# Patient Record
Sex: FEMALE | Race: WHITE | NOT HISPANIC OR LATINO | Employment: FULL TIME | URBAN - METROPOLITAN AREA
[De-identification: names, ages, dates, MRNs, and addresses within clinical notes are randomized per-mention and may not be internally consistent; named-entity substitution may affect disease eponyms.]

---

## 2017-01-13 ENCOUNTER — GENERIC CONVERSION - ENCOUNTER (OUTPATIENT)
Dept: OTHER | Facility: OTHER | Age: 62
End: 2017-01-13

## 2017-02-13 ENCOUNTER — GENERIC CONVERSION - ENCOUNTER (OUTPATIENT)
Dept: OTHER | Facility: OTHER | Age: 62
End: 2017-02-13

## 2017-03-16 ENCOUNTER — GENERIC CONVERSION - ENCOUNTER (OUTPATIENT)
Dept: OTHER | Facility: OTHER | Age: 62
End: 2017-03-16

## 2017-04-03 ENCOUNTER — ALLSCRIPTS OFFICE VISIT (OUTPATIENT)
Dept: OTHER | Facility: OTHER | Age: 62
End: 2017-04-03

## 2017-04-03 LAB
FLUAV AG SPEC QL IA: NEGATIVE
INFLUENZA B AG (HISTORICAL): NEGATIVE

## 2017-04-06 ENCOUNTER — ALLSCRIPTS OFFICE VISIT (OUTPATIENT)
Dept: OTHER | Facility: OTHER | Age: 62
End: 2017-04-06

## 2017-05-10 ENCOUNTER — ALLSCRIPTS OFFICE VISIT (OUTPATIENT)
Dept: OTHER | Facility: OTHER | Age: 62
End: 2017-05-10

## 2017-05-11 ENCOUNTER — GENERIC CONVERSION - ENCOUNTER (OUTPATIENT)
Dept: OTHER | Facility: OTHER | Age: 62
End: 2017-05-11

## 2017-05-12 ENCOUNTER — ALLSCRIPTS OFFICE VISIT (OUTPATIENT)
Dept: OTHER | Facility: OTHER | Age: 62
End: 2017-05-12

## 2017-05-15 ENCOUNTER — ALLSCRIPTS OFFICE VISIT (OUTPATIENT)
Dept: OTHER | Facility: OTHER | Age: 62
End: 2017-05-15

## 2017-05-23 ENCOUNTER — ALLSCRIPTS OFFICE VISIT (OUTPATIENT)
Dept: OTHER | Facility: OTHER | Age: 62
End: 2017-05-23

## 2017-05-23 ENCOUNTER — LAB REQUISITION (OUTPATIENT)
Dept: LAB | Facility: HOSPITAL | Age: 62
End: 2017-05-23
Payer: COMMERCIAL

## 2017-05-23 DIAGNOSIS — Z01.419 ENCOUNTER FOR GYNECOLOGICAL EXAMINATION WITHOUT ABNORMAL FINDING: ICD-10-CM

## 2017-05-23 PROCEDURE — 88175 CYTOPATH C/V AUTO FLUID REDO: CPT | Performed by: OBSTETRICS & GYNECOLOGY

## 2017-05-25 DIAGNOSIS — Z12.31 ENCOUNTER FOR SCREENING MAMMOGRAM FOR MALIGNANT NEOPLASM OF BREAST: ICD-10-CM

## 2017-06-01 LAB
LAB AP GYN PRIMARY INTERPRETATION: NORMAL
Lab: NORMAL

## 2017-06-02 ENCOUNTER — GENERIC CONVERSION - ENCOUNTER (OUTPATIENT)
Dept: OTHER | Facility: OTHER | Age: 62
End: 2017-06-02

## 2017-06-29 ENCOUNTER — ALLSCRIPTS OFFICE VISIT (OUTPATIENT)
Dept: RADIOLOGY | Facility: CLINIC | Age: 62
End: 2017-06-29
Payer: COMMERCIAL

## 2017-07-13 ENCOUNTER — GENERIC CONVERSION - ENCOUNTER (OUTPATIENT)
Dept: OTHER | Facility: OTHER | Age: 62
End: 2017-07-13

## 2017-09-21 ENCOUNTER — ALLSCRIPTS OFFICE VISIT (OUTPATIENT)
Dept: RADIOLOGY | Facility: CLINIC | Age: 62
End: 2017-09-21
Payer: COMMERCIAL

## 2017-10-09 ENCOUNTER — HOSPITAL ENCOUNTER (OUTPATIENT)
Dept: RADIOLOGY | Age: 62
Discharge: HOME | End: 2017-10-09
Payer: COMMERCIAL

## 2017-10-09 DIAGNOSIS — Z12.31 ENCOUNTER FOR SCREENING MAMMOGRAM FOR MALIGNANT NEOPLASM OF BREAST: ICD-10-CM

## 2017-10-09 PROCEDURE — 77063 BREAST TOMOSYNTHESIS BI: CPT

## 2017-10-09 PROCEDURE — G0202 SCR MAMMO BI INCL CAD: HCPCS

## 2017-10-13 ENCOUNTER — GENERIC CONVERSION - ENCOUNTER (OUTPATIENT)
Dept: OTHER | Facility: OTHER | Age: 62
End: 2017-10-13

## 2017-10-25 ENCOUNTER — TRANSCRIBE ORDERS (OUTPATIENT)
Dept: ADMINISTRATIVE | Facility: HOSPITAL | Age: 62
End: 2017-10-25

## 2017-10-25 DIAGNOSIS — M48.061 SPINAL STENOSIS, LUMBAR REGION, WITHOUT NEUROGENIC CLAUDICATION: Primary | ICD-10-CM

## 2017-11-07 ENCOUNTER — HOSPITAL ENCOUNTER (OUTPATIENT)
Dept: RADIOLOGY | Facility: HOSPITAL | Age: 62
Discharge: HOME | End: 2017-11-07
Attending: RADIOLOGY
Payer: COMMERCIAL

## 2017-11-07 ENCOUNTER — HOSPITAL ENCOUNTER (OUTPATIENT)
Dept: RADIOLOGY | Facility: HOSPITAL | Age: 62
Discharge: HOME | End: 2017-11-07
Payer: COMMERCIAL

## 2017-11-07 DIAGNOSIS — M48.061 SPINAL STENOSIS, LUMBAR REGION, WITHOUT NEUROGENIC CLAUDICATION: ICD-10-CM

## 2017-11-07 DIAGNOSIS — M48.061 SPINAL STENOSIS OF LUMBAR REGION WITHOUT NEUROGENIC CLAUDICATION: ICD-10-CM

## 2017-11-07 PROCEDURE — 72148 MRI LUMBAR SPINE W/O DYE: CPT

## 2017-11-15 ENCOUNTER — ALLSCRIPTS OFFICE VISIT (OUTPATIENT)
Dept: RADIOLOGY | Facility: CLINIC | Age: 62
End: 2017-11-15
Payer: COMMERCIAL

## 2017-11-20 ENCOUNTER — ALLSCRIPTS OFFICE VISIT (OUTPATIENT)
Dept: OTHER | Facility: OTHER | Age: 62
End: 2017-11-20

## 2017-11-20 ENCOUNTER — APPOINTMENT (OUTPATIENT)
Dept: RADIOLOGY | Age: 62
End: 2017-11-20
Payer: COMMERCIAL

## 2017-11-20 ENCOUNTER — TRANSCRIBE ORDERS (OUTPATIENT)
Dept: ADMINISTRATIVE | Age: 62
End: 2017-11-20

## 2017-11-20 DIAGNOSIS — M25.561 PAIN IN RIGHT KNEE: ICD-10-CM

## 2017-11-20 DIAGNOSIS — S89.91XA INJURY OF RIGHT LOWER LEG: ICD-10-CM

## 2017-11-20 PROCEDURE — 73562 X-RAY EXAM OF KNEE 3: CPT

## 2017-11-21 NOTE — PROGRESS NOTES
Assessment    1  Lumbar stenosis without neurogenic claudication (724 02) (M48 061)   2  Acute pain of right knee (719 46) (M25 561)   3  Benign essential HTN (401 1) (I10)    Plan  Acute pain of right knee    · * XR KNEE 3 VW RIGHT NON INJURY; Status:Active; Requested for:20Nov2017; Anxiety    · BusPIRone HCl - 5 MG Oral Tablet; TAKE 2 TABLETS AT NIGHT   · LORazepam 1 MG Oral Tablet; TAKE 1  5  tablets  AT BEDTIME AS NEEDED  Benign essential HTN    · Quinapril HCl - 10 MG Oral Tablet; TAKE 1 TABLET DAILY  Myofascial pain syndrome    · TiZANidine HCl - 4 MG Oral Capsule; TAKE 1 5 CAPSULE Daily as needed forpain    Discussion/Summary    Will obtain x-rays of the right knee  If there is no significant bony pathology present we will consider intra-articular steroid injection for pain relief  Chief Complaint  Pt presents for right knee pain, pains to walk and when laying downover a week ago but got worst last Saturday  Pain scale 6/10      History of Present Illness  HPI: The patient presents to the office with complaints of recent onset of right knee pain  She has a history of spinal stenosis and chronic left-sided sciatica  She has a spinal stimulator in place and recently had some modifications done to the settings of the spinal stimulator which improved her left-sided sciatic pain  Consequent to her left-sided sciatic pain she was probably favoring her left side and bearing most of her weight on her right side and as a consequence has developed some right knee pain  She had some company visiting her over the weekend and was on her feet quite a bit and states that by the end of the weekend she could barely stand up the pain was so severe in her right knee  She denies any redness or swelling but she continues to have some pain and discomfort in her knee which she describes at a level of 6/10 currently  She denies any instability of the knee or difficulty maintaining her weight        Review of Systems Constitutional: No fever, no chills, feels well, no tiredness, no recent weight gain or loss  ENT: no ear ache, no loss of hearing, no nosebleeds or nasal discharge, no sore throat or hoarseness  Cardiovascular: no complaints of slow or fast heart rate, no chest pain, no palpitations, no leg claudication or lower extremity edema  Respiratory: no complaints of shortness of breath, no wheezing, no dyspnea on exertion, no orthopnea or PND  Breasts: no complaints of breast pain, breast lump or nipple discharge  Genitourinary: no complaints of dysuria, no incontinence, no pelvic pain, no dysmenorrhea, no vaginal discharge or abnormal vaginal bleeding  Musculoskeletal: as noted in HPI  Neurological: no complaints of headache, no confusion, no numbness or tingling, no dizziness or fainting  Active Problems  1  Anxiety (300 00) (F41 9)   2  Benign essential HTN (401 1) (I10)   3  Cervical radiculopathy (723 4) (M54 12)   4  Disc disorder of cervical region (722 91) (M50 90)   5  Hormone replacement therapy (postmenopausal) (V07 4) (Z79 890)   6  Hot flash, menopausal (627 2) (N95 1)   7  Lumbar degenerative disc disease (722 52) (M51 36)   8  Lumbar facet arthropathy (721 3) (M12 88)   9  Lumbar radiculopathy (724 4) (M54 16)   10  Lumbar stenosis without neurogenic claudication (724 02) (M48 061)   11  Myofascial pain syndrome (729 1) (M79 1)   12  Pain syndrome, chronic (338 4) (G89 4)   13  Positive depression screening (796 4) (Z13 89)   14  Sacroiliitis (720 2) (M46 1)    Social History     · Denied: History of Alcohol Use (History)   · Daily caffeinated cola consumption   · Daily Coffee Consumption (3  Cups/Day)   · Denied: History of Drug Use   · Marital History - Currently    · Never A Smoker   · Occasional alcohol use   · Occupation:   · Business owner, [employer]   · Working Full Time  The social history was reviewed and is unchanged  Current Meds   1   Advil 200 MG Oral Tablet; TAKE 3 Respiratory effort: No increased work of breathing or signs of respiratory distress  Cardiovascular  Examination of extremities for edema and/or varicosities: Normal    Musculoskeletal  Gait and station: Normal    Digits and nails: Normal without clubbing or cyanosis  Inspection/palpation of joints, bones, and muscles: Abnormal  -- Mild arthritic changes in both knees with some medial tenderness on palpation of the right knee  No joint instability  Moderate crepitus was noted on range of motion of the right knee compared to the left  No evidence of joint effusion  Skin  Skin and subcutaneous tissue: Normal without rashes or lesions     Neurologic No focal neurological   Psychiatric  Orientation to person, place, and time: Normal    Mood and affect: Normal          Future Appointments    Date/Time Provider Specialty Site   05/25/2018 10:30 AM Avila Barber MD Obstetrics/Gynecology [facility]       Signatures   Electronically signed by : Kenyatta Rayo MD; Nov 20 2017  4:13PM EST                       (Author)

## 2017-11-22 ENCOUNTER — APPOINTMENT (EMERGENCY)
Dept: RADIOLOGY | Facility: HOSPITAL | Age: 62
End: 2017-11-22
Payer: COMMERCIAL

## 2017-11-22 ENCOUNTER — HOSPITAL ENCOUNTER (EMERGENCY)
Facility: HOSPITAL | Age: 62
Discharge: HOME | End: 2017-11-22
Attending: EMERGENCY MEDICINE | Admitting: EMERGENCY MEDICINE
Payer: COMMERCIAL

## 2017-11-22 ENCOUNTER — GENERIC CONVERSION - ENCOUNTER (OUTPATIENT)
Dept: OTHER | Facility: OTHER | Age: 62
End: 2017-11-22

## 2017-11-22 VITALS
SYSTOLIC BLOOD PRESSURE: 179 MMHG | RESPIRATION RATE: 18 BRPM | WEIGHT: 205 LBS | BODY MASS INDEX: 33.09 KG/M2 | OXYGEN SATURATION: 99 % | DIASTOLIC BLOOD PRESSURE: 84 MMHG | TEMPERATURE: 97.8 F | HEART RATE: 61 BPM

## 2017-11-22 DIAGNOSIS — M25.561 RIGHT KNEE PAIN: ICD-10-CM

## 2017-11-22 DIAGNOSIS — M19.90 DEGENERATIVE JOINT DISEASE: Primary | ICD-10-CM

## 2017-11-22 PROCEDURE — 99283 EMERGENCY DEPT VISIT LOW MDM: CPT

## 2017-11-22 PROCEDURE — 73564 X-RAY EXAM KNEE 4 OR MORE: CPT | Performed by: EMERGENCY MEDICINE

## 2017-11-22 RX ORDER — PREDNISONE 50 MG/1
50 TABLET ORAL DAILY
Qty: 5 TABLET | Refills: 0 | Status: SHIPPED | OUTPATIENT
Start: 2017-11-22 | End: 2017-11-27

## 2017-11-23 NOTE — DISCHARGE INSTRUCTIONS
Arthralgia   WHAT YOU NEED TO KNOW:   Arthralgia is pain in one or more joints, with no inflammation  It may be short-term and get better within 6 to 8 weeks  Arthralgia can be an early sign of arthritis  Arthralgia may be caused by a medical condition, such as a hormone disorder or a tumor  It may also be caused by an infection or injury  DISCHARGE INSTRUCTIONS:   Medicines: The following medicines may  be ordered for you:  · Acetaminophen  decreases pain  Ask how much to take and how often to take it  Follow directions  Acetaminophen can cause liver damage if not taken correctly  · NSAIDs  decrease pain and prevent swelling  Ask your healthcare provider which medicine is right for you  Ask how much to take and when to take it  Take as directed  NSAIDs can cause stomach bleeding and kidney problems if not taken correctly  · Pain relief cream  decreases pain  Use this cream as directed  · Take your medicine as directed  Contact your healthcare provider if you think your medicine is not helping or if you have side effects  Tell him of her if you are allergic to any medicine  Keep a list of the medicines, vitamins, and herbs you take  Include the amounts, and when and why you take them  Bring the list or the pill bottles to follow-up visits  Carry your medicine list with you in case of an emergency  Follow up with your healthcare provider or specialist as directed:  Write down your questions so you remember to ask them during your visits  Self-care:   · Apply heat  to help decrease pain  Use a heating pad or heat wrap  Apply heat for 20 to 30 minutes every 2 hours for as many days as directed  · Rest  as much as possible  Avoid activities that cause joint pain  · Apply ice  to help decrease swelling and pain  Ice may also help prevent tissue damage  Use an ice pack, or put crushed ice in a plastic bag   Cover it with a towel and place it on your painful joint for 15 to 20 minutes every hour or as directed  · Support  the joint with a brace or elastic wrap as directed  · Elevate  your joint above the level of your heart as often as you can to help decrease swelling and pain  Prop your painful joint on pillows or blankets to keep it elevated comfortably  · Lose weight  if you are overweight  Extra weight can put pressure on your joints and cause more pain  Ask your healthcare provider how much you should weigh  Ask him to help you create a weight loss plan  · Exercise  regularly to help improve joint movement and to decrease pain  Ask about the best exercise plan for you  Low-impact exercises can help take the pressure off your joints  Examples are walking, swimming, and water aerobics  Physical therapy:  A physical therapist teaches you exercises to help improve movement and strength, and to decrease pain  Ask your healthcare provider if physical therapy is right for you  Contact your healthcare provider or specialist if:   · You have a fever  · You continue to have joint pain that cannot be relieved with heat, ice, or medicine  · You have pain and inflammation around your joint  · You have questions or concerns about your condition or care  Return to the emergency department if:   · You have sudden, severe pain when you move your joint  · You have a fever and shaking chills  · You cannot move your joint  · You lose feeling on the side of your body where you have the painful joint  © 2017 [address]  Information is for End User's use only and may not be sold, redistributed or otherwise used for commercial purposes  All illustrations and images included in CareNotes® are the copyrighted property of A D A M , Inc  or Yobany Betancur  The above information is an  only  It is not intended as medical advice for individual conditions or treatments   Talk to your doctor, nurse or pharmacist before following any medical regimen to see if it is safe and effective for you  Knee Pain   Cal DEAN & Lottie BONDS: Hip and Knee Pain  In: Austen Quinteros, Luis GARCÍA, Murray, et al, eds  [employer]'s Textbook of Rheumatology, 9th ed  [address], [place], [place], 2013, [place] [phone]  Odilia Guzman, Von, Arjun MORALES, et al: Knee Pain  In: [employer], ed  Current Therapy in Pain, 1st ed  [address], [place], [place], 2009, [place] [phone]  Rachana Orta: Knee Pain  In: Mono FJ, ed  The 5-Minute Clinical Consult 2012, 20th ed  [address] [facility] [facility], [place], [place], 2012, [place] [phone]  © 2017 [address] [facility] Information is for End User's use only and may not be sold, redistributed or otherwise used for commercial purposes  All illustrations and images included in CareNotes® are the copyrighted property of A D A M , Inc  or [employer]  The above information is an  only  It is not intended as medical advice for individual conditions or treatments  Talk to your doctor, nurse or pharmacist before following any medical regimen to see if it is safe and effective for you  Osteoarthritis, Ambulatory Care   GENERAL INFORMATION:   Osteoarthritis  occurs when cartilage (tissue that cushions a joint) wears away slowly and causes the bones to rub together  Osteoarthritis (OA) is a long-term condition that often affects the hands, neck, lower back, knees, and hips  OA is also called arthrosis or degenerative joint disease    Common symptoms include the following:   · Joint pain that gets worse when you move the joint     · Joint stiffness that decreases after you move the joint     · Decreased range of movement     · Hard, bony enlargement on your fingers or toes    · A grinding or cracking sound when you move your joint  Seek immediate care for the following symptoms:   · Severe pain    · Not able to move your joint  Treatment for osteoarthritis  may include any of the following:  · Acetaminophen  is used to decrease pain  It is available without a doctor's order  Ask how much to take and how often to take it  Follow directions  Acetaminophen can cause liver damage if not taken correctly  · NSAIDs  help decrease swelling and pain or fever  This medicine is available with or without a doctor's order  NSAIDs can cause stomach bleeding or kidney problems in certain people  If you take blood thinner medicine, always ask your healthcare provider if NSAIDs are safe for you  Always read the medicine label and follow directions  · Capsaicin cream  may help decrease pain in your joint  · Prescription pain medicine  may be given to decrease severe pain if other medicines do not work  Take the medicine as directed  Do not wait until the pain is severe before you take your medicine  · A steroid injection  may be given if your symptoms get worse  · Physical therapy  may be ordered by your healthcare provider  A physical therapist teaches you exercises to help improve movement and strength, and to decrease pain in your joints  · Surgery  may be needed if other treatments do not work  Manage osteoarthritis   · Stay active  Physical activity may reduce your pain and improve your ability to do daily activities  Avoid activities that cause pain  Ask your healthcare provider what type of exercise would be best for you  · Maintain a healthy weight  This helps decrease the strain on the joints in your back, hips, knees, ankles, and feet  Ask your healthcare provider how much you should weigh  Ask him to help you create a weight loss plan if you are overweight  · Use heat or ice  on your joints as directed  Heat and ice help decrease pain, swelling, and muscle spasms  Use a heating pad on a low setting or take a warm bath  Use an ice pack, or put crushed ice in a plastic bag  Cover it with a towel  · Massage  the muscles around the joint to relieve pain and stiffness      · Use a cane, crutches, or a walker to protect and relieve pressure on your ankle, knee, and hip joints  You may also be prescribed shoe inserts to decrease pressure in your joints  · Wear flat or low-heeled shoes  This will help decrease pain and reduce pressure on your ankle, knee, and hip joints  Follow up with your healthcare provider as directed:  Write down your questions so you remember to ask them during your visits  CARE AGREEMENT:   You have the right to help plan your care  Learn about your health condition and how it may be treated  Discuss treatment options with your caregivers to decide what care you want to receive  You always have the right to refuse treatment  The above information is an  only  It is not intended as medical advice for individual conditions or treatments  Talk to your doctor, nurse or pharmacist before following any medical regimen to see if it is safe and effective for you  © 2014 [address] is for End User's use only and may not be sold, redistributed or otherwise used for commercial purposes  All illustrations and images included in CareNotes® are the copyrighted property of A D A M , Inc  or Yobany Betancur

## 2017-11-27 ENCOUNTER — GENERIC CONVERSION - ENCOUNTER (OUTPATIENT)
Dept: OTHER | Facility: OTHER | Age: 62
End: 2017-11-27

## 2017-11-28 ENCOUNTER — GENERIC CONVERSION - ENCOUNTER (OUTPATIENT)
Dept: OTHER | Facility: OTHER | Age: 62
End: 2017-11-28

## 2017-11-28 ENCOUNTER — TRANSCRIBE ORDERS (OUTPATIENT)
Dept: ADMINISTRATIVE | Facility: HOSPITAL | Age: 62
End: 2017-11-28

## 2017-11-28 DIAGNOSIS — S89.91XA INJURY OF RIGHT KNEE, INITIAL ENCOUNTER: Primary | ICD-10-CM

## 2017-12-07 ENCOUNTER — HOSPITAL ENCOUNTER (OUTPATIENT)
Dept: RADIOLOGY | Facility: HOSPITAL | Age: 62
Discharge: HOME | End: 2017-12-07
Payer: COMMERCIAL

## 2017-12-07 ENCOUNTER — GENERIC CONVERSION - ENCOUNTER (OUTPATIENT)
Dept: OTHER | Facility: OTHER | Age: 62
End: 2017-12-07

## 2017-12-07 DIAGNOSIS — S89.91XA INJURY OF RIGHT LOWER LEG: ICD-10-CM

## 2017-12-07 PROCEDURE — 73721 MRI JNT OF LWR EXTRE W/O DYE: CPT

## 2017-12-08 ENCOUNTER — ALLSCRIPTS OFFICE VISIT (OUTPATIENT)
Dept: OTHER | Facility: OTHER | Age: 62
End: 2017-12-08

## 2017-12-13 ENCOUNTER — GENERIC CONVERSION - ENCOUNTER (OUTPATIENT)
Dept: OTHER | Facility: OTHER | Age: 62
End: 2017-12-13

## 2017-12-21 ENCOUNTER — ALLSCRIPTS OFFICE VISIT (OUTPATIENT)
Dept: OTHER | Facility: OTHER | Age: 62
End: 2017-12-21

## 2017-12-21 DIAGNOSIS — M54.16 RADICULOPATHY OF LUMBAR REGION: ICD-10-CM

## 2017-12-21 DIAGNOSIS — E66.9 OBESITY: ICD-10-CM

## 2017-12-21 DIAGNOSIS — M54.12 RADICULOPATHY OF CERVICAL REGION: ICD-10-CM

## 2017-12-21 DIAGNOSIS — S83.241A OTHER TEAR OF MEDIAL MENISCUS, CURRENT INJURY, RIGHT KNEE, INITIAL ENCOUNTER: ICD-10-CM

## 2017-12-21 DIAGNOSIS — M17.11 PRIMARY OSTEOARTHRITIS OF RIGHT KNEE: ICD-10-CM

## 2017-12-21 DIAGNOSIS — M25.561 PAIN IN RIGHT KNEE: ICD-10-CM

## 2017-12-21 DIAGNOSIS — M51.36 OTHER INTERVERTEBRAL DISC DEGENERATION, LUMBAR REGION: ICD-10-CM

## 2017-12-21 DIAGNOSIS — F41.9 ANXIETY DISORDER: ICD-10-CM

## 2017-12-21 DIAGNOSIS — M50.90 CERVICAL DISC DISORDER: ICD-10-CM

## 2017-12-23 NOTE — PROGRESS NOTES
Assessment   1  Acute pain of right knee (079 46) (M25 561)   2  Tear of medial meniscus of right knee, current, unspecified tear type, initial encounter     (836 0) (S83 241A)   3  Primary osteoarthritis of right knee (715 16) (M17 11)    Plan   Acute pain of right knee, Anxiety, Primary osteoarthritis of right knee, Tear of medial    meniscus of right knee, current, unspecified tear type, initial encounter    · Meloxicam 15 MG Oral Tablet; TAKE 1 TABLET DAILY WITH FOOD   · *1 - [facility]  * right knee osteoarthritis, medial meniscus    tear, patellofemoral syndrome    Weight bearing as tolerated    VMO strengthening    Use all modalities as seen fit  2-3 times a week for 6-8 weeks    Thank you  Status: Active  Requested for: 29Pdp9949  Care Summary provided  : Yes    Discussion/Summary   Right knee DJD with possible acute tear of the meniscus  Plan is as follows:          Weightbearing as tolerated          Discussed curve sedative and operative interventions at this time we will commence with conservative treatment          Outpatient physical therapy          Follow-up in 2 months          The patient continues to be symptomatic, then we will discuss possible surgical intervention which may include arthroscopic surgery with partial medial meniscectomy          Discussed treatment plan with patient and she is in agreement treatment plan  Thank you         Chief Complaint   1  Knee Pain    History of Present Illness   HPI: 51-year-old female presents at this time secondary to right knee pain  Patient states that she has had some knee pain in the past and was scheduled for steroid injection by her pain management physician  She states that she was going downstairs but she forgot her fall  When she twisted her knee to go back of states she felt the pop and some swelling medially in the right knee  She states that the pain was localized over the medial side   She did receive steroid injections and states that she has felt much better  She denies any numbness tingling fevers chills  Review of Systems      ROS reviewed  Active Problems   1  Acute pain of right knee (719 46) (M25 561)   2  Anxiety (300 00) (F41 9)   3  Benign essential HTN (401 1) (I10)   4  Cervical radiculopathy (723 4) (M54 12)   5  Disc disorder of cervical region (722 91) (M50 90)   6  Hormone replacement therapy (postmenopausal) (V07 4) (Z79 890)   7  Hot flash, menopausal (627 2) (N95 1)   8  Lumbar degenerative disc disease (722 52) (M51 36)   9  Lumbar facet arthropathy (721 3) (M46 96)   10  Lumbar radiculopathy (724 4) (M54 16)   11  Lumbar stenosis without neurogenic claudication (724 02) (M48 061)   12  Myofascial pain syndrome (729 1) (M79 1)   13  Pain syndrome, chronic (338 4) (G89 4)   14  Positive depression screening (796 4) (Z13 89)   15  Primary osteoarthritis of right knee (715 16) (M17 11)   16  Right knee injury (959 7) (S89 91XA)   17  Sacroiliitis (720 2) (M46 1)   18   Tear of medial meniscus of right knee, current, unspecified tear type, initial encounter      (836 0) (B94 989Q)    Past Medical History    · History of Asymptomatic menopausal state (V49 81) (Z78 0)   · History of Cellulitis (682 9) (L03 90)   · History of Cervical radiculopathy (723 4) (M54 12)   · History of Cough (786 2) (R05)   · History of Ecchymoses, spontaneous (782 7) (R23 3)   · History of Encounter for screening mammogram for malignant neoplasm of breast    (V76 12) (Z12 31)   · History of acute bronchitis (V12 69) (Z87 09)   · History of cataract (V12 49) (Z86 69)   · History of herpes simplex infection (V12 09) (Z86 19)   · History of herpes simplex infection (V12 09) (Z86 19)   · History of hypertension (V12 59) (Z86 79)   · History of infectious mononucleosis (V12 09) (Z86 19)   · History of insomnia (V13 89) (Z87 898)   · History of low back pain (V13 59) (Z87 39)   · History of retinal detachment (V12 49) (Z86 69)   · History of thyroid disease (V12 29) (Z86 39)   · History of tonsillitis (V12 69) (Z87 09)   · History of vertigo (V12 49) (D52 665)   · History of Joint pain, knee (719 46) (M25 569)   · History of Laceration Of Eyelid (870 8)   · History of Lumbago With Sciatica (724 3)   · History of Menopause (627 2)   · History of Osteoarthritis of shoulder region, unspecified laterality   · Other muscle spasm (728 85) (S16 048)   · History of Other muscle spasm (728 85) (Q64 296)   · History of Pain in joint of right shoulder region (719 41) (M25 511)   · History of Pap smear, as part of routine gynecological examination (V76 2) (Z01 419)   · History of Plantar fasciitis (728 71) (M72 2)   · History of Preop examination (V72 84) (Z01 818)   · History of Ptosis of both eyelids (374 30) (H02 403)   · History of Ptosis of left eyelid (374 30) (H02 402)   · History of 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)   · History of Retinal tear of right eye (361 00) (H33 311)   · History of Screening for colon cancer (V76 51) (Z12 11)   · History of Shoulder Joint Disorder (719 91)   · History of Sprained Right Shoulder (840 9)   · History of Status post surgery (V45 89) (Z98 890)   · History of Thyrotoxicosis (242 90)   · 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    (242 41)   · History of Viral URI (465 9) (J06 9,B97 89)   · History of Visit for screening mammogram (V76 12) (Z12 31)     The active problems and past medical history were reviewed and updated today  Surgical History    · History of Denial Of Any Significant Medical History   · History of Implantation Of Intraspinal Neurostimulator By Laminectomy Epidural     The surgical history was reviewed and updated today         Family History   Mother    · Family history of Breast Cancer (V16 3)   · Family history of Chronic Obstructive Pulmonary Disease   · Family history of Essential Hypertension   · Family history of Family Health Status 1  Children Living   · Family history of Hypertension (V17 49)  Father    · Family history of Acute Myocardial Infarction (V17 3)   · Family history of Emphysema   · Family history of Essential Hypertension   · Family history of Prehypertension  Maternal Uncle    · Family history of Liver Cancer     The family history was reviewed and updated today  Social History    · Denied: History of Alcohol Use (History)   · Daily caffeinated cola consumption   · Daily Coffee Consumption (3  Cups/Day)   · Denied: History of Drug Use   · Marital History - Currently    · Never A Smoker   · Occasional alcohol use   · Occupation:   · Business owner, [employer]   · Working Full Time  The social history was reviewed and updated today  Current Meds    1  Advil 200 MG Oral Tablet; TAKE 3 TABLET 3 times daily; Therapy: (Recorded:32Ccd2590) to Recorded   2  BusPIRone HCl - 5 MG Oral Tablet; TAKE 2 TABLETS AT NIGHT; Therapy: 00EJV6382 to (Evaluate:19May2018)  Requested for: 77NJE9995; Last     Rx:20Nov2017 Ordered   3  Denavir 1 % External Cream; APPLY TOPICALLY DAILY AS NEEDED; Therapy: 27Ciw9327 to (Last Rx:28Vbp4512)  Requested for: 89Msa0227 Ordered   4  LORazepam 1 MG Oral Tablet; TAKE 1  5  tablets  AT BEDTIME AS NEEDED  Requested     for: 69LJS8480; Last Rx:20Nov2017 Ordered   5  Lyrica 50 MG Oral Capsule; TAKE 1 TAB IN AM AND 2 TABS QHS; Therapy: 48CNH0609 to (Evaluate:05Jun2018); Last Rx:34Ytz5943 Ordered   6  MethIMAzole 5 MG Oral Tablet; Take one tablet daily; Therapy: (Recorded:31Cdl6484) to Recorded   7  Propranolol HCl - 10 MG Oral Tablet; TAKE 1 TABLET TWICE DAILY  Requested for:     88TBZ7139; Last Rx:08Wyb0978 Ordered   8  Quinapril HCl - 10 MG Oral Tablet; TAKE 1 TABLET DAILY; Therapy: 23SZV7814 to (Evaluate:19May2018)  Requested for: 42ZXQ3289; Last     Rx:20Nov2017 Ordered   9   TiZANidine HCl - 4 MG Oral Capsule; TAKE 1 5 CAPSULE Daily as needed for pain Requested for: 20Nov2017; Last Rx:19Pjy9695 Ordered     The medication list was reviewed and updated today  Allergies   1  Adhesive Tape TAPE   2  Flexeril TABS   3  Naproxen Sodium TABS   4  Penicillins   5  Promethazine-Codeine SYRP   6  Tramadol    Vitals   Signs   Heart Rate: 72  Respiration: 18  Systolic: 692  Diastolic: 86  Height: 5 ft 6 in  Weight: 215 lb 6 08 oz  BMI Calculated: 34 76  BSA Calculated: 2 07    Physical Exam   Right knee:  No abrasions, no open wounds, medial joint line tenderness, very mild lateral joint line tenderness, no pain with valgus or varus stress, stable to valgus and varus stress, negative Lachman test, negative posterior drawer test, otherwise neurologically and vascularly intact distally         Constitutional - General appearance: Normal       Musculoskeletal - Gait and station: Abnormal -- Digits and nails: Normal -- Muscle strength/tone: Normal -- Lower extremity compartments: Normal       Cardiovascular - Pulses: Normal -- Examination of extremities for edema and/or varicosities: Normal       Skin - Skin and subcutaneous tissue: Normal       Neurologic - Sensation: Normal       Psychiatric - Orientation to person, place, and time: Normal -- Mood and affect: Normal       Results/Data   I personally reviewed the films/images/results in the office today  My interpretation follows  X-ray Review Right knee: Mild osteoarthritic changes with mild medial joint space narrowing  MRI Review MRI of right knee shows radial tear of the medial meniscus tricompartmental osteoarthritic changes  Future Appointments      Date/Time Provider Specialty Site   02/27/2018 10:00 AM CHANTALE Arroyo   Orthopedic Surgery [address]   12/27/2017 11:45 AM Elisabet Gonzalez MD Internal Medicine [facility]   05/25/2018 10:30 AM Tito Uribe MD Obstetrics/Gynecology [facility]     Signatures    Electronically signed by : Celso Gonzales CHANTALE Dial ; Dec 22 2017 11:13AM EST                       (Author)

## 2017-12-27 ENCOUNTER — GENERIC CONVERSION - ENCOUNTER (OUTPATIENT)
Dept: OTHER | Facility: OTHER | Age: 62
End: 2017-12-27

## 2018-01-05 ENCOUNTER — GENERIC CONVERSION - ENCOUNTER (OUTPATIENT)
Dept: OTHER | Facility: OTHER | Age: 63
End: 2018-01-05

## 2018-01-09 ENCOUNTER — GENERIC CONVERSION - ENCOUNTER (OUTPATIENT)
Dept: OTHER | Facility: OTHER | Age: 63
End: 2018-01-09

## 2018-01-10 ENCOUNTER — GENERIC CONVERSION - ENCOUNTER (OUTPATIENT)
Dept: OBGYN CLINIC | Facility: HOSPITAL | Age: 63
End: 2018-01-10

## 2018-01-10 NOTE — MISCELLANEOUS
Message   Recorded as Task   Date: 02/05/2016 08:59 AM, Created By: Hillary Ferrera   Task Name: Medical Complaint Callback   Assigned To: Hillary Ferrera   Regarding Patient: Pamela Mahmood, Status: Active   Comment:    Costenbader,Rondel - 05 Feb 2016 8:59 AM     TASK CREATED  Pt called and requested an appt for incision check  States her battery site incision "looks worse"  Swollen and very tender  Appt scheduled with Jodi Pereyra for 2pm today          Signatures   Electronically signed by : Leeann Avalos, ; Feb 5 2016  9:00AM EST                       (Author)

## 2018-01-10 NOTE — PROGRESS NOTES
Assessment    1  Pre-op evaluation (V72 84) (Z01 818)   2  Benign essential HTN (401 1) (I10)   3  Lumbar degenerative disc disease (722 52) (M51 36)    Discussion/Summary  Surgical Clearance: She is at a LOW TO MODERATE risk from a cardiovascular standpoint at this time without any additional cardiac testing  Reevaluation needed, if she should present with symptoms prior to surgery/procedure  Released from Work: Unable to return to work until further notice  Reviewed MRI results and blood work with patient  Educated patient to make follow up apt with Dr Wandy Silva for routine health maintenance  The patient was counseled regarding diagnostic results, instructions for management, risk factor reductions, prognosis, patient and family education, impressions, risks and benefits of treatment options, importance of compliance with treatment  Chief Complaint  Patient is her for a Pre-Op exam  She is scheduled for placement of a thoracic spinal cord stimulator with left buttock internal pulse generator on 1/26/2016 by CHANTALE Lynch  at [facility]  Labs for clearance are located in patient's chart  History of Present Illness  Pre-Op Visit (Brief): The patient is being seen for a preoperative visit  The procedure is a(n) Placement of a thoracic spinal cord stimulator with left buttock internal pulse generator scheduled for 1/26/16 with Courtney Maria  The indication for surgery is Thoracic spine pain  Surgical Risk Assessment:   Prior Anesthesia: She had prior anesthesia and no prior adverse reaction to general anesthesia  Pertinent Past Medical History: Hyperthyroidism, Chronic back pain, HTN  Exercise Capacity: able to walk four blocks without symptoms and able to walk two flights of stairs without symptoms  Lifestyle Factors: denies alcohol use, denies tobacco use and denies illegal drug use  Symptoms: no symptoms   Other VICKEY risk factors include age over 48, but normal BMI, female gender and normal neck circumference  Predicted risk of VICKEY: None  Pertinent Family History: Heart disease, empyemaischemic heart disease, but no family history of an adverse reaction to anesthesia, no aneurysm, no bleeding problems, no sudden early deaths and no stroke  Living Situation: home is secure and supportive and no post-op concerns with her living situation  Review of Systems    Constitutional: no fever, not feeling poorly, no chills and not feeling tired  Eyes: no eyesight problems  ENT: no sore throat  Cardiovascular: no chest pain and no palpitations  Respiratory: no shortness of breath, no cough and no wheezing  Gastrointestinal: no abdominal pain, no nausea and no diarrhea  Musculoskeletal: arthralgias and Chronic lower back pain, but as noted in HPI and no myalgias  Integumentary: no rashes  Neurological: no headache  Psychiatric: no anxiety and no depression  Hematologic/Lymphatic: no tendency for easy bleeding and no tendency for easy bruising  ROS reviewed  Active Problems    1  Anxiety (300 00) (F41 9)   2  Benign essential HTN (401 1) (I10)   3  Cervical radiculopathy (723 4) (M54 12)   4  Degeneration of cervical intervertebral disc (722 4) (M50 90)   5  Hormone replacement therapy (postmenopausal) (V07 4) (Z79 890)   6  Low back pain (724 2) (M54 5)   7  Lumbar degenerative disc disease (722 52) (M51 36)   8  Lumbar facet arthropathy (721 3) (M47 816)   9  Lumbar radiculopathy (724 4) (M54 16)   10  Lumbar stenosis without neurogenic claudication (724 02) (M48 06)   11  Myofascial pain syndrome (729 1) (M79 1)   12  Pain syndrome, chronic (338 4) (G89 4)   13   Sacroiliitis (720 2) (M46 1)    Past Medical History    · History of Asymptomatic menopausal state (V49 81) (Z78 0)   · History of Cellulitis (682 9) (L03 90)   · History of Cervical radiculopathy (723 4) (M54 12)   · History of Cough (786 2) (R05)   · History of Ecchymoses, spontaneous (782 7) (R23 3)   · History of Encounter for screening mammogram for malignant neoplasm of breast  (V76 12) (Z12 31)   · History of acute bronchitis (V12 69) (Z87 09)   · History of cataract (V12 49) (Z86 69)   · History of herpes simplex infection (V12 09) (Z86 19)   · History of herpes simplex infection (V12 09) (Z86 19)   · History of hypertension (V12 59) (Z86 79)   · History of infectious mononucleosis (V12 09) (Z86 19)   · History of insomnia (V13 89) (Z87 898)   · History of retinal detachment (V12 49) (Z86 69)   · History of thyroid disease (V12 29) (Z86 39)   · History of vertigo (V12 49) (Z87 898)   · History of Joint pain, knee (719 46) (M25 569)   · History of Laceration Of Eyelid (870 8)   · History of Lumbago With Sciatica (724 3)   · History of Menopause (627 2)   · History of Osteoarthritis of shoulder region, unspecified laterality   · Other muscle spasm (728 85) (I17 541)   · History of Other muscle spasm (728 85) (X18 609)   · History of Pain in joint of right shoulder region (719 41) (M25 511)   · History of Pap smear, as part of routine gynecological examination (V76 2) (Z12 4)   · History of Plantar fasciitis (728 71) (M72 2)   · History of Preop examination (V72 84) (Z01 818)   · History of Ptosis of left eyelid (374 30) (H02 402)   · History of Retinal tear of right eye (361 00) (H33 001)   · History of Screening for colon cancer (V76 51) (Z12 11)   · History of Shoulder Joint Disorder (719 91)   · History of Sprained Right Shoulder (840 9)   · History of Status post surgery (V45 89) (Z98 89)   · History of Thyrotoxicosis (242 90)   · 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  (242 41)   · History of Visit for screening mammogram (V76 12) (Z12 31)    The active problems and past medical history were reviewed and updated today        Surgical History    · History of Denial Of Any Significant Medical History    The surgical history was reviewed and updated today  Family History    · Family history of Breast Cancer (V16 3)   · Family history of Chronic Obstructive Pulmonary Disease   · Family history of Essential Hypertension   · Family history of Family Health Status 1  Children Living   · Family history of Hypertension (V17 49)    · Family history of Acute Myocardial Infarction (V17 3)   · Family history of Emphysema   · Family history of Essential Hypertension   · Family history of Prehypertension    · Family history of Liver Cancer    The family history was reviewed and updated today  Social History    · Denied: History of Alcohol Use (History)   · Daily caffeinated cola consumption   · Daily Coffee Consumption (3  Cups/Day)   · Denied: History of Drug Use   · Marital History - Currently    · Never A Smoker   · Occasional alcohol use   · Occupation:   · Business owner, [employer]   · Working Full Time  The social history was reviewed and updated today  Current Meds   1  BusPIRone HCl - 5 MG Oral Tablet; TAKE 2 TABLETS AT NIGHT; Therapy: 93RUA5241 to (Evaluate:16Mar2016)  Requested for: 12Jan2016; Last   Rx:18Sep2015; Status: ACTIVE - Renewal Denied Ordered   2  Estradiol 0 5 MG Oral Tablet; take 1 tablet twice a day; Therapy: 21HLO4844 to (Last Rx:11Nov2015)  Requested for: 12VXY6396 Ordered   3  LORazepam 1 MG Oral Tablet; TAKE 1  5  tablets  AT BEDTIME AS NEEDED  Requested   for: 12Jan2016; Last ER:24VON4038; Status: ACTIVE - Renewal Denied Ordered   4  Lyrica 50 MG Oral Capsule; TAKE 1 CAPSULE 3 TIMES DAILY; Therapy: 47Akg6494 to (Evaluate:25Imf7402); Last Rx:19Nov2015 Ordered   5  Methimazole 5 MG Oral Tablet; Take 1 tablet daily; Therapy: (Recorded:54Oid2718) to Recorded   6  Norethindrone Acetate 5 MG Oral Tablet; One tablet daily for ten days every other month; Therapy: 08MEO1318 to 050 558 89 71)  Requested for: 17YUF7563; Last   Rx:29Jan2014 Ordered   7   Norethindrone Acetate 5 MG Oral Tablet; TAKE 1 TABLET DAILY FOR TEN DAYS EVERY   OTHER MONTH;   Therapy: 94DRC2811 to (Judson Mcgregor)  Requested for: 62GZD8506; Last   Rx:02Nov2015 Ordered   8  Propranolol HCl - 10 MG Oral Tablet; Take 1 tablet twice daily; Therapy: 71Lkj0944 to (Evaluate:28Fvf7183)  Requested for: 05EOP5447; Last   Rx:17Mar2014 Ordered   9  Quinapril HCl - 20 MG Oral Tablet; TAKE 1 TABLET DAILY; Therapy: 71Bnz4985 to (Evaluate:17Jan2016)  Requested for: 60LJT7925; Last   Rx:29Quv1438; Status: ACTIVE - Renewal Denied Ordered   10  TiZANidine HCl - 4 MG Oral Tablet; TAKE 1 TABLET Twice daily PRN SPASM; Therapy: 28DQS4735 to (Evaluate:44Tpq7410)  Requested for: 56Zsw5037; Last    Rx:96Ysp3782 Ordered   11  Triamterene-HCTZ 37 5-25 MG Oral Capsule; TAKE ONE CAPSULE BY MOUTH EVERY    DAY; Therapy: 65Zsu6538 to (Last Rx:76Zks6329)  Requested for: 57GPR2855 Ordered    The medication list was reviewed and updated today  Allergies    1  Flexeril TABS   2  Naproxen Sodium TABS   3  Penicillins   4  Promethazine-Codeine SYRP   5  Tramadol    Vitals   Recorded: 20Jan2016 10:48AM   Temperature 98 F, Oral   Heart Rate 64   Systolic 466, RUE, Sitting   Diastolic 68, RUE, Sitting   BP Comments MANUAL BP   Height 5 ft 6 in   Weight 189 lb 8 oz   BMI Calculated 30 59   BSA Calculated 1 95   O2 Saturation 97     Physical Exam    Constitutional   General appearance: No acute distress, well appearing and well nourished  Eyes   Conjunctiva and lids: Abnormal   R hyperemic conjuctiva from recent surgery  Pupils and irises: Equal, round and reactive to light  Pulmonary   Respiratory effort: No increased work of breathing or signs of respiratory distress  Auscultation of lungs: Clear to auscultation  Cardiovascular   Auscultation of heart: Normal rate and rhythm, normal S1 and S2, without murmurs  Examination of extremities for edema and/or varicosities: Normal     Carotid pulses: Normal     Abdomen   Abdomen: Non-tender, no masses  Liver and spleen: No hepatomegaly or splenomegaly  Musculoskeletal   Gait and station: Normal     Skin   Skin and subcutaneous tissue: Normal without rashes or lesions  Neurologic   Cranial nerves: Cranial nerves 2-12 intact  Psychiatric   Orientation to person, place, and time: Normal     Mood and affect: Normal          End of Encounter Meds    1  BusPIRone HCl - 5 MG Oral Tablet; TAKE 2 TABLETS AT NIGHT; Therapy: 80DWS1304 to (Evaluate:16Mar2016)  Requested for: 12Jan2016; Last   Rx:42Gzk5261; Status: ACTIVE - Renewal Denied Ordered   2  LORazepam 1 MG Oral Tablet; TAKE 1  5  tablets  AT BEDTIME AS NEEDED  Requested   for: 12Jan2016; Last SS:68WCH1158; Status: ACTIVE - Renewal Denied Ordered    3  Quinapril HCl - 20 MG Oral Tablet; TAKE 1 TABLET DAILY; Therapy: 11Aug2011 to (Evaluate:17Jan2016)  Requested for: 92WFR0259; Last   Rx:48Ueo9855; Status: ACTIVE - Renewal Denied Ordered   4  Triamterene-HCTZ 37 5-25 MG Oral Capsule; TAKE ONE CAPSULE BY MOUTH EVERY   DAY; Therapy: 79Cgf8038 to (Last Rx:27Aiu9142)  Requested for: 09EJI7887 Ordered    5  Norethindrone Acetate 5 MG Oral Tablet; TAKE 1 TABLET DAILY FOR TEN DAYS EVERY   OTHER MONTH;   Therapy: 86NNY9297 to (Shun Dangelo)  Requested for: 26HSW0228; Last   Rx:02Nov2015 Ordered    6  Lyrica 50 MG Oral Capsule; TAKE 1 CAPSULE 3 TIMES DAILY; Therapy: 90Fyf4969 to (Evaluate:02Bor3528); Last Rx:19Nov2015 Ordered    7  TiZANidine HCl - 4 MG Oral Tablet; TAKE 1 TABLET Twice daily PRN SPASM; Therapy: 07PNX9898 to (Evaluate:51Jmr9575)  Requested for: 69Kxx8896; Last   Rx:18Sep2015 Ordered    8  Estradiol 0 5 MG Oral Tablet; take 1 tablet twice a day; Therapy: 76XHS8319 to (Last Rx:11Nov2015)  Requested for: 29XNQ9339 Ordered    9  Norethindrone Acetate 5 MG Oral Tablet; One tablet daily for ten days every other month; Therapy: 97OOT2004 to [phone])  Requested for: 04AXP1227; Last   Rx:29Jan2014 Ordered    10   Propranolol HCl - 10 MG Oral Tablet; Take 1 tablet twice daily; Therapy: 64Ktc1750 to (Evaluate:20Ybm7737)  Requested for: 86NCY1640; Last    Rx:17Mar2014 Ordered    11  Methimazole 5 MG Oral Tablet; Take 1 tablet daily; Therapy: (Recorded:67Cti7960) to Recorded    Future Appointments    Date/Time Provider Specialty Site   01/26/2016 02:30 PM CHANTALE Davis  Neurosurgery [address] OR   03/10/2016 11:15 AM CHANTALE Davis   Neurosurgery [facility] NEUROSURGICAL   03/10/2016 11:00 AM Ezekiel Mcintosh, [facility] NEUROSURGICAL   02/09/2016 09:15 AM Sandra Zarate, [facility] Neurosurgery [facility] NEUROSURGICAL   02/10/2016 11:00 AM Yamila Lema MD Obstetrics/Gynecology [facility]     Signatures   Electronically signed by : Mitali Rivera, [address] [place] [address]; Jan 20 2016 12:21PM EST                       (Author)    Electronically signed by : Brianna Mujica MD; Jan 20 2016  4:41PM EST                       (Author)

## 2018-01-10 NOTE — MISCELLANEOUS
Message   Recorded as Task   Date: 09/21/2016 09:55 AM, Created By: Raynard Halsted   Task Name: Call Back   Assigned To: [facility]ney clinical,Team   Regarding Patient: Lg Chang, Status: In Progress   Comment:    Laisha Wheeler - 21 Sep 2016 9:55 AM     TASK CREATED  Caller: Self; General Medical Question; [phone] (Home); [phone] x,,,,, (Work)  Received a vmlom from [phone] this am from the pt  stating that she knows she has multiple issues going on with her body  She has had 3 eye surgeries previously but she continues with blurry vision which there seems to be no cause  She is wondering if she could be having that as a SE from one of her medications  She did s/w the pharmacist who stated that it could possibly be the Lyrica  She would like to know what you think about that? Do you think she should change her meds? As anyone else been affected with blurry vision? FQ to advise  thanks   Yen Kimbrough - 21 Sep 2016 11:20 AM     TASK REPLIED TO: Previously Assigned To [facility] clinical,Team  she could get it from [facility]   but she's on such a low dose  she could try weaning herself off of it by taking 1 tab BID for 3 days, then one tab QHS and see how she does   Laisha Wheeler - 21 Sep 2016 1:44 PM     TASK EDITED  Attempted to call the pt  and left a detailed mom in regards to the previous task  Laisha Wheeler - 21 Sep 2016 1:44 PM     TASK IN PROGRESS   Marissa Martinez - 27 Sep 2016 2:40 PM     TASK EDITED  S/W pt and she did get the message last wk from our nurse  She said she was in our dept the very next day and did discuss with Dr Mario Mosher then  Pt appreciated the f/u call  Active Problems    1  Anxiety (300 00) (F41 9)   2  Benign essential HTN (401 1) (I10)   3  Cervical radiculopathy (723 4) (M54 12)   4  Degeneration of cervical intervertebral disc (722 4) (M50 90)   5  Encounter for gynecological examination without abnormal finding (V72 31) (Z01 419)   6   Encounter for screening mammogram for malignant neoplasm of breast (V76 12)   (Z12 31)   7  Hormone replacement therapy (postmenopausal) (V07 4) (Z79 890)   8  Low back pain (724 2) (M54 5)   9  Lumbar degenerative disc disease (722 52) (M51 36)   10  Lumbar facet arthropathy (721 3) (M12 88)   11  Lumbar radiculopathy (724 4) (M54 16)   12  Lumbar stenosis without neurogenic claudication (724 02) (M48 06)   13  Myofascial pain syndrome (729 1) (M79 1)   14  Pain syndrome, chronic (338 4) (G89 4)   15  Sacroiliitis (720 2) (M46 1)   16  Screening cholesterol level (V77 91) (Z13 220)    Current Meds   1  Advil 200 MG Oral Tablet; TAKE 3 TABLET 3 times daily; Therapy: (Recorded:95Lmh4420) to Recorded   2  BusPIRone HCl - 5 MG Oral Tablet; TAKE 2 TABLETS AT NIGHT; Therapy: 65OZE8453 to (Evaluate:97Bhy5337)  Requested for: 29JGD6034; Last   Rx:30Jun2016 Ordered   3  Estradiol 0 5 MG Oral Tablet; take 1 tablet twice a day; Therapy: 03BQC8874 to (Last Rx:11Nov2015)  Requested for: 13IHJ2318 Ordered   4  LORazepam 1 MG Oral Tablet; TAKE 1  5  tablets  AT BEDTIME AS NEEDED  Requested   for: 21Jun2016; Last Rx:21Jun2016 Ordered   5  Lyrica 50 MG Oral Capsule; 1 CAPSULE in the am and 2 caps at night; Therapy: 98GDU3294 to (Evaluate:09Agv7445); Last GP:53HVV6569 Ordered   6  Methimazole 5 MG Oral Tablet; Take 1 tablet daily; Therapy: (Recorded:73Uov6283) to Recorded   7  Norethindrone Acetate 5 MG Oral Tablet; TAKE 1 TABLET DAILY FOR TEN DAYS EVERY   OTHER MONTH;   Therapy: 74XJN8119 to (Melissa Menon)  Requested for: 06RXQ3272; Last   Rx:02Nov2015 Ordered   8  Propranolol HCl - 10 MG Oral Tablet; take 1/2 in the A M  and one tablet in at bed time; Therapy: (Recorded:09Jun2016) to Recorded   9  Quinapril HCl - 10 MG Oral Tablet; TAKE 1 TABLET DAILY; Therapy: 51KHX6532 to (Evaluate:36Nwp9194); Last Rx:30Jun2016 Ordered   10   TiZANidine HCl - 4 MG Oral Capsule; TAKE 1 5 CAPSULE Daily  Requested for:    66Ncd3896; Last ZU:98MOX8864 Ordered    Allergies    1  Adhesive Tape TAPE   2  Flexeril TABS   3  Penicillins   4  Naproxen Sodium TABS   5  Promethazine-Codeine SYRP   6   Tramadol    Signatures   Electronically signed by : Lang Schirmer, ; Sep 27 2016  2:41PM EST                       (Author)

## 2018-01-10 NOTE — MISCELLANEOUS
Message   Recorded as Task   Date: 11/27/2017 08:58 AM, Created By: Beka Nair   Task Name: Miscellaneous   Assigned To: Venancio Braun clinical,Team   Regarding Patient: Jeromy Munoz, Status: Active   Comment:    Beka Nair - 27 Nov 2017 8:58 AM     TASK CREATED  Pt called stating that she went to the ER on Wednesday because she twisted her knee and heard something pop  Pt states that they recommended she follow-up with Dr Kreg Spatz and possibly have an MRI done  Pt states that she has an appt scheduled for a procedure on the same knee (R) on 12/8 and wants to know if Dr Kreg Spatz wants to order an MRI or any other tests to be done prior to the procedure  Pt can be reached at [phone]  Kaylan Langston - 27 Nov 2017 9:16 AM     TASK EDITED  S/w pt  who went to [facility] ER on 11/22 after being on the stairs at home and twisted her knee and her a loud pop with severe pain into the R knee  PT  went to the ER where they took x-rays, MD stated that there was not much change from x-ray on 11/22 that it showed arthritis  Pt  was given a steroid pack which has given her relief from pain and swelling, has one more day of pack  Pt  scheduled for R knee injection on 12/8 at 3:30  Pt  questioning if she should be seen prior to inj or get an MRI prior  Please advise  Messi Garcia - 27 Nov 2017 9:36 AM     TASK REPLIED TO: Previously Assigned To Messi Garcia  please have her schedule with an NP for re-eval   Kaylan Langston - 27 Nov 2017 9:53 AM     TASK EDITED  Called pt  to make an appt with HA on 12/6 at 2:45  Pt  scheduled for OPRO on 12/8, SOVS with HA will re-evaluate if pt can have OPRO on 12/8  Pt  requesting to have SOVS sooner, made a note in IDX if anything opens up with either NP to call her  Pt  verbalized understanding  Active Problems    1  Acute pain of right knee (719 46) (M25 561)   2  Anxiety (300 00) (F41 9)   3  Benign essential HTN (401 1) (I10)   4   Cervical radiculopathy (723 4) (M54 12)   5  Disc disorder of cervical region (722 91) (M50 90)   6  Hormone replacement therapy (postmenopausal) (V07 4) (Z79 890)   7  Hot flash, menopausal (627 2) (N95 1)   8  Lumbar degenerative disc disease (722 52) (M51 36)   9  Lumbar facet arthropathy (721 3) (M12 88)   10  Lumbar radiculopathy (724 4) (M54 16)   11  Lumbar stenosis without neurogenic claudication (724 02) (M48 061)   12  Myofascial pain syndrome (729 1) (M79 1)   13  Pain syndrome, chronic (338 4) (G89 4)   14  Positive depression screening (796 4) (Z13 89)   15  Sacroiliitis (720 2) (M46 1)    Current Meds   1  Advil 200 MG Oral Tablet; TAKE 3 TABLET 3 times daily; Therapy: (Recorded:41Xgu8679) to Recorded   2  BusPIRone HCl - 5 MG Oral Tablet; TAKE 2 TABLETS AT NIGHT; Therapy: 72LKH2812 to (Evaluate:56Zjw8836)  Requested for: 84AKB5162; Last   Rx:20Nov2017 Ordered   3  Denavir 1 % External Cream; APPLY TOPICALLY DAILY AS NEEDED; Therapy: 24Mhy8289 to (Last Rx:85Qif1101)  Requested for: 06Kjs9461 Ordered   4  LORazepam 1 MG Oral Tablet; TAKE 1  5  tablets  AT BEDTIME AS NEEDED  Requested   for: 81AEF7079; Last Rx:20Nov2017 Ordered   5  Lyrica 50 MG Oral Capsule; TAKE 1 TAB IN AM AND 2 TABS QHS; Therapy: 72JSL2653 to (Evaluate:54Gsm4088); Last Rx:15Jun2017 Ordered   6  MethIMAzole 5 MG Oral Tablet; Take one tablet daily; Therapy: (Recorded:73Bxj3748) to Recorded   7  Propranolol HCl - 10 MG Oral Tablet; TAKE 1 TABLET TWICE DAILY  Requested for:   31VIR3622; Last Rx:72Wzq3441 Ordered   8  Quinapril HCl - 10 MG Oral Tablet; TAKE 1 TABLET DAILY; Therapy: 46NGR3622 to (Evaluate:01Dva5470)  Requested for: 57DGK7438; Last   Rx:20Nov2017 Ordered   9  TiZANidine HCl - 4 MG Oral Capsule; TAKE 1 5 CAPSULE Daily as needed for pain    Requested for: 20Nov2017; Last Rx:20Nov2017 Ordered    Allergies    1  Adhesive Tape TAPE   2  Flexeril TABS   3  Naproxen Sodium TABS   4  Penicillins   5  Promethazine-Codeine SYRP   6  Tramadol    Signatures   Electronically signed by : Jake Obrien, ; Nov 27 2017  9:55AM EST                       (Author)

## 2018-01-10 NOTE — MISCELLANEOUS
Message   Recorded as Task   Date: 11/23/2016 09:19 AM, Created By: Ambrosio Broussard   Task Name: Financial Authorization   Assigned To: [facility] sched,Team   Regarding Patient: Karuna Drew, Status: In Progress   Comment:    Marissa Martinez - 23 Nov 2016 9:19 AM     TASK CREATED  Caller: Self; Authorization Status; [phone] (Home)  Pt called and said she scheduled her CT for 11/29 at [facility]  Pt wants to be called once approved by her [employer] company  [employer] - 28 Nov 3289 9:09 PM     TASK IN PROGRESS   [employer] - 28 Nov 0210 0:41 PM     TASK EDITED  Spoke with [employer] @ the health plan and no Judieth Dates is required for CT  Called patient and advised her  PT appreciative  Active Problems    1  Anxiety (300 00) (F41 9)   2  Benign essential HTN (401 1) (I10)   3  Cervical radiculopathy (723 4) (M54 12)   4  Disc disorder of cervical region (722 91) (M50 90)   5  Hormone replacement therapy (postmenopausal) (V07 4) (Z79 890)   6  Low back pain (724 2) (M54 5)   7  Lumbar degenerative disc disease (722 52) (M51 36)   8  Lumbar facet arthropathy (721 3) (M12 88)   9  Lumbar radiculopathy (724 4) (M54 16)   10  Lumbar stenosis without neurogenic claudication (724 02) (M48 06)   11  Myofascial pain syndrome (729 1) (M79 1)   12  Need for influenza vaccination (V04 81) (Z23)   13  Pain syndrome, chronic (338 4) (G89 4)   14  Preop examination (V72 84) (Z01 818)   15  Ptosis of both eyelids (374 30) (H02 403)   16  Sacroiliitis (720 2) (M46 1)   17  Screening for depression (V79 0) (Z13 89)    Current Meds   1  Advil 200 MG Oral Tablet; TAKE 3 TABLET 3 times daily; Therapy: (Recorded:93Shl7659) to Recorded   2  BusPIRone HCl - 5 MG Oral Tablet; TAKE 2 TABLETS AT NIGHT; Therapy: 85GWM8673 to (Evaluate:78Ygf7267)  Requested for: 79MGJ9355; Last   Rx:30Jun2016 Ordered   3  Estradiol 0 5 MG Oral Tablet; take 1 tablet twice a day;    Therapy: 99PEY2321 to (Last Rx:11Nov2015)  Requested for: 83ZSD3871 Ordered   4  LORazepam 1 MG Oral Tablet; TAKE 1  5  tablets  AT BEDTIME AS NEEDED  Requested   for: 54AAW2256; Last Rx:02Nov2016 Ordered   5  Lyrica 100 MG Oral Capsule; TAKE 2 CAPSULE Bedtime; Therapy: 76VIP0292 to (Evaluate:21Jan2017); Last Rx:22Nov2016 Ordered   6  MethIMAzole 5 MG Oral Tablet; take 0 5 tablet daily; Therapy: (Recorded:14Nov2016) to Recorded   7  Norethindrone Acetate 5 MG Oral Tablet; TAKE 1 TABLET DAILY FOR TEN DAYS EVERY   OTHER MONTH;   Therapy: 71WHD1611 to (Rukhsana Corral)  Requested for: 90AFG7144; Last   Rx:02Nov2015 Ordered   8  Propranolol HCl - 10 MG Oral Tablet; TAKE 1 TABLET AT BEDTIME; Therapy: (Recorded:14Nov2016) to Recorded   9  Quinapril HCl - 10 MG Oral Tablet; TAKE 1 TABLET DAILY; Therapy: 15ZPC1526 to (Evaluate:73Ucz6458); Last Rx:30Jun2016 Ordered   10  TiZANidine HCl - 4 MG Oral Capsule; TAKE 1 5 CAPSULE Daily  Requested for:    04Ois6243; Last Rx:30Jun2016 Ordered    Allergies    1  Adhesive Tape TAPE   2  Flexeril TABS   3  Penicillins   4  Naproxen Sodium TABS   5  Promethazine-Codeine SYRP   6   Tramadol    Signatures   Electronically signed by : Sosa Montejo, ; Nov 28 2016  3:45PM EST                       (Author)

## 2018-01-11 ENCOUNTER — GENERIC CONVERSION - ENCOUNTER (OUTPATIENT)
Dept: OTHER | Facility: OTHER | Age: 63
End: 2018-01-11

## 2018-01-11 NOTE — RESULT NOTES
Verified Results  MAMMO SCREENING BILATERAL W 3D & CAD [id number] 02:46PM Zenia Galarza Order Number: [id number]    - Patient Instructions: To schedule this appointment, please contact Central Scheduling at [phone]  Do not wear any perfume, powder, lotion or deodorant on breast or underarm area  Please bring your doctors order, referral (if needed) and insurance information with you on the day of the test  Failure to bring this information may result in this test being rescheduled  Arrive 15 minutes prior to your appointment time to register  On the day of your test, please bring any prior mammogram or breast studies with you that were not performed at a [facility]  Failure to bring prior exams may result in your test needing to be rescheduled  Test Name Result Flag Reference   MAMMO SCREENING BILATERAL W 3D & CAD (Report)     Patient History:   Patient is postmenopausal and had first child at age 42  Family history of breast cancer at age 80 in mother  Took hormonal contraceptives for 31 years  Took estrogen for 4    years  Took progesterone for 4 years  Patient has never smoked  Patient's BMI is 30 7  Reason for exam: screening, asymptomatic  Mammo Screening Bilateral W DBT and CAD: October 9, 2017 - Check    In #: [de-identified]   2D/3D Procedure   3D views: Bilateral MLO view(s) were taken  2D views: Bilateral CC view(s) were taken  Technologist: AMOL Nelson (AMOL)(M)   Prior study comparison: May 25, 2016, mammo screening bilateral W   DBT and CAD, performed at [place] [address]  February 11, 2015, digital bilateral screening mammogram   performed at [address] [place] [address]  January 28, 2014,    digital bilateral screening mammogram performed at [address] [place] [address]  January 24, 2013, digital bilateral screening    mammogram performed at [address] [place] [address]   January 3,    2012, digital bilateral screening mammogram performed at [address] [place] [address]  There are scattered fibroglandular densities  A combination of    mediolateral oblique 3D tomographic slices as well as standard    two-dimensional orthogonal images were obtained  No dominant soft tissue mass, architectural distortion or    suspicious calcifications are noted in either breast   The skin    and nipple structures are within normal limits  Scattered benign   appearing calcifications are noted  No significant changes when compared with prior studies  ACR BI-RADSï¾® Assessments: BiRad:2 - Benign     Recommendation:   Routine screening mammogram of both breasts in 1 year  A    reminder letter will be scheduled  The patient is scheduled in a reminder system for screening    mammography  8-10% of cancers will be missed on mammography  Management of a    palpable abnormality must be based on clinical grounds  Patients    will be notified of their results via letter from our facility  Accredited by [employer] of [employer] and FDA  Transcription Location:  [facility]: [id number]     Risk Value(s):   Tyrer-Cuzick 10 Year: 11 800%, Tyrer-Cuzick Lifetime: 27 300%,    Myriad Table: 1 5%, ALBAN 5 Year: 2 9%, NCI Lifetime: 14 3%       Plan  PMH: Encounter for screening mammogram for malignant neoplasm of breast    · Digital Unilateral Diagnostic Mammogram With CAD ; every 1 year; Last 90PSH4172;   Next [id number]; Status:Active

## 2018-01-11 NOTE — CONSULTS
Chief Complaint  Patient is her for a Pre-Op exam  She is scheduled for placement of a thoracic spinal cord stimulator with left buttock internal pulse generator on 1/26/2016 by CHANTALE Arreola  at [facility]  Labs for clearance are located in patient's chart  History of Present Illness  Pre-Op Visit (Brief): The patient is being seen for a preoperative visit  The procedure is a(n) Placement of a thoracic spinal cord stimulator with left buttock internal pulse generator scheduled for 1/26/16 with Bernice Duque  The indication for surgery is Thoracic spine pain  Surgical Risk Assessment:   Prior Anesthesia: She had prior anesthesia and no prior adverse reaction to general anesthesia  Pertinent Past Medical History: Hyperthyroidism, Chronic back pain, HTN  Exercise Capacity: able to walk four blocks without symptoms and able to walk two flights of stairs without symptoms  Lifestyle Factors: denies alcohol use, denies tobacco use and denies illegal drug use  Symptoms: no symptoms  Other VICKEY risk factors include age over 48, but normal BMI, female gender and normal neck circumference  Predicted risk of VICKEY: None  Pertinent Family History: Heart disease, empyemaischemic heart disease, but no family history of an adverse reaction to anesthesia, no aneurysm, no bleeding problems, no sudden early deaths and no stroke  Living Situation: home is secure and supportive and no post-op concerns with her living situation  Review of Systems    Constitutional: no fever, not feeling poorly, no chills and not feeling tired  Eyes: no eyesight problems  ENT: no sore throat  Cardiovascular: no chest pain and no palpitations  Respiratory: no shortness of breath, no cough and no wheezing  Gastrointestinal: no abdominal pain, no nausea and no diarrhea  Musculoskeletal: arthralgias and Chronic lower back pain, but as noted in HPI and no myalgias  Integumentary: no rashes     Neurological: no headache  Psychiatric: no anxiety and no depression  Hematologic/Lymphatic: no tendency for easy bleeding and no tendency for easy bruising  ROS reviewed  Active Problems    1  Anxiety (300 00) (F41 9)   2  Benign essential HTN (401 1) (I10)   3  Cervical radiculopathy (723 4) (M54 12)   4  Degeneration of cervical intervertebral disc (722 4) (M50 90)   5  Hormone replacement therapy (postmenopausal) (V07 4) (Z79 890)   6  Low back pain (724 2) (M54 5)   7  Lumbar degenerative disc disease (722 52) (M51 36)   8  Lumbar facet arthropathy (721 3) (M47 816)   9  Lumbar radiculopathy (724 4) (M54 16)   10  Lumbar stenosis without neurogenic claudication (724 02) (M48 06)   11  Myofascial pain syndrome (729 1) (M79 1)   12  Pain syndrome, chronic (338 4) (G89 4)   13   Sacroiliitis (720 2) (M46 1)    Past Medical History    · History of Asymptomatic menopausal state (V49 81) (Z78 0)   · History of Cellulitis (682 9) (L03 90)   · History of Cervical radiculopathy (723 4) (M54 12)   · History of Cough (786 2) (R05)   · History of Ecchymoses, spontaneous (782 7) (R23 3)   · History of Encounter for screening mammogram for malignant neoplasm of breast  (V76 12) (Z12 31)   · History of acute bronchitis (V12 69) (Z87 09)   · History of cataract (V12 49) (Z86 69)   · History of herpes simplex infection (V12 09) (Z86 19)   · History of herpes simplex infection (V12 09) (Z86 19)   · History of hypertension (V12 59) (Z86 79)   · History of infectious mononucleosis (V12 09) (Z86 19)   · History of insomnia (V13 89) (Z87 898)   · History of retinal detachment (V12 49) (Z86 69)   · History of thyroid disease (V12 29) (Z86 39)   · History of vertigo (V12 49) (U23 017)   · History of Joint pain, knee (719 46) (M25 569)   · History of Laceration Of Eyelid (870 8)   · History of Lumbago With Sciatica (724 3)   · History of Menopause (627 2)   · History of Osteoarthritis of shoulder region, unspecified laterality   · Other muscle spasm (728 85) (L59 684)   · History of Other muscle spasm (728 85) (A18 623)   · History of Pain in joint of right shoulder region (719 41) (M25 511)   · History of Pap smear, as part of routine gynecological examination (V76 2) (Z12 4)   · History of Plantar fasciitis (728 71) (M72 2)   · History of Preop examination (V72 84) (Z01 818)   · History of Ptosis of left eyelid (374 30) (H02 402)   · History of Retinal tear of right eye (361 00) (H33 001)   · History of Screening for colon cancer (V76 51) (Z12 11)   · History of Shoulder Joint Disorder (719 91)   · History of Sprained Right Shoulder (840 9)   · History of Status post surgery (V45 89) (Z98 89)   · History of Thyrotoxicosis (242 90)   · 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  (242 41)   · History of Visit for screening mammogram (V76 12) (Z12 31)    The active problems and past medical history were reviewed and updated today  Surgical History    · History of Denial Of Any Significant Medical History    The surgical history was reviewed and updated today  Family History    · Family history of Breast Cancer (V16 3)   · Family history of Chronic Obstructive Pulmonary Disease   · Family history of Essential Hypertension   · Family history of Family Health Status 1  Children Living   · Family history of Hypertension (V17 49)    · Family history of Acute Myocardial Infarction (V17 3)   · Family history of Emphysema   · Family history of Essential Hypertension   · Family history of Prehypertension    · Family history of Liver Cancer    The family history was reviewed and updated today         Social History    · Denied: History of Alcohol Use (History)   · Daily caffeinated cola consumption   · Daily Coffee Consumption (3  Cups/Day)   · Denied: History of Drug Use   · Marital History - Currently    · Never A Smoker   · Occasional alcohol use   · Occupation:   · Business owner, [employer]   · Working Full Time  The social history was reviewed and updated today  Current Meds   1  BusPIRone HCl - 5 MG Oral Tablet; TAKE 2 TABLETS AT NIGHT; Therapy: 76WOI3977 to (Evaluate:16Mar2016)  Requested for: 12Jan2016; Last   Rx:10Nlr5526; Status: ACTIVE - Renewal Denied Ordered   2  Estradiol 0 5 MG Oral Tablet; take 1 tablet twice a day; Therapy: 45NYF1188 to (Last Rx:11Nov2015)  Requested for: 48DCK0832 Ordered   3  LORazepam 1 MG Oral Tablet; TAKE 1  5  tablets  AT BEDTIME AS NEEDED  Requested   for: 12Jan2016; Last BS:30FFV6185; Status: ACTIVE - Renewal Denied Ordered   4  Lyrica 50 MG Oral Capsule; TAKE 1 CAPSULE 3 TIMES DAILY; Therapy: 05Ksj4850 to (Evaluate:63Gyn2676); Last Rx:19Nov2015 Ordered   5  Methimazole 5 MG Oral Tablet; Take 1 tablet daily; Therapy: (Recorded:18Sep2015) to Recorded   6  Norethindrone Acetate 5 MG Oral Tablet; One tablet daily for ten days every other month; Therapy: 18IBL4370 to [phone])  Requested for: 47UNS3766; Last   Rx:29Jan2014 Ordered   7  Norethindrone Acetate 5 MG Oral Tablet; TAKE 1 TABLET DAILY FOR TEN DAYS EVERY   OTHER MONTH;   Therapy: 78BWG8898 to (Ada Evans)  Requested for: 44FLZ9026; Last   Rx:02Nov2015 Ordered   8  Propranolol HCl - 10 MG Oral Tablet; Take 1 tablet twice daily; Therapy: 03Hmq6976 to (Evaluate:14Dnd6851)  Requested for: 04WWF0287; Last   Rx:17Mar2014 Ordered   9  Quinapril HCl - 20 MG Oral Tablet; TAKE 1 TABLET DAILY; Therapy: 46Muu8087 to (Evaluate:17Jan2016)  Requested for: 13PDM5302; Last   Rx:75Lfk8408; Status: ACTIVE - Renewal Denied Ordered   10  TiZANidine HCl - 4 MG Oral Tablet; TAKE 1 TABLET Twice daily PRN SPASM; Therapy: 67IYV8790 to (Evaluate:61Ahs7131)  Requested for: 89Fir9597; Last    Rx:18Sep2015 Ordered   11  Triamterene-HCTZ 37 5-25 MG Oral Capsule; TAKE ONE CAPSULE BY MOUTH EVERY    DAY;     Therapy: 68Aza9916 to (Last Rx:04Nbc8840)  Requested for: 72UPH2940 Ordered    The medication list was reviewed and updated today  Allergies    1  Flexeril TABS   2  Naproxen Sodium TABS   3  Penicillins   4  Promethazine-Codeine SYRP   5  Tramadol    Vitals  Signs [Data Includes: Current Encounter]    Temperature: 98 F, Oral  Heart Rate: 64  Systolic: 964, RUE, Sitting  Diastolic: 68, RUE, Sitting  BP Comments: MANUAL BP  Height: 5 ft 6 in  Weight: 189 lb 8 oz  BMI Calculated: 30 59  BSA Calculated: 1 95  O2 Saturation: 97    Physical Exam    Constitutional   General appearance: No acute distress, well appearing and well nourished  Eyes   Conjunctiva and lids: Abnormal   R hyperemic conjuctiva from recent surgery  Pupils and irises: Equal, round and reactive to light  Pulmonary   Respiratory effort: No increased work of breathing or signs of respiratory distress  Auscultation of lungs: Clear to auscultation  Cardiovascular   Auscultation of heart: Normal rate and rhythm, normal S1 and S2, without murmurs  Examination of extremities for edema and/or varicosities: Normal     Carotid pulses: Normal     Abdomen   Abdomen: Non-tender, no masses  Liver and spleen: No hepatomegaly or splenomegaly  Musculoskeletal   Gait and station: Normal     Skin   Skin and subcutaneous tissue: Normal without rashes or lesions  Neurologic   Cranial nerves: Cranial nerves 2-12 intact  Psychiatric   Orientation to person, place, and time: Normal     Mood and affect: Normal          Assessment    1  Pre-op evaluation (V72 84) (Z01 818)   2  Benign essential HTN (401 1) (I10)   3  Lumbar degenerative disc disease (722 52) (M51 36)    Discussion/Summary  Surgical Clearance: She is at a LOW TO MODERATE risk from a cardiovascular standpoint at this time without any additional cardiac testing  Reevaluation needed, if she should present with symptoms prior to surgery/procedure  Released from Work: Unable to return to work until further notice       Reviewed MRI results and blood work with patient  Educated patient to make follow up apt with Dr Philly Vazquez for routine health maintenance  The patient was counseled regarding diagnostic results, instructions for management, risk factor reductions, prognosis, patient and family education, impressions, risks and benefits of treatment options, importance of compliance with treatment  End of Encounter Meds    1  BusPIRone HCl - 5 MG Oral Tablet; TAKE 2 TABLETS AT NIGHT; Therapy: 19JMM9813 to (Evaluate:16Mar2016)  Requested for: 12Jan2016; Last   Rx:62Drp1395; Status: ACTIVE - Renewal Denied Ordered   2  LORazepam 1 MG Oral Tablet; TAKE 1  5  tablets  AT BEDTIME AS NEEDED  Requested   for: 12Jan2016; Last BF:70JDB8161; Status: ACTIVE - Renewal Denied Ordered    3  Quinapril HCl - 20 MG Oral Tablet; TAKE 1 TABLET DAILY; Therapy: 11Aug2011 to (Evaluate:17Jan2016)  Requested for: 05MVU2103; Last   Rx:45Lfm8059; Status: ACTIVE - Renewal Denied Ordered   4  Triamterene-HCTZ 37 5-25 MG Oral Capsule; TAKE ONE CAPSULE BY MOUTH EVERY   DAY; Therapy: 95Hyu4643 to (Last Rx:45Mqx8840)  Requested for: 96MYO5744 Ordered    5  Norethindrone Acetate 5 MG Oral Tablet; TAKE 1 TABLET DAILY FOR TEN DAYS EVERY   OTHER MONTH;   Therapy: 54IPS7873 to (Malcolm Dennison)  Requested for: 54LQR7277; Last   Rx:02Nov2015 Ordered    6  Lyrica 50 MG Oral Capsule; TAKE 1 CAPSULE 3 TIMES DAILY; Therapy: 34Aqs5468 to (Evaluate:71Few0628); Last Rx:19Nov2015 Ordered    7  TiZANidine HCl - 4 MG Oral Tablet; TAKE 1 TABLET Twice daily PRN SPASM; Therapy: 11IPY1186 to (Evaluate:36Ofe9119)  Requested for: 80Bag5893; Last   Rx:70Xej7460 Ordered    8  Estradiol 0 5 MG Oral Tablet; take 1 tablet twice a day; Therapy: 79XQU0957 to (Last Rx:11Nov2015)  Requested for: 60EEE1578 Ordered    9  Norethindrone Acetate 5 MG Oral Tablet; One tablet daily for ten days every other month; Therapy: 63HMO3125 to [phone])  Requested for: 50ICL3560; Last   Rx:29Jan2014 Ordered    10  Propranolol HCl - 10 MG Oral Tablet; Take 1 tablet twice daily; Therapy: 91Dgj4882 to (Evaluate:99Ygc8549)  Requested for: 35JFA1449; Last    Rx:17Mar2014 Ordered    11  Methimazole 5 MG Oral Tablet; Take 1 tablet daily;     Therapy: (Recorded:19Rmr9479) to Recorded    Signatures   Electronically signed by : Veena Shannon, 10 [facility]; Jan 20 2016 12:21PM EST                       (Author)    Electronically signed by : Akiko Randall MD; Jan 20 2016  4:41PM EST                       (Author)
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
## 2018-01-12 VITALS
TEMPERATURE: 98.3 F | HEART RATE: 93 BPM | SYSTOLIC BLOOD PRESSURE: 138 MMHG | WEIGHT: 201.25 LBS | BODY MASS INDEX: 32.34 KG/M2 | HEIGHT: 66 IN | DIASTOLIC BLOOD PRESSURE: 78 MMHG | OXYGEN SATURATION: 96 %

## 2018-01-12 NOTE — RESULT NOTES
Message   Recorded as Task   Date: 03/17/2016 01:17 PM, Created By: Snow Cuenca   Task Name: Medical Complaint Callback   Assigned To: [facility] surgery sched,Team   Regarding Patient: Monty Domingo, Status: Active   Comment:    Laisha Wheeler - 17 Mar 2016 1:17 PM     TASK CREATED  Caller: Self; Medical Complaint; [phone] (Home); [phone] x,,,,, (Work)  Received [employer] at [phone], then s/w pt  stating that she started having increasing right shoulder,neck pain with numb/ness and tingling going down the arm  She states that she did have and PRATIK done a couple of years ago  She is going away on vacation in early april  She is wondering if she can have an epidural or does she need a povs for a reevaluation  FQ to advise  thanks   Yoni Valladares - 17 Mar 2016 2:32 PM     TASK REPLIED TO: Previously Assigned To [facility] courtney clinical,Team  ok to schedule ISAI (dx M50 12)   Laisha Wheeler - 17 Mar 2016 3:28 PM     TASK EDITED  S/w pt  to schedule the ISAI per FQ  Pt at another appt  and will CB on 3/18 to schedule     Kanika Baugh - 18 Mar 2016 9:12 AM     TASK EDITED  S/W patient - patient scheduled for Tuesday, March 22 at [facility] with Dr Jeannie Khan - patient advised nothing to eat or drink 1 hour prior to procedure but encouraged to have a light lunch - patient stated on Advil, advised to stop 1 day prior, patient denies any abx's - patient also advised to arrange for  - patient aware and agreed        Signatures   Electronically signed by : Raisa José, ; Mar 18 2016  9:13AM EST                       (Author)

## 2018-01-12 NOTE — RESULT NOTES
Verified Results  MAMMO SCREENING BILATERAL W 3D & CAD 00NNJ7640 02:24PM Odell Fernandes Order Number: [id number]     Test Name Result Flag Reference   MAMMO SCREENING BILATERAL W 3D & CAD (Report)     Patient History:   Patient had first child at age 42  Family history of breast cancer in mother at age 80  Took hormonal contraceptives for 31 years  Taking estrogen for 4   years  Taking progesterone for 4 years  Patient has never smoked  Patient's BMI is 30 7  Reason for exam: screening (asymptomatic)  Mammo Screening Bilateral W DBT and CAD: May 25, 2016 - Check In    #: [de-identified]   2D/3D Procedure   3D views: Bilateral MLO view(s) were taken  CC view(s) were    taken of the left breast    2D views: Bilateral CC view(s) were taken  Technologist: [facility] (R)(M)   Prior study comparison: February 11, 2015, digital bilateral    screening mammogram, performed at [address] [place] [address]  January 28, 2014, digital bilateral screening mammogram,    performed at [address] [place] [address]  January 24, 2013,    digital bilateral screening mammogram, performed at [address] [place] [address]  January 3, 2012, digital bilateral screening    mammogram, performed at [address] [place] [address]  December 3,   2009, digital bilateral screening mammogram, performed at [address] [place] [address]  The breast tissue is heterogeneously dense, potentially limiting    the sensitivity of mammography  Patient risk, included in this    report, assists in determining the appropriate screening regimen    (such as 3-D mammography or the inclusion of automated breast    ultrasound or MRI)  3-D mammography may also remain indicated as    screening  The parenchymal pattern appears stable  No dominant soft tissue    mass or suspicious calcifications are noted  The skin and nipple   contours are within normal limits  No mammographic evidence of malignancy   No    significant changes when compared with prior studies  ASSESSMENT: BiRad:1 - Negative     Recommendation:   Routine screening mammogram in 1 year  A reminder letter will be   scheduled  8-10% of cancers will be missed on mammography  Management of a    palpable abnormality must be based on clinical grounds  Patients    will be notified of their results via letter from our facility  Accredited by [employer] of Radiology and FDA  Transcription Location: [facility]: [id number]     Risk Value(s):   Tyrer-Cuzick 10 Year: 11 757%, Tyrer-Cuzick Lifetime: 28 319%,    Myriad Table: 1 5%, ALBAN 5 Year: 2 8%, NCI Lifetime: 14 6%   Signed by:   Poornima Hobbs MD   5/26/16       Plan  PMH: Encounter for screening mammogram for malignant neoplasm of breast    · Digital Unilateral Diagnostic Mammogram With CAD ; every 1 year; Last 22FBB9350;   Next [id number]; Status:Active

## 2018-01-12 NOTE — MISCELLANEOUS
Message   Recorded as Task   Date: 11/29/2016 03:19 PM, Created By: Kvng Drew   Task Name: Call Patient with results   Assigned To: [address] [place] [address] clinical,Team   Regarding Patient: Alexis Bailon, Status: In Progress   August Naldo - 29 Nov 2016 3:19 PM     Patient Phone: [phone]    degenerative disc disease C6-7,  mild central and mild right>Left foraminal narrowing  Do not offer any procedure, she has had ISAI with no relief  Follow up with Dr Olivia Cotto as suggested at last OV   Fifi Willard - 29 Nov 2016 4:17 PM     TASK REASSIGNED: Previously Assigned To Patti Hammond - 30 Nov 2016 3:36 PM     TASK REPLIED TO: Previously Assigned To [address] [place] [address] clinical,Team  ****FYI****    S/w pt  and advised of above  Pt  states that she was already called by someone in [facility] and was told not to go to   [facility] office because there is not much of a change in the CT scan  Pt  was advised to schedule another inj  w/ Dr Mary Cesar and she is coming in on Tuesday for her ISAI  Pt  was advised that I will notify AO    ****   Samina Newell - 30 Nov 2016 3:39 PM     TASK REPLIED TO: Previously Assigned To Kvng dash thank you   Cuca Valles - 30 Nov 2016 3:58 PM     TASK IN PROGRESS        Active Problems    1  Anxiety (300 00) (F41 9)   2  Benign essential HTN (401 1) (I10)   3  Cervical radiculopathy (723 4) (M54 12)   4  Disc disorder of cervical region (722 91) (M50 90)   5  Hormone replacement therapy (postmenopausal) (V07 4) (Z79 890)   6  Low back pain (724 2) (M54 5)   7  Lumbar degenerative disc disease (722 52) (M51 36)   8  Lumbar facet arthropathy (721 3) (M12 88)   9  Lumbar radiculopathy (724 4) (M54 16)   10  Lumbar stenosis without neurogenic claudication (724 02) (M48 06)   11  Myofascial pain syndrome (729 1) (M79 1)   12  Need for influenza vaccination (V04 81) (Z23)   13  Pain syndrome, chronic (338 4) (G89 4)   14   Preop examination (P06 95) (Z01 818)   15  Ptosis of both eyelids (374 30) (H02 403)   16  Sacroiliitis (720 2) (M46 1)   17  Screening for depression (V79 0) (Z13 89)    Current Meds   1  Advil 200 MG Oral Tablet; TAKE 3 TABLET 3 times daily; Therapy: (Recorded:47Rmm8793) to Recorded   2  BusPIRone HCl - 5 MG Oral Tablet; TAKE 2 TABLETS AT NIGHT; Therapy: 94YNT0548 to (Evaluate:67Hil9806)  Requested for: 17DIP8153; Last   Rx:30Jun2016 Ordered   3  Estradiol 0 5 MG Oral Tablet; take 1 tablet twice a day; Therapy: 83XMV4536 to (Last Rx:11Nov2015)  Requested for: 47ACH9669 Ordered   4  LORazepam 1 MG Oral Tablet; TAKE 1  5  tablets  AT BEDTIME AS NEEDED  Requested   for: 30QCW2310; Last Rx:02Nov2016 Ordered   5  Lyrica 100 MG Oral Capsule; TAKE 2 CAPSULE Bedtime; Therapy: 93NBZ4218 to (Evaluate:21Jan2017); Last Rx:22Nov2016 Ordered   6  MethIMAzole 5 MG Oral Tablet; take 0 5 tablet daily; Therapy: (Recorded:14Nov2016) to Recorded   7  Norethindrone Acetate 5 MG Oral Tablet; TAKE 1 TABLET DAILY FOR TEN DAYS EVERY   OTHER MONTH;   Therapy: 85HPH3092 to ([place] [facility])  Requested for: 85ZIH4211; Last   Rx:02Nov2015 Ordered   8  Propranolol HCl - 10 MG Oral Tablet; TAKE 1 TABLET AT BEDTIME; Therapy: (Recorded:14Nov2016) to Recorded   9  Quinapril HCl - 10 MG Oral Tablet; TAKE 1 TABLET DAILY; Therapy: 33RIU8896 to (Evaluate:60Nbf7895); Last Rx:30Jun2016 Ordered   10  TiZANidine HCl - 4 MG Oral Capsule; TAKE 1 5 CAPSULE Daily  Requested for:    79Wfb0758; Last Rx:30Jun2016 Ordered    Allergies    1  Adhesive Tape TAPE   2  Flexeril TABS   3  Penicillins   4  Naproxen Sodium TABS   5  Promethazine-Codeine SYRP   6   Tramadol    Signatures   Electronically signed by : Munir Farah RN; Nov 30 2016  3:59PM EST                       (Author)

## 2018-01-12 NOTE — MISCELLANEOUS
Message   Recorded as Task   Date: 11/27/2017 10:03 AM, Created By: Nehal Morfin   Task Name: Miscellaneous   Assigned To: [facility] clinical,Team   Regarding Patient: Minerva Shultz, Status: Active   Comment:    Nehal Reichjayleentatyana - 27 Nov 2017 10:03 AM     TASK CREATED  Pt called stating that she just spoke with the nurse and forgot to mention something  Pt is requesting a call back to ask another question  I was not able to reactivate the original task  Pt can be reached at [phone]  Kaylan Langston - 27 Nov 2017 10:32 AM     TASK EDITED  Pt  concerned that now that she is using a cane it is putting pressure on her R neck where she had an old injection, I suggested maybe using a walker to distribute the weight to both arms, pt  stated she may be able to get one from a friend  Offered pt  an appt with HA at the [facility] office tomorrow beings pt  was concerned about her SOVS at [place] being too close to her injec  Pt  scheduled for SOVS with [facility] for ER visit f/u on 11/28 at 1:00pm at [facility] office  Cx'd SOVS on 12/6 in [facility]  Pt  verbalized understanding  Active Problems    1  Acute pain of right knee (719 46) (M25 561)   2  Anxiety (300 00) (F41 9)   3  Benign essential HTN (401 1) (I10)   4  Cervical radiculopathy (723 4) (M54 12)   5  Disc disorder of cervical region (722 91) (M50 90)   6  Hormone replacement therapy (postmenopausal) (V07 4) (Z79 890)   7  Hot flash, menopausal (627 2) (N95 1)   8  Lumbar degenerative disc disease (722 52) (M51 36)   9  Lumbar facet arthropathy (721 3) (M12 88)   10  Lumbar radiculopathy (724 4) (M54 16)   11  Lumbar stenosis without neurogenic claudication (724 02) (M48 061)   12  Myofascial pain syndrome (729 1) (M79 1)   13  Pain syndrome, chronic (338 4) (G89 4)   14  Positive depression screening (796 4) (Z13 89)   15  Sacroiliitis (720 2) (M46 1)    Current Meds   1  Advil 200 MG Oral Tablet; TAKE 3 TABLET 3 times daily;    Therapy: (Recorded:42Lty1651) to Recorded   2  BusPIRone HCl - 5 MG Oral Tablet; TAKE 2 TABLETS AT NIGHT; Therapy: 46JHX5261 to (Evaluate:90Rbu4307)  Requested for: 23FOK1497; Last   Rx:20Nov2017 Ordered   3  Denavir 1 % External Cream; APPLY TOPICALLY DAILY AS NEEDED; Therapy: 51Ovt6019 to (Last Rx:10Tvl2131)  Requested for: 70Oza7535 Ordered   4  LORazepam 1 MG Oral Tablet; TAKE 1  5  tablets  AT BEDTIME AS NEEDED  Requested   for: 50MWM6771; Last Rx:20Nov2017 Ordered   5  Lyrica 50 MG Oral Capsule; TAKE 1 TAB IN AM AND 2 TABS QHS; Therapy: 20ZUD8888 to (Evaluate:80Ntb4534); Last Rx:15Jun2017 Ordered   6  MethIMAzole 5 MG Oral Tablet; Take one tablet daily; Therapy: (Recorded:77Dww3110) to Recorded   7  Propranolol HCl - 10 MG Oral Tablet; TAKE 1 TABLET TWICE DAILY  Requested for:   89DFR2859; Last Rx:15Wul5330 Ordered   8  Quinapril HCl - 10 MG Oral Tablet; TAKE 1 TABLET DAILY; Therapy: 28OYL0586 to (Evaluate:02Wwn9048)  Requested for: 30AJP1741; Last   Rx:20Nov2017 Ordered   9  TiZANidine HCl - 4 MG Oral Capsule; TAKE 1 5 CAPSULE Daily as needed for pain    Requested for: 20Nov2017; Last Rx:20Nov2017 Ordered    Allergies    1  Adhesive Tape TAPE   2  Flexeril TABS   3  Naproxen Sodium TABS   4  Penicillins   5  Promethazine-Codeine SYRP   6   Tramadol    Signatures   Electronically signed by : Elham Gan, ; Nov 27 2017 10:33AM EST                       (Author)

## 2018-01-12 NOTE — MISCELLANEOUS
Message   Recorded as Task   Date: 01/25/2016 08:34 AM, Created By: Mimi Morales   Task Name: Med Renewal Request   Assigned To: Mariaa Quigley clinical,Team   Regarding Patient: Laya Houes, Status: Active   Comment:    Mimi Morales - 25 Jan 2016 8:34 AM     TASK CREATED  Caller: Self; Renew Medication; [phone] (Home)  TC from pt 1/25 at 23-14-20-09 asking for refill of lyrica 50mg TID [facility], pt has 5 left and it tolerating and not having s/e's  Pt said she is finally going for surgery tomorrow to have her stimulator placed  Pt has no pending povs     Please advise  Steph Lilly - 25 Jan 2016 10:22 AM     TASK REPLIED TO: Previously Assigned To [facility] courtney clinical,Team  can you please call in Lyrica 50 mg 1 tab po TID disp # 90 2 refills  thanks   Mimi Morales - 25 Jan 2016 11:11 AM     TASK EDITED  Lyrica 50mg 1 tab TID, #90 with 2 [facility] [address]  Pt informed Samuel Quinn [facility]   Pt advised never to stop taking this medication abruptly and in the future to call 48 hrs in advance for refill  Pt understood and agreed  Active Problems    1  Anxiety (300 00) (F41 9)   2  Benign essential HTN (401 1) (I10)   3  Cervical radiculopathy (723 4) (M54 12)   4  Degeneration of cervical intervertebral disc (722 4) (M50 90)   5  Hormone replacement therapy (postmenopausal) (V07 4) (Z79 890)   6  Low back pain (724 2) (M54 5)   7  Lumbar degenerative disc disease (722 52) (M51 36)   8  Lumbar facet arthropathy (721 3) (M47 816)   9  Lumbar radiculopathy (724 4) (M54 16)   10  Lumbar stenosis without neurogenic claudication (724 02) (M48 06)   11  Myofascial pain syndrome (729 1) (M79 1)   12  Pain syndrome, chronic (338 4) (G89 4)   13  Pre-op evaluation (V72 84) (Z01 818)   14  Sacroiliitis (720 2) (M46 1)    Current Meds   1  BusPIRone HCl - 5 MG Oral Tablet; TAKE 2 TABLETS AT NIGHT;    Therapy: 33LFR4030 to (Evaluate:16Mar2016) Requested for: 12Jan2016; Last   Rx:18Sep2015; Status: ACTIVE - Renewal Denied Ordered   2  Estradiol 0 5 MG Oral Tablet; take 1 tablet twice a day; Therapy: 46AEZ9753 to (Last Rx:11Nov2015)  Requested for: 86TJQ4752 Ordered   3  LORazepam 1 MG Oral Tablet; TAKE 1  5  tablets  AT BEDTIME AS NEEDED  Requested   for: 12Jan2016; Last DQ:80WXS5352; Status: ACTIVE - Renewal Denied Ordered   4  Lyrica 50 MG Oral Capsule; TAKE 1 CAPSULE 3 TIMES DAILY; Therapy: 13Haw2241 to (Evaluate:69Woh2971); Last Rx:19Nov2015 Ordered   5  Methimazole 5 MG Oral Tablet; Take 1 tablet daily; Therapy: (Recorded:18Sep2015) to Recorded   6  Norethindrone Acetate 5 MG Oral Tablet; One tablet daily for ten days every other month; Therapy: 53XEJ1076 to 051632-354)  Requested for: 72EZX2188; Last   Rx:29Jan2014 Ordered   7  Norethindrone Acetate 5 MG Oral Tablet; TAKE 1 TABLET DAILY FOR TEN DAYS EVERY   OTHER MONTH;   Therapy: 99CLA3571 to ([place])  Requested for: 66WWF3701; Last   Rx:02Nov2015 Ordered   8  Propranolol HCl - 10 MG Oral Tablet; Take 1 tablet twice daily; Therapy: 61Bef2729 to (Evaluate:73Fqf1833)  Requested for: 96XST5233; Last   Rx:17Mar2014 Ordered   9  Quinapril HCl - 20 MG Oral Tablet; TAKE 1 TABLET DAILY; Therapy: 71Lzf3146 to (Evaluate:17Jan2016)  Requested for: 32EHC9870; Last   Rx:75Ass3505; Status: ACTIVE - Renewal Denied Ordered   10  TiZANidine HCl - 4 MG Oral Tablet; TAKE 1 TABLET Twice daily PRN SPASM; Therapy: 67QKK8170 to (Evaluate:08Jsr0204)  Requested for: 18Sep2015; Last    Rx:18Sep2015 Ordered   11  Triamterene-HCTZ 37 5-25 MG Oral Capsule; TAKE ONE CAPSULE BY MOUTH EVERY    DAY; Therapy: 20Fle2714 to (Last Rx:00Oau3128)  Requested for: 64YZI4256 Ordered    Allergies    1  Flexeril TABS   2  Naproxen Sodium TABS   3  Penicillins   4  Promethazine-Codeine SYRP   5   Tramadol    Plan  Lumbar radiculopathy    · Lyrica 50 MG Oral Capsule; TAKE 1 CAPSULE 3 TIMES DAILY    Signatures   Electronically signed by : Mary Alvarado, ; Jan 25 2016 11:16AM EST                       (Author)

## 2018-01-12 NOTE — PROGRESS NOTES
Assessment    1  Low back pain (724 2) (M54 5)   2  Lumbar degenerative disc disease (722 52) (M51 36)   3  Lumbar radiculopathy (724 4) (M54 16)   4  Lumbar stenosis without neurogenic claudication (724 02) (M48 06)   5  Lumbar facet arthropathy (721 3) (M47 816)   6  Pain syndrome, chronic (338 4) (G89 4)    Plan  Low back pain, Lumbar degenerative disc disease, Lumbar facet arthropathy, Lumbar  radiculopathy, Lumbar stenosis without neurogenic claudication, Pain syndrome, chronic    · Follow-up Visit in 4 Weeks Evaluation and Treatment  Follow-up as scheduled in 4  weeks  Status: Complete  Done: 32JMK0166   Ordered; For: Low back pain, Lumbar degenerative disc disease, Lumbar facet arthropathy, Lumbar radiculopathy, Lumbar stenosis without neurogenic claudication, Pain syndrome, chronic; Ordered By: Steve Hook Performed:  Due: 28FOQ8862; Last Updated By: Lanie Carpenter; 2/5/2016 3:17:30 PM    Discussion/Summary    64 y o  female presenting for an incision check  After examining her incision, she was noted to have mild erythema surrounding the staples individually and a large ecchymotic patch underneath the battery pack  Due to these symptoms, she does not have an infection and her incisions were well healed  I removed her staples from both incisions and she had a small amount of dehiscence on the left part of the battery incision but the subcutaneous tissues were well healed  Steristrips were applied to help reapproximate the edges and patient was instructed to have her  monitor the incision for any increasing dehiscence, fevers, increasing erythema or any purulent drainage  If she develops any of those symptoms she should contact our office so we may bring her in to be seen  The [address] [place] [address] saw her during this postoperative appointment to adjust her spinal cord stimulator   She should follow up as scheduled for the 6 week postoperative appointment and continue to follow postoperative restrictions  She should continue to limit weights of 5-10 pounds and limiting bending/twisting/lifting  If she feels as though she needs an adjustment prior to the 6 week pov she should contact the company rep to set up a time to meet for those adjustments  Chief Complaint  incision site @ IPG swollen and red x 2 days , 01/26/16 SF: placement of thoracic SCS with left buttock IPG ( left spine thoracic ) - [facility]      Post-Op  Post-Op Lumbar/Sacral Spine:   Ashlee Dumont is status post on 1/26/16   Placement of thoracic SCS at T9-10 and left buttock IPG  History of Present Illness: The patient reports back pain and 64 y o  female presenting for an incision check  She had her thoracic SCS placed on 1/26/16 and was doing well until Wednesday night when she started with some increased discomfort over the battery site  She states her  has been checking the incision and he told her on Wednesday night it was a little red but not bad  Thursday she called our office to see if there were any recommendations but she didn't want to make an appointment  She states when she woke up this morning she had some slight increase in swelling around the battery and increase in pain  She was also having some anxiety about going into the weekend if her incision was infected  She states she doesn't have any fevers or drainage from the incision  Her pain has been well controlled by the stimulator and it was originally placed for chronic lower back pain and left leg pain  She states her pain is currently a 1/10 but she has noticed some increased muscle spasm/tightness along the middle thoracic portion of her back  She normally uses a small roll pillow under her back to sleep that is about an inch thick but she hasn't been using that because she didn't want to cause any problems with the stimulator  Patient states she sees Dr Staci Fang for pain management and he fills her lyrica   Her PCP refills her tizanidine and she also takes advil for pain  Denies any numbness/tingling, weakness or bowel/bladder dysfunction  , but no nausea, no fever, no lower extremity weakness, no lower extremity numbness, normal bowel and bladder function, no lower extremity pain and walking/standing/sitting tolerance  Physical Examination:   Surgical incision site is clean, dry and intact  Evaluation of the back demonstrates erythema, but no warmth, no swelling, no induration, no ecchymosis and no tenderness  The gait was normal and the station was normal    Lumbar/Sacral Spine examination demonstrates full ROM and no pain with motion in any direction  Lower Extremity DVT Assessment: no signs or symptoms suggestive of deep vein thrombosis, no calf swelling and no palpable cord in calf  Motor Exam: motor groups within normal limits of strength & tone bilaterally  Sensory Exam: sensory functions within normal limits bilaterally  Deep Tendon Reflexes: deep tendon reflexes within normal limits bilaterally  Assessment:   Post-op, the patient is doing well, with good pain control, without signs of an infection and without evidence of a cerebrospinal fluid leak  Plan:   Patient instructed to follow up in 4 weeks  To Do For Next Visit: clinical recheck  Review of Systems    Constitutional: No fever, no chills, feels well, no tiredness, no recent weight gain or weight loss  Eyes: wear glasses, but No complaints of eye pain, no red eyes, no eyesight problems, no discharge, no dry eyes, no itching of eyes  ENT: no complaints of earache, no loss of hearing, no nose bleeds, no nasal discharge, no sore throat, no hoarseness  Cardiovascular: No complaints of slow heart rate, no fast heart rate, no chest pain, no palpitations, no leg claudication, no lower extremity edema  Respiratory: No complaints of shortness of breath, no wheezing, no cough, no SOB on exertion, no orthopnea, no PND     Gastrointestinal: No complaints of abdominal pain, no constipation, no nausea or vomiting, no diarrhea, no bloody stools  Genitourinary: No complaints of dysuria, no incontinence, no pelvic pain, no dysmenorrhea, no vaginal discharge or bleeding  Musculoskeletal: limb pain, joint stiffness and right side mid back pian, but no joint swelling and no limb swelling  Integumentary: skin wound and right buttock bruising ,incisions red with staples, but no rashes, no itching, no breast pain, no skin lesions and no breast lump  Neurological: No complaints of headache, no confusion, no convulsions, no numbness, no dizziness or fainting, no tingling, no limb weakness, no difficulty walking  Psychiatric: anxiety, but not suicidal, no personality change, no sleep disturbances, no depression and no emotional problems  Endocrine: hypothyroid, but No complaints of proptosis, no hot flashes, no muscle weakness, no deepening of the voice, no feelings of weakness  Hematologic/Lymphatic: No complaints of swollen glands, no swollen glands in the neck, does not bleed easily, does not bruise easily  ROS reviewed  Active Problems    1  Anxiety (300 00) (F41 9)   2  Benign essential HTN (401 1) (I10)   3  Cervical radiculopathy (723 4) (M54 12)   4  Degeneration of cervical intervertebral disc (722 4) (M50 90)   5  Hormone replacement therapy (postmenopausal) (V07 4) (Z79 890)   6  Low back pain (724 2) (M54 5)   7  Lumbar degenerative disc disease (722 52) (M51 36)   8  Lumbar facet arthropathy (721 3) (M47 816)   9  Lumbar radiculopathy (724 4) (M54 16)   10  Lumbar stenosis without neurogenic claudication (724 02) (M48 06)   11  Myofascial pain syndrome (729 1) (M79 1)   12  Pain syndrome, chronic (338 4) (G89 4)   13  Pre-op evaluation (V72 84) (Z01 818)   14   Sacroiliitis (720 2) (M46 1)    Social History    · Denied: History of Alcohol Use (History)   · Daily caffeinated cola consumption   · Daily Coffee Consumption (3  Cups/Day)   · Denied: History of Drug Use   · Marital History - Currently    · Never A Smoker   · Occasional alcohol use   · Occupation:   · Business owner, [employer]   · Working Full Time    Current Meds   1  Advil 200 MG Oral Tablet; TAKE 3 TABLET 3 times daily; Therapy: (Recorded:61Kyu3105) to Recorded   2  BusPIRone HCl - 5 MG Oral Tablet; TAKE 2 TABLETS AT NIGHT; Therapy: 18EDE9114 to (Evaluate:16Mar2016)  Requested for: 12Jan2016; Last   Rx:18Sep2015; Status: ACTIVE - Renewal Denied Ordered   3  Estradiol 0 5 MG Oral Tablet; take 1 tablet twice a day; Therapy: 55LZS3295 to (Last Rx:11Nov2015)  Requested for: 20RAF6738 Ordered   4  LORazepam 1 MG Oral Tablet; TAKE 1  5  tablets  AT BEDTIME AS NEEDED  Requested   for: 08CXO7451; Last Rx:05Feb2016 Ordered   5  Methimazole 5 MG Oral Tablet; Take 1 tablet daily; Therapy: (Recorded:18Sep2015) to Recorded   6  Norethindrone Acetate 5 MG Oral Tablet; TAKE 1 TABLET DAILY FOR TEN DAYS EVERY   OTHER MONTH;   Therapy: 61FFO6612 to (Sarah Beth Astudillo)  Requested for: 42FFI0161; Last   Rx:02Nov2015 Ordered   7  Propranolol HCl - 10 MG Oral Tablet; Take 1 tablet twice daily; Therapy: 75Prv5467 to (Evaluate:13Sep2014)  Requested for: 32FQP9130; Last   Rx:17Mar2014 Ordered   8  Quinapril HCl - 20 MG Oral Tablet; TAKE 1 TABLET DAILY; Therapy: 11Aug2011 to (Evaluate:17Jan2016)  Requested for: 86MWX4280; Last   Rx:74Drk7914; Status: ACTIVE - Renewal Denied Ordered   9  TiZANidine HCl - 4 MG Oral Capsule; take 1 capsule daily; Therapy: (Recorded:64Lpr0231) to Recorded   10  Triamterene-HCTZ 37 5-25 MG Oral Capsule; TAKE ONE CAPSULE BY MOUTH EVERY    DAY; Therapy: 71Rsw9394 to (Last Rx:21Bei9988)  Requested for: 24BVU4011 Ordered    Allergies    1  Adhesive Tape TAPE   2  Flexeril TABS   3  Penicillins   4  Naproxen Sodium TABS   5  Promethazine-Codeine SYRP   6   Tramadol    Vitals   Recorded: 19YYP7224 02:12PM   Temperature 97 8 F, Oral   Heart Rate 60   Respiration 12   Systolic 875, RUE, Sitting   Diastolic 80, RUE, Sitting   Height 5 ft 6 in   Weight 190 lb 2 oz   BMI Calculated 30 69   BSA Calculated 1 96   Pain Scale 1     Procedure    Wound Exam: well healed with no sign of infection  There was mild dehiscence of the wound  Procedure Note: 20 staples were removed  Dressing: Steri-Strips were applied  Patient Status:  the patient tolerated the procedure well  Complications:  there were no complications  Future Appointments    Date/Time Provider Specialty Site   03/10/2016 11:15 AM CHANTALE Kim   Neurosurgery [facility] NEUROSURGICAL   03/10/2016 11:00 AM Castillo Rea N Marty Kim   04/27/2016 02:00 PM Joe Chamberlain MD Obstetrics/Gynecology [facility] & [address], [address]     Signatures   Electronically signed by : Bradford Ramirez [facility]; Feb 5 2016  3:47PM EST                       (Author)    Electronically signed by : CHANTALE Chavez ; Feb 5 2016  5:27PM EST                       (Author)

## 2018-01-13 VITALS
HEART RATE: 79 BPM | WEIGHT: 204 LBS | OXYGEN SATURATION: 96 % | DIASTOLIC BLOOD PRESSURE: 76 MMHG | HEIGHT: 65 IN | BODY MASS INDEX: 33.99 KG/M2 | SYSTOLIC BLOOD PRESSURE: 134 MMHG | TEMPERATURE: 97.4 F

## 2018-01-13 VITALS
SYSTOLIC BLOOD PRESSURE: 128 MMHG | DIASTOLIC BLOOD PRESSURE: 64 MMHG | WEIGHT: 201 LBS | BODY MASS INDEX: 32.3 KG/M2 | HEIGHT: 66 IN

## 2018-01-13 VITALS
HEIGHT: 65 IN | BODY MASS INDEX: 33.82 KG/M2 | OXYGEN SATURATION: 98 % | TEMPERATURE: 98.3 F | HEART RATE: 97 BPM | SYSTOLIC BLOOD PRESSURE: 132 MMHG | WEIGHT: 203 LBS | DIASTOLIC BLOOD PRESSURE: 76 MMHG

## 2018-01-13 VITALS
HEART RATE: 100 BPM | HEIGHT: 65 IN | SYSTOLIC BLOOD PRESSURE: 138 MMHG | BODY MASS INDEX: 34.29 KG/M2 | TEMPERATURE: 100.2 F | DIASTOLIC BLOOD PRESSURE: 80 MMHG | OXYGEN SATURATION: 98 % | WEIGHT: 205.8 LBS

## 2018-01-13 NOTE — RESULT NOTES
Verified Results  * CT SPINE CERVICAL WO CONTRAST [id number] 09:28AM [facility]     Test Name Result Flag Reference   CT SPINE CERVICAL WO CONTRAST (Report)     CT CERVICAL SPINE - WITHOUT CONTRAST     INDICATION: Right-sided neck pain radiating into the right arm and fingers for one month  Occasional severe pain  Posterior right scapular pain with stiffness in the neck  COMPARISON: None  TECHNIQUE: CT examination of the cervical spine was performed without intravenous contrast  Contiguous axial images were obtained  Sagittal and coronal reconstructions were performed  This examination, like all CT scans performed in the [facility], was performed utilizing techniques to minimize radiation dose exposure, including the use of iterative reconstruction and automated exposure control  IMAGE QUALITY: Diagnostic  FINDINGS:     ALIGNMENT: Normal alignment of the cervical spine  No subluxation  VERTEBRAL BODIES: No fracture  DEGENERATIVE CHANGES: Degenerative disc disease C6-7 with disc osteophyte complex  There is mild central and mild right greater than left foraminal narrowing  PREVERTEBRAL AND PARASPINAL SOFT TISSUES: Normal      THORACIC INLET: Normal        IMPRESSION:     Degenerative disc disease C6-7 results in mild central and mild right greater than left foraminal narrowing         Workstation performed: [id number]     Signed by:   Fadi Yip DO   11/29/16

## 2018-01-13 NOTE — MISCELLANEOUS
Message   Recorded as Task   Date: 03/16/2017 03:34 PM, Created By: Aldo Jarquin   Task Name: Care Coordination   Assigned To: SPA courtney clinical,Team   Regarding Patient: Asia Portillo, Status: Active   Comment:    RodolfociciAyala gonzalez - 16 Mar 2017 3:34 PM     TASK CREATED  Pt called requesting a refill of Lyrica but has an insurance question regarding it  Pls call pt at [phone]  Mariela Orellana - 16 Mar 2017 3:46 PM     TASK EDITED  Spoke to pt she [facility] and [facility] day supply cost her $60, she had it filled  Pt is requesting that she receive another script for the other 60 days to be sent to future scripts as it is only $40/month  Pt just filled the one month supply  Steph Lilly - 16 Mar 2017 4:15 PM     TASK REPLIED TO: Previously Assigned To Steph Lilly  can send scripts electronically for Lyrica  She can  script at [facility] and send in herself   Mariela Orellana - 16 Mar 2017 4:20 PM     TASK EDITED  Pt aware, will p/u  Active Problems    1  Anxiety (300 00) (F41 9)   2  Benign essential HTN (401 1) (I10)   3  Cervical radiculopathy (723 4) (M54 12)   4  Disc disorder of cervical region (722 91) (M50 90)   5  Hormone replacement therapy (postmenopausal) (V07 4) (Z79 890)   6  Low back pain (724 2) (M54 5)   7  Lumbar degenerative disc disease (722 52) (M51 36)   8  Lumbar facet arthropathy (721 3) (M12 88)   9  Lumbar radiculopathy (724 4) (M54 16)   10  Lumbar stenosis without neurogenic claudication (724 02) (M48 06)   11  Myofascial pain syndrome (729 1) (M79 1)   12  Need for influenza vaccination (V04 81) (Z23)   13  Pain syndrome, chronic (338 4) (G89 4)   14  Preop examination (V72 84) (Z01 818)   15  Ptosis of both eyelids (374 30) (H02 403)   16  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)   17  Sacroiliitis (720 2) (M46 1)   18  Screening for depression (V79 0) (Z13 89)    Current Meds   1   Advil 200 MG Oral Tablet; TAKE 3 TABLET 3 times daily; Therapy: (Recorded:94Ufk4306) to Recorded   2  BusPIRone HCl - 5 MG Oral Tablet; TAKE 2 TABLETS AT NIGHT; Therapy: 16JFU3725 to (Evaluate:58Fhu7957)  Requested for: 58Wsg5430; Last   Rx:84Bvl9733 Ordered   3  Denavir 1 % External Cream; APPLY TOPICALLY DAILY AS NEEDED; Therapy: 53Net1258 to (Last Rx:36Zcz9109)  Requested for: 40Qmt5168 Ordered   4  Estradiol 0 5 MG Oral Tablet; take 1 tablet twice a day; Therapy: 80FID1065 to (Last Rx:11Nov2015)  Requested for: 19CUA9956 Ordered   5  LORazepam 1 MG Oral Tablet; TAKE 1  5  tablets  AT BEDTIME AS NEEDED  Requested   for: 05PWO2216; Last Rx:46Bnf1302 Ordered   6  Lyrica 50 MG Oral Capsule; TAKE 1 TAB IN AM AND 3 TABS QHS; Therapy: 58XBU1827 to (Evaluate:45Ihh7876); Last Rx:16Mar2017 Ordered   7  MethIMAzole 5 MG Oral Tablet; take 0 5 tablet daily; Therapy: (Recorded:42Vbp5596) to Recorded   8  Norethindrone Acetate 5 MG Oral Tablet; TAKE 1 TABLET DAILY FOR TEN DAYS EVERY   OTHER MONTH;   Therapy: 42EZT6741 to (Cali Barclay)  Requested for: 44AYK0624; Last   Rx:02Nov2015 Ordered   9  Propranolol HCl - 10 MG Oral Tablet; TAKE 1 TABLET AT BEDTIME; Last Rx:74Ftd3877   Ordered   10  Quinapril HCl - 10 MG Oral Tablet; TAKE 1 TABLET DAILY; Therapy: 78BXH2880 to (Evaluate:77Wkl0597)  Requested for: 54Tzd1708; Last    Rx:40Uth9320; Status: ACTIVE - Renewal Denied Ordered   11  TiZANidine HCl - 4 MG Oral Capsule; TAKE 1 5 CAPSULE Daily  Requested for:    11Ktz9940; Last Rx:09Myp3510 Ordered    Allergies    1  Adhesive Tape TAPE   2  Flexeril TABS   3  Penicillins   4  Naproxen Sodium TABS   5  Promethazine-Codeine SYRP   6   Tramadol    Signatures   Electronically signed by : Love Dacosta RN; Mar 16 2017  4:20PM EST                       (Author)

## 2018-01-13 NOTE — MISCELLANEOUS
Message     Recorded as Task   Date: 11/21/2017 10:09 AM, Created By: Josefina Jean   Task Name: Call Back   Assigned To: [facility]PA courtney clinical,Team   Regarding Patient: Cely Valiente, Status: Active   CommentDeniece Musca - 21 Nov 2017 10:09 AM     TASK CREATED  [facility] Call Center- Patient called requesting to leave a message for a nurse/Q06  Stated she hurt her other knee and had an xray done on her right knee   not sure who to f/u with  Jocelyn Shukla S73 or her PCP to read the xray and to possibly give her injections in her knee  any questions c/b [phone]   Aliyah Hernandez - 21 Nov 2017 11:06 AM     TASK EDITED  S/W pt  Pt advised same as above  Pt stated she did have 30 people over on Saturday and did Thanksgiving dinner and was on her feet all day  Pt did have a xray done on her right knee and was seen in Dr Alesia Cosby office by Dr Larry Schwartz stated he could give her the injection or she could be seen by FQ to do the injection  Pt stated her left leg is feeling much better now since the last injection  Advised pt will make FQ aware and will c/b with recommendations  Ada Lopez - 21 Nov 2017 11:59 AM     TASK REPLIED TO: Previously Assigned To Ada Lopez  she's got OA in that knee  i can give her shot next week (give 2 weeks in between epidural and knee injection)   Aliyah Hernandez - 21 Nov 2017 12:31 PM     TASK EDITED  FQ, First available Oriana Pablo is 12/19, or not sure if plan is to do on a Friday with ultrasound? Please advise  Thanks  Ada Lopez - 21 Nov 2017 12:31 PM     TASK REPLIED TO: Previously Assigned To Christian Yin with an ultrasound   Aliyah Hernandez - 21 Nov 2017 12:48 PM     TASK EDITED  Attempted to reach pt  LVMMOM with C/B #, location and office hours  Carmen Augustin - 21 Nov 2017 12:52 PM     TASK EDITED  phone call from patient returning your call  please call patient at [phone], patient will be available     Priscilla Lovett - 21 Nov 2017 2:16 PM TASK EDITED  Pt called back asking if there is any way that her PCP can give her this injection  She would prefer Q06 to do it but doesn't want to wait that long with the holidays coming up  Pt would like a call back at [phone]  Aliyah Hernandez - 21 Nov 2017 2:30 PM     TASK EDITED  S/W pt  Advised pt same as above  Pt scheduled on first available Friday 12/8/17 with FQ for right knee injection under USGI to arrive at 3:15 for a 3:30 appt  Pt asking for a sooner appt if possible due to a lot events coming up with pt's job and pt is afraid she is going to re-injury her left knee  Advised pt that FQ recommendations is to wait 2 weeks between epidural and knee injection and the earliest SPA would be able to schedule pt would be November 30th  Advised pt will make FQ aware  DavidAliyah - 21 Nov 2017 2:34 PM     TASK EDITED  S/W pt  Pt asking if it is okay if her PCP can give her the injection earlier if she waits for 2 weeks; which would be around November 30th  Advised will c/b with FQ recommendations  Jayne Guzman - 22 Nov 2017 8:24 AM     TASK REPLIED TO: Previously Assigned To Jayne Guzman  yes that's fine   Marissa Martinez - 22 Nov 2017 8:36 AM     TASK EDITED  Pt advised of the same as in previous task  Pt said for now she is going to keep the appt she has w/ FQ for the knee inj on 12/8 but if she feels she needs it done sooner she will call her PCP to do it, she knows not to have done before 11/30 b/c it needs to be 2 wk from her PRATIK she had  Pt said if we have a sooner appt before 12/8 could we call her to have R/S  I explained to the pt that inj with u/s guidance are only done every other friday PM and with our office being closed this Friday the next avail time inj w/ u/s guidance would be is on 12/8, so there won't be any sooner appt to offer her here at [address]  Pt knows to call our office if she decides to have done sooner by PCP  Active Problems    1   Acute pain of right knee (719 46) (M25 561)   2  Anxiety (300 00) (F41 9)   3  Benign essential HTN (401 1) (I10)   4  Cervical radiculopathy (723 4) (M54 12)   5  Disc disorder of cervical region (722 91) (M50 90)   6  Hormone replacement therapy (postmenopausal) (V07 4) (Z79 890)   7  Hot flash, menopausal (627 2) (N95 1)   8  Lumbar degenerative disc disease (722 52) (M51 36)   9  Lumbar facet arthropathy (721 3) (M12 88)   10  Lumbar radiculopathy (724 4) (M54 16)   11  Lumbar stenosis without neurogenic claudication (724 02) (M48 061)   12  Myofascial pain syndrome (729 1) (M79 1)   13  Pain syndrome, chronic (338 4) (G89 4)   14  Positive depression screening (796 4) (Z13 89)   15  Sacroiliitis (720 2) (M46 1)    Current Meds   1  Advil 200 MG Oral Tablet; TAKE 3 TABLET 3 times daily; Therapy: (Recorded:69Wlp2309) to Recorded   2  BusPIRone HCl - 5 MG Oral Tablet; TAKE 2 TABLETS AT NIGHT; Therapy: 24JEV5523 to (Evaluate:16Yam4767)  Requested for: 97XXU5460; Last   Rx:20Nov2017 Ordered   3  Denavir 1 % External Cream; APPLY TOPICALLY DAILY AS NEEDED; Therapy: 93Rvh4257 to (Last Rx:12Tli5118)  Requested for: 73Jcr8991 Ordered   4  LORazepam 1 MG Oral Tablet; TAKE 1  5  tablets  AT BEDTIME AS NEEDED  Requested   for: 11MRR4021; Last Rx:20Nov2017 Ordered   5  Lyrica 50 MG Oral Capsule; TAKE 1 TAB IN AM AND 2 TABS QHS; Therapy: 91PAS5738 to (Evaluate:89Wif3527); Last Rx:15Jun2017 Ordered   6  MethIMAzole 5 MG Oral Tablet; Take one tablet daily; Therapy: (Recorded:99Iyc3235) to Recorded   7  Propranolol HCl - 10 MG Oral Tablet; TAKE 1 TABLET TWICE DAILY  Requested for:   59DRU5900; Last Rx:22Vta6151 Ordered   8  Quinapril HCl - 10 MG Oral Tablet; TAKE 1 TABLET DAILY; Therapy: 35FRS6356 to (Evaluate:43Yxu4730)  Requested for: 47VPY9937; Last   Rx:20Nov2017 Ordered   9  TiZANidine HCl - 4 MG Oral Capsule; TAKE 1 5 CAPSULE Daily as needed for pain    Requested for: 20Nov2017; Last Rx:20Nov2017 Ordered    Allergies    1   Adhesive Tape TAPE   2  Flexeril TABS   3  Naproxen Sodium TABS   4  Penicillins   5  Promethazine-Codeine SYRP   6   Tramadol    Signatures   Electronically signed by : Rigo Velázquez, ; Nov 22 2017  8:37AM EST                       (Author)

## 2018-01-13 NOTE — MISCELLANEOUS
Message   Recorded as Task   Date: 12/05/2016 01:32 PM, Created By: Alicia Reddy   Task Name: Follow Up   Assigned To: [facility] courtney clinical,Team   Regarding Patient: Everett Rollins, Status: Active   Comment:    MichelleMarissa - 05 Dec 2016 1:32 PM     TASK CREATED  Caller: Self; General Medical Question; [phone] (Home)  TC from pt at 1:16 that [facility] had given her a RX for lyrica 100mg take 2 QHS at her last ov  pt said she hasn't gotten that RX filled yet b/c she was using up her lyrica 50mg first  She has found if she take 200mg QHS she is still too tired the next morning she instead she takes 50mg 1 tab during the day and 50mg (3) QHS, this doesn't make her as tired  Pt found out that her new ins for next year requires 80 day RXs  Pt asking when she comes tomorrow for her inj can we provide her with a 90 day RX of lyrica 50mg 1 cap during the day and 3 caps QHS for her to send to [facility]? Told pt I would forward her request to Dr Kaiser Friday - 05 Dec 2016 1:35 PM     TASK REPLIED TO: Previously Assigned To Ralph funes,Team   yes i will write script now   Alicia Reddy - 05 Dec 2016 3:33 PM     TASK EDITED  Left vm for pt that Dr Cindy Patel has written her script and we will give to her tomorrow when she comes for her injection  Active Problems    1  Anxiety (300 00) (F41 9)   2  Benign essential HTN (401 1) (I10)   3  Cervical radiculopathy (723 4) (M54 12)   4  Disc disorder of cervical region (722 91) (M50 90)   5  Hormone replacement therapy (postmenopausal) (V07 4) (Z79 890)   6  Low back pain (724 2) (M54 5)   7  Lumbar degenerative disc disease (722 52) (M51 36)   8  Lumbar facet arthropathy (721 3) (M12 88)   9  Lumbar radiculopathy (724 4) (M54 16)   10  Lumbar stenosis without neurogenic claudication (724 02) (M48 06)   11  Myofascial pain syndrome (729 1) (M79 1)   12  Need for influenza vaccination (V04 81) (Z23)   13   Pain syndrome, chronic (338 4) (G89 4)   14  Preop examination (V72 84) (Z01 818)   15  Ptosis of both eyelids (374 30) (H02 403)   16  Sacroiliitis (720 2) (M46 1)   17  Screening for depression (V79 0) (Z13 89)    Current Meds   1  Advil 200 MG Oral Tablet; TAKE 3 TABLET 3 times daily; Therapy: (Recorded:36Gqt6439) to Recorded   2  BusPIRone HCl - 5 MG Oral Tablet; TAKE 2 TABLETS AT NIGHT; Therapy: 81PAK2888 to (Evaluate:31May2017)  Requested for: 00Ahw8183; Last   Rx:33Rae1300 Ordered   3  Estradiol 0 5 MG Oral Tablet; take 1 tablet twice a day; Therapy: 72FIL0682 to (Last Rx:11Nov2015)  Requested for: 55LGM7101 Ordered   4  LORazepam 1 MG Oral Tablet; TAKE 1  5  tablets  AT BEDTIME AS NEEDED  Requested   for: 85IBY9597; Last Rx:02Nov2016 Ordered   5  Lyrica 50 MG Oral Capsule; TAKE 1 TAB IN AM AND 3 TABS QHS; Therapy: 74FKF3134 to (Evaluate:03Jun2017); Last Rx:17Iyg6465 Ordered   6  MethIMAzole 5 MG Oral Tablet; take 0 5 tablet daily; Therapy: (Recorded:14Nov2016) to Recorded   7  Norethindrone Acetate 5 MG Oral Tablet; TAKE 1 TABLET DAILY FOR TEN DAYS EVERY   OTHER MONTH;   Therapy: 30QKX7877 to ([place])  Requested for: 00HCI9291; Last   Rx:02Nov2015 Ordered   8  Propranolol HCl - 10 MG Oral Tablet; TAKE 1 TABLET AT BEDTIME; Last Rx:87Tfs8874   Ordered   9  Quinapril HCl - 10 MG Oral Tablet; TAKE 1 TABLET DAILY; Therapy: 85IYP9859 to (Evaluate:31May2017); Last Rx:49Sej3836 Ordered   10  TiZANidine HCl - 4 MG Oral Capsule; TAKE 1 5 CAPSULE Daily  Requested for:    20Oji8723; Last Rx:28Lss9769 Ordered    Allergies    1  Adhesive Tape TAPE   2  Flexeril TABS   3  Penicillins   4  Naproxen Sodium TABS   5  Promethazine-Codeine SYRP   6   Tramadol    Signatures   Electronically signed by : Saniya Wan, ; Dec  5 2016  3:33PM EST                       (Author)

## 2018-01-14 VITALS
DIASTOLIC BLOOD PRESSURE: 88 MMHG | WEIGHT: 208 LBS | BODY MASS INDEX: 33.43 KG/M2 | OXYGEN SATURATION: 98 % | SYSTOLIC BLOOD PRESSURE: 140 MMHG | HEIGHT: 66 IN | TEMPERATURE: 98.1 F | HEART RATE: 76 BPM

## 2018-01-15 NOTE — MISCELLANEOUS
Message   Recorded as Task   Date: 09/13/2016 01:02 PM, Created By: Reba Franco   Task Name: Medical Complaint Callback   Assigned To: S[facility] courtney clinical,Team   Regarding Patient: Lizy Awad, Status: Active   Comment:    Reba Franco - 13 Sep 2016 1:02 PM     TASK CREATED  Caller: Self; Medical Complaint; [phone] (Home)  Pt left vm on Tues at 11;35 that when she last saw NM she had discussed about the pain of her left buttocks and going down the leg and her SCS seems not to be reaching that area  She did get reprogrammed by [facility]  Quan Martinez said that maybe she could have a shot that might help with the pain in this area  Pt wants Steph's input on this  Request c/b at [phone]  S/W pt and confirmed pain was on the left side  I confirmed that per pt last ov w/ Viviane Martinez that if Viviane Martinez stated that if she fails to get relief after reprogramming she should contact the office and be sched for Left SIJ inj  Told pt I would sched the inj and make Steph aware on Thurs when she's in the office  Pt scheduled for Left SIJ inj for 9/22/16 at 2:30  Instr reviewed: light lunch allowed then NPO 1 Hr prior to opro,  needed, c/b needed if sick/abx started prior to opro, denies blood thinners  Pt verbalaized understanding of all instr  Rody Asper, please put order in allscripts for the injection  **   Steph Lilly - 14 Sep 2016 9:39 AM     TASK REPLIED TO: Previously Assigned To SPA courtney clinical,Team                      order in allscripts for SIJ inj   thanks for scheduling patient        Active Problems    1  Anxiety (300 00) (F41 9)   2  Benign essential HTN (401 1) (I10)   3  Cervical radiculopathy (723 4) (M54 12)   4  Degeneration of cervical intervertebral disc (722 4) (M50 90)   5  Encounter for gynecological examination without abnormal finding (V72 31) (Z01 419)   6  Encounter for screening mammogram for malignant neoplasm of breast (V76 12)   (Z12 31)   7   Hormone replacement therapy (postmenopausal) (V07 4) (Z79 890)   8  Low back pain (724 2) (M54 5)   9  Lumbar degenerative disc disease (722 52) (M51 36)   10  Lumbar facet arthropathy (721 3) (M12 88)   11  Lumbar radiculopathy (724 4) (M54 16)   12  Lumbar stenosis without neurogenic claudication (724 02) (M48 06)   13  Myofascial pain syndrome (729 1) (M79 1)   14  Pain syndrome, chronic (338 4) (G89 4)   15  Sacroiliitis (720 2) (M46 1)   16  Screening cholesterol level (V77 91) (Z13 220)    Current Meds   1  Advil 200 MG Oral Tablet; TAKE 3 TABLET 3 times daily; Therapy: (Recorded:37Bef1208) to Recorded   2  BusPIRone HCl - 5 MG Oral Tablet; TAKE 2 TABLETS AT NIGHT; Therapy: 94GST0716 to (Evaluate:19Qsh0968)  Requested for: 02MYN6194; Last   Rx:30Jun2016 Ordered   3  Estradiol 0 5 MG Oral Tablet; take 1 tablet twice a day; Therapy: 41FLS0212 to (Last Rx:11Nov2015)  Requested for: 24ZFB3889 Ordered   4  LORazepam 1 MG Oral Tablet; TAKE 1  5  tablets  AT BEDTIME AS NEEDED  Requested   for: 21Jun2016; Last Rx:21Jun2016 Ordered   5  Lyrica 50 MG Oral Capsule; 1 CAPSULE in the am and 2 caps at night; Therapy: 92BUF6378 to (Evaluate:27Fpl6971); Last XK:41AUP3893 Ordered   6  Methimazole 5 MG Oral Tablet; Take 1 tablet daily; Therapy: (Recorded:77Goi1088) to Recorded   7  Norethindrone Acetate 5 MG Oral Tablet; TAKE 1 TABLET DAILY FOR TEN DAYS EVERY   OTHER MONTH;   Therapy: 93CYJ1107 to (\[facility]\"")  Requested for: 39LAC4004; Last   Rx:02Nov2015 Ordered   8  Propranolol HCl - 10 MG Oral Tablet; take 1/2 in the A M  and one tablet in at bed time; Therapy: (Recorded:09Jun2016) to Recorded   9  Quinapril HCl - 10 MG Oral Tablet; TAKE 1 TABLET DAILY; Therapy: 95CMO2978 to (Evaluate:53Pvb0511); Last Rx:30Jun2016 Ordered   10  TiZANidine HCl - 4 MG Oral Capsule; TAKE 1 5 CAPSULE Daily  Requested for:    34Khm5926; Last Rx:30Jun2016 Ordered    Allergies    1  Adhesive Tape TAPE   2  Flexeril TABS   3  Penicillins   4  Naproxen Sodium TABS   5  Promethazine-Codeine SYRP   6   Tramadol    Signatures   Electronically signed by : Karlos Oliveira, ; Sep 14 2016 10:17AM EST                       (Author)

## 2018-01-15 NOTE — MISCELLANEOUS
Message  Patient had a SCS implanted by Dr [facility] in January  She had her 2 week POV on 2/5/16  She called into the office and left a message stating that she was still having some discomfort around her thoracic incision site as well as at her IPG site  We reviewed signs of infection- she denied any excessive erythema, drainage or warmth to the skin around the incisional areas  Patient also denied a fever  I explained to the patient that it is not unusual to still have discomfort at these surgical sites  We discussed continuing to monitor the incisions and calling the office if she has any further questions or concerns  Patient expressed an understanding and was in agreement with this  Active Problems    1  Anxiety (300 00) (F41 9)   2  Benign essential HTN (401 1) (I10)   3  Cervical radiculopathy (723 4) (M54 12)   4  Degeneration of cervical intervertebral disc (722 4) (M50 90)   5  Hormone replacement therapy (postmenopausal) (V07 4) (Z79 890)   6  Low back pain (724 2) (M54 5)   7  Lumbar degenerative disc disease (722 52) (M51 36)   8  Lumbar facet arthropathy (721 3) (M47 816)   9  Lumbar radiculopathy (724 4) (M54 16)   10  Lumbar stenosis without neurogenic claudication (724 02) (M48 06)   11  Myofascial pain syndrome (729 1) (M79 1)   12  Pain syndrome, chronic (338 4) (G89 4)   13  Pre-op evaluation (V72 84) (Z01 818)   14  Sacroiliitis (720 2) (M46 1)    Current Meds   1  Advil 200 MG Oral Tablet; TAKE 3 TABLET 3 times daily; Therapy: (Recorded:37Fkt7297) to Recorded   2  BusPIRone HCl - 5 MG Oral Tablet; TAKE 2 TABLETS AT NIGHT; Therapy: 58KPC2827 to (Evaluate:16Mar2016)  Requested for: 12Jan2016; Last   Rx:65Qns7783; Status: ACTIVE - Renewal Denied Ordered   3  Estradiol 0 5 MG Oral Tablet; take 1 tablet twice a day; Therapy: 63OIG5777 to (Last Rx:11Nov2015)  Requested for: 68DJW9636 Ordered   4   LORazepam 1 MG Oral Tablet; TAKE 1  5  tablets  AT BEDTIME AS NEEDED  Requested   for: 34FMZ3695; Last Rx:98Xfg9341 Ordered   5  Methimazole 5 MG Oral Tablet; Take 1 tablet daily; Therapy: (Recorded:81Ihh9577) to Recorded   6  Norethindrone Acetate 5 MG Oral Tablet; TAKE 1 TABLET DAILY FOR TEN DAYS EVERY   OTHER MONTH;   Therapy: 60CDS0498 to ([place] )  Requested for: 43OLO6749; Last   Rx:02Nov2015 Ordered   7  Propranolol HCl - 10 MG Oral Tablet; Take 1 tablet twice daily; Therapy: 73Skb4562 to (Evaluate:23Vfa2443)  Requested for: 19TJV4767; Last   Rx:17Mar2014 Ordered   8  Quinapril HCl - 20 MG Oral Tablet; TAKE 1 TABLET DAILY; Therapy: 11Aug2011 to (Evaluate:17Jan2016)  Requested for: 22ORE4548; Last   Rx:32Fml1061; Status: ACTIVE - Renewal Denied Ordered   9  TiZANidine HCl - 4 MG Oral Capsule; take 1 capsule daily; Therapy: (Recorded:21Cpj9881) to Recorded   10  Triamterene-HCTZ 37 5-25 MG Oral Capsule; TAKE ONE CAPSULE BY MOUTH EVERY    DAY; Therapy: 23Hgf8544 to (Last Rx:14Kco0125)  Requested for: 23FAC9676 Ordered    Allergies    1  Adhesive Tape TAPE   2  Flexeril TABS   3  Penicillins   4  Naproxen Sodium TABS   5  Promethazine-Codeine SYRP   6   Tramadol    Signatures   Electronically signed by : Bernice Grier RN; Feb 15 2016  2:11PM EST                       (Author)

## 2018-01-15 NOTE — MISCELLANEOUS
Message   Recorded as Task   Date: 05/24/2016 09:04 AM, Created By: Campbell Osorio   Task Name: Med Renewal Request   Assigned To: Ana funes,Team   Regarding Patient: Karuna Drew, Status: In Progress   Comment:    Mariela Orellana - 24 May 2016 9:04 AM     TASK CREATED  Caller: Self; Renew Medication; [phone] (Home)  Pt lmom requesting a refill of lyrica 50mg, pt states she takes one tab tid and sometimes just two tabs at hs depending on her pain in the morning  [facility] [facility] 24 May 2016 10:37 AM     TASK REPLIED TO: Previously Assigned To SPA courtney clinical,Team  ok to call in lyrica 50mg one po tid # 90 with no refills and then schedule 1 month POVS with dorita and me for re-eval and further refills   Mariela Orellana - 24 May 2016 11:00 AM     TASK EDITED  Lmom for pt to return call, needs to schedule a one month office visit  Cheri called into Felicia Jessica - 24 May 2016 11:29 AM     TASK EDITED  Spoke to pt, f/u scheduled for 6/23/16  Active Problems    1  Anxiety (300 00) (F41 9)   2  Benign essential HTN (401 1) (I10)   3  Cervical radiculopathy (723 4) (M54 12)   4  Degeneration of cervical intervertebral disc (722 4) (M50 90)   5  Encounter for gynecological examination without abnormal finding (V72 31) (Z01 419)   6  Encounter for screening mammogram for malignant neoplasm of breast (V76 12)   (Z12 31)   7  Hormone replacement therapy (postmenopausal) (V07 4) (Z79 890)   8  Low back pain (724 2) (M54 5)   9  Lumbar degenerative disc disease (722 52) (M51 36)   10  Lumbar facet arthropathy (721 3) (M46 96)   11  Lumbar radiculopathy (724 4) (M54 16)   12  Lumbar stenosis without neurogenic claudication (724 02) (M48 06)   13  Myofascial pain syndrome (729 1) (M79 1)   14  Pain syndrome, chronic (338 4) (G89 4)   15  Sacroiliitis (720 2) (M46 1)    Current Meds   1   Advil 200 MG Oral Tablet; TAKE 3 TABLET 3 times daily; Therapy: (Recorded:14Nfv4334) to Recorded   2  BusPIRone HCl - 5 MG Oral Tablet; TAKE 2 TABLETS AT NIGHT; Therapy: 56CRZ7012 to (Evaluate:57Ymv7583)  Requested for: 43FBN2379; Last   Rx:10Mar2016 Ordered   3  Estradiol 0 5 MG Oral Tablet; take 1 tablet twice a day; Therapy: 52ZXI4203 to (Last Rx:11Nov2015)  Requested for: 64QMY0363 Ordered   4  LORazepam 1 MG Oral Tablet; TAKE 1  5  tablets  AT BEDTIME AS NEEDED  Requested   for: 38QJO3897; Last Rx:31Mar2016 Ordered   5  Lyrica 100 MG Oral Capsule; TAKE 1 CAPSULE AT BEDTIME; Therapy: 65PDL6137 to Recorded   6  Lyrica 50 MG Oral Capsule; 1 CAPSULE DAILY; Therapy: 89LDY7099 to Recorded   7  Lyrica 50 MG Oral Capsule; TAKE 1 CAPSULE 3 TIMES DAILY; Therapy: 87IZJ0027 to (Evaluate:23Jun2016) Recorded   8  Methimazole 5 MG Oral Tablet; Take 1 tablet daily; Therapy: (Recorded:32Ecx7335) to Recorded   9  Norethindrone Acetate 5 MG Oral Tablet; TAKE 1 TABLET DAILY FOR TEN DAYS EVERY   OTHER MONTH;   Therapy: 56EPN4768 to (Keyla Aguilar)  Requested for: 68ASO7571; Last   Rx:02Nov2015 Ordered   10  Propranolol HCl - 10 MG Oral Tablet; Take 1 tablet twice daily; Therapy: 07Rzt2347 to (Evaluate:47Zvi9471)  Requested for: 15TSM3604; Last    Rx:17Mar2014 Ordered   11  Quinapril HCl - 10 MG Oral Tablet; TAKE 1 TABLET DAILY; Therapy: 77KHN5018 to (Evaluate:09Apr2016); Last Rx:10Mar2016 Ordered   12  TiZANidine HCl - 4 MG Oral Capsule; TAKE 1 5 CAPSULE Daily  Requested for:    69QSL5570; Last Rx:10Mar2016 Ordered   13  Triamterene-HCTZ 37 5-25 MG Oral Capsule; TAKE 1 CAPSULE DAILY; Therapy: 62Yry9637 to (Evaluate:08Jun2016)  Requested for: 67GYJ9832; Last    Rx:10Mar2016 Ordered    Allergies    1  Adhesive Tape TAPE   2  Flexeril TABS   3  Penicillins   4  Naproxen Sodium TABS   5  Promethazine-Codeine SYRP   6   Tramadol    Signatures   Electronically signed by : Mike Stevens RN; May 24 2016 11:29AM EST (Author)

## 2018-01-15 NOTE — MISCELLANEOUS
Message   Recorded as Task   Date: 11/08/2016 01:23 PM, Created By: Chilo Carlos   Task Name: Follow Up   Assigned To: Romel Salcido procedure,Team   Regarding Patient: Maria Interiano, Status: Active   CommentDoak Seth - 08 Nov 2016 1:23 PM     TASK CREATED  pt is S/P 11/01/2016 ISAI (M50 12) # 2 by Dr Zaid Myrick  no pain dairy   f/u 12/22   Chilo Carlos - 08 Nov 2016 2:32 PM     TASK EDITED  spoke to pt she got some relief from inj her pain level is a 4 no redness or swelling   Alexa Merida - 08 Nov 2016 2:45 PM     TASK REPLIED TO: Previously Assigned To Alexa Merida md aware   f/u @ [facility]        Active Problems    1  Anxiety (300 00) (F41 9)   2  Benign essential HTN (401 1) (I10)   3  Cervical radiculopathy (723 4) (M54 12)   4  Degeneration of cervical intervertebral disc (722 4) (M50 90)   5  Hormone replacement therapy (postmenopausal) (V07 4) (Z79 890)   6  Low back pain (724 2) (M54 5)   7  Lumbar degenerative disc disease (722 52) (M51 36)   8  Lumbar facet arthropathy (721 3) (M12 88)   9  Lumbar radiculopathy (724 4) (M54 16)   10  Lumbar stenosis without neurogenic claudication (724 02) (M48 06)   11  Myofascial pain syndrome (729 1) (M79 1)   12  Pain syndrome, chronic (338 4) (G89 4)   13  Preop examination (V72 84) (Z01 818)   14  Ptosis of both eyelids (374 30) (H02 403)   15  Sacroiliitis (720 2) (M46 1)    Current Meds   1  Advil 200 MG Oral Tablet; TAKE 3 TABLET 3 times daily; Therapy: (Recorded:16Jil3332) to Recorded   2  BusPIRone HCl - 5 MG Oral Tablet; TAKE 2 TABLETS AT NIGHT; Therapy: 06UGJ5418 to (Evaluate:22Gjx3049)  Requested for: 32ZFM2419; Last   Rx:30Jun2016 Ordered   3  Estradiol 0 5 MG Oral Tablet; take 1 tablet twice a day; Therapy: 30JHV1416 to (Last Rx:11Nov2015)  Requested for: 82VWB6533 Ordered   4  LORazepam 1 MG Oral Tablet; TAKE 1  5  tablets  AT BEDTIME AS NEEDED  Requested   for: 15SAH4393; Last Rx:02Nov2016 Ordered   5   Lyrica 50 MG Oral Capsule; TAKE 2 CAPSULE Bedtime; Therapy: 50TOU8063 to (Evaluate:04Apr2017) Recorded   6  MethIMAzole 5 MG Oral Tablet; Take 1 tablet daily; Therapy: (Recorded:92Gcp4504) to Recorded   7  Norethindrone Acetate 5 MG Oral Tablet; TAKE 1 TABLET DAILY FOR TEN DAYS EVERY   OTHER MONTH;   Therapy: 59SBM8101 to (Nimco Barrientos)  Requested for: 94JXL5570; Last   Rx:02Nov2015 Ordered   8  Propranolol HCl - 10 MG Oral Tablet; take 1/2 in the A M  and one tablet in at bed time; Therapy: (Recorded:09Jun2016) to Recorded   9  Quinapril HCl - 10 MG Oral Tablet; TAKE 1 TABLET DAILY; Therapy: 42RLQ7538 to (Evaluate:42Pkg8242); Last Rx:30Jun2016 Ordered   10  TiZANidine HCl - 4 MG Oral Capsule; TAKE 1 5 CAPSULE Daily  Requested for:    40Run9229; Last Rx:30Jun2016 Ordered    Allergies    1  Adhesive Tape TAPE   2  Flexeril TABS   3  Penicillins   4  Naproxen Sodium TABS   5  Promethazine-Codeine SYRP   6   Tramadol    Signatures   Electronically signed by : Saurav Haskins, ; Nov 8 2016  3:32PM EST                       (Author)

## 2018-01-15 NOTE — MISCELLANEOUS
Message   Recorded as Task   Date: 12/12/2016 07:59 AM, Created By: Toya Yip   Task Name: Financial Authorization   Assigned To: Armida Walker   Regarding Patient: Asia Portillo, Status: Active   Comment:    Irma Quarles - 12 Dec 2016 7:59 AM     TASK Christine Adame from [employer] lm on [place] procedure f/u line 12/9/16 @ 1251  -[facility] pt needs prior auth for Lyrica, if he doesn't hear from us today, he will have to submit to medical director  -[facility] # to contact [address] [place] [address] - 12 Dec 2016 11:20 AM     TASK EDITED   Hina Omer - 12 Dec 2016 12:01 PM     TASK EDITED  Spoke with Mason Dia and request has been approved, valid through 12/31/2039  Pt aware  Active Problems    1  Anxiety (300 00) (F41 9)   2  Benign essential HTN (401 1) (I10)   3  Cervical radiculopathy (723 4) (M54 12)   4  Disc disorder of cervical region (722 91) (M50 90)   5  Hormone replacement therapy (postmenopausal) (V07 4) (Z79 890)   6  Low back pain (724 2) (M54 5)   7  Lumbar degenerative disc disease (722 52) (M51 36)   8  Lumbar facet arthropathy (721 3) (M12 88)   9  Lumbar radiculopathy (724 4) (M54 16)   10  Lumbar stenosis without neurogenic claudication (724 02) (M48 06)   11  Myofascial pain syndrome (729 1) (M79 1)   12  Need for influenza vaccination (V04 81) (Z23)   13  Pain syndrome, chronic (338 4) (G89 4)   14  Preop examination (V72 84) (Z01 818)   15  Ptosis of both eyelids (374 30) (H02 403)   16  Sacroiliitis (720 2) (M46 1)   17  Screening for depression (V79 0) (Z13 89)    Current Meds   1  Advil 200 MG Oral Tablet; TAKE 3 TABLET 3 times daily; Therapy: (Recorded:13Lpc3888) to Recorded   2  BusPIRone HCl - 5 MG Oral Tablet; TAKE 2 TABLETS AT NIGHT; Therapy: 44LEZ0152 to (Evaluate:67Pjd1853)  Requested for: 91Qze5185; Last   Rx:03Wtx1073 Ordered   3  Estradiol 0 5 MG Oral Tablet; take 1 tablet twice a day;    Therapy: 72HFC4679 to (Last Rx:11Nov2015)  Requested for: 70QUM4784 Ordered   4  LORazepam 1 MG Oral Tablet; TAKE 1  5  tablets  AT BEDTIME AS NEEDED  Requested   for: 69HEB7752; Last Rx:02Nov2016 Ordered   5  Lyrica 50 MG Oral Capsule; TAKE 1 TAB IN AM AND 3 TABS QHS; Therapy: 16BBQ6310 to (Evaluate:03Jun2017); Last Rx:56Wiz2722 Ordered   6  MethIMAzole 5 MG Oral Tablet; take 0 5 tablet daily; Therapy: (Recorded:14Nov2016) to Recorded   7  Norethindrone Acetate 5 MG Oral Tablet; TAKE 1 TABLET DAILY FOR TEN DAYS EVERY   OTHER MONTH;   Therapy: 47DLP6549 to ([place])  Requested for: 29LZR4388; Last   Rx:02Nov2015 Ordered   8  Propranolol HCl - 10 MG Oral Tablet; TAKE 1 TABLET AT BEDTIME; Last Rx:31Eei2188   Ordered   9  Quinapril HCl - 10 MG Oral Tablet; TAKE 1 TABLET DAILY; Therapy: 58RRI7329 to (Evaluate:59Ukq8335); Last Rx:00Wbo2887 Ordered   10  TiZANidine HCl - 4 MG Oral Capsule; TAKE 1 5 CAPSULE Daily  Requested for:    89Tmn7477; Last Rx:81Jmt2714 Ordered    Allergies    1  Adhesive Tape TAPE   2  Flexeril TABS   3  Penicillins   4  Naproxen Sodium TABS   5  Promethazine-Codeine SYRP   6   Tramadol    Signatures   Electronically signed by : Abram Bland, ; Dec 12 2016 12:02PM EST                       (Author)

## 2018-01-16 NOTE — MISCELLANEOUS
Message   Recorded as Task   Date: 12/13/2016 11:51 AM, Created By: Jessica Anne   Task Name: Follow Up   Assigned To: Jackie alonso,Team   Regarding Patient: Emily Claudio, Status: Active   CommentRuperto Marquise - 13 Dec 2016 11:51 AM     TASK CREATED  pt is S/P ISAI # 3 12/06/2016 by Dr Kaushik Campos   no pain diary   f/u 12/22/2016   Jessica Anne - 13 Dec 2016 11:53 AM     TASK EDITED  spoke to pt she got 60% relief from inj her pain is a 3 no redness or swelling   Bety,Rod - 13 Dec 2016 11:54 AM     TASK REPLIED TO: Previously Assigned To Chelsea Vu md aware   will see her at the [facility]        Active Problems    1  Anxiety (300 00) (F41 9)   2  Benign essential HTN (401 1) (I10)   3  Cervical radiculopathy (723 4) (M54 12)   4  Disc disorder of cervical region (722 91) (M50 90)   5  Hormone replacement therapy (postmenopausal) (V07 4) (Z79 890)   6  Low back pain (724 2) (M54 5)   7  Lumbar degenerative disc disease (722 52) (M51 36)   8  Lumbar facet arthropathy (721 3) (M12 88)   9  Lumbar radiculopathy (724 4) (M54 16)   10  Lumbar stenosis without neurogenic claudication (724 02) (M48 06)   11  Myofascial pain syndrome (729 1) (M79 1)   12  Need for influenza vaccination (V04 81) (Z23)   13  Pain syndrome, chronic (338 4) (G89 4)   14  Preop examination (V72 84) (Z01 818)   15  Ptosis of both eyelids (374 30) (H02 403)   16  Sacroiliitis (720 2) (M46 1)   17  Screening for depression (V79 0) (Z13 89)    Current Meds   1  Advil 200 MG Oral Tablet; TAKE 3 TABLET 3 times daily; Therapy: (Recorded:16Pzl4683) to Recorded   2  BusPIRone HCl - 5 MG Oral Tablet; TAKE 2 TABLETS AT NIGHT; Therapy: 96AEJ6624 to (Evaluate:34Xdp2649)  Requested for: 62Coj3582; Last   Rx:47Ddt4471 Ordered   3  Estradiol 0 5 MG Oral Tablet; take 1 tablet twice a day; Therapy: 96BIN3310 to (Last Rx:11Nov2015)  Requested for: 34ZSJ4066 Ordered   4   LORazepam 1 MG Oral Tablet; TAKE 1  5  tablets AT BEDTIME AS NEEDED  Requested   for: 58VYA5412; Last Rx:02Nov2016 Ordered   5  Lyrica 50 MG Oral Capsule; TAKE 1 TAB IN AM AND 3 TABS QHS; Therapy: 88UNH5700 to (Evaluate:03Jun2017); Last Rx:36Lcl1568 Ordered   6  MethIMAzole 5 MG Oral Tablet; take 0 5 tablet daily; Therapy: (Recorded:14Nov2016) to Recorded   7  Norethindrone Acetate 5 MG Oral Tablet; TAKE 1 TABLET DAILY FOR TEN DAYS EVERY   OTHER MONTH;   Therapy: 55UDJ3117 to (Rogelio Montgomery)  Requested for: 24ARW6366; Last   Rx:02Nov2015 Ordered   8  Propranolol HCl - 10 MG Oral Tablet; TAKE 1 TABLET AT BEDTIME; Last Rx:02Dec2016   Ordered   9  Quinapril HCl - 10 MG Oral Tablet; TAKE 1 TABLET DAILY; Therapy: 68GXG2224 to (Evaluate:10Med6794); Last Rx:54Eid2594 Ordered   10  TiZANidine HCl - 4 MG Oral Capsule; TAKE 1 5 CAPSULE Daily  Requested for:    23Tvh8258; Last Rx:66Eim8757 Ordered    Allergies    1  Adhesive Tape TAPE   2  Flexeril TABS   3  Penicillins   4  Naproxen Sodium TABS   5  Promethazine-Codeine SYRP   6   Tramadol    Signatures   Electronically signed by : Cami Bass, ; Dec 13 2016 11:59AM EST                       (Author)

## 2018-01-16 NOTE — MISCELLANEOUS
Message   Recorded as Task   Date: 02/04/2016 11:38 AM, Created By: Deon Eddy   Task Name: Call Back   Assigned To: Addis Crabtree   Regarding Patient: Swati Lopez, Status: Active   Comment:    Addis Crabtree - 04 Feb 2016 11:38 AM     TASK CREATED  Pt reports that the area around her newly placed battery site is swollen  When questioned, she denies any increase in discomfort, redness,drainage, chills, or fever  She reports that the area had been bruised, and now that it is improving she has noticed this  Her 2 week f/u for staple removal is 2/9  She was offered an appt  tomorrow to eval but she feels she will wait until tomorrow to see how it is  She will call the office with any future concerns          Signatures   Electronically signed by : [facility], ; Feb 4 2016 11:39AM EST                       (Author)

## 2018-01-16 NOTE — MISCELLANEOUS
Message   Recorded as Task   Date: 11/22/2016 03:14 PM, Created By: Moon Galvan   Task Name: Follow Up   Assigned To: SPA courtney clinical,Team   Regarding Patient: Gurdeep Donnelly, Status: Active   Comment:    Mariela Orellana - 22 Nov 2016 3:14 PM     TASK CREATED  Caller: Self; General Medical Question; [phone] (Mobile Phone)  Pt lmom stating that she just was seen by Priscilla Navarrete and has 2 questions: She would like to make sure that she can get an MRI due to having an implanted neurostimulator and also is it possible that the injection she got in her neck did not target the right area as they have worked in the past    Moon Galvan - 22 Nov 2016 3:14 PM     TASK EDITED   Jennyfer Miguel - 22 Nov 2016 3:43 PM     TASK REPLIED TO: Previously Assigned To Jennyfer Miguel  my fault we will have to get a CT scan, I will put this order in allscripts  It is always a possibility, it also does sometimes stop being effective so it is hard to tell  since she did not receive any relief at all we would not repeat  Mariela Orellana - 22 Nov 2016 4:00 PM     TASK EDITED  Spoke to pt made aware of CtScan that was ordered and further recommendations  Pt stated that she does not mind seeing an NP but would fell more comfortable hearing from Dr Albert that she should see a surgeon  Tequila Chino - 22 Nov 2016 4:09 PM     TASK REPLIED TO: Previously Assigned To Tequila Chino  i don't think she needs to see Dr Messi Bishop yet, i wanted Rox Velazquez to order new imaging and I'll review and call her with results  Just didn't want to do another injection without further imaging   Marissa Martinez - 23 Nov 2016 9:18 AM     TASK EDITED      ***FYI***    Pt left vm today at 8:33  that she s/w a nurse late yest afternoon and has an update and questions:  She called Dr Ubaldo Mcmullen office to schedule an appt but was told there was a note stating not to schule appt until more info from Dr William Champion      She had asked about MRI which she can't have b/c of her stimulator so we changed it to a CT, she scheduled it for 11/29/16  Does our office check with her ins if CT will be covered, she believes we would have for the MRI but no one said if we do for the CT? She had asked to make a note to Dr Ryan Kirkpatrick if she could try another shot to get her through the Holidays while she waits for the results of the test?    LMOM for pt to c/b at 8:52    Pt LMOM returning my call at 8:56  S/W pt and informed her that per Dr Ryan Kirkpatrick she doesn't need ov w/ Dr Freeman Gerardo yet, he will call her with the results of the CT and he doesn't want another inj without further imaging  Told pt I would inform our  that she has scheduled the CT for 11/29/16 at [facility] and to call her once CT [facility] has been obtained  I will send separate task to  task team about getting auth for CT on 11/29  Pt appreciative of the c/b  Bette Tafoya - 23 Nov 2016 10:45 AM     TASK REPLIED TO: Previously Assigned To Bette Tafoya md aware        Active Problems    1  Anxiety (300 00) (F41 9)   2  Benign essential HTN (401 1) (I10)   3  Cervical radiculopathy (723 4) (M54 12)   4  Disc disorder of cervical region (722 91) (M50 90)   5  Hormone replacement therapy (postmenopausal) (V07 4) (Z79 890)   6  Low back pain (724 2) (M54 5)   7  Lumbar degenerative disc disease (722 52) (M51 36)   8  Lumbar facet arthropathy (721 3) (M12 88)   9  Lumbar radiculopathy (724 4) (M54 16)   10  Lumbar stenosis without neurogenic claudication (724 02) (M48 06)   11  Myofascial pain syndrome (729 1) (M79 1)   12  Need for influenza vaccination (V04 81) (Z23)   13  Pain syndrome, chronic (338 4) (G89 4)   14  Preop examination (V72 84) (Z01 818)   15  Ptosis of both eyelids (374 30) (H02 403)   16  Sacroiliitis (720 2) (M46 1)   17  Screening for depression (V79 0) (Z13 89)    Current Meds   1   Advil 200 MG Oral Tablet; TAKE 3 TABLET 3 times daily; Therapy: (Recorded:83Apm1248) to Recorded   2  BusPIRone HCl - 5 MG Oral Tablet; TAKE 2 TABLETS AT NIGHT; Therapy: 06TWM8926 to (Evaluate:97Hzw2121)  Requested for: 69HJG0360; Last   Rx:30Jun2016 Ordered   3  Estradiol 0 5 MG Oral Tablet; take 1 tablet twice a day; Therapy: 07WLM8379 to (Last Rx:11Nov2015)  Requested for: 77PKP8894 Ordered   4  LORazepam 1 MG Oral Tablet; TAKE 1  5  tablets  AT BEDTIME AS NEEDED  Requested   for: 22ENP2308; Last Rx:02Nov2016 Ordered   5  Lyrica 100 MG Oral Capsule; TAKE 2 CAPSULE Bedtime; Therapy: 82XLV0809 to (Evaluate:21Jan2017); Last Rx:22Nov2016 Ordered   6  MethIMAzole 5 MG Oral Tablet; take 0 5 tablet daily; Therapy: (Recorded:14Nov2016) to Recorded   7  Norethindrone Acetate 5 MG Oral Tablet; TAKE 1 TABLET DAILY FOR TEN DAYS EVERY   OTHER MONTH;   Therapy: 53TSU6259 to (Dany Garay)  Requested for: 36YUD6742; Last   Rx:02Nov2015 Ordered   8  Propranolol HCl - 10 MG Oral Tablet; TAKE 1 TABLET AT BEDTIME; Therapy: (Recorded:14Nov2016) to Recorded   9  Quinapril HCl - 10 MG Oral Tablet; TAKE 1 TABLET DAILY; Therapy: 30YKY8412 to (Evaluate:37Dti8756); Last Rx:30Jun2016 Ordered   10  TiZANidine HCl - 4 MG Oral Capsule; TAKE 1 5 CAPSULE Daily  Requested for:    87Hxr3712; Last Rx:30Jun2016 Ordered    Allergies    1  Adhesive Tape TAPE   2  Flexeril TABS   3  Penicillins   4  Naproxen Sodium TABS   5  Promethazine-Codeine SYRP   6   Tramadol    Signatures   Electronically signed by : Eyad Werner, ; Nov 23 2016 10:53AM EST                       (Author)

## 2018-01-16 NOTE — MISCELLANEOUS
Message   Recorded as Task   Date: 03/28/2016 10:32 AM, Created By: Ya Gardner   Task Name: Follow Up   Assigned To: Rafael Higgins procedure,Team   Regarding Patient: Lucila Larsen, Status: Active   CommentBlain Zena - 28 Mar 2016 10:32 AM     TASK CREATED  Pt is S/P ISAI  on 3/22/16 by Dr Lauryn Torrez  No f/u scheduled  OmerHina - 29 Mar 2016 9:56 AM     TASK EDITED  Spoke with pt who received 40% relief from injection  No neck pain and RUE pain is gone just some pain in R shoulder blade  Current level of pain 3/10, prior to inj 7/10  Pt will call should pain return  Magali Jeffery - 29 Mar 2016 10:58 AM     TASK REPLIED TO: Previously Assigned To Magali Jeffery md aware        Active Problems    1  Anxiety (300 00) (F41 9)   2  Benign essential HTN (401 1) (I10)   3  Cervical radiculopathy (723 4) (M54 12)   4  Degeneration of cervical intervertebral disc (722 4) (M50 90)   5  Hormone replacement therapy (postmenopausal) (V07 4) (Z79 890)   6  Low back pain (724 2) (M54 5)   7  Lumbar degenerative disc disease (722 52) (M51 36)   8  Lumbar facet arthropathy (721 3) (M46 96)   9  Lumbar radiculopathy (724 4) (M54 16)   10  Lumbar stenosis without neurogenic claudication (724 02) (M48 06)   11  Myofascial pain syndrome (729 1) (M79 1)   12  Pain syndrome, chronic (338 4) (G89 4)   13  Pre-op evaluation (V72 84) (Z01 818)   14  Sacroiliitis (720 2) (M46 1)    Current Meds   1  Advil 200 MG Oral Tablet; TAKE 3 TABLET 3 times daily; Therapy: (Recorded:90Ive8633) to Recorded   2  BusPIRone HCl - 5 MG Oral Tablet; TAKE 2 TABLETS AT NIGHT; Therapy: 76OCE4015 to (Evaluate:11Edf1435)  Requested for: 43PPF7803; Last   Rx:10Mar2016 Ordered   3  Estradiol 0 5 MG Oral Tablet; take 1 tablet twice a day; Therapy: 61OSQ3786 to (Last Rx:74Swf6541)  Requested for: 21GVM5237 Ordered   4   LORazepam 1 MG Oral Tablet; TAKE 1  5  tablets  AT BEDTIME AS NEEDED  Requested   for: 82OGY4911; Last Rx:02Fsj7046 Ordered   5  Lyrica 100 MG Oral Capsule; TAKE 1 CAPSULE AT BEDTIME; Therapy: 21YXD5783 to Recorded   6  Lyrica 50 MG Oral Capsule; 1 CAPSULE DAILY; Therapy: 10ZZC8341 to Recorded   7  Methimazole 5 MG Oral Tablet; Take 1 tablet daily; Therapy: (Recorded:74Dne4429) to Recorded   8  Norethindrone Acetate 5 MG Oral Tablet; TAKE 1 TABLET DAILY FOR TEN DAYS EVERY   OTHER MONTH;   Therapy: 00KRV7482 to (Ev Mujica)  Requested for: 34OFA9448; Last   Rx:02Nov2015 Ordered   9  Propranolol HCl - 10 MG Oral Tablet; Take 1 tablet twice daily; Therapy: 44Afg6292 to (Evaluate:67Lwq1858)  Requested for: 36XYD1555; Last   Rx:17Mar2014 Ordered   10  Quinapril HCl - 10 MG Oral Tablet; TAKE 1 TABLET DAILY; Therapy: 12LZQ9113 to (Evaluate:09Apr2016); Last Rx:10Mar2016 Ordered   11  TiZANidine HCl - 4 MG Oral Capsule; TAKE 1 5 CAPSULE Daily  Requested for:    19HUT2127; Last Rx:10Mar2016 Ordered   12  Triamterene-HCTZ 37 5-25 MG Oral Capsule; TAKE 1 CAPSULE DAILY; Therapy: 22Boi5192 to (Evaluate:08Jun2016)  Requested for: 56QFW6574; Last    Rx:10Mar2016 Ordered    Allergies    1  Adhesive Tape TAPE   2  Flexeril TABS   3  Penicillins   4  Naproxen Sodium TABS   5  Promethazine-Codeine SYRP   6   Tramadol    Signatures   Electronically signed by : Delores Golden, ; Mar 29 2016 11:51AM EST                       (Author)

## 2018-01-16 NOTE — RESULT NOTES
Message   Recorded as Task   Date: 08/31/2016 08:16 AM, Created By: Christi Buckner   Task Name: Follow Up   Assigned To: Christi Buckner   Regarding Patient: Valentine Mcleod, Status: Active   CommentElisha Riley - 31 Aug 2016 8:16 AM     TASK CREATED  Found prescription for Lyrica 100mg oral capsule dated 11/04/2015 that patient never picked up  Prescription was voided, scanned into file and destroyed          Signatures   Electronically signed by : Ilana Shepard, ; Aug 31 2016  8:17AM EST                       (Author)

## 2018-01-16 NOTE — RESULT NOTES
Message   Recorded as Task   Date: 10/18/2016 11:39 AM, Created By: Lindsay Landis   Task Name: Medical Complaint Callback   Assigned To: Smita Hickey   Regarding Patient: Riddhi Giron, Status: Active   Comment:    Lindsya Landis - 18 Oct 2016 11:39 AM     TASK CREATED  Caller: Self; Medical Complaint; [phone] (Home)  Pt left vm at 1057 that she wanted to let us know that the shot for her LB took awhile to work but the leg is is definietely better  Pt said said called for another reason: she thinks she over did it 2 wks ago b/c she's having pain with her neck and shoulder again  She is trying to nurse it with heat and rest but the pain is not going away  She had the back inj in Sept and didn't know if she could have an inj for the neck/shoulder this soon? Is she allowed to have another shot for the shoulder/neck again? She knows Dr Christine Nova does inj on Tues & Thurs and wanted to know if she could have done this Thurs b/c she is sched for eye sx on friday or if she got the shot would that interfer with the local anesthesia she would get friday? S/W pt and informed her Dr Jean-Pierre Hilario sched for [facility] Ko is already full  Pt is having B/L eye lid sx this friday, I told pt she will need to find out from her eye surgeon how soon he would allow her post op to lay face down on our table to get a neck inj  Told pt I would make Dr Christine Nova aware about her pain returning and that I advised her to d/w eye surgeon about when she could lie prone post op and then she is to call the office back with that info  Shai Judd - 18 Oct 2016 1:12 PM     TASK REPLIED TO: Previously Assigned To SPA courtney clinical,Team  no she shouldn't get shot Thursday if she's having eye surgery friday   Laisha Wheeler - 18 Oct 2016 1:51 PM     TASK EDITED  S/w the pt  and she was very appreciative of the call and she will f/u c/ the eye surgeon as to when she will be able to have the injection     Shai Judd - 23 Oct 2016 11:29 AM     TASK REPLIED TO: Previously Assigned To Jonn Grace md aware   when opthomologist gives ok, then schedule ISAI (Dx M50 12)        Signatures   Electronically signed by : Tim Rowe, ; Oct 19 2016  2:08PM EST                       (Author)

## 2018-01-17 NOTE — MISCELLANEOUS
Message   Recorded as Task   Date: 06/02/2016 10:40 AM, Created By: Cayden Chua   Task Name: Follow Up   Assigned To: Heena Ruiz   Regarding Patient: Corrinne Mountain, Status: Active   Comment:    Cayden Chua - 02 Jun 2016 10:40 AM     TASK CREATED  Pt had 3 d mammo done and got letter - does she need f/u? I discussed dense breasts and her 3 d mammo - which was normal  Offered pt abus and gave her info  Pt will considr and cb if she wants it  Her Melissa Purdy is hi        Active Problems    1  Anxiety (300 00) (F41 9)   2  Benign essential HTN (401 1) (I10)   3  Cervical radiculopathy (723 4) (M54 12)   4  Degeneration of cervical intervertebral disc (722 4) (M50 90)   5  Encounter for gynecological examination without abnormal finding (V72 31) (Z01 419)   6  Encounter for screening mammogram for malignant neoplasm of breast (V76 12)   (Z12 31)   7  Hormone replacement therapy (postmenopausal) (V07 4) (Z79 890)   8  Low back pain (724 2) (M54 5)   9  Lumbar degenerative disc disease (722 52) (M51 36)   10  Lumbar facet arthropathy (721 3) (M46 96)   11  Lumbar radiculopathy (724 4) (M54 16)   12  Lumbar stenosis without neurogenic claudication (724 02) (M48 06)   13  Myofascial pain syndrome (729 1) (M79 1)   14  Pain syndrome, chronic (338 4) (G89 4)   15  Sacroiliitis (720 2) (M46 1)    Current Meds   1  Advil 200 MG Oral Tablet; TAKE 3 TABLET 3 times daily; Therapy: (Recorded:03Ysi0397) to Recorded   2  BusPIRone HCl - 5 MG Oral Tablet; TAKE 2 TABLETS AT NIGHT; Therapy: 63SWS2525 to (Evaluate:33Dgl6940)  Requested for: 81FJU7570; Last   Rx:10Mar2016 Ordered   3  Estradiol 0 5 MG Oral Tablet; take 1 tablet twice a day; Therapy: 54JVC6966 to (Last Rx:11Nov2015)  Requested for: 41QLZ5973 Ordered   4  LORazepam 1 MG Oral Tablet; TAKE 1  5  tablets  AT BEDTIME AS NEEDED  Requested   for: 95OHO3452; Last Rx:31Mar2016 Ordered   5  Lyrica 100 MG Oral Capsule; TAKE 1 CAPSULE AT BEDTIME;    Therapy: 22VFB0467 to Recorded   6  Lyrica 50 MG Oral Capsule; 1 CAPSULE DAILY; Therapy: 00IOS0523 to Recorded   7  Lyrica 50 MG Oral Capsule; TAKE 1 CAPSULE 3 TIMES DAILY; Therapy: 20RDL2970 to (Evaluate:23Jun2016) Recorded   8  Methimazole 5 MG Oral Tablet; Take 1 tablet daily; Therapy: (Recorded:22Rmo0216) to Recorded   9  Norethindrone Acetate 5 MG Oral Tablet; TAKE 1 TABLET DAILY FOR TEN DAYS EVERY   OTHER MONTH;   Therapy: 77NIZ7871 to (Seth Savage)  Requested for: 62FDT8882; Last   Rx:02Nov2015 Ordered   10  Propranolol HCl - 10 MG Oral Tablet; Take 1 tablet twice daily; Therapy: 32Qxn1822 to (Evaluate:50Xxg7074)  Requested for: 27SQY0258; Last    Rx:17Mar2014 Ordered   11  Quinapril HCl - 10 MG Oral Tablet; TAKE 1 TABLET DAILY; Therapy: 13GQN7305 to (Evaluate:09Apr2016); Last Rx:10Mar2016 Ordered   12  TiZANidine HCl - 4 MG Oral Capsule; TAKE 1 5 CAPSULE Daily  Requested for:    62GCD6181; Last Rx:10Mar2016 Ordered   13  Triamterene-HCTZ 37 5-25 MG Oral Capsule; TAKE 1 CAPSULE DAILY; Therapy: 93Jjd5745 to (Evaluate:08Jun2016)  Requested for: 17BWW2625; Last    Rx:10Mar2016 Ordered    Allergies    1  Adhesive Tape TAPE   2  Flexeril TABS   3  Penicillins   4  Naproxen Sodium TABS   5  Promethazine-Codeine SYRP   6  Tramadol    Signatures   Electronically signed by :  Karlo Ireland, ; Jun 2 2016 10:40AM EST                       (Author)

## 2018-01-17 NOTE — RESULT NOTES
Verified Results  (1) THIN PREP PAP FOLLOW UP WITH IMAGING 94LYO7481 03:29PM Maycol Cardenas Order Number: [id number]     Test Name Result Flag Reference   LAB AP CASE REPORT (Report)     Gynecologic Cytology Report            Case: [id number]                  Authorizing Provider: Ruby Damon MD     Collected:      05/23/2017 1529        First Screen:     CHIRAG Faria Received:      05/26/2017 0910        Specimen:  LIQUID-BASED PAP, DIAGNOSTIC, Cervix   LAB AP GYN PRIMARY INTERPRETATION      Negative for intraepithelial lesion or malignancy  Electronically signed by CHIRAG Faria on 6/1/2017 at 8:42 AM   LAB AP GYN SPECIMEN ADEQUACY      Satisfactory for evaluation  Endocervical/transformation zone component present  LAB AP GYN ADDITIONAL INFORMATION (Report)     [employer]'s FDA approved ,  and ThinPrep Imaging System are   utilized with strict adherence to the 's instruction manual to   prepare gynecologic and non-gynecologic cytology specimens for the   production of ThinPrep slides as well as for gynecologic ThinPrep imaging  These processes have been validated by our laboratory and/or by the     The Pap test is not a diagnostic procedure and should not be used as the   sole means to detect cervical cancer  It is only a screening procedure to   aid in the detection of cervical cancer and its precursors  Both   false-negative and false-positive results have been experienced  Your   patient's test result should be interpreted in this context together with   the history and clinical findings     LAB AP CLINICAL INFORMATION      TW Order Number: [id number]

## 2018-01-17 NOTE — RESULT NOTES
Verified Results  * XR SPINE THORACIC 3 VIEW [id number] 01:14PM Bettye Moore Order Number: [id number]   Performing Comments: Please complete upright thoracic spine xrays AP/Lateral views 2-3 days prior to the 3 month follow up appointment  Test Name Result Flag Reference   XR SPINE THORACIC 3 VW (Report)     THORACIC SPINE     INDICATION: Follow-up spinal cord stimulator placement  COMPARISON: Intraoperative radiograph from January 26, 2016  VIEWS: AP, lateral and coned-down projections; 3 images     FINDINGS:     A spinal cord stimulator is in place with the paddle electrode at the T8-T9 level  There is no evidence of lead fracture  Thoracic vertebrae demonstrate normal stature and alignment  There is no fracture or pathologic bone lesion  There is no displacement of the paraspinal line  The pedicles are intact  IMPRESSION:     Spinal cord stimulator electrode at T8-T9         Workstation performed: [id number]     Signed by:   Maria A Lima MD   6/7/16

## 2018-01-17 NOTE — RESULT NOTES
Message   Recorded as Task   Date: 07/06/2017 09:39 AM, Created By: Sheila Kenny   Task Name: Follow Up   Assigned To: ELIZABETH valdez procedure,Team   Regarding Patient: Kimberly Ramos, Status: Active   Comment:    Marzena Strong - 06 Jul 2017 9:39 AM     TASK CREATED  pt S/P L SIJ INJECTION on 6/29/17 with Cain Pollock at [facility]  f/u scheduled on 11/13/17 with Jan Pride - 07 Jul 2017 8:50 AM     TASK EDITED  1st attempt to reach pt  LM on VM to Tawnya Byers - 07 Jul 2017 9:08 AM     TASK EDITED  [address]- patient returned call   stating that she feels the same   which is normal for the patient   no relief   pain level 5/10   only taking Lyrica & advil   c/b [phone]   Marzena Strong - 07 Jul 2017 10:19 AM     TASK EDITED  tried to reach pt again  LM on VM to Bertha Ashton - 07 Jul 2017 10:27 AM     TASK EDITED  Pt called back again  [phone]  Marzena Strong - 07 Jul 2017 11:46 AM     TASK EDITED  got ahold of the pt  She states her pain is 5/10 and hasn't had any relief yet from inj  but said "that's normal for me, usually takes a few weeks until she starts to get some relief "   Alin Maguire - 09 Jul 2017 11:04 PM     TASK REPLIED TO: Previously Assigned To Alin Maguire md aware    please f/u with her next week   [facility] - 13 Jul 1117 3:69 AM     TASK EDITED  Patient reports that her buttock pain is a little bit better, she still has pain going down her left leg  She understands it may take a while  Please advise if there are any other recommendations prior to her f/u? Alin Maguire - 13 Jul 2017 9:27 AM     TASK REPLIED TO: Previously Assigned To Alin Maguire  no further recs at this time   [facility] - 13 Jul 0256 62:48 AM     TASK EDITED  Patient aware          Signatures   Electronically signed by : Erminia Barthel, ; Jul 13 2017 10:07AM EST                       (Author)

## 2018-01-17 NOTE — MISCELLANEOUS
Message   Recorded as Task   Date: 02/13/2017 08:42 AM, Created By: Autumn Smith   Task Name: Med Renewal Request   Assigned To: Ashlyn Jeffries clinical,Team   Regarding Patient: Julia Wiseman, Status: Active   Comment:    Autumn Smith - 13 Feb 2017 8:42 AM     TASK CREATED  Caller: Self; Renew Medication; [phone] (Home)  TC from pt asking for 90 day RF for her Lyrica 50mg 1 tab QAM, 3 tabs QHS to be sent to [facility]  pt just filled last bottle from retail pharmacy  Last seen 11/22 by PIETRO Sebastian pending parthavsTorbryce Fishman - 13 Feb 2017 12:24 PM     TASK REPLIED TO: Previously Assigned To ELIZABETH Dennison clinical,Team  will send, but would need ov after this refill or have PCP prescribe   Steph Lilly - 13 Feb 2017 12:25 PM     TASK REASSIGNED: Previously Assigned To ELIZABETH Cerda - 13 Feb 2017 12:25 PM     TASK REPLIED TO: Previously Assigned To Steph Lilly  forgot we cant send lyrica to mail order  she would need to  scripts  please see task below this about ov   Marissa Martinez - 13 Feb 2017 2:10 PM     TASK EDITED  pt informed she will need to come p/u the RX for the lyrica and then mail it to [facility]  Pt aware she can p/u RX M-F from 8-4pm     I then transferred call to  to make her 3 month ov w/ NM  Active Problems    1  Anxiety (300 00) (F41 9)   2  Benign essential HTN (401 1) (I10)   3  Cervical radiculopathy (723 4) (M54 12)   4  Disc disorder of cervical region (722 91) (M50 90)   5  Hormone replacement therapy (postmenopausal) (V07 4) (Z79 890)   6  Low back pain (724 2) (M54 5)   7  Lumbar degenerative disc disease (722 52) (M51 36)   8  Lumbar facet arthropathy (721 3) (M12 88)   9  Lumbar radiculopathy (724 4) (M54 16)   10  Lumbar stenosis without neurogenic claudication (724 02) (M48 06)   11  Myofascial pain syndrome (729 1) (M79 1)   12  Need for influenza vaccination (V04 81) (Z23)   13   Pain syndrome, chronic (338 4) (G89 4)   14  Preop examination (V72 84) (Z01 818)   15  Ptosis of both eyelids (374 30) (H02 403)   16  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)   17  Sacroiliitis (720 2) (M46 1)   18  Screening for depression (V79 0) (Z13 89)    Current Meds   1  Advil 200 MG Oral Tablet; TAKE 3 TABLET 3 times daily; Therapy: (Recorded:69Cdu0426) to Recorded   2  BusPIRone HCl - 5 MG Oral Tablet; TAKE 2 TABLETS AT NIGHT; Therapy: 84MIE0102 to (Evaluate:31May2017)  Requested for: 64Pmt3659; Last   Rx:02Dec2016 Ordered   3  Denavir 1 % External Cream; APPLY TOPICALLY DAILY AS NEEDED; Therapy: 30Iyh6123 to (Last Rx:00Sfo1327)  Requested for: 54Fwq7188 Ordered   4  Estradiol 0 5 MG Oral Tablet; take 1 tablet twice a day; Therapy: 03EXV6333 to (Last Rx:11Nov2015)  Requested for: 86GWB9518 Ordered   5  LORazepam 1 MG Oral Tablet; TAKE 1  5  tablets  AT BEDTIME AS NEEDED  Requested   for: 81QOZ9167; Last Rx:12Iiu3665 Ordered   6  Lyrica 50 MG Oral Capsule; TAKE 1 TAB IN AM AND 3 TABS QHS; Therapy: 03NUH0975 to (Evaluate:14May2017); Last Rx:88Fyo1168 Ordered   7  MethIMAzole 5 MG Oral Tablet; take 0 5 tablet daily; Therapy: (Recorded:14Nov2016) to Recorded   8  Norethindrone Acetate 5 MG Oral Tablet; TAKE 1 TABLET DAILY FOR TEN DAYS EVERY   OTHER MONTH;   Therapy: 09VQL9651 to (Alfredo Samara)  Requested for: 54CXT2977; Last   Rx:02Nov2015 Ordered   9  Propranolol HCl - 10 MG Oral Tablet; TAKE 1 TABLET AT BEDTIME; Last Rx:02Dec2016   Ordered   10  Quinapril HCl - 10 MG Oral Tablet; TAKE 1 TABLET DAILY; Therapy: 42LTP0976 to (Evaluate:31May2017)  Requested for: 68Fji0378; Last    Rx:08Ewq5670; Status: ACTIVE - Renewal Denied Ordered   11  TiZANidine HCl - 4 MG Oral Capsule; TAKE 1 5 CAPSULE Daily  Requested for:    31Ysh6771; Last Rx:26Nnd6634 Ordered    Allergies    1  Adhesive Tape TAPE   2  Flexeril TABS   3  Penicillins   4  Naproxen Sodium TABS   5  Promethazine-Codeine SYRP   6  Tramadol    Signatures   Electronically signed by : Saniya Wan, ; Feb 13 2017  2:11PM EST                       (Author)

## 2018-01-18 NOTE — RESULT NOTES
Verified Results  (B) PAP (REFLEX TO HPV PLUS WHEN ASC-US) 27Apr2016 02:21PM Jeane Davey     Test Name Result Flag Reference   PAP, LIQUID-BASED NILM     DIAGNOSIS:            Negative for intraepithelial lesion or malignancy  ADEQUACY:             Satisfactory for evaluation /                         Endocervical/transformation zone component                         present  Scant cellularity  COMMENT:              This Pap smear was manually screened  SPECIMEN SOURCE:      PAP (RFLX HPV PLUS WHENASC-US), CERVIX  CLINICAL INFORMATION: LMP: N/A                        Provided Diagnosis Codes: J33 367,Y80 79                                                Cervicovaginal cytology should be considered a                         screening procedure subject to false negatives                         and false positives  Results are more reliable                         when a satisfactory sample is obtained on a                         regular repetitive basis, and should be                         interpreted together with past and current                         clinical data    ELECTRONICALLY SIGNED   BY:                   Rescreened By: CHIRAG Ferguson (ASCP)   Case                         Electronically Signed 05/02/2016

## 2018-01-18 NOTE — PROGRESS NOTES
Assessment    1  Pain syndrome, chronic (338 4) (G89 4)   2  Lumbar stenosis without neurogenic claudication (724 02) (M48 06)   3  Lumbar degenerative disc disease (722 52) (M51 36)   4  Lumbar facet arthropathy (721 3) (M12 88)   5  Lumbar radiculopathy (724 4) (M54 16)    Plan  Pain syndrome, chronic    · Lyrica 50 MG Oral Capsule; TAKE 1 TAB IN AM AND 2 TABS QHS   Rx By: Paula Martinez; Dispense: 90 Days ; #:270 Capsule; Refill: 1; For: Pain syndrome, chronic; LUIS = N; Print Rx   · Follow-up visit in 6 months Evaluation and Treatment  Follow-up  Status: Hold For -  Scheduling  Requested for: 02NCW2686   Ordered; For: Pain syndrome, chronic; Ordered By: Paula Martinez Performed:  Order Comments: with nm Due: 68GAB7847  Sacroiliitis    · Ray Banerjee  (Chiropractic) Co-Management  *  Status: Hold For - Scheduling   Requested for: 03HEB6190   Ordered; For: Sacroiliitis; Ordered By: Paula Martinez Performed:  Due: 72QFL9318  Care Summary provided  : Yes    Discussion/Summary    Patient is a 62-year-old female with a history of lumbar degenerative disc disease, lumbar spondylosis, lumbar stenosis, and lumbar radiculopathy, who presents today for a follow-up appointment  The patient continues to experience left-sided low back pain which has gradually increased since last office visit  Her pain is consistent with sacroiliitis  Unfortunately, because of her insurance, she cannot move forward with an injection at this time  Therefore, I will refer her to Dr Josiah Samayoa for chiropractic treatments for sacroiliac dysfunction  She will continue on the Lyrica as prescribed and was given a 90 day prescription with one refill  [place] prescription monitoring drug program report was reviewed and was appropriate  Chief Complaint    1   Back Pain    History of Present Illness  Patient is a 62-year-old female with a history of cervical disc bulge, cervical degenerative disc disease, and cervical radiculopathy, lumbar stenosis, lumbar radiculopathy, and sacroiliitis  She was last seen in the office on December 6, 2016 in which she had a cervical epidural steroid injection  She presents today for a follow-up appointment  At this time, the patient has minimal neck complaints  A majority of her pain is located on the left side of the low back, and radiates on the lateral aspect of her legs stopping at the knee  Her pain is constant occurring in the evening  She describes it as dull aching and throbbing  She is currently rating her pain a 4/10 on the numeric rating scale  He is taking Lyrica 50 mg one tablet in the morning, and 2 tablets at night  Medication provides moderate pain relief, but makes her feel tired in the morning  Aditya Lozano presents with complaints of gradual onset of constant episodes of moderate left lower back pain, described as dull, aching and throbbing, radiating to the left buttock and left thigh  On a scale of 1 to 10, the patient rates the pain as 4  Symptoms are unchanged  Review of Systems    Constitutional: no fever, no recent weight gain and no recent weight loss  Eyes: no double vision and no blurry vision  Cardiovascular: no chest pain, no palpitations and no lower extremity edema  Respiratory: no complaints of shortness of breath and no wheezing  Musculoskeletal: difficulty walking and joint stiffness, but no muscle weakness, no joint swelling, no limb swelling, no pain in extremity and no decreased range of motion  Neurological: no dizziness, no difficulty swallowing, no memory loss, no loss of consciousness and no seizures  Gastrointestinal: no nausea, no vomiting, no constipation and no diarrhea  Genitourinary: no difficulty initiating urine stream, no genital pain and no frequent urination  Integumentary: no complaints of skin rash  Psychiatric: no depression     Endocrine: no excessive thirst, no adrenal disease, no hypothyroidism and no hyperthyroidism  Hematologic/Lymphatic: no tendency for easy bruising and no tendency for easy bleeding  Active Problems    1  Anxiety (300 00) (F41 9)   2  Benign essential HTN (401 1) (I10)   3  Cervical radiculopathy (723 4) (M54 12)   4  Disc disorder of cervical region (722 91) (M50 90)   5  Hormone replacement therapy (postmenopausal) (V07 4) (Z79 890)   6  Hot flash, menopausal (627 2) (N95 1)   7  Lumbar degenerative disc disease (722 52) (M51 36)   8  Lumbar facet arthropathy (721 3) (M12 88)   9  Lumbar radiculopathy (724 4) (M54 16)   10  Lumbar stenosis without neurogenic claudication (724 02) (M48 06)   11  Myofascial pain syndrome (729 1) (M79 1)   12  Pain syndrome, chronic (338 4) (G89 4)   13  Positive depression screening (796 4) (R68 89)   14  Sacroiliitis (720 2) (M46 1)    Past Medical History    1  History of Asymptomatic menopausal state (V49 81) (Z78 0)   2  History of Cellulitis (682 9) (L03 90)   3  History of Cervical radiculopathy (723 4) (M54 12)   4  History of Cough (786 2) (R05)   5  History of Ecchymoses, spontaneous (782 7) (R23 3)   6  History of Encounter for screening mammogram for malignant neoplasm of breast   (V76 12) (Z12 31)   7  History of acute bronchitis (V12 69) (Z87 09)   8  History of cataract (V12 49) (Z86 69)   9  History of herpes simplex infection (V12 09) (Z86 19)   10  History of herpes simplex infection (V12 09) (Z86 19)   11  History of hypertension (V12 59) (Z86 79)   12  History of infectious mononucleosis (V12 09) (Z86 19)   13  History of insomnia (V13 89) (Z87 898)   14  History of low back pain (V13 59) (Z87 39)   15  History of retinal detachment (V12 49) (Z86 69)   16  History of thyroid disease (V12 29) (Z86 39)   17  History of tonsillitis (V12 69) (Z87 09)   18  History of vertigo (V12 49) (Z87 898)   19  History of Joint pain, knee (719 46) (M25 569)   20  History of Laceration Of Eyelid (870 8)   21   History of Lumbago With Sciatica (724 3)   22  History of Menopause (627 2)   23  History of Osteoarthritis of shoulder region, unspecified laterality   24  Other muscle spasm (728 85) (M62 838)   25  History of Other muscle spasm (728 85) (M62 838)   26  History of Pain in joint of right shoulder region (719 41) (M25 511)   27  History of Pap smear, as part of routine gynecological examination (V76 2) (Z01 419)   28  History of Plantar fasciitis (728 71) (M72 2)   29  History of Preop examination (V72 84) (Z01 818)   30  History of Ptosis of both eyelids (374 30) (H02 403)   31  History of Ptosis of left eyelid (374 30) (H02 402)   32  History of 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)   33  History of Retinal tear of right eye (361 00) (H33 001)   34  History of Screening for colon cancer (V76 51) (Z12 11)   35  History of Shoulder Joint Disorder (719 91)   36  History of Sprained Right Shoulder (840 9)   37  History of Status post surgery (V45 89) (Z98 890)   38  History of Thyrotoxicosis (242 90)   39  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    (242 41)   40  History of Viral URI (465 9) (J06 9,B97 89)   41  History of Visit for screening mammogram (V76 12) (Z12 31)    The active problems and past medical history were reviewed and updated today  Surgical History    1  History of Denial Of Any Significant Medical History   2  History of Implantation Of Intraspinal Neurostimulator By Laminectomy Epidural    The surgical history was reviewed and updated today  Family History  Mother    1  Family history of Breast Cancer (V16 3)   2  Family history of Chronic Obstructive Pulmonary Disease   3  Family history of Essential Hypertension   4  Family history of Family Health Status 1  Children Living   5  Family history of Hypertension (V17 49)  Father    10  Family history of Acute Myocardial Infarction (V17 3)   7  Family history of Emphysema   8  Family history of Essential Hypertension   9   Family history of Prehypertension  Maternal Uncle    10  Family history of Liver Cancer    The family history was reviewed and updated today  Social History    · Denied: History of Alcohol Use (History)   · Daily caffeinated cola consumption   · Daily Coffee Consumption (3  Cups/Day)   · Denied: History of Drug Use   · Marital History - Currently    · Never A Smoker   · Occasional alcohol use   · Occupation:   · Working Full Time  The social history was reviewed and updated today  The social history was reviewed and is unchanged  Current Meds   1  Advil 200 MG Oral Tablet; TAKE 3 TABLET 3 times daily; Therapy: (Recorded:41Nyn7158) to Recorded   2  BusPIRone HCl - 5 MG Oral Tablet; TAKE 2 TABLETS AT NIGHT; Therapy: 44NWT1094 to (Jannette Scruggs)  Requested for: 03Apr2017; Last   Rx:03Apr2017 Ordered   3  Denavir 1 % External Cream; APPLY TOPICALLY DAILY AS NEEDED; Therapy: 92Lrc2207 to (Last Rx:98Tma3450)  Requested for: 10Upd0692 Ordered   4  LORazepam 1 MG Oral Tablet; TAKE 1  5  tablets  AT BEDTIME AS NEEDED  Requested   for: 03Apr2017; Last Rx:03Apr2017 Ordered   5  Lyrica 50 MG Oral Capsule; TAKE 1 TAB IN AM AND 2 TABS QHS; Therapy: 18ABU7928 to (Evaluate:77Fnh4115) Recorded   6  MethIMAzole 5 MG Oral Tablet; Take one tablet daily; Therapy: (Recorded:21Wfh4508) to Recorded   7  Propranolol HCl - 10 MG Oral Tablet; TAKE 1 TABLET TWICE DAILY; Last Rx:12May2017   Ordered   8  Quinapril HCl - 10 MG Oral Tablet; TAKE 1 TABLET DAILY; Therapy: 64FDJ6130 to (Jannette Scruggs)  Requested for: 03Apr2017; Last   Rx:03Apr2017 Ordered   9  TiZANidine HCl - 4 MG Oral Capsule; TAKE 1 5 CAPSULE Daily as needed for pain; Therapy: (Recorded:29Lvu9481) to  Requested for: 64VZI7966 Recorded    The medication list was reviewed and updated today  Allergies    1  Adhesive Tape TAPE   2  Flexeril TABS   3  Naproxen Sodium TABS   4  Penicillins   5  Promethazine-Codeine SYRP   6  Tramadol    Vitals  Vital Signs    Recorded: 70EGI1020 12:57PM   Temperature 98 1 F   Heart Rate 88   Respiration 18   Systolic 101   Diastolic 86   Height 5 ft 5 5 in   Weight 204 lb 4 00 oz   BMI Calculated 33 47   BSA Calculated 2   Pain Scale 4     Physical Exam    Constitutional   General appearance: Well developed, well nourished, alert, in no distress, non-toxic and no overt pain behavior  Eyes   Sclera: anicteric   HEENT   Hearing grossly intact  Neck   Neck: Supple, symmetric, trachea midline, no masses  Pulmonary   Respiratory effort: Even and unlabored  Cardiovascular   Examination of extremities: No edema or pitting edema present  Skin   Skin and subcutaneous tissue: Normal without rashes or lesions, well hydrated  Psychiatric   Mood and affect: Mood and affect appropriate  Neurologic   Cranial nerves: Cranial nerves II-XII grossly intact  Musculoskeletal   Gait and station: Normal     Cervical Spine examination demonstrates Cervical Spine:   Appearance: Normal normal lordosis  Tenderness: None  Cervical Sensory Exam:  intact to light touch and pinprick in the upper extremities  ROM: Full  Flexion was painless  Extension was painless  Rotation to the left was painless  Rotation to the right was painful    hand strength was normal bilaterally  wrist strength was normal bilaterally  elbow strength was normal bilaterally  shoulder strength was normal bilaterally  Special Tests: negative Spurling's Maneuver to the right and negative Spurling's Maneuver to the left  Lumbar/Sacral Spine examination demonstrates Lumbosacral Spine:   Appearance: Normal    Tenderness: the left sacroiliac joint  Bilateral L5  Lumbosacral Spine Sensory: intact to light touch and pinprick in the lower extremities  ROM Lumbosacral Spine: Full  Flexion was not restricted and was painless  Extension was not restricted and was painless   Left lateral flexion was not restricted and was painless  Right lateral flexion was not restricted and was painless  Foot and ankle strength was normal bilaterally  Knee strength was normal bilaterally  Hip strength was normal bilaterally  Evaluation of Muscle Stretch Reflexes on the right side demonstrates muscle stretch reflexes equal and symmetric right lower limbs  Evaluation of Muscle Stretch Reflexes on the left side demonstrates muscle stretch reflexes equal and symmetric right lower limbs  Special Tests: negative Straight Leg Raise on right, negative Straight Leg Raise on left, negative Nicko's Maneuver on right and negative Nicko's Maneuver on lef  Results/Data  Results Free Text [place] [facility] [place]   [place]    I personally reviewed the films/images in the office today  Radiology:  THORACIC SPINE     INDICATION: Follow-up spinal cord stimulator placement  COMPARISON: Intraoperative radiograph from January 26, 2016  VIEWS: AP, lateral and coned-down projections; 3 images     FINDINGS:     A spinal cord stimulator is in place with the paddle electrode at the T8-T9 level  There is no evidence of lead fracture  Thoracic vertebrae demonstrate normal stature and alignment  There is no fracture or pathologic bone lesion  There is no displacement of the paraspinal line  The pedicles are intact  IMPRESSION:     Spinal cord stimulator electrode at T8-T9  Workstation performed: [id number]            Future Appointments    Date/Time Provider Specialty Site   07/11/2017 11:45 AM Renee Jacinto DO Internal Medicine [facility]   11/13/2017 10:15 AM Renee Jacinto DO Internal Medicine [facility]   05/23/2017 02:30 PM Brooklyn Joyce MD Obstetrics/Gynecology [facility]

## 2018-01-18 NOTE — MISCELLANEOUS
Message   Recorded as Task   Date: 11/22/2016 03:14 PM, Created By: Kelley Ballard   Task Name: Follow Up   Assigned To: [facility],Team   Regarding Patient: Cristobal Rollins, Status: Active   Comment:    Mariela Orellana - 22 Nov 2016 3:14 PM     TASK CREATED  Caller: Self; General Medical Question; [phone] (Mobile Phone)  Pt lmom stating that she just was seen by Noel Garcia and has 2 questions: She would like to make sure that she can get an MRI due to having an implanted neurostimulator and also is it possible that the injection she got in her neck did not target the right area as they have worked in the past    Kelley Ballard - 22 Nov 2016 3:14 PM     TASK EDITED   Jose De Jesus - 22 Nov 2016 3:43 PM     TASK REPLIED TO: Previously Assigned To Jose De Jesus  my fault we will have to get a CT scan, I will put this order in allscripts  It is always a possibility, it also does sometimes stop being effective so it is hard to tell  since she did not receive any relief at all we would not repeat  Mariela Orellana - 22 Nov 2016 4:00 PM     TASK EDITED  Spoke to pt made aware of CtScan that was ordered and further recommendations  Pt stated that she does not mind seeing an NP but would fell more comfortable hearing from Dr Albert that she should see a surgeon  Meir Dorsey - 22 Nov 2016 4:09 PM     TASK REPLIED TO: Previously Assigned To Meir Dorsey  i don't think she needs to see Dr Nida Connor yet, i wanted Drea Prieto to order new imaging and I'll review and call her with results  Just didn't want to do another injection without further imaging   Marissa Martinez - 23 Nov 2016 9:18 AM     TASK EDITED      ***FYI***    Pt left vm today at 8:33  that she s/w a nurse late yest afternoon and has an update and questions:  She called Dr Chico Munoz office to schedule an appt but was told there was a note stating not to schule appt until more info from Dr Lew Sanchez      She had asked about MRI which she can't have b/c of her stimulator so we changed it to a CT, she scheduled it for 11/29/16  Does our office check with her ins if CT will be covered, she believes we would have for the MRI but no one said if we do for the CT? She had asked to make a note to Dr Lew Sanchez if she could try another shot to get her through the Holidays while she waits for the results of the test?    LMOM for pt to c/b at 8:52    Pt LMOM returning my call at 8:56  S/W pt and informed her that per Dr Lew Sanchez she doesn't need ov w/ Dr Nida Connor yet, he will call her with the results of the CT and he doesn't want another inj without further imaging  Told pt I would inform our  that she has scheduled the CT for 11/29/16 at [address] [place] [address] and to call her once CT Donovan Apodaca has been obtained  I will send separate task to  task team about getting auth for CT on 11/29  Pt appreciative of the c/b  Tae East 32 - 23 Nov 2016 10:45 AM     TASK REPLIED TO: Previously Assigned To Tae Akhtar md aware   Priscila Lean - 23 Nov 2016 10:53 AM     TASK COMPLETED   Mariela Orellana - 29 Nov 2016 10:08 AM     TASK REACTIVATED   Mariela Orellana - 29 Nov 2016 10:14 AM     TASK EDITED  Pt lmom stating that she doesn't need a call back just wanted Dr Albert to be made aware that she had the CTScan done and nothing has changed  She continues w/excrutiating pain in her right shoulder and right hand weakness/pain  She feels it is identical to the first time she was seen by Sheryl Tirado and the injection had took it away  Pt can be reached at [phone] once Sheryl Tirado has reviewed results  Tae East 32 - 29 Nov 2016 4:08 PM     TASK REPLIED TO: Previously Assigned To Tae Akhtar  reviewed CT scan results and no signficant change    please schedule her for repeat ISAI   Mariela Orellana - 29 Nov 2016 4:26 PM     TASK EDITED  Spoke to pt, scheduled ISAI for 12/6/16   Pt denies taking anticoagulant medication  Was advised to hold ibuprofen 24 hours prior to injection  Pt to bring a ,npo 1 hour prior to injection,if she becomes ill/fever/abt to call the office  Pt verbalized understanding  Active Problems    1  Anxiety (300 00) (F41 9)   2  Benign essential HTN (401 1) (I10)   3  Cervical radiculopathy (723 4) (M54 12)   4  Disc disorder of cervical region (722 91) (M50 90)   5  Hormone replacement therapy (postmenopausal) (V07 4) (Z79 890)   6  Low back pain (724 2) (M54 5)   7  Lumbar degenerative disc disease (722 52) (M51 36)   8  Lumbar facet arthropathy (721 3) (M12 88)   9  Lumbar radiculopathy (724 4) (M54 16)   10  Lumbar stenosis without neurogenic claudication (724 02) (M48 06)   11  Myofascial pain syndrome (729 1) (M79 1)   12  Need for influenza vaccination (V04 81) (Z23)   13  Pain syndrome, chronic (338 4) (G89 4)   14  Preop examination (V72 84) (Z01 818)   15  Ptosis of both eyelids (374 30) (H02 403)   16  Sacroiliitis (720 2) (M46 1)   17  Screening for depression (V79 0) (Z13 89)    Current Meds   1  Advil 200 MG Oral Tablet; TAKE 3 TABLET 3 times daily; Therapy: (Recorded:52Pau3545) to Recorded   2  BusPIRone HCl - 5 MG Oral Tablet; TAKE 2 TABLETS AT NIGHT; Therapy: 50RKW4960 to (Evaluate:93Esb9556)  Requested for: 76QKP1160; Last   Rx:30Jun2016 Ordered   3  Estradiol 0 5 MG Oral Tablet; take 1 tablet twice a day; Therapy: 01IML2598 to (Last Rx:11Nov2015)  Requested for: 51HVB3704 Ordered   4  LORazepam 1 MG Oral Tablet; TAKE 1  5  tablets  AT BEDTIME AS NEEDED  Requested   for: 07OIQ2412; Last Rx:02Nov2016 Ordered   5  Lyrica 100 MG Oral Capsule; TAKE 2 CAPSULE Bedtime; Therapy: 30NNR7256 to (Evaluate:21Jan2017); Last Rx:22Nov2016 Ordered   6  MethIMAzole 5 MG Oral Tablet; take 0 5 tablet daily; Therapy: (Recorded:39Qxm6171) to Recorded   7   Norethindrone Acetate 5 MG Oral Tablet; TAKE 1 TABLET DAILY FOR TEN DAYS EVERY   OTHER MONTH;   Therapy: 61ZMQ2348 to (Rayshawn Ariza)  Requested for: 01YSL9174; Last   Rx:02Nov2015 Ordered   8  Propranolol HCl - 10 MG Oral Tablet; TAKE 1 TABLET AT BEDTIME; Therapy: (Recorded:14Nov2016) to Recorded   9  Quinapril HCl - 10 MG Oral Tablet; TAKE 1 TABLET DAILY; Therapy: 30UKR2118 to (Evaluate:84Btf9002); Last Rx:30Jun2016 Ordered   10  TiZANidine HCl - 4 MG Oral Capsule; TAKE 1 5 CAPSULE Daily  Requested for:    19Iay6512; Last Rx:30Jun2016 Ordered    Allergies    1  Adhesive Tape TAPE   2  Flexeril TABS   3  Penicillins   4  Naproxen Sodium TABS   5  Promethazine-Codeine SYRP   6   Tramadol    Signatures   Electronically signed by : Orren Bamberger, RN; Nov 29 2016  4:26PM EST                       (Author)

## 2018-01-18 NOTE — MISCELLANEOUS
Message   Recorded as Task   Date: 06/07/2016 11:12 AM, Created By: Daksha Thomson   Task Name: Follow Up   Assigned To: Ilana Domingo   Regarding Patient: Debby Moreno, Status: In Progress   Sushantlizeth Roberto - 07 Jun 2016 11:12 AM     TASK CREATED  Caller: Self; [phone] (Home); [phone] x,,,,, (Work)  pt has questions about an us see prior [id number] 11:21 AM     TASK IN Miller Baugh - 07 Jun 2016 12:30 PM     TASK EDITED  Pt states she would like to get the ABUS done as a precoutionary measure as her mother has past away from breast cancer  I gave the patient the codes to call into the insurance to make sure they will cover the ABUs, patient will call us back to let us know  Active Problems    1  Anxiety (300 00) (F41 9)   2  Benign essential HTN (401 1) (I10)   3  Cervical radiculopathy (723 4) (M54 12)   4  Degeneration of cervical intervertebral disc (722 4) (M50 90)   5  Encounter for gynecological examination without abnormal finding (V72 31) (Z01 419)   6  Encounter for screening mammogram for malignant neoplasm of breast (V76 12)   (Z12 31)   7  Hormone replacement therapy (postmenopausal) (V07 4) (Z79 890)   8  Low back pain (724 2) (M54 5)   9  Lumbar degenerative disc disease (722 52) (M51 36)   10  Lumbar facet arthropathy (721 3) (M46 96)   11  Lumbar radiculopathy (724 4) (M54 16)   12  Lumbar stenosis without neurogenic claudication (724 02) (M48 06)   13  Myofascial pain syndrome (729 1) (M79 1)   14  Pain syndrome, chronic (338 4) (G89 4)   15  Sacroiliitis (720 2) (M46 1)    Current Meds   1  Advil 200 MG Oral Tablet; TAKE 3 TABLET 3 times daily; Therapy: (Recorded:69Jgj8096) to Recorded   2  BusPIRone HCl - 5 MG Oral Tablet; TAKE 2 TABLETS AT NIGHT; Therapy: 61QCF6465 to (Evaluate:84Qre0201)  Requested for: 38HAB4127; Last   Rx:10Mar2016 Ordered   3   Estradiol 0 5 MG Oral Tablet; take 1 tablet twice a day;   Therapy: 18ZMV8446 to (Last Rx:11Nov2015)  Requested for: 25VEL9170 Ordered   4  LORazepam 1 MG Oral Tablet; TAKE 1  5  tablets  AT BEDTIME AS NEEDED  Requested   for: 17TPD1725; Last Rx:02Jun2016 Ordered   5  Lyrica 100 MG Oral Capsule; TAKE 1 CAPSULE AT BEDTIME; Therapy: 25MFN8541 to Recorded   6  Lyrica 50 MG Oral Capsule; 1 CAPSULE DAILY; Therapy: 01FHK1586 to Recorded   7  Lyrica 50 MG Oral Capsule; TAKE 1 CAPSULE 3 TIMES DAILY; Therapy: 26AUP4758 to (Evaluate:23Jun2016) Recorded   8  Methimazole 5 MG Oral Tablet; Take 1 tablet daily; Therapy: (Recorded:68Txq0626) to Recorded   9  Norethindrone Acetate 5 MG Oral Tablet; TAKE 1 TABLET DAILY FOR TEN DAYS EVERY   OTHER MONTH;   Therapy: 05AMJ9603 to (Laisha Morrow)  Requested for: 83JGM0191; Last   Rx:02Nov2015 Ordered   10  Propranolol HCl - 10 MG Oral Tablet; Take 1 tablet twice daily; Therapy: 52Ncx1547 to (Evaluate:96Low0760)  Requested for: 28AZP3560; Last    Rx:17Mar2014 Ordered   11  Quinapril HCl - 10 MG Oral Tablet; TAKE 1 TABLET DAILY; Therapy: 78ONQ1136 to (Evaluate:09Apr2016); Last Rx:10Mar2016 Ordered   12  TiZANidine HCl - 4 MG Oral Capsule; TAKE 1 5 CAPSULE Daily  Requested for:    26DSE0313; Last Rx:10Mar2016 Ordered   13  Triamterene-HCTZ 37 5-25 MG Oral Capsule; TAKE 1 CAPSULE DAILY; Therapy: 57Flw1973 to (Evaluate:08Jun2016)  Requested for: 07MYI4899; Last    Rx:10Mar2016 Ordered    Allergies    1  Adhesive Tape TAPE   2  Flexeril TABS   3  Penicillins   4  Naproxen Sodium TABS   5  Promethazine-Codeine SYRP   6   Tramadol    Signatures   Electronically signed by : Aura Rollins LPN; Jun 7 7767 97:58MV EST                       (Author)

## 2018-01-22 ENCOUNTER — GENERIC CONVERSION - ENCOUNTER (OUTPATIENT)
Dept: OTHER | Facility: OTHER | Age: 63
End: 2018-01-22

## 2018-01-22 VITALS
HEIGHT: 66 IN | TEMPERATURE: 98.1 F | BODY MASS INDEX: 32.83 KG/M2 | HEART RATE: 88 BPM | DIASTOLIC BLOOD PRESSURE: 86 MMHG | WEIGHT: 204.25 LBS | RESPIRATION RATE: 18 BRPM | SYSTOLIC BLOOD PRESSURE: 126 MMHG

## 2018-01-22 VITALS
RESPIRATION RATE: 16 BRPM | HEIGHT: 66 IN | BODY MASS INDEX: 33.43 KG/M2 | SYSTOLIC BLOOD PRESSURE: 126 MMHG | DIASTOLIC BLOOD PRESSURE: 82 MMHG | HEART RATE: 72 BPM | WEIGHT: 208 LBS

## 2018-01-23 VITALS
DIASTOLIC BLOOD PRESSURE: 86 MMHG | BODY MASS INDEX: 34.61 KG/M2 | RESPIRATION RATE: 18 BRPM | HEIGHT: 66 IN | WEIGHT: 215.38 LBS | HEART RATE: 72 BPM | SYSTOLIC BLOOD PRESSURE: 135 MMHG

## 2018-01-23 NOTE — MISCELLANEOUS
Message   Recorded as Task   Date: 12/19/2017 03:32 PM, Created By: Jeremy Begum   Task Name: Miscellaneous   Assigned To: Mercy Sheets   Regarding Patient: Moira De León, Status: Active   Comment:    Jeremy Begum - 19 Dec 2017 3:32 PM     TASK CREATED  pt is calling requesting to speak with denae please call pt back at [address] [place] [address] - 19 Dec 2017 4:10 PM     TASK EDITED  Pt said that when she had her recent knee inj she s/w FQ about her husb's achilles tendon issue  FQ said he was going to check with one of the other doctor's in the practice if this was something he could address  Candace Driverg was just wondering what FQ found out? I told pt Dr Rosa Blandon is out of office this afternoon but I will send him a messsage and we will get back to her tomorrow about it  Pls advisbubba Lawler - 56 Dec 2017 8:47 PM     TASK REPLIED TO: Previously Assigned To Vicky Lawler  i'm so sorry, I did find out  Imani Lora also feels it's unsafe to do an achilles steroid injection because it could cause tendon rupture   Marissa Martinez - 20 Dec 2017 8:30 AM     TASK EDITED  Pt informed of the same  Pt asking if you have any other thoughts or rec for her husb of what he should do or who he should see for the achilles tendon issue? Vicky Lawler - 05 Jan 2018 4:12 PM     TASK REPLIED TO: Previously Assigned To Vicky Lawler  i would recommend he see Dr Rafael Sauceda - he specializes in foot and ankle   Kaylan Langston - 05 Jan 2018 4:25 PM     TASK EDITED  **FYI**    S/w pt , informed her of recommendation, she says he saw his PCP and he does have a torn tendon, and his PCP referred him to Dr Rafael Sauceda as well  Pt  is very appreciative of FQ taking the time to give this recommendation and is happy both FQ and his PCP recommend the same doctor  Vicky Lawler - 05 Jan 2018 4:34 PM     TASK REPLIED TO: Previously Assigned To Vicky Lawler  md aware        Active Problems    1   Acute pain of right knee (719 46) (M25 561)   2  Anxiety (300 00) (F41 9)   3  Benign essential HTN (401 1) (I10)   4  Cervical radiculopathy (723 4) (M54 12)   5  Disc disorder of cervical region (722 91) (M50 90)   6  Hormone replacement therapy (postmenopausal) (V07 4) (Z79 890)   7  Hot flash, menopausal (627 2) (N95 1)   8  Lumbar degenerative disc disease (722 52) (M51 36)   9  Lumbar facet arthropathy (721 3) (M46 96)   10  Lumbar radiculopathy (724 4) (M54 16)   11  Lumbar stenosis without neurogenic claudication (724 02) (M48 061)   12  Myofascial pain syndrome (729 1) (M79 1)   13  Obesity (BMI 30-39 9) (278 00) (E66 9)   14  Pain syndrome, chronic (338 4) (G89 4)   15  Positive depression screening (796 4) (Z13 89)   16  Primary osteoarthritis of right knee (715 16) (M17 11)   17  Right knee injury (959 7) (S89 91XA)   18  Sacroiliitis (720 2) (M46 1)   19  Tear of medial meniscus of right knee, current, unspecified tear type, initial encounter    (836 0) (S83 241A)    Current Meds   1  Advil 200 MG Oral Tablet; TAKE 3 TABLET 3 times daily; Therapy: (Recorded:18Hom5504) to Recorded   2  BusPIRone HCl - 5 MG Oral Tablet; TAKE 2 TABLETS AT NIGHT; Therapy: 51TOK2848 to (Evaluate:52Rav0638)  Requested for: 41RGW6163; Last   Rx:20Nov2017 Ordered   3  Denavir 1 % External Cream; APPLY TOPICALLY DAILY AS NEEDED; Therapy: 08Bex4197 to (Last Rx:35Dme7885)  Requested for: 17Vwt6686 Ordered   4  LORazepam 1 MG Oral Tablet; TAKE 1  5  tablets  AT BEDTIME AS NEEDED  Requested   for: 32PCG9390; Last Rx:20Nov2017 Ordered   5  Lyrica 50 MG Oral Capsule; TAKE 1 TAB IN AM AND 2 TABS QHS; Therapy: 37XWO0250 to (Evaluate:05Jun2018); Last Rx:87Msz3803 Ordered   6  Meloxicam 15 MG Oral Tablet; TAKE 1 TABLET DAILY WITH FOOD; Therapy: 21Dec2017 to (Evaluate:20Apr2018)  Requested for: 21Dec2017; Last   Rx:21Dec2017 Ordered   7  MethIMAzole 5 MG Oral Tablet; Take one tablet daily; Therapy: (Recorded:33Rcl4975) to Recorded   8  Propranolol HCl - 10 MG Oral Tablet; TAKE 1 TABLET TWICE DAILY  Requested for:   96CZN3441; Last Rx:78Zgc1467 Ordered   9  Quinapril HCl - 10 MG Oral Tablet; TAKE 1 TABLET DAILY; Therapy: 92UYZ5772 to (Evaluate:89Jka5532)  Requested for: 20YMK5702; Last   Rx:20Nov2017 Ordered   10  TiZANidine HCl - 4 MG Oral Capsule; TAKE 1 5 CAPSULE Daily as needed for pain     Requested for: 20Nov2017; Last Rx:20Nov2017 Ordered   11  Xenical 120 MG Oral Capsule; Take 1 three times daily with each main meal;    Therapy: 34Uxg6934 to (Last Rx:53Bhk4150)  Requested for: 97Bmx0744 Ordered    Allergies    1  Adhesive Tape TAPE   2  Flexeril TABS   3  Naproxen Sodium TABS   4  Penicillins   5  Promethazine-Codeine SYRP   6   Tramadol    Signatures   Electronically signed by : Myron Bueno, ; Jan 5 2018  4:37PM EST                       (Author)

## 2018-01-23 NOTE — MISCELLANEOUS
Message   Recorded as Task   Date: 12/07/2017 08:06 AM, Created By: Lupe Cosby   Task Name: Follow Up   Assigned To: [facility],Team   Regarding Patient: Lisa Nur, Status: Active   CommentDaisy An - 07 Dec 2017 8:06 AM     TASK CREATED  does patient need refill for Lyrica  I received a letter from [facility] stating she needs a refill  I can not send to mail order through allscripts  she can  a script at our office today  i will print out and leave at our office   Laisha Wheeler - 07 Dec 2017 9:36 AM     TASK EDITED  S/w the pt  and she stated that she found an "old" script dated for June  They did honor it and she will not need one until next month  Pt  was appreciative of the call  Steph Lilly - 07 Dec 2017 12:30 PM     TASK REPLIED TO: Previously Assigned To Steph Lilly  [facility] stated that script was too ol in the letter they sent me  i wasnt sure if she had another script or needed onw   elvin delgado have put that in previous task, but i didnt realize she sent that script in   [address] [place] [address] Dec 2017 1:54 PM     TASK EDITED  Pt informed of the previous task  She said she called [facility] a wk ago and told them she had a RX from June and they said it would be ok, so she sent it in  Pt said she is coming in tomorrow at 3pm for a knee inj she will p/u the Lyrica Rx then  Steph Lilly - 07 Dec 2017 3:09 PM     TASK REPLIED TO: Previously Assigned To Lupe Cosby  ok great thanks        Active Problems    1  Acute pain of right knee (719 46) (M25 561)   2  Anxiety (300 00) (F41 9)   3  Benign essential HTN (401 1) (I10)   4  Cervical radiculopathy (723 4) (M54 12)   5  Disc disorder of cervical region (722 91) (M50 90)   6  Hormone replacement therapy (postmenopausal) (V07 4) (Z79 890)   7  Hot flash, menopausal (627 2) (N95 1)   8  Lumbar degenerative disc disease (722 52) (M51 36)   9  Lumbar facet arthropathy (721 3) (M46 96)   10   Lumbar radiculopathy (724 4) (M54 16)   11  Lumbar stenosis without neurogenic claudication (724 02) (M48 061)   12  Myofascial pain syndrome (729 1) (M79 1)   13  Pain syndrome, chronic (338 4) (G89 4)   14  Positive depression screening (796 4) (Z13 89)   15  Primary osteoarthritis of right knee (715 16) (M17 11)   16  Right knee injury (959 7) (S89 91XA)   17  Sacroiliitis (720 2) (M46 1)    Current Meds   1  Advil 200 MG Oral Tablet; TAKE 3 TABLET 3 times daily; Therapy: (Recorded:46Rjz7372) to Recorded   2  BusPIRone HCl - 5 MG Oral Tablet; TAKE 2 TABLETS AT NIGHT; Therapy: 67LWX2477 to (Evaluate:19May2018)  Requested for: 55YSE5219; Last   Rx:20Nov2017 Ordered   3  Denavir 1 % External Cream; APPLY TOPICALLY DAILY AS NEEDED; Therapy: 99Ydu6196 to (Last Rx:98Pzz8306)  Requested for: 18Ecy5886 Ordered   4  LORazepam 1 MG Oral Tablet; TAKE 1  5  tablets  AT BEDTIME AS NEEDED  Requested   for: 83STM3162; Last Rx:20Nov2017 Ordered   5  Lyrica 50 MG Oral Capsule; TAKE 1 TAB IN AM AND 2 TABS QHS; Therapy: 77TLS2668 to (Evaluate:05Jun2018); Last Rx:07Tup9558 Ordered   6  MethIMAzole 5 MG Oral Tablet; Take one tablet daily; Therapy: (Recorded:57Wyi8798) to Recorded   7  Propranolol HCl - 10 MG Oral Tablet; TAKE 1 TABLET TWICE DAILY  Requested for:   20DQN2885; Last Rx:50Vbn3408 Ordered   8  Quinapril HCl - 10 MG Oral Tablet; TAKE 1 TABLET DAILY; Therapy: 65MYL6853 to (Evaluate:19May2018)  Requested for: 22EHY0159; Last   Rx:20Nov2017 Ordered   9  TiZANidine HCl - 4 MG Oral Capsule; TAKE 1 5 CAPSULE Daily as needed for pain    Requested for: 20Nov2017; Last Rx:20Nov2017 Ordered    Allergies    1  Adhesive Tape TAPE   2  Flexeril TABS   3  Naproxen Sodium TABS   4  Penicillins   5  Promethazine-Codeine SYRP   6   Tramadol    Signatures   Electronically signed by : Mark Alberto, ; Dec  7 2017  3:11PM EST                       (Author)

## 2018-01-23 NOTE — MISCELLANEOUS
Message   Recorded as Task   Date: 01/09/2018 03:56 PM, Created By: Jewell Sutton   Task Name: Care Coordination   Assigned To: [facility],Team   Regarding Patient: Karuna Pathak, Status: Active   Comment:    Aliyah Hernandez - 09 Jan 2018 3:56 PM     TASK CREATED    Two sample boxes of Lyrica 50 mg  given to pt  Expiration 2/2020  Lot # [id number]  Active Problems    1  Acute pain of right knee (719 46) (M25 561)   2  Anxiety (300 00) (F41 9)   3  Benign essential HTN (401 1) (I10)   4  Cervical radiculopathy (723 4) (M54 12)   5  Disc disorder of cervical region (722 91) (M50 90)   6  Hormone replacement therapy (postmenopausal) (V07 4) (Z79 890)   7  Hot flash, menopausal (627 2) (N95 1)   8  Lumbar degenerative disc disease (722 52) (M51 36)   9  Lumbar facet arthropathy (721 3) (M46 96)   10  Lumbar radiculopathy (724 4) (M54 16)   11  Lumbar stenosis without neurogenic claudication (724 02) (M48 061)   12  Myofascial pain syndrome (729 1) (M79 1)   13  Need for influenza vaccination (V04 81) (Z23)   14  Obesity (BMI 30-39 9) (278 00) (E66 9)   15  Pain syndrome, chronic (338 4) (G89 4)   16  Positive depression screening (796 4) (Z13 89)   17  Primary osteoarthritis of right knee (715 16) (M17 11)   18  Right knee injury (959 7) (S89 91XA)   19  Sacroiliitis (720 2) (M46 1)   20  Tear of medial meniscus of right knee, current, unspecified tear type, initial encounter    (836 0) (S83 241A)    Current Meds   1  Advil 200 MG Oral Tablet; TAKE 3 TABLET 3 times daily; Therapy: (Recorded:47Uqf0115) to Recorded   2  BusPIRone HCl - 5 MG Oral Tablet; TAKE 2 TABLETS AT NIGHT; Therapy: 06KDO6734 to (Evaluate:41Xsk0943)  Requested for: 07DWO9126; Last   Rx:20Nov2017 Ordered   3  Denavir 1 % External Cream; APPLY TOPICALLY DAILY AS NEEDED; Therapy: 64Spc1596 to (Last Rx:22Dec2016)  Requested for: 22Dec2016 Ordered   4   LORazepam 1 MG Oral Tablet; TAKE 1  5  tablets  AT BEDTIME AS NEEDED Requested   for: 20Nov2017; Last Rx:20Nov2017 Ordered   5  Lyrica 50 MG Oral Capsule; TAKE 1 TAB IN AM AND 2 TABS QHS; Therapy: 26ADY4177 to (Evaluate:05Jun2018); Last Rx:74Vdg4916 Ordered   6  Meloxicam 15 MG Oral Tablet; TAKE 1 TABLET DAILY WITH FOOD; Therapy: 39Tve9842 to (Evaluate:20Apr2018)  Requested for: 75Wrc3815; Last   Rx:12Btx5386 Ordered   7  MethIMAzole 5 MG Oral Tablet; Take one tablet daily; Therapy: (Recorded:78Vpk4440) to Recorded   8  Propranolol HCl - 10 MG Oral Tablet; TAKE 1 TABLET TWICE DAILY  Requested for:   93AVK4210; Last Rx:79Pdi9706 Ordered   9  Quinapril HCl - 10 MG Oral Tablet; TAKE 1 TABLET DAILY; Therapy: 00VHS2266 to (Evaluate:19May2018)  Requested for: 09OXG9865; Last   Rx:20Nov2017 Ordered   10  TiZANidine HCl - 4 MG Oral Capsule; TAKE 1 5 CAPSULE Daily as needed for pain     Requested for: 20Nov2017; Last Rx:20Nov2017 Ordered   11  Xenical 120 MG Oral Capsule; Take 1 three times daily with each main meal;    Therapy: 05Tpo6602 to (Last Rx:14Qog0274)  Requested for: 32Dml4824 Ordered    Allergies    1  Adhesive Tape TAPE   2  Flexeril TABS   3  Naproxen Sodium TABS   4  Penicillins   5  Promethazine-Codeine SYRP   6   Tramadol    Signatures   Electronically signed by : Sheri So RN; Jan 9 2018  3:57PM EST                       (Author)

## 2018-01-23 NOTE — MISCELLANEOUS
Message     Recorded as Task   Date: 12/07/2017 08:06 AM, Created By: Mary Pinzon   Task Name: Follow Up   Assigned To: SPA courtney clinical,Team   Regarding Patient: Nathalia Moore, Status: Active   CommentClaudell Goodpasture - 07 Dec 2017 8:06 AM     TASK CREATED  does patient need refill for Lyrica  I received a letter from [facility] stating she needs a refill  I can not send to mail order through allscripts  she can  a script at our office today  i will print out and leave at our office   Laisha Wheeler - 07 Dec 2017 9:36 AM     TASK EDITED  S/w the pt  and she stated that she found an "old" script dated for June  They did honor it and she will not need one until next month  Pt  was appreciative of the call  Steph Lilly - 07 Dec 2017 12:30 PM     TASK REPLIED TO: Previously Assigned To Steph Lilly  [facility] stated that script was too old in the letter they sent me  i wasn't sure if she had another script or needed one   sorry should have put that in previous task, but i didn't realize she sent that script in   [address] [place] [address] Dec 2017 1:54 PM     TASK EDITED  Pt informed of the previous task  She said she called [facility] a wk ago and told them she had a RX from June and they said it would be ok, so she sent it in  Pt said she is coming in tomorrow at 3pm for a knee inj she will p/u the Lyrica Rx then  Steph Lilly - 07 Dec 2017 3:09 PM     TASK REPLIED TO: Previously Assigned To Mary Pinzon  ok great thanks   Daryle Bumpers - 07 Dec 2017 3:11 PM     TASK Jason Gess - 13 Dec 2017 11:46 AM     TASK REACTIVATED   Viviane Lars - 13 Dec 2017 11:47 AM     TASK EDITED  Pt called asking if there is a script for Lyrica to  at the office  She didn't want to come in if it wasn't ready  Please call [phone]  Marissa Martinez - 13 Dec 2017 11:57 AM     TASK EDITED  Pt informed the Maurie Pill is signed and ready for p/u   Pt told she may p/u RX M-F from 8-4pmElihu Nail left Rx in the MA's cabinet  Active Problems    1  Acute pain of right knee (719 46) (M25 561)   2  Anxiety (300 00) (F41 9)   3  Benign essential HTN (401 1) (I10)   4  Cervical radiculopathy (723 4) (M54 12)   5  Disc disorder of cervical region (722 91) (M50 90)   6  Hormone replacement therapy (postmenopausal) (V07 4) (Z79 890)   7  Hot flash, menopausal (627 2) (N95 1)   8  Lumbar degenerative disc disease (722 52) (M51 36)   9  Lumbar facet arthropathy (721 3) (M46 96)   10  Lumbar radiculopathy (724 4) (M54 16)   11  Lumbar stenosis without neurogenic claudication (724 02) (M48 061)   12  Myofascial pain syndrome (729 1) (M79 1)   13  Pain syndrome, chronic (338 4) (G89 4)   14  Positive depression screening (796 4) (Z13 89)   15  Primary osteoarthritis of right knee (715 16) (M17 11)   16  Right knee injury (959 7) (S89 91XA)   17  Sacroiliitis (720 2) (M46 1)   18  Tear of medial meniscus of right knee, current, unspecified tear type, initial encounter    (836 0) (S83 241A)    Current Meds   1  Advil 200 MG Oral Tablet; TAKE 3 TABLET 3 times daily; Therapy: (Recorded:60Zjw5513) to Recorded   2  BusPIRone HCl - 5 MG Oral Tablet; TAKE 2 TABLETS AT NIGHT; Therapy: 24OER9581 to (Evaluate:85Ccv3922)  Requested for: 00FUI6877; Last   Rx:20Nov2017 Ordered   3  Denavir 1 % External Cream; APPLY TOPICALLY DAILY AS NEEDED; Therapy: 43Xqv0311 to (Last Rx:72Rny3919)  Requested for: 40Hcc7772 Ordered   4  LORazepam 1 MG Oral Tablet; TAKE 1  5  tablets  AT BEDTIME AS NEEDED  Requested   for: 64SCP3363; Last Rx:20Nov2017 Ordered   5  Lyrica 50 MG Oral Capsule; TAKE 1 TAB IN AM AND 2 TABS QHS; Therapy: 08IGS6806 to (Evaluate:05Jun2018); Last Rx:73Kfs0373 Ordered   6  MethIMAzole 5 MG Oral Tablet; Take one tablet daily; Therapy: (Recorded:11Hsv4422) to Recorded   7  Propranolol HCl - 10 MG Oral Tablet; TAKE 1 TABLET TWICE DAILY  Requested for:   15WVQ5069; Last Rx:78Yge5404 Ordered   8  Quinapril HCl - 10 MG Oral Tablet; TAKE 1 TABLET DAILY; Therapy: 52HCN8995 to (Evaluate:97Xmi3658)  Requested for: 85AEN0550; Last   Rx:20Nov2017 Ordered   9  TiZANidine HCl - 4 MG Oral Capsule; TAKE 1 5 CAPSULE Daily as needed for pain    Requested for: 20Nov2017; Last Rx:20Nov2017 Ordered    Allergies    1  Adhesive Tape TAPE   2  Flexeril TABS   3  Naproxen Sodium TABS   4  Penicillins   5  Promethazine-Codeine SYRP   6   Tramadol    Signatures   Electronically signed by : Christine Jasso, ; Dec 13 2017 11:58AM EST                       (Author)

## 2018-01-23 NOTE — MISCELLANEOUS
Message   Recorded as Task   Date: 12/07/2017 01:56 PM, Created By: [address] [facility]   Task Name: Follow Up   Assigned To: Marissa Martinez   Regarding Patient: Jeromy Munoz, Status: Active   Comment:    Marissa Martinez - 07 Dec 2017 1:56 PM     TASK CREATED  Other  Pt wanted to let you know that she had the Knee MRI done today at noon at [facility] and her knee inj is scheduled for tomorrow at 3pm, she thought you would want to see the results before the inj tomorrow  Told pt I would make FQ aware  Messi Garcia - 07 Dec 2017 2:02 PM     TASK REPLIED TO: Previously Assigned To Messi Garcia md aware        Active Problems    1  Acute pain of right knee (719 46) (M25 561)   2  Anxiety (300 00) (F41 9)   3  Benign essential HTN (401 1) (I10)   4  Cervical radiculopathy (723 4) (M54 12)   5  Disc disorder of cervical region (722 91) (M50 90)   6  Hormone replacement therapy (postmenopausal) (V07 4) (Z79 890)   7  Hot flash, menopausal (627 2) (N95 1)   8  Lumbar degenerative disc disease (722 52) (M51 36)   9  Lumbar facet arthropathy (721 3) (M46 96)   10  Lumbar radiculopathy (724 4) (M54 16)   11  Lumbar stenosis without neurogenic claudication (724 02) (M48 061)   12  Myofascial pain syndrome (729 1) (M79 1)   13  Pain syndrome, chronic (338 4) (G89 4)   14  Positive depression screening (796 4) (Z13 89)   15  Primary osteoarthritis of right knee (715 16) (M17 11)   16  Right knee injury (959 7) (S89 91XA)   17  Sacroiliitis (720 2) (M46 1)    Current Meds   1  Advil 200 MG Oral Tablet; TAKE 3 TABLET 3 times daily; Therapy: (Recorded:51Eee7528) to Recorded   2  BusPIRone HCl - 5 MG Oral Tablet; TAKE 2 TABLETS AT NIGHT; Therapy: 22QQH4229 to (Evaluate:12Xzy0312)  Requested for: 75OVY3970; Last   Rx:20Nov2017 Ordered   3  Denavir 1 % External Cream; APPLY TOPICALLY DAILY AS NEEDED; Therapy: 22Dec2016 to (Last Rx:22Dec2016)  Requested for: 22Dec2016 Ordered   4   LORazepam 1 MG Oral Tablet; TAKE 1  5  tablets AT BEDTIME AS NEEDED  Requested   for: 20Nov2017; Last Rx:20Nov2017 Ordered   5  Lyrica 50 MG Oral Capsule; TAKE 1 TAB IN AM AND 2 TABS QHS; Therapy: 97KRC4458 to (Evaluate:05Jun2018); Last Rx:21Axi5097 Ordered   6  MethIMAzole 5 MG Oral Tablet; Take one tablet daily; Therapy: (Recorded:73Fbe7440) to Recorded   7  Propranolol HCl - 10 MG Oral Tablet; TAKE 1 TABLET TWICE DAILY  Requested for:   84RJU1777; Last Rx:25Vwf6047 Ordered   8  Quinapril HCl - 10 MG Oral Tablet; TAKE 1 TABLET DAILY; Therapy: 33CKZ1901 to (Evaluate:19May2018)  Requested for: 25FAG1519; Last   Rx:20Nov2017 Ordered   9  TiZANidine HCl - 4 MG Oral Capsule; TAKE 1 5 CAPSULE Daily as needed for pain    Requested for: 20Nov2017; Last Rx:20Nov2017 Ordered    Allergies    1  Adhesive Tape TAPE   2  Flexeril TABS   3  Naproxen Sodium TABS   4  Penicillins   5  Promethazine-Codeine SYRP   6   Tramadol    Signatures   Electronically signed by : Navarro Corcoran, ; Dec  7 2017  3:56PM EST                       (Author)

## 2018-01-24 VITALS
OXYGEN SATURATION: 98 % | SYSTOLIC BLOOD PRESSURE: 126 MMHG | BODY MASS INDEX: 34.47 KG/M2 | HEART RATE: 78 BPM | TEMPERATURE: 98.4 F | WEIGHT: 214.5 LBS | DIASTOLIC BLOOD PRESSURE: 86 MMHG | HEIGHT: 66 IN

## 2018-01-24 NOTE — MISCELLANEOUS
Message     Recorded as Task   Date: 01/15/2018 12:41 PM, Created By: Boy Arana   Task Name: Miscellaneous   Assigned To: Tom Duval clinical,Team   Regarding Patient: Mary Kay Arora, Status: In Progress   Comment:    Alexandria Rodriguez - [address] 2018 12:41 PM     TASK CREATED  pt is calling in about the task  dated  from 1/8/18  pt did go on [employer] and printed out the co pay cards but she also needs the script for lyrica and does not have one  please call pt back at [address] [place] [address] [address] 2018 2:06 PM     TASK EDITED  I explained to the pt that she needs download the [employer] pathway application, fill out her portion of the application then drop off application at our office for NM to complete  Amy Donahue will then fax the application and a Lyrica RX to [facility], pt will be contacted from [facility] if she is approved for the program   Pt wanted to make sure we received the same info that it was being denied by her Ins b/c it is not approved for chronic pain, I told pt Amy Donahue was already made aware of that info  Alexandria Rodriguez - [address] 2018 2:20 PM     TASK EDITED  pt is calling back requesting to speak with denae and would like for her to call her back at [address] [place] [address] - [address] 2018 2:59 PM     TASK EDITED  Luis Antonio Only:  S/W pt who said she was discussing things with her husb and they will pursue the [employer] program, bu he is just upset with their Ins Co b/c they pay so much money into it and they won't cover the lyrica now  They are planning on contacting their Ins Co directly to inquire further about this but they were asking if we used a different dx code on the most recent Lyrica Rx as compared to her previous scripts and maybe that is why they won't cover it  I placed pt on hold and looked in allscripts and informed the pt that we used the same dx code for her recent Rx as was used previously for her June RX   I told pt that unfortunately Ins Co are constantly changes things & we have seen it happen with many of our pts  I advised to follow through on calling their Ins [place] - [address] 2018 4:06 PM     TASK REPLIED TO: Previously Assigned To Steph Lilly                called patient and explained that i will gladly do a tier exception after she talks to [employer]  if she calls back, I would just need to know where to fax letter to   i did tell her to get that info for me   Marissa Martinez - [address] 2018 4:12 PM     TASK IN PROGRESS        Active Problems    1  Acute pain of right knee (719 46) (M25 561)   2  Anxiety (300 00) (F41 9)   3  Benign essential HTN (401 1) (I10)   4  Cervical radiculopathy (723 4) (M54 12)   5  Disc disorder of cervical region (722 91) (M50 90)   6  Hormone replacement therapy (postmenopausal) (V07 4) (Z79 890)   7  Hot flash, menopausal (627 2) (N95 1)   8  Lumbar degenerative disc disease (722 52) (M51 36)   9  Lumbar facet arthropathy (721 3) (M46 96)   10  Lumbar radiculopathy (724 4) (M54 16)   11  Lumbar stenosis without neurogenic claudication (724 02) (M48 061)   12  Myofascial pain syndrome (729 1) (M79 1)   13  Need for influenza vaccination (V04 81) (Z23)   14  Obesity (BMI 30-39 9) (278 00) (E66 9)   15  Pain syndrome, chronic (338 4) (G89 4)   16  Positive depression screening (796 4) (Z13 89)   17  Primary osteoarthritis of right knee (715 16) (M17 11)   18  Right knee injury (959 7) (S89 91XA)   19  Sacroiliitis (720 2) (M46 1)   20  Tear of medial meniscus of right knee, current, unspecified tear type, initial encounter    (836 0) (S83 241A)    Current Meds   1  Advil 200 MG Oral Tablet; TAKE 3 TABLET 3 times daily; Therapy: (Recorded:65Dsh2136) to Recorded   2  BusPIRone HCl - 5 MG Oral Tablet; TAKE 2 TABLETS AT NIGHT; Therapy: 15KKX7741 to (Evaluate:76Oac1212)  Requested for: 50HCO9108; Last   Rx:20Nov2017 Ordered   3   Denavir 1 % External Cream; APPLY TOPICALLY DAILY AS NEEDED; Therapy: 89Nnk0709 to (Last Rx:75Sff7654)  Requested for: 00Ckt6548 Ordered   4  LORazepam 1 MG Oral Tablet; TAKE 1  5  tablets  AT BEDTIME AS NEEDED  Requested   for: 62FJF0739; Last Rx:20Nov2017 Ordered   5  Lyrica 50 MG Oral Capsule; TAKE 1 TAB IN AM AND 2 TABS QHS; Therapy: 43IQZ9380 to (Evaluate:05Jun2018); Last Rx:34Iak1210 Ordered   6  Meloxicam 15 MG Oral Tablet; TAKE 1 TABLET DAILY WITH FOOD; Therapy: 11Squ7588 to (Evaluate:20Apr2018)  Requested for: 86Bdv0603; Last   Rx:24Bhd1690 Ordered   7  MethIMAzole 5 MG Oral Tablet; Take one tablet daily; Therapy: (Recorded:45Hiv5835) to Recorded   8  Propranolol HCl - 10 MG Oral Tablet; TAKE 1 TABLET TWICE DAILY  Requested for:   83ZUE3134; Last Rx:29Qkl3472 Ordered   9  Quinapril HCl - 10 MG Oral Tablet; TAKE 1 TABLET DAILY; Therapy: 93HZK4508 to (Evaluate:18Dbr7485)  Requested for: 60SCJ2697; Last   Rx:20Nov2017 Ordered   10  TiZANidine HCl - 4 MG Oral Capsule; TAKE 1 5 CAPSULE Daily as needed for pain     Requested for: 20Nov2017; Last Rx:20Nov2017 Ordered   11  Xenical 120 MG Oral Capsule; Take 1 three times daily with each main meal;    Therapy: 45Rmy9139 to (Last Rx:15Xxb0189)  Requested for: 11Izi0864 Ordered    Allergies    1  Adhesive Tape TAPE   2  Flexeril TABS   3  Naproxen Sodium TABS   4  Penicillins   5  Promethazine-Codeine SYRP   6   Tramadol    Signatures   Electronically signed by : Tod Faulkner, ; Jan 22 2018  9:50AM EST                       (Author)

## 2018-01-25 ENCOUNTER — TELEPHONE (OUTPATIENT)
Dept: OTHER | Facility: OTHER | Age: 63
End: 2018-01-25

## 2018-01-25 NOTE — TELEPHONE ENCOUNTER
FYI: S/W pt who just wanted to know if Claudell Loyal got the note she dropped off for her that provides instructions on what NM has to do for the Tier Exception for the Lyrica  Pt said she hasn't heard anything back from her Ins Co yet  Pt said if she doesn't get the Tier Exception then we will have to come up with another plan b/c she was told it would cost $1900  I informed pt that Claudell Loyal has been out for a family emergency since last Thurs and today was her first day back in the office but only for a half of a day  Told pt I was not sure if Claudell Loyal has even completed the Tier Exception with her being out of the office  Pt understood and will just wait until she hears from the Ins Co   Pt said she will need to come in for more Lyrica samples in a while  While waiting to hear about the Tier Exception  Told pt just to call the office before coming just to be sure we have samples available

## 2018-01-25 NOTE — TELEPHONE ENCOUNTER
I did tier exception letter (in basket on my desk), but there was no fax number in the letter that talisha left for me  I would have to contact her insurance company for that unless she has it  I have not had time to do that because of being out    If she provides you with fax please leave on my desk and I will fax Monday if that's ok with her

## 2018-01-25 NOTE — TELEPHONE ENCOUNTER
[address] Horace Martinez left on anserve    Given to:             [address] [facility]    Reason: ROUTINE/OFFICE   Pt's Dr: Remberto Mary, [facility]       For: Hector Bass 49     2nd Call: NO        From: Jenae Vilchis     Phone: [phone]   Ext:     Pt Name: SAME AS ABOVE    Pt : 1955     Message: NEED TO SPK TO JHONATAN Lucas 81  PLS CALL BK

## 2018-01-26 NOTE — TELEPHONE ENCOUNTER
-armandoi-    RN s/w pt regarding last task  Per pt the insurance company would only give her a phone number which she she provided in the note that she gave to [place]  Per pt the [employer] told her that the physician or the CRNP needs to call the number to get the rest of the information and the fax number  Pt appreciative and realizes that NM will be back in the office on 1/29 and that if she needs more samples of lyrica she will call

## 2018-01-26 NOTE — TELEPHONE ENCOUNTER
Ivana Valera Not sure if I sent the reply correct, because its still in my in basket  I have letter written and its in the basket on my desk  I just need a fax number to send it to   I have to call her insurance company Monday, unless Tee Siddiqui has the fax for the appeal department

## 2018-01-29 ENCOUNTER — TELEPHONE (OUTPATIENT)
Dept: RADIOLOGY | Facility: CLINIC | Age: 63
End: 2018-01-29

## 2018-01-29 NOTE — TELEPHONE ENCOUNTER
Given to:             [address] [facility]    Reason: ROUTINE/OFFICE   Pt's Dr: Fabby Willard       For: OFFICE     2nd Call: NO        From: Giana Campbell     Phone: [phone]   Ext:     Pt Name: Giana Campbell    Pt : 1955     Message: FOR NURSE  NEEDS SOME MORE SAMPLES OF ANIVAL WAS ADVISED SHE            CAN CALL AND ADVISE  SHE WILLPASS BY TODAY AT 4:30 TO   -----------------------------------------------------------------     CALLER ID: [id number]      CSN: [id number]      Taken By: NS 2018 10:32 AM  Delivered By:    /  /

## 2018-01-29 NOTE — TELEPHONE ENCOUNTER
S/W pt and confirmed she is taking lyrica 50 mg, 1 in Am and 2 at HS Pt advised she can come p/u 2 sample boxes at the [place] office before 4 pm today  2 samples baoxes of lyrica 50mg left in cabinet for pt p/u    EXP 2/2020, Lot # [id number]

## 2018-02-08 ENCOUNTER — TELEPHONE (OUTPATIENT)
Dept: PAIN MEDICINE | Facility: MEDICAL CENTER | Age: 63
End: 2018-02-08

## 2018-02-08 NOTE — TELEPHONE ENCOUNTER
Pt coming into the office today to meet with Alisson from [address] [place] [address]  Asking if she can get more samples of Lyrica when she is there (appt is at 3:30)  States that they are trying to get the medication approved through her insurance company but she only has enough samples left for 4 more days  Pt can be reached at [phone]

## 2018-02-08 NOTE — TELEPHONE ENCOUNTER
S/w pt confirmed through NM that we can provide her more Lyrica samples until we hear anything on the Lyrica approval  Pt verbalized understanding

## 2018-02-19 NOTE — TELEPHONE ENCOUNTER
Roz Irvin from [facility]  is calling to say that the lyrica is still denied   Case ref number is [id number] and phone number is [phone] [facility] [address]

## 2018-02-22 NOTE — TELEPHONE ENCOUNTER
I already did appeal/tier exception so there is nothing more I can do if still being denied    I advised her previously to look into [employer] pathway program

## 2018-02-22 NOTE — TELEPHONE ENCOUNTER
S/w the patient and informed her and she stated she does not think she will qualify but she will try the [facility] pathways  Encouraged her to go online and print out the form and fill out the required fields and she will bring in to you to fill out the rest of the information  Patient was appreciative of the call and NM's effort  and wanted you to know

## 2018-02-26 ENCOUNTER — TELEPHONE (OUTPATIENT)
Dept: OBGYN CLINIC | Facility: HOSPITAL | Age: 63
End: 2018-02-26

## 2018-02-26 NOTE — TELEPHONE ENCOUNTER
Caller: patient  Call back number: [phone]  Patient's doctor: Dr Luke Newton    Patient needs a new script for Pt   Patient will call back with fax number

## 2018-02-26 NOTE — MISCELLANEOUS
Message   Recorded as Task   Date: 01/08/2018 09:25 AM, Created By: Shelbi Beard   Task Name: Miscellaneous   Assigned To: SPA Med Authorization,Team   Regarding Patient: Silvano Wood, Status: Active   Comment:    Alexandria Rodriguez - 08 Jan 2018 9:25 AM     TASK CREATED  pt is calling and  needing a refill on her lyrica  she has none left  and need to speaks with nurse, cb# [phone] - 08 Jan 2018 9:31 AM     TASK IN PROGRESS   Cinthya Rangel - 08 Jan 2018 9:41 AM     TASK EDITED  S/w pt  Pt received a lyrica script back in December  Pharmacy telling her it is now a Tier 3 drug and her price increased from $45/month to $650/month  Pt can not afford to fill script  Has 3 day supply left  Pt states [facility] gave her a phone # for office to call [phone]    Pt would also like to know if there are any samples she can have? Cinthya Rangel - 08 Jan 2018 9:49 AM     TASK IN PROGRESS   Cinthya Rangel - 08 Jan 2018 10:05 AM     TASK EDITED  Madelaine Koroma the # provided for future scripts below  They state lyrica needs prior auth now  [facility] started a tier exception for med at this time  ansd advised office cb to check status  Reference #: [id number]  Will send to med auth team to f/u  Jaspreet Goetz - 08 Jan 2018 10:33 AM     TASK REPLIED TO: Previously Assigned To Jaspreet Gotez md aware  pt can  samples at [facility] 27 Jan 2018 10:35 AM     TASK IN [address] [place] [address] - 08 Jan 2018 10:37 AM     TASK EDITED  S/w pt and advised of same  Cinthya Rangel - 08 Jan 2018 10:38 AM     TASK EDITED  [facility], please f/u on status of tier exception started today  Reference # intask below  Ayala Copeland - 09 Jan 2018 2:15 PM     TASK EDITED  Mannie Baltazar from [facility] called requesting more information regarding Lyrica 50mg  Mannie Garciah can be reached at [phone]  She said there is a deadline on the case of tomorrow noon central time     Hina Omer - [address] 2018 7:19 AM     TASK EDITED  Faxed additional requested info to [facility]  Hina Omer - [address] 2018 7:19 AM     TASK IN PROGRESS   Levonia Medico - [address] 2018 3:04 PM     TASK EDITED  Simmie Fresh[facility] has been denied because it is considered off label when used for chronic pain dx and is considered experimental    Steph Lilly - 11 Jan 2018 7:04 AM     TASK REPLIED TO: Previously Assigned To Steph Lilly  she can apply to [employer] pathway prgram   she can get the form online, fill out her section, then bring to the office and i will fill out the prescriber section and fax a script to them  she can  a box or 2 of samples also from our office   [facility] - 11 Jan 0487 0:67 PM     TASK EDITED  Pt aware  She will go online to get application and fill it out and will bring into office  She is aware to  samples  Active Problems    1  Acute pain of right knee (719 46) (M25 561)   2  Anxiety (300 00) (F41 9)   3  Benign essential HTN (401 1) (I10)   4  Cervical radiculopathy (723 4) (M54 12)   5  Disc disorder of cervical region (722 91) (M50 90)   6  Hormone replacement therapy (postmenopausal) (V07 4) (Z79 890)   7  Hot flash, menopausal (627 2) (N95 1)   8  Lumbar degenerative disc disease (722 52) (M51 36)   9  Lumbar facet arthropathy (721 3) (M46 96)   10  Lumbar radiculopathy (724 4) (M54 16)   11  Lumbar stenosis without neurogenic claudication (724 02) (M48 061)   12  Myofascial pain syndrome (729 1) (M79 1)   13  Need for influenza vaccination (V04 81) (Z23)   14  Obesity (BMI 30-39 9) (278 00) (E66 9)   15  Pain syndrome, chronic (338 4) (G89 4)   16  Positive depression screening (796 4) (Z13 89)   17  Primary osteoarthritis of right knee (715 16) (M17 11)   18  Right knee injury (959 7) (S89 91XA)   19  Sacroiliitis (720 2) (M46 1)   20  Tear of medial meniscus of right knee, current, unspecified tear type, initial encounter    (836 0) (Q64 182A)    Current Meds   1  Advil 200 MG Oral Tablet; TAKE 3 TABLET 3 times daily; Therapy: (Recorded:20Cgg0912) to Recorded   2  BusPIRone HCl - 5 MG Oral Tablet; TAKE 2 TABLETS AT NIGHT; Therapy: 03TNY5222 to (Evaluate:54Hrh6355)  Requested for: 70YJF7002; Last   Rx:20Nov2017 Ordered   3  Denavir 1 % External Cream; APPLY TOPICALLY DAILY AS NEEDED; Therapy: 49Tdh9856 to (Last Rx:60Tvt4277)  Requested for: 32Pem9161 Ordered   4  LORazepam 1 MG Oral Tablet; TAKE 1  5  tablets  AT BEDTIME AS NEEDED  Requested   for: 06MJD3939; Last Rx:20Nov2017 Ordered   5  Lyrica 50 MG Oral Capsule; TAKE 1 TAB IN AM AND 2 TABS QHS; Therapy: 91YVY4310 to (Evaluate:05Jun2018); Last Rx:16Tca8588 Ordered   6  Meloxicam 15 MG Oral Tablet; TAKE 1 TABLET DAILY WITH FOOD; Therapy: 10Tqq5865 to (Evaluate:20Apr2018)  Requested for: 86Gza2624; Last   Rx:31Kio6774 Ordered   7  MethIMAzole 5 MG Oral Tablet; Take one tablet daily; Therapy: (Recorded:12Elt7341) to Recorded   8  Propranolol HCl - 10 MG Oral Tablet; TAKE 1 TABLET TWICE DAILY  Requested for:   12STJ3292; Last Rx:91Hih0624 Ordered   9  Quinapril HCl - 10 MG Oral Tablet; TAKE 1 TABLET DAILY; Therapy: 47VCL7980 to (Evaluate:67Dxu9846)  Requested for: 15ZBY7247; Last   Rx:20Nov2017 Ordered   10  TiZANidine HCl - 4 MG Oral Capsule; TAKE 1 5 CAPSULE Daily as needed for pain     Requested for: 20Nov2017; Last Rx:20Nov2017 Ordered   11  Xenical 120 MG Oral Capsule; Take 1 three times daily with each main meal;    Therapy: 63Hze6104 to (Last Rx:68Uwr8140)  Requested for: 16Eba9021 Ordered    Allergies    1  Adhesive Tape TAPE   2  Flexeril TABS   3  Naproxen Sodium TABS   4  Penicillins   5  Promethazine-Codeine SYRP   6   Tramadol    Signatures   Electronically signed by : Tirso Sherwood, ; Jan 11 2018  3:36PM EST                       (Author)

## 2018-02-27 DIAGNOSIS — M25.561 RIGHT KNEE PAIN, UNSPECIFIED CHRONICITY: Primary | ICD-10-CM

## 2018-03-02 NOTE — TELEPHONE ENCOUNTER
Pt informed that the office is closing b/c of the weather and our power has been flickering  Pt advised she will need to stop in the office next week

## 2018-03-02 NOTE — TELEPHONE ENCOUNTER
Pt called asking if she can get more samples of Lyrica  She also has a form that she needs to talk to someone about  Pt states that she can stop by the office today to take care of both  She can be reached at [phone]

## 2018-03-05 NOTE — TELEPHONE ENCOUNTER
Pt wanted [employer] [facility] to know that they have been without power since Friday so they haven't been able to do [employer] application, but it looks like some preliminary info she saw online is that she may not qualify for it  Pt hoping to look into it more once she gets her power back  She will keep [place] updated but if she is not going to qualify then she might need to make an appt to see [place] to figure out something else she can be put on  I told that per [place] that she is working on possibly getting her a script for a free 7 day trial of lyrica

## 2018-03-05 NOTE — TELEPHONE ENCOUNTER
I can write a script for a free 7 day trial which she can take to the pharmacy  When she gets close to running out of her samples she can call and she can  script  This can only be done once  Also she can call Jesus Leary to find out income info and if she would qualify  [phone]    She should apply soon, as it can take a few weeks for them to make decision and we have no more samples

## 2018-03-05 NOTE — TELEPHONE ENCOUNTER
Pt informed of Steph's rec as stated in her previous task  Pt given ph # for [address] as listed in Steph's task, she will call them and if b/c of their income they won't qualify then she won't fill out the application and will call our office and make us aware  Pt asked if that happens what is the next step? Pt informed that per Jan Menon there is one other medication she could try her on and she wouldn't need an ov for that she should just call the office and make Jan Menon aware and Jan Menon would order it

## 2018-03-07 NOTE — PROCEDURES
Procedure      Pre-procedure Diagnosis: Lumbar spinal stenosis with neurogenic claudication  Post-procedure Diagnosis: Lumbar spinal stenosis with neurogenic claudication  Procedure Title(s):  1  Left L4 transforaminal epidural steroid injection 2  Left L5 transforaminal epidural steroid injection  Attending Surgeon:   Javier Alvarez MD  Anesthesia:   Local     Indications: The patient is a 63year-old female with a diagnosis of lumbar spinal stenosis with neurogenic claudication  The patient's history and physical exam were reviewed  The risks, benefits and alternatives to the procedure were discussed, and all questions were answered to the patient's satisfaction  The patient agreed to proceed, and written informed consent was obtained  Procedure in Detail: The patient was brought into the procedure room and placed in the prone position on the fluoroscopy table  The area of the lumbar spine was prepped with chloraprep solution then draped in a sterile manner  The L4 vertebral body was identified with AP fluoroscopy  An oblique view to the left was obtained to better visualize the inferior junction of the pedicle and transverse process  The 6 o'clock position of the pedicle was marked and identified  The skin and subcutaneous tissues in the area were anesthetized with 1% lidocaine  A 22-gauge, 5 inch needle was directed toward the targeted point under fluoroscopy until bone was contacted  The needle was then walked inferiorly until the neural foramen was entered  A lateral fluoroscopic view was then used to place the needle tip at the 10 o'clock position of the foramen  The same procedure was repeated for the left L5 level    Negative aspiration was confirmed, and 1 ml Omnipaque 300 was injected at each level  Appropriate neurograms were observed under AP fluoroscopy  Digital subtraction angiography was performed showing no vascular uptake and appropriate spread in the epidural space   Then, after negative aspiration, a solution consisting of 1 mL 0 25% bupivacaine and 0 5 mL depo-medrol (80mg/mL) was easily injected at each level  The needles were removed with a 1% lidocaine flush  The patient's back was cleaned and a bandage was placed over the needle insertion points  Disposition: The patient tolerated the procedure well, and there were no apparent complications  The patient was taken to the recovery area where written discharge instructions for the procedure were given             Signatures   Electronically signed by : Parker Saenz MD; Nov 15 2017  1:18PM EST                       (Author)

## 2018-03-08 ENCOUNTER — TELEPHONE (OUTPATIENT)
Dept: PAIN MEDICINE | Facility: MEDICAL CENTER | Age: 63
End: 2018-03-08

## 2018-03-08 NOTE — TELEPHONE ENCOUNTER
Pt given the Lyrica weaning instructions from Raffi Ibarra as stated in her previous task  Pt verbalized understanding of instr and will call the office once she is closer to being weaned off her Lyrica for Raffi Ibarra to send script for nortriptyline to her pharmacy

## 2018-03-08 NOTE — TELEPHONE ENCOUNTER
She would have to wean off Lyrica by taking 1 50 mg tab BID for 1 week then decrease to 1 tab daily for 1 week then stop  We can try nortriptyline instead  This would be 1 tab at night   She can call when closer to weaning off Lyrica and I will send to pharmacy then

## 2018-03-08 NOTE — TELEPHONE ENCOUNTER
[address] [facility]- Patient LM a message on anserve requesting to s/w nurse   c/b [phone]

## 2018-03-08 NOTE — TELEPHONE ENCOUNTER
[address] [place]- patient called back stating she forgot to ask if the new Rx is covered by her insurance?  C/b [phone]

## 2018-03-08 NOTE — TELEPHONE ENCOUNTER
Pt said they followed up with the [address] [place] [address] and they will not qualify for the program  Told pt I would make Zachary Arredondo aware and we will c/b with what the next step would be  Pt asking if she orders a new medication does she have to wean off the Lyrica before starting a new medication? Pt said she is currently taking Lyrica 50 mg 1 cap in the AM and 2 capsules in the evening  Pls advise

## 2018-03-09 NOTE — TELEPHONE ENCOUNTER
UMU:  I called pt and advised her that she should call her Ins Co and inquire if nortriptyline would be covered

## 2018-03-19 ENCOUNTER — TELEPHONE (OUTPATIENT)
Dept: PAIN MEDICINE | Facility: MEDICAL CENTER | Age: 63
End: 2018-03-19

## 2018-03-19 DIAGNOSIS — M51.36 DEGENERATIVE LUMBAR DISC: ICD-10-CM

## 2018-03-19 DIAGNOSIS — M47.816 LUMBAR SPONDYLOSIS: Primary | ICD-10-CM

## 2018-03-19 NOTE — TELEPHONE ENCOUNTER
[address] from [facility] called requesting to get a new Rx for PT and a copy of recent MRI  Fax# [phone]   Any questions c/b [phone]

## 2018-03-19 NOTE — TELEPHONE ENCOUNTER
Pt came to [address] [place] [address] therapy but with an  rx for PT on her back  Therapist from [address] [place] [address] requesting a new PT rx for her back dated within the last 30 days  Last rx written on  by NM  She is also requesting the most recent MRI's of her back and knee be sent over with the PT rx once done

## 2018-03-19 NOTE — TELEPHONE ENCOUNTER
Faxed PT rx and both MRI's to # listed below attn Jose Martin Later at [facility]

## 2018-03-19 NOTE — TELEPHONE ENCOUNTER
Order for PT for low back in Epic  This will be good for 6 more weeks    After that patient will need f/u if additional PT is needed for re-eval

## 2018-03-26 DIAGNOSIS — F41.9 ANXIETY: ICD-10-CM

## 2018-03-26 DIAGNOSIS — F41.9 ANXIETY: Primary | ICD-10-CM

## 2018-03-26 RX ORDER — LORAZEPAM 1 MG/1
TABLET ORAL
Qty: 135 TABLET | Refills: 0 | Status: SHIPPED | OUTPATIENT
Start: 2018-03-26 | End: 2018-07-11 | Stop reason: SDUPTHER

## 2018-03-26 RX ORDER — LORAZEPAM 1 MG/1
1 TABLET ORAL
Qty: 30 TABLET | Refills: 0 | Status: SHIPPED | OUTPATIENT
Start: 2018-03-26 | End: 2018-03-26 | Stop reason: SDUPTHER

## 2018-03-26 NOTE — TELEPHONE ENCOUNTER
Rx that was printed in BT for 30 day supply was shredded  Pt gets a 90 day supply to send to mail order

## 2018-04-05 ENCOUNTER — TELEPHONE (OUTPATIENT)
Dept: PAIN MEDICINE | Facility: CLINIC | Age: 63
End: 2018-04-05

## 2018-04-05 DIAGNOSIS — M54.16 LUMBAR RADICULOPATHY: Primary | ICD-10-CM

## 2018-04-05 RX ORDER — NORTRIPTYLINE HYDROCHLORIDE 10 MG/1
10 CAPSULE ORAL
Qty: 30 CAPSULE | Refills: 0 | Status: SHIPPED | OUTPATIENT
Start: 2018-04-05 | End: 2018-04-13

## 2018-04-05 NOTE — TELEPHONE ENCOUNTER
Pt said she followed our instruction and took the lyrica BID for 1 week and then decreased to once a day for 1 wk and then call and Erinn Flor would prescribe the nortriptyline  Pt said she has been taking the lyrica once a day for the past   1 1/2 wks  Pt said she looked up the nortriptyline and read it is also used for depression and she wanted to make sure it can be taken with her lorazepam which she takes 1 mg daily at bedtime  Told pt I would make Erinn Flor aware  Told pt that common s/e are drowsiness and dizziness and not to stop abruptly  Pt wants sent to [facility] on file b/c she wants to see how it works before ordering a 90 day supply with her mail order pharmacy       **Pt said no need to c/b once sent b/c her [facility] will notify her **

## 2018-04-05 NOTE — TELEPHONE ENCOUNTER
Pt called and stated that her insurance won't pay for her Lyrica  Pt thinks she is supposed to be started on Nortriptyline  Please call [phone]

## 2018-04-05 NOTE — TELEPHONE ENCOUNTER
Nortriptyline is classified as a medication used for depression  However, we prescribe low doses to help with neuropathic pain  Ok to take with lorazepam as they are different class of medications   Will [facility] today

## 2018-04-06 NOTE — TELEPHONE ENCOUNTER
Pt informed of the same as stated in Steph's previous task  Pt stated she picked it up yest and started it last night  Pt advised she will need to contact the office 2-3 days before running out for further refills  Pt was informed during yest's call of the common s/e and not to stop med abruptly  Pt verbalized understanding

## 2018-04-09 NOTE — TELEPHONE ENCOUNTER
Pt is calling for an update on new medication she is on Nortriptyline  Pt states that the med is making her hyper and hasn't been able to sleep since she started taking it   Please call pt back at [phone]

## 2018-04-09 NOTE — TELEPHONE ENCOUNTER
I s/w Jeff Duval prior to calling pt and made her aware of pt's previous message  Jeff Duval said for pt to stop the nortriptyline and unfortunately there are no other neuropathetic pain meds to offer her  Pt said to tell Jeff Duval she is no going to take it tonight but is going to try taking it in the morning instead tomorrow b/c it had the opposite effect on her and didn't make her drowsy but hyper  She stated she also just s/w Pat about getting another inj scheduled

## 2018-04-13 ENCOUNTER — OFFICE VISIT (OUTPATIENT)
Dept: INTERNAL MEDICINE CLINIC | Facility: CLINIC | Age: 63
End: 2018-04-13
Payer: COMMERCIAL

## 2018-04-13 VITALS
DIASTOLIC BLOOD PRESSURE: 88 MMHG | TEMPERATURE: 97.7 F | WEIGHT: 224 LBS | HEART RATE: 94 BPM | SYSTOLIC BLOOD PRESSURE: 136 MMHG | HEIGHT: 66 IN | OXYGEN SATURATION: 95 % | BODY MASS INDEX: 36 KG/M2

## 2018-04-13 DIAGNOSIS — M54.16 LUMBAR RADICULOPATHY: Primary | ICD-10-CM

## 2018-04-13 DIAGNOSIS — M51.36 LUMBAR DEGENERATIVE DISC DISEASE: ICD-10-CM

## 2018-04-13 DIAGNOSIS — L71.9 ROSACEA, ACNE: ICD-10-CM

## 2018-04-13 PROCEDURE — 99214 OFFICE O/P EST MOD 30 MIN: CPT | Performed by: FAMILY MEDICINE

## 2018-04-13 RX ORDER — PREDNISONE 20 MG/1
50 TABLET ORAL DAILY
Qty: 5 TABLET | Refills: 0 | Status: SHIPPED | OUTPATIENT
Start: 2018-04-13 | End: 2018-04-16 | Stop reason: SDUPTHER

## 2018-04-13 RX ORDER — VALACYCLOVIR HYDROCHLORIDE 500 MG/1
500 TABLET, FILM COATED ORAL AS NEEDED
COMMUNITY
End: 2018-10-16 | Stop reason: SDUPTHER

## 2018-04-13 RX ORDER — DOXYCYCLINE HYCLATE 100 MG/1
100 CAPSULE ORAL EVERY 12 HOURS SCHEDULED
Qty: 60 CAPSULE | Refills: 4 | Status: SHIPPED | OUTPATIENT
Start: 2018-04-13 | End: 2018-05-13

## 2018-04-13 RX ORDER — CARBOXYMETHYLCELLULOSE SODIUM 5 MG/ML
1 SOLUTION/ DROPS OPHTHALMIC
COMMUNITY
End: 2018-05-08 | Stop reason: ALTCHOICE

## 2018-04-13 RX ORDER — DULOXETIN HYDROCHLORIDE 30 MG/1
30 CAPSULE, DELAYED RELEASE ORAL 2 TIMES DAILY
Qty: 60 CAPSULE | Refills: 5 | Status: SHIPPED | OUTPATIENT
Start: 2018-04-13 | End: 2018-05-08 | Stop reason: ALTCHOICE

## 2018-04-13 NOTE — PROGRESS NOTES
Assessment/Plan:    No problem-specific Assessment & Plan notes found for this encounter  Problem List Items Addressed This Visit        Nervous and Auditory    Lumbar radiculopathy - Primary    Relevant Medications    predniSONE 20 mg tablet    DULoxetine (CYMBALTA) 30 mg delayed release capsule       Musculoskeletal and Integument    Rosacea, acne    Relevant Medications    doxycycline hyclate (VIBRAMYCIN) 100 mg capsule    Lumbar degenerative disc disease    Relevant Medications    predniSONE 20 mg tablet    DULoxetine (CYMBALTA) 30 mg delayed release capsule          Her start prednisone for 5 days and stop all NSAIDs including ibuprofen and meloxicam   Prednisone must be taken with food  Then start Cymbalta to help with anxiety depression and back pain  I encouraged her to make an appointment with her neurosurgeon and keep the appointment with pain management doctor to discuss further options such as injections versus Neurosurgery for lumbar stenosis and canal stenosis and foraminal stenosis  MRI reviewed today with her   MRI of the knee also reviewed today with her   Will start doxycycline b i d  for rosacea symptoms  She is advised to not starting the above meds at once  recommend prednisone followed by Cymbalta followed by doxycycline  Call if there is any side effects  We will further address her sleep and depression have follow-up in 3-4 weeks  Discussed options Of Neurontin, amitriptyline, or narcotics to control her pain  s        Subjective:      Patient ID: Paris Alvares is a 63 y o  female  HPI      Back pain:  Patient states that lyrica not covered by her insurance anymore and she has been off of it for 1 week  She feels that it really did not help her is now that she has not been taking it for 1 week  On Neurontin in the past which did not help her      She was given nortriptyline by her pain management doctor which made her feel lethargic but did not help with her discomfort  She called them and she has an appointment to be seen for possible injection  She describes her pain as burning and has difficulty standing and sitting for any prolonged period of time  She has difficulty coping in her and sitting at her job  She states that she takes Zanaflex at night p r n  but that makes her tired in the daytime  She takes anywhere between 8-12 to 100 mg ibuprofen a day but does not mix it with meloxicam   She had an MRI few months back and a pain pump placed by Neurosurgery approximately 1 year ago  Originally it did help her but since then it has not been helping and she has not seen a neurosurgeon back  Rosaananyaa, patient was placed on Metrogel by her endocrinologist and she has been using it for 1 month  She feels that is not helping  She has facial flushing for several years which are exacerbated by stress, hypertension, heat, and hot flashes  She would like to discuss other options  Insomnia, patient states she does not sleep well which is very multifactorial including the pain and how flashes  She used to be on estrogen replacement currently does not take any  She does take Ativan at night to help her sleep which helped marginally  Depression:  Patient is a depression was exacerbated by the pain and inability to sleep  She is always tired and fatigued and is having difficulty with activities of daily living around the house like cleaning and cooking as well as work  Patient is here today and can cooperate always  She feels that she is up to any medications or the long medications right now          The following portions of the patient's history were reviewed and updated as appropriate: allergies, current medications, past family history, past medical history, past social history, past surgical history and problem list     Review of Systems    Constitutional:  Denies fever or chills   Eyes:  Denies change in visual acuity   HENT:  Denies nasal congestion or sore throat   Respiratory:  Denies cough or shortness of breath or wheezing  Cardiovascular:  Denies palpitations or chest pain  GI:  Denies abdominal pain, nausea, or vomiting  Integument:  Denies rash, facial flushing   Neurologic:  Denies headache or focal weakness        Objective:      /88 (BP Location: Left arm, Patient Position: Sitting, Cuff Size: Large)   Pulse 94   Temp 97 7 °F (36 5 °C) (Oral)   Ht 5' 6" (1 676 m)   Wt 102 kg (224 lb)   LMP 11/26/2015   SpO2 95%   BMI 36 15 kg/m²          Physical Exam      Constitutional:  Well developed, well nourished, no acute distress, non-toxic appearance   Eyes:  PERRL, conjunctiva normal , non icteric sclera  HENT:  Atraumatic, oropharynx moist  Neck-  supple   Respiratory:  CTA b/l, normal breath sounds, no rales, no wheezing   Cardiovascular:  RRR, no murmurs, no LE edema b/l  GI:  Soft, nondistended, normal bowel sounds x 4, nontender, no organomegaly, no mass, no rebound, no guarding   Neurologic:  no focal deficits noted   Psychiatric:  Speech and behavior appropriate , AAO x 3    MS, unable to stand up straight  Pain exacerbated with standing up straight  Positive straight leg test bilaterally  Tender to palpation lumbar area paraspinal muscle hypertonicity

## 2018-04-16 DIAGNOSIS — M54.16 LUMBAR RADICULOPATHY: ICD-10-CM

## 2018-04-16 DIAGNOSIS — M51.36 LUMBAR DEGENERATIVE DISC DISEASE: ICD-10-CM

## 2018-04-16 RX ORDER — PREDNISONE 20 MG/1
50 TABLET ORAL DAILY
Qty: 8 TABLET | Refills: 0 | Status: SHIPPED | OUTPATIENT
Start: 2018-04-16 | End: 2018-05-08 | Stop reason: ALTCHOICE

## 2018-04-18 ENCOUNTER — OFFICE VISIT (OUTPATIENT)
Dept: PAIN MEDICINE | Facility: CLINIC | Age: 63
End: 2018-04-18
Payer: COMMERCIAL

## 2018-04-18 VITALS
WEIGHT: 224 LBS | SYSTOLIC BLOOD PRESSURE: 136 MMHG | TEMPERATURE: 98.2 F | HEIGHT: 66 IN | BODY MASS INDEX: 36 KG/M2 | HEART RATE: 88 BPM | DIASTOLIC BLOOD PRESSURE: 92 MMHG

## 2018-04-18 DIAGNOSIS — M46.1 SACROILIITIS (HCC): ICD-10-CM

## 2018-04-18 DIAGNOSIS — M51.16 INTERVERTEBRAL DISC DISORDERS WITH RADICULOPATHY, LUMBAR REGION: Primary | ICD-10-CM

## 2018-04-18 PROBLEM — M25.561 ACUTE PAIN OF RIGHT KNEE: Status: ACTIVE | Noted: 2017-11-20

## 2018-04-18 PROBLEM — N95.1 HOT FLASH, MENOPAUSAL: Status: ACTIVE | Noted: 2017-05-12

## 2018-04-18 PROBLEM — E66.9 OBESITY (BMI 30-39.9): Status: ACTIVE | Noted: 2017-12-27

## 2018-04-18 PROBLEM — Z13.31 POSITIVE DEPRESSION SCREENING: Status: ACTIVE | Noted: 2017-05-12

## 2018-04-18 PROBLEM — S89.91XA RIGHT KNEE INJURY: Status: ACTIVE | Noted: 2017-11-28

## 2018-04-18 PROBLEM — S83.241A TEAR OF MEDIAL MENISCUS OF RIGHT KNEE, CURRENT: Status: ACTIVE | Noted: 2017-12-08

## 2018-04-18 PROBLEM — M17.11 PRIMARY OSTEOARTHRITIS OF RIGHT KNEE: Status: ACTIVE | Noted: 2017-11-28

## 2018-04-18 PROCEDURE — 99214 OFFICE O/P EST MOD 30 MIN: CPT | Performed by: NURSE PRACTITIONER

## 2018-04-18 NOTE — PROGRESS NOTES
Assessment:  1  Intervertebral disc disorders with radiculopathy, lumbar region    2  Sacroiliitis ([facility] [address] )        Plan:  Haylee Iraheta is a 63 y o  female with a history of chronic pain secondary to cervical disc bulge, cervical degenerative disease, cervical radiculopathy, lumbar degenerative disc disease, lumbar stenosis, lumbar radiculopathy, and right knee pain  Patient has a permanent [employer] spinal cord stimulator implanted  Patient presents today with left-sided low back pain which is consistent with lumbar radiculopathy, most likely stemming from a small disc bulge, and mild stenosis noted at L4-L5 in her lumbar spine  The patient had underwent a left L4-L5 transforaminal epidural steroid injection on 11/15/2017, with excellent relief of symptoms  Therefore, the patient would like to proceed with another left L4-L5 transforaminal epidural steroid injection at this time  Patient was educated on procedures most common risks, and was given a brochure the procedure  Patient verbalized understanding, and would like to proceed with the procedure  Therefore the patient be scheduled for an upcoming Tuesday or Thursday  Patient recently was on prednisone 50 mg for the last 6 days  Carol Wadsworth states that he is ok with the patient proceeding with the injection in 2 or more weeks    I discussed with the patient multiple medication options to help with her ongoing low back pain  Patient reports that her family doctor has recently started her on Cymbalta 30 mg 1 tablet 2 times daily  However, the patient reports that she has not started this medication as of yet  Patient reports that she will try starting this medication this coming week  The patient last had her spinal cord stimulator reprogrammed in February of 2018  Jaime Lara ([employer] representative)  Was  In the office today and agreed to reprogram the patient after her office visit      The patient will follow up after her left L4-L5 transforaminal epidural steroid injection, or sooner with worsening symptoms  Complete risks and benefits including bleeding, infection, tissue reaction, nerve injury and allergic reaction were discussed  The approach was demonstrated using models and literature was provided  Verbal and written consent was obtained  My impressions and treatment recommendations were discussed in detail with the patient who verbalized understanding and had no further questions  Discharge instructions were provided  I personally saw and examined the patient and I agree with the above discussed plan of care  Orders Placed This Encounter   Procedures    FL spine and pain procedure     Standing Status:   Future     Standing Expiration Date:   4/18/2022     Order Specific Question:   Reason for Exam:     Answer:   Left L4-L5 TFESI     Order Specific Question:   Is the patient pregnant? Answer:   No     Order Specific Question:   Anticoagulant hold needed? Answer:   no     No orders of the defined types were placed in this encounter  History of Present Illness:  Junior Casas is a 63 y o  female with a history of chronic pain secondary to cervical disc bulge, cervical degenerative disease, cervical radiculopathy, lumbar degenerative disc disease, lumbar stenosis, lumbar radiculopathy, and right knee pain  Patient has a permanent [employer] spinal cord stimulator implanted  Patient was last seen office on 12/8/2017 where she underwent a right knee injection  Patient presents for a follow up office visit in regards to Back Pain; Leg Pain; and Knee Pain  The patients current symptoms include low back pain that radiates down the lateral aspect of her left leg to her knee  Patient denies bilateral leg weakness, or bowel or bladder issues  She describes her pain as dull aching, constant pain that occurs throughout the day  Patient reports that her pain is symptoms have worsened since last visit    Patient currently rates her pain as 7/10 numeric pain scale  The patient reports that she recently was started on Nortriptyline by our office 10 mg at bedtime, however was starting to feel anxiety and sleeplessness, therefore she stopped the medication  Patient reports that she recently was started on prednisone 50 mg for the last 6 days, as prescribed by her family doctor  Patient reports that the Prednisone helped with her right knee and leg pain, however did not help with her back  Patient reports that the last time her spinal cord stimulator was reprogrammed was in February of 2018  I have personally reviewed and/or updated the patient's past medical history, past surgical history, family history, social history, current medications, allergies, and vital signs today  Review of Systems   Respiratory: Negative for shortness of breath  Cardiovascular: Negative for chest pain  Gastrointestinal: Negative for constipation, diarrhea, nausea and vomiting  Musculoskeletal: Positive for gait problem  Negative for arthralgias, joint swelling and myalgias  Skin: Negative for rash  Neurological: Negative for dizziness, seizures and weakness  All other systems reviewed and are negative  Patient Active Problem List   Diagnosis    Rosacea, acne    Lumbar degenerative disc disease    Lumbar radiculopathy    Acute pain of right knee    Anxiety    Benign essential HTN    Cervical radiculopathy    Disc disorder of cervical region    Hot flash, menopausal    Lumbar facet arthropathy (HCC)    Lumbar stenosis without neurogenic claudication    Myofascial pain syndrome    Obesity (BMI 30-39  9)    Pain syndrome, chronic    Positive depression screening    Primary osteoarthritis of right knee    Right knee injury    Sacroiliitis (HCC)    Tear of medial meniscus of right knee, current       Past Medical History:   Diagnosis Date    Anxiety     Cataract, bilateral     last assessed    Cervical radiculopathy     Disc disease, degenerative, cervical     Herpes simplex infection     Hypertension     Insomnia     Low back pain     Lumbar degenerative disc disease     Lumbar facet arthropathy (HCC)     Lumbar radiculopathy     Lumbar stenosis without neurogenic claudication     Mononucleosis     Myofascial pain dysfunction syndrome     Osteoarthritis     shoulder region - unspecified laterality - last assessed 2/1/14    Plantar fasciitis     Ptosis, both eyelids     last assessed 10/6/16    Ptosis, congenital, left     Retinal detachment     last assessed 12/11/14 right eye    Sacroiliitis ([address] )     Thyroid disease     Thyrotoxicosis     last assessed 5/17/13    Vertigo        Past Surgical History:   Procedure Laterality Date    [place] SURG IMPLNT NEUROELECT,EPIDURAL Left 1/26/2016    Procedure: PLACEMENT OF THORACIC SPINAL CORD STIMULATOR WITH LEFT BUTTOCK IMPLANTABLE PULSE GENERATOR (IMPULSE MONITORING);   Surgeon: Jt Nguyen MD;  Location:  MAIN OR;  Service: Neurosurgery    RETINAL DETACHMENT SURGERY Right        Family History   Problem Relation Age of Onset   Levoolayinka Vazquez Breast cancer Mother    Levoolayinka Vazquez COPD Mother     Hypertension Mother      essential    Heart attack Father      acute MI    Emphysema Father     Hypertension Father      essential    Other Father      prehypertension    Liver cancer Maternal Aunt        Social History     Occupational History    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     Social History Main Topics    Smoking status: Never Smoker    Smokeless tobacco: Never Used    Alcohol use No      Comment: occasional use as per [employer]    Drug use: No    Sexual activity: Yes       Current Outpatient Prescriptions on File Prior to Visit   Medication Sig    busPIRone (BUSPAR) 5 mg tablet Take 5 mg by mouth 2 (two) times a day Take two tabs hs      carboxymethylcellulose 0 5 % SOLN 1 drop 4 (four) times a day    doxycycline hyclate (VIBRAMYCIN) 100 mg capsule Take 1 capsule (100 mg total) by mouth every 12 (twelve) hours for 30 days    DULoxetine (CYMBALTA) 30 mg delayed release capsule Take 1 capsule (30 mg total) by mouth 2 (two) times a day    ibuprofen (ADVIL) 200 mg tablet Take 3 tablets by mouth 3 (three) times a day    LORazepam (ATIVAN) 1 mg tablet Take 1 5 tablets by mouth at bedtime as needed    methimazole (TAPAZOLE) 5 mg tablet Take 5 mg by mouth daily      metroNIDAZOLE (METROCREAM) 0 75 % cream Apply topically daily at bedtime    penciclovir (DENAVIR) 1 % cream Apply topically    predniSONE 20 mg tablet Take 2 5 tablets (50 mg total) by mouth daily Take with food    propranolol (INDERAL) 10 mg tablet Take 10 mg by mouth 3 (three) times a day   quinapril (ACCUPRIL) 20 mg tablet Take 20 mg by mouth daily at bedtime   TiZANidine (ZANAFLEX) 4 MG capsule Take 4 mg by mouth daily      triamterene-hydrochlorothiazide (DYAZIDE) 37 5-25 mg per capsule Take 1 capsule by mouth every morning   valACYclovir (VALTREX) 500 mg tablet Take 500 mg by mouth as needed     No current facility-administered medications on file prior to visit  Allergies   Allergen Reactions    Other Dermatitis     Adhesive tape - please use ONLY PAPER TAPE    Scopolamine Other (See Comments)     Severe, debilitating headache      Flexeril [Cyclobenzaprine] Dizziness and Fatigue    Naproxen      Other reaction(s): Unknown Allergic Reaction  Annotation - [id number]: nightmares    Penicillins      Other reaction(s): Unknown Allergic Reaction  Annotation - [id number]: childhood reaction    Promethazine-Codeine      Reaction Date: [id number]; Annotation - [id number]: nightmares;  Annotation - [id number]: nightmares    Tramadol      Other reaction(s): Unknown Allergic Reaction  Annotation - [id number]: PT HAS NIGHTMARES FROM TRAMADOL    Wound Dressing Adhesive Rash       Physical Exam:    /92   Pulse 88   Temp 98 2 °F (36 8 °C)   Ht 5' 6" (1 676 m)   Wt 102 kg (224 lb) LMP 11/26/2015   BMI 36 15 kg/m²     Constitutional:normal, well developed, well nourished, alert, in no distress and non-toxic and no overt pain behavior  and obese  Eyes:anicteric  HEENT:grossly intact  Neck:supple, symmetric, trachea midline and no masses   Pulmonary:even and unlabored  Cardiovascular:No edema or pitting edema present  Skin:Normal without rashes or lesions and well hydrated  Psychiatric:Mood and affect appropriate  Neurologic:Cranial Nerves II-XII grossly intact  Musculoskeletal:normal    Lumbar Spine Exam    Appearance:  Normal lordosis  Palpation/Tenderness:  left lumbar paraspinal tenderness  Sensory:  no sensory deficits noted  Range of Motion:  Flexion:  Minimally limited  with pain  Extension:  Minimally limited  with pain  Lateral Flexion - Left:  Minimally limited  with pain  Lateral Flexion - Right:  Minimally limited  with pain  Rotation - Left:  Minimally limited  with pain  Rotation - Right:  Minimally limited  with pain  Motor Strength:  Left hip flexion:  5/5  Right hip flexion:  5/5  Left knee extension:  5/5  Right knee extension:  5/5  Left foot dorsiflexion:  5/5  Left foot plantar flexion:  5/5  Right foot dorsiflexion:  5/5  Right foot plantar flexion:  5/5      Imaging  MRI LUMBAR SPINE WITHOUT CONTRAST     INDICATION:  Low back and left leg pain     COMPARISON:  MR 3/17/2014     TECHNIQUE:  Sagittal T1, sagittal T2, sagittal inversion recovery, axial 2-D merge and axial T2        IMAGE QUALITY:  Presence of spinal stimulator limits study parameters, imaging time and consequently, anatomic integrity         Findings:  Patient does have 6 nonrib-bearing lumbar-type vertebral bodies and, 12 ribs  For purposes of this report transitional lumbosacral segment will be deemed S1         ALIGNMENT:  Stable degenerative grade 1 L5-S1 anterolisthesis      MARROW SIGNAL:  Normal marrow signal is identified within the visualized bony structures    No discrete marrow lesion      DISTAL CORD AND CONUS:  Normal size and signal within the distal cord and conus  The conus ends at the L2 level      PARASPINAL SOFT TISSUES:  Paraspinal soft tissues are unremarkable      SACRUM:  Normal signal within the sacrum  No evidence of insufficiency or stress fracture      LOWER THORACIC DISC SPACES:  Minimal bulge T12-L1     LUMBAR DISC SPACES:          L1-L2:  Minor bulge     L2-L3:  Minor bulge     L3-L4:  Minor bulge     L4-L5:  Facet arthrosis, redundancy of the ligamentum flavum, minor bulge  Minor stenosis  AP dimension of the sac reduced to approximately 6 mm  This has progressed minimally since prior study      L5-S1:  Degenerative grade 1 anterolisthesis, marked redundancy of the ligamentum flavum, trefoil shape Canal with severe bilateral lateral recess stenosis  Minor distortion of the foramen no critical stenosis  Correlate for signs and symptoms of   spinal stenosis  Although not apparent on prior studies, the pars interarticularis is not well depicted on the limited quality exam today  Dehiscence of the pars is not excluded      S1-S2: Rudimentary disc space overtly normal, minor facet arthrosis, no critical stenosis      IMPRESSION:     Study is limited by imaging requirements of spinal stimulator      6 nonrib-bearing lumbar-type vertebral bodies and 12th ribs  Caudal-most vertebral body will be designated S1      Progressive degenerative changes at L4-L5 and L5-S1, most notably at L5-S1  Severe canal and lateral recess stenosis    Correlate for signs and symptoms of spinal stenosis and, bilateral S1 radiculitis

## 2018-04-19 ENCOUNTER — TELEPHONE (OUTPATIENT)
Dept: INTERNAL MEDICINE CLINIC | Facility: CLINIC | Age: 63
End: 2018-04-19

## 2018-04-19 NOTE — TELEPHONE ENCOUNTER
Pt called indicating that she saw pain management today  They told her that her Cymbalta dose Dr Pat Calix started her on was too high for someone who is starting  She doesn't know what she should do  Should she start it at the 30 mg bid, Dr Pat Calix told her to or start at 20 mg and titrate up as suggested by pain management? She stated she has already p/u the medication from the pharmacy, but has not started taking it yet  She also said should she wait until after getting her injection or start it already? Please discuss with Dr Pat Calix  Thank you!

## 2018-05-08 ENCOUNTER — HOSPITAL ENCOUNTER (OUTPATIENT)
Dept: RADIOLOGY | Facility: CLINIC | Age: 63
Discharge: HOME | End: 2018-05-08
Attending: ANESTHESIOLOGY
Payer: COMMERCIAL

## 2018-05-08 VITALS
RESPIRATION RATE: 20 BRPM | DIASTOLIC BLOOD PRESSURE: 86 MMHG | HEART RATE: 70 BPM | SYSTOLIC BLOOD PRESSURE: 137 MMHG | TEMPERATURE: 97.9 F | OXYGEN SATURATION: 100 %

## 2018-05-08 DIAGNOSIS — M51.16 INTERVERTEBRAL DISC DISORDERS WITH RADICULOPATHY, LUMBAR REGION: ICD-10-CM

## 2018-05-08 PROCEDURE — 64484 NJX AA&/STRD TFRM EPI L/S EA: CPT | Performed by: ANESTHESIOLOGY

## 2018-05-08 PROCEDURE — 64483 NJX AA&/STRD TFRM EPI L/S 1: CPT | Performed by: ANESTHESIOLOGY

## 2018-05-08 RX ORDER — BUPIVACAINE HCL/PF 2.5 MG/ML
10 VIAL (ML) INJECTION ONCE
Status: COMPLETED | OUTPATIENT
Start: 2018-05-08 | End: 2018-05-08

## 2018-05-08 RX ORDER — METHYLPREDNISOLONE ACETATE 80 MG/ML
80 INJECTION, SUSPENSION INTRA-ARTICULAR; INTRALESIONAL; INTRAMUSCULAR; PARENTERAL; SOFT TISSUE ONCE
Status: COMPLETED | OUTPATIENT
Start: 2018-05-08 | End: 2018-05-08

## 2018-05-08 RX ORDER — LIDOCAINE HYDROCHLORIDE 10 MG/ML
10 INJECTION, SOLUTION EPIDURAL; INFILTRATION; INTRACAUDAL; PERINEURAL ONCE
Status: COMPLETED | OUTPATIENT
Start: 2018-05-08 | End: 2018-05-08

## 2018-05-08 RX ADMIN — METHYLPREDNISOLONE ACETATE 80 MG: 80 INJECTION, SUSPENSION INTRA-ARTICULAR; INTRALESIONAL; INTRAMUSCULAR; PARENTERAL; SOFT TISSUE at 13:56

## 2018-05-08 RX ADMIN — LIDOCAINE HYDROCHLORIDE 8 ML: 10 INJECTION, SOLUTION EPIDURAL; INFILTRATION; INTRACAUDAL; PERINEURAL at 13:52

## 2018-05-08 RX ADMIN — Medication 2 ML: at 13:56

## 2018-05-08 RX ADMIN — IOHEXOL 1 ML: 300 INJECTION, SOLUTION INTRAVENOUS at 13:56

## 2018-05-08 NOTE — DISCHARGE INSTRUCTIONS
Epidural Steroid Injection   WHAT YOU NEED TO KNOW:   An epidural steroid injection (PRATIK) is a procedure to inject steroid medicine into the epidural space  The epidural space is between your spinal cord and vertebrae  Steroids reduce inflammation and fluid buildup in your spine that may be causing pain  You may be given pain medicine along with the steroids  ACTIVITY  · Do not drive or operate machinery today  · No strenuous activity today - bending, lifting, etc   · You may resume normal activites starting tomorrow - start slowly and as tolerated  · You may shower today, but no tub baths or hot tubs  · You may have numbness for several hours from the local anesthetic  Please use caution and common sense, especially with weight-bearing activities  CARE OF THE INJECTION SITE  · If you have soreness or pain, apply ice to the area today (20 minutes on/20 minutes off)  · Starting tomorrow, you may use warm, moist heat or ice if needed  · You may have an increase or change in your discomfort for 36-48 hours after your treatment  · Apply ice and continue with any pain medication you have been prescribed  · Notify [facility] if you have any of the following: redness, drainage, swelling, headache, stiff neck or fever above 100°F     SPECIAL INSTRUCTIONS  · Our office will contact you in approximately 7 days for a progress report  MEDICATIONS  · Continue to take all routine medications  · Our office may have instructed you to hold some medications  If you have a problem specifically related to your procedure, please call our office at [phone]  Problems not related to your procedure should be directed to your primary care physician

## 2018-05-08 NOTE — H&P
History of Present Illness: The patient is a 66 y o  female who presents with complaints of left lower back and leg pain secondary to lumbar spinal stenosis and is here today for left L4-5 transforaminal epidural steroid injection  Patient Active Problem List   Diagnosis    Rosacea, acne    Lumbar degenerative disc disease    Lumbar radiculopathy    Acute pain of right knee    Anxiety    Benign essential HTN    Cervical radiculopathy    Disc disorder of cervical region    Hot flash, menopausal    Lumbar facet arthropathy (HCC)    Lumbar stenosis without neurogenic claudication    Myofascial pain syndrome    Obesity (BMI 30-39  9)    Pain syndrome, chronic    Positive depression screening    Primary osteoarthritis of right knee    Right knee injury    Sacroiliitis ([address] )    Tear of medial meniscus of right knee, current       Past Medical History:   Diagnosis Date    Anxiety     Cataract, bilateral     last assessed    Cervical radiculopathy     Disc disease, degenerative, cervical     Herpes simplex infection     Hypertension     Insomnia     Low back pain     Lumbar degenerative disc disease     Lumbar facet arthropathy (HCC)     Lumbar radiculopathy     Lumbar stenosis without neurogenic claudication     Mononucleosis     Myofascial pain dysfunction syndrome     Osteoarthritis     shoulder region - unspecified laterality - last assessed 2/1/14    Plantar fasciitis     Ptosis, both eyelids     last assessed 10/6/16    Ptosis, congenital, left     Retinal detachment     last assessed 12/11/14 right eye    Sacroiliitis ([address] )     Thyroid disease     Thyrotoxicosis     last assessed 5/17/13    Vertigo        Past Surgical History:   Procedure Laterality Date    [place] SURG IMPLNT NEUROELECT,EPIDURAL Left 1/26/2016    Procedure: PLACEMENT OF THORACIC SPINAL CORD STIMULATOR WITH LEFT BUTTOCK IMPLANTABLE PULSE GENERATOR (IMPULSE MONITORING);   Surgeon: Geraldo Bynum MD;  Location: [facility];  Service: Neurosurgery    RETINAL DETACHMENT SURGERY Right          Current Outpatient Prescriptions:     busPIRone (BUSPAR) 5 mg tablet, Take 5 mg by mouth 2 (two) times a day Take two tabs hs  , Disp: , Rfl:     ibuprofen (ADVIL) 200 mg tablet, Take 3 tablets by mouth 3 (three) times a day, Disp: , Rfl:     LORazepam (ATIVAN) 1 mg tablet, Take 1 5 tablets by mouth at bedtime as needed, Disp: 135 tablet, Rfl: 0    methimazole (TAPAZOLE) 5 mg tablet, Take 5 mg by mouth daily  , Disp: , Rfl:     metroNIDAZOLE (METROCREAM) 0 75 % cream, Apply topically daily at bedtime, Disp: , Rfl:     penciclovir (DENAVIR) 1 % cream, Apply topically as needed  , Disp: , Rfl:     propranolol (INDERAL) 10 mg tablet, Take 10 mg by mouth 3 (three) times a day , Disp: , Rfl:     quinapril (ACCUPRIL) 20 mg tablet, Take 20 mg by mouth daily at bedtime  , Disp: , Rfl:     TiZANidine (ZANAFLEX) 4 MG capsule, Take 4 mg by mouth daily  , Disp: , Rfl:     valACYclovir (VALTREX) 500 mg tablet, Take 500 mg by mouth as needed, Disp: , Rfl:     doxycycline hyclate (VIBRAMYCIN) 100 mg capsule, Take 1 capsule (100 mg total) by mouth every 12 (twelve) hours for 30 days, Disp: 60 capsule, Rfl: 4    Allergies   Allergen Reactions    Other Dermatitis     Adhesive tape - please use ONLY PAPER TAPE    Scopolamine Other (See Comments)     Severe, debilitating headache      Flexeril [Cyclobenzaprine] Dizziness and Fatigue    Naproxen      Other reaction(s): Unknown Allergic Reaction  Annotation - [id number]: nightmares    Penicillins      Other reaction(s): Unknown Allergic Reaction  Annotation - [id number]: childhood reaction    Promethazine-Codeine      Reaction Date: [id number]; Annotation - [id number]: nightmares;  Annotation - [id number]: nightmares    Tramadol      Other reaction(s): Unknown Allergic Reaction  Annotation - [id number]: PT HAS NIGHTMARES FROM TRAMADOL    Wound Dressing Adhesive Rash       Physical Exam:   Vitals: 05/08/18 1325   BP: 124/81   Pulse: 59   Resp: 20   Temp: 97 9 °F (36 6 °C)   SpO2: 100%     General: Awake, Alert, Oriented x 3, Mood and affect appropriate  Respiratory: Respirations even and unlabored  Cardiovascular: Peripheral pulses intact; no edema  Musculoskeletal Exam:  Left lower back tenderness    ASA Score: 2    Assessment:   1   Intervertebral disc disorders with radiculopathy, lumbar region        Plan: Left L4-L5 TFESI

## 2018-05-14 DIAGNOSIS — I10 ESSENTIAL HYPERTENSION: Primary | ICD-10-CM

## 2018-05-15 ENCOUNTER — TELEPHONE (OUTPATIENT)
Dept: RADIOLOGY | Facility: CLINIC | Age: 63
End: 2018-05-15

## 2018-05-15 NOTE — TELEPHONE ENCOUNTER
Patient states that her pain isn't totally gone but is feeling better  Patient states that it usually takes the full two weeks before she gets the full effect  Patient describes the pain as dull ache      Pain scale is 2/10 with 60% relief

## 2018-05-16 ENCOUNTER — OFFICE VISIT (OUTPATIENT)
Dept: INTERNAL MEDICINE CLINIC | Facility: CLINIC | Age: 63
End: 2018-05-16
Payer: COMMERCIAL

## 2018-05-16 VITALS
WEIGHT: 215 LBS | BODY MASS INDEX: 34.55 KG/M2 | OXYGEN SATURATION: 99 % | DIASTOLIC BLOOD PRESSURE: 82 MMHG | SYSTOLIC BLOOD PRESSURE: 126 MMHG | HEART RATE: 66 BPM | TEMPERATURE: 97.5 F | HEIGHT: 66 IN

## 2018-05-16 DIAGNOSIS — M48.061 LUMBAR STENOSIS WITHOUT NEUROGENIC CLAUDICATION: ICD-10-CM

## 2018-05-16 DIAGNOSIS — F41.9 ANXIETY: ICD-10-CM

## 2018-05-16 DIAGNOSIS — M50.90 DISC DISORDER OF CERVICAL REGION: ICD-10-CM

## 2018-05-16 DIAGNOSIS — M54.16 LUMBAR RADICULOPATHY: Primary | ICD-10-CM

## 2018-05-16 DIAGNOSIS — L71.9 ROSACEA, ACNE: ICD-10-CM

## 2018-05-16 DIAGNOSIS — M54.12 CERVICAL RADICULOPATHY: ICD-10-CM

## 2018-05-16 PROCEDURE — 99214 OFFICE O/P EST MOD 30 MIN: CPT | Performed by: FAMILY MEDICINE

## 2018-05-16 RX ORDER — QUINAPRIL 10 MG/1
TABLET ORAL
Qty: 90 TABLET | Refills: 1 | Status: SHIPPED | OUTPATIENT
Start: 2018-05-16 | End: 2018-10-28 | Stop reason: SDUPTHER

## 2018-05-16 RX ORDER — BUSPIRONE HYDROCHLORIDE 5 MG/1
10 TABLET ORAL 2 TIMES DAILY
Qty: 60 TABLET | Refills: 5 | Status: SHIPPED | OUTPATIENT
Start: 2018-05-16 | End: 2018-06-27 | Stop reason: SDUPTHER

## 2018-05-16 NOTE — PROGRESS NOTES
Assessment/Plan:    No problem-specific Assessment & Plan notes found for this encounter  Problem List Items Addressed This Visit        Nervous and Auditory    Lumbar radiculopathy - Primary    Cervical radiculopathy       Musculoskeletal and Integument    Rosacea, acne    Disc disorder of cervical region       Other    Anxiety    Lumbar stenosis without neurogenic claudication           stay off Cymbalta since it has not helped  Increase buspar to 10 mg twice a day,  to help with anxiety depression and back pain  I encouraged her to make an appointment with her neurosurgeon and keep the appointment with pain management doctor to discuss further options such as injections versus Neurosurgery for lumbar stenosis and canal stenosis and foraminal stenosis  keep doxycycline b i d  for rosacea symptoms  She is advised to not starting the above meds at once  Has a spinal stimulator that helps her back but now pain in LLE- radiation  Call if there is any side effects  We will further address her sleep and depression have follow-up in 3-4 weeks  Discussed options Of Neurontin, amitriptyline, or narcotics to control her pain  Subjective:      Patient ID: Ubaldo Shine is a 66 y o  female  HPI        Back pain:  Patient states that lyrica not covered by her insurance anymore and she has been off of it for 1 week  She feels that it really did not help her is now that she has not been taking it for 1 week  On Neurontin in the past which did not help her  She was given nortriptyline by her pain management doctor which made her feel lethargic but did not help with her discomfort  She called them and she has an appointment to be seen for possible injection  She describes her pain as burning and has difficulty standing and sitting for any prolonged period of time  She has difficulty coping in her and sitting at her job    She states that she takes Zanaflex at night p r n  but that makes her tired in the daytime  She takes anywhere between 8-12 to 100 mg ibuprofen a day but does not mix it with meloxicam   She had an MRI few months back and a pain pump placed by Neurosurgery approximately 1 year ago  Originally it did help her but since then it has not been helping and she has not seen a neurosurgeon back  5/16/18: prednisone id not help and Cymbalta makes her anxiety worse  Went to the pain mgmt and got injections that have helped now  Still having crying spells and does not feel that Cymbalta is helping  Rosacea, patient was placed on Metrogel by her endocrinologist and she has been using it for 1 month  She feels that is not helping  She has facial flushing for several years which are exacerbated by stress, hypertension, heat, and hot flashes  She would like to discuss other options  5/16/18: started doxy 1 week ago, no relief yet  Still flushing and worse in the mornings  Insomnia, patient states she does not sleep well which is very multifactorial including the pain and how flashes  She used to be on estrogen replacement currently does not take any  She does take Ativan at night to help her sleep which helped marginally  5/16/18: taking 1 5 mg  And advil pm and it has helped- sleeping through the night now and slightly  More tired in am      Depression:  Patient is a depression was exacerbated by the pain and inability to sleep  She is always tired and fatigued and is having difficulty with activities of daily living around the house like cleaning and cooking as well as work  Patient is here today and can cooperate always  She feels that she is up to any medications or the long medications right now          The following portions of the patient's history were reviewed and updated as appropriate: allergies, current medications, past family history, past medical history, past social history, past surgical history and problem list     Review of Systems      Constitutional:  Denies fever or chills   Eyes:  Denies change in visual acuity   HENT:  Denies nasal congestion or sore throat   Respiratory:  Denies cough or shortness of breath or wheezing  Cardiovascular:  Denies palpitations or chest pain  GI:  Denies abdominal pain, nausea, or vomiting  Integument:  Denies rash, facial flushing   Neurologic:  Denies headache or focal weakness        Objective:      /82 (BP Location: Left arm, Patient Position: Sitting, Cuff Size: Adult)   Pulse 66   Temp 97 5 °F (36 4 °C) (Oral)   Ht 5' 6" (1 676 m)   Wt 97 5 kg (215 lb)   LMP 11/26/2015   SpO2 99%   BMI 34 70 kg/m²          Physical Exam        Constitutional:  Well developed, well nourished, no acute distress, non-toxic appearance   Eyes:  PERRL, conjunctiva normal , non icteric sclera  HENT:  Atraumatic, oropharynx moist  Neck-  supple   Respiratory:  CTA b/l, normal breath sounds, no rales, no wheezing   Cardiovascular:  RRR, no murmurs, no LE edema b/l  GI:  Soft, nondistended, normal bowel sounds x 4, nontender, no organomegaly, no mass, no rebound, no guarding   Neurologic:  no focal deficits noted   Psychiatric:  Speech and behavior appropriate , AAO x 3    MS, unable to stand up straight  Pain exacerbated with standing up straight  Positive straight leg test bilaterally  Tender to palpation lumbar area paraspinal muscle hypertonicity

## 2018-06-11 ENCOUNTER — OFFICE VISIT (OUTPATIENT)
Dept: INTERNAL MEDICINE CLINIC | Facility: CLINIC | Age: 63
End: 2018-06-11
Payer: COMMERCIAL

## 2018-06-11 VITALS
DIASTOLIC BLOOD PRESSURE: 80 MMHG | HEIGHT: 66 IN | BODY MASS INDEX: 34.55 KG/M2 | HEART RATE: 74 BPM | SYSTOLIC BLOOD PRESSURE: 140 MMHG | OXYGEN SATURATION: 98 % | WEIGHT: 215 LBS | TEMPERATURE: 97.8 F
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
## 2018-06-11 DIAGNOSIS — L03.116 CELLULITIS OF LEFT LOWER EXTREMITY: Primary | ICD-10-CM

## 2018-06-11 PROCEDURE — 99213 OFFICE O/P EST LOW 20 MIN: CPT | Performed by: NURSE PRACTITIONER

## 2018-06-11 RX ORDER — CEPHALEXIN 500 MG/1
500 CAPSULE ORAL EVERY 8 HOURS SCHEDULED
Qty: 21 CAPSULE | Refills: 0 | Status: SHIPPED | OUTPATIENT
Start: 2018-06-11 | End: 2018-06-18

## 2018-06-11 NOTE — PATIENT INSTRUCTIONS
You have been prescribed an antibiotic today  Take the full course of prescription  Recommend taking with food as may upset your stomach  Also would recommend OTC probiotic or yogurt to help protect the natural edy of your stomach  Rest   Stay well hydrated    Return for follow-up on Thursday for re-evaluation

## 2018-06-11 NOTE — PROGRESS NOTES
Assessment/Plan:    No problem-specific Assessment & Plan notes found for this encounter  Diagnoses and all orders for this visit:    Cellulitis of left lower extremity  -     cephalexin (KEFLEX) 500 mg capsule; Take 1 capsule (500 mg total) by mouth every 8 (eight) hours for 7 days    Insect bite, initial encounter      Given that the patient is currently on 200 mg of doxycycline daily and this cellulitis developed, will start the patient on Keflex  Low risk for Lyme in this setting  Patient to follow up in 3 days for a recheck to ensure that she is improving  Red flag signs to return or go to the ER discussed  Patient advised to take a probiotics to protect her good gut bacteria  Subjective:      Patient ID: Danilo Guevara is a 66 y o  female  Patient presents for an acute visit  She reports that she noticed 2 days ago that she had an insect bite to her posterior left calf  She is unsure of what kind of insect bit her  She reports that the following day the erythema to the bite area increased and has remained the same today  She reports tenderness, warmth, and itching to the site  The patient has tried oral and topical Benadryl to the site, as well as oral Zyrtec with only minimal relief of her itching symptoms  The patient chronically takes ibuprofen and reports that this is not helping her symptoms at this time  The patient denies drainage from the site, as well as fevers, chills, night sweats, chest pain, shortness of breath, abdominal pain, nausea, vomiting, or diarrhea, lower extremity swelling or calf pain  The patient suffers from rosacea and currently takes 200 mg of doxycycline daily as well as applies Metrogel daily  Insect Bite   This is a new problem  The current episode started in the past 7 days  The problem occurs constantly  The problem has been gradually worsening  Associated symptoms include a rash (per HPI)   Pertinent negatives include no abdominal pain, arthralgias, change in bowel habit, chest pain, chills, congestion, coughing, diaphoresis, fatigue, fever, joint swelling, myalgias, nausea, sore throat, swollen glands or vomiting  Nothing aggravates the symptoms  She has tried NSAIDs (antihistamines) for the symptoms  The treatment provided mild relief  The following portions of the patient's history were reviewed and updated as appropriate: allergies, current medications, past family history, past medical history, past social history, past surgical history and problem list     Review of Systems   Constitutional: Negative for chills, diaphoresis, fatigue and fever  HENT: Negative for congestion and sore throat  Eyes: Negative for discharge  Respiratory: Negative for cough  Cardiovascular: Negative for chest pain  Gastrointestinal: Negative for abdominal pain, change in bowel habit, nausea and vomiting  Musculoskeletal: Negative for arthralgias, joint swelling and myalgias  Skin: Positive for rash (per HPI)  Neurological: Negative for dizziness  Psychiatric/Behavioral: Negative for agitation           Past Medical History:   Diagnosis Date    Anxiety     Cataract, bilateral     last assessed    Cervical radiculopathy     Disc disease, degenerative, cervical     Herpes simplex infection     Hypertension     Insomnia     Low back pain     Lumbar degenerative disc disease     Lumbar facet arthropathy (HCC)     Lumbar radiculopathy     Lumbar stenosis without neurogenic claudication     Mononucleosis     Myofascial pain dysfunction syndrome     Osteoarthritis     shoulder region - unspecified laterality - last assessed 2/1/14    Plantar fasciitis     Ptosis, both eyelids     last assessed 10/6/16    Ptosis, congenital, left     Retinal detachment     last assessed 12/11/14 right eye    Sacroiliitis ([facility] [address] )     Thyroid disease     Thyrotoxicosis     last assessed 5/17/13    Vertigo          Current Outpatient Prescriptions:    busPIRone (BUSPAR) 5 mg tablet, Take 2 tablets (10 mg total) by mouth 2 (two) times a day Take two tabs hs, Disp: 60 tablet, Rfl: 5    ibuprofen (ADVIL) 200 mg tablet, Take 3 tablets by mouth 3 (three) times a day, Disp: , Rfl:     LORazepam (ATIVAN) 1 mg tablet, Take 1 5 tablets by mouth at bedtime as needed, Disp: 135 tablet, Rfl: 0    methimazole (TAPAZOLE) 5 mg tablet, Take 5 mg by mouth daily  , Disp: , Rfl:     metroNIDAZOLE (METROCREAM) 0 75 % cream, Apply topically daily at bedtime, Disp: , Rfl:     penciclovir (DENAVIR) 1 % cream, Apply topically as needed  , Disp: , Rfl:     propranolol (INDERAL) 10 mg tablet, Take 10 mg by mouth 2 (two) times a day  , Disp: , Rfl:     quinapril (ACCUPRIL) 10 mg tablet, TAKE 1 TABLET BY MOUTH  DAILY, Disp: 90 tablet, Rfl: 1    TiZANidine (ZANAFLEX) 4 MG capsule, Take 4 mg by mouth daily  , Disp: , Rfl:     valACYclovir (VALTREX) 500 mg tablet, Take 500 mg by mouth as needed, Disp: , Rfl:     cephalexin (KEFLEX) 500 mg capsule, Take 1 capsule (500 mg total) by mouth every 8 (eight) hours for 7 days, Disp: 21 capsule, Rfl: 0    quinapril (ACCUPRIL) 20 mg tablet, Take 20 mg by mouth daily at bedtime  , Disp: , Rfl:     Allergies   Allergen Reactions    Other Dermatitis     Adhesive tape - please use ONLY PAPER TAPE    Scopolamine Other (See Comments)     Severe, debilitating headache      Flexeril [Cyclobenzaprine] Dizziness and Fatigue    Naproxen      Other reaction(s): Unknown Allergic Reaction  Annotation - [id number]: nightmares    Penicillins      Other reaction(s): Unknown Allergic Reaction  Annotation - [id number]: childhood reaction    Promethazine-Codeine      Reaction Date: [id number]; Annotation - [id number]: nightmares;  Annotation - [id number]: nightmares    Tramadol      Other reaction(s): Unknown Allergic Reaction  Annotation - [id number]: PT HAS NIGHTMARES FROM TRAMADOL    Wound Dressing Adhesive Rash       Social History   Past Surgical History: Procedure Laterality Date    [place] SURG IMPLNT NEUROELECT,EPIDURAL Left 1/26/2016    Procedure: PLACEMENT OF THORACIC SPINAL CORD STIMULATOR WITH LEFT BUTTOCK IMPLANTABLE PULSE GENERATOR (IMPULSE MONITORING); Surgeon: Ez Payne MD;  Location: [facility];  Service: Neurosurgery    RETINAL DETACHMENT SURGERY Right      Family History   Problem Relation Age of Onset   Jaky Sheilds Breast cancer Mother    Jaky Shields COPD Mother     Hypertension Mother      essential    Heart attack Father      acute MI    Emphysema Father     Hypertension Father      essential    Other Father      prehypertension    Liver cancer Maternal Aunt        Objective:  /80 (BP Location: Left arm, Patient Position: Sitting, Cuff Size: Large) Comment: DID NOT TAKE AFTERNOON MEDICATIONS YET  Pulse 74   Temp 97 8 °F (36 6 °C) (Oral)   Ht 5' 6" (1 676 m)   Wt 97 5 kg (215 lb)   LMP 11/26/2015   SpO2 98%   BMI 34 70 kg/m²        Physical Exam   Constitutional: She is oriented to person, place, and time  She appears well-developed and well-nourished  No distress  HENT:   Head: Normocephalic and atraumatic  Right Ear: External ear normal    Left Ear: External ear normal    Mouth/Throat: Oropharynx is clear and moist    Eyes: Conjunctivae are normal  Pupils are equal, round, and reactive to light  No scleral icterus  Neck: Normal range of motion  Neck supple  No thyromegaly present  Cardiovascular: Normal rate, regular rhythm and normal heart sounds  Pulmonary/Chest: Effort normal and breath sounds normal  No respiratory distress  Abdominal: Soft  Bowel sounds are normal  She exhibits no distension  Musculoskeletal: Normal range of motion  She exhibits no edema  Neurological: She is alert and oriented to person, place, and time  Skin: Skin is warm and dry  Rash noted  Psychiatric: She has a normal mood and affect  Her behavior is normal  Judgment and thought content normal    Vitals reviewed

## 2018-06-13 ENCOUNTER — OFFICE VISIT (OUTPATIENT)
Dept: INTERNAL MEDICINE CLINIC | Facility: CLINIC | Age: 63
End: 2018-06-13
Payer: COMMERCIAL

## 2018-06-13 VITALS
TEMPERATURE: 97.6 F | HEIGHT: 67 IN | WEIGHT: 216.8 LBS | RESPIRATION RATE: 16 BRPM | SYSTOLIC BLOOD PRESSURE: 136 MMHG | OXYGEN SATURATION: 99 % | DIASTOLIC BLOOD PRESSURE: 88 MMHG | BODY MASS INDEX: 34.03 KG/M2 | HEART RATE: 74 BPM

## 2018-06-13 DIAGNOSIS — L03.116 CELLULITIS OF LEFT LOWER EXTREMITY: Primary | ICD-10-CM

## 2018-06-13 PROCEDURE — 99213 OFFICE O/P EST LOW 20 MIN: CPT | Performed by: NURSE PRACTITIONER

## 2018-06-13 RX ORDER — DOXYCYCLINE HYCLATE 100 MG/1
100 CAPSULE ORAL 2 TIMES DAILY
Refills: 4 | COMMUNITY
Start: 2018-06-11 | End: 2018-09-26

## 2018-06-13 RX ORDER — SULFAMETHOXAZOLE AND TRIMETHOPRIM 800; 160 MG/1; MG/1
1 TABLET ORAL DAILY
Qty: 10 TABLET | Refills: 0 | Status: SHIPPED | OUTPATIENT
Start: 2018-06-13 | End: 2018-06-23

## 2018-06-13 NOTE — PROGRESS NOTES
Assessment/Plan:    Cellulitis of left lower extremity  Started patient on Bactrium DS to take once daily for 10 days  Patient is to continue to take antibiotic with food, probiotic or yogurt to prevent yeast infection/diarrhea  Will have patient follow-up in one week  given the patient is currently on 200 milligrams of doxy daily and this cellulitis developed, patient was also started on Keflex to take for 7 days, at her previous appointment, advised patient to continue with the Keflex as well  Red flag symptoms discussed in the at office visit today as to when to return to the office or go to the emergency room  Diagnoses and all orders for this visit:    Cellulitis of left lower extremity  -     sulfamethoxazole-trimethoprim (BACTRIM DS) 800-160 mg per tablet; Take 1 tablet by mouth daily for 10 days    Other orders  -     Discontinue: DOXYCYCLINE HYCLATE PO; Take 100 mg by mouth 2 (two) times a day  -     doxycycline hyclate (VIBRAMYCIN) 100 mg capsule; 100 mg 2 (two) times a day          Subjective:      Patient ID: Manuelito Smith is a 66 y o  female  Patient presents today with a worsen rash on her legs  She was seen on June 11,2018 by [facility] and was started on Keflex 200mg Q8 hours for 7 days  Patient states that she has an increase of pain and the location of the rash is more erythematous  Patient had noticed this rash about 4 days ago,  She had reported that she had an insect bite to the posterior left calf  She reports that she was unaware of what kind of insect bit her  The following day she developed erythema  The patient had tried oral and topical Benadryl to the site of the insect bite and she is also taking oral Zyrtec with minimal relief to her itching  The patient currently denies drainage from the site as well as fevers, chills, night sweats, chest pain, shortness of breath, abdominal pain, nausea, vomiting, or diarrhea    She does report some swelling to her left lower extremity however she does deny calf pain  Of note the patient does suffer from rosacea and currently takes doxy 200 milligrams daily  The following portions of the patient's history were reviewed and updated as appropriate: allergies, current medications, past family history, past medical history, past social history, past surgical history and problem list     Review of Systems   Constitutional: Negative for activity change, appetite change, chills, diaphoresis and fever  HENT: Negative for congestion, ear discharge, ear pain, postnasal drip, rhinorrhea, sinus pain, sinus pressure and sore throat  Eyes: Negative for pain, discharge, itching and visual disturbance  Respiratory: Negative for cough, chest tightness, shortness of breath and wheezing  Cardiovascular: Positive for leg swelling (left lower extremitiy, denies calf tenderness)  Negative for chest pain and palpitations  Gastrointestinal: Negative for abdominal pain, constipation, diarrhea, nausea and vomiting  Endocrine: Negative for polydipsia, polyphagia and polyuria  Genitourinary: Negative for difficulty urinating, dysuria and urgency  Musculoskeletal: Negative for arthralgias, back pain and neck pain  Skin: Positive for rash (left lower   Extremity)  Negative for wound  Neurological: Negative for dizziness, weakness, numbness and headaches           Past Medical History:   Diagnosis Date    Anxiety     Cataract, bilateral     last assessed    Cervical radiculopathy     Disc disease, degenerative, cervical     Herpes simplex infection     Hypertension     Insomnia     Low back pain     Lumbar degenerative disc disease     Lumbar facet arthropathy (HCC)     Lumbar radiculopathy     Lumbar stenosis without neurogenic claudication     Mononucleosis     Myofascial pain dysfunction syndrome     Osteoarthritis     shoulder region - unspecified laterality - last assessed 2/1/14    Plantar fasciitis     Ptosis, both eyelids     last assessed 10/6/16    Ptosis, congenital, left     Retinal detachment     last assessed 12/11/14 right eye    Sacroiliitis ([address] )     Thyroid disease     Thyrotoxicosis     last assessed 5/17/13    Vertigo          Current Outpatient Prescriptions:     busPIRone (BUSPAR) 5 mg tablet, Take 2 tablets (10 mg total) by mouth 2 (two) times a day Take two tabs hs, Disp: 60 tablet, Rfl: 5    cephalexin (KEFLEX) 500 mg capsule, Take 1 capsule (500 mg total) by mouth every 8 (eight) hours for 7 days, Disp: 21 capsule, Rfl: 0    doxycycline hyclate (VIBRAMYCIN) 100 mg capsule, 100 mg 2 (two) times a day, Disp: , Rfl: 4    ibuprofen (ADVIL) 200 mg tablet, Take 3 tablets by mouth 3 (three) times a day, Disp: , Rfl:     LORazepam (ATIVAN) 1 mg tablet, Take 1 5 tablets by mouth at bedtime as needed, Disp: 135 tablet, Rfl: 0    methimazole (TAPAZOLE) 5 mg tablet, Take 5 mg by mouth daily  , Disp: , Rfl:     metroNIDAZOLE (METROCREAM) 0 75 % cream, Apply topically daily at bedtime, Disp: , Rfl:     penciclovir (DENAVIR) 1 % cream, Apply topically as needed  , Disp: , Rfl:     propranolol (INDERAL) 10 mg tablet, Take 10 mg by mouth 2 (two) times a day  , Disp: , Rfl:     quinapril (ACCUPRIL) 10 mg tablet, TAKE 1 TABLET BY MOUTH  DAILY, Disp: 90 tablet, Rfl: 1    TiZANidine (ZANAFLEX) 4 MG capsule, Take 4 mg by mouth daily  , Disp: , Rfl:     valACYclovir (VALTREX) 500 mg tablet, Take 500 mg by mouth as needed, Disp: , Rfl:     sulfamethoxazole-trimethoprim (BACTRIM DS) 800-160 mg per tablet, Take 1 tablet by mouth daily for 10 days, Disp: 10 tablet, Rfl: 0    Allergies   Allergen Reactions    Other Dermatitis     Adhesive tape - please use ONLY PAPER TAPE    Scopolamine Other (See Comments)     Severe, debilitating headache      Flexeril [Cyclobenzaprine] Dizziness and Fatigue    Naproxen      Other reaction(s): Unknown Allergic Reaction  Annotation - [id number]: nightmares    Penicillins      Other reaction(s): Unknown Allergic Reaction  Annotation - [id number]: childhood reaction    Promethazine-Codeine      Reaction Date: [id number]; Annotation - [id number]: nightmares; Annotation - [id number]: nightmares    Tramadol      Other reaction(s): Unknown Allergic Reaction  Annotation - [id number]: PT HAS NIGHTMARES FROM TRAMADOL    Wound Dressing Adhesive Rash       Social History   Past Surgical History:   Procedure Laterality Date    [place] SURG IMPLNT NEUROELECT,EPIDURAL Left 1/26/2016    Procedure: PLACEMENT OF THORACIC SPINAL CORD STIMULATOR WITH LEFT BUTTOCK IMPLANTABLE PULSE GENERATOR (IMPULSE MONITORING); Surgeon: Rikki Runner, MD;  Location:  MAIN OR;  Service: Neurosurgery    RETINAL DETACHMENT SURGERY Right      Family History   Problem Relation Age of Onset   Dee Dee Silence Breast cancer Mother    Dee Dee Silence COPD Mother     Hypertension Mother      essential    Heart attack Father      acute MI    Emphysema Father     Hypertension Father      essential    Other Father      prehypertension    Liver cancer Maternal Aunt        Objective:  /88 (BP Location: Left arm, Patient Position: Sitting, Cuff Size: Large)   Pulse 74   Temp 97 6 °F (36 4 °C) (Oral)   Resp 16   Ht 5' 7" (1 702 m)   Wt 98 3 kg (216 lb 12 8 oz)   LMP 11/26/2015   SpO2 99%   BMI 33 96 kg/m²     No results found for this or any previous visit (from the past 1344 hour(s))  Physical Exam   Constitutional: She is oriented to person, place, and time  She appears well-developed and well-nourished  No distress  HENT:   Head: Normocephalic and atraumatic  Right Ear: External ear normal    Left Ear: External ear normal    Nose: Nose normal    Mouth/Throat: Oropharynx is clear and moist  No oropharyngeal exudate  Eyes: Conjunctivae and EOM are normal  Pupils are equal, round, and reactive to light  Right eye exhibits no discharge  Left eye exhibits no discharge  Neck: Normal range of motion  Neck supple   No thyromegaly present  Cardiovascular: Normal rate, regular rhythm, normal heart sounds and intact distal pulses  Exam reveals no gallop and no friction rub  No murmur heard  Pulmonary/Chest: Effort normal and breath sounds normal  No stridor  No respiratory distress  She has no wheezes  She has no rales  Abdominal: Soft  Bowel sounds are normal  She exhibits no distension  There is no tenderness  Musculoskeletal: She exhibits edema (trace right lower extremity edema)  Negative holomens sign   Lymphadenopathy:     She has no cervical adenopathy  Neurological: She is alert and oriented to person, place, and time  Skin: Skin is warm and dry  No rash noted  She is not diaphoretic  No erythema  Psychiatric: She has a normal mood and affect  Her behavior is normal  Judgment and thought content normal      No images are attached to the encounter

## 2018-06-13 NOTE — ASSESSMENT & PLAN NOTE
Started patient on Bactrium DS to take once daily for 10 days  Patient is to continue to take antibiotic with food, probiotic or yogurt to prevent yeast infection/diarrhea  Will have patient follow-up in one week  given the patient is currently on 200 milligrams of doxy daily and this cellulitis developed, patient was also started on Keflex to take for 7 days, at her previous appointment, advised patient to continue with the Keflex as well  Red flag symptoms discussed in the at office visit today as to when to return to the office or go to the emergency room

## 2018-06-26 DIAGNOSIS — E05.90 HYPERTHYROIDISM: Primary | ICD-10-CM

## 2018-06-27 DIAGNOSIS — F41.9 ANXIETY: ICD-10-CM

## 2018-06-27 RX ORDER — BUSPIRONE HYDROCHLORIDE 5 MG/1
10 TABLET ORAL
Qty: 180 TABLET | Refills: 1 | Status: SHIPPED | OUTPATIENT
Start: 2018-06-27 | End: 2018-10-11 | Stop reason: SDUPTHER

## 2018-07-11 DIAGNOSIS — F41.9 ANXIETY: ICD-10-CM

## 2018-07-12 RX ORDER — LORAZEPAM 1 MG/1
1.5 TABLET ORAL
Qty: 135 TABLET | Refills: 0 | Status: SHIPPED | OUTPATIENT
Start: 2018-07-12 | End: 2018-10-10 | Stop reason: SDUPTHER

## 2018-07-31 LAB
ALBUMIN SERPL-MCNC: 4.4 G/DL (ref 3.6–4.8)
ALBUMIN/GLOB SERPL: 1.8 {RATIO} (ref 1.2–2.2)
ALP SERPL-CCNC: 142 IU/L (ref 39–117)
ALT SERPL-CCNC: 15 IU/L (ref 0–32)
AMBIG ABBREV DEFAULT: NORMAL
AMBIG ABBREV DEFAULT: NORMAL
AST SERPL-CCNC: 23 IU/L (ref 0–40)
BASOPHILS # BLD AUTO: 0.1 X10E3/UL (ref 0–0.2)
BASOPHILS NFR BLD AUTO: 1 %
BILIRUB SERPL-MCNC: 0.9 MG/DL (ref 0–1.2)
BUN SERPL-MCNC: 16 MG/DL (ref 8–27)
BUN/CREAT SERPL: 20 (ref 12–28)
CALCIUM SERPL-MCNC: 10 MG/DL (ref 8.7–10.3)
CHLORIDE SERPL-SCNC: 97 MMOL/L (ref 96–106)
CHOLEST SERPL-MCNC: 216 MG/DL (ref 100–199)
CO2 SERPL-SCNC: 23 MMOL/L (ref 20–29)
CREAT SERPL-MCNC: 0.82 MG/DL (ref 0.57–1)
EOSINOPHIL # BLD AUTO: 0.1 X10E3/UL (ref 0–0.4)
EOSINOPHIL NFR BLD AUTO: 2 %
ERYTHROCYTE [DISTWIDTH] IN BLOOD BY AUTOMATED COUNT: 15.5 % (ref 12.3–15.4)
GLOBULIN SER-MCNC: 2.4 G/DL (ref 1.5–4.5)
GLUCOSE SERPL-MCNC: 103 MG/DL (ref 65–99)
HBA1C MFR BLD: 5.6 % (ref 4.8–5.6)
HCT VFR BLD AUTO: 37.9 % (ref 34–46.6)
HDLC SERPL-MCNC: 79 MG/DL
HGB BLD-MCNC: 12.2 G/DL (ref 11.1–15.9)
IMM GRANULOCYTES # BLD: 0 X10E3/UL (ref 0–0.1)
IMM GRANULOCYTES NFR BLD: 0 %
LDLC SERPL CALC-MCNC: 121 MG/DL (ref 0–99)
LYMPHOCYTES # BLD AUTO: 1.5 X10E3/UL (ref 0.7–3.1)
LYMPHOCYTES NFR BLD AUTO: 27 %
MCH RBC QN AUTO: 27.7 PG (ref 26.6–33)
MCHC RBC AUTO-ENTMCNC: 32.2 G/DL (ref 31.5–35.7)
MCV RBC AUTO: 86 FL (ref 79–97)
MONOCYTES # BLD AUTO: 0.5 X10E3/UL (ref 0.1–0.9)
MONOCYTES NFR BLD AUTO: 9 %
NEUTROPHILS # BLD AUTO: 3.4 X10E3/UL (ref 1.4–7)
NEUTROPHILS NFR BLD AUTO: 61 %
PLATELET # BLD AUTO: 332 X10E3/UL (ref 150–379)
POTASSIUM SERPL-SCNC: 4.2 MMOL/L (ref 3.5–5.2)
PROT SERPL-MCNC: 6.8 G/DL (ref 6–8.5)
RBC # BLD AUTO: 4.41 X10E6/UL (ref 3.77–5.28)
SL AMB EGFR AFRICAN AMERICAN: 89 ML/MIN/1.73
SL AMB EGFR NON AFRICAN AMERICAN: 77 ML/MIN/1.73
SL AMB VLDL CHOLESTEROL CALC: 16 MG/DL (ref 5–40)
SODIUM SERPL-SCNC: 139 MMOL/L (ref 134–144)
T4 FREE SERPL-MCNC: 0.97 NG/DL (ref 0.82–1.77)
TRIGL SERPL-MCNC: 78 MG/DL (ref 0–149)
TSH SERPL DL<=0.005 MIU/L-ACNC: 3.02 UIU/ML (ref 0.45–4.5)
WBC # BLD AUTO: 5.7 X10E3/UL (ref 3.4–10.8)

## 2018-08-09 ENCOUNTER — OFFICE VISIT (OUTPATIENT)
Dept: PAIN MEDICINE | Facility: CLINIC | Age: 63
End: 2018-08-09
Payer: COMMERCIAL

## 2018-08-09 VITALS
HEIGHT: 67 IN | BODY MASS INDEX: 34.06 KG/M2 | WEIGHT: 217 LBS | DIASTOLIC BLOOD PRESSURE: 82 MMHG | HEART RATE: 74 BPM | SYSTOLIC BLOOD PRESSURE: 134 MMHG | TEMPERATURE: 98.2 F

## 2018-08-09 DIAGNOSIS — M48.061 SPINAL STENOSIS OF LUMBAR REGION, UNSPECIFIED WHETHER NEUROGENIC CLAUDICATION PRESENT: ICD-10-CM

## 2018-08-09 DIAGNOSIS — M50.120 CERVICAL DISC DISORDER WITH RADICULOPATHY OF MID-CERVICAL REGION: ICD-10-CM

## 2018-08-09 DIAGNOSIS — G89.4 CHRONIC PAIN SYNDROME: Primary | ICD-10-CM

## 2018-08-09 DIAGNOSIS — M54.16 LUMBAR RADICULOPATHY: ICD-10-CM

## 2018-08-09 DIAGNOSIS — M47.812 SPONDYLOSIS OF CERVICAL REGION WITHOUT MYELOPATHY OR RADICULOPATHY: ICD-10-CM

## 2018-08-09 DIAGNOSIS — M51.16 INTERVERTEBRAL DISC DISORDERS WITH RADICULOPATHY, LUMBAR REGION: ICD-10-CM

## 2018-08-09 PROCEDURE — 99214 OFFICE O/P EST MOD 30 MIN: CPT | Performed by: NURSE PRACTITIONER

## 2018-08-09 NOTE — PROGRESS NOTES
Assessment:  1  Chronic pain syndrome    2  Cervical disc disorder with radiculopathy of mid-cervical region    3  Spondylosis of cervical region without myelopathy or radiculopathy    4  Lumbar radiculopathy    5  Intervertebral disc disorders with radiculopathy, lumbar region    6  Spinal stenosis of lumbar region, unspecified whether neurogenic claudication present        Plan:  Abbey Knox is a 66 y o  female with a history of chronic pain secondary to cervical disc bulge, cervical degenerative disease, cervical radiculopathy, lumbar degenerative disc disease, lumbar stenosis, lumbar radiculopathy, and right knee pain  The patient presents today with left-sided low back pain, which is consistent with lumbar radiculopathy most likely stemming from lumbar stenosis noted at L4-L5  The patient reports that her pain is symptoms are the same pain is symptoms that she experienced prior to undergoing a previous left L4-L5 transforaminal epidural steroid injection  Therefore at this time the patient would like to repeat the injection  The patient was educated on the procedures most common risks, was given a brochure the procedure  The patient verbalized understanding, would like to proceed with the procedure  Therefore the patient be scheduled for upcoming Tuesday or Thursday  The patient would like to attend physical therapy after the completion of her injection to help increase her strength and endurance  Therefore the patient was provided a referral to physical therapy for evaluation treatment  The patient has a permanent [employer] spinal cord stimulator implanted  She reports that she met with Sandrine kaur yesterday and had her stimulator reprogrammed and since only has noticed a small amount of relief since reprogramming  Complete risks and benefits including bleeding, infection, tissue reaction, nerve injury and allergic reaction were discussed   The approach was demonstrated using models and literature was provided  The patient will follow up after undergoing a left L4-L5 transforaminal steroid injection, or sooner with the worsening of  symptoms  My impressions and treatment recommendations were discussed in detail with the patient who verbalized understanding and had no further questions  Discharge instructions were provided  I personally saw and examined the patient and I agree with the above discussed plan of care  Orders Placed This Encounter   Procedures    FL spine and pain procedure     Standing Status:   Future     Standing Expiration Date:   8/9/2022     Order Specific Question:   Reason for Exam:     Answer:   Left L4-L5 TFESI     Order Specific Question:   Anticoagulant hold needed? Answer:   No    Ambulatory referral to Physical Therapy     Standing Status:   Future     Standing Expiration Date:   2/9/2019     Referral Priority:   Routine     Referral Type:   Physical Therapy     Referral Reason:   Specialty Services Required     Requested Specialty:   Physical Therapy     Number of Visits Requested:   1     Expiration Date:   8/9/2019     No orders of the defined types were placed in this encounter  History of Present Illness:  Amanda Gracia is a 66 y o  female with a history of chronic pain secondary to cervical disc bulge, cervical degenerative disease, cervical radiculopathy, lumbar degenerative disc disease, lumbar stenosis, lumbar radiculopathy, and right knee pain  Patient has a permanent [employer] spinal cord stimulator implanted  The patient was last seen in the office on 5/8/2018 where she underwent a left L4-L5 transforaminal epidural steroid injection  She  presents for a follow up office visit in regards to Hip Pain and Leg Pain  The patients current symptoms include left-sided low back pain that radiates down the posterior aspect of her left leg to her knee  She denies bilateral leg weakness or bowel or bladder issues   She describes her pain as dull aching, throbbing constant pain that occurs mostly during the evening hours  The patient reports that her pain is symptoms have worsened since last visit  She currently rates her pain as 6/10 numeric pain scale  I have personally reviewed and/or updated the patient's past medical history, past surgical history, family history, social history, current medications, allergies, and vital signs today  Review of Systems   Respiratory: Negative for shortness of breath  Cardiovascular: Negative for chest pain  Gastrointestinal: Negative for constipation, diarrhea, nausea and vomiting  Musculoskeletal: Positive for gait problem  Negative for arthralgias, joint swelling and myalgias  Joint stiffness   Skin: Negative for rash  Neurological: Negative for dizziness, seizures and weakness  All other systems reviewed and are negative  Patient Active Problem List   Diagnosis    Rosacea, acne    Lumbar degenerative disc disease    Lumbar radiculopathy    Acute pain of right knee    Anxiety    Benign essential HTN    Cervical radiculopathy    Disc disorder of cervical region    Hot flash, menopausal    Lumbar facet arthropathy (HCC)    Lumbar stenosis without neurogenic claudication    Myofascial pain syndrome    Obesity (BMI 30-39  9)    Pain syndrome, chronic    Positive depression screening    Primary osteoarthritis of right knee    Right knee injury    Sacroiliitis ([address] )    Tear of medial meniscus of right knee, current    Cellulitis of left lower extremity       Past Medical History:   Diagnosis Date    Anxiety     Cataract, bilateral     last assessed    Cervical radiculopathy     Disc disease, degenerative, cervical     Herpes simplex infection     Hypertension     Insomnia     Low back pain     Lumbar degenerative disc disease     Lumbar facet arthropathy (HCC)     Lumbar radiculopathy     Lumbar stenosis without neurogenic claudication     Mononucleosis     Myofascial pain dysfunction syndrome     Osteoarthritis     shoulder region - unspecified laterality - last assessed 2/1/14    Plantar fasciitis     Ptosis, both eyelids     last assessed 10/6/16    Ptosis, congenital, left     Retinal detachment     last assessed 12/11/14 right eye    Sacroiliitis ([address] )     Thyroid disease     Thyrotoxicosis     last assessed 5/17/13    Vertigo        Past Surgical History:   Procedure Laterality Date    LA SURG IMPLNT NEUROELECT,EPIDURAL Left 1/26/2016    Procedure: PLACEMENT OF THORACIC SPINAL CORD STIMULATOR WITH LEFT BUTTOCK IMPLANTABLE PULSE GENERATOR (IMPULSE MONITORING);   Surgeon: Hao Mcdonald MD;  Location:  MAIN OR;  Service: Neurosurgery    RETINAL DETACHMENT SURGERY Right        Family History   Problem Relation Age of Onset   Yoshi Segura Breast cancer Mother    Yoshi Seugra COPD Mother     Hypertension Mother         essential    Heart attack Father         acute MI    Emphysema Father     Hypertension Father         essential    Other Father         prehypertension    Liver cancer Maternal Aunt        Social History     Occupational History    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     Social History Main Topics    Smoking status: Never Smoker    Smokeless tobacco: Never Used    Alcohol use No      Comment: occasional use as per [employer]    Drug use: No    Sexual activity: Yes       Current Outpatient Prescriptions on File Prior to Visit   Medication Sig    busPIRone (BUSPAR) 5 mg tablet Take 2 tablets (10 mg total) by mouth daily at bedtime    doxycycline hyclate (VIBRAMYCIN) 100 mg capsule 100 mg 2 (two) times a day    ibuprofen (ADVIL) 200 mg tablet Take 3 tablets by mouth 3 (three) times a day    LORazepam (ATIVAN) 1 mg tablet Take 1 5 tablets (1 5 mg total) by mouth daily at bedtime Take 1 5 tablets by mouth at bedtime as needed    methimazole (TAPAZOLE) 5 mg tablet Take 5 mg by mouth daily      metroNIDAZOLE (METROCREAM) 0 75 % cream Apply topically daily at bedtime    penciclovir (DENAVIR) 1 % cream Apply topically as needed      propranolol (INDERAL) 10 mg tablet Take 10 mg by mouth 2 (two) times a day      quinapril (ACCUPRIL) 10 mg tablet TAKE 1 TABLET BY MOUTH  DAILY    TiZANidine (ZANAFLEX) 4 MG capsule Take 4 mg by mouth daily      valACYclovir (VALTREX) 500 mg tablet Take 500 mg by mouth as needed     No current facility-administered medications on file prior to visit  Allergies   Allergen Reactions    Other Dermatitis     Adhesive tape - please use ONLY PAPER TAPE    Scopolamine Other (See Comments)     Severe, debilitating headache      Flexeril [Cyclobenzaprine] Dizziness and Fatigue    Naproxen      Other reaction(s): Unknown Allergic Reaction  Annotation - [id number]: nightmares    Penicillins      Other reaction(s): Unknown Allergic Reaction  Annotation - [id number]: childhood reaction    Promethazine-Codeine      Reaction Date: [id number]; Annotation - [id number]: nightmares; Annotation - [id number]: nightmares    Tramadol      Other reaction(s): Unknown Allergic Reaction  Annotation - [id number]: PT HAS NIGHTMARES FROM TRAMADOL    Wound Dressing Adhesive Rash       Physical Exam:    /82   Pulse 74   Temp 98 2 °F (36 8 °C)   Ht 5' 7" (1 702 m)   Wt 98 4 kg (217 lb)   LMP 11/26/2015   BMI 33 99 kg/m²     Constitutional:normal, well developed, well nourished, alert, in no distress and non-toxic and no overt pain behavior   and obese  Eyes:anicteric  HEENT:grossly intact  Neck:supple, symmetric, trachea midline and no masses   Pulmonary:even and unlabored  Cardiovascular:No edema or pitting edema present  Skin:Normal without rashes or lesions and well hydrated  Psychiatric:Mood and affect appropriate  Neurologic:Cranial Nerves II-XII grossly intact  Musculoskeletal:normal     Lumbar Spine Exam    Appearance:  Normal lordosis  Palpation/Tenderness:  left lumbar paraspinal tenderness  Sensory:  no sensory deficits noted  Range of Motion:  Flexion:  Minimally limited  with pain  Extension:  Minimally limited  with pain  Lateral Flexion - Left:  Minimally limited  without pain  Lateral Flexion - Right:  Minimally limited  without pain  Rotation - Left:  Minimally limited  without pain  Rotation - Right:  Minimally limited  without pain  Motor Strength:  Left hip flexion:  5/5  Right hip flexion:  5/5  Left knee extension:  5/5  Right knee extension:  5/5  Left foot dorsiflexion:  5/5  Left foot plantar flexion:  5/5  Right foot dorsiflexion:  5/5  Right foot plantar flexion:  5/5        Imaging  MRI LUMBAR SPINE WITHOUT CONTRAST     INDICATION:  Low back and left leg pain     COMPARISON:  MR 3/17/2014     TECHNIQUE:  Sagittal T1, sagittal T2, sagittal inversion recovery, axial 2-D merge and axial T2        IMAGE QUALITY:  Presence of spinal stimulator limits study parameters, imaging time and consequently, anatomic integrity         Findings:  Patient does have 6 nonrib-bearing lumbar-type vertebral bodies and, 12 ribs  For purposes of this report transitional lumbosacral segment will be deemed S1         ALIGNMENT:  Stable degenerative grade 1 L5-S1 anterolisthesis      MARROW SIGNAL:  Normal marrow signal is identified within the visualized bony structures  No discrete marrow lesion      DISTAL CORD AND CONUS:  Normal size and signal within the distal cord and conus  The conus ends at the L2 level      PARASPINAL SOFT TISSUES:  Paraspinal soft tissues are unremarkable      SACRUM:  Normal signal within the sacrum  No evidence of insufficiency or stress fracture      LOWER THORACIC DISC SPACES:  Minimal bulge T12-L1     LUMBAR DISC SPACES:          L1-L2:  Minor bulge     L2-L3:  Minor bulge     L3-L4:  Minor bulge     L4-L5:  Facet arthrosis, redundancy of the ligamentum flavum, minor bulge  Minor stenosis  AP dimension of the sac reduced to approximately 6 mm    This has progressed minimally since prior study      L5-S1:  Degenerative grade 1 anterolisthesis, marked redundancy of the ligamentum flavum, trefoil shape Canal with severe bilateral lateral recess stenosis  Minor distortion of the foramen no critical stenosis  Correlate for signs and symptoms of   spinal stenosis  Although not apparent on prior studies, the pars interarticularis is not well depicted on the limited quality exam today  Dehiscence of the pars is not excluded      S1-S2: Rudimentary disc space overtly normal, minor facet arthrosis, no critical stenosis      IMPRESSION:     Study is limited by imaging requirements of spinal stimulator      6 nonrib-bearing lumbar-type vertebral bodies and 12th ribs  Caudal-most vertebral body will be designated S1      Progressive degenerative changes at L4-L5 and L5-S1, most notably at L5-S1  Severe canal and lateral recess stenosis    Correlate for signs and symptoms of spinal stenosis and, bilateral S1 radiculitis         Workstation performed: [id number]

## 2018-08-13 ENCOUNTER — OFFICE VISIT (OUTPATIENT)
Dept: INTERNAL MEDICINE CLINIC | Facility: CLINIC | Age: 63
End: 2018-08-13
Payer: COMMERCIAL

## 2018-08-13 VITALS
TEMPERATURE: 97.7 F | DIASTOLIC BLOOD PRESSURE: 80 MMHG | SYSTOLIC BLOOD PRESSURE: 130 MMHG | WEIGHT: 218.4 LBS | HEART RATE: 68 BPM | OXYGEN SATURATION: 97 % | BODY MASS INDEX: 34.28 KG/M2 | HEIGHT: 67 IN

## 2018-08-13 DIAGNOSIS — M51.36 LUMBAR DEGENERATIVE DISC DISEASE: ICD-10-CM

## 2018-08-13 DIAGNOSIS — I10 BENIGN ESSENTIAL HTN: ICD-10-CM

## 2018-08-13 DIAGNOSIS — M54.16 LUMBAR RADICULOPATHY: Primary | ICD-10-CM

## 2018-08-13 DIAGNOSIS — L71.9 ROSACEA, ACNE: ICD-10-CM

## 2018-08-13 DIAGNOSIS — F41.9 ANXIETY: ICD-10-CM

## 2018-08-13 PROBLEM — M25.561 ACUTE PAIN OF RIGHT KNEE: Status: RESOLVED | Noted: 2017-11-20 | Resolved: 2018-08-13

## 2018-08-13 PROBLEM — L03.116 CELLULITIS OF LEFT LOWER EXTREMITY: Status: RESOLVED | Noted: 2018-06-13 | Resolved: 2018-08-13

## 2018-08-13 PROCEDURE — 99214 OFFICE O/P EST MOD 30 MIN: CPT | Performed by: FAMILY MEDICINE

## 2018-08-13 RX ORDER — METAXALONE 400 MG/1
400 TABLET ORAL 2 TIMES DAILY PRN
Qty: 60 TABLET | Refills: 3 | Status: SHIPPED | OUTPATIENT
Start: 2018-08-13 | End: 2019-01-04

## 2018-08-13 RX ORDER — MELOXICAM 15 MG/1
15 TABLET ORAL DAILY
Qty: 30 TABLET | Refills: 3 | Status: SHIPPED | OUTPATIENT
Start: 2018-08-13 | End: 2018-09-26

## 2018-08-13 NOTE — PROGRESS NOTES
Assessment/Plan:    No problem-specific Assessment & Plan notes found for this encounter  1  Benign essential HTN (401 1) (I10)   2  Anxiety (300 00) (F41 9)    Discussion:    add mobic for better control and stop OTC advil  Take with food, stop zanaflex and start skelaxin for better pain control  Call if any issues    stay off Cymbalta since it has not helped and made her tired  Increase buspar to 10 mg twice a day,  to help with anxiety depression and back pain  I encouraged her to make an appointment with her neurosurgeon and keep the appointment with pain management doctor to discuss further options such as injections versus Neurosurgery for lumbar stenosis and canal stenosis and foraminal stenosis  keep doxycycline b i d  for rosacea symptoms  She is advised to not starting the above meds at once  Has a spinal stimulator that helps her back but now pain in LLE- radiation  Takes ativan at night and  It helps but she still wakes up at night, it makes her too tired in the daytime so she does not like to take it  Discussed options Of Neurontin, amitriptyline, or narcotics to control her pain              Problem List Items Addressed This Visit        Cardiovascular and Mediastinum    Benign essential HTN       Nervous and Auditory    Lumbar radiculopathy - Primary       Musculoskeletal and Integument    Rosacea, acne    Lumbar degenerative disc disease       Other    Anxiety            Subjective:  F/up htn and back pain and insomnia     Patient ID: Haylee Iraheta is a 66 y o  female  HPI     Back pain:  Patient states that lyrica not covered by her insurance anymore and she has been off of it for 1 week  She feels that it really did not help her is now that she has not been taking it for 1 week  On Neurontin in the past which did not help her  She was given nortriptyline by her pain management doctor which made her feel lethargic but did not help with her discomfort    She called them and she has an appointment to be seen for possible injection  She describes her pain as burning and has difficulty standing and sitting for any prolonged period of time  She has difficulty coping in her and sitting at her job  She states that she takes Zanaflex at night p r n  but that makes her tired in the daytime  She takes anywhere between 8-12 to 100 mg ibuprofen a day but does not mix it with meloxicam   She had an MRI few months back and a pain pump placed by Neurosurgery approximately 1 year ago  Originally it did help her but since then it has not been helping and she has not seen a neurosurgeon back  5/16/18: prednisone id not help and Cymbalta makes her anxiety worse  Went to the pain mgmt and got injections that have helped now  Still having crying spells and does not feel that Cymbalta is helping  8/13/18: going for another spinal injection, had her stimulator adjusted but not much relief  Better day today       Rosacea, patient was placed on Metrogel by her endocrinologist and she has been using it for 1 month  She feels that is not helping  She has facial flushing for several years which are exacerbated by stress, hypertension, heat, and hot flashes  She would like to discuss other options  5/16/18: started doxy 1 week ago, no relief yet  Still flushing and worse in the mornings      Insomnia, patient states she does not sleep well which is very multifactorial including the pain and how flashes  She used to be on estrogen replacement currently does not take any  She does take Ativan at night to help her sleep which helped marginally  5/16/18: taking 1 5 mg  And advil pm and it has helped- sleeping through the night now and slightly  More tired in am       Depression:  Patient is a depression was exacerbated by the pain and inability to sleep  She is always tired and fatigued and is having difficulty with activities of daily living around the house like cleaning and cooking as well as work    Patient is here today and can cooperate always  She feels that she is up to any medications or the long medications right now    8/13/18: buspar was increased to 10 mg per but she felt tired on it and went back to 5 mg BId, has gained weight    The following portions of the patient's history were reviewed and updated as appropriate: allergies, current medications, past family history, past medical history, past social history, past surgical history and problem list     Review of Systems    Constitutional:  Denies fever or chills   Eyes:  Denies change in visual acuity   HENT:  Denies nasal congestion or sore throat   Respiratory:  Denies cough or shortness of breath or wheezing  Cardiovascular:  Denies palpitations or chest pain  GI:  Denies abdominal pain, nausea, or vomiting  Integument:  Denies rash   Neurologic:  Denies headache or focal weakness      Objective:      /80 (BP Location: Left arm, Patient Position: Sitting, Cuff Size: Adult)   Pulse 68   Temp 97 7 °F (36 5 °C) (Oral)   Ht 5' 7" (1 702 m)   Wt 99 1 kg (218 lb 6 4 oz)   LMP 11/26/2015   SpO2 97%   BMI 34 21 kg/m²          Physical Exam    Constitutional:  Well developed, well nourished, no acute distress, non-toxic appearance   Eyes:  PERRL, conjunctiva normal , non icteric sclera  HENT:  Atraumatic, oropharynx moist  Neck-  supple   Respiratory:  CTA b/l, normal breath sounds, no rales, no wheezing   Cardiovascular:  RRR, no murmurs, no LE edema b/l  GI:  Soft, nondistended, normal bowel sounds x 4, nontender, no organomegaly, no mass, no rebound, no guarding   Neurologic:  no focal deficits noted   Psychiatric:  Speech and behavior appropriate , AAO x 3  MS: able to stand up straight today, positive straight leg test

## 2018-08-17 ENCOUNTER — TELEPHONE (OUTPATIENT)
Dept: PAIN MEDICINE | Facility: MEDICAL CENTER | Age: 63
End: 2018-08-17

## 2018-08-17 NOTE — TELEPHONE ENCOUNTER
FYI only:  Pt said she had problems with her arm and neck 5 yrs ago and now she's starting with pain the middle of her right arm that radiates down to her hand and up to the shoulder  Pt asking if she can also have a neck inj on 8/23 when she get her lumbar inj  I explained to the pt that those 2 inj can't be done on the same day  I looked at pt's last ov on 8/8 w/ HA and there is nothing documented about her arm pain, pt said she didn't tell [facility] b/c it wasn't that bad  I explained to the pt that Ins would require documentation at ov for auth for an inj so I rec pt make another ov w/ HA to have the arm pain eval   Pt given ov for Wed 8/22 at 3pm w/ HA

## 2018-08-17 NOTE — TELEPHONE ENCOUNTER
Patient called stating that she is scheduled for a procedure (lumbar)   Patient stated she has been having issues w/ her neck and wants to know if she can be seen for both and have a few questions c/b [phone]

## 2018-08-22 ENCOUNTER — OFFICE VISIT (OUTPATIENT)
Dept: PAIN MEDICINE | Facility: CLINIC | Age: 63
End: 2018-08-22
Payer: COMMERCIAL

## 2018-08-22 VITALS
HEART RATE: 74 BPM | WEIGHT: 217 LBS | SYSTOLIC BLOOD PRESSURE: 128 MMHG | DIASTOLIC BLOOD PRESSURE: 76 MMHG | HEIGHT: 67 IN | BODY MASS INDEX: 34.06 KG/M2 | TEMPERATURE: 98.1 F

## 2018-08-22 DIAGNOSIS — M48.061 LUMBAR STENOSIS WITHOUT NEUROGENIC CLAUDICATION: ICD-10-CM

## 2018-08-22 DIAGNOSIS — G89.4 PAIN SYNDROME, CHRONIC: ICD-10-CM

## 2018-08-22 DIAGNOSIS — M54.12 CERVICAL RADICULOPATHY: ICD-10-CM

## 2018-08-22 DIAGNOSIS — M51.36 LUMBAR DEGENERATIVE DISC DISEASE: ICD-10-CM

## 2018-08-22 DIAGNOSIS — M50.120 CERVICAL DISC DISORDER WITH RADICULOPATHY OF MID-CERVICAL REGION: Primary | ICD-10-CM

## 2018-08-22 DIAGNOSIS — M47.816 LUMBAR FACET ARTHROPATHY: ICD-10-CM

## 2018-08-22 DIAGNOSIS — M50.90 DISC DISORDER OF CERVICAL REGION: ICD-10-CM

## 2018-08-22 DIAGNOSIS — M79.18 MYOFASCIAL PAIN SYNDROME: ICD-10-CM

## 2018-08-22 PROCEDURE — 99214 OFFICE O/P EST MOD 30 MIN: CPT | Performed by: NURSE PRACTITIONER

## 2018-08-22 NOTE — PROGRESS NOTES
Assessment:  1  Cervical disc disorder with radiculopathy of mid-cervical region    2  Myofascial pain syndrome    3  Lumbar facet arthropathy ([address] )    4  Lumbar degenerative disc disease    5  Disc disorder of cervical region    6  Cervical radiculopathy    7  Pain syndrome, chronic    8  Lumbar stenosis without neurogenic claudication        Plan:  Junior Casas is a 66 y o  female with a history of chronic pain secondary to cervical disc bulge, cervical degenerative disease, cervical radiculopathy, lumbar degenerative disc disease, lumbar stenosis, lumbar radiculopathy, and right knee pain  The patient present with a re-emergence of neck pain since her last cervical epidural steroid injection  Therefore, at this time the patient would like to proceed with a repeat cervical epidural steroid injection at this time  The patient was educated on the procedures most common risks  She verbalized understanding and would like to proceed with the procedure  Therefore, the patient will be scheduled for an upcoming Tuesday or Thursday  The patient is also scheduled for a left L4-L5 TFESI on 08/23/2018 fro her low back pain  [place] Pres[facility] Drug Monitoring Program report was reviewed and was appropriate     Complete risks and benefits including bleeding, infection, tissue reaction, nerve injury and allergic reaction were discussed  The approach was demonstrated using models and literature was provided  The patient will follow up after her cervical epidural steroid injection or sooner with the worsening of symptoms  My impressions and treatment recommendations were discussed in detail with the patient who verbalized understanding and had no further questions  Discharge instructions were provided  I personally saw and examined the patient and I agree with the above discussed plan of care      Orders Placed This Encounter   Procedures    FL spine and pain procedure     Standing Status:   Future Standing Expiration Date:   8/22/2022     Order Specific Question:   Reason for Exam:     Answer:   ISAI     Order Specific Question:   Anticoagulant hold needed? Answer:   No     No orders of the defined types were placed in this encounter  History of Present Illness:  Alberto Bowie is a 66 y o  female with a history of chronic pain secondary to cervical disc bulge, cervical degenerative disease, cervical radiculopathy, lumbar degenerative disc disease, lumbar stenosis, lumbar radiculopathy, and right knee pain  Patient has a permanent [employer] spinal cord stimulator implanted  The patient who presents for a follow up office visit in regards to Follow-up (right shoulder)  The patients current symptoms include right neck pain that radiates down the anterior aspect of the right arm to her elbow,and intermittently radiates into her middle finger  She reports mild weakness, however she reports there weakness is not as bad as it was in the past prior to a cervical epidural steroid injection  She describes her pain as sharp,throbbing, and shooting pain  She describes her pain as intermittent pain that occurs throughout the day  The patient reports that her pain and symptoms have worsened since last office visit  I have personally reviewed and/or updated the patient's past medical history, past surgical history, family history, social history, current medications, allergies, and vital signs today  Review of Systems   Respiratory: Negative for shortness of breath  Cardiovascular: Negative for chest pain  Gastrointestinal: Negative for constipation, diarrhea, nausea and vomiting  Musculoskeletal: Negative for arthralgias, gait problem, joint swelling and myalgias  Skin: Negative for rash  Neurological: Positive for weakness  Negative for dizziness and seizures  All other systems reviewed and are negative        Patient Active Problem List   Diagnosis    Rosacea, acne    Lumbar degenerative disc disease    Lumbar radiculopathy    Anxiety    Benign essential HTN    Cervical radiculopathy    Disc disorder of cervical region    Hot flash, menopausal    Lumbar facet arthropathy (HCC)    Lumbar stenosis without neurogenic claudication    Myofascial pain syndrome    Obesity (BMI 30-39  9)    Pain syndrome, chronic    Positive depression screening    Primary osteoarthritis of right knee    Right knee injury    Sacroiliitis ([address] )    Tear of medial meniscus of right knee, current       Past Medical History:   Diagnosis Date    Anxiety     Cataract, bilateral     last assessed    Cervical radiculopathy     Disc disease, degenerative, cervical     Herpes simplex infection     Hypertension     Insomnia     Low back pain     Lumbar degenerative disc disease     Lumbar facet arthropathy (HCC)     Lumbar radiculopathy     Lumbar stenosis without neurogenic claudication     Mononucleosis     Myofascial pain dysfunction syndrome     Osteoarthritis     shoulder region - unspecified laterality - last assessed 2/1/14    Plantar fasciitis     Ptosis, both eyelids     last assessed 10/6/16    Ptosis, congenital, left     Retinal detachment     last assessed 12/11/14 right eye    Sacroiliitis ([address] )     Thyroid disease     Thyrotoxicosis     last assessed 5/17/13    Vertigo        Past Surgical History:   Procedure Laterality Date    DC SURG IMPLNT NEUROELECT,EPIDURAL Left 1/26/2016    Procedure: PLACEMENT OF THORACIC SPINAL CORD STIMULATOR WITH LEFT BUTTOCK IMPLANTABLE PULSE GENERATOR (IMPULSE MONITORING);   Surgeon: Hao Mcdonald MD;  Location:  MAIN OR;  Service: Neurosurgery    RETINAL DETACHMENT SURGERY Right        Family History   Problem Relation Age of Onset   Yoshi Segura Breast cancer Mother    Yoshi Segura COPD Mother     Hypertension Mother         essential    Heart attack Father         acute MI    Emphysema Father     Hypertension Father         essential    Other Father prehypertension    Liver cancer Maternal Aunt        Social History     Occupational History    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     Social History Main Topics    Smoking status: Never Smoker    Smokeless tobacco: Never Used    Alcohol use No      Comment: occasional use as per [employer]    Drug use: No    Sexual activity: Yes       Current Outpatient Prescriptions on File Prior to Visit   Medication Sig    busPIRone (BUSPAR) 5 mg tablet Take 2 tablets (10 mg total) by mouth daily at bedtime    doxycycline hyclate (VIBRAMYCIN) 100 mg capsule 100 mg 2 (two) times a day    LORazepam (ATIVAN) 1 mg tablet Take 1 5 tablets (1 5 mg total) by mouth daily at bedtime Take 1 5 tablets by mouth at bedtime as needed    meloxicam (MOBIC) 15 mg tablet Take 1 tablet (15 mg total) by mouth daily    metaxalone (SKELAXIN) 400 MG tablet Take 1 tablet (400 mg total) by mouth 2 (two) times a day as needed (muscl spasm)    methimazole (TAPAZOLE) 5 mg tablet Take 5 mg by mouth daily      metroNIDAZOLE (METROCREAM) 0 75 % cream Apply topically daily at bedtime    penciclovir (DENAVIR) 1 % cream Apply topically as needed      propranolol (INDERAL) 10 mg tablet Take 10 mg by mouth 2 (two) times a day      quinapril (ACCUPRIL) 10 mg tablet TAKE 1 TABLET BY MOUTH  DAILY    valACYclovir (VALTREX) 500 mg tablet Take 500 mg by mouth as needed     No current facility-administered medications on file prior to visit          Allergies   Allergen Reactions    Other Dermatitis     Adhesive tape - please use ONLY PAPER TAPE    Scopolamine Other (See Comments)     Severe, debilitating headache      Flexeril [Cyclobenzaprine] Dizziness and Fatigue    Naproxen      Other reaction(s): Unknown Allergic Reaction  Annotation - [id number]: nightmares    Penicillins      Other reaction(s): Unknown Allergic Reaction  Annotation - [id number]: childhood reaction    Promethazine-Codeine      Reaction Date: [id number]; Annotation - [id number]: nightmares; Annotation - [id number]: nightmares    Tramadol      Other reaction(s): Unknown Allergic Reaction  Annotation - [id number]: PT HAS NIGHTMARES FROM TRAMADOL    Wound Dressing Adhesive Rash       Physical Exam:    /76   Pulse 74   Temp 98 1 °F (36 7 °C) (Oral)   Ht 5' 7" (1 702 m)   Wt 98 4 kg (217 lb)   LMP 11/26/2015   BMI 33 99 kg/m²     Constitutional:normal, well developed, well nourished, alert, in no distress and non-toxic and no overt pain behavior  and obese  Eyes:anicteric  HEENT:grossly intact  Neck:supple, symmetric, trachea midline and no masses   Pulmonary:even and unlabored  Cardiovascular:No edema or pitting edema present  Skin:Normal without rashes or lesions and well hydrated  Psychiatric:Mood and affect appropriate  Neurologic:Cranial Nerves II-XII grossly intact  Musculoskeletal:normal    Cervical Spine Exam    Appearance:  Normal lordosis  Palpation/Tenderness:  right cervical paraspinal tenderness  right trapezium tenderness  Sensory:  no sensory deficits noted  Range of Motion:  Flexion:  Minimally limited  with pain  Extension:  Minimally limited  with pain  Lateral Flexion - Left:  Minimally limited  with pain  Lateral Flexion - Right:  Minimally limited  with pain  Rotation - Left:  Minimally limited  with pain  Rotation - Right:  Minimally limited  with pain  Motor Strength:  Left Arm Flexion  5/5  Left Arm Extension  5/5  Right Arm Flexion  5/5  Right Arm Extension  5/5  Left Wrist Flexion  5/5  Left Wrist Extension  5/5  Left Finger Abduction  5/5  Right Finger Abduction  5/5  Left    5/5  Right   5/5  Special Tests:  Left Spurlings:  negative  Right Spurlings  negative       Imaging  CT CERVICAL SPINE - WITHOUT CONTRAST     INDICATION: Right-sided neck pain radiating into the right arm and fingers for one month  Occasional severe pain    Posterior right scapular pain with stiffness in the neck      COMPARISON: None      TECHNIQUE:  CT examination of the cervical spine was performed without intravenous contrast   Contiguous axial images were obtained  Sagittal and coronal reconstructions were performed  This examination, like all CT scans performed in the [facility], was performed utilizing techniques to minimize radiation dose exposure, including the use of iterative reconstruction and automated exposure control         IMAGE QUALITY:  Diagnostic      FINDINGS:     ALIGNMENT:  Normal alignment of the cervical spine  No subluxation      VERTEBRAL BODIES:  No fracture      DEGENERATIVE CHANGES:  Degenerative disc disease C6-7 with disc osteophyte complex    There is mild central and mild right greater than left foraminal narrowing      PREVERTEBRAL AND PARASPINAL SOFT TISSUES:  Normal      THORACIC INLET:  Normal      IMPRESSION:     Degenerative disc disease C6-7 results in mild central and mild right greater than left foraminal narrowing

## 2018-08-23 ENCOUNTER — HOSPITAL ENCOUNTER (OUTPATIENT)
Dept: RADIOLOGY | Facility: CLINIC | Age: 63
Discharge: HOME | End: 2018-08-23
Attending: ANESTHESIOLOGY

## 2018-09-14 ENCOUNTER — HOSPITAL ENCOUNTER (OUTPATIENT)
Dept: RADIOLOGY | Facility: CLINIC | Age: 63
Discharge: HOME | End: 2018-09-14
Attending: ANESTHESIOLOGY | Admitting: ANESTHESIOLOGY
Payer: COMMERCIAL

## 2018-09-14 VITALS
RESPIRATION RATE: 18 BRPM | SYSTOLIC BLOOD PRESSURE: 129 MMHG | DIASTOLIC BLOOD PRESSURE: 82 MMHG | TEMPERATURE: 97.7 F | OXYGEN SATURATION: 100 % | HEART RATE: 61 BPM

## 2018-09-14 DIAGNOSIS — M51.16 INTERVERTEBRAL DISC DISORDERS WITH RADICULOPATHY, LUMBAR REGION: ICD-10-CM

## 2018-09-14 PROCEDURE — 64483 NJX AA&/STRD TFRM EPI L/S 1: CPT | Performed by: ANESTHESIOLOGY

## 2018-09-14 RX ORDER — METHYLPREDNISOLONE ACETATE 80 MG/ML
80 INJECTION, SUSPENSION INTRA-ARTICULAR; INTRALESIONAL; INTRAMUSCULAR; PARENTERAL; SOFT TISSUE ONCE
Status: COMPLETED | OUTPATIENT
Start: 2018-09-14 | End: 2018-09-14

## 2018-09-14 RX ORDER — BUPIVACAINE HCL/PF 2.5 MG/ML
10 VIAL (ML) INJECTION ONCE
Status: COMPLETED | OUTPATIENT
Start: 2018-09-14 | End: 2018-09-14

## 2018-09-14 RX ORDER — LIDOCAINE HYDROCHLORIDE 10 MG/ML
10 INJECTION, SOLUTION EPIDURAL; INFILTRATION; INTRACAUDAL; PERINEURAL ONCE
Status: COMPLETED | OUTPATIENT
Start: 2018-09-14 | End: 2018-09-14

## 2018-09-14 RX ADMIN — IOHEXOL 1 ML: 300 INJECTION, SOLUTION INTRAVENOUS at 10:04

## 2018-09-14 RX ADMIN — Medication 2 ML: at 10:04

## 2018-09-14 RX ADMIN — METHYLPREDNISOLONE ACETATE 80 MG: 80 INJECTION, SUSPENSION INTRA-ARTICULAR; INTRALESIONAL; INTRAMUSCULAR; PARENTERAL; SOFT TISSUE at 10:04

## 2018-09-14 RX ADMIN — LIDOCAINE HYDROCHLORIDE 8 ML: 10 INJECTION, SOLUTION EPIDURAL; INFILTRATION; INTRACAUDAL; PERINEURAL at 10:00

## 2018-09-14 NOTE — DISCHARGE INSTR - LAB
Epidural Steroid Injection   WHAT YOU NEED TO KNOW:   An epidural steroid injection (PRATIK) is a procedure to inject steroid medicine into the epidural space  The epidural space is between your spinal cord and vertebrae  Steroids reduce inflammation and fluid buildup in your spine that may be causing pain  You may be given pain medicine along with the steroids  ACTIVITY  · Do not drive or operate machinery today  · No strenuous activity today - bending, lifting, etc   · You may resume normal activites starting tomorrow - start slowly and as tolerated  · You may shower today, but no tub baths or hot tubs  · You may have numbness for several hours from the local anesthetic  Please use caution and common sense, especially with weight-bearing activities  CARE OF THE INJECTION SITE  · If you have soreness or pain, apply ice to the area today (20 minutes on/20 minutes off)  · Starting tomorrow, you may use warm, moist heat or ice if needed  · You may have an increase or change in your discomfort for 36-48 hours after your treatment  · Apply ice and continue with any pain medication you have been prescribed  · Notify [facility] if you have any of the following: redness, drainage, swelling, headache, stiff neck or fever above 100°F     SPECIAL INSTRUCTIONS  · Our office will contact you in approximately 7 days for a progress report  MEDICATIONS  · Continue to take all routine medications  · Our office may have instructed you to hold some medications  If you have a problem specifically related to your procedure, please call our office at [phone]  Problems not related to your procedure should be directed to your primary care physician

## 2018-09-14 NOTE — H&P
History of Present Illness: The patient is a 66 y o  female who presents with complaints of left lower back and leg pain secondary to lumbar stenosis and is here today for left L4-5 transforaminal epidural steroid injection  Patient Active Problem List   Diagnosis    Rosacea, acne    Lumbar degenerative disc disease    Lumbar radiculopathy    Anxiety    Benign essential HTN    Cervical radiculopathy    Disc disorder of cervical region    Hot flash, menopausal    Lumbar facet arthropathy (HCC)    Lumbar stenosis without neurogenic claudication    Myofascial pain syndrome    Obesity (BMI 30-39  9)    Pain syndrome, chronic    Positive depression screening    Primary osteoarthritis of right knee    Right knee injury    Sacroiliitis ([address] )    Tear of medial meniscus of right knee, current       Past Medical History:   Diagnosis Date    Anxiety     Cataract, bilateral     last assessed    Cervical radiculopathy     Disc disease, degenerative, cervical     Herpes simplex infection     Hypertension     Insomnia     Low back pain     Lumbar degenerative disc disease     Lumbar facet arthropathy (HCC)     Lumbar radiculopathy     Lumbar stenosis without neurogenic claudication     Mononucleosis     Myofascial pain dysfunction syndrome     Osteoarthritis     shoulder region - unspecified laterality - last assessed 2/1/14    Plantar fasciitis     Ptosis, both eyelids     last assessed 10/6/16    Ptosis, congenital, left     Retinal detachment     last assessed 12/11/14 right eye    Sacroiliitis ([address] )     Thyroid disease     Thyrotoxicosis     last assessed 5/17/13    Vertigo        Past Surgical History:   Procedure Laterality Date    OR SURG IMPLNT NEUROELECT,EPIDURAL Left 1/26/2016    Procedure: PLACEMENT OF THORACIC SPINAL CORD STIMULATOR WITH LEFT BUTTOCK IMPLANTABLE PULSE GENERATOR (IMPULSE MONITORING);   Surgeon: Lilia Claudio MD;  Location:  MAIN OR;  Service: Neurosurgery  RETINAL DETACHMENT SURGERY Right          Current Outpatient Prescriptions:     busPIRone (BUSPAR) 5 mg tablet, Take 2 tablets (10 mg total) by mouth daily at bedtime, Disp: 180 tablet, Rfl: 1    doxycycline hyclate (VIBRAMYCIN) 100 mg capsule, 100 mg 2 (two) times a day, Disp: , Rfl: 4    LORazepam (ATIVAN) 1 mg tablet, Take 1 5 tablets (1 5 mg total) by mouth daily at bedtime Take 1 5 tablets by mouth at bedtime as needed, Disp: 135 tablet, Rfl: 0    meloxicam (MOBIC) 15 mg tablet, Take 1 tablet (15 mg total) by mouth daily, Disp: 30 tablet, Rfl: 3    metaxalone (SKELAXIN) 400 MG tablet, Take 1 tablet (400 mg total) by mouth 2 (two) times a day as needed (muscl spasm), Disp: 60 tablet, Rfl: 3    methimazole (TAPAZOLE) 5 mg tablet, Take 5 mg by mouth daily  , Disp: , Rfl:     metroNIDAZOLE (METROCREAM) 0 75 % cream, Apply topically daily at bedtime, Disp: , Rfl:     penciclovir (DENAVIR) 1 % cream, Apply topically as needed  , Disp: , Rfl:     propranolol (INDERAL) 10 mg tablet, Take 10 mg by mouth 2 (two) times a day  , Disp: , Rfl:     quinapril (ACCUPRIL) 10 mg tablet, TAKE 1 TABLET BY MOUTH  DAILY, Disp: 90 tablet, Rfl: 1    valACYclovir (VALTREX) 500 mg tablet, Take 500 mg by mouth as needed, Disp: , Rfl:     Allergies   Allergen Reactions    Other Dermatitis     Adhesive tape - please use ONLY PAPER TAPE    Scopolamine Other (See Comments)     Severe, debilitating headache      Flexeril [Cyclobenzaprine] Dizziness and Fatigue    Naproxen      Other reaction(s): Unknown Allergic Reaction  Annotation - [id number]: nightmares    Penicillins      Other reaction(s): Unknown Allergic Reaction  Annotation - [id number]: childhood reaction    Promethazine-Codeine      Reaction Date: [id number]; Annotation - [id number]: nightmares;  Annotation - [id number]: nightmares    Tramadol      Other reaction(s): Unknown Allergic Reaction  Annotation - [id number]: PT HAS NIGHTMARES FROM TRAMADOL    Wound Dressing Adhesive Rash       Physical Exam:   Vitals:    09/14/18 0940   BP: 104/71   Pulse: 64   Resp: 18   Temp: 97 7 °F (36 5 °C)   SpO2: 96%     General: Awake, Alert, Oriented x 3, Mood and affect appropriate  Respiratory: Respirations even and unlabored  Cardiovascular: Peripheral pulses intact; no edema  Musculoskeletal Exam:   Left lower back tenderness    ASA Score: 2    Patient/Chart Verification  Patient ID Verified: Verbal  ID Band Applied: No  Consents Confirmed: Procedural, To be obtained in the Pre-Procedure area  H&P( within 30 days) Verified: To be obtained in the Pre-Procedure area  Interval H&P(within 24 hr) Complete (required for Outpatients and Surgery Admit only): To be obtained in the Pre-Procedure area  Allergies Reviewed: Yes  Anticoag/NSAID held?: NA  Currently on antibiotics?: No    Assessment:   1   Intervertebral disc disorders with radiculopathy, lumbar region        Plan: Left L4-L5 TFESI

## 2018-09-17 DIAGNOSIS — F41.9 ANXIETY: ICD-10-CM

## 2018-09-17 RX ORDER — LORAZEPAM 1 MG/1
1.5 TABLET ORAL
Qty: 135 TABLET | Refills: 0 | Status: CANCELLED | OUTPATIENT
Start: 2018-09-17

## 2018-09-17 NOTE — TELEPHONE ENCOUNTER
Pt l/m on [facility] Rx line requesting refill of Lorazepam to [facility]  Per PDMP site Rx to early to fill  Last filled on 7/12/18 for 90 day supply  The earliest it can be filled is 10/5/18, 1 week prior to it being due b/c now all meds can be e-scribed including controlled substances  Called and notified pt  She said she would call back

## 2018-09-19 ENCOUNTER — TELEPHONE (OUTPATIENT)
Dept: RADIOLOGY | Facility: CLINIC | Age: 63
End: 2018-09-19

## 2018-09-25 NOTE — TELEPHONE ENCOUNTER
Patient states that she is able to stand up straight  Patient states that this is improvement  Pain scale - 3/10 with 70% relief    Was wondering about surgery? Should I schedule for an office visit?

## 2018-09-26 ENCOUNTER — OFFICE VISIT (OUTPATIENT)
Dept: PAIN MEDICINE | Facility: CLINIC | Age: 63
End: 2018-09-26
Payer: COMMERCIAL

## 2018-09-26 VITALS
HEIGHT: 67 IN | BODY MASS INDEX: 34.06 KG/M2 | WEIGHT: 217 LBS | SYSTOLIC BLOOD PRESSURE: 120 MMHG | DIASTOLIC BLOOD PRESSURE: 68 MMHG | TEMPERATURE: 97.8 F

## 2018-09-26 DIAGNOSIS — M48.061 LUMBAR STENOSIS WITHOUT NEUROGENIC CLAUDICATION: Primary | ICD-10-CM

## 2018-09-26 PROCEDURE — 99214 OFFICE O/P EST MOD 30 MIN: CPT | Performed by: ANESTHESIOLOGY

## 2018-09-26 NOTE — PROGRESS NOTES
Assessment:  1  Lumbar stenosis without neurogenic claudication        Plan:  The patient received significant relief from the epidural steroid injection but still has pain in her left low back and leg and is looking for definitive treatment so that she does not have to rely on injections so frequently  At this time, I will order updated MRI of the lumbar spine in preparation for referral to Dr Troy Quintanilla of Neurosurgery to discuss lumbar decompression surgery  History of Present Illness: The patient is a 66 y o  female with a history of lumbar spinal stenosis with radiculopathy and sacroiliitis returns follow-up  She was last seen on September 14th at which time she underwent a left L4-5 transforaminal epidural steroid injection which provided significant relief  She reports intermittent pain now in the left lower back that travels down the left leg that is dull-aching, throbbing, pressure-like  She does have the spinal cord stimulator that does help with the back pain but is interested in more long-term treatment  Review of Systems:    Review of Systems   Respiratory: Negative for shortness of breath  Cardiovascular: Negative for chest pain  Gastrointestinal: Negative for constipation, diarrhea, nausea and vomiting  Musculoskeletal: Positive for gait problem  Negative for arthralgias, joint swelling and myalgias  Skin: Negative for rash  Neurological: Negative for dizziness, seizures and weakness  All other systems reviewed and are negative          Past Medical History:   Diagnosis Date    Anxiety     Cataract, bilateral     last assessed    Cervical radiculopathy     Disc disease, degenerative, cervical     Herpes simplex infection     Hypertension     Insomnia     Low back pain     Lumbar degenerative disc disease     Lumbar facet arthropathy (HCC)     Lumbar radiculopathy     Lumbar stenosis without neurogenic claudication     Mononucleosis     Myofascial pain dysfunction syndrome     Osteoarthritis     shoulder region - unspecified laterality - last assessed 2/1/14    Plantar fasciitis     Ptosis, both eyelids     last assessed 10/6/16    Ptosis, congenital, left     Retinal detachment     last assessed 12/11/14 right eye    Sacroiliitis ([address] )     Thyroid disease     Thyrotoxicosis     last assessed 5/17/13    Vertigo        Past Surgical History:   Procedure Laterality Date    [place] SURG IMPLNT NEUROELECT,EPIDURAL Left 1/26/2016    Procedure: PLACEMENT OF THORACIC SPINAL CORD STIMULATOR WITH LEFT BUTTOCK IMPLANTABLE PULSE GENERATOR (IMPULSE MONITORING);   Surgeon: Jt Nguyen MD;  Location:  MAIN OR;  Service: Neurosurgery    RETINAL DETACHMENT SURGERY Right        Family History   Problem Relation Age of Onset   Juan C Vazquez Breast cancer Mother    Juan C Vazquez COPD Mother     Hypertension Mother         essential    Heart attack Father         acute MI    Emphysema Father     Hypertension Father         essential    Other Father         prehypertension    Liver cancer Maternal Aunt        Social History     Occupational History    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     Social History Main Topics    Smoking status: Never Smoker    Smokeless tobacco: Never Used    Alcohol use No      Comment: occasional use as per [employer]    Drug use: No    Sexual activity: Yes         Current Outpatient Prescriptions:     busPIRone (BUSPAR) 5 mg tablet, Take 2 tablets (10 mg total) by mouth daily at bedtime, Disp: 180 tablet, Rfl: 1    LORazepam (ATIVAN) 1 mg tablet, Take 1 5 tablets (1 5 mg total) by mouth daily at bedtime Take 1 5 tablets by mouth at bedtime as needed, Disp: 135 tablet, Rfl: 0    metaxalone (SKELAXIN) 400 MG tablet, Take 1 tablet (400 mg total) by mouth 2 (two) times a day as needed (muscl spasm), Disp: 60 tablet, Rfl: 3    methimazole (TAPAZOLE) 5 mg tablet, Take 5 mg by mouth daily  , Disp: , Rfl:     metroNIDAZOLE (METROCREAM) 0 75 % cream, Apply topically daily at bedtime, Disp: , Rfl:     penciclovir (DENAVIR) 1 % cream, Apply topically as needed  , Disp: , Rfl:     propranolol (INDERAL) 10 mg tablet, Take 10 mg by mouth 2 (two) times a day  , Disp: , Rfl:     quinapril (ACCUPRIL) 10 mg tablet, TAKE 1 TABLET BY MOUTH  DAILY, Disp: 90 tablet, Rfl: 1    valACYclovir (VALTREX) 500 mg tablet, Take 500 mg by mouth as needed, Disp: , Rfl:     Allergies   Allergen Reactions    Other Dermatitis     Adhesive tape - please use ONLY PAPER TAPE    Scopolamine Other (See Comments)     Severe, debilitating headache      Flexeril [Cyclobenzaprine] Dizziness and Fatigue    Naproxen      Other reaction(s): Unknown Allergic Reaction  Annotation - [id number]: nightmares    Penicillins      Other reaction(s): Unknown Allergic Reaction  Annotation - [id number]: childhood reaction    Promethazine-Codeine      Reaction Date: [id number]; Annotation - [id number]: nightmares; Annotation - [id number]: nightmares    Tramadol      Other reaction(s): Unknown Allergic Reaction  Annotation - [id number]: PT HAS NIGHTMARES FROM TRAMADOL    Wound Dressing Adhesive Rash       Physical Exam:    /68   Temp 97 8 °F (36 6 °C) (Oral)   Ht 5' 7" (1 702 m)   Wt 98 4 kg (217 lb)   LMP 11/26/2015   BMI 33 99 kg/m²     Constitutional:normal, well developed, well nourished, alert, in no distress and non-toxic and no overt pain behavior   and obese  Eyes:anicteric  HEENT:grossly intact  Neck:supple, symmetric, trachea midline and no masses   Pulmonary:even and unlabored  Cardiovascular:No edema or pitting edema present  Skin:Normal without rashes or lesions and well hydrated  Psychiatric:Mood and affect appropriate  Neurologic:Cranial Nerves II-XII grossly intact  Musculoskeletal:antalgic    Lumbar Spine Exam  Appearance:  Normal lordosis  Palpation/Tenderness:  left lumbar paraspinal tenderness  left sacroiliac joint tenderness  left piriformis tenderness  Sensory:  no sensory deficits noted  Range of Motion:  Flexion:  Minimally limited  with pain  Extension:  Moderately limited  with pain  Lateral Flexion - Left:  Moderately limited  with pain  Lateral Flexion - Right:  No limitation  without pain  Rotation - Left:  Moderately limited  with pain  Rotation - Right:  No limitation  without pain  Motor Strength:  Left hip flexion:  5/5  Left hip extension:  5/5  Right hip flexion:  5/5  Right hip extension:  5/5  Left knee flexion:  5/5  Left knee extension:  5/5  Right knee flexion:  5/5  Right knee extension:  5/5  Left foot dorsiflexion:  4/5  Left foot plantar flexion:  5/5  Right foot dorsiflexion:  5/5  Right foot plantar flexion:  5/5  Reflexes:  Left Patellar:  2+   Right Patellar:  2+   Left Achilles:  2+   Right Achilles:  2+   Special Tests:  Left Straight Leg Test:  positive  Right Straight Leg Test:  negative  Left Nicko's Maneuver:  negative  Right Nicko's Maneuver:  negative    Imaging    MRI LUMBAR SPINE WITHOUT CONTRAST (11/7/2017)     INDICATION:  Low back and left leg pain     COMPARISON:  MR 3/17/2014     TECHNIQUE:  Sagittal T1, sagittal T2, sagittal inversion recovery, axial 2-D merge and axial T2        IMAGE QUALITY:  Presence of spinal stimulator limits study parameters, imaging time and consequently, anatomic integrity         Findings:  Patient does have 6 nonrib-bearing lumbar-type vertebral bodies and, 12 ribs  For purposes of this report transitional lumbosacral segment will be deemed S1         ALIGNMENT:  Stable degenerative grade 1 L5-S1 anterolisthesis      MARROW SIGNAL:  Normal marrow signal is identified within the visualized bony structures  No discrete marrow lesion      DISTAL CORD AND CONUS:  Normal size and signal within the distal cord and conus  The conus ends at the L2 level      PARASPINAL SOFT TISSUES:  Paraspinal soft tissues are unremarkable      SACRUM:  Normal signal within the sacrum   No evidence of insufficiency or stress fracture      LOWER THORACIC DISC SPACES:  Minimal bulge T12-L1     LUMBAR DISC SPACES:          L1-L2:  Minor bulge     L2-L3:  Minor bulge     L3-L4:  Minor bulge     L4-L5:  Facet arthrosis, redundancy of the ligamentum flavum, minor bulge  Minor stenosis  AP dimension of the sac reduced to approximately 6 mm  This has progressed minimally since prior study      L5-S1:  Degenerative grade 1 anterolisthesis, marked redundancy of the ligamentum flavum, trefoil shape Canal with severe bilateral lateral recess stenosis  Minor distortion of the foramen no critical stenosis  Correlate for signs and symptoms of   spinal stenosis  Although not apparent on prior studies, the pars interarticularis is not well depicted on the limited quality exam today    Dehiscence of the pars is not excluded      S1-S2: Rudimentary disc space overtly normal, minor facet arthrosis, no critical stenosis        Orders Placed This Encounter   Procedures    MRI lumbar spine wo contrast    Ambulatory referral to Neurosurgery

## 2018-09-28 ENCOUNTER — HOSPITAL ENCOUNTER (OUTPATIENT)
Dept: RADIOLOGY | Facility: CLINIC | Age: 63
Discharge: HOME | End: 2018-09-28
Attending: ANESTHESIOLOGY | Admitting: ANESTHESIOLOGY
Payer: COMMERCIAL

## 2018-09-28 VITALS
HEART RATE: 66 BPM | TEMPERATURE: 98 F | RESPIRATION RATE: 20 BRPM | DIASTOLIC BLOOD PRESSURE: 78 MMHG | SYSTOLIC BLOOD PRESSURE: 119 MMHG | OXYGEN SATURATION: 98 %

## 2018-09-28 DIAGNOSIS — M50.120 CERVICAL DISC DISORDER WITH RADICULOPATHY OF MID-CERVICAL REGION: ICD-10-CM

## 2018-09-28 PROCEDURE — 62321 NJX INTERLAMINAR CRV/THRC: CPT | Performed by: ANESTHESIOLOGY

## 2018-09-28 RX ORDER — LIDOCAINE HYDROCHLORIDE 10 MG/ML
5 INJECTION, SOLUTION EPIDURAL; INFILTRATION; INTRACAUDAL; PERINEURAL ONCE
Status: COMPLETED | OUTPATIENT
Start: 2018-09-28 | End: 2018-09-28

## 2018-09-28 RX ORDER — METHYLPREDNISOLONE ACETATE 80 MG/ML
80 INJECTION, SUSPENSION INTRA-ARTICULAR; INTRALESIONAL; INTRAMUSCULAR; PARENTERAL; SOFT TISSUE ONCE
Status: COMPLETED | OUTPATIENT
Start: 2018-09-28 | End: 2018-09-28

## 2018-09-28 RX ADMIN — LIDOCAINE HYDROCHLORIDE 4 ML: 10 INJECTION, SOLUTION EPIDURAL; INFILTRATION; INTRACAUDAL; PERINEURAL at 10:45

## 2018-09-28 RX ADMIN — METHYLPREDNISOLONE ACETATE 80 MG: 80 INJECTION, SUSPENSION INTRA-ARTICULAR; INTRALESIONAL; INTRAMUSCULAR; PARENTERAL; SOFT TISSUE at 10:48

## 2018-09-28 RX ADMIN — IOHEXOL 1 ML: 300 INJECTION, SOLUTION INTRAVENOUS at 10:47

## 2018-09-28 NOTE — DISCHARGE INSTR - LAB
Epidural Steroid Injection   WHAT YOU NEED TO KNOW:   An epidural steroid injection (PRATIK) is a procedure to inject steroid medicine into the epidural space  The epidural space is between your spinal cord and vertebrae  Steroids reduce inflammation and fluid buildup in your spine that may be causing pain  You may be given pain medicine along with the steroids  ACTIVITY  · Do not drive or operate machinery today  · No strenuous activity today - bending, lifting, etc   · You may resume normal activites starting tomorrow - start slowly and as tolerated  · You may shower today, but no tub baths or hot tubs  · You may have numbness for several hours from the local anesthetic  Please use caution and common sense, especially with weight-bearing activities  CARE OF THE INJECTION SITE  · If you have soreness or pain, apply ice to the area today (20 minutes on/20 minutes off)  · Starting tomorrow, you may use warm, moist heat or ice if needed  · You may have an increase or change in your discomfort for 36-48 hours after your treatment  · Apply ice and continue with any pain medication you have been prescribed  · Notify [facility] if you have any of the following: redness, drainage, swelling, headache, stiff neck or fever above 100°F     SPECIAL INSTRUCTIONS  · Our office will contact you in approximately 7 days for a progress report  MEDICATIONS  · Continue to take all routine medications  · Our office may have instructed you to hold some medications  If you have a problem specifically related to your procedure, please call our office at [phone]  Problems not related to your procedure should be directed to your primary care physician

## 2018-09-28 NOTE — H&P
History of Present Illness: The patient is a 66 y o  female who presents with complaints of neck pain and is here today for cervical epidural steroid injection  Patient Active Problem List   Diagnosis    Rosacea, acne    Lumbar degenerative disc disease    Lumbar radiculopathy    Anxiety    Benign essential HTN    Cervical radiculopathy    Disc disorder of cervical region    Hot flash, menopausal    Lumbar facet arthropathy (HCC)    Lumbar stenosis without neurogenic claudication    Myofascial pain syndrome    Obesity (BMI 30-39  9)    Pain syndrome, chronic    Positive depression screening    Primary osteoarthritis of right knee    Right knee injury    Sacroiliitis ([address] )    Tear of medial meniscus of right knee, current       Past Medical History:   Diagnosis Date    Anxiety     Cataract, bilateral     last assessed    Cervical radiculopathy     Disc disease, degenerative, cervical     Herpes simplex infection     Hypertension     Insomnia     Low back pain     Lumbar degenerative disc disease     Lumbar facet arthropathy (HCC)     Lumbar radiculopathy     Lumbar stenosis without neurogenic claudication     Mononucleosis     Myofascial pain dysfunction syndrome     Osteoarthritis     shoulder region - unspecified laterality - last assessed 2/1/14    Plantar fasciitis     Ptosis, both eyelids     last assessed 10/6/16    Ptosis, congenital, left     Retinal detachment     last assessed 12/11/14 right eye    Sacroiliitis ([address] )     Thyroid disease     Thyrotoxicosis     last assessed 5/17/13    Vertigo        Past Surgical History:   Procedure Laterality Date    [place] SURG IMPLNT NEUROELECT,EPIDURAL Left 1/26/2016    Procedure: PLACEMENT OF THORACIC SPINAL CORD STIMULATOR WITH LEFT BUTTOCK IMPLANTABLE PULSE GENERATOR (IMPULSE MONITORING);   Surgeon: Latonya Carter MD;  Location:  MAIN OR;  Service: Neurosurgery    RETINAL DETACHMENT SURGERY Right          Current Outpatient Prescriptions:     busPIRone (BUSPAR) 5 mg tablet, Take 2 tablets (10 mg total) by mouth daily at bedtime, Disp: 180 tablet, Rfl: 1    LORazepam (ATIVAN) 1 mg tablet, Take 1 5 tablets (1 5 mg total) by mouth daily at bedtime Take 1 5 tablets by mouth at bedtime as needed, Disp: 135 tablet, Rfl: 0    metaxalone (SKELAXIN) 400 MG tablet, Take 1 tablet (400 mg total) by mouth 2 (two) times a day as needed (muscl spasm), Disp: 60 tablet, Rfl: 3    methimazole (TAPAZOLE) 5 mg tablet, Take 5 mg by mouth daily  , Disp: , Rfl:     metroNIDAZOLE (METROCREAM) 0 75 % cream, Apply topically daily at bedtime, Disp: , Rfl:     penciclovir (DENAVIR) 1 % cream, Apply topically as needed  , Disp: , Rfl:     propranolol (INDERAL) 10 mg tablet, Take 10 mg by mouth 2 (two) times a day  , Disp: , Rfl:     quinapril (ACCUPRIL) 10 mg tablet, TAKE 1 TABLET BY MOUTH  DAILY, Disp: 90 tablet, Rfl: 1    valACYclovir (VALTREX) 500 mg tablet, Take 500 mg by mouth as needed, Disp: , Rfl:     Allergies   Allergen Reactions    Other Dermatitis     Adhesive tape - please use ONLY PAPER TAPE    Scopolamine Other (See Comments)     Severe, debilitating headache      Flexeril [Cyclobenzaprine] Dizziness and Fatigue    Naproxen      Other reaction(s): Unknown Allergic Reaction  Annotation - [id number]: nightmares    Penicillins      Other reaction(s): Unknown Allergic Reaction  Annotation - [id number]: childhood reaction    Promethazine-Codeine      Reaction Date: [id number]; Annotation - [id number]: nightmares;  Annotation - [id number]: nightmares    Tramadol      Other reaction(s): Unknown Allergic Reaction  Annotation - [id number]: PT HAS NIGHTMARES FROM TRAMADOL    Wound Dressing Adhesive Rash       Physical Exam:   Vitals:    09/28/18 1031   BP: 110/72   Pulse: 65   Resp: 20   Temp: 98 °F (36 7 °C)   SpO2: 97%     General: Awake, Alert, Oriented x 3, Mood and affect appropriate  Respiratory: Respirations even and unlabored  Cardiovascular: Peripheral pulses intact; no edema  Musculoskeletal Exam:   Neck tenderness    ASA Score: 2    Patient/Chart Verification  Patient ID Verified: Verbal  Consents Confirmed: Procedural, To be obtained in the Pre-Procedure area  H&P( within 30 days) Verified: To be obtained in the Pre-Procedure area  Allergies Reviewed: Yes  Anticoag/NSAID held?:  (no ibuprofen for 24 hrs )  Currently on antibiotics?: Yes (on prophylactic abx for rosacea)    Assessment:   1   Cervical disc disorder with radiculopathy of mid-cervical region        Plan: ISAI

## 2018-09-29 DIAGNOSIS — L71.9 ROSACEA, ACNE: ICD-10-CM

## 2018-10-01 RX ORDER — DOXYCYCLINE HYCLATE 100 MG/1
CAPSULE ORAL
Qty: 60 CAPSULE | Refills: 4 | OUTPATIENT
Start: 2018-10-01

## 2018-10-04 ENCOUNTER — TELEPHONE (OUTPATIENT)
Dept: RADIOLOGY | Facility: CLINIC | Age: 63
End: 2018-10-04

## 2018-10-10 DIAGNOSIS — L71.9 ROSACEA, ACNE: Primary | ICD-10-CM

## 2018-10-10 DIAGNOSIS — F41.9 ANXIETY: ICD-10-CM

## 2018-10-10 RX ORDER — DOXYCYCLINE HYCLATE 100 MG/1
100 CAPSULE ORAL 2 TIMES DAILY
Qty: 180 CAPSULE | Refills: 1 | Status: SHIPPED | OUTPATIENT
Start: 2018-10-10 | End: 2019-03-08 | Stop reason: SDUPTHER

## 2018-10-10 RX ORDER — LORAZEPAM 1 MG/1
1.5 TABLET ORAL
Qty: 135 TABLET | Refills: 0 | Status: SHIPPED | OUTPATIENT
Start: 2018-10-10 | End: 2018-12-20 | Stop reason: SDUPTHER

## 2018-10-11 ENCOUNTER — HOSPITAL ENCOUNTER (OUTPATIENT)
Dept: RADIOLOGY | Facility: HOSPITAL | Age: 63
Discharge: HOME | End: 2018-10-11
Payer: COMMERCIAL

## 2018-10-11 DIAGNOSIS — M48.061 LUMBAR STENOSIS WITHOUT NEUROGENIC CLAUDICATION: ICD-10-CM

## 2018-10-11 DIAGNOSIS — F41.9 ANXIETY: ICD-10-CM

## 2018-10-11 PROCEDURE — 72148 MRI LUMBAR SPINE W/O DYE: CPT

## 2018-10-11 RX ORDER — BUSPIRONE HYDROCHLORIDE 5 MG/1
10 TABLET ORAL
Qty: 180 TABLET | Refills: 1 | Status: SHIPPED | OUTPATIENT
Start: 2018-10-11 | End: 2018-10-12 | Stop reason: SDUPTHER

## 2018-10-11 NOTE — TELEPHONE ENCOUNTER
Please confirm with pt  Per Dr Malick Flores last note on 8/13 it states to increase to Buspar twice a day  At other area of the note it states that the patient was on 10 but cut back to 5  Can we please confirm what she is actually taking? Thanks!  Send back when ready

## 2018-10-12 RX ORDER — BUSPIRONE HYDROCHLORIDE 5 MG/1
5 TABLET ORAL 2 TIMES DAILY
Qty: 180 TABLET | Refills: 1 | Status: SHIPPED | OUTPATIENT
Start: 2018-10-12 | End: 2018-11-05 | Stop reason: SDUPTHER

## 2018-10-15 ENCOUNTER — OFFICE VISIT (OUTPATIENT)
Dept: INTERNAL MEDICINE CLINIC | Age: 63
End: 2018-10-15
Payer: COMMERCIAL

## 2018-10-15 ENCOUNTER — TELEPHONE (OUTPATIENT)
Dept: RADIOLOGY | Facility: CLINIC | Age: 63
End: 2018-10-15

## 2018-10-15 VITALS
HEART RATE: 66 BPM | BODY MASS INDEX: 34.02 KG/M2 | WEIGHT: 217.2 LBS | DIASTOLIC BLOOD PRESSURE: 60 MMHG | TEMPERATURE: 97.5 F | OXYGEN SATURATION: 98 % | SYSTOLIC BLOOD PRESSURE: 114 MMHG

## 2018-10-15 DIAGNOSIS — M51.26 BULGE OF LUMBAR DISC WITHOUT MYELOPATHY: Primary | ICD-10-CM

## 2018-10-15 DIAGNOSIS — I10 BENIGN ESSENTIAL HTN: ICD-10-CM

## 2018-10-15 DIAGNOSIS — M51.36 LUMBAR DEGENERATIVE DISC DISEASE: ICD-10-CM

## 2018-10-15 DIAGNOSIS — J30.89 SEASONAL ALLERGIC RHINITIS DUE TO OTHER ALLERGIC TRIGGER: ICD-10-CM

## 2018-10-15 DIAGNOSIS — Z23 NEED FOR INFLUENZA VACCINATION: ICD-10-CM

## 2018-10-15 DIAGNOSIS — M54.16 LUMBAR RADICULOPATHY: ICD-10-CM

## 2018-10-15 DIAGNOSIS — L71.9 ROSACEA, ACNE: ICD-10-CM

## 2018-10-15 PROBLEM — M51.369 BULGE OF LUMBAR DISC WITHOUT MYELOPATHY: Status: ACTIVE | Noted: 2018-10-15

## 2018-10-15 PROCEDURE — 90686 IIV4 VACC NO PRSV 0.5 ML IM: CPT

## 2018-10-15 PROCEDURE — 3078F DIAST BP <80 MM HG: CPT | Performed by: FAMILY MEDICINE

## 2018-10-15 PROCEDURE — 90471 IMMUNIZATION ADMIN: CPT

## 2018-10-15 PROCEDURE — 3074F SYST BP LT 130 MM HG: CPT | Performed by: FAMILY MEDICINE

## 2018-10-15 PROCEDURE — 99214 OFFICE O/P EST MOD 30 MIN: CPT | Performed by: FAMILY MEDICINE

## 2018-10-15 RX ORDER — MELOXICAM 15 MG/1
15 TABLET ORAL DAILY PRN
Qty: 30 TABLET | Refills: 3 | Status: SHIPPED | OUTPATIENT
Start: 2018-10-15 | End: 2019-01-25

## 2018-10-15 RX ORDER — FLUTICASONE PROPIONATE 50 MCG
1 SPRAY, SUSPENSION (ML) NASAL DAILY
Qty: 16 G | Refills: 3 | Status: SHIPPED | OUTPATIENT
Start: 2018-10-15

## 2018-10-15 NOTE — TELEPHONE ENCOUNTER
----- Message from Leonel Abreu MD sent at 10/12/2018 10:50 AM EDT -----  MRI L-spine reviewed- essentially no change from prior MRI  Still with left sided disc protrusion at L4-5 with mild to moderate stenosis as well as at L5-S1 with moderate spinal stenosis  Cont with plan to see Dr Trevino President

## 2018-10-15 NOTE — PROGRESS NOTES
Assessment/Plan:    No problem-specific Assessment & Plan notes found for this encounter  1  Benign essential HTN (401 1) (I10)   2  Anxiety (300 00) (F41 9)    Discussion:    add mobic for better control and stop OTC advil  Take with food, stop zanaflex and start skelaxin for better pain control  Call if any issues    stay off Cymbalta since it has not helped and made her tired  Increase buspar to 10 mg twice a day,  to help with anxiety depression and back pain  she has an appt with her neurosurgeon and keep the appointment with pain management doctor to discuss further options such as injections versus Neurosurgery for lumbar stenosis and canal stenosis and foraminal stenosis  keep doxycycline b i d  for rosacea symptoms  She is advised to not starting the above meds at once  Has a spinal stimulator that helps her back but now pain in LLE- radiation  Takes ativan at night and  It helps but she still wakes up at night, it makes her too tired in the daytime so she does not like to take it  Discussed options Of Neurontin, amitriptyline, or narcotics to control her pain              Problem List Items Addressed This Visit        Cardiovascular and Mediastinum    Benign essential HTN       Nervous and Auditory    Lumbar radiculopathy    Relevant Medications    meloxicam (MOBIC) 15 mg tablet       Musculoskeletal and Integument    Rosacea, acne    Lumbar degenerative disc disease    Relevant Medications    meloxicam (MOBIC) 15 mg tablet    Bulge of lumbar disc without myelopathy - Primary    Relevant Medications    meloxicam (MOBIC) 15 mg tablet            Subjective:  F/up htn and back pain and insomnia     Patient ID: Luke Olmstead is a 66 y o  female  HPI     Back pain:  Patient states that lyrica not covered by her insurance anymore and she has been off of it for 1 week  She feels that it really did not help her is now that she has not been taking it for 1 week    On Neurontin in the past which did not help her  She was given nortriptyline by her pain management doctor which made her feel lethargic but did not help with her discomfort  She called them and she has an appointment to be seen for possible injection  She describes her pain as burning and has difficulty standing and sitting for any prolonged period of time  She has difficulty coping in her and sitting at her job  She states that she takes Zanaflex at night p r n  but that makes her tired in the daytime  She takes anywhere between 8-12 to 100 mg ibuprofen a day but does not mix it with meloxicam   She had an MRI few months back and a pain pump placed by Neurosurgery approximately 1 year ago  Originally it did help her but since then it has not been helping and she has not seen a neurosurgeon back  5/16/18: prednisone id not help and Cymbalta makes her anxiety worse  Went to the pain mgmt and got injections that have helped now  Still having crying spells and does not feel that Cymbalta is helping  8/13/18: going for another spinal injection, had her stimulator adjusted but not much relief  Better day today    10/15/18:  Pain mgmt note seen, for neurosurgical eval  repeat MRI noted today  Never started mobic, better with low dose muscle relaxor, takes half bc full makes her tired     Rosacea, patient was placed on Metrogel by her endocrinologist and she has been using it for 1 month  She feels that is not helping  She has facial flushing for several years which are exacerbated by stress, hypertension, heat, and hot flashes  She would like to discuss other options  5/16/18: started doxy 1 week ago, no relief yet  Still flushing and worse in the mornings  10/15/18:  Helping a lot, taking doxy BID, ran out 1 week ago, re start at BID and then decrease to monthly     Insomnia, patient states she does not sleep well which is very multifactorial including the pain and how flashes    She used to be on estrogen replacement currently does not take any  She does take Ativan at night to help her sleep which helped marginally  5/16/18: taking 1 5 mg  And advil pm and it has helped- sleeping through the night now and slightly  More tired in am   10/15/18: taking ativan and sleeping well with it     Depression:  Patient is a depression was exacerbated by the pain and inability to sleep  She is always tired and fatigued and is having difficulty with activities of daily living around the house like cleaning and cooking as well as work  Patient is here today and can cooperate always  She feels that she is up to any medications or the long medications right now  8/13/18: buspar was increased to 10 mg per but she felt tired on it and went back to 5 mg BId, has gained weight  10/15/18: taking bupar 5 mg- 2 tabs at [facility],      The following portions of the patient's history were reviewed and updated as appropriate: allergies, current medications, past family history, past medical history, past social history, past surgical history and problem list     Review of Systems    Constitutional:  Denies fever or chills   Eyes:  Denies change in visual acuity   HENT:  Denies nasal congestion or sore throat , + allergy symptoms  Respiratory:  Denies cough or shortness of breath or wheezing  Cardiovascular:  Denies palpitations or chest pain  GI:  Denies abdominal pain, nausea, or vomiting  Integument:  rosacea  Neurologic:  Denies headache or focal weakness, dizziness with computer and change in head positions    MS: see HPI    Objective:      /60 (BP Location: Left arm, Patient Position: Sitting, Cuff Size: Large)   Pulse 66   Temp 97 5 °F (36 4 °C) (Tympanic)   Wt 98 5 kg (217 lb 3 2 oz)   LMP 11/26/2015   SpO2 98%   BMI 34 02 kg/m²          Physical Exam    Constitutional:  Well developed, well nourished, no acute distress, non-toxic appearance   Eyes:  PERRL, conjunctiva normal , non icteric sclera  HENT:  Atraumatic, oropharynx moist  Neck-  supple Respiratory:  CTA b/l, normal breath sounds, no rales, no wheezing   Cardiovascular:  RRR, no murmurs, no LE edema b/l  GI:  Soft, nondistended, normal bowel sounds x 4, nontender, no organomegaly, no mass, no rebound, no guarding   Neurologic:  no focal deficits noted   Psychiatric:  Speech and behavior appropriate , AAO x 3  MS: able to stand up straight today, positive straight leg test

## 2018-10-15 NOTE — TELEPHONE ENCOUNTER
Pt informed of the MRI results and recommendation as stated in FQ's previous task  Pt said she has an appt with Dr Aiden Mccabe for 11/8 and is also on their cancellation list  Pt appreciated the call

## 2018-10-16 DIAGNOSIS — B00.1 RECURRENT COLD SORES: Primary | ICD-10-CM

## 2018-10-16 RX ORDER — VALACYCLOVIR HYDROCHLORIDE 500 MG/1
500 TABLET, FILM COATED ORAL AS NEEDED
Qty: 30 TABLET | Refills: 0 | Status: SHIPPED | OUTPATIENT
Start: 2018-10-16 | End: 2018-12-07 | Stop reason: SDUPTHER

## 2018-10-28 DIAGNOSIS — I10 ESSENTIAL HYPERTENSION: ICD-10-CM

## 2018-10-30 RX ORDER — QUINAPRIL 10 MG/1
TABLET ORAL
Qty: 90 TABLET | Refills: 1 | Status: SHIPPED | OUTPATIENT
Start: 2018-10-30 | End: 2018-12-12 | Stop reason: SDUPTHER

## 2018-11-05 DIAGNOSIS — F41.9 ANXIETY: ICD-10-CM

## 2018-11-07 RX ORDER — BUSPIRONE HYDROCHLORIDE 5 MG/1
5 TABLET ORAL 4 TIMES DAILY
Qty: 128 TABLET | Refills: 0 | Status: SHIPPED | OUTPATIENT
Start: 2018-11-07 | End: 2018-12-10 | Stop reason: SDUPTHER

## 2018-11-08 ENCOUNTER — OFFICE VISIT (OUTPATIENT)
Dept: NEUROSURGERY | Facility: CLINIC | Age: 63
End: 2018-11-08
Payer: COMMERCIAL

## 2018-11-08 VITALS
RESPIRATION RATE: 16 BRPM | HEIGHT: 67 IN | BODY MASS INDEX: 34.18 KG/M2 | HEART RATE: 65 BPM | SYSTOLIC BLOOD PRESSURE: 144 MMHG | DIASTOLIC BLOOD PRESSURE: 77 MMHG | TEMPERATURE: 96 F | WEIGHT: 217.8 LBS

## 2018-11-08 DIAGNOSIS — M43.16 SPONDYLOLISTHESIS OF LUMBAR REGION: Primary | ICD-10-CM

## 2018-11-08 DIAGNOSIS — M48.061 LUMBAR STENOSIS WITHOUT NEUROGENIC CLAUDICATION: ICD-10-CM

## 2018-11-08 PROCEDURE — 99214 OFFICE O/P EST MOD 30 MIN: CPT | Performed by: NEUROLOGICAL SURGERY

## 2018-11-08 NOTE — LETTER
November 8, 2018     Ovidio Larsen MD  [id number] [facility] [address]  [address] [facility] [facility]    Patient: Saskia Martinez   YOB: 1955   Date of Visit: 11/8/2018       Dear Dr Rosa Blandon:    Thank you for referring Sharmin Yates to me for evaluation  Below are my notes for this consultation  If you have questions, please do not hesitate to call me  I look forward to following your patient along with you  Sincerely,        Sloane Tatum MD        CC: DO Slonae Syed MD  11/8/2018 10:55 AM  Sign at close encounter  Office Note - Neurosurgery   Saskia Martinez 66 y o  female MRN: [id number]      Assessment:    Patient is gradually worsening  44-year-old woman with lumbar spondylosis and spondylolisthesis with stenosis and radiculopathy and neurogenic claudication  She has tried a number of nonsurgical pain management strategies  Epidural steroid injection seem to be helpful  She may wish to initiate membrane stabilizing agents at a more for double as these have been helpful in the past     We discussed an L4-5 and L5-S1 posterior instrumented fixation fusion with L5-S1 decompression and possible transverse lumbar interbody fusion with possible extension to additional levels  The goal of surgery is to relieve pressure neural structures and hopefully improve radicular pain and symptoms of neurogenic claudication  Weakness, numbness and back pain are less likely to improve  The risks of surgery were described in detail  1  Risk of general anesthetic with possible cardiac and respiratory complication  Risk of infection and bleeding  2  Risk of neurological injury with new pain, weakness or numbness in the legs or difficulties with bowel and bladder function  Risk of CSF leak was described  3  Possible need for additional lumbar spine surgery in the future                                             Expected postoperative course, including activity restrictions, expected pain and postoperative medication were reviewed  Patient provided verbal consent to surgical procedure and signed consent form: No    Patient prefers to exhausted nonsurgical pain management strategies  I reviewed the signs and symptoms of progressive lumbar radiculopathy and neurogenic claudication  I would be pleased to see her again should she have additional questions regarding surgery under which circumstance she may require up-to-date imaging including flexion-extension film lumbar spine  Otherwise, she will follow up through this office on a p r n  basis  History, physical examination and diagnostic tests were reviewed and questions answered  Diagnosis, care plan and treatment options were discussed  The patient and spouse/SO understand instructions and will follow up as directed  Plan:    Follow-up: prn    Problem List Items Addressed This Visit        Musculoskeletal and Integument    Spondylolisthesis of lumbar region - Primary       Other    Lumbar stenosis without neurogenic claudication          Subjective/Objective     Chief Complaint    Lower back and left-sided leg pain  HPI    Pleasant 19-year-old woman with longstanding history of lower back and left-sided leg pain  She had a spinal cord stimulator placed in January of 2016 which initially was quite helpful  However she has now developed some lower back pain and left buttock pain which radiates into the left hamstring  This seems to be worse with standing and walking but can occur in any position  If he stands and walks for more than a block, she will begin to feel heaviness and clumsiness in the left leg  She denies any difficulty with the right leg  She denies any difficulties with bowel bladder function or changing perineal sensation  Epidural steroid injections are quite helpful with the pain  In the past Lyrica was helpful but she can no longer afford this secondary to insurance changes    She has not initiated a less expensive membrane stabilizing agent  Physical therapy has not been helpful  She presents today with an MRI of the lumbar spine  ROS    Review of Systems   Constitutional: Positive for fatigue  HENT: Negative  Eyes: Negative  Respiratory: Negative  Cardiovascular: Negative  Gastrointestinal: Negative  Endocrine: Negative  Genitourinary: Negative  Musculoskeletal: Positive for back pain (across lower back, more left then right, radiates into left buttock, left hip, and down left leg, stopping a left knee ) and gait problem (due to vertigo)  Skin: Negative  Allergic/Immunologic: Negative  Neurological: Positive for dizziness (due to vertigo ) and weakness (left leg )  Negative for seizures, syncope, numbness and headaches  Hematological: Negative  Psychiatric/Behavioral: Negative          Family History    Family History   Problem Relation Age of Onset   [facility] Breast cancer Mother    [facility] COPD Mother     Hypertension Mother         essential    Heart attack Father         acute MI    Emphysema Father     Hypertension Father         essential    Other Father         prehypertension    Liver cancer Maternal Aunt        Social History    Social History     Social History    Marital status: /Civil Union     Spouse name: N/A    Number of children: N/A    Years of education: N/A     Occupational History    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     Social History Main Topics    Smoking status: Never Smoker    Smokeless tobacco: Never Used    Alcohol use No      Comment: occasional use as per Allscripts    Drug use: No    Sexual activity: Yes     Other Topics Concern    Not on file     Social History Narrative    Daily caffeinated cola consumption    3 cups coffee/day           Past Medical History    Past Medical History:   Diagnosis Date    Anxiety     Cataract, bilateral     last assessed    Cervical radiculopathy     Disc disease, degenerative, cervical     Herpes simplex infection     Hypertension     Insomnia     Low back pain     Lumbar degenerative disc disease     Lumbar facet arthropathy     Lumbar radiculopathy     Lumbar stenosis without neurogenic claudication     Mononucleosis     Myofascial pain dysfunction syndrome     Osteoarthritis     shoulder region - unspecified laterality - last assessed 2/1/14    Plantar fasciitis     Ptosis, both eyelids     last assessed 10/6/16    Ptosis, congenital, left     Retinal detachment     last assessed 12/11/14 right eye    Sacroiliitis ([address] )     Thyroid disease     Thyrotoxicosis     last assessed 5/17/13    Vertigo        Surgical History    Past Surgical History:   Procedure Laterality Date    NE SURG IMPLNT NEUROELECT,EPIDURAL Left 1/26/2016    Procedure: PLACEMENT OF THORACIC SPINAL CORD STIMULATOR WITH LEFT BUTTOCK IMPLANTABLE PULSE GENERATOR (IMPULSE MONITORING);   Surgeon: Allen Ferrell MD;  Location:  MAIN OR;  Service: Neurosurgery    RETINAL DETACHMENT SURGERY Right        Medications      Current Outpatient Prescriptions:     busPIRone (BUSPAR) 5 mg tablet, Take 1 tablet (5 mg total) by mouth 4 (four) times a day, Disp: 128 tablet, Rfl: 0    doxycycline hyclate (VIBRAMYCIN) 100 mg capsule, Take 1 capsule (100 mg total) by mouth 2 (two) times a day for 180 days, Disp: 180 capsule, Rfl: 1    fluticasone (FLONASE) 50 mcg/act nasal spray, 1 spray into each nostril daily, Disp: 16 g, Rfl: 3    LORazepam (ATIVAN) 1 mg tablet, Take 1 5 tablets (1 5 mg total) by mouth daily at bedtime as needed for anxiety, Disp: 135 tablet, Rfl: 0    meloxicam (MOBIC) 15 mg tablet, Take 1 tablet (15 mg total) by mouth daily as needed for moderate pain, Disp: 30 tablet, Rfl: 3    metaxalone (SKELAXIN) 400 MG tablet, Take 1 tablet (400 mg total) by mouth 2 (two) times a day as needed (muscl spasm), Disp: 60 tablet, Rfl: 3    methimazole (TAPAZOLE) 5 mg tablet, Take 5 mg by mouth daily  , Disp: , Rfl:     penciclovir (DENAVIR) 1 % cream, Apply topically as needed  , Disp: , Rfl:     propranolol (INDERAL) 10 mg tablet, Take 10 mg by mouth 2 (two) times a day  , Disp: , Rfl:     quinapril (ACCUPRIL) 10 mg tablet, TAKE 1 TABLET BY MOUTH  DAILY, Disp: 90 tablet, Rfl: 1    valACYclovir (VALTREX) 500 mg tablet, Take 1 tablet (500 mg total) by mouth as needed (cold sores) for up to 30 days, Disp: 30 tablet, Rfl: 0    Allergies    Allergies   Allergen Reactions    Other Dermatitis     Adhesive tape - please use ONLY PAPER TAPE    Scopolamine Other (See Comments)     Severe, debilitating headache      Flexeril [Cyclobenzaprine] Dizziness and Fatigue    Naproxen      Other reaction(s): Unknown Allergic Reaction  Annotation - [id number]: nightmares    Penicillins      Other reaction(s): Unknown Allergic Reaction  Annotation - [id number]: childhood reaction    Promethazine-Codeine      Reaction Date: [id number]; Annotation - [id number]: nightmares; Annotation - [id number]: nightmares    Tramadol      Other reaction(s): Unknown Allergic Reaction  Annotation - [id number]: PT HAS NIGHTMARES FROM TRAMADOL    Wound Dressing Adhesive Rash       The following portions of the patient's history were reviewed and updated as appropriate: allergies, current medications, past family history, past medical history, past social history, past surgical history and problem list     Investigations    I personally reviewed the MRI results with the patient:    MRI of the lumbar spine without contrast dated October 11th, 2018  Note is made of partial lumbarization of S1  Grade 1 degenerative anterolisthesis at L5-S1  Advanced facet arthropathy at L4-5 and L5-S1  At L5-S1 there is moderate central and bilateral lateral recess stenosis, worse on the left than on the right secondary to degenerative changes and anterolisthesis  Minimal stenosis at L4-5  Intrathecal contents unremarkable      Physical Exam    Vitals:  Blood pressure 144/77, pulse 65, temperature (!) 96 °F (35 6 °C), resp  rate 16, height 5' 7" (1 702 m), weight 98 8 kg (217 lb 12 8 oz), last menstrual period 11/26/2015  ,Body mass index is 34 11 kg/m²  Physical Exam   Constitutional: She is oriented to person, place, and time  She appears well-developed and well-nourished  No distress  Musculoskeletal:   Full range of motion on flexion-extension of the lumbar spine with minimal pain  No tenderness to palpation  Neurological: She is alert and oriented to person, place, and time  No sensory deficit  5/5 power in lower extremities  Normal pinprick and light touch sensation over lower extremities  Walks with a steady gait but somewhat stooped forward posture  Skin: Skin is warm and dry  Psychiatric: She has a normal mood and affect  Her behavior is normal    Vitals reviewed  Neurologic Exam     Mental Status   Oriented to person, place, and time

## 2018-11-08 NOTE — PROGRESS NOTES
Office Note - Neurosurgery   Lynetta Skiff 66 y o  female MRN: [id number]      Assessment:    Patient is gradually worsening  53-year-old woman with lumbar spondylosis and spondylolisthesis with stenosis and radiculopathy and neurogenic claudication  She has tried a number of nonsurgical pain management strategies  Epidural steroid injection seem to be helpful  She may wish to initiate membrane stabilizing agents at a more for double as these have been helpful in the past     We discussed an L4-5 and L5-S1 posterior instrumented fixation fusion with L5-S1 decompression and possible transverse lumbar interbody fusion with possible extension to additional levels  The goal of surgery is to relieve pressure neural structures and hopefully improve radicular pain and symptoms of neurogenic claudication  Weakness, numbness and back pain are less likely to improve  The risks of surgery were described in detail  1  Risk of general anesthetic with possible cardiac and respiratory complication  Risk of infection and bleeding  2  Risk of neurological injury with new pain, weakness or numbness in the legs or difficulties with bowel and bladder function  Risk of CSF leak was described  3  Possible need for additional lumbar spine surgery in the future  Expected postoperative course, including activity restrictions, expected pain and postoperative medication were reviewed  Patient provided verbal consent to surgical procedure and signed consent form: No    Patient prefers to exhausted nonsurgical pain management strategies  I reviewed the signs and symptoms of progressive lumbar radiculopathy and neurogenic claudication  I would be pleased to see her again should she have additional questions regarding surgery under which circumstance she may require up-to-date imaging including flexion-extension film lumbar spine    Otherwise, she will follow up through this office on a p r n  basis  History, physical examination and diagnostic tests were reviewed and questions answered  Diagnosis, care plan and treatment options were discussed  The patient and spouse/SO understand instructions and will follow up as directed  Plan:    Follow-up: prn    Problem List Items Addressed This Visit        Musculoskeletal and Integument    Spondylolisthesis of lumbar region - Primary       Other    Lumbar stenosis without neurogenic claudication          Subjective/Objective     Chief Complaint    Lower back and left-sided leg pain  HPI    Pleasant 80-year-old woman with longstanding history of lower back and left-sided leg pain  She had a spinal cord stimulator placed in January of 2016 which initially was quite helpful  However she has now developed some lower back pain and left buttock pain which radiates into the left hamstring  This seems to be worse with standing and walking but can occur in any position  If he stands and walks for more than a block, she will begin to feel heaviness and clumsiness in the left leg  She denies any difficulty with the right leg  She denies any difficulties with bowel bladder function or changing perineal sensation  Epidural steroid injections are quite helpful with the pain  In the past Lyrica was helpful but she can no longer afford this secondary to insurance changes  She has not initiated a less expensive membrane stabilizing agent  Physical therapy has not been helpful  She presents today with an MRI of the lumbar spine  ROS    Review of Systems   Constitutional: Positive for fatigue  HENT: Negative  Eyes: Negative  Respiratory: Negative  Cardiovascular: Negative  Gastrointestinal: Negative  Endocrine: Negative  Genitourinary: Negative      Musculoskeletal: Positive for back pain (across lower back, more left then right, radiates into left buttock, left hip, and down left leg, stopping a left knee ) and gait problem (due to vertigo)  Skin: Negative  Allergic/Immunologic: Negative  Neurological: Positive for dizziness (due to vertigo ) and weakness (left leg )  Negative for seizures, syncope, numbness and headaches  Hematological: Negative  Psychiatric/Behavioral: Negative          Family History    Family History   Problem Relation Age of Onset   [facility] Breast cancer Mother    [facility] COPD Mother     Hypertension Mother         essential    Heart attack Father         acute MI    Emphysema Father     Hypertension Father         essential    Other Father         prehypertension    Liver cancer Maternal Aunt        Social History    Social History     Social History    Marital status: /Civil Union     Spouse name: N/A    Number of children: N/A    Years of education: N/A     Occupational History    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     Social History Main Topics    Smoking status: Never Smoker    Smokeless tobacco: Never Used    Alcohol use No      Comment: occasional use as per Allscripts    Drug use: No    Sexual activity: Yes     Other Topics Concern    Not on file     Social History Narrative    Daily caffeinated cola consumption    3 cups coffee/day           Past Medical History    Past Medical History:   Diagnosis Date    Anxiety     Cataract, bilateral     last assessed    Cervical radiculopathy     Disc disease, degenerative, cervical     Herpes simplex infection     Hypertension     Insomnia     Low back pain     Lumbar degenerative disc disease     Lumbar facet arthropathy     Lumbar radiculopathy     Lumbar stenosis without neurogenic claudication     Mononucleosis     Myofascial pain dysfunction syndrome     Osteoarthritis     shoulder region - unspecified laterality - last assessed 2/1/14    Plantar fasciitis     Ptosis, both eyelids     last assessed 10/6/16    Ptosis, congenital, left     Retinal detachment     last assessed 12/11/14 right eye    Sacroili[address] ([facility] [address]     Thyroid disease     Thyrotoxicosis     last assessed 5/17/13    Vertigo        Surgical History    Past Surgical History:   Procedure Laterality Date    [place] SURG IMPLNT NEUROELECT,EPIDURAL Left 1/26/2016    Procedure: PLACEMENT OF THORACIC SPINAL CORD STIMULATOR WITH LEFT BUTTOCK IMPLANTABLE PULSE GENERATOR (IMPULSE MONITORING);   Surgeon: Alonzo Mitchell MD;  Location:  MAIN OR;  Service: Neurosurgery    RETINAL DETACHMENT SURGERY Right        Medications      Current Outpatient Prescriptions:     busPIRone (BUSPAR) 5 mg tablet, Take 1 tablet (5 mg total) by mouth 4 (four) times a day, Disp: 128 tablet, Rfl: 0    doxycycline hyclate (VIBRAMYCIN) 100 mg capsule, Take 1 capsule (100 mg total) by mouth 2 (two) times a day for 180 days, Disp: 180 capsule, Rfl: 1    fluticasone (FLONASE) 50 mcg/act nasal spray, 1 spray into each nostril daily, Disp: 16 g, Rfl: 3    LORazepam (ATIVAN) 1 mg tablet, Take 1 5 tablets (1 5 mg total) by mouth daily at bedtime as needed for anxiety, Disp: 135 tablet, Rfl: 0    meloxicam (MOBIC) 15 mg tablet, Take 1 tablet (15 mg total) by mouth daily as needed for moderate pain, Disp: 30 tablet, Rfl: 3    metaxalone (SKELAXIN) 400 MG tablet, Take 1 tablet (400 mg total) by mouth 2 (two) times a day as needed (muscl spasm), Disp: 60 tablet, Rfl: 3    methimazole (TAPAZOLE) 5 mg tablet, Take 5 mg by mouth daily  , Disp: , Rfl:     penciclovir (DENAVIR) 1 % cream, Apply topically as needed  , Disp: , Rfl:     propranolol (INDERAL) 10 mg tablet, Take 10 mg by mouth 2 (two) times a day  , Disp: , Rfl:     quinapril (ACCUPRIL) 10 mg tablet, TAKE 1 TABLET BY MOUTH  DAILY, Disp: 90 tablet, Rfl: 1    valACYclovir (VALTREX) 500 mg tablet, Take 1 tablet (500 mg total) by mouth as needed (cold sores) for up to 30 days, Disp: 30 tablet, Rfl: 0    Allergies    Allergies   Allergen Reactions    Other Dermatitis     Adhesive tape - please use ONLY PAPER TAPE    Scopolamine Other (See Comments)     Severe, debilitating headache      Flexeril [Cyclobenzaprine] Dizziness and Fatigue    Naproxen      Other reaction(s): Unknown Allergic Reaction  Annotation - [id number]: nightmares    Penicillins      Other reaction(s): Unknown Allergic Reaction  Annotation - [id number]: childhood reaction    Promethazine-Codeine      Reaction Date: [id number]; Annotation - [id number]: nightmares; Annotation - [id number]: nightmares    Tramadol      Other reaction(s): Unknown Allergic Reaction  Annotation - [id number]: PT HAS NIGHTMARES FROM TRAMADOL    Wound Dressing Adhesive Rash       The following portions of the patient's history were reviewed and updated as appropriate: allergies, current medications, past family history, past medical history, past social history, past surgical history and problem list     Investigations    I personally reviewed the MRI results with the patient:    MRI of the lumbar spine without contrast dated October 11th, 2018  Note is made of partial lumbarization of S1  Grade 1 degenerative anterolisthesis at L5-S1  Advanced facet arthropathy at L4-5 and L5-S1  At L5-S1 there is moderate central and bilateral lateral recess stenosis, worse on the left than on the right secondary to degenerative changes and anterolisthesis  Minimal stenosis at L4-5  Intrathecal contents unremarkable  Physical Exam    Vitals:  Blood pressure 144/77, pulse 65, temperature (!) 96 °F (35 6 °C), resp  rate 16, height 5' 7" (1 702 m), weight 98 8 kg (217 lb 12 8 oz), last menstrual period 11/26/2015  ,Body mass index is 34 11 kg/m²  Physical Exam   Constitutional: She is oriented to person, place, and time  She appears well-developed and well-nourished  No distress  Musculoskeletal:   Full range of motion on flexion-extension of the lumbar spine with minimal pain  No tenderness to palpation  Neurological: She is alert and oriented to person, place, and time   No sensory deficit  5/5 power in lower extremities  Normal pinprick and light touch sensation over lower extremities  Walks with a steady gait but somewhat stooped forward posture  Skin: Skin is warm and dry  Psychiatric: She has a normal mood and affect  Her behavior is normal    Vitals reviewed  Neurologic Exam     Mental Status   Oriented to person, place, and time

## 2018-11-13 ENCOUNTER — TELEPHONE (OUTPATIENT)
Dept: OBGYN CLINIC | Facility: HOSPITAL | Age: 63
End: 2018-11-13

## 2018-11-13 NOTE — TELEPHONE ENCOUNTER
Caller: patient  Call back number: [phone]    Patient called stating she would like to get another injection in her back and neck  She had the last one in September  She states she is still having a lot of pain in her neck and shoulder  She states the injection did not help the last time  Her back pain is staring to come back  She is asking if this can be ordered so it can be approved by her insurance  She has seen Barbi Alcantara and is considering the surgery

## 2018-11-13 NOTE — TELEPHONE ENCOUNTER
S/w pt, scheduled her for OV with Gonsalo Rodriguez on 11/19 to arrive at 8:30 am to discuss arm/shoulder pain  Please schedule pt for L L4-5 TFESI, pt needs it to be before the end of the year due to high deductible at the beginning of the year

## 2018-11-13 NOTE — TELEPHONE ENCOUNTER
S/w pt, she is s/p ISAI on 9/28 and L L4-5 TFESI on 9/14, she said that she would like to try to have inj's for both again before the end of the year  She said her insurance is very difficult and it takes a long time to get approved and if she waits till the beginning of the year she will have to pay thousands for the injections  Pt said she would like to get her back done first cause that is bothering her more, she did meet with Rock Pearson and he suggested sx but she said it is a lot more than she was expecting and she does not know if she is ready for that yet  She is also concerned because she said the ISAI that she got in the end of September did not really help her shoulder and arm pain and thinks maybe something new might be going on  What do you think?

## 2018-11-13 NOTE — TELEPHONE ENCOUNTER
Go head and schedule the repeat back injection and schedule her follow-up visit with [facility] for re-eval of the neck/shoulder

## 2018-11-19 ENCOUNTER — OFFICE VISIT (OUTPATIENT)
Dept: PAIN MEDICINE | Facility: CLINIC | Age: 63
End: 2018-11-19
Payer: COMMERCIAL

## 2018-11-19 ENCOUNTER — HOSPITAL ENCOUNTER (OUTPATIENT)
Dept: RADIOLOGY | Age: 63
Discharge: HOME | End: 2018-11-19
Payer: COMMERCIAL

## 2018-11-19 ENCOUNTER — APPOINTMENT (OUTPATIENT)
Dept: RADIOLOGY | Facility: MEDICAL CENTER | Age: 63
End: 2018-11-19
Payer: COMMERCIAL

## 2018-11-19 ENCOUNTER — TELEPHONE (OUTPATIENT)
Dept: OBGYN CLINIC | Facility: CLINIC | Age: 63
End: 2018-11-19

## 2018-11-19 ENCOUNTER — TELEPHONE (OUTPATIENT)
Dept: PAIN MEDICINE | Facility: MEDICAL CENTER | Age: 63
End: 2018-11-19

## 2018-11-19 VITALS
SYSTOLIC BLOOD PRESSURE: 118 MMHG | BODY MASS INDEX: 33.9 KG/M2 | WEIGHT: 216 LBS | DIASTOLIC BLOOD PRESSURE: 72 MMHG | HEART RATE: 84 BPM | HEIGHT: 67 IN

## 2018-11-19 VITALS — HEIGHT: 67 IN | WEIGHT: 216 LBS | BODY MASS INDEX: 33.9 KG/M2

## 2018-11-19 DIAGNOSIS — M25.511 ACUTE PAIN OF RIGHT SHOULDER: ICD-10-CM

## 2018-11-19 DIAGNOSIS — M51.26 LUMBAR DISC HERNIATION: ICD-10-CM

## 2018-11-19 DIAGNOSIS — Z12.31 ENCOUNTER FOR SCREENING MAMMOGRAM FOR MALIGNANT NEOPLASM OF BREAST: ICD-10-CM

## 2018-11-19 DIAGNOSIS — G89.4 CHRONIC PAIN SYNDROME: Primary | ICD-10-CM

## 2018-11-19 DIAGNOSIS — Z12.31 ENCOUNTER FOR SCREENING MAMMOGRAM FOR MALIGNANT NEOPLASM OF BREAST: Primary | ICD-10-CM

## 2018-11-19 DIAGNOSIS — M47.816 LUMBAR SPONDYLOSIS: ICD-10-CM

## 2018-11-19 DIAGNOSIS — M54.16 LUMBAR RADICULOPATHY: ICD-10-CM

## 2018-11-19 DIAGNOSIS — M54.12 CERVICAL RADICULOPATHY: ICD-10-CM

## 2018-11-19 PROCEDURE — 99214 OFFICE O/P EST MOD 30 MIN: CPT | Performed by: NURSE PRACTITIONER

## 2018-11-19 PROCEDURE — 73030 X-RAY EXAM OF SHOULDER: CPT

## 2018-11-19 PROCEDURE — 77067 SCR MAMMO BI INCL CAD: CPT

## 2018-11-19 PROCEDURE — 77063 BREAST TOMOSYNTHESIS BI: CPT

## 2018-11-19 NOTE — PROGRESS NOTES
Assessment:  1  Chronic pain syndrome    2  Acute pain of right shoulder    3  Cervical radiculopathy    4  Lumbar disc herniation    5  Lumbar radiculopathy    6  Lumbar spondylosis        Plan:  Regi Quiroz is a 66 y o  female who presents for a follow up office visit in regards to Back Pain and Arm Pain  The patient has a history of chronic pain syndrome secondary to lumbar disc herniation, lumbar spondylosis, lumbar radiculopathy, cervical degenerative disc disease and cervical radiculopathy  She presents today with ongoing right arm and low back pain  The low back pain is consistent with a disc herniation as well as spondylosis at L4-L5 which is causing radiculopathy  To help decrease inflammation and nerve irritation, we can repeat the left L4-L5 transforaminal epidural steroid injection  Complete risks and benefits including bleeding, infection, tissue reaction, nerve injury and allergic reaction were discussed  The approach was demonstrated using models and literature was provided  Verbal and written consent was obtained  In regards to the right arm pain, she has weakness in her right biceps and triceps on examination  Her pain was also replicated with right arm abduction  She has a MRI right shoulder in 2014 which showed calcific deposits in the infraspinatus without tear and mild acromioclavicular joint osteoarthritis  I will order a x-ray of the right shoulder to further evaluate due to new onset of weakness and decreased ROM  I will contact her with the results  I will also order an EMG of the right upper extremity to evaluate for median neuropathy  I will contact her with the results of this test as well    My impressions and treatment recommendations were discussed in detail with the patient who verbalized understanding and had no further questions  Discharge instructions were provided   I personally saw and examined the patient and I agree with the above discussed plan of care     Orders Placed This Encounter   Procedures    XR shoulder 2+ vw right     Standing Status:   Future     Number of Occurrences:   1     Standing Expiration Date:   11/19/2022     Scheduling Instructions:      Bring along any outside films relating to this procedure  Order Specific Question:   Reason for Exam:     Answer:   right shoulder pain    FL spine and pain procedure     Standing Status:   Future     Standing Expiration Date:   11/19/2022     Order Specific Question:   Reason for Exam:     Answer:   left L4-L5 TFESI     Order Specific Question:   Anticoagulant hold needed? Answer:   no    EMG 1 Limb     Standing Status:   Future     Standing Expiration Date:   11/19/2019     Order Specific Question:   Location:     Answer:   Right upper     Order Specific Question:   Reason for Exam:     Answer:   right arm pain     Order Specific Question:   Possible Diagnosis: Answer:   cervical neuropathy     No orders of the defined types were placed in this encounter  History of Present Illness:  Yovany Long is a 66 y o  female who presents for a follow up office visit in regards to Back Pain and Arm Pain  The patient has a history of chronic pain syndrome secondary to lumbar disc herniation, lumbar spondylosis, lumbar radiculopathy, cervical degenerative disc disease and cervical radiculopathy  She was last seen in the office on September 28, 2018, which she had a cervical epidural steroid injection  On September 14, 2018, she had a left L4-L5 transforaminal epidural steroid injection  She presents today with ongoing neck and low back pain  She states the low back pain is located primarily on the left side, and radiates down the lateral aspect of the left leg, stopping at the knee  The pain had significantly improved after the left L4-L5 transforaminal epidural steroid injection she had on September 14, 2018    However, due to the increased activity she has been doing consider the holidays, the pain is starting to return  It is now constant and described as dull aching and shooting  She is rating her pain a 7/10 on the numeric rating scale  She is also experiencing right arm pain  She states the pain is mainly located in the right shoulder, scapula, as well as the right bicep  She also occasionally feels pain that radiates into the forearm and top of her right hand  She had a cervical epidural steroid injection on September 28, 2018, but this provided little pain relief  She also feels pain when lifting her arm, and feels weakness  She recently saw Dr Norma Phillips, who discussed in L4-L5 and L5-S1 fusion, but the patient is not sure she wants to move forward with surgery  She is currently taking meloxicam and metaxalone which were prescribed by her primary care physician  The medication provides some pain relief    I have personally reviewed and/or updated the patient's past medical history, past surgical history, family history, social history, current medications, allergies, and vital signs today  Review of Systems    Patient Active Problem List   Diagnosis    Rosacea, acne    Lumbar degenerative disc disease    Lumbar radiculopathy    Anxiety    Benign essential HTN    Cervical radiculopathy    Disc disorder of cervical region    Hot flash, menopausal    Lumbar facet arthropathy    Lumbar stenosis without neurogenic claudication    Myofascial pain syndrome    Obesity (BMI 30-39  9)    Pain syndrome, chronic    Positive depression screening    Primary osteoarthritis of right knee    Right knee injury    Sacroiliitis ([address] )    Tear of medial meniscus of right knee, current    Bulge of lumbar disc without myelopathy    Spondylolisthesis of lumbar region       Past Medical History:   Diagnosis Date    Anxiety     Cataract, bilateral     last assessed    Cervical radiculopathy     Disc disease, degenerative, cervical     Herpes simplex infection     Hypertension     Insomnia     Low back pain     Lumbar degenerative disc disease     Lumbar facet arthropathy     Lumbar radiculopathy     Lumbar stenosis without neurogenic claudication     Mononucleosis     Myofascial pain dysfunction syndrome     Osteoarthritis     shoulder region - unspecified laterality - last assessed 2/1/14    Plantar fasciitis     Ptosis, both eyelids     last assessed 10/6/16    Ptosis, congenital, left     Retinal detachment     last assessed 12/11/14 right eye    Sacroiliitis ([address] )     Thyroid disease     Thyrotoxicosis     last assessed 5/17/13    Vertigo        Past Surgical History:   Procedure Laterality Date    [facility] SURG IMPLNT NEUROELECT,EPIDURAL Left 1/26/2016    Procedure: PLACEMENT OF THORACIC SPINAL CORD STIMULATOR WITH LEFT BUTTOCK IMPLANTABLE PULSE GENERATOR (IMPULSE MONITORING);   Surgeon: Mimi Bryan MD;  Location:  MAIN OR;  Service: Neurosurgery    RETINAL DETACHMENT SURGERY Right        Family History   Problem Relation Age of Onset   Grazyna Sharp Breast cancer Mother    Grazyna Sharp COPD Mother     Hypertension Mother         essential    Heart attack Father         acute MI    Emphysema Father     Hypertension Father         essential    Other Father         prehypertension    Liver cancer Maternal Aunt        Social History     Occupational History    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     Social History Main Topics    Smoking status: Never Smoker    Smokeless tobacco: Never Used    Alcohol use No      Comment: occasional use as per [employer]    Drug use: No    Sexual activity: Yes       Current Outpatient Prescriptions on File Prior to Visit   Medication Sig    busPIRone (BUSPAR) 5 mg tablet Take 1 tablet (5 mg total) by mouth 4 (four) times a day    doxycycline hyclate (VIBRAMYCIN) 100 mg capsule Take 1 capsule (100 mg total) by mouth 2 (two) times a day for 180 days    fluticasone (FLONASE) 50 mcg/act nasal spray 1 spray into each nostril daily    LORazepam (ATIVAN) 1 mg tablet Take 1 5 tablets (1 5 mg total) by mouth daily at bedtime as needed for anxiety    meloxicam (MOBIC) 15 mg tablet Take 1 tablet (15 mg total) by mouth daily as needed for moderate pain    metaxalone (SKELAXIN) 400 MG tablet Take 1 tablet (400 mg total) by mouth 2 (two) times a day as needed (muscl spasm)    methimazole (TAPAZOLE) 5 mg tablet Take 5 mg by mouth daily      penciclovir (DENAVIR) 1 % cream Apply topically as needed      propranolol (INDERAL) 10 mg tablet Take 10 mg by mouth 2 (two) times a day      quinapril (ACCUPRIL) 10 mg tablet TAKE 1 TABLET BY MOUTH  DAILY    valACYclovir (VALTREX) 500 mg tablet Take 1 tablet (500 mg total) by mouth as needed (cold sores) for up to 30 days     No current facility-administered medications on file prior to visit  Allergies   Allergen Reactions    Other Dermatitis     Adhesive tape - please use ONLY PAPER TAPE    Scopolamine Other (See Comments)     Severe, debilitating headache      Flexeril [Cyclobenzaprine] Dizziness and Fatigue    Naproxen      Other reaction(s): Unknown Allergic Reaction  Annotation - [id number]: nightmares    Penicillins      Other reaction(s): Unknown Allergic Reaction  Annotation - [id number]: childhood reaction    Promethazine-Codeine      Reaction Date: [id number]; Annotation - [id number]: nightmares; Annotation - [id number]: nightmares    Tramadol      Other reaction(s): Unknown Allergic Reaction  Annotation - [id number]: PT HAS NIGHTMARES FROM TRAMADOL    Wound Dressing Adhesive Rash       Physical Exam:    /72 (BP Location: Left arm, Patient Position: Sitting)   Pulse 84   Ht 5' 7" (1 702 m)   Wt 98 kg (216 lb)   LMP 11/26/2015   BMI 33 83 kg/m²     Constitutional:normal, well developed, well nourished, alert, in no distress and non-toxic and no overt pain behavior    Eyes:anicteric  HEENT:grossly intact  Neck:supple, symmetric, trachea midline and no masses Pulmonary:even and unlabored  Cardiovascular:No edema or pitting edema present  Skin:Normal without rashes or lesions and well hydrated  Psychiatric:Mood and affect appropriate  Neurologic:Cranial Nerves II-XII grossly intact  Musculoskeletal:normal     Cervical Spine Exam    Appearance:  Normal lordosis  Palpation/Tenderness:  no tenderness or spasm   Tenderness right acromioclavicular joint   Tenderness and spasms right trapezius and rhomboid muscle  Motor Strength:  Left Arm Flexion  5/5  Left Arm Extension  5/5  Right Arm Flexion  4/5  Right Arm Extension  4/5  Left Finger Abduction  5/5  Right Finger Abduction  5/5  Left    5/5  Right   5/5  Pain with 60 degrees right arm abduction  Decreased ability to internal adduct right arm    Imaging  CT CERVICAL SPINE - WITHOUT CONTRAST 11/29/16     INDICATION: Right-sided neck pain radiating into the right arm and fingers for one month  Occasional severe pain  Posterior right scapular pain with stiffness in the neck      COMPARISON: None      TECHNIQUE:  CT examination of the cervical spine was performed without intravenous contrast   Contiguous axial images were obtained  Sagittal and coronal reconstructions were performed  This examination, like all CT scans performed in the [facility], was performed utilizing techniques to minimize radiation dose exposure, including the use of iterative reconstruction and automated exposure control         IMAGE QUALITY:  Diagnostic      FINDINGS:     ALIGNMENT:  Normal alignment of the cervical spine  No subluxation      VERTEBRAL BODIES:  No fracture      DEGENERATIVE CHANGES:  Degenerative disc disease C6-7 with disc osteophyte complex    There is mild central and mild right greater than left foraminal narrowing      PREVERTEBRAL AND PARASPINAL SOFT TISSUES:  Normal      THORACIC INLET:  Normal      IMPRESSION:     Degenerative disc disease C6-7 results in mild central and mild right greater than left foraminal narrowing        MRI LUMBAR SPINE WITHOUT CONTRAST 10/11/18     INDICATION: [id number]: Spinal stenosis, lumbar region without neurogenic claudication  Left-sided low back pain and leg pain      COMPARISON:  11/7/2017     TECHNIQUE: Limited scanning was performed using MR guidelines based on  limitations  Sagittal T2, coronal T2, axial gradient echo and axial T2 as well as sagittal SPGR imaging was obtained      Medical device: The patient has a st fabby stimulator [id number]  which is MR compatible  Scanning was performed as per the 's gridlines with attention to device appropriate DANUTA levels         IMAGE QUALITY:  Diagnostic     FINDINGS: There is a transitional vertebral body at the lumbosacral junction  For the purposes of this study, the first conically shaped vertebral body will be designated S1  If spinal intervention is indicated, correlation with radiography recommended      Vertebral body levels are labeled on  image 7, series 5  The numbering convention used previously will be followed on the current study        ALIGNMENT:  Trace grade 1 anterolisthesis of L5 on S1 is retrospectively stable as is a minimal levoscoliosis mid lumbar spine apex at the L3 segment      MARROW SIGNAL:  Normal marrow signal is identified within the visualized bony structures  No discrete marrow lesion      DISTAL CORD AND CONUS:  Normal size and signal within the distal cord and conus  The conus ends at the L2 level      PARASPINAL SOFT TISSUES:  Artifact in the left subcutaneous soft tissues in the low back noted likely related to stimulator leads      SACRUM:  Normal signal within the sacrum  No evidence of insufficiency or stress fracture      LOWER THORACIC DISC SPACES:  Normal disc height and signal   No disc herniation, canal stenosis or foraminal narrowing      LUMBAR DISC SPACES:     L1-L2:  Normal      L2-L3:  Normal      L3-L4:  There is a diffuse disk bulge    No significant central canal or neural foraminal narrowing  Bilateral facet hypertrophy noted  This level is similar to the prior study      L4-L5:  Small left paracentral disc protrusion  There is bilateral facet hypertrophy  Mild to moderate central canal narrowing  Mild left neural foraminal narrowing  Right neural foramen patent  This level is similar to the prior study      L5-S1:  There is uncovering the intervertebral disc space  There is bilateral facet hypertrophy  There is a superimposed left neural foraminal disc protrusion  Moderate left neural foraminal narrowing  Moderate central canal narrowing  Mild right   neural foraminal narrowing  This level is similar to the prior study      IMPRESSION:     1  There is a transitional vertebral body at the lumbosacral junction  If spinal intervention is indicated, correlation with radiography recommended  2   Scattered spondylotic changes are similar to the prior study  Patient is here for a follow up on back pain and right arm pain

## 2018-11-19 NOTE — TELEPHONE ENCOUNTER
Pt is calling stating that she went to the xray immediately after appt so results should be in shortly and she called to make an appt with Pito Partida for the EMG and any location they could not get her in until March  [place] [facility] [place] 5th and she wanted to know if that is okay   Pt can be reached at [phone]

## 2018-11-21 ENCOUNTER — TELEPHONE (OUTPATIENT)
Dept: RADIOLOGY | Facility: CLINIC | Age: 63
End: 2018-11-21

## 2018-11-21 NOTE — TELEPHONE ENCOUNTER
We received a document from Dandre  of [facility] on 11/19 asking to fax EMG referral form to [phone]  EMG script faxed to above fax #, attention Dandre

## 2018-11-23 ENCOUNTER — TELEPHONE (OUTPATIENT)
Dept: PAIN MEDICINE | Facility: CLINIC | Age: 63
End: 2018-11-23

## 2018-11-23 NOTE — TELEPHONE ENCOUNTER
Patient called checking the status on scheduling her back injection  She wanted to speak with Patsy Campbell   Please advise, [facility]    Call back# [phone]    Dr Malone patient

## 2018-11-23 NOTE — TELEPHONE ENCOUNTER
S/W pt and she wanted to make Gardenia Schrader aware tat she had her shoulder [place] [facility] [place] [address], I told pt Gardenia Schrader will be back in the office Mon  Pt will awaiting tSeph's rec about a possible inj for her shoulder    Pt said she also left a vm for pat about scheduling her back inj

## 2018-11-26 DIAGNOSIS — M19.011 ARTHRITIS OF RIGHT ACROMIOCLAVICULAR JOINT: Primary | ICD-10-CM

## 2018-12-03 ENCOUNTER — TELEPHONE (OUTPATIENT)
Dept: OBGYN CLINIC | Facility: HOSPITAL | Age: 63
End: 2018-12-03

## 2018-12-03 NOTE — TELEPHONE ENCOUNTER
Shoulder blade pain can be referred from the shoulder or the neck    Since she had pain with right arm ROM and the last ISAI was not helpful, I would recommend staying with shoulder injection

## 2018-12-03 NOTE — TELEPHONE ENCOUNTER
I s/w pt who said in addition to her original right shoulder pain that goes down the arm to the fingers  Last Thurs she started with right shoulder blade pain which has become excruciating  Pt said since she left this message this AM that Debbi Isaac called her to schedule her injections  She decided to get the shoulder inj done first and it is scheduled for this Fri and 2 wks later she will get the back inj done on 12/20  Pt concerned about the new shoulder blade pain, she wants to make sure the inj for her shoulder will also help this pain or does she need something else done  She is using the metaxalone 400 mg BID and meloxicam daily from her PCP  Sometimes she doesn't take the metaxalone BID b/c it makes her drowsy  I advised she could try just taking half tab in the AM and use the full tab at bedtime  She is also using over the counter Thermacare patches  I told pt the shoulder blade pain is prob all part of the same shoulder issue but  I will ask Nm to advise  Pt said she is scheduled for her EMG this Wed

## 2018-12-03 NOTE — TELEPHONE ENCOUNTER
Caller: patient  Call back number: [phone]    Patient called stating that she is having "excruciating pain" in her right shoulder blade  The pain is a 10/10  She states she has never had this before  The pain started about 3 days ago  Patient is concerned something is wrong  She is asking to speak to someone about this

## 2018-12-04 DIAGNOSIS — F41.9 ANXIETY: ICD-10-CM

## 2018-12-04 RX ORDER — BUSPIRONE HYDROCHLORIDE 5 MG/1
5 TABLET ORAL 4 TIMES DAILY
Qty: 128 TABLET | Refills: 0 | OUTPATIENT
Start: 2018-12-04

## 2018-12-05 ENCOUNTER — TELEPHONE (OUTPATIENT)
Dept: PAIN MEDICINE | Facility: CLINIC | Age: 63
End: 2018-12-05

## 2018-12-05 NOTE — TELEPHONE ENCOUNTER
Dr Karolina Treviño    Patient called to say she she had her EMG done today at [facility]  They told her they would get the results to the office as soon as they could   They also said they should be there by her appt on 12/7/18

## 2018-12-06 NOTE — TELEPHONE ENCOUNTER
UMU CASTELLANOS,     Called [employer] at [phone] , s/w medical assistant Keri Hugo Ganser states that she is waiting for the EMG to be signed off and then she can fax it to us, but she does not know what time it will be signed off  [facility]  fax #[phone] given to Hugo Ganser  Mayra Factor that the patient is scheduled for an injection on 12/7/18

## 2018-12-07 ENCOUNTER — PROCEDURE VISIT (OUTPATIENT)
Dept: PAIN MEDICINE | Facility: CLINIC | Age: 63
End: 2018-12-07
Payer: COMMERCIAL

## 2018-12-07 VITALS — SYSTOLIC BLOOD PRESSURE: 130 MMHG | HEART RATE: 82 BPM | DIASTOLIC BLOOD PRESSURE: 89 MMHG | TEMPERATURE: 97.9 F

## 2018-12-07 DIAGNOSIS — M19.011 ARTHRITIS OF RIGHT ACROMIOCLAVICULAR JOINT: Primary | ICD-10-CM

## 2018-12-07 DIAGNOSIS — B00.1 RECURRENT COLD SORES: ICD-10-CM

## 2018-12-07 PROCEDURE — 20611 DRAIN/INJ JOINT/BURSA W/US: CPT | Performed by: ANESTHESIOLOGY

## 2018-12-07 RX ORDER — METHYLPREDNISOLONE ACETATE 40 MG/ML
40 INJECTION, SUSPENSION INTRA-ARTICULAR; INTRALESIONAL; INTRAMUSCULAR; SOFT TISSUE ONCE
Status: COMPLETED | OUTPATIENT
Start: 2018-12-07 | End: 2018-12-07

## 2018-12-07 RX ORDER — ROPIVACAINE HYDROCHLORIDE 5 MG/ML
1 INJECTION, SOLUTION EPIDURAL; INFILTRATION; PERINEURAL ONCE
Status: COMPLETED | OUTPATIENT
Start: 2018-12-07 | End: 2018-12-07

## 2018-12-07 RX ORDER — VALACYCLOVIR HYDROCHLORIDE 500 MG/1
500 TABLET, FILM COATED ORAL AS NEEDED
Qty: 30 TABLET | Refills: 0 | Status: SHIPPED | OUTPATIENT
Start: 2018-12-07 | End: 2019-06-18 | Stop reason: SDUPTHER

## 2018-12-07 RX ADMIN — ROPIVACAINE HYDROCHLORIDE 1 ML: 5 INJECTION, SOLUTION EPIDURAL; INFILTRATION; PERINEURAL at 15:59

## 2018-12-07 RX ADMIN — METHYLPREDNISOLONE ACETATE 40 MG: 40 INJECTION, SUSPENSION INTRA-ARTICULAR; INTRALESIONAL; INTRAMUSCULAR; SOFT TISSUE at 15:59

## 2018-12-07 NOTE — PROGRESS NOTES
Pre-procedure Diagnosis:   1  Arthritis of right acromioclavicular joint      Post-procedure Diagnosis:   1  Arthritis of right acromioclavicular joint      Operation Title(s):  Right acromioclavicular injection under ultrasound guidance  Attending Surgeon:   Mark Warren MD  Anesthesia:   Local    Indications: The patient is a 66y o  year-old female with a diagnosis of   1  Arthritis of right acromioclavicular joint      The patient's history and physical exam were reviewed  The risks, benefits and alternatives to the procedure were discussed, and all questions were answered to the patient's satisfaction  The patient agreed to proceed, and written informed consent was obtained  Procedure in Detail: The patient was brought into the exam room and placed in the sitting position on the exam room chair  The right shoulder was prepped with chloraprep and draped in a sterile manner  A sterile ultrasound probe cover and gel was placed over a 3 75 MHz curvilinear transducer probe  The probe was placed over the shoulder to visualize the acromioclavicular joint  Optimal needle path was determined and the skin and subcutaneous tissues in the area was anesthetized with 1% lidocaine  Then, under ultrasound guidance, a 2 inch ultrasound needle was advanced until the needle entered the joint  After visualization of the tip in the target area and negative aspiration for blood, a solution consisting of 1 mL 0 25% bupivacaine and 1 mL Depo-Medrol (40mg/ml) was easily injected  The patient's shoulder was cleansed and a bandage was placed over the sites of needle insertion  Disposition: The patient tolerated the procedure well and there were no apparent complications  The patient was given written discharge instructions for the procedure

## 2018-12-10 DIAGNOSIS — F41.9 ANXIETY: ICD-10-CM

## 2018-12-10 NOTE — TELEPHONE ENCOUNTER
Can you please contact patient and let her know EMG was normal   However, the Physician who performed the EMG felt clinically her symptoms seemed to be more related to right shoulder impingement    Patient already has shoulder injection scheduled

## 2018-12-11 RX ORDER — BUSPIRONE HYDROCHLORIDE 5 MG/1
5 TABLET ORAL 4 TIMES DAILY
Qty: 128 TABLET | Refills: 0 | Status: SHIPPED | OUTPATIENT
Start: 2018-12-11 | End: 2019-02-21 | Stop reason: SDUPTHER

## 2018-12-11 NOTE — TELEPHONE ENCOUNTER
I left detailed vm on home/cell that per Veronica Alexandra the EMG was normal but the MD who performed the study felt her symptoms were more related to a right shoulder impingement  Pt just had her right shoulder inj last Fri 12/7, so hopefully she will see improvement from that inj  Pt will need to give it 2 wks to see the full effect, she will get a call from our office 7 days after the inj to see how the inj is working  Any questions contact the office  C/B # and OH provided

## 2018-12-12 DIAGNOSIS — E05.90 HYPERTHYROIDISM: Primary | ICD-10-CM

## 2018-12-12 DIAGNOSIS — M54.16 LUMBAR RADICULOPATHY: ICD-10-CM

## 2018-12-12 DIAGNOSIS — M51.36 LUMBAR DEGENERATIVE DISC DISEASE: ICD-10-CM

## 2018-12-12 DIAGNOSIS — I10 ESSENTIAL HYPERTENSION: ICD-10-CM

## 2018-12-13 NOTE — TELEPHONE ENCOUNTER
We have not prescribed her tapazole  Pt was previously followed by endocrine - likely they have prescribed/managed

## 2018-12-14 ENCOUNTER — TELEPHONE (OUTPATIENT)
Dept: PAIN MEDICINE | Facility: CLINIC | Age: 63
End: 2018-12-14

## 2018-12-14 RX ORDER — QUINAPRIL 10 MG/1
10 TABLET ORAL DAILY
Qty: 90 TABLET | Refills: 1 | Status: SHIPPED | OUTPATIENT
Start: 2018-12-14 | End: 2019-12-06 | Stop reason: SDUPTHER

## 2018-12-14 RX ORDER — METHIMAZOLE 5 MG/1
5 TABLET ORAL DAILY
Qty: 30 TABLET | Refills: 1 | Status: SHIPPED | OUTPATIENT
Start: 2018-12-14 | End: 2019-03-01 | Stop reason: SDUPTHER

## 2018-12-14 NOTE — TELEPHONE ENCOUNTER
Can we confirm that we have notified patient to contact her endocrinologist for refill of Methimazole?

## 2018-12-14 NOTE — TELEPHONE ENCOUNTER
PT was contacted  She stated she was previously seeing Dr Ray Pereira (endocronology) but was released to have Dr Lety Simms take over her thyroid medications

## 2018-12-20 ENCOUNTER — HOSPITAL ENCOUNTER (OUTPATIENT)
Dept: RADIOLOGY | Facility: CLINIC | Age: 63
Discharge: HOME | End: 2018-12-20
Attending: ANESTHESIOLOGY | Admitting: ANESTHESIOLOGY
Payer: COMMERCIAL

## 2018-12-20 VITALS
RESPIRATION RATE: 20 BRPM | OXYGEN SATURATION: 98 % | DIASTOLIC BLOOD PRESSURE: 81 MMHG | TEMPERATURE: 97.8 F | HEART RATE: 59 BPM | SYSTOLIC BLOOD PRESSURE: 128 MMHG

## 2018-12-20 DIAGNOSIS — M54.16 LUMBAR RADICULOPATHY: ICD-10-CM

## 2018-12-20 DIAGNOSIS — M51.26 LUMBAR DISC HERNIATION: ICD-10-CM

## 2018-12-20 DIAGNOSIS — F41.9 ANXIETY: ICD-10-CM

## 2018-12-20 PROCEDURE — 64483 NJX AA&/STRD TFRM EPI L/S 1: CPT | Performed by: ANESTHESIOLOGY

## 2018-12-20 PROCEDURE — 64484 NJX AA&/STRD TFRM EPI L/S EA: CPT | Performed by: ANESTHESIOLOGY

## 2018-12-20 RX ORDER — 0.9 % SODIUM CHLORIDE 0.9 %
10 VIAL (ML) INJECTION ONCE
Status: COMPLETED | OUTPATIENT
Start: 2018-12-20 | End: 2018-12-20

## 2018-12-20 RX ORDER — BUPIVACAINE HCL/PF 2.5 MG/ML
30 VIAL (ML) INJECTION ONCE
Status: COMPLETED | OUTPATIENT
Start: 2018-12-20 | End: 2018-12-20

## 2018-12-20 RX ORDER — METHYLPREDNISOLONE ACETATE 80 MG/ML
80 INJECTION, SUSPENSION INTRA-ARTICULAR; INTRALESIONAL; INTRAMUSCULAR; PARENTERAL; SOFT TISSUE ONCE
Status: COMPLETED | OUTPATIENT
Start: 2018-12-20 | End: 2018-12-20

## 2018-12-20 RX ADMIN — Medication 4 ML: at 11:03

## 2018-12-20 RX ADMIN — SODIUM CHLORIDE 4 ML: 9 INJECTION, SOLUTION INTRAMUSCULAR; INTRAVENOUS; SUBCUTANEOUS at 11:03

## 2018-12-20 RX ADMIN — IOHEXOL 2 ML: 300 INJECTION, SOLUTION INTRAVENOUS at 11:06

## 2018-12-20 RX ADMIN — Medication 2 ML: at 11:08

## 2018-12-20 RX ADMIN — METHYLPREDNISOLONE ACETATE 80 MG: 80 INJECTION, SUSPENSION INTRA-ARTICULAR; INTRALESIONAL; INTRAMUSCULAR; PARENTERAL; SOFT TISSUE at 11:08

## 2018-12-20 NOTE — TELEPHONE ENCOUNTER
PMED checked, no red flags identified; safe to proceed with prescription refills     Last O/V: 10/15/2018  Next O/V: 12/21/2018

## 2018-12-20 NOTE — H&P
History of Present Illness: The patient is a 66 y o  female who presents with complaints of left lower back and leg pain secondary to lumbar disc herniation lumbar stenosis and is here today for left L4-5 transforaminal epidural steroid injection  Patient Active Problem List   Diagnosis    Rosacea, acne    Lumbar degenerative disc disease    Lumbar radiculopathy    Anxiety    Benign essential HTN    Cervical radiculopathy    Disc disorder of cervical region    Hot flash, menopausal    Lumbar facet arthropathy    Lumbar stenosis without neurogenic claudication    Myofascial pain syndrome    Obesity (BMI 30-39  9)    Pain syndrome, chronic    Positive depression screening    Primary osteoarthritis of right knee    Right knee injury    Sacroiliitis ([address] )    Tear of medial meniscus of right knee, current    Bulge of lumbar disc without myelopathy    Spondylolisthesis of lumbar region    Arthritis of right acromioclavicular joint       Past Medical History:   Diagnosis Date    Anxiety     Cataract, bilateral     last assessed    Cervical radiculopathy     Disc disease, degenerative, cervical     Herpes simplex infection     Hypertension     Insomnia     Low back pain     Lumbar degenerative disc disease     Lumbar facet arthropathy     Lumbar radiculopathy     Lumbar stenosis without neurogenic claudication     Mononucleosis     Myofascial pain dysfunction syndrome     Osteoarthritis     shoulder region - unspecified laterality - last assessed 2/1/14    Plantar fasciitis     Ptosis, both eyelids     last assessed 10/6/16    Ptosis, congenital, left     Retinal detachment     last assessed 12/11/14 right eye    Sacroiliitis ([address] )     Thyroid disease     Thyrotoxicosis     last assessed 5/17/13    Vertigo        Past Surgical History:   Procedure Laterality Date    NV SURG IMPLNT NEUROELECT,EPIDURAL Left 1/26/2016    Procedure: PLACEMENT OF THORACIC SPINAL CORD STIMULATOR WITH LEFT BUTTOCK IMPLANTABLE PULSE GENERATOR (IMPULSE MONITORING);   Surgeon: Alonzo Mitchell MD;  Location: [facility];  Service: Neurosurgery    RETINAL DETACHMENT SURGERY Right          Current Outpatient Prescriptions:     busPIRone (BUSPAR) 5 mg tablet, Take 1 tablet (5 mg total) by mouth 4 (four) times a day, Disp: 128 tablet, Rfl: 0    doxycycline hyclate (VIBRAMYCIN) 100 mg capsule, Take 1 capsule (100 mg total) by mouth 2 (two) times a day for 180 days, Disp: 180 capsule, Rfl: 1    fluticasone (FLONASE) 50 mcg/act nasal spray, 1 spray into each nostril daily, Disp: 16 g, Rfl: 3    LORazepam (ATIVAN) 1 mg tablet, Take 1 5 tablets (1 5 mg total) by mouth daily at bedtime as needed for anxiety, Disp: 135 tablet, Rfl: 0    meloxicam (MOBIC) 15 mg tablet, Take 1 tablet (15 mg total) by mouth daily as needed for moderate pain, Disp: 30 tablet, Rfl: 3    metaxalone (SKELAXIN) 400 MG tablet, Take 1 tablet (400 mg total) by mouth 2 (two) times a day as needed (muscl spasm), Disp: 60 tablet, Rfl: 3    methimazole (TAPAZOLE) 5 mg tablet, Take 1 tablet (5 mg total) by mouth daily, Disp: 30 tablet, Rfl: 1    penciclovir (DENAVIR) 1 % cream, Apply topically as needed  , Disp: , Rfl:     propranolol (INDERAL) 10 mg tablet, Take 10 mg by mouth 2 (two) times a day  , Disp: , Rfl:     quinapril (ACCUPRIL) 10 mg tablet, Take 1 tablet (10 mg total) by mouth daily, Disp: 90 tablet, Rfl: 1    valACYclovir (VALTREX) 500 mg tablet, Take 1 tablet (500 mg total) by mouth as needed (cold sores) for up to 30 days, Disp: 30 tablet, Rfl: 0    Current Facility-Administered Medications:     bupivacaine (PF) (MARCAINE) 0 25 % injection 30 mL, 30 mL, Epidural, Once, Manisha Galvez MD    iohexol (OMNIPAQUE) 300 mg/mL injection 50 mL, 50 mL, Epidural, Once, Manisha Galvez MD    lidocaine (PF) (XYLOCAINE-MPF) 2 % injection 5 mL, 5 mL, Infiltration, Once, Manisha Galvez MD    methylPREDNISolone acetate (DEPO-MEDROL) injection 80 mg, 80 mg, Epidural, Once, Zakiya Damon MD    sodium chloride (PF) 0 9 % injection 10 mL, 10 mL, Infiltration, Once, Zakiya Damon MD    Allergies   Allergen Reactions    Other Dermatitis     Adhesive tape - please use ONLY PAPER TAPE    Scopolamine Other (See Comments)     Severe, debilitating headache      Flexeril [Cyclobenzaprine] Dizziness and Fatigue    Naproxen      Other reaction(s): Unknown Allergic Reaction  Annotation - [id number]: nightmares    Penicillins      Other reaction(s): Unknown Allergic Reaction  Annotation - [id number]: childhood reaction    Promethazine-Codeine      Reaction Date: [id number]; Annotation - [id number]: nightmares; Annotation - [id number]: nightmares    Tramadol      Other reaction(s): Unknown Allergic Reaction  Annotation - [id number]: PT HAS NIGHTMARES FROM TRAMADOL    Wound Dressing Adhesive Rash       Physical Exam:   Vitals:    12/20/18 1049   BP: 118/82   Pulse: 67   Resp: 20   Temp: 97 8 °F (36 6 °C)   SpO2: 97%     General: Awake, Alert, Oriented x 3, Mood and affect appropriate  Respiratory: Respirations even and unlabored  Cardiovascular: Peripheral pulses intact; no edema  Musculoskeletal Exam:   Left lower back tenderness    ASA Score: 2    Patient/Chart Verification  Patient ID Verified: Verbal  Consents Confirmed: Procedural, To be obtained in the Pre-Procedure area  H&P( within 30 days) Verified: To be obtained in the Pre-Procedure area  Allergies Reviewed: Yes  Anticoag/NSAID held?: NA  Currently on antibiotics?: No    Assessment:   1  Lumbar disc herniation    2   Lumbar radiculopathy        Plan: left L4-L5 TFESI

## 2018-12-20 NOTE — DISCHARGE INSTR - LAB
Epidural Steroid Injection   WHAT YOU NEED TO KNOW:   An epidural steroid injection (PRATIK) is a procedure to inject steroid medicine into the epidural space  The epidural space is between your spinal cord and vertebrae  Steroids reduce inflammation and fluid buildup in your spine that may be causing pain  You may be given pain medicine along with the steroids  ACTIVITY  · Do not drive or operate machinery today  · No strenuous activity today - bending, lifting, etc   · You may resume normal activites starting tomorrow - start slowly and as tolerated  · You may shower today, but no tub baths or hot tubs  · You may have numbness for several hours from the local anesthetic  Please use caution and common sense, especially with weight-bearing activities  CARE OF THE INJECTION SITE  · If you have soreness or pain, apply ice to the area today (20 minutes on/20 minutes off)  · Starting tomorrow, you may use warm, moist heat or ice if needed  · You may have an increase or change in your discomfort for 36-48 hours after your treatment  · Apply ice and continue with any pain medication you have been prescribed  · Notify the [facility] if you have any of the following: redness, drainage, swelling, headache, stiff neck or fever above 100°F     SPECIAL INSTRUCTIONS  · Our office will contact you in approximately 7 days for a progress report  MEDICATIONS  · Continue to take all routine medications  · Our office may have instructed you to hold some medications  If you have a problem specifically related to your procedure, please call our office at [phone]  Problems not related to your procedure should be directed to your primary care physician

## 2018-12-21 ENCOUNTER — OFFICE VISIT (OUTPATIENT)
Dept: INTERNAL MEDICINE CLINIC | Facility: CLINIC | Age: 63
End: 2018-12-21
Payer: COMMERCIAL

## 2018-12-21 VITALS
BODY MASS INDEX: 36.22 KG/M2 | SYSTOLIC BLOOD PRESSURE: 124 MMHG | HEART RATE: 65 BPM | OXYGEN SATURATION: 98 % | DIASTOLIC BLOOD PRESSURE: 82 MMHG | WEIGHT: 217.4 LBS | HEIGHT: 65 IN | TEMPERATURE: 97.7 F

## 2018-12-21 DIAGNOSIS — M79.18 MYOFASCIAL PAIN SYNDROME: ICD-10-CM

## 2018-12-21 DIAGNOSIS — B00.1 RECURRENT COLD SORES: Primary | ICD-10-CM

## 2018-12-21 DIAGNOSIS — M19.011 ARTHRITIS OF RIGHT ACROMIOCLAVICULAR JOINT: ICD-10-CM

## 2018-12-21 PROBLEM — M89.8X1 CLAVICLE PAIN: Status: ACTIVE | Noted: 2018-12-21

## 2018-12-21 PROBLEM — M89.8X1 CLAVICLE PAIN: Status: RESOLVED | Noted: 2018-12-21 | Resolved: 2018-12-21

## 2018-12-21 PROCEDURE — 99214 OFFICE O/P EST MOD 30 MIN: CPT | Performed by: FAMILY MEDICINE

## 2018-12-21 RX ORDER — LORAZEPAM 1 MG/1
1.5 TABLET ORAL
Qty: 135 TABLET | Refills: 0 | Status: SHIPPED | OUTPATIENT
Start: 2018-12-21 | End: 2019-07-08 | Stop reason: SDUPTHER

## 2018-12-21 RX ORDER — GABAPENTIN 100 MG/1
100 CAPSULE ORAL 3 TIMES DAILY
Qty: 90 CAPSULE | Refills: 5 | Status: SHIPPED | OUTPATIENT
Start: 2018-12-21 | End: 2019-02-27

## 2018-12-21 NOTE — PROGRESS NOTES
Assessment/Plan:    No problem-specific Assessment & Plan notes found for this encounter  1  Benign essential HTN (401 1) (I10)   2  Anxiety (300 00) (F41 9)    Discussion:    add gabapentin- call if not helpful at 300 mg daily- taper up dose  taking mobic for better control  But not sure if it is helping-and stop OTC advil  Take with food, stop zanaflex and  cont skelaxin for better pain control  Call if any issues    stay off Cymbalta since it has not helped and made her tired  Keep  buspar to 10 mg twice a day,  to help with anxiety depression and back pain- it is helpful but makes her tired  she has an appt with her neurosurgeon and keep the appointment with pain management doctor to discuss further options such as injections versus Neurosurgery for lumbar stenosis and canal stenosis and foraminal stenosis  keep doxycycline b i d  for rosacea symptoms  Has a spinal stimulator that helps her back but now pain in LLE- radiation  Takes ativan at night and  It helps but she still wakes up at night, it makes her too tired in the daytime so she does not like to take it  Discussed options  amitriptyline, or narcotics to control her pain              Problem List Items Addressed This Visit        Musculoskeletal and Integument    Myofascial pain syndrome    Relevant Medications    gabapentin (NEURONTIN) 100 mg capsule    Arthritis of right acromioclavicular joint    Relevant Medications    gabapentin (NEURONTIN) 100 mg capsule      Other Visit Diagnoses     Recurrent cold sores    -  Primary    Relevant Medications    penciclovir (DENAVIR) 1 % cream            Subjective:  F/up htn and back pain and insomnia     Patient ID: Vinayak Salter is a 66 y o  female  HPI     Back pain:  Patient states that lyrica not covered by her insurance anymore and she has been off of it for 1 week  She feels that it really did not help her is now that she has not been taking it for 1 week    On Neurontin in the past which did not help her  She was given nortriptyline by her pain management doctor which made her feel lethargic but did not help with her discomfort  She called them and she has an appointment to be seen for possible injection  She describes her pain as burning and has difficulty standing and sitting for any prolonged period of time  She has difficulty coping in her and sitting at her job  She states that she takes Zanaflex at night p r n  but that makes her tired in the daytime  She takes anywhere between 8-12 to 100 mg ibuprofen a day but does not mix it with meloxicam   She had an MRI few months back and a pain pump placed by Neurosurgery approximately 1 year ago  Originally it did help her but since then it has not been helping and she has not seen a neurosurgeon back  5/16/18: prednisone id not help and Cymbalta makes her anxiety worse  Went to the pain mgmt and got injections that have helped now  Still having crying spells and does not feel that Cymbalta is helping  8/13/18: going for another spinal injection, had her stimulator adjusted but not much relief  Better day today    10/15/18:  Pain mgmt note seen, for neurosurgical eval  repeat MRI noted today  Never started mobic, better with low dose muscle relaxor, takes half bc full makes her tired  12/2018:  Sleeping better with changes in  Medications last visit but not sure if helping with the pain, seeing pan mgt and getting injections  EMG is normal  R arm        Rosacea, patient was placed on Metrogel by her endocrinologist and she has been using it for 1 month  She feels that is not helping  She has facial flushing for several years which are exacerbated by stress, hypertension, heat, and hot flashes  She would like to discuss other options  5/16/18: started doxy 1 week ago, no relief yet  Still flushing and worse in the mornings   10/15/18:  Helping a lot, taking doxy BID, ran out 1 week ago, re start at BID and then decrease to monthly     Insomnia, patient states she does not sleep well which is very multifactorial including the pain and how flashes  She used to be on estrogen replacement currently does not take any  She does take Ativan at night to help her sleep which helped marginally  5/16/18: taking 1 5 mg  And advil pm and it has helped- sleeping through the night now and slightly  More tired in am   10/15/18: taking ativan and sleeping well with it     Depression:  Patient is a depression was exacerbated by the pain and inability to sleep  She is always tired and fatigued and is having difficulty with activities of daily living around the house like cleaning and cooking as well as work  Patient is here today and can cooperate always  She feels that she is up to any medications or the long medications right now  8/13/18: buspar was increased to 10 mg per but she felt tired on it and went back to 5 mg BId, has gained weight  10/15/18: taking bupar 5 mg- 2 tabs at [employer],  12/21/18: stable    The following portions of the patient's history were reviewed and updated as appropriate: allergies, current medications, past family history, past medical history, past social history, past surgical history and problem list     Review of Systems    Constitutional:  Denies fever or chills , no weight changes  Eyes:  Denies change in visual acuity , wears glasses  HENT:  Denies nasal congestion or sore throat , + allergy symptoms  Respiratory:  Denies cough or shortness of breath or wheezing  Cardiovascular:  Denies palpitations or chest pain  GI:  Denies abdominal pain, nausea, or vomiting  Integument:  rosacea  Neurologic:  Denies headache or focal weakness, dizziness with computer and change in head positions    MS: see HPI    Objective:      /82 (BP Location: Left arm, Patient Position: Sitting, Cuff Size: Large)   Pulse 65   Temp 97 7 °F (36 5 °C) (Oral)   Ht 5' 5" (1 651 m)   Wt 98 6 kg (217 lb 6 4 oz)   LMP 11/26/2015 SpO2 98%   BMI 36 18 kg/m²          Physical Exam    Constitutional:  Well developed, well nourished, no acute distress, non-toxic appearance   Eyes:  PERRL, conjunctiva normal , non icteric sclera  HENT:  Atraumatic, oropharynx moist  Neck-  supple   Respiratory:  CTA b/l, normal breath sounds, no rales, no wheezing   Cardiovascular:  RRR, no murmurs, no LE edema b/l  GI:  Soft, nondistended, normal bowel sounds x 4, nontender, no organomegaly, no mass, no rebound, no guarding   Neurologic:  no focal deficits noted   Psychiatric:  Speech and behavior appropriate , AAO x 3  MS: able to stand up straight today, positive straight leg test

## 2018-12-27 ENCOUNTER — TELEPHONE (OUTPATIENT)
Dept: PAIN MEDICINE | Facility: CLINIC | Age: 63
End: 2018-12-27

## 2018-12-27 NOTE — TELEPHONE ENCOUNTER
Pt reports 40% improvement post inj  Pain level 4/10     Pt said she spoke with you about her shoulder/arm pain and you said it was her neck and someone would call her from the office

## 2019-01-02 ENCOUNTER — TELEPHONE (OUTPATIENT)
Dept: OBGYN CLINIC | Facility: HOSPITAL | Age: 64
End: 2019-01-02

## 2019-01-02 NOTE — TELEPHONE ENCOUNTER
Patient called and said that Dr Diann Her wanted to see her for her shoulder and tingling down the arm into the finger tips  She wants to see Dr Diann Her and not a PA  She said it is related to her neck   Please advise [phone]

## 2019-01-04 ENCOUNTER — OFFICE VISIT (OUTPATIENT)
Dept: PAIN MEDICINE | Facility: CLINIC | Age: 64
End: 2019-01-04
Payer: COMMERCIAL

## 2019-01-04 VITALS
SYSTOLIC BLOOD PRESSURE: 130 MMHG | BODY MASS INDEX: 36.11 KG/M2 | DIASTOLIC BLOOD PRESSURE: 74 MMHG | TEMPERATURE: 97.8 F | WEIGHT: 217 LBS | HEART RATE: 70 BPM

## 2019-01-04 DIAGNOSIS — M79.18 MYOFASCIAL PAIN SYNDROME: ICD-10-CM

## 2019-01-04 DIAGNOSIS — M48.062 SPINAL STENOSIS OF LUMBAR REGION WITH NEUROGENIC CLAUDICATION: ICD-10-CM

## 2019-01-04 DIAGNOSIS — M25.511 CHRONIC RIGHT SHOULDER PAIN: ICD-10-CM

## 2019-01-04 DIAGNOSIS — M54.12 CERVICAL RADICULOPATHY: Primary | ICD-10-CM

## 2019-01-04 DIAGNOSIS — G89.29 CHRONIC RIGHT SHOULDER PAIN: ICD-10-CM

## 2019-01-04 DIAGNOSIS — M19.011 ARTHRITIS OF RIGHT ACROMIOCLAVICULAR JOINT: ICD-10-CM

## 2019-01-04 PROCEDURE — 99214 OFFICE O/P EST MOD 30 MIN: CPT | Performed by: ANESTHESIOLOGY

## 2019-01-04 RX ORDER — METHOCARBAMOL 750 MG/1
750 TABLET, FILM COATED ORAL
Qty: 30 TABLET | Refills: 0 | Status: SHIPPED | OUTPATIENT
Start: 2019-01-04 | End: 2019-01-30 | Stop reason: SDUPTHER

## 2019-01-04 NOTE — PROGRESS NOTES
Assessment:  1  Cervical radiculopathy    2  Arthritis of right acromioclavicular joint    3  Chronic right shoulder pain    4  Spinal stenosis of lumbar region with neurogenic claudication    5  Myofascial pain syndrome        Plan:  Due to the patient's ongoing pain/weakness despite the right shoulder injection and cervical epidural steroid injection performed in September, I will order an updated MRI of the cervical spine to evaluate for worsening degeneration at C6-7 and C7-T1  I advised her I will call with the results and discuss treatment moving forward  To help with the muscular component of her pain, I will have her discontinue the metaxalone start her on methocarbamol 750 mg at bedtime  She was apprised of the most common side effects including sleepiness and dizziness  My impressions and treatment recommendations were discussed in detail with the patient who verbalized understanding and had no further questions  Discharge instructions were provided  I personally saw and examined the patient and I agree with the above discussed plan of care  Orders Placed This Encounter   Procedures    MRI cervical spine without contrast     Standing Status:   Future     Standing Expiration Date:   1/4/2023     Scheduling Instructions: There is no preparation for this test  Please leave your jewelry and valuables at home, wedding rings are the exception  Please bring your insurance cards, a form of photo ID and a list of your medications with you  Arrive 15 minutes prior to your appointment time in order to register  Please bring any prior CT or MRI studies of this area that were not performed at a [facility]  To schedule this appointment, please contact Central Scheduling at [phone]  Order Specific Question:   What is the patient's sedation requirement? Answer:   No Sedation     Order Specific Question:   Does the patient have metallic implants? Answer:    Yes Comments:   [employer] SCS stimulator (MRI Conditional)     New Medications Ordered This Visit   Medications    methocarbamol (ROBAXIN) 750 mg tablet     Sig: Take 1 tablet (750 mg total) by mouth daily at bedtime as needed for muscle spasms for up to 90 days     Dispense:  30 tablet     Refill:  0       History of Present Illness:  Delaney Abraham is a 66 y o  female who presents for a follow up office visit in regards to Shoulder Pain and Back Pain  The patient has a history of right acromioclavicular arthritis and underwent an AC injection last month with minimal improvement  She reports ongoing pain in the right neck that travels into the shoulder blade and travels down the arm into the 1st through 3rd digits which is dull/aching, throbbing with numbness  She feels weakness of the right arm  She was restarted on gabapentin recently 100 mg 3 times a day but has not noticed a difference yet  She remains on metaxalone 400 mg at bedtime which she does not feel helps with the muscle spasms  She also has a history of spinal stenosis with lumbar radiculopathy and underwent left L4-5 transforaminal epidural steroid injection last month with moderate improvement  She still continues with intermittent dull-aching in the left lower back and leg, but that is better  I have personally reviewed and/or updated the patient's past medical history, past surgical history, family history, social history, current medications, allergies, and vital signs today  Review of Systems   Respiratory: Negative for shortness of breath  Cardiovascular: Negative for chest pain  Gastrointestinal: Negative for constipation, diarrhea, nausea and vomiting  Musculoskeletal: Positive for gait problem  Negative for arthralgias, joint swelling and myalgias  Skin: Negative for rash  Neurological: Negative for dizziness, seizures and weakness  All other systems reviewed and are negative        Patient Active Problem List Diagnosis    Rosacea, acne    Lumbar degenerative disc disease    Lumbar radiculopathy    Anxiety    Benign essential HTN    Cervical radiculopathy    Disc disorder of cervical region    Hot flash, menopausal    Lumbar facet arthropathy    Lumbar stenosis without neurogenic claudication    Myofascial pain syndrome    Obesity (BMI 30-39  9)    Pain syndrome, chronic    Positive depression screening    Primary osteoarthritis of right knee    Right knee injury    Sacroiliitis ([address] )    Tear of medial meniscus of right knee, current    Bulge of lumbar disc without myelopathy    Spondylolisthesis of lumbar region    Arthritis of right acromioclavicular joint       Past Medical History:   Diagnosis Date    Anxiety     Cataract, bilateral     last assessed    Cervical radiculopathy     Disc disease, degenerative, cervical     Herpes simplex infection     Hypertension     Insomnia     Low back pain     Lumbar degenerative disc disease     Lumbar facet arthropathy     Lumbar radiculopathy     Lumbar stenosis without neurogenic claudication     Mononucleosis     Myofascial pain dysfunction syndrome     Osteoarthritis     shoulder region - unspecified laterality - last assessed 2/1/14    Plantar fasciitis     Ptosis, both eyelids     last assessed 10/6/16    Ptosis, congenital, left     Retinal detachment     last assessed 12/11/14 right eye    Sacroiliitis ([address] )     Thyroid disease     Thyrotoxicosis     last assessed 5/17/13    Vertigo        Past Surgical History:   Procedure Laterality Date    WI SURG IMPLNT NEUROELECT,EPIDURAL Left 1/26/2016    Procedure: PLACEMENT OF THORACIC SPINAL CORD STIMULATOR WITH LEFT BUTTOCK IMPLANTABLE PULSE GENERATOR (IMPULSE MONITORING);   Surgeon: Denny Stovall MD;  Location:  MAIN OR;  Service: Neurosurgery    RETINAL DETACHMENT SURGERY Right        Family History   Problem Relation Age of Onset    Breast cancer Mother     COPD Mother    Mandy Hetal Hypertension Mother         essential    Heart attack Father         acute MI    Emphysema Father     Hypertension Father         essential    Other Father         prehypertension    Liver cancer Maternal Aunt        Social History     Occupational History    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     Social History Main Topics    Smoking status: Never Smoker    Smokeless tobacco: Never Used    Alcohol use Yes      Comment: occasional use as per [employer]    Drug use: No    Sexual activity: Yes       Current Outpatient Prescriptions on File Prior to Visit   Medication Sig    busPIRone (BUSPAR) 5 mg tablet Take 1 tablet (5 mg total) by mouth 4 (four) times a day    doxycycline hyclate (VIBRAMYCIN) 100 mg capsule Take 1 capsule (100 mg total) by mouth 2 (two) times a day for 180 days    fluticasone (FLONASE) 50 mcg/act nasal spray 1 spray into each nostril daily    gabapentin (NEURONTIN) 100 mg capsule Take 1 capsule (100 mg total) by mouth 3 (three) times a day    LORazepam (ATIVAN) 1 mg tablet Take 1 5 tablets (1 5 mg total) by mouth daily at bedtime as needed for anxiety    meloxicam (MOBIC) 15 mg tablet Take 1 tablet (15 mg total) by mouth daily as needed for moderate pain    methimazole (TAPAZOLE) 5 mg tablet Take 1 tablet (5 mg total) by mouth daily    penciclovir (DENAVIR) 1 % cream Apply topically daily    propranolol (INDERAL) 10 mg tablet Take 10 mg by mouth 2 (two) times a day      quinapril (ACCUPRIL) 10 mg tablet Take 1 tablet (10 mg total) by mouth daily    valACYclovir (VALTREX) 500 mg tablet Take 1 tablet (500 mg total) by mouth as needed (cold sores) for up to 30 days    [DISCONTINUED] metaxalone (SKELAXIN) 400 MG tablet Take 1 tablet (400 mg total) by mouth 2 (two) times a day as needed (muscl spasm)     No current facility-administered medications on file prior to visit          Allergies   Allergen Reactions    Other Dermatitis     Adhesive tape - please use ONLY PAPER TAPE    Scopolamine Other (See Comments)     Severe, debilitating headache      Flexeril [Cyclobenzaprine] Dizziness and Fatigue    Naproxen      Other reaction(s): Unknown Allergic Reaction  Annotation - [id number]: nightmares    Penicillins      Other reaction(s): Unknown Allergic Reaction  Annotation - [id number]: childhood reaction    Promethazine-Codeine      Reaction Date: [id number]; Annotation - [id number]: nightmares; Annotation - [id number]: nightmares    Tramadol      Other reaction(s): Unknown Allergic Reaction  Annotation - [id number]: PT HAS NIGHTMARES FROM TRAMADOL    Wound Dressing Adhesive Rash       Physical Exam:    /74   Pulse 70   Temp 97 8 °F (36 6 °C) (Oral)   Wt 98 4 kg (217 lb)   LMP 11/26/2015   BMI 36 11 kg/m²     Constitutional:normal, well developed, well nourished, alert, in no distress and non-toxic and no overt pain behavior   and obese  Eyes:anicteric  HEENT:grossly intact  Neck:supple, symmetric, trachea midline and no masses   Pulmonary:even and unlabored  Cardiovascular:No edema or pitting edema present  Skin:Normal without rashes or lesions and well hydrated  Psychiatric:Mood and affect appropriate  Neurologic:Cranial Nerves II-XII grossly intact  Musculoskeletal:normal     Cervical Spine Exam  Appearance:  Normal lordosis  Palpation/Tenderness:  right cervical paraspinal tenderness  right trapezium tenderness  trigger points palpable  Sensory:  no sensory deficits noted  Range of Motion:  Flexion:  No limitation  with pain  Extension:  Moderately limited  with pain  Lateral Flexion - Left:  No limitation  without pain  Lateral Flexion - Right:  Moderately limited  with pain  Rotation - Left:  No limitation  without pain  Rotation - Right:  Moderately limited  with pain  Motor Strength:  Left Arm Flexion  5/5  Left Arm Extension  5/5  Right Arm Flexion  5/5  Right Arm Extension  4/5  Left Wrist Flexion  5/5  Left Wrist Extension  5/5   Right Wrist Flexion 5/5  Right Wrist Extension 5/5  Reflexes:  Left Biceps:  1+   Right Biceps:  1+   Left Triceps:  1+   Right Triceps:  1+   Special Tests:  Left Spurlings:  negative  Right Spurlings  positive        Imaging    MRI CERVICAL SPINE WITHOUT CONTRAST  (2/21/2014)  INDICATION-  Right-sided neck and arm pain  Brachial neuritis  COMPARISON-  None  TECHNIQUE-   Sagittal T1, sagittal T2, sagittal inversion recovery, axial T2, axial   gradient  IMAGE QUALITY-    Diagnostic   FINDINGS-   ALIGNMENT-   Normal alignment of the cervical spine  No compression   fracture  No subluxation  No scoliosis  MARROW SIGNAL-   Small hemangioma within the C7 vertebral body  CERVICAL AND VISUALIZED THORACIC CORD-  Normal signal within the   visualized cord  PREVERTEBRAL AND PARASPINAL SOFT TISSUES-  Prevertebral and paraspinal   soft tissues are unremarkable  VISUALIZED POSTERIOR FOSSA-   The visualized posterior fossa   demonstrates no abnormal signal    CERVICAL DISC SPACES-         C2-C3-  Normal    C3-C4-  Normal    C4-C5-  Normal    C5-C6-  Normal    C6-C7-  Loss of disc height  Mild annular bulging and uncinate joint   hypertrophic change  Mild canal stenosis and bilateral foraminal   narrowing, right greater than left  C7-T1-  Minimal right uncinate joint hypertrophic change  No canal   stenosis  Mild right foraminal narrowing  UPPER THORACIC DISC SPACES-  Mild upper thoracic degenerative change   without canal stenosis or foraminal narrowing

## 2019-01-07 ENCOUNTER — TELEPHONE (OUTPATIENT)
Dept: OBGYN CLINIC | Facility: HOSPITAL | Age: 64
End: 2019-01-07

## 2019-01-07 NOTE — TELEPHONE ENCOUNTER
Patient is calling to let us know that she has her mri scheduled for the 18th  She is wondering if we could start the auth for the injections she would get after the mri  She is saying that her insurance is awful and takes forever and is worried about the time it would take to get the authorization  I told her I was not sure that it was possible but that I would ask       Baylee Cuello pt

## 2019-01-18 ENCOUNTER — HOSPITAL ENCOUNTER (OUTPATIENT)
Dept: RADIOLOGY | Facility: HOSPITAL | Age: 64
Discharge: HOME | End: 2019-01-18
Payer: COMMERCIAL

## 2019-01-18 DIAGNOSIS — M54.12 CERVICAL RADICULOPATHY: ICD-10-CM

## 2019-01-18 PROCEDURE — 72141 MRI NECK SPINE W/O DYE: CPT

## 2019-01-21 ENCOUNTER — TELEPHONE (OUTPATIENT)
Dept: OBGYN CLINIC | Facility: HOSPITAL | Age: 64
End: 2019-01-21

## 2019-01-21 NOTE — TELEPHONE ENCOUNTER
Dr Trudy Martinez    # [phone]    Patient called to let you know she had the MRI done on Friday 1/18/2019  Patient stated she was to have the MRI done and then schedule her procedure  Patient is asking to be able to get the procedure scheduled ASAP as she is in a lot of pain

## 2019-01-22 ENCOUNTER — TELEPHONE (OUTPATIENT)
Dept: RADIOLOGY | Facility: CLINIC | Age: 64
End: 2019-01-22

## 2019-01-22 DIAGNOSIS — M54.12 CERVICAL RADICULOPATHY: Primary | ICD-10-CM

## 2019-01-22 NOTE — TELEPHONE ENCOUNTER
MRI C-spine showed stable degenerative changes as compared to prior MRI  Disc bulging at C6-7 with spondylosis at that level causing moderate right foraminal stenosis  I will place order for ISAI  Please let her know that there was small incidental thyroid nodule seen    She should f/u with her PCP

## 2019-01-23 NOTE — TELEPHONE ENCOUNTER
I s/w pt and informed her of her MRI results as stated in CRUZITO's previous task  Pt will f/u with her PCP regarding the sm thyroid nodule  Pt began crying that she is in so much pain that she can't work, walk, drive or sleep and is becoming depressed  Pt said Michael Chowdary called her yest and scheduled the inj for 2/13/19, pt crying b/c she doesn't know how she is going to deal with the pain until then, she was hoping it would be sooner  I explained that FQ is out of office the first wk of Feb but I could reach out to him if there is any possibility of getting her inj sooner  Pt is taking methocarbamol 750 mg at bedtime and she is able to fall asleep but if she wakes up during the night she is in so much pain and then all through the day  She said FQ said she could also continue taking the meloxicam 15 mg daily which she also takes at bedtime  Pt said she can't take anything during the day that is going to make her dopey  I suggested she take the meloxicam during the day instead of at bedtime for now and I will ask  for any further rec for pain

## 2019-01-23 NOTE — TELEPHONE ENCOUNTER
When I scheduled the patient yesterday, I explained that the MRI was read and signed off on 1/21  I needed the MRI results to go the authorization  It was just sent yesterday  Sometimes her insurance is very difficult and a peer to peer has to be done  As soon as we have an answer from [employer], I will try to get her in   Thank you

## 2019-01-25 ENCOUNTER — TELEPHONE (OUTPATIENT)
Dept: PAIN MEDICINE | Facility: CLINIC | Age: 64
End: 2019-01-25

## 2019-01-25 DIAGNOSIS — G89.4 PAIN SYNDROME, CHRONIC: Primary | ICD-10-CM

## 2019-01-25 RX ORDER — DICLOFENAC SODIUM 75 MG/1
75 TABLET, DELAYED RELEASE ORAL 2 TIMES DAILY
Qty: 60 TABLET | Refills: 0 | Status: SHIPPED | OUTPATIENT
Start: 2019-01-25 | End: 2019-03-18 | Stop reason: SDUPTHER

## 2019-01-25 NOTE — TELEPHONE ENCOUNTER
I s/w pt and she said she tried taking the meloxicam during the day and one day even took it during the day and at bedtime even though she knows she shouldn't have and it's not helping  I told pt that FQ said if it wasn't helping he would put her on a new anti-inflammatory  [facility]  Pt asked if the inj can be scheduled sooner, I explained that FQ asked Pat to look into that but Lorraine Ayalarachel is still waiting on approval from Ins   Pt said she is going to call her Ins Co about getting the approval

## 2019-01-25 NOTE — TELEPHONE ENCOUNTER
Pt informed that  has sent new anti-inflammatory diclofenac 75 BID with food to her [facility] and she is to stop the meloxicam  Pt understood  Pt said she s/w someone at [address] [place] [address] about approval for her inj and they said if submitted as Urgent they response given in 2 days otherwise takes 5 days for response  Told pt I would make Pat aware  Pat informed of the same

## 2019-01-25 NOTE — TELEPHONE ENCOUNTER
Will try patient on diclofenax 75mg BID instead  rx [facility]    She should d/c meloxicam

## 2019-01-30 ENCOUNTER — TELEPHONE (OUTPATIENT)
Dept: PAIN MEDICINE | Facility: CLINIC | Age: 64
End: 2019-01-30

## 2019-01-30 DIAGNOSIS — M79.18 MYOFASCIAL PAIN SYNDROME: ICD-10-CM

## 2019-01-30 RX ORDER — METHOCARBAMOL 750 MG/1
750 TABLET, FILM COATED ORAL
Qty: 30 TABLET | Refills: 5 | Status: SHIPPED | OUTPATIENT
Start: 2019-01-30 | End: 2019-07-24

## 2019-01-30 NOTE — TELEPHONE ENCOUNTER
I s/w pt to ask how the methocarbamol was working  She said she doesn't think it is but she is afraid to stop taking it b/c she imagines the pain will be worse without it  She has 3 pills left  Pt denies any s/e with it  She is still waiting on her Ins Co to make a decision about her inj, she said she s/w Roger Bess the other day about that  Would like RF [facility]

## 2019-01-30 NOTE — TELEPHONE ENCOUNTER
Pt contacted Call Center requested refill of their medication  Medication Name: Methocarbamol      Dosage of Med: 750 mg      Frequency of Med:Take 1 tablet (750 mg total) by mouth daily at bedtime as needed for muscle spasms for up to 90 days      Remaining Medication:      [facility] Location[id number], [address] [place], [place] [address]        Pt  Preferred Callback Phone Number: [phone]      Thank you

## 2019-02-21 DIAGNOSIS — F41.9 ANXIETY: ICD-10-CM

## 2019-02-21 RX ORDER — BUSPIRONE HYDROCHLORIDE 5 MG/1
5 TABLET ORAL 4 TIMES DAILY
Qty: 128 TABLET | Refills: 0 | Status: SHIPPED | OUTPATIENT
Start: 2019-02-21 | End: 2019-03-22 | Stop reason: SDUPTHER

## 2019-02-27 ENCOUNTER — OFFICE VISIT (OUTPATIENT)
Dept: PAIN MEDICINE | Facility: CLINIC | Age: 64
End: 2019-02-27
Payer: COMMERCIAL

## 2019-02-27 VITALS
BODY MASS INDEX: 35.83 KG/M2 | DIASTOLIC BLOOD PRESSURE: 76 MMHG | HEART RATE: 65 BPM | SYSTOLIC BLOOD PRESSURE: 128 MMHG | TEMPERATURE: 97.6 F | WEIGHT: 222 LBS

## 2019-02-27 DIAGNOSIS — M51.16 INTERVERTEBRAL DISC DISORDERS WITH RADICULOPATHY, LUMBAR REGION: ICD-10-CM

## 2019-02-27 DIAGNOSIS — M79.18 MYOFASCIAL PAIN SYNDROME: ICD-10-CM

## 2019-02-27 DIAGNOSIS — M47.816 LUMBAR SPONDYLOSIS: ICD-10-CM

## 2019-02-27 DIAGNOSIS — M54.12 CERVICAL RADICULOPATHY: ICD-10-CM

## 2019-02-27 DIAGNOSIS — G89.4 CHRONIC PAIN SYNDROME: Primary | ICD-10-CM

## 2019-02-27 PROCEDURE — 99214 OFFICE O/P EST MOD 30 MIN: CPT | Performed by: ANESTHESIOLOGY

## 2019-02-27 RX ORDER — PREGABALIN 75 MG/1
75 CAPSULE ORAL 2 TIMES DAILY
Qty: 60 CAPSULE | Refills: 0 | Status: SHIPPED | OUTPATIENT
Start: 2019-02-27 | End: 2019-03-07

## 2019-02-27 NOTE — PROGRESS NOTES
Left Assessment:  1  Chronic pain syndrome    2  Cervical radiculopathy    3  Lumbar spondylosis    4  Myofascial pain syndrome    5  Intervertebral disc disorders with radiculopathy, lumbar region        Plan:  Rasta Hannon is a 66 y o  female with a history of chronic pain syndrome secondary to cervical radiculopathy, lumbar disc herniation, lumbar radiculopathy, lumbar spondylosis and right shoulder pain  The patient presents today with pain that is multifactorial in nature  She was scheduled to undergo a cervical epidural steroid injection last office visit  However due to the patient not having significant relief of symptoms from her previous cervical epidural steroid injection her insurance company would not cover an additional an injection  Therefore, the patient will follow up with Dr Dumont to discuss surgical options for her cervical spine  The patient also continues with ongoing low back pain  She reports that her back pain is slowly re-emerging since her last left L4-L5 transforaminal epidural steroid injection that was performed on 12/20/2018  Therefore, at this time the patient would like to proceed with an additional left L4-L5 transforaminal epidural steroid injection at this time  She was educated on the procedures most common risks, and was given a brochure the procedure  The patient verbalized understanding, would like to proceed with the procedure therefore the patient be scheduled for an upcoming Tuesday, Thursday or Friday  The patient had taken Lyrica in the past which had provided her pain relief  She had stopped this medication due to cost   She reports that she recently obtained a new prescription plan  Therefore at this time we will restart the patient on Lyrica 75 mg 1 tablet 2 times daily  She was instructed to call the office in 1-2 weeks to update office on the effectiveness of this medication, or sooner with adverse medication side effects      Complete risks and benefits including bleeding, infection, tissue reaction, nerve injury and allergic reaction were discussed  The approach was demonstrated using models and literature was provided  Verbal and written consent was obtained  The patient will follow up after her left L4-L5 transforaminal epidural steroid injection, or sooner with the worsening of symptoms  My impressions and treatment recommendations were discussed in detail with the patient who verbalized understanding and had no further questions  Discharge instructions were provided  I personally saw and examined the patient and I agree with the above discussed plan of care  Orders Placed This Encounter   Procedures    FL spine and pain procedure     Standing Status:   Future     Standing Expiration Date:   2/27/2023     Order Specific Question:   Reason for Exam:     Answer:   Left L4-L5 TFESI     Order Specific Question:   Anticoagulant hold needed? Answer:   No     New Medications Ordered This Visit   Medications    pregabalin (LYRICA) 75 mg capsule     Sig: Take 1 capsule (75 mg total) by mouth 2 (two) times a day     Dispense:  60 capsule     Refill:  0       History of Present Illness:  Sharda Kennedy is a 66 y o  female with a history of chronic pain syndrome secondary to cervical radiculopathy, lumbar disc herniation, lumbar radiculopathy, lumbar spondylosis and right shoulder pain  The patient was last seen office on 01/04/2019  Where the patient was ordered to undergo a cervical epidural steroid injection  She presents for a follow up office visit in regards to Neck Pain (denied injection); Shoulder Pain; and Arm Pain  The patients current symptoms include neck pain that radiates down the posterior aspect of her right arm to her hand  She also complains of left-sided low back pain that radiates down her left leg to her foot  She denies bilateral leg weakness, or bowel or bladder issues    She describes her pain as dull aching, throbbing, shooting, numbness, pins and needles pain that is constant nature occurring throughout the day  The patient reports that her pain is symptoms have worsened since last office visit  She currently rates her pain as 7 out 10 numeric pain scale  I have personally reviewed and/or updated the patient's past medical history, past surgical history, family history, social history, current medications, allergies, and vital signs today  Review of Systems   Respiratory: Negative for shortness of breath  Cardiovascular: Negative for chest pain  Gastrointestinal: Negative for constipation, diarrhea, nausea and vomiting  Musculoskeletal: Negative for arthralgias, gait problem, joint swelling and myalgias  Skin: Negative for rash  Neurological: Negative for dizziness, seizures and weakness  All other systems reviewed and are negative  Patient Active Problem List   Diagnosis    Rosacea, acne    Lumbar degenerative disc disease    Lumbar radiculopathy    Anxiety    Benign essential HTN    Cervical radiculopathy    Disc disorder of cervical region    Hot flash, menopausal    Lumbar facet arthropathy    Lumbar stenosis without neurogenic claudication    Myofascial pain syndrome    Obesity (BMI 30-39  9)    Pain syndrome, chronic    Positive depression screening    Primary osteoarthritis of right knee    Right knee injury    Sacroiliitis ([address] )    Tear of medial meniscus of right knee, current    Bulge of lumbar disc without myelopathy    Spondylolisthesis of lumbar region    Arthritis of right acromioclavicular joint       Past Medical History:   Diagnosis Date    Anxiety     Cataract, bilateral     last assessed    Cervical radiculopathy     Disc disease, degenerative, cervical     Herpes simplex infection     Hypertension     Insomnia     Low back pain     Lumbar degenerative disc disease     Lumbar facet arthropathy     Lumbar radiculopathy     Lumbar stenosis without neurogenic claudication     Mononucleosis     Myofascial pain dysfunction syndrome     Osteoarthritis     shoulder region - unspecified laterality - last assessed 2/1/14    Plantar fasciitis     Ptosis, both eyelids     last assessed 10/6/16    Ptosis, congenital, left     Retinal detachment     last assessed 12/11/14 right eye    Sacroiliitis ([address] )     Thyroid disease     Thyrotoxicosis     last assessed 5/17/13    Vertigo        Past Surgical History:   Procedure Laterality Date    TX SURG IMPLNT NEUROELECT,EPIDURAL Left 1/26/2016    Procedure: PLACEMENT OF THORACIC SPINAL CORD STIMULATOR WITH LEFT BUTTOCK IMPLANTABLE PULSE GENERATOR (IMPULSE MONITORING);   Surgeon: Jose Rafael Zazueta MD;  Location:  MAIN OR;  Service: Neurosurgery    RETINAL DETACHMENT SURGERY Right        Family History   Problem Relation Age of Onset   Ursula Riis Breast cancer Mother    Ursula Risurendra COPD Mother     Hypertension Mother         essential    Heart attack Father         acute MI    Emphysema Father     Hypertension Father         essential    Other Father         prehypertension    Liver cancer Maternal Aunt        Social History     Occupational History    Occupation: Business owner     Comment: [employer]-full time working   Tobacco Use    Smoking status: Never Smoker    Smokeless tobacco: Never Used   Substance and Sexual Activity    Alcohol use: Yes     Comment: occasional use as per [employer]    Drug use: No    Sexual activity: Yes       Current Outpatient Medications on File Prior to Visit   Medication Sig    diclofenac (VOLTAREN) 75 mg EC tablet Take 1 tablet (75 mg total) by mouth 2 (two) times a day (Patient taking differently: Take 100 mg by mouth 2 (two) times a day )    busPIRone (BUSPAR) 5 mg tablet Take 1 tablet (5 mg total) by mouth 4 (four) times a day    doxycycline hyclate (VIBRAMYCIN) 100 mg capsule Take 1 capsule (100 mg total) by mouth 2 (two) times a day for 180 days    fluticasone (FLONASE) 50 mcg/act nasal spray 1 spray into each nostril daily    LORazepam (ATIVAN) 1 mg tablet Take 1 5 tablets (1 5 mg total) by mouth daily at bedtime as needed for anxiety    methimazole (TAPAZOLE) 5 mg tablet Take 1 tablet (5 mg total) by mouth daily    methocarbamol (ROBAXIN) 750 mg tablet Take 1 tablet (750 mg total) by mouth daily at bedtime as needed for muscle spasms for up to 90 days    penciclovir (DENAVIR) 1 % cream Apply topically daily    propranolol (INDERAL) 10 mg tablet Take 10 mg by mouth 2 (two) times a day      quinapril (ACCUPRIL) 10 mg tablet Take 1 tablet (10 mg total) by mouth daily    valACYclovir (VALTREX) 500 mg tablet Take 1 tablet (500 mg total) by mouth as needed (cold sores) for up to 30 days    [DISCONTINUED] gabapentin (NEURONTIN) 100 mg capsule Take 1 capsule (100 mg total) by mouth 3 (three) times a day     No current facility-administered medications on file prior to visit  Allergies   Allergen Reactions    Other Dermatitis     Adhesive tape - please use ONLY PAPER TAPE    Scopolamine Other (See Comments)     Severe, debilitating headache      Flexeril [Cyclobenzaprine] Dizziness and Fatigue    Naproxen      Other reaction(s): Unknown Allergic Reaction  Annotation - [id number]: nightmares    Penicillins      Other reaction(s): Unknown Allergic Reaction  Annotation - [id number]: childhood reaction    Promethazine-Codeine      Reaction Date: [id number]; Annotation - [id number]: nightmares; Annotation - [id number]: nightmares    Tramadol      Other reaction(s): Unknown Allergic Reaction  Annotation - [id number]: PT HAS NIGHTMARES FROM TRAMADOL    Wound Dressing Adhesive Rash       Physical Exam:    /76   Pulse 65   Temp 97 6 °F (36 4 °C) (Oral)   Wt 101 kg (222 lb)   LMP 11/26/2015   BMI 35 83 kg/m²     Constitutional:normal, well developed, well nourished, alert, in no distress and non-toxic and no overt pain behavior   and obese  Eyes:anicteric  HEENT:grossly intact  Neck:supple, symmetric, trachea midline and no masses   Pulmonary:even and unlabored  Cardiovascular:No edema or pitting edema present  Skin:Normal without rashes or lesions and well hydrated  Psychiatric:Mood and affect appropriate  Neurologic:Cranial Nerves II-XII grossly intact  Musculoskeletal:normal     Cervical Spine Exam    Appearance:  Normal lordosis  Palpation/Tenderness:  left cervical paraspinal tenderness  right cervical paraspinal tenderness  left trapezium tenderness  right trapezium tenderness  Sensory:  no sensory deficits noted  Range of Motion:  Flexion:  Minimally limited  with pain  Extension:  Minimally limited  with pain  Lateral Flexion - Left:  Minimally limited  with pain  Lateral Flexion - Right:  Minimally limited  with pain  Rotation - Left:  Minimally limited  with pain  Rotation - Right:  Minimally limited  with pain  Motor Strength:  Left Arm Flexion  5/5  Left Arm Extension  5/5  Right Arm Flexion  5/5  Right Arm Extension 4/5  Left Wrist Flexion  5/5  Left Wrist Extension  5/5  Left Finger Abduction  5/5  Right Finger Abduction  5/5  Left    5/5  Right   5/5         Imaging  MRI CERVICAL SPINE WITHOUT CONTRAST     INDICATION: Right arm pain and weakness      COMPARISON:  MRI of the cervical spine from February 20, 2014      TECHNIQUE:  Sagittal T1, sagittal T2, sagittal inversion recovery, axial T2, axial  2D merge  the patient had a [facility] spinal stimulator which was placed in safe mode  Imaging scan parameters were adjusted to meet the standards for imaging a patient   with a spinal stimulator  There are no complications encountered      IMAGE QUALITY:  Degraded due to decreased scan time and specified technique      FINDINGS:     ALIGNMENT:  Normal alignment of the cervical spine  No compression fracture  No subluxation  No scoliosis      MARROW SIGNAL:  Normal marrow signal is identified within the visualized bony structures    No discrete marrow lesion      CERVICAL AND VISUALIZED THORACIC CORD:  Normal signal within the visualized cord      PREVERTEBRAL AND PARASPINAL SOFT TISSUES: 1 1 x 0 8 x 1 3 cm T2 hyperintense well marginated thyroid lesion without suspicious features  Incidental discovery of a thyroid nodule measuring less than 1 5 cm and without suspicious features is noted in this   patient who is above 28years old; according to guidelines published in the February 2015 white paper on incidental thyroid nodules in the Journal of the [employer] of Radiology [facility]), no further evaluation is recommended       VISUALIZED POSTERIOR FOSSA:  The visualized posterior fossa demonstrates no abnormal signal      CERVICAL DISC SPACES:     C2-C3:  Normal      C3-C4:  Left central protrusion with mild ventral indentation on the thecal sac  Minimal encroachment of the proximal left neural foramina      C4-C5: Shallow disc osteophyte complex with mild right uncovertebral hypertrophy        C5-C6:  No spinal canal stenosis  Mild right foraminal encroachment      C6-C7:  Disc bulge with bilateral uncovertebral hypertrophy and mild posterior osteophytic ridging without spinal canal stenosis  Mild right foraminal stenosis      C7-T1:  Tiny central protrusion without spinal canal or foraminal stenosis      UPPER THORACIC DISC SPACES:  Normal      IMPRESSION:     1  Stable mild cervical spondylosis  Mild right C6-7 foraminal stenosis  2  No cord indentation or cord signal abnormality  3  Approximate 1 3 cm right thyroid lesion    No additional workup recommended at this time

## 2019-02-28 ENCOUNTER — TELEPHONE (OUTPATIENT)
Dept: OBGYN CLINIC | Facility: HOSPITAL | Age: 64
End: 2019-02-28

## 2019-02-28 NOTE — TELEPHONE ENCOUNTER
Spoke to patient as she was to be given a printed script for her Lyrica, as it is expensive to get through her pharmacy  She plans on going to different pharmacies to see if she can get it cheaper  She also requested if she can get the AVS from appt  Advised pt she can come and pick it up

## 2019-02-28 NOTE — TELEPHONE ENCOUNTER
Patient is calling back regarding the Lyrica   She was told that she was going to be given a written script but it was electronically sent to [facility]  She can be reached at #[phone]

## 2019-03-01 ENCOUNTER — OFFICE VISIT (OUTPATIENT)
Dept: INTERNAL MEDICINE CLINIC | Facility: CLINIC | Age: 64
End: 2019-03-01
Payer: COMMERCIAL

## 2019-03-01 VITALS
SYSTOLIC BLOOD PRESSURE: 120 MMHG | WEIGHT: 224 LBS | DIASTOLIC BLOOD PRESSURE: 78 MMHG | BODY MASS INDEX: 37.32 KG/M2 | HEIGHT: 65 IN | OXYGEN SATURATION: 99 % | TEMPERATURE: 97.8 F | HEART RATE: 74 BPM

## 2019-03-01 DIAGNOSIS — G89.4 PAIN SYNDROME, CHRONIC: ICD-10-CM

## 2019-03-01 DIAGNOSIS — M51.36 LUMBAR DEGENERATIVE DISC DISEASE: ICD-10-CM

## 2019-03-01 DIAGNOSIS — M43.16 SPONDYLOLISTHESIS OF LUMBAR REGION: ICD-10-CM

## 2019-03-01 DIAGNOSIS — M54.12 CERVICAL RADICULOPATHY: ICD-10-CM

## 2019-03-01 DIAGNOSIS — E78.00 HYPERCHOLESTEREMIA: ICD-10-CM

## 2019-03-01 DIAGNOSIS — E05.90 HYPERTHYROIDISM: ICD-10-CM

## 2019-03-01 DIAGNOSIS — M48.061 LUMBAR STENOSIS WITHOUT NEUROGENIC CLAUDICATION: ICD-10-CM

## 2019-03-01 DIAGNOSIS — F41.9 ANXIETY: ICD-10-CM

## 2019-03-01 DIAGNOSIS — I10 BENIGN ESSENTIAL HTN: ICD-10-CM

## 2019-03-01 DIAGNOSIS — M51.26 BULGE OF LUMBAR DISC WITHOUT MYELOPATHY: ICD-10-CM

## 2019-03-01 DIAGNOSIS — L71.9 ROSACEA, ACNE: ICD-10-CM

## 2019-03-01 DIAGNOSIS — M54.16 LUMBAR RADICULOPATHY: Primary | ICD-10-CM

## 2019-03-01 PROCEDURE — 3074F SYST BP LT 130 MM HG: CPT | Performed by: FAMILY MEDICINE

## 2019-03-01 PROCEDURE — 99214 OFFICE O/P EST MOD 30 MIN: CPT | Performed by: FAMILY MEDICINE

## 2019-03-01 PROCEDURE — 3008F BODY MASS INDEX DOCD: CPT | Performed by: FAMILY MEDICINE

## 2019-03-01 PROCEDURE — 3078F DIAST BP <80 MM HG: CPT | Performed by: FAMILY MEDICINE

## 2019-03-01 RX ORDER — METHIMAZOLE 5 MG/1
5 TABLET ORAL DAILY
Qty: 90 TABLET | Refills: 1 | Status: SHIPPED | OUTPATIENT
Start: 2019-03-01 | End: 2019-07-08 | Stop reason: SDUPTHER

## 2019-03-01 NOTE — PROGRESS NOTES
Assessment/Plan:    No problem-specific Assessment & Plan notes found for this encounter  1  Benign essential HTN (401 1) (I10)   2  Anxiety (300 00) (F41 9)    Discussion:   Pain management notes reviewed  Continue follow-up with pain management and she is seeking 2nd opinion by Neurosurgery and Orthopedics for back surgery  No changes to medications from my standpoint today  Recommended physical therapy with water and referral given today  Return in approximately 4 months with laboratory data  stay off Cymbalta since it has not helped and made her tired  Keep  buspar to 10 mg twice a day,  to help with anxiety depression and back pain- it is helpful but makes her tired  she has an appt with her neurosurgeon and keep the appointment with pain management doctor to discuss further options such as injections versus Neurosurgery for lumbar stenosis and canal stenosis and foraminal stenosis  keep doxycycline b i d  for rosacea symptoms  Has a spinal stimulator that helps her back but now pain in LLE- radiation  Takes ativan at night and  It helps but she still wakes up at night, it makes her too tired in the daytime so she does not like to take it  Discussed options  amitriptyline, or narcotics to control her pain     Weight gain is again discussed with her today  She admits to not being compliant with nutrition or activity levels    I encouraged her that water therapy will help her be mobile and lose weight         Problem List Items Addressed This Visit        Cardiovascular and Mediastinum    Benign essential HTN       Nervous and Auditory    Lumbar radiculopathy - Primary    Relevant Orders    Ambulatory referral to Physical Therapy    Cervical radiculopathy    Relevant Orders    Ambulatory referral to Physical Therapy       Musculoskeletal and Integument    Rosacea, acne    Lumbar degenerative disc disease    Relevant Orders    Ambulatory referral to Physical Therapy    Bulge of lumbar disc without myelopathy    Relevant Orders    Ambulatory referral to Physical Therapy    Spondylolisthesis of lumbar region    Relevant Orders    Ambulatory referral to Physical Therapy       Other    Anxiety    Lumbar stenosis without neurogenic claudication    Relevant Orders    Ambulatory referral to Physical Therapy    Pain syndrome, chronic      Other Visit Diagnoses     Hyperthyroidism        Relevant Medications    methimazole (TAPAZOLE) 5 mg tablet    Other Relevant Orders    TSH, 3rd generation    T4, free    Hypercholesteremia        Relevant Orders    Lipid panel    Comprehensive metabolic panel            Subjective:  F/up htn and back pain and insomnia     Patient ID: Luke Olmstead is a 66 y o  female  Hypertension   Associated symptoms include anxiety  Anxiety         Arthritis     Back Pain          Back pain:  Patient states that lyrica not covered by her insurance anymore and she has been off of it for 1 week  She feels that it really did not help her is now that she has not been taking it for 1 week  On Neurontin in the past which did not help her  She was given nortriptyline by her pain management doctor which made her feel lethargic but did not help with her discomfort  She called them and she has an appointment to be seen for possible injection  She describes her pain as burning and has difficulty standing and sitting for any prolonged period of time  She has difficulty coping in her and sitting at her job  She states that she takes Zanaflex at night p r n  but that makes her tired in the daytime  She takes anywhere between 8-12 to 100 mg ibuprofen a day but does not mix it with meloxicam   She had an MRI few months back and a pain pump placed by Neurosurgery approximately 1 year ago  Originally it did help her but since then it has not been helping and she has not seen a neurosurgeon back  5/16/18: prednisone id not help and Cymbalta makes her anxiety worse   Went to the pain mgmt and got injections that have helped now  Still having crying spells and does not feel that Cymbalta is helping  8/13/18: going for another spinal injection, had her stimulator adjusted but not much relief  Better day today    10/15/18:  Pain mgmt note seen, for neurosurgical eval  repeat MRI noted today  Never started mobic, better with low dose muscle relaxor, takes half bc full makes her tired  12/2018:  Sleeping better with changes in  Medications last visit but not sure if helping with the pain, seeing pan mgt and getting injections  EMG is normal  R arm   3/1/2019: seeing pain mgmt and gets epidural injections, went for second opinion for [facility] for cervical  Epidural  Dr Debby Scanlon has recommended back surgery  Had issue of  insurance paying for epidural- that's why went  to [facility]  Has appt next week for neck injection at [facility]  Has R shoulder issues  Rosacea, patient was placed on Metrogel by her endocrinologist and she has been using it for 1 month  She feels that is not helping  She has facial flushing for several years which are exacerbated by stress, hypertension, heat, and hot flashes  She would like to discuss other options  5/16/18: started doxy 1 week ago, no relief yet  Still flushing and worse in the mornings  10/15/18:  Helping a lot, taking doxy BID, ran out 1 week ago, re start at BID and then decrease to monthly     Insomnia, patient states she does not sleep well which is very multifactorial including the pain and how flashes  She used to be on estrogen replacement currently does not take any  She does take Ativan at night to help her sleep which helped marginally  5/16/18: taking 1 5 mg  And advil pm and it has helped- sleeping through the night now and slightly  More tired in am   10/15/18: taking ativan and sleeping well with it 3/1/19:      Depression:  Patient is a depression was exacerbated by the pain and inability to sleep    She is always tired and fatigued and is having difficulty with activities of daily living around the house like cleaning and cooking as well as work  Patient is here today and can cooperate always  She feels that she is up to any medications or the long medications right now  8/13/18: buspar was increased to 10 mg per but she felt tired on it and went back to 5 mg BId, has gained weight  10/15/18: taking bupar 5 mg- 2 tabs at [facility],  12/21/18: stable  March 2019, no issues  Takes 1- 5 mg BuSpar in the morning than 1 with lunch and 2 at night  The following portions of the patient's history were reviewed and updated as appropriate: allergies, current medications, past family history, past medical history, past social history, past surgical history and problem list     Review of Systems   Musculoskeletal: Positive for arthritis and back pain  Constitutional:  Denies fever or chills ,  7 lb weight gain  Eyes:  Denies change in visual acuity , wears glasses  HENT:  Denies nasal congestion or sore throat , + allergy symptoms  Respiratory:  Denies cough or shortness of breath or wheezing  Cardiovascular:  Denies palpitations or chest pain  GI:  Denies abdominal pain, nausea, or vomiting  Integument:  rosacea  Neurologic:  Denies headache or focal weakness, dizziness with computer and change in head positions    MS: see HPI    Objective:      /78 (BP Location: Left arm, Patient Position: Sitting, Cuff Size: Large)   Pulse 74   Temp 97 8 °F (36 6 °C) (Oral)   Ht 5' 5" (1 651 m) Comment: shoes on  Wt 102 kg (224 lb) Comment: shoes on  LMP 11/26/2015   SpO2 99%   BMI 37 28 kg/m²          Physical Exam    Constitutional:  Well developed, well nourished, no acute distress, non-toxic appearance   Eyes:  PERRL, conjunctiva normal , non icteric sclera  HENT:  Atraumatic, oropharynx moist  Neck-  supple   Respiratory:  CTA b/l, normal breath sounds, no rales, no wheezing   Cardiovascular:  RRR, no murmurs, no LE edema b/l  GI:  Soft, nondistended, normal bowel sounds x 4, nontender, no organomegaly, no mass, no rebound, no guarding   Neurologic:  no focal deficits noted   Psychiatric:  Speech and behavior appropriate , AAO x 3  MS: able to stand up straight today, positive straight leg test

## 2019-03-07 ENCOUNTER — TELEPHONE (OUTPATIENT)
Dept: OBGYN CLINIC | Facility: HOSPITAL | Age: 64
End: 2019-03-07

## 2019-03-07 DIAGNOSIS — M54.12 CERVICAL RADICULOPATHY: Primary | ICD-10-CM

## 2019-03-07 DIAGNOSIS — M79.18 MYOFASCIAL PAIN SYNDROME: ICD-10-CM

## 2019-03-07 DIAGNOSIS — M47.816 LUMBAR SPONDYLOSIS: ICD-10-CM

## 2019-03-07 RX ORDER — PREGABALIN 50 MG/1
50 CAPSULE ORAL 3 TIMES DAILY
Qty: 270 CAPSULE | Refills: 0 | Status: SHIPPED | OUTPATIENT
Start: 2019-03-07 | End: 2019-06-26 | Stop reason: SDUPTHER

## 2019-03-07 NOTE — TELEPHONE ENCOUNTER
I called the patient back and verified that she wanted a prescription for Lyrica 50 mg 1 tablet 3 times daily, 90 day supply sent to her mail order pharmacy  I did send a prescription for Lyrica 50 mg 1 tablet 3 times daily, 90 day supply, 270 tablets to the patient's mail order pharmacy

## 2019-03-07 NOTE — TELEPHONE ENCOUNTER
Dr Sharon Barragan    # [phone]    Patient is asking if her pregabalin (LYRICA) 75 mg capsule can go from 75 mg 2 times a day back to 50 mg 3 times a day  She already spoke with insurance its is the same cost    She would like a call from the nurse about her last back procedure  She has some questions

## 2019-03-07 NOTE — TELEPHONE ENCOUNTER
S/w pt, she is asking for a rx to be sent to future scripts (mail order on file) for Lyrica 50 mg TID  Pt said she tolerates the 50 mg TID better because it does not make her as drowsy with spacing it out  Pt was given a paper rx last visit for Lyrica 75 mg BID but she did not fill it yet  Pt said that her insurance company said it would be the same cost      Pt also said that she was denied for another neck injection by her insurance company because she did not have more than 50% relief  Pt is now worried that her back injection that she scheduled will be denied as well because when we called her for her one week f/u she only had 40% relief at the time  Pt asked that I s/w the  about this

## 2019-03-08 DIAGNOSIS — G89.4 PAIN SYNDROME, CHRONIC: ICD-10-CM

## 2019-03-08 DIAGNOSIS — L71.9 ROSACEA, ACNE: ICD-10-CM

## 2019-03-11 ENCOUNTER — APPOINTMENT (EMERGENCY)
Dept: CT IMAGING | Facility: HOSPITAL | Age: 64
End: 2019-03-11
Payer: COMMERCIAL

## 2019-03-11 ENCOUNTER — HOSPITAL ENCOUNTER (EMERGENCY)
Facility: HOSPITAL | Age: 64
Discharge: HOME | End: 2019-03-11
Attending: EMERGENCY MEDICINE | Admitting: EMERGENCY MEDICINE
Payer: COMMERCIAL

## 2019-03-11 ENCOUNTER — TELEPHONE (OUTPATIENT)
Dept: PHYSICAL THERAPY | Facility: OTHER | Age: 64
End: 2019-03-11

## 2019-03-11 VITALS
HEIGHT: 66 IN | HEART RATE: 98 BPM | DIASTOLIC BLOOD PRESSURE: 89 MMHG | OXYGEN SATURATION: 98 % | BODY MASS INDEX: 35.36 KG/M2 | SYSTOLIC BLOOD PRESSURE: 156 MMHG | TEMPERATURE: 97.4 F | WEIGHT: 220 LBS | RESPIRATION RATE: 17 BRPM

## 2019-03-11 DIAGNOSIS — M54.50 LOWER BACK PAIN: ICD-10-CM

## 2019-03-11 DIAGNOSIS — R51.9 HEADACHE: ICD-10-CM

## 2019-03-11 DIAGNOSIS — S09.90XA ACUTE HEAD INJURY WITHOUT LOSS OF CONSCIOUSNESS, INITIAL ENCOUNTER: Primary | ICD-10-CM

## 2019-03-11 DIAGNOSIS — M54.2 NECK PAIN: ICD-10-CM

## 2019-03-11 PROCEDURE — 99284 EMERGENCY DEPT VISIT MOD MDM: CPT

## 2019-03-11 PROCEDURE — 70450 CT HEAD/BRAIN W/O DYE: CPT

## 2019-03-11 RX ORDER — ACETAMINOPHEN 325 MG/1
650 TABLET ORAL ONCE
Status: COMPLETED | OUTPATIENT
Start: 2019-03-11 | End: 2019-03-11

## 2019-03-11 RX ORDER — LIDOCAINE 50 MG/G
2 PATCH TOPICAL ONCE
Status: DISCONTINUED | OUTPATIENT
Start: 2019-03-11 | End: 2019-03-11 | Stop reason: HOSPADM

## 2019-03-11 RX ADMIN — ACETAMINOPHEN 650 MG: 325 TABLET, FILM COATED ORAL at 10:50

## 2019-03-11 RX ADMIN — LIDOCAINE 2 PATCH: 50 PATCH TOPICAL at 10:43

## 2019-03-11 NOTE — TELEPHONE ENCOUNTER
Message left for Pt  to call back [facility] at there earliest convince, our number and our hours given  Waiting for Pt  to call us back  Pt  is being seen by Jennifer Garnett for pain management

## 2019-03-11 NOTE — TELEPHONE ENCOUNTER
diclofenec 76 was initial rx'd by pain management and listed as not taking on med list   diclofenec 100 is listed as historical on med list   We have never rx'd  Best for pt to follow-up with pain management to keep continuity of her pain meds

## 2019-03-13 ENCOUNTER — TELEPHONE (OUTPATIENT)
Dept: PHYSICAL THERAPY | Facility: OTHER | Age: 64
End: 2019-03-13

## 2019-03-13 NOTE — TELEPHONE ENCOUNTER
Pt did call back and stated that she has a script from her PCP to do water physical therapy for her back and neck pain  Pt did have a fall several days ago, on black ice which has caused her back and neck pain to flare up  Pt is currently being seen by pain management, and is refusing our program   Referral will be closed out

## 2019-03-13 NOTE — TELEPHONE ENCOUNTER
Message left with Pt  to call [facility] program, our c/b number and our hours given  This was second call placed to Pt  , waiting for c/b  Referral will be closed out at end of business today, if Pt  does not return our call

## 2019-03-14 ENCOUNTER — TELEPHONE (OUTPATIENT)
Dept: OBGYN CLINIC | Facility: MEDICAL CENTER | Age: 64
End: 2019-03-14

## 2019-03-14 RX ORDER — DOXYCYCLINE HYCLATE 100 MG/1
100 CAPSULE ORAL 2 TIMES DAILY
Qty: 180 CAPSULE | Refills: 1 | Status: SHIPPED | OUTPATIENT
Start: 2019-03-14 | End: 2019-08-28

## 2019-03-14 NOTE — TELEPHONE ENCOUNTER
Medication Name: diclofenac   Dosage of Med: 75 mg        Frequency of Med:Take 1 tablet twice daily    Remaining Medication: 1 week left         Pharmacy and Location:[facility]/pharmacy #[id number] #[id number], [address], [place], [place]           Pt   Preferred Callback Phone Number: [phone]       Pt also wanted to know if she is supposed to take the 75 mg or 100mg

## 2019-03-14 NOTE — ED PROVIDER NOTES
History  Chief Complaint   Patient presents with    Fall     Pt reports slipping on black ice while walking work right before 0800 this am  States she hit her head, but no LOC, no thinner  C/o dull HA and slight dizziness  HPI   73-year-old pleasant, well-appearing female with history chronic neck pain, chronic back pain, hypertension, hyperthyroidism presents to the emergency department for evaluation following fall  Patient states that she slipped on black ice this morning while going to a conference and fell backwards, hitting her head on the ground  She denies any loss of consciousness and was able to get up with assistance immediately following the fall  Since the injury, she has had dull headache and slight dizziness  She does have chronic pain in her neck and back that are current at this time, though not necessarily much worse than usual   On review of systems, she complains mild right elbow pain, where she sustained an abrasion when she fell (clothing and jacket were on and not ripped)  She denies any change in vision, confusion, chest pain, shortness of breath, abdominal pain, nausea, vomiting, weakness, numbness, paresthesias, or complaints other than stated above  Tetanus up to date  She does not take any AC or AP  Prior to Admission Medications   Prescriptions Last Dose Informant Patient Reported? Taking?    Diclofenac Sodium  MG 24 hr tablet   Yes No   Sig: Take 100 mg by mouth 2 (two) times a day   LORazepam (ATIVAN) 1 mg tablet  Self No No   Sig: Take 1 5 tablets (1 5 mg total) by mouth daily at bedtime as needed for anxiety   busPIRone (BUSPAR) 5 mg tablet   No No   Sig: Take 1 tablet (5 mg total) by mouth 4 (four) times a day   diclofenac (VOLTAREN) 75 mg EC tablet  Self No No   Sig: Take 1 tablet (75 mg total) by mouth 2 (two) times a day   Patient not taking: Reported on 3/1/2019   fluticasone (FLONASE) 50 mcg/act nasal spray  Self No No   Si spray into each nostril daily methimazole (TAPAZOLE) 5 mg tablet   No No   Sig: Take 1 tablet (5 mg total) by mouth daily   methocarbamol (ROBAXIN) 750 mg tablet   No No   Sig: Take 1 tablet (750 mg total) by mouth daily at bedtime as needed for muscle spasms for up to 90 days   penciclovir (DENAVIR) 1 % cream   No No   Sig: Apply topically daily   Patient taking differently: Apply topically daily as needed    pregabalin (LYRICA) 50 mg capsule   No No   Sig: Take 1 capsule (50 mg total) by mouth 3 (three) times a day   propranolol (INDERAL) 10 mg tablet  Self Yes No   Sig: Take 10 mg by mouth 2 (two) times a day     quinapril (ACCUPRIL) 10 mg tablet  Self No No   Sig: Take 1 tablet (10 mg total) by mouth daily   valACYclovir (VALTREX) 500 mg tablet  Self No No   Sig: Take 1 tablet (500 mg total) by mouth as needed (cold sores) for up to 30 days      Facility-Administered Medications: None       Past Medical History:   Diagnosis Date    Anxiety     Cataract, bilateral     last assessed    Cervical radiculopathy     Disc disease, degenerative, cervical     Herpes simplex infection     Hypertension     Insomnia     Low back pain     Lumbar degenerative disc disease     Lumbar facet arthropathy     Lumbar radiculopathy     Lumbar stenosis without neurogenic claudication     Mononucleosis     Myofascial pain dysfunction syndrome     Osteoarthritis     shoulder region - unspecified laterality - last assessed 2/1/14    Plantar fasciitis     Ptosis, both eyelids     last assessed 10/6/16    Ptosis, congenital, left     Retinal detachment     last assessed 12/11/14 right eye    Sacroiliitis ([address] )     Thyroid disease     Thyrotoxicosis     last assessed 5/17/13    Vertigo        Past Surgical History:   Procedure Laterality Date    [place] SURG IMPLNT NEUROELECT,EPIDURAL Left 1/26/2016    Procedure: PLACEMENT OF THORACIC SPINAL CORD STIMULATOR WITH LEFT BUTTOCK IMPLANTABLE PULSE GENERATOR (IMPULSE MONITORING);   Surgeon: Franco Rivas Bong Romero MD;  Location: [facility] [place];  Service: Neurosurgery    RETINAL DETACHMENT SURGERY Right        Family History   Problem Relation Age of Onset   Nuris Alfredo Breast cancer Mother    Nuris Alfredo COPD Mother     Hypertension Mother         essential    Heart attack Father         acute MI    Emphysema Father     Hypertension Father         essential    Other Father         prehypertension    Liver cancer Maternal Aunt      I have reviewed and agree with the history as documented  Social History     Tobacco Use    Smoking status: Never Smoker    Smokeless tobacco: Never Used   Substance Use Topics    Alcohol use: Yes     Comment: occasional use as per [employer]    Drug use: No        Review of Systems   Constitutional: Negative for chills and fever  Respiratory: Negative for shortness of breath  Gastrointestinal: Negative for abdominal pain, nausea and vomiting  Skin: Positive for wound  Allergic/Immunologic: Negative for immunocompromised state  Neurological: Positive for dizziness and headaches  Hematological: Does not bruise/bleed easily  Psychiatric/Behavioral: The patient is not nervous/anxious  All other systems reviewed and are negative  Physical Exam  Physical Exam   Constitutional: She is oriented to person, place, and time  She appears well-nourished  No distress  HENT:   Head: Normocephalic and atraumatic  No contusion, hematoma, abrasion, or laceration noted to scalp   Eyes: EOM are normal    Neck: Normal range of motion  Neck supple  Cardiovascular: Normal rate and regular rhythm  Pulmonary/Chest: Effort normal and breath sounds normal  No respiratory distress  Abdominal: Soft  She exhibits no distension  There is no tenderness  Musculoskeletal: Normal range of motion  Right elbow: She exhibits normal range of motion and no swelling          Arms:  No tenderness of C, T, or L-spine  Mild paraspinal muscular tenderness of neck and lower back    Mild abrasion right elbow overlying faint ecchymosis   Neurological: She is alert and oriented to person, place, and time  Skin: Skin is warm and dry  She is not diaphoretic  Psychiatric: She has a normal mood and affect  Her behavior is normal    Nursing note and vitals reviewed  Vital Signs  ED Triage Vitals   Temperature Pulse Respirations Blood Pressure SpO2   03/11/19 0921 03/11/19 0909 03/11/19 0909 03/11/19 0909 03/11/19 0909   (!) 97 4 °F (36 3 °C) 73 18 (!) 149/110 98 %      Temp Source Heart Rate Source Patient Position - Orthostatic VS BP Location FiO2 (%)   03/11/19 0921 03/11/19 0909 03/11/19 0909 03/11/19 0909 --   Oral Monitor Sitting Right arm       Pain Score       03/11/19 0909       7           Vitals:    03/11/19 0909 03/11/19 0939   BP: (!) 149/110 156/89   Pulse: 73 98   Patient Position - Orthostatic VS: Sitting Lying           Visual Acuity  Visual Acuity      Most Recent Value   L Pupil Size (mm)  3   R Pupil Size (mm)  3          ED Medications  Medications   acetaminophen (TYLENOL) tablet 650 mg (650 mg Oral Given 3/11/19 1050)       Diagnostic Studies  Results Reviewed     None                 CT head without contrast   Final Result by Tricia Krishnan MD (03/11 1044)      1  No acute intracranial abnormality  2   Air-fluid level within the left maxillary sinus suggestive of acute sinusitis  Workstation performed: [id number]                    Procedures  Procedures       Phone Contacts  ED Phone Contact    ED Course                               MDM  Number of Diagnoses or Management Options  Acute head injury without loss of consciousness, initial encounter:   Headache:   Lower back pain:   Neck pain:   Diagnosis management comments: Shared decision making with patient; given mechanism of fall and current mild headache and dizziness, will treat pain, obtain CT head and plan to discharge home if CT negative        Disposition  Final diagnoses:   Acute head injury without loss of consciousness, initial encounter   Headache   Neck pain   Lower back pain     Time reflects when diagnosis was documented in both MDM as applicable and the Disposition within this note     Time User Action Codes Description Comment    3/11/2019 10:52 AM Evelina Koch Aj Sampson Add [S09 90XA] Acute head injury without loss of consciousness, initial encounter     3/11/2019 10:52 AM Dianne Pham Add [R51] Headache     3/11/2019 10:52 AM Dianne Titus Add [M54 2] Neck pain     3/11/2019 10:53 AM Yani Main Add [M54 5] Lower back pain       ED Disposition     ED Disposition Condition Date/Time Comment    Discharge Stable Mon Mar 11, 2019 10:52 AM Georgina Powell discharge to home/self care              Follow-up Information     Follow up With Specialties Details Why Contact Info Additional [address] [facility], DO Family Medicine, Internal Medicine  As needed [address] [facility]  [phone]       [address] [place] [address] Emergency Department Emergency Medicine  If symptoms worsen [address] [facility] [address]  [phone] [place] ED, [address], [address]          Discharge Medication List as of 3/11/2019 10:54 AM      CONTINUE these medications which have NOT CHANGED    Details   busPIRone (BUSPAR) 5 mg tablet Take 1 tablet (5 mg total) by mouth 4 (four) times a day, Starting Thu 2/21/2019, Normal      diclofenac (VOLTAREN) 75 mg EC tablet Take 1 tablet (75 mg total) by mouth 2 (two) times a day, Starting Fri 1/25/2019, Normal      Diclofenac Sodium  MG 24 hr tablet Take 100 mg by mouth 2 (two) times a day, Historical Med      fluticasone (FLONASE) 50 mcg/act nasal spray 1 spray into each nostril daily, Starting Mon 10/15/2018, Normal      LORazepam (ATIVAN) 1 mg tablet Take 1 5 tablets (1 5 mg total) by mouth daily at bedtime as needed for anxiety, Starting Fri 12/21/2018, Normal      methimazole (TAPAZOLE) 5 mg tablet Take 1 tablet (5 mg total) by mouth daily, Starting Fri 3/1/2019, Normal      methocarbamol (ROBAXIN) 750 mg tablet Take 1 tablet (750 mg total) by mouth daily at bedtime as needed for muscle spasms for up to 90 days, Starting Wed 1/30/2019, Until Tue 4/30/2019, Normal      penciclovir (DENAVIR) 1 % cream Apply topically daily, Starting Fri 12/21/2018, Normal      pregabalin (LYRICA) 50 mg capsule Take 1 capsule (50 mg total) by mouth 3 (three) times a day, Starting Thu 3/7/2019, Normal      propranolol (INDERAL) 10 mg tablet Take 10 mg by mouth 2 (two) times a day  , Historical Med      quinapril (ACCUPRIL) 10 mg tablet Take 1 tablet (10 mg total) by mouth daily, Starting Fri 12/14/2018, Normal      valACYclovir (VALTREX) 500 mg tablet Take 1 tablet (500 mg total) by mouth as needed (cold sores) for up to 30 days, Starting Fri 12/7/2018, Until Fri 3/1/2019, Normal      doxycycline hyclate (VIBRAMYCIN) 100 mg capsule Take 1 capsule (100 mg total) by mouth 2 (two) times a day for 180 days, Starting Wed 10/10/2018, Until Mon 4/8/2019, Normal               ED Provider  Electronically Signed by           Chi Montez MD  03/14/19 7811

## 2019-03-14 NOTE — TELEPHONE ENCOUNTER
Called patient and stated she will Call pain management for refill on Diclofenic     PT still requesting doxycycline hyclate to be refilled

## 2019-03-18 DIAGNOSIS — G89.4 PAIN SYNDROME, CHRONIC: ICD-10-CM

## 2019-03-18 DIAGNOSIS — F41.9 ANXIETY: ICD-10-CM

## 2019-03-18 RX ORDER — DICLOFENAC SODIUM 75 MG/1
75 TABLET, DELAYED RELEASE ORAL 2 TIMES DAILY
Qty: 60 TABLET | Refills: 0 | Status: SHIPPED | OUTPATIENT
Start: 2019-03-18 | End: 2019-04-25 | Stop reason: SDUPTHER

## 2019-03-18 RX ORDER — BUSPIRONE HYDROCHLORIDE 5 MG/1
5 TABLET ORAL 4 TIMES DAILY
Qty: 128 TABLET | Refills: 0 | OUTPATIENT
Start: 2019-03-18

## 2019-03-18 NOTE — TELEPHONE ENCOUNTER
S/w pt, she needs RF of her diclofenac, asking if it can be increased to 100 mg BID and if not she'll stay on the 75 mg BID  Last prescribed 1/25/19  No pending ov  [facility]  Pt said she is going to be starting back on Lyrica again, with her Ins now it will only cost $80     Pt asking if her inj got approved yet, Pat's phone line was busy, I told pt I will have Michael Rued the  call her

## 2019-03-18 NOTE — TELEPHONE ENCOUNTER
I called the patient back and discussed with her had diclofenac 75 mg 1 tablet 2 times daily was a maximum dose  She stated that she would like to continue on diclofenac 75 mg 1 tablet 2 times daily  Therefore prescription for this medication was sent electronically to the patient's pharmacy on file

## 2019-03-22 DIAGNOSIS — F41.9 ANXIETY: ICD-10-CM

## 2019-03-22 RX ORDER — BUSPIRONE HYDROCHLORIDE 5 MG/1
5 TABLET ORAL 4 TIMES DAILY
Qty: 128 TABLET | Refills: 0 | Status: SHIPPED | OUTPATIENT
Start: 2019-03-22 | End: 2019-04-26 | Stop reason: SDUPTHER

## 2019-04-04 ENCOUNTER — TELEPHONE (OUTPATIENT)
Dept: INTERNAL MEDICINE CLINIC | Facility: CLINIC | Age: 64
End: 2019-04-04

## 2019-04-04 DIAGNOSIS — E05.90 HYPERTHYROIDISM: Primary | ICD-10-CM

## 2019-04-08 LAB
T4 FREE SERPL-MCNC: 1.33 NG/DL (ref 0.82–1.77)
TSH SERPL DL<=0.005 MIU/L-ACNC: 1.14 UIU/ML (ref 0.45–4.5)

## 2019-04-09 ENCOUNTER — HOSPITAL ENCOUNTER (OUTPATIENT)
Dept: RADIOLOGY | Facility: CLINIC | Age: 64
Discharge: HOME | End: 2019-04-09
Attending: ANESTHESIOLOGY | Admitting: ANESTHESIOLOGY
Payer: COMMERCIAL

## 2019-04-09 VITALS
HEART RATE: 61 BPM | OXYGEN SATURATION: 100 % | RESPIRATION RATE: 20 BRPM | SYSTOLIC BLOOD PRESSURE: 151 MMHG | DIASTOLIC BLOOD PRESSURE: 84 MMHG | TEMPERATURE: 97.8 F

## 2019-04-09 DIAGNOSIS — M51.16 INTERVERTEBRAL DISC DISORDERS WITH RADICULOPATHY, LUMBAR REGION: ICD-10-CM

## 2019-04-09 PROCEDURE — 64484 NJX AA&/STRD TFRM EPI L/S EA: CPT | Performed by: ANESTHESIOLOGY

## 2019-04-09 PROCEDURE — 64483 NJX AA&/STRD TFRM EPI L/S 1: CPT | Performed by: ANESTHESIOLOGY

## 2019-04-09 RX ORDER — BUPIVACAINE HCL/PF 2.5 MG/ML
10 VIAL (ML) INJECTION ONCE
Status: COMPLETED | OUTPATIENT
Start: 2019-04-09 | End: 2019-04-09

## 2019-04-09 RX ORDER — 0.9 % SODIUM CHLORIDE 0.9 %
10 VIAL (ML) INJECTION ONCE
Status: COMPLETED | OUTPATIENT
Start: 2019-04-09 | End: 2019-04-09

## 2019-04-09 RX ORDER — METHYLPREDNISOLONE ACETATE 80 MG/ML
80 INJECTION, SUSPENSION INTRA-ARTICULAR; INTRALESIONAL; INTRAMUSCULAR; PARENTERAL; SOFT TISSUE ONCE
Status: COMPLETED | OUTPATIENT
Start: 2019-04-09 | End: 2019-04-09

## 2019-04-09 RX ADMIN — BUPIVACAINE HYDROCHLORIDE 2 ML: 2.5 INJECTION, SOLUTION EPIDURAL; INFILTRATION; INTRACAUDAL at 14:09

## 2019-04-09 RX ADMIN — METHYLPREDNISOLONE ACETATE 80 MG: 80 INJECTION, SUSPENSION INTRA-ARTICULAR; INTRALESIONAL; INTRAMUSCULAR; PARENTERAL; SOFT TISSUE at 14:09

## 2019-04-09 RX ADMIN — Medication 5 ML: at 14:05

## 2019-04-09 RX ADMIN — SODIUM CHLORIDE 5 ML: 9 INJECTION, SOLUTION INTRAMUSCULAR; INTRAVENOUS; SUBCUTANEOUS at 14:05

## 2019-04-09 RX ADMIN — IOHEXOL 1 ML: 300 INJECTION, SOLUTION INTRAVENOUS at 14:09

## 2019-04-15 ENCOUNTER — TELEPHONE (OUTPATIENT)
Dept: INTERNAL MEDICINE CLINIC | Facility: CLINIC | Age: 64
End: 2019-04-15

## 2019-04-16 ENCOUNTER — TELEPHONE (OUTPATIENT)
Dept: PAIN MEDICINE | Facility: CLINIC | Age: 64
End: 2019-04-16

## 2019-04-18 DIAGNOSIS — F41.9 ANXIETY: ICD-10-CM

## 2019-04-18 RX ORDER — BUSPIRONE HYDROCHLORIDE 5 MG/1
5 TABLET ORAL 4 TIMES DAILY
Qty: 128 TABLET | Refills: 0 | OUTPATIENT
Start: 2019-04-18

## 2019-04-25 DIAGNOSIS — G89.4 PAIN SYNDROME, CHRONIC: ICD-10-CM

## 2019-04-25 RX ORDER — DICLOFENAC SODIUM 75 MG/1
75 TABLET, DELAYED RELEASE ORAL 2 TIMES DAILY
Qty: 180 TABLET | Refills: 1 | Status: SHIPPED | OUTPATIENT
Start: 2019-04-25 | End: 2019-07-08 | Stop reason: SDUPTHER

## 2019-04-26 DIAGNOSIS — F41.9 ANXIETY: ICD-10-CM

## 2019-04-26 RX ORDER — BUSPIRONE HYDROCHLORIDE 5 MG/1
5 TABLET ORAL 4 TIMES DAILY
Qty: 360 TABLET | Refills: 1 | Status: SHIPPED | OUTPATIENT
Start: 2019-04-26 | End: 2019-07-08 | Stop reason: SDUPTHER

## 2019-05-15 ENCOUNTER — TELEPHONE (OUTPATIENT)
Dept: PAIN MEDICINE | Facility: CLINIC | Age: 64
End: 2019-05-15

## 2019-06-07 ENCOUNTER — PROCEDURE VISIT (OUTPATIENT)
Dept: PAIN MEDICINE | Facility: CLINIC | Age: 64
End: 2019-06-07
Payer: COMMERCIAL

## 2019-06-07 VITALS — TEMPERATURE: 98 F | DIASTOLIC BLOOD PRESSURE: 89 MMHG | SYSTOLIC BLOOD PRESSURE: 152 MMHG | HEART RATE: 76 BPM

## 2019-06-07 DIAGNOSIS — M19.011 ARTHRITIS OF RIGHT ACROMIOCLAVICULAR JOINT: Primary | ICD-10-CM

## 2019-06-07 PROCEDURE — 20611 DRAIN/INJ JOINT/BURSA W/US: CPT | Performed by: ANESTHESIOLOGY

## 2019-06-07 RX ORDER — METHYLPREDNISOLONE ACETATE 40 MG/ML
40 INJECTION, SUSPENSION INTRA-ARTICULAR; INTRALESIONAL; INTRAMUSCULAR; SOFT TISSUE ONCE
Status: COMPLETED | OUTPATIENT
Start: 2019-06-07 | End: 2019-06-07

## 2019-06-07 RX ORDER — BUPIVACAINE HYDROCHLORIDE 2.5 MG/ML
10 INJECTION, SOLUTION INFILTRATION; PERINEURAL ONCE
Status: COMPLETED | OUTPATIENT
Start: 2019-06-07 | End: 2019-06-07

## 2019-06-07 RX ADMIN — METHYLPREDNISOLONE ACETATE 40 MG: 40 INJECTION, SUSPENSION INTRA-ARTICULAR; INTRALESIONAL; INTRAMUSCULAR; SOFT TISSUE at 15:11

## 2019-06-07 RX ADMIN — BUPIVACAINE HYDROCHLORIDE 10 ML: 2.5 INJECTION, SOLUTION INFILTRATION; PERINEURAL at 15:11

## 2019-06-11 ENCOUNTER — TELEPHONE (OUTPATIENT)
Dept: PAIN MEDICINE | Facility: CLINIC | Age: 64
End: 2019-06-11

## 2019-06-11 DIAGNOSIS — M48.061 LUMBAR STENOSIS WITHOUT NEUROGENIC CLAUDICATION: Primary | ICD-10-CM

## 2019-06-11 RX ORDER — METHYLPREDNISOLONE 4 MG/1
TABLET ORAL
Qty: 21 TABLET | Refills: 0 | Status: SHIPPED | OUTPATIENT
Start: 2019-06-11 | End: 2019-07-08 | Stop reason: ALTCHOICE

## 2019-06-13 DIAGNOSIS — M48.061 LUMBAR STENOSIS WITHOUT NEUROGENIC CLAUDICATION: Primary | ICD-10-CM

## 2019-06-14 ENCOUNTER — TELEPHONE (OUTPATIENT)
Dept: PAIN MEDICINE | Facility: CLINIC | Age: 64
End: 2019-06-14

## 2019-06-18 DIAGNOSIS — B00.1 RECURRENT COLD SORES: ICD-10-CM

## 2019-06-18 RX ORDER — VALACYCLOVIR HYDROCHLORIDE 500 MG/1
500 TABLET, FILM COATED ORAL AS NEEDED
Qty: 30 TABLET | Refills: 0 | Status: SHIPPED | OUTPATIENT
Start: 2019-06-18 | End: 2019-08-30 | Stop reason: ALTCHOICE

## 2019-06-25 ENCOUNTER — TELEPHONE (OUTPATIENT)
Dept: PAIN MEDICINE | Facility: CLINIC | Age: 64
End: 2019-06-25

## 2019-06-25 LAB
ALBUMIN SERPL-MCNC: 4.3 G/DL (ref 3.6–4.8)
ALBUMIN/GLOB SERPL: 2 {RATIO} (ref 1.2–2.2)
ALP SERPL-CCNC: 118 IU/L (ref 39–117)
ALT SERPL-CCNC: 17 IU/L (ref 0–32)
AST SERPL-CCNC: 30 IU/L (ref 0–40)
BILIRUB SERPL-MCNC: 0.6 MG/DL (ref 0–1.2)
BUN SERPL-MCNC: 15 MG/DL (ref 8–27)
BUN/CREAT SERPL: 18 (ref 12–28)
CALCIUM SERPL-MCNC: 9.7 MG/DL (ref 8.7–10.3)
CHLORIDE SERPL-SCNC: 101 MMOL/L (ref 96–106)
CHOLEST SERPL-MCNC: 207 MG/DL (ref 100–199)
CO2 SERPL-SCNC: 24 MMOL/L (ref 20–29)
CREAT SERPL-MCNC: 0.82 MG/DL (ref 0.57–1)
GLOBULIN SER-MCNC: 2.2 G/DL (ref 1.5–4.5)
GLUCOSE SERPL-MCNC: 93 MG/DL (ref 65–99)
HDLC SERPL-MCNC: 70 MG/DL
LABCORP COMMENT: NORMAL
LDLC SERPL CALC-MCNC: 125 MG/DL (ref 0–99)
POTASSIUM SERPL-SCNC: 4.8 MMOL/L (ref 3.5–5.2)
PROT SERPL-MCNC: 6.5 G/DL (ref 6–8.5)
SL AMB EGFR AFRICAN AMERICAN: 89 ML/MIN/1.73
SL AMB EGFR NON AFRICAN AMERICAN: 77 ML/MIN/1.73
SL AMB VLDL CHOLESTEROL CALC: 12 MG/DL (ref 5–40)
SODIUM SERPL-SCNC: 139 MMOL/L (ref 134–144)
T4 FREE SERPL-MCNC: 1.1 NG/DL (ref 0.82–1.77)
TRIGL SERPL-MCNC: 62 MG/DL (ref 0–149)
TSH SERPL DL<=0.005 MIU/L-ACNC: 2.86 UIU/ML (ref 0.45–4.5)

## 2019-06-25 NOTE — TELEPHONE ENCOUNTER
Pt is waiting for Juan Carlos Duarte to call her to set up a procedure date  She is going on vacation sept 13 and looking to schedule a date prior to this   PH# [phone]

## 2019-06-26 DIAGNOSIS — M54.12 CERVICAL RADICULOPATHY: ICD-10-CM

## 2019-06-26 DIAGNOSIS — M47.816 LUMBAR SPONDYLOSIS: ICD-10-CM

## 2019-06-26 DIAGNOSIS — M79.18 MYOFASCIAL PAIN SYNDROME: ICD-10-CM

## 2019-06-26 RX ORDER — PREGABALIN 50 MG/1
50 CAPSULE ORAL 3 TIMES DAILY
Qty: 270 CAPSULE | Refills: 1 | Status: SHIPPED | OUTPATIENT
Start: 2019-06-26 | End: 2020-04-13 | Stop reason: SDUPTHER

## 2019-06-28 DIAGNOSIS — I10 ESSENTIAL HYPERTENSION: ICD-10-CM

## 2019-07-01 RX ORDER — QUINAPRIL 10 MG/1
TABLET ORAL
Qty: 90 TABLET | Refills: 1 | OUTPATIENT
Start: 2019-07-01

## 2019-07-08 ENCOUNTER — OFFICE VISIT (OUTPATIENT)
Dept: INTERNAL MEDICINE CLINIC | Facility: CLINIC | Age: 64
End: 2019-07-08
Payer: COMMERCIAL

## 2019-07-08 VITALS
HEART RATE: 72 BPM | WEIGHT: 191.2 LBS | SYSTOLIC BLOOD PRESSURE: 134 MMHG | DIASTOLIC BLOOD PRESSURE: 90 MMHG | BODY MASS INDEX: 31.86 KG/M2 | HEIGHT: 65 IN | TEMPERATURE: 97.5 F | OXYGEN SATURATION: 97 %

## 2019-07-08 DIAGNOSIS — R21 RASH: ICD-10-CM

## 2019-07-08 DIAGNOSIS — E05.90 HYPERTHYROIDISM: ICD-10-CM

## 2019-07-08 DIAGNOSIS — Z11.59 NEED FOR HEPATITIS C SCREENING TEST: Primary | ICD-10-CM

## 2019-07-08 DIAGNOSIS — E66.9 OBESITY (BMI 30-39.9): ICD-10-CM

## 2019-07-08 DIAGNOSIS — R53.83 OTHER FATIGUE: ICD-10-CM

## 2019-07-08 DIAGNOSIS — G89.4 PAIN SYNDROME, CHRONIC: ICD-10-CM

## 2019-07-08 DIAGNOSIS — F41.9 ANXIETY: ICD-10-CM

## 2019-07-08 DIAGNOSIS — M54.16 LUMBAR RADICULOPATHY: ICD-10-CM

## 2019-07-08 DIAGNOSIS — L50.9 HIVES: ICD-10-CM

## 2019-07-08 DIAGNOSIS — M51.36 LUMBAR DEGENERATIVE DISC DISEASE: ICD-10-CM

## 2019-07-08 DIAGNOSIS — M48.061 LUMBAR STENOSIS WITHOUT NEUROGENIC CLAUDICATION: ICD-10-CM

## 2019-07-08 DIAGNOSIS — I10 BENIGN ESSENTIAL HTN: ICD-10-CM

## 2019-07-08 DIAGNOSIS — L71.9 ROSACEA, ACNE: ICD-10-CM

## 2019-07-08 PROCEDURE — 99214 OFFICE O/P EST MOD 30 MIN: CPT | Performed by: FAMILY MEDICINE

## 2019-07-08 RX ORDER — METHIMAZOLE 5 MG/1
5 TABLET ORAL DAILY
Qty: 90 TABLET | Refills: 1 | Status: SHIPPED | OUTPATIENT
Start: 2019-07-08 | End: 2019-11-18 | Stop reason: SDUPTHER

## 2019-07-08 RX ORDER — DICLOFENAC SODIUM 75 MG/1
75 TABLET, DELAYED RELEASE ORAL 2 TIMES DAILY
Qty: 180 TABLET | Refills: 1 | Status: SHIPPED | OUTPATIENT
Start: 2019-07-08 | End: 2019-12-06 | Stop reason: SDUPTHER

## 2019-07-08 RX ORDER — LORAZEPAM 1 MG/1
1.5 TABLET ORAL
Qty: 135 TABLET | Refills: 1 | Status: SHIPPED | OUTPATIENT
Start: 2019-07-08 | End: 2019-09-10 | Stop reason: SDUPTHER

## 2019-07-08 RX ORDER — BUSPIRONE HYDROCHLORIDE 5 MG/1
5 TABLET ORAL 4 TIMES DAILY
Qty: 360 TABLET | Refills: 1 | Status: SHIPPED | OUTPATIENT
Start: 2019-07-08 | End: 2019-11-18 | Stop reason: SDUPTHER

## 2019-07-08 NOTE — PROGRESS NOTES
Assessment/Plan:    No problem-specific Assessment & Plan notes found for this encounter  1  Benign essential HTN (401 1) (I10)   2  Anxiety (300 00) (F41 9)    Discussion:   Pain management notes reviewed  No changes to medications from my standpoint today  Recommended physical therapy with water and referral given today  Return in approximately 6 months with laboratory data  stay off Cymbalta since it has not helped and made her tired  Keep  buspar to 5 mg 4 times a day since 10 mg twice a day made her too tired  to help with anxiety depression and back pain- it is helpful but makes her tired  she has an appt with her neurosurgeon and keep the appointment with pain management doctor to discuss further options such as injections versus Neurosurgery for lumbar stenosis and canal stenosis and foraminal stenosis  keep doxycycline b i d  for rosacea symptoms  Has a spinal stimulator that helps her back but now pain in LLE- radiation  Takes ativan at night and  It helps but she still wakes up at night, it makes her too tired in the daytime so she does not like to take it  Discussed options  amitriptyline, or narcotics to control her pain     Weight gain is again discussed with her today  She admits to not being compliant with nutrition or activity levels  I encouraged her that water therapy will help her be mobile and lose weight  Great job with your weight loss and continue with keto diet  All laboratory data reviewed  Check lab work for allergy testing since occasionally gets hives on the ankles  Unknown source at this time    BMI Counseling: Body mass index is 31 82 kg/m²  Discussed the patient's BMI with her  The BMI is above average  BMI counseling and education was provided to the patient  Exercise recommendations include exercising 3-5 times per week         Problem List Items Addressed This Visit        Cardiovascular and Mediastinum    Benign essential HTN    Relevant Orders    Comprehensive metabolic panel       Nervous and Auditory    Lumbar radiculopathy       Musculoskeletal and Integument    Rosacea, acne    Lumbar degenerative disc disease    Hives    Relevant Orders    [facility] Allergy Panel, Adult    Food Allergy Profile    Lyme Antibody Profile with reflex to WB    Rash    Relevant Orders    [facility] Allergy Panel, Adult    Food Allergy Profile    Lyme Antibody Profile with reflex to WB       Other    Anxiety    Relevant Medications    LORazepam (ATIVAN) 1 mg tablet    busPIRone (BUSPAR) 5 mg tablet    Lumbar stenosis without neurogenic claudication    Obesity (BMI 30-39  9)    Pain syndrome, chronic    Relevant Medications    diclofenac (VOLTAREN) 75 mg EC tablet    Other fatigue    Relevant Orders    Vitamin D 25 hydroxy      Other Visit Diagnoses     Need for hepatitis C screening test    -  Primary    Relevant Orders    Hepatitis C antibody    Hyperthyroidism        Relevant Medications    methimazole (TAPAZOLE) 5 mg tablet                  Subjective:  F/up htn and back pain and insomnia     Patient ID: Paris Alvares is a 60 y o  female  Arthritis     Back Pain     Hypertension   Associated symptoms include anxiety  Anxiety              Back pain:  Patient states that lyrica not covered by her insurance anymore and she has been off of it for 1 week  She feels that it really did not help her is now that she has not been taking it for 1 week  On Neurontin in the past which did not help her  She was given nortriptyline by her pain management doctor which made her feel lethargic but did not help with her discomfort  She called them and she has an appointment to be seen for possible injection  She describes her pain as burning and has difficulty standing and sitting for any prolonged period of time  She has difficulty coping in her and sitting at her job  She states that she takes Zanaflex at night p r n  but that makes her tired in the daytime    She takes anywhere between 8-12 to 100 mg ibuprofen a day but does not mix it with meloxicam   She had an MRI few months back and a pain pump placed by Neurosurgery approximately 1 year ago  Originally it did help her but since then it has not been helping and she has not seen a neurosurgeon back  5/16/18: prednisone id not help and Cymbalta makes her anxiety worse  Went to the pain mgmt and got injections that have helped now  Still having crying spells and does not feel that Cymbalta is helping  8/13/18: going for another spinal injection, had her stimulator adjusted but not much relief  Better day today    10/15/18:  Pain mgmt note seen, for neurosurgical eval  repeat MRI noted today  Never started mobic, better with low dose muscle relaxor, takes half bc full makes her tired  12/2018:  Sleeping better with changes in  Medications last visit but not sure if helping with the pain, seeing pan mgt and getting injections  EMG is normal  R arm   3/1/2019: seeing pain mgmt and gets epidural injections, went for second opinion for [facility] for cervical  Epidural  Dr Milo Rothman has recommended back surgery  Had issue of  insurance paying for epidural- that's why went  to [facility]  Has appt next week for neck injection at [facility]  Has R shoulder issues  July 2019, still in pain medications but is overall feeling general due to significant weight loss  Has more energy and has been moving more     Rosacea, patient was placed on Metrogel by her endocrinologist and she has been using it for 1 month  She feels that is not helping  She has facial flushing for several years which are exacerbated by stress, hypertension, heat, and hot flashes  She would like to discuss other options  5/16/18: started doxy 1 week ago, no relief yet  Still flushing and worse in the mornings   10/15/18:  Helping a lot, taking doxy BID, ran out 1 week ago, re start at BID and then decrease to monthly     Insomnia, patient states she does not sleep well which is very multifactorial including the pain and how flashes  She used to be on estrogen replacement currently does not take any  She does take Ativan at night to help her sleep which helped marginally  5/16/18: taking 1 5 mg  And advil pm and it has helped- sleeping through the night now and slightly  More tired in am   10/15/18: taking ativan and sleeping well with it 3/1/19:   July 2019, has not tried to decrease her lorazepam and does take it every night to sleep     Depression:  Patient is a depression was exacerbated by the pain and inability to sleep  She is always tired and fatigued and is having difficulty with activities of daily living around the house like cleaning and cooking as well as work  Patient is here today and can cooperate always  She feels that she is up to any medications or the long medications right now  8/13/18: buspar was increased to 10 mg per but she felt tired on it and went back to 5 mg BId, has gained weight  10/15/18: taking bupar 5 mg- 2 tabs at [facility],  12/21/18: stable  March 2019, no issues  Takes 1- 5 mg BuSpar in the morning than 1 with lunch and 2 at night  July 2019:  Doing very well with present medications and feels much better since losing weight    The following portions of the patient's history were reviewed and updated as appropriate: allergies, current medications, past family history, past medical history, past social history, past surgical history and problem list     Review of Systems   Musculoskeletal: Positive for arthritis and back pain         Constitutional:  Denies fever or chills ,   27 lb weight loss since 12 / 2018  Eyes:  Denies change in visual acuity , wears glasses  HENT:  Denies nasal congestion or sore throat , + allergy symptoms  Respiratory:  Denies cough or shortness of breath or wheezing  Cardiovascular:  Denies palpitations or chest pain  GI:  Denies abdominal pain, nausea, or vomiting  Integument:  rosacea  Neurologic:  Denies headache or focal weakness, dizziness with computer and change in head positions    MS: see HPI    Objective:      /90 (BP Location: Left arm, Patient Position: Sitting, Cuff Size: Adult)   Pulse 72   Temp 97 5 °F (36 4 °C) (Oral)   Ht 5' 5" (1 651 m)   Wt 86 7 kg (191 lb 3 2 oz)   LMP 11/26/2015   SpO2 97% Comment: room air  BMI 31 82 kg/m²          Physical Exam    Constitutional:  Well developed, well nourished, no acute distress, non-toxic appearance   Eyes:  PERRL, conjunctiva normal , non icteric sclera  HENT:  Atraumatic, oropharynx moist  Neck-  supple   Respiratory:  CTA b/l, normal breath sounds, no rales, no wheezing   Cardiovascular:  RRR, no murmurs, no LE edema b/l  GI:  Soft, nondistended, normal bowel sounds x 4, nontender, no organomegaly, no mass, no rebound, no guarding   Neurologic:  no focal deficits noted   Psychiatric:  Speech and behavior appropriate , AAO x 3  MS: able to stand up straight today, positive straight leg test

## 2019-07-24 ENCOUNTER — OFFICE VISIT (OUTPATIENT)
Dept: PAIN MEDICINE | Facility: CLINIC | Age: 64
End: 2019-07-24
Payer: COMMERCIAL

## 2019-07-24 VITALS
WEIGHT: 187 LBS | RESPIRATION RATE: 16 BRPM | HEART RATE: 57 BPM | DIASTOLIC BLOOD PRESSURE: 86 MMHG | SYSTOLIC BLOOD PRESSURE: 132 MMHG | BODY MASS INDEX: 31.16 KG/M2 | HEIGHT: 65 IN

## 2019-07-24 DIAGNOSIS — M54.16 LUMBAR RADICULOPATHY: Primary | ICD-10-CM

## 2019-07-24 DIAGNOSIS — M54.12 CERVICAL RADICULOPATHY: ICD-10-CM

## 2019-07-24 DIAGNOSIS — M48.061 LUMBAR STENOSIS WITHOUT NEUROGENIC CLAUDICATION: ICD-10-CM

## 2019-07-24 DIAGNOSIS — M47.816 LUMBAR FACET ARTHROPATHY: ICD-10-CM

## 2019-07-24 DIAGNOSIS — M51.36 LUMBAR DEGENERATIVE DISC DISEASE: ICD-10-CM

## 2019-07-24 DIAGNOSIS — M79.18 MYOFASCIAL PAIN SYNDROME: ICD-10-CM

## 2019-07-24 PROCEDURE — 99214 OFFICE O/P EST MOD 30 MIN: CPT | Performed by: NURSE PRACTITIONER

## 2019-07-24 NOTE — PROGRESS NOTES
Pt c/o lower back pain that radiates to the left hip and leg    Assessment:  1  Lumbar radiculopathy    2  Cervical radiculopathy    3  Lumbar degenerative disc disease    4  Lumbar facet arthropathy    5  Myofascial pain syndrome    6  Lumbar stenosis without neurogenic claudication        Plan:  Collette Phelps is a 60 y o  female chronic pain syndrome secondary to cervical radiculopathy, lumbar spondylosis, lumbar intervertebral disc disorder radiculopathy and myofascial pain syndrome  The patient presents with low back pain  She is reporting that her back pain is the same back pain she experienced prior to undergoing a left L4-L5 transforaminal steroid injection on 4/9/2019 which provided her 70% pain relief for greater than 3 months  She is requesting to repeat this injection at this time  Since this injection provided her significant pain relief for 3 months I agree that repeating this injection would be beneficial therefore,she was educated on the procedures most common risks, and was given a brochure the procedure  She verbalized understanding, and would like to proceed with the procedure  The patient will continue on diclofenac as prescribed by her family doctor  She will also continue on Lyrica as prescribed by our office  She reports that she is taking Lyrica 50 mg 1 tablet 2 times daily and does not require refill at this time  She was instructed to call the office 3-4 days prior to running out of medication, for further refills  Complete risks and benefits including bleeding, infection, tissue reaction, nerve injury and allergic reaction were discussed  The approach was demonstrated using models and literature was provided  The patient will follow up after her left L4-L5 transforaminal steroid injection, or sooner with the  worsening of symptoms      My impressions and treatment recommendations were discussed in detail with the patient who verbalized understanding and had no further questions  Discharge instructions were provided  I personally saw and examined the patient and I agree with the above discussed plan of care  No orders of the defined types were placed in this encounter  No orders of the defined types were placed in this encounter  History of Present Illness:  Yuliya Ellis is a 60 y o  female chronic pain syndrome secondary to cervical radiculopathy, lumbar spondylosis, lumbar intervertebral disc disorder radiculopathy and myofascial pain syndrome  The patient was last seen office on 6/7/2019 where she underwent a right acromioclavicular joint injection  She presents for a follow up office visit in regards to Back Pain (radiates to the left hip and leg); Hip Pain; and Leg Pain  The patients current symptoms include  low back pain that radiates into her left buttocks and down the posterior aspect of her left thigh to her knee  She denies bilateral leg weakness, or bowel or bladder issues  She describes her pain as dull aching, throbbing pain that is intermittent nature with symptoms worsening during the evening and nighttime  She reports that her pain and symptoms have worsened since last office visit  She currently rates her pain as 6/10 numeric pain scale  Current pain medications includes:  Diclofenac 75 mg 2 times daily and lyrica 50 mg 2 times a day  The patient reports that this regimen is providing 70 % pain relief  The patient is reporting no side effects from this pain medication regimen  I have personally reviewed and/or updated the patient's past medical history, past surgical history, family history, social history, current medications, allergies, and vital signs today  Review of Systems   Respiratory: Negative for shortness of breath  Cardiovascular: Negative for chest pain  Gastrointestinal: Negative for constipation, diarrhea, nausea and vomiting  Musculoskeletal: Positive for gait problem   Negative for arthralgias, joint swelling and myalgias  Decreased ROM   Skin: Negative for rash  Neurological: Negative for dizziness, seizures and weakness  All other systems reviewed and are negative  Patient Active Problem List   Diagnosis    Rosacea, acne    Lumbar degenerative disc disease    Lumbar radiculopathy    Anxiety    Benign essential HTN    Cervical radiculopathy    Disc disorder of cervical region    Hot flash, menopausal    Lumbar facet arthropathy    Lumbar stenosis without neurogenic claudication    Myofascial pain syndrome    Obesity (BMI 30-39  9)    Pain syndrome, chronic    Positive depression screening    Primary osteoarthritis of right knee    Right knee injury    Sacroiliitis (HCC)    Tear of medial meniscus of right knee, current    Bulge of lumbar disc without myelopathy    Spondylolisthesis of lumbar region    Arthritis of right acromioclavicular joint    Other fatigue    Hives    Rash       Past Medical History:   Diagnosis Date    Anxiety     Cataract, bilateral     last assessed    Cervical radiculopathy     Disc disease, degenerative, cervical     Herpes simplex infection     Hypertension     Insomnia     Low back pain     Lumbar degenerative disc disease     Lumbar facet arthropathy     Lumbar radiculopathy     Lumbar stenosis without neurogenic claudication     Mononucleosis     Myofascial pain dysfunction syndrome     Osteoarthritis     shoulder region - unspecified laterality - last assessed 2/1/14    Plantar fasciitis     Ptosis, both eyelids     last assessed 10/6/16    Ptosis, congenital, left     Retinal detachment     last assessed 12/11/14 right eye    Sacroiliitis ([address] )     Thyroid disease     Thyrotoxicosis     last assessed 5/17/13    Vertigo        Past Surgical History:   Procedure Laterality Date    MD SURG IMPLNT NEUROELECT,EPIDURAL Left 1/26/2016    Procedure: PLACEMENT OF THORACIC SPINAL CORD STIMULATOR WITH LEFT BUTTOCK IMPLANTABLE PULSE GENERATOR (IMPULSE MONITORING); Surgeon: Jumana Jacques MD;  Location:  MAIN OR;  Service: Neurosurgery    RETINAL DETACHMENT SURGERY Right        Family History   Problem Relation Age of Onset   Bonilla Vora Breast cancer Mother    Bonilla Vora COPD Mother     Hypertension Mother         essential    Heart attack Father         acute MI    Emphysema Father     Hypertension Father         essential    Other Father         prehypertension    Liver cancer Maternal Aunt        Social History     Occupational History    Occupation: Business owner     Comment: [employer]-full time working   Tobacco Use    Smoking status: Never Smoker    Smokeless tobacco: Never Used   Substance and Sexual Activity    Alcohol use:  Yes     Alcohol/week: 1 0 standard drinks     Types: 1 Glasses of wine per week     Frequency: Monthly or less     Drinks per session: 1 or 2     Binge frequency: Never     Comment: occasional use as per [employer]    Drug use: No    Sexual activity: Yes       Current Outpatient Medications on File Prior to Visit   Medication Sig    busPIRone (BUSPAR) 5 mg tablet Take 1 tablet (5 mg total) by mouth 4 (four) times a day    diclofenac (VOLTAREN) 75 mg EC tablet Take 1 tablet (75 mg total) by mouth 2 (two) times a day    doxycycline hyclate (VIBRAMYCIN) 100 mg capsule Take 1 capsule (100 mg total) by mouth 2 (two) times a day for 180 days    fluticasone (FLONASE) 50 mcg/act nasal spray 1 spray into each nostril daily (Patient taking differently: 1 spray into each nostril as needed )    LORazepam (ATIVAN) 1 mg tablet Take 1 5 tablets (1 5 mg total) by mouth daily at bedtime as needed for anxiety    methimazole (TAPAZOLE) 5 mg tablet Take 1 tablet (5 mg total) by mouth daily    penciclovir (DENAVIR) 1 % cream Apply topically daily (Patient taking differently: Apply topically daily as needed )    pregabalin (LYRICA) 50 mg capsule Take 1 capsule (50 mg total) by mouth 3 (three) times a day    propranolol (INDERAL) 10 mg tablet Take 10 mg by mouth 2 (two) times a day      quinapril (ACCUPRIL) 10 mg tablet Take 1 tablet (10 mg total) by mouth daily    valACYclovir (VALTREX) 500 mg tablet Take 1 tablet (500 mg total) by mouth as needed (cold sores) for up to 30 days    [DISCONTINUED] methocarbamol (ROBAXIN) 750 mg tablet Take 1 tablet (750 mg total) by mouth daily at bedtime as needed for muscle spasms for up to 90 days     No current facility-administered medications on file prior to visit  Allergies   Allergen Reactions    Other Dermatitis     Adhesive tape - please use ONLY PAPER TAPE    Scopolamine Other (See Comments)     Severe, debilitating headache      Flexeril [Cyclobenzaprine] Dizziness and Fatigue    Naproxen      Other reaction(s): Unknown Allergic Reaction  Annotation - [id number]: nightmares    Penicillins      Other reaction(s): Unknown Allergic Reaction  Annotation - [id number]: childhood reaction    Promethazine-Codeine      Reaction Date: [id number]; Annotation - [id number]: nightmares; Annotation - [id number]: nightmares    Tramadol      Other reaction(s): Unknown Allergic Reaction  Annotation - [id number]: PT HAS NIGHTMARES FROM TRAMADOL    Wound Dressing Adhesive Rash       Physical Exam:    /86 (BP Location: Right arm, Patient Position: Sitting, Cuff Size: Standard)   Pulse 57   Resp 16   Ht 5' 5" (1 651 m)   Wt 84 8 kg (187 lb)   LMP 11/26/2015   BMI 31 12 kg/m²     Constitutional:normal, well developed, well nourished, alert, in no distress and non-toxic and no overt pain behavior   and obese  Eyes:anicteric  HEENT:grossly intact  Neck:supple, symmetric, trachea midline and no masses   Pulmonary:even and unlabored  Cardiovascular:No edema or pitting edema present  Skin:Normal without rashes or lesions and well hydrated  Psychiatric:Mood and affect appropriate  Neurologic:Cranial Nerves II-XII grossly intact  Musculoskeletal:antalgic     Lumbar Spine Exam    Appearance:  Normal lordosis  Palpation/Tenderness:  left lumbar paraspinal tenderness  Sensory:  no sensory deficits noted  Range of Motion:  Flexion:  Minimally limited  with pain  Extension:  Minimally limited  with pain  Lateral Flexion - Left:  Minimally limited  with pain  Lateral Flexion - Right:  Minimally limited  with pain  Rotation - Left:  Minimally limited  with pain  Rotation - Right:  Minimally limited  with pain  Motor Strength:  Left hip flexion:  5/5  Right hip flexion:  5/5  Left knee extension:  5/5  Right knee extension:  5/5  Left foot dorsiflexion:  5/5  Left foot plantar flexion:  5/5  Right foot dorsiflexion:  5/5  Right foot plantar flexion:  5/5    Imaging    MRI LUMBAR SPINE WITHOUT CONTRAST     INDICATION: [id number]: Spinal stenosis, lumbar region without neurogenic claudication  Left-sided low back pain and leg pain      COMPARISON:  11/7/2017     TECHNIQUE: Limited scanning was performed using MR guidelines based on  limitations  Sagittal T2, coronal T2, axial gradient echo and axial T2 as well as sagittal SPGR imaging was obtained      Medical device: The patient has a st fabby stimulator [id number]  which is MR compatible  Scanning was performed as per the 's gridlines with attention to device appropriate DANUTA levels         IMAGE QUALITY:  Diagnostic     FINDINGS: There is a transitional vertebral body at the lumbosacral junction  For the purposes of this study, the first conically shaped vertebral body will be designated S1  If spinal intervention is indicated, correlation with radiography recommended      Vertebral body levels are labeled on  image 7, series 5  The numbering convention used previously will be followed on the current study          ALIGNMENT:  Trace grade 1 anterolisthesis of L5 on S1 is retrospectively stable as is a minimal levoscoliosis mid lumbar spine apex at the L3 segment      MARROW SIGNAL:  Normal marrow signal is identified within the visualized bony structures  No discrete marrow lesion      DISTAL CORD AND CONUS:  Normal size and signal within the distal cord and conus  The conus ends at the L2 level      PARASPINAL SOFT TISSUES:  Artifact in the left subcutaneous soft tissues in the low back noted likely related to stimulator leads      SACRUM:  Normal signal within the sacrum  No evidence of insufficiency or stress fracture      LOWER THORACIC DISC SPACES:  Normal disc height and signal   No disc herniation, canal stenosis or foraminal narrowing      LUMBAR DISC SPACES:     L1-L2:  Normal      L2-L3:  Normal      L3-L4:  There is a diffuse disk bulge  No significant central canal or neural foraminal narrowing  Bilateral facet hypertrophy noted  This level is similar to the prior study      L4-L5:  Small left paracentral disc protrusion  There is bilateral facet hypertrophy  Mild to moderate central canal narrowing  Mild left neural foraminal narrowing  Right neural foramen patent  This level is similar to the prior study      L5-S1:  There is uncovering the intervertebral disc space  There is bilateral facet hypertrophy  There is a superimposed left neural foraminal disc protrusion  Moderate left neural foraminal narrowing  Moderate central canal narrowing  Mild right   neural foraminal narrowing  This level is similar to the prior study      IMPRESSION:     1  There is a transitional vertebral body at the lumbosacral junction  If spinal intervention is indicated, correlation with radiography recommended    2   Scattered spondylotic changes are similar to the prior study         Workstation performed: [id number]

## 2019-08-12 ENCOUNTER — OFFICE VISIT (OUTPATIENT)
Dept: INTERNAL MEDICINE CLINIC | Facility: CLINIC | Age: 64
End: 2019-08-12
Payer: COMMERCIAL

## 2019-08-12 VITALS
SYSTOLIC BLOOD PRESSURE: 108 MMHG | WEIGHT: 185 LBS | DIASTOLIC BLOOD PRESSURE: 72 MMHG | TEMPERATURE: 97.5 F | OXYGEN SATURATION: 97 % | HEIGHT: 65 IN | HEART RATE: 64 BPM | BODY MASS INDEX: 30.82 KG/M2

## 2019-08-12 DIAGNOSIS — I95.0 IDIOPATHIC HYPOTENSION: ICD-10-CM

## 2019-08-12 DIAGNOSIS — IMO0001 FOOD POISONING, ACCIDENTAL OR UNINTENTIONAL, INITIAL ENCOUNTER: Primary | ICD-10-CM

## 2019-08-12 PROBLEM — A05.9 FOOD POISONING: Status: ACTIVE | Noted: 2019-08-12

## 2019-08-12 PROCEDURE — 99213 OFFICE O/P EST LOW 20 MIN: CPT | Performed by: FAMILY MEDICINE

## 2019-08-12 RX ORDER — CIPROFLOXACIN 500 MG/1
500 TABLET, FILM COATED ORAL EVERY 12 HOURS SCHEDULED
Qty: 10 TABLET | Refills: 0 | Status: SHIPPED | OUTPATIENT
Start: 2019-08-12 | End: 2019-08-17

## 2019-08-12 RX ORDER — CYCLOSPORINE 0.5 MG/ML
1 EMULSION OPHTHALMIC 2 TIMES DAILY
Refills: 3 | COMMUNITY
Start: 2019-08-06

## 2019-08-12 NOTE — PROGRESS NOTES
Assessment/Plan:    No problem-specific Assessment & Plan notes found for this encounter  Problem List Items Addressed This Visit        Cardiovascular and Mediastinum    Idiopathic hypotension       Other    Food poisoning - Primary    Relevant Medications    ciprofloxacin (CIPRO) 500 mg tablet                Discussion, advised  Accupril for now and check blood pressures at home  Can restart half tablet or full tablet if blood pressures start to increase especially diastolic  Call me with readings if not improved  Continue with oral fluid hydration and soft foods  Stop doxycycline until GI issue has resolved and start Cipro for 5 days for suspected food poisoning  Okay to use over-the-counter Beano, Gas-X, Maalox or Mylanta  Red flag signs reviewed with patient  All questions and concerns addressed today      Subjective:      Patient ID: Ricardo Bolanos is a 60 y o  female  HPI  Bloated (Patient c/o feeling bloated since last Saturday- over 1 week ago after eating out  She also c/o back pain, stomach and intestinal pain/discomfort  She states that the pain had gotten so severe she felt her  needed to take her to the ER  She is eating less since her symptoms started  She can eat and be ok and then feel sick mintues later  Has not had any actual nausea vomiting or diarrhea but just has soreness in her abdomen area  Has been a with her as well and has similar symptoms but much less  She had had a shrimp dish that triggered all of this  Started taking Carafate and has had 2 doses which is helped the epigastric pain but none of her other symptoms  She is urinating for an and there is no changes in her bowel habits    The following portions of the patient's history were reviewed and updated as appropriate: allergies, current medications, past family history, past medical history, past social history, past surgical history and problem list     Review of Systems      Constitutional:  Denies fever or chills   Eyes:  Denies change in visual acuity   HENT:  Denies nasal congestion or sore throat   Respiratory:  Denies cough or shortness of breath or wheezing  Cardiovascular:  Denies palpitations or chest pain  GI:  Denies  nausea, or vomiting, is having cramping epigastric abdominal pain  See HPI  Integument:  Denies rash   Neurologic:  Denies headache or focal weakness      Objective:      /72 (BP Location: Left arm, Patient Position: Sitting, Cuff Size: Large)   Pulse 64   Temp 97 5 °F (36 4 °C) (Oral)   Ht 5' 4 96" (1 65 m)   Wt 83 9 kg (185 lb)   LMP 11/26/2015   SpO2 97%   BMI 30 82 kg/m²          Physical Exam      Constitutional:  Well developed, well nourished, no acute distress, non-toxic appearance   Eyes:  PERRL, conjunctiva normal , non icteric sclera  HENT:  Atraumatic, oropharynx moist  Neck-  supple   Respiratory:  CTA b/l, normal breath sounds, no rales, no wheezing   Cardiovascular:  RRR, no murmurs, no LE edema b/l  GI:  Soft, nondistended, normal bowel sounds x 4, mild tender to palpation epigastric area and lower quadrant left lower quadrant  , no organomegaly, no mass, no rebound, no guarding   Neurologic:  no focal deficits noted   Psychiatric:  Speech and behavior appropriate , AAO x 3

## 2019-08-15 ENCOUNTER — TELEPHONE (OUTPATIENT)
Dept: INTERNAL MEDICINE CLINIC | Facility: CLINIC | Age: 64
End: 2019-08-15

## 2019-08-15 DIAGNOSIS — R53.83 OTHER FATIGUE: ICD-10-CM

## 2019-08-15 DIAGNOSIS — IMO0001 FOOD POISONING, ACCIDENTAL OR UNINTENTIONAL, INITIAL ENCOUNTER: ICD-10-CM

## 2019-08-15 DIAGNOSIS — R10.32 LEFT LOWER QUADRANT PAIN: Primary | ICD-10-CM

## 2019-08-15 NOTE — TELEPHONE ENCOUNTER
Aida Boeck called and stated that she has a CT set up and because it is with contrast a BUN and creatine needs to be ordered before she can get the imaging done  She would need to go to [facility] due to her insurance  She wants to know if this can be ordered as soon as possible so she can go tomorrow morning for the bw  Thank you!

## 2019-08-15 NOTE — TELEPHONE ENCOUNTER
Advised the pt of the order being in chart  She will call to schedule the ct depending on her availability  She will call us back to advised us when it has been scheduled

## 2019-08-15 NOTE — TELEPHONE ENCOUNTER
Pt called in stating she is just about done with her antibiotics but is still experiencing all the same symptoms such as bloating and pain in abdomen and back pain  Pt wants to know shouldn't she be experiencing relief by now and if so what should we do?

## 2019-08-17 ENCOUNTER — TELEPHONE (OUTPATIENT)
Dept: OTHER | Facility: OTHER | Age: 64
End: 2019-08-17

## 2019-08-17 NOTE — TELEPHONE ENCOUNTER
Meagan Kay 1955  CONFIDENTIALTY NOTICE: This fax transmission is intended only for the addressee  It contains information that is legally privileged,  confidential or otherwise protected from use or disclosure  If you are not the intended recipient, you are strictly prohibited from reviewing,  disclosing, copying using or disseminating any of this information or taking any action in reliance on or regarding this information  If you have  received this fax in error, please notify us immediately by telephone so that we can arrange for its return to us  Page:   Call Id: [id number]  Health Call  Standard Call Report  Health Call  Patient Name: Meagan Kay  Gender: Female  : 1955  Age: 60 Y 1 M 21 D  Return Phone  Number: [phone] (Home)  Address: [address] [place] [address]  Practice Name: [address] [place] [address]  Practice Charged:  Physician:  [address] [place] [address] Name:  Relationship To  Patient: Self  Return Phone Number: [phone] (Home)  Presenting Problem: "I was having abdominal pain which  I was taking Cipro for  The pain has  gotten better and I'm wondering if I  should have been taking the Cipro for  longer "  Service Type: Messages  Charged Service 1: Messages  Pharmacy Name and  Number:  Nurse Assessment  Protocols  Protocol Title Nurse Date/Time  Disp  Time Disposition Final User  2019 10:24:44 AM Close Yes Altaf Bundy  Comments  User: Onesimo Stone RN Date/Time: 2019 10:23:50 AM  Patient was calling because she wanted to know if she should be taking the Cipro for longer  Completed her Cipro yesterday  which was given to her for abdominal pain  Patient currently is not having any symptoms - states the abdominal pain seems to  have gotten better but is afraid of it coming back  Explained that after hours this office will not call in anything   Explained that if  she should develop symptoms then she can call the service back and we can triage the call  Patient verbalized understanding

## 2019-08-19 DIAGNOSIS — IMO0001 FOOD POISONING, ACCIDENTAL OR UNINTENTIONAL, INITIAL ENCOUNTER: ICD-10-CM

## 2019-08-19 DIAGNOSIS — R53.83 OTHER FATIGUE: ICD-10-CM

## 2019-08-19 DIAGNOSIS — R10.32 LEFT LOWER QUADRANT PAIN: Primary | ICD-10-CM

## 2019-08-19 NOTE — TELEPHONE ENCOUNTER
Thank you so much  I will order labs, cbc and CMP stat  If she can go today  Hold on further cipro  Keep appt for CT scan

## 2019-08-19 NOTE — TELEPHONE ENCOUNTER
She will not be able to go today as she must use [facility]   She will go in the AM   Orders faxed to [facility] @ [phone]

## 2019-08-19 NOTE — TELEPHONE ENCOUNTER
She can come to  The NH or bath lab to have this done  If bath then before 1 pm  Other st [address] after 1   Not fasting, thanks

## 2019-08-19 NOTE — TELEPHONE ENCOUNTER
I called the patient to follow up on her call to answering service over the week ends  She finished the Cipro on Friday and she does not note any improvement in her symptoms  Friday she stated that she felt pretty good and then on Saturday the pain was very severe in the epigastric area  She states that after the pain starts, I just blow up    She is treating this with Beeno and Gas X  She is not eating very much and she said that its like clock work around [employer] every day it starts    Bowels are moving  The pain is not associated with eating  She denies fever, nausea and vomiting or abdominal tenderness  She has been on the same diet since March 2019 (Keto Diet)  Her CT scan scheduled on Wednesday

## 2019-08-20 ENCOUNTER — TELEPHONE (OUTPATIENT)
Dept: INTERNAL MEDICINE CLINIC | Facility: CLINIC | Age: 64
End: 2019-08-20

## 2019-08-20 NOTE — TELEPHONE ENCOUNTER
Pt called to state she had bw done at [facility] and they told her the  had left already but that another was coming around 6pm tonight

## 2019-08-20 NOTE — TELEPHONE ENCOUNTER
Patient had called today to let us know that she had lab work done today at 145 pm at [facility] if we could please look into this, dr Marin Chung had originally wanted stat labs but waiting for these results

## 2019-08-21 ENCOUNTER — HOSPITAL ENCOUNTER (OUTPATIENT)
Dept: RADIOLOGY | Age: 64
Discharge: HOME | End: 2019-08-21
Payer: COMMERCIAL

## 2019-08-21 DIAGNOSIS — IMO0001 FOOD POISONING, ACCIDENTAL OR UNINTENTIONAL, INITIAL ENCOUNTER: ICD-10-CM

## 2019-08-21 DIAGNOSIS — R53.83 OTHER FATIGUE: ICD-10-CM

## 2019-08-21 DIAGNOSIS — R10.32 LEFT LOWER QUADRANT PAIN: ICD-10-CM

## 2019-08-21 LAB
ALBUMIN SERPL-MCNC: 4 G/DL (ref 3.4–5)
ALP SERPL-CCNC: 145 U/L (ref 50–136)
ALT SERPL-CCNC: 31 U/L (ref 13–61)
AST SERPL-CCNC: 26 U/L (ref 15–37)
BASOPHILS # BLD AUTO: 0.1 X10E3/UL (ref 0–0.2)
BASOPHILS NFR BLD AUTO: 0.8 %
BILIRUB SERPL-MCNC: 0.75 MG/DL (ref 0–1)
BUN SERPL-MCNC: 13 MG/DL (ref 7–18)
CALCIUM SERPL-MCNC: 9.5 MG/DL (ref 8.5–10.1)
CHLORIDE SERPL-SCNC: 106 MMOL/L (ref 97–108)
CO2 SERPL-SCNC: 30 MMOL/L (ref 21–32)
CREAT SERPL-MCNC: 0.77 MG/DL (ref 0.55–1.3)
EOSINOPHIL # BLD AUTO: 0.1 X10E3/UL (ref 0–0.4)
EOSINOPHIL NFR BLD AUTO: 1 %
ERYTHROCYTE [DISTWIDTH] IN BLOOD BY AUTOMATED COUNT: 16.2 % (ref 11.3–15.4)
GLUCOSE SERPL-MCNC: 89 MG/DL (ref 70–110)
HCT VFR BLD AUTO: 39.4 % (ref 34.3–47.5)
HGB BLD-MCNC: 12.6 G/DL (ref 11–15.7)
IMM GRANULOCYTES NFR BLD: 0 %
LYMPHOCYTES # BLD AUTO: 1.6 X10E3/UL (ref 0.5–3.5)
LYMPHOCYTES NFR BLD AUTO: 24.9 %
MCH RBC QN AUTO: 29.5 PG (ref 27.6–33.6)
MCHC RBC AUTO-ENTMCNC: 32 G/DL (ref 31.5–35)
MCV RBC AUTO: 92.3 FL (ref 83–101)
MONOCYTES # BLD AUTO: 0.5 X10E3/UL (ref 0.2–0.7)
MONOCYTES NFR BLD AUTO: 7.8 %
NEUTROPHILS # BLD AUTO: 4.1 X10E3/UL (ref 1.2–7.3)
NEUTROPHILS NFR BLD AUTO: 65.3 %
NRBC BLD AUTO-RTO: 0 %
PLATELET # BLD AUTO: 327 X10E3/UL (ref 125–367)
POTASSIUM SERPL-SCNC: 4.7 MMOL/L (ref 3.3–4.7)
PROT SERPL-MCNC: 7 G/DL (ref 6.4–8.2)
RBC # BLD AUTO: 4.27 X10E6/UL (ref 3.67–5.38)
SL AMB EGFR NON AFRICAN AMERICAN: >60 ML/MIN (ref 60–128)
SODIUM SERPL-SCNC: 144 MMOL/L (ref 134–146)
WBC # BLD AUTO: 6.3 X10E3/UL (ref 3.2–10.6)

## 2019-08-21 PROCEDURE — 74177 CT ABD & PELVIS W/CONTRAST: CPT

## 2019-08-21 RX ADMIN — IOHEXOL 100 ML: 350 INJECTION, SOLUTION INTRAVENOUS at 12:53

## 2019-08-21 NOTE — TELEPHONE ENCOUNTER
I called the patient with the blood work results from 8/20/19  She states that she had blood work done on Friday and these results do not appear in [address] [place] [address] Rd  I called [facility] and they will fax the results to us

## 2019-08-22 ENCOUNTER — TELEPHONE (OUTPATIENT)
Dept: INTERNAL MEDICINE CLINIC | Facility: CLINIC | Age: 64
End: 2019-08-22

## 2019-08-22 DIAGNOSIS — N28.1 KIDNEY CYST, ACQUIRED: Primary | ICD-10-CM

## 2019-08-22 NOTE — TELEPHONE ENCOUNTER
D/w pt, reviewed labs and CT scan  Will need kidney US when better  Start OTC nexium, can increase to 2 pills per day  If not better then refer to GI   She understands

## 2019-08-22 NOTE — TELEPHONE ENCOUNTER
----- Message from Anival Baugh DO sent at 8/22/2019  1:50 PM EDT -----  Senthil Espino, can you please call for CT scan results   Then I will call her

## 2019-08-23 ENCOUNTER — TELEPHONE (OUTPATIENT)
Dept: INTERNAL MEDICINE CLINIC | Facility: CLINIC | Age: 64
End: 2019-08-23

## 2019-08-23 NOTE — TELEPHONE ENCOUNTER
----- Message from Moraima Baron DO sent at 8/22/2019  1:50 PM EDT -----  Ritesh Rabago, can you please call for CT scan results   Then I will call her

## 2019-08-28 ENCOUNTER — TELEPHONE (OUTPATIENT)
Dept: PAIN MEDICINE | Facility: MEDICAL CENTER | Age: 64
End: 2019-08-28

## 2019-08-28 ENCOUNTER — OFFICE VISIT (OUTPATIENT)
Dept: INTERNAL MEDICINE CLINIC | Age: 64
End: 2019-08-28
Payer: COMMERCIAL

## 2019-08-28 VITALS
DIASTOLIC BLOOD PRESSURE: 86 MMHG | WEIGHT: 182 LBS | TEMPERATURE: 97.4 F | BODY MASS INDEX: 30.32 KG/M2 | SYSTOLIC BLOOD PRESSURE: 136 MMHG | HEIGHT: 65 IN | OXYGEN SATURATION: 98 % | HEART RATE: 69 BPM

## 2019-08-28 DIAGNOSIS — F41.9 ANXIETY: ICD-10-CM

## 2019-08-28 DIAGNOSIS — Z91.038 ALLERGIC REACTION TO INSECT BITE: Primary | ICD-10-CM

## 2019-08-28 DIAGNOSIS — I10 BENIGN ESSENTIAL HTN: ICD-10-CM

## 2019-08-28 PROCEDURE — 99214 OFFICE O/P EST MOD 30 MIN: CPT | Performed by: PHYSICIAN ASSISTANT

## 2019-08-28 RX ORDER — PREDNISONE 10 MG/1
TABLET ORAL
Qty: 15 TABLET | Refills: 0 | Status: SHIPPED | OUTPATIENT
Start: 2019-08-28 | End: 2019-12-26 | Stop reason: ALTCHOICE

## 2019-08-28 NOTE — TELEPHONE ENCOUNTER
Note sent to pranav Cunningham:   Pt is scheduled for  a procedure: L L4-5 TFESI  Pt had a reaction  a bite which is closing her eye and she went to here PCP,  Her PCP prescribed: predniSONE 10 mg 4 tabs daily for 2 days then 3 tabs x 1 day then 2 tabs x 1 day then 1 tab x 1 day take with food     Pt is concerned and she doesnt want to cancel her procedure  Will the pred have an impact on her getting the procedure        Pt can be reached at [phone]

## 2019-08-28 NOTE — PROGRESS NOTES
Assessment/Plan:         Diagnoses and all orders for this visit:    Allergic reaction to insect bite  Comments:  ice to affected area  benadryl q 6 hours prn  start pred tonight   Orders:  -     predniSONE 10 mg tablet; 4 tabs daily for 2 days then 3 tabs x 1 day then 2 tabs x 1 day then 1 tab x 1 day take with food    Benign essential HTN  Comments:  well controlled     Anxiety        Pt instructed to continue benadryl 25mg q 6 hours prn (warned this may make drowsy, would recommend taking at bedtime for 2 nights)  Pt instructed to go to ER for swelling tongue, throat, diff breathing or wheezing  Start pred taper tonight, make pain management aware tomorrow am  Take prednisone with food - take around lunch time starting tomorrow and daily until completed     Subjective:      Patient ID: Naomi Shanks is a 60 y o  female      59 y/o female presents acute same day visit for swelling above her L eye and eyelid - pt reports she was outside and felt something bite her above her eyebrow, she brushed It away and was not concerned, however an hour or so later she started feeling itching of this area and looked in the mirror to see redness, over the course of the past hours her left upper eyelid has become swollen, she was concerned this would get worse, she took benadryl approx 2 hours ago and notes the itching has improved    Pt reports this was a gnat or mosquito, states it was not a bee or venomous insect as it wasn't a painful bite  She has no hx of anaphylaxis or edema of face, lips or tongue    Pt also applied ice to the affected area with some relief   She denies scratchy or itchy throat, denies diff with breathing or wheezing, denies throat or tongue swelling         The following portions of the patient's history were reviewed and updated as appropriate: allergies, current medications, past family history, past medical history, past social history, past surgical history and problem list     Review of Systems Constitutional: Negative for activity change, appetite change, chills, fatigue and fever  HENT: Negative for facial swelling, sore throat, trouble swallowing and voice change  Eyes: Positive for redness and itching  Negative for photophobia, pain and visual disturbance  Respiratory: Negative for cough, chest tightness, shortness of breath and wheezing  Cardiovascular: Negative for chest pain, palpitations and leg swelling  Gastrointestinal: Negative for abdominal pain, constipation, diarrhea and nausea  Musculoskeletal: Positive for arthralgias and back pain  Negative for gait problem  Skin: Positive for color change and wound  Allergic/Immunologic: Negative for environmental allergies and food allergies  Hematological: Negative for adenopathy  Does not bruise/bleed easily  Psychiatric/Behavioral: Negative for sleep disturbance  The patient is nervous/anxious            Past Medical History:   Diagnosis Date    Anxiety     Cataract, bilateral     last assessed    Cervical radiculopathy     Disc disease, degenerative, cervical     Herpes simplex infection     Hypertension     Insomnia     Low back pain     Lumbar degenerative disc disease     Lumbar facet arthropathy     Lumbar radiculopathy     Lumbar stenosis without neurogenic claudication     Mononucleosis     Myofascial pain dysfunction syndrome     Osteoarthritis     shoulder region - unspecified laterality - last assessed 2/1/14    Plantar fasciitis     Ptosis, both eyelids     last assessed 10/6/16    Ptosis, congenital, left     Retinal detachment     last assessed 12/11/14 right eye    Sacroiliitis ([facility] [address]     Thyroid disease     Thyrotoxicosis     last assessed 5/17/13    Vertigo          Current Outpatient Medications:     busPIRone (BUSPAR) 5 mg tablet, Take 1 tablet (5 mg total) by mouth 4 (four) times a day (Patient taking differently: Take 5 mg by mouth 3 (three) times a day ), Disp: 360 tablet, Rfl: 1   diclofenac (VOLTAREN) 75 mg EC tablet, Take 1 tablet (75 mg total) by mouth 2 (two) times a day, Disp: 180 tablet, Rfl: 1    fluticasone (FLONASE) 50 mcg/act nasal spray, 1 spray into each nostril daily (Patient taking differently: 1 spray into each nostril as needed ), Disp: 16 g, Rfl: 3    LORazepam (ATIVAN) 1 mg tablet, Take 1 5 tablets (1 5 mg total) by mouth daily at bedtime as needed for anxiety (Patient taking differently: Take 1 mg by mouth daily at bedtime as needed for anxiety ), Disp: 135 tablet, Rfl: 1    methimazole (TAPAZOLE) 5 mg tablet, Take 1 tablet (5 mg total) by mouth daily, Disp: 90 tablet, Rfl: 1    penciclovir (DENAVIR) 1 % cream, Apply topically daily (Patient taking differently: Apply topically daily as needed ), Disp: 5 g, Rfl: 1    pregabalin (LYRICA) 50 mg capsule, Take 1 capsule (50 mg total) by mouth 3 (three) times a day, Disp: 270 capsule, Rfl: 1    propranolol (INDERAL) 10 mg tablet, Take 10 mg by mouth 2 (two) times a day  , Disp: , Rfl:     quinapril (ACCUPRIL) 10 mg tablet, Take 1 tablet (10 mg total) by mouth daily, Disp: 90 tablet, Rfl: 1    RESTASIS 0 05 % ophthalmic emulsion, Administer 1 drop to both eyes 2 (two) times a day, Disp: , Rfl: 3    predniSONE 10 mg tablet, 4 tabs daily for 2 days then 3 tabs x 1 day then 2 tabs x 1 day then 1 tab x 1 day take with food, Disp: 15 tablet, Rfl: 0    valACYclovir (VALTREX) 500 mg tablet, Take 1 tablet (500 mg total) by mouth as needed (cold sores) for up to 30 days (Patient not taking: Reported on 8/28/2019), Disp: 30 tablet, Rfl: 0    Allergies   Allergen Reactions    Other Dermatitis     Adhesive tape - please use ONLY PAPER TAPE    Scopolamine Other (See Comments)     Severe, debilitating headache      Flexeril [Cyclobenzaprine] Dizziness and Fatigue    Naproxen      Other reaction(s): Unknown Allergic Reaction  Annotation - [id number]: nightmares    Penicillins      Other reaction(s): Unknown Allergic Reaction  Annotation - [id number]: childhood reaction    Promethazine-Codeine      Reaction Date: [id number]; Annotation - [id number]: nightmares; Annotation - [id number]: nightmares    Tramadol      Other reaction(s): Unknown Allergic Reaction  Annotation - [id number]: PT HAS NIGHTMARES FROM TRAMADOL    Wound Dressing Adhesive Rash       Social History   Past Surgical History:   Procedure Laterality Date    ID SURG IMPLNT NEUROELECT,EPIDURAL Left 1/26/2016    Procedure: PLACEMENT OF THORACIC SPINAL CORD STIMULATOR WITH LEFT BUTTOCK IMPLANTABLE PULSE GENERATOR (IMPULSE MONITORING); Surgeon: French Dodge MD;  Location:  MAIN OR;  Service: Neurosurgery    RETINAL DETACHMENT SURGERY Right      Family History   Problem Relation Age of Onset   Jerel Lopez Breast cancer Mother    Jerel Lopez COPD Mother     Hypertension Mother         essential    Heart attack Father         acute MI    Emphysema Father     Hypertension Father         essential    Other Father         prehypertension    Liver cancer Maternal Aunt        Objective:  /86 (BP Location: Left arm, Patient Position: Sitting, Cuff Size: Standard)   Pulse 69   Temp (!) 97 4 °F (36 3 °C) (Tympanic)   Ht 5' 4 84" (1 647 m)   Wt 82 6 kg (182 lb)   LMP 11/26/2015   SpO2 98%   BMI 30 43 kg/m²        Physical Exam   Constitutional: She is oriented to person, place, and time  She appears well-developed and well-nourished  HENT:   Right Ear: External ear normal    Left Ear: External ear normal    Nose: Nose normal    Mouth/Throat: Oropharynx is clear and moist  No oropharyngeal exudate  L TM no erythema    Eyes: Pupils are equal, round, and reactive to light  Conjunctivae and EOM are normal    L eyelid swelling, extending to eyebrow and above with redness and slight warmth  No visible bite or eschar    Neck: Normal range of motion  Neck supple  Cardiovascular: Normal rate, regular rhythm and normal heart sounds     Pulmonary/Chest: Effort normal and breath sounds normal  She has no wheezes  She exhibits no tenderness  Musculoskeletal: Normal range of motion  She exhibits no edema  Lymphadenopathy:     She has no cervical adenopathy  Neurological: She is alert and oriented to person, place, and time  Skin: Skin is warm and dry  Rash (erythema over left eyebrow) noted  She is not diaphoretic  Psychiatric: She has a normal mood and affect  Her behavior is normal  Judgment and thought content normal    Vitals reviewed

## 2019-08-29 ENCOUNTER — HOSPITAL ENCOUNTER (OUTPATIENT)
Dept: RADIOLOGY | Facility: CLINIC | Age: 64
Discharge: HOME | End: 2019-08-29
Attending: ANESTHESIOLOGY | Admitting: ANESTHESIOLOGY
Payer: COMMERCIAL

## 2019-08-29 VITALS
TEMPERATURE: 97.9 F | OXYGEN SATURATION: 100 % | DIASTOLIC BLOOD PRESSURE: 92 MMHG | HEART RATE: 62 BPM | SYSTOLIC BLOOD PRESSURE: 168 MMHG | RESPIRATION RATE: 20 BRPM

## 2019-08-29 DIAGNOSIS — M48.061 SPINAL STENOSIS OF LUMBAR REGION WITHOUT NEUROGENIC CLAUDICATION: ICD-10-CM

## 2019-08-29 PROCEDURE — 64484 NJX AA&/STRD TFRM EPI L/S EA: CPT | Performed by: ANESTHESIOLOGY

## 2019-08-29 PROCEDURE — 64483 NJX AA&/STRD TFRM EPI L/S 1: CPT | Performed by: ANESTHESIOLOGY

## 2019-08-29 RX ORDER — 0.9 % SODIUM CHLORIDE 0.9 %
10 VIAL (ML) INJECTION ONCE
Status: COMPLETED | OUTPATIENT
Start: 2019-08-29 | End: 2019-08-29

## 2019-08-29 RX ORDER — METHYLPREDNISOLONE ACETATE 80 MG/ML
80 INJECTION, SUSPENSION INTRA-ARTICULAR; INTRALESIONAL; INTRAMUSCULAR; PARENTERAL; SOFT TISSUE ONCE
Status: COMPLETED | OUTPATIENT
Start: 2019-08-29 | End: 2019-08-29

## 2019-08-29 RX ORDER — BUPIVACAINE HCL/PF 2.5 MG/ML
10 VIAL (ML) INJECTION ONCE
Status: COMPLETED | OUTPATIENT
Start: 2019-08-29 | End: 2019-08-29

## 2019-08-29 RX ADMIN — IOHEXOL 1 ML: 300 INJECTION, SOLUTION INTRAVENOUS at 13:22

## 2019-08-29 RX ADMIN — BUPIVACAINE HYDROCHLORIDE 2 ML: 2.5 INJECTION, SOLUTION EPIDURAL; INFILTRATION; INTRACAUDAL at 13:22

## 2019-08-29 RX ADMIN — SODIUM CHLORIDE 5 ML: 9 INJECTION, SOLUTION INTRAMUSCULAR; INTRAVENOUS; SUBCUTANEOUS at 13:19

## 2019-08-29 RX ADMIN — METHYLPREDNISOLONE ACETATE 80 MG: 80 INJECTION, SUSPENSION INTRA-ARTICULAR; INTRALESIONAL; INTRAMUSCULAR; PARENTERAL; SOFT TISSUE at 13:22

## 2019-08-29 RX ADMIN — Medication 5 ML: at 13:19

## 2019-08-29 NOTE — DISCHARGE INSTR - LAB
Epidural Steroid Injection   WHAT YOU NEED TO KNOW:   An epidural steroid injection (PRATIK) is a procedure to inject steroid medicine into the epidural space  The epidural space is between your spinal cord and vertebrae  Steroids reduce inflammation and fluid buildup in your spine that may be causing pain  You may be given pain medicine along with the steroids  ACTIVITY  · Do not drive or operate machinery today  · No strenuous activity today - bending, lifting, etc   · You may resume normal activites starting tomorrow - start slowly and as tolerated  · You may shower today, but no tub baths or hot tubs  · You may have numbness for several hours from the local anesthetic  Please use caution and common sense, especially with weight-bearing activities  CARE OF THE INJECTION SITE  · If you have soreness or pain, apply ice to the area today (20 minutes on/20 minutes off)  · Starting tomorrow, you may use warm, moist heat or ice if needed  · You may have an increase or change in your discomfort for 36-48 hours after your treatment  · Apply ice and continue with any pain medication you have been prescribed  · Notify the [facility] if you have any of the following: redness, drainage, swelling, headache, stiff neck or fever above 100°F     SPECIAL INSTRUCTIONS  · Our office will contact you in approximately 7 days for a progress report  MEDICATIONS  · Continue to take all routine medications  · Our office may have instructed you to hold some medications  If you have a problem specifically related to your procedure, please call our office at [phone]  Problems not related to your procedure should be directed to your primary care physician

## 2019-08-29 NOTE — TELEPHONE ENCOUNTER
S/w pt  Pt states on call did not reach out to her yesterday  Apologized to pt and advised OK for injection today  Pt appreciative

## 2019-08-29 NOTE — H&P
History of Present Illness: The patient is a 60 y o  female who presents with complaints of left lower back and leg pain secondary to spinal stenosis and is here today for left L4 left L5 transforaminal epidural steroid injection  Patient Active Problem List   Diagnosis    Rosacea, acne    Lumbar degenerative disc disease    Lumbar radiculopathy    Anxiety    Benign essential HTN    Cervical radiculopathy    Disc disorder of cervical region    Hot flash, menopausal    Lumbar facet arthropathy    Lumbar stenosis without neurogenic claudication    Myofascial pain syndrome    Obesity (BMI 30-39  9)    Pain syndrome, chronic    Positive depression screening    Primary osteoarthritis of right knee    Right knee injury    Sacroiliitis (HCC)    Tear of medial meniscus of right knee, current    Bulge of lumbar disc without myelopathy    Spondylolisthesis of lumbar region    Arthritis of right acromioclavicular joint    Other fatigue    Hives    Rash    Food poisoning    Idiopathic hypotension    Left lower quadrant pain       Past Medical History:   Diagnosis Date    Anxiety     Cataract, bilateral     last assessed    Cervical radiculopathy     Disc disease, degenerative, cervical     Herpes simplex infection     Hypertension     Insomnia     Low back pain     Lumbar degenerative disc disease     Lumbar facet arthropathy     Lumbar radiculopathy     Lumbar stenosis without neurogenic claudication     Mononucleosis     Myofascial pain dysfunction syndrome     Osteoarthritis     shoulder region - unspecified laterality - last assessed 2/1/14    Plantar fasciitis     Ptosis, both eyelids     last assessed 10/6/16    Ptosis, congenital, left     Retinal detachment     last assessed 12/11/14 right eye    Sacroiliitis ([address] )     Thyroid disease     Thyrotoxicosis     last assessed 5/17/13    Vertigo        Past Surgical History:   Procedure Laterality Date    MO SURG IMPLNT NEUROELECT,EPIDURAL Left 1/26/2016    Procedure: PLACEMENT OF THORACIC SPINAL CORD STIMULATOR WITH LEFT BUTTOCK IMPLANTABLE PULSE GENERATOR (IMPULSE MONITORING);   Surgeon: Deep Romo MD;  Location: [facility];  Service: Neurosurgery    RETINAL DETACHMENT SURGERY Right          Current Outpatient Medications:     busPIRone (BUSPAR) 5 mg tablet, Take 1 tablet (5 mg total) by mouth 4 (four) times a day (Patient taking differently: Take 5 mg by mouth 3 (three) times a day ), Disp: 360 tablet, Rfl: 1    diclofenac (VOLTAREN) 75 mg EC tablet, Take 1 tablet (75 mg total) by mouth 2 (two) times a day, Disp: 180 tablet, Rfl: 1    fluticasone (FLONASE) 50 mcg/act nasal spray, 1 spray into each nostril daily (Patient taking differently: 1 spray into each nostril as needed ), Disp: 16 g, Rfl: 3    LORazepam (ATIVAN) 1 mg tablet, Take 1 5 tablets (1 5 mg total) by mouth daily at bedtime as needed for anxiety (Patient taking differently: Take 1 mg by mouth daily at bedtime as needed for anxiety ), Disp: 135 tablet, Rfl: 1    methimazole (TAPAZOLE) 5 mg tablet, Take 1 tablet (5 mg total) by mouth daily, Disp: 90 tablet, Rfl: 1    penciclovir (DENAVIR) 1 % cream, Apply topically daily (Patient taking differently: Apply topically daily as needed ), Disp: 5 g, Rfl: 1    predniSONE 10 mg tablet, 4 tabs daily for 2 days then 3 tabs x 1 day then 2 tabs x 1 day then 1 tab x 1 day take with food, Disp: 15 tablet, Rfl: 0    pregabalin (LYRICA) 50 mg capsule, Take 1 capsule (50 mg total) by mouth 3 (three) times a day, Disp: 270 capsule, Rfl: 1    propranolol (INDERAL) 10 mg tablet, Take 10 mg by mouth 2 (two) times a day  , Disp: , Rfl:     quinapril (ACCUPRIL) 10 mg tablet, Take 1 tablet (10 mg total) by mouth daily, Disp: 90 tablet, Rfl: 1    RESTASIS 0 05 % ophthalmic emulsion, Administer 1 drop to both eyes 2 (two) times a day, Disp: , Rfl: 3    valACYclovir (VALTREX) 500 mg tablet, Take 1 tablet (500 mg total) by mouth as needed (cold sores) for up to 30 days (Patient not taking: Reported on 8/28/2019), Disp: 30 tablet, Rfl: 0    Current Facility-Administered Medications:     bupivacaine (PF) (MARCAINE) 0 25 % injection 10 mL, 10 mL, Epidural, Once, Maryam Phan MD    iohexol (OMNIPAQUE) 300 mg/mL injection 50 mL, 50 mL, Epidural, Once, Maryam Phan MD    lidocaine (PF) (XYLOCAINE-MPF) 2 % injection 5 mL, 5 mL, Infiltration, Once, Maryam Phan MD    methylPREDNISolone acetate (DEPO-MEDROL) injection 80 mg, 80 mg, Epidural, Once, Maryam Phan MD    sodium chloride (PF) 0 9 % injection 10 mL, 10 mL, Infiltration, Once, Maryam Phan MD    Allergies   Allergen Reactions    Other Dermatitis     Adhesive tape - please use ONLY PAPER TAPE    Scopolamine Other (See Comments)     Severe, debilitating headache      Flexeril [Cyclobenzaprine] Dizziness and Fatigue    Naproxen      Other reaction(s): Unknown Allergic Reaction  Annotation - [id number]: nightmares    Penicillins      Other reaction(s): Unknown Allergic Reaction  Annotation - [id number]: childhood reaction    Promethazine-Codeine      Reaction Date: [id number]; Annotation - [id number]: nightmares; Annotation - [id number]: nightmares    Tramadol      Other reaction(s): Unknown Allergic Reaction  Annotation - [id number]: PT HAS NIGHTMARES FROM TRAMADOL    Wound Dressing Adhesive Rash       Physical Exam:   Vitals:    08/29/19 1304   BP: 144/93   Pulse: 63   Resp: 20   Temp: 97 9 °F (36 6 °C)   SpO2: 100%     General: Awake, Alert, Oriented x 3, Mood and affect appropriate  Respiratory: Respirations even and unlabored  Cardiovascular: Peripheral pulses intact; no edema  Musculoskeletal Exam:   Left lower back tenderness    ASA Score: 2    Patient/Chart Verification  Patient ID Verified: Verbal  Consents Confirmed: To be obtained in the Pre-Procedure area  H&P( within 30 days) Verified:  To be obtained in the Pre-Procedure area  Allergies Reviewed: Yes  Anticoag/NSAID held?: NA  Currently on antibiotics?: No    Assessment:   1   Spinal stenosis of lumbar region without neurogenic claudication        Plan: L L4-5 TFESI

## 2019-08-30 ENCOUNTER — OFFICE VISIT (OUTPATIENT)
Dept: INTERNAL MEDICINE CLINIC | Facility: CLINIC | Age: 64
End: 2019-08-30
Payer: COMMERCIAL

## 2019-08-30 ENCOUNTER — TELEPHONE (OUTPATIENT)
Dept: INTERNAL MEDICINE CLINIC | Age: 64
End: 2019-08-30

## 2019-08-30 VITALS
WEIGHT: 181 LBS | TEMPERATURE: 97.9 F | BODY MASS INDEX: 30.9 KG/M2 | OXYGEN SATURATION: 97 % | HEIGHT: 64 IN | DIASTOLIC BLOOD PRESSURE: 88 MMHG | HEART RATE: 60 BPM | SYSTOLIC BLOOD PRESSURE: 120 MMHG

## 2019-08-30 DIAGNOSIS — T38.0X5A ADVERSE EFFECT OF ADRENAL CORTICAL STEROIDS, INITIAL ENCOUNTER: Primary | ICD-10-CM

## 2019-08-30 PROCEDURE — 99213 OFFICE O/P EST LOW 20 MIN: CPT | Performed by: INTERNAL MEDICINE

## 2019-08-30 NOTE — PROGRESS NOTES
Assessment/Plan:    Adverse reaction of steroids  - likely secondary to a double dose of steroids  - I counseled patient to discontinue the steroid taper since she already has a long-acting steroid injection recently given  - I reassured her and counseled her to call the doctor on call if her symptoms reoccur  - patient was counseled to go to the emergency department if she develops worsening facial swelling, tongue swelling, wheezing, total body swelling, shortness of breath  - she should continue with her Ativan as needed  - she should return to the office as previously scheduled or sooner if needed  Diagnoses and all orders for this visit:    Adverse effect of adrenal cortical steroids, initial encounter          Subjective:      Patient ID: Manuelito Smith is a 60 y o  female  HPI  Patient presents with complaints of feeling jittery, shaky and hyper and very anxious after she took steroids for her recent adverse reaction to a bug bite and then had an epidural shot given her also  She states that she usually gets the epidural injections at intervals but has never had this kind of reaction  This time around, she was on a steroid taper and then got the epidural injection  She states that yesterday night she could not sleep because of her hyper she was  She only slept for a couple of hours and had to take an extra dose of her Ativan  She has not taking of the prednisone today  Her symptoms are much improved now  She still admits to anxiety and does have a history of anxiety  She states that she had taken extra dose of her lorazepam when she was feeling so bad  The swelling around her left eye has improved  She denies any more facial swelling, tongue swelling, wheezing, cough,  fever, chills, night sweats, chest pain, shortness of breath and palpitations, nausea, vomiting, abdominal pain, diarrhea, constipation      The following portions of the patient's history were reviewed and updated as appropriate:   She  has a past medical history of Anxiety, Cataract, bilateral, Cervical radiculopathy, Disc disease, degenerative, cervical, Herpes simplex infection, Hypertension, Insomnia, Low back pain, Lumbar degenerative disc disease, Lumbar facet arthropathy, Lumbar radiculopathy, Lumbar stenosis without neurogenic claudication, Mononucleosis, Myofascial pain dysfunction syndrome, Osteoarthritis, Plantar fasciitis, Ptosis, both eyelids, Ptosis, congenital, left, Retinal detachment, Sacroiliitis ([address] ), Thyroid disease, Thyrotoxicosis, and Vertigo  She   Patient Active Problem List    Diagnosis Date Noted    Adverse effect of adrenal cortical steroids, initial encounter 08/30/2019    Left lower quadrant pain 08/15/2019    Food poisoning 08/12/2019    Idiopathic hypotension 08/12/2019    Other fatigue 07/08/2019    Hives 07/08/2019    Rash 07/08/2019    Arthritis of right acromioclavicular joint     Spondylolisthesis of lumbar region 11/08/2018    Bulge of lumbar disc without myelopathy 10/15/2018    Rosacea, acne 04/13/2018    Obesity (BMI 30-39 9) 12/27/2017    Tear of medial meniscus of right knee, current 12/08/2017    Primary osteoarthritis of right knee 11/28/2017    Right knee injury 11/28/2017    Hot flash, menopausal 05/12/2017    Positive depression screening 05/12/2017    Lumbar facet arthropathy 07/22/2015    Sacroiliitis ([facility] [address] ) 03/03/2015    Pain syndrome, chronic 01/06/2015    Benign essential HTN 06/20/2014    Lumbar degenerative disc disease 05/12/2014    Lumbar radiculopathy 05/12/2014    Lumbar stenosis without neurogenic claudication 05/12/2014    Disc disorder of cervical region 03/10/2014    Myofascial pain syndrome 03/10/2014    Cervical radiculopathy 02/17/2014    Anxiety 12/28/2012     She  has a past surgical history that includes Retinal detachment surgery (Right) and pr surg implnt neuroelect,epidural (Left, 1/26/2016)    Her family history includes Breast cancer in her mother; COPD in her mother; Emphysema in her father; Heart attack in her father; Hypertension in her father and mother; Liver cancer in her maternal aunt; Other in her father  She  reports that she has never smoked  She has never used smokeless tobacco  She reports that she drinks about 1 0 standard drinks of alcohol per week  She reports that she does not use drugs  Current Outpatient Medications   Medication Sig Dispense Refill    busPIRone (BUSPAR) 5 mg tablet Take 1 tablet (5 mg total) by mouth 4 (four) times a day (Patient taking differently: Take 5 mg by mouth 3 (three) times a day ) 360 tablet 1    diclofenac (VOLTAREN) 75 mg EC tablet Take 1 tablet (75 mg total) by mouth 2 (two) times a day 180 tablet 1    fluticasone (FLONASE) 50 mcg/act nasal spray 1 spray into each nostril daily (Patient taking differently: 1 spray into each nostril as needed ) 16 g 3    LORazepam (ATIVAN) 1 mg tablet Take 1 5 tablets (1 5 mg total) by mouth daily at bedtime as needed for anxiety (Patient taking differently: Take 1 mg by mouth daily at bedtime as needed for anxiety ) 135 tablet 1    methimazole (TAPAZOLE) 5 mg tablet Take 1 tablet (5 mg total) by mouth daily 90 tablet 1    penciclovir (DENAVIR) 1 % cream Apply topically daily (Patient taking differently: Apply topically daily as needed ) 5 g 1    predniSONE 10 mg tablet 4 tabs daily for 2 days then 3 tabs x 1 day then 2 tabs x 1 day then 1 tab x 1 day take with food 15 tablet 0    pregabalin (LYRICA) 50 mg capsule Take 1 capsule (50 mg total) by mouth 3 (three) times a day 270 capsule 1    propranolol (INDERAL) 10 mg tablet Take 10 mg by mouth 2 (two) times a day        quinapril (ACCUPRIL) 10 mg tablet Take 1 tablet (10 mg total) by mouth daily 90 tablet 1    RESTASIS 0 05 % ophthalmic emulsion Administer 1 drop to both eyes 2 (two) times a day  3     No current facility-administered medications for this visit        Current Outpatient Medications on File Prior to Visit   Medication Sig    busPIRone (BUSPAR) 5 mg tablet Take 1 tablet (5 mg total) by mouth 4 (four) times a day (Patient taking differently: Take 5 mg by mouth 3 (three) times a day )    diclofenac (VOLTAREN) 75 mg EC tablet Take 1 tablet (75 mg total) by mouth 2 (two) times a day    fluticasone (FLONASE) 50 mcg/act nasal spray 1 spray into each nostril daily (Patient taking differently: 1 spray into each nostril as needed )    LORazepam (ATIVAN) 1 mg tablet Take 1 5 tablets (1 5 mg total) by mouth daily at bedtime as needed for anxiety (Patient taking differently: Take 1 mg by mouth daily at bedtime as needed for anxiety )    methimazole (TAPAZOLE) 5 mg tablet Take 1 tablet (5 mg total) by mouth daily    penciclovir (DENAVIR) 1 % cream Apply topically daily (Patient taking differently: Apply topically daily as needed )    predniSONE 10 mg tablet 4 tabs daily for 2 days then 3 tabs x 1 day then 2 tabs x 1 day then 1 tab x 1 day take with food    pregabalin (LYRICA) 50 mg capsule Take 1 capsule (50 mg total) by mouth 3 (three) times a day    propranolol (INDERAL) 10 mg tablet Take 10 mg by mouth 2 (two) times a day      quinapril (ACCUPRIL) 10 mg tablet Take 1 tablet (10 mg total) by mouth daily    RESTASIS 0 05 % ophthalmic emulsion Administer 1 drop to both eyes 2 (two) times a day    [DISCONTINUED] valACYclovir (VALTREX) 500 mg tablet Take 1 tablet (500 mg total) by mouth as needed (cold sores) for up to 30 days (Patient not taking: Reported on 8/28/2019)     No current facility-administered medications on file prior to visit  She is allergic to other; scopolamine; flexeril [cyclobenzaprine]; naproxen; penicillins; promethazine-codeine; tramadol; and wound dressing adhesive       Review of Systems   Constitutional: Negative for activity change, chills, fatigue, fever and unexpected weight change     HENT: Positive for facial swelling (Swelling around her left eye after a bug bite)  Negative for ear pain, postnasal drip, rhinorrhea, sinus pressure and sore throat  Eyes: Negative for pain  Respiratory: Negative for cough, choking, chest tightness, shortness of breath and wheezing  Cardiovascular: Negative for chest pain, palpitations and leg swelling  Gastrointestinal: Negative for abdominal pain, constipation, diarrhea, nausea and vomiting  Genitourinary: Negative for dysuria and hematuria  Musculoskeletal: Negative for arthralgias, back pain, gait problem, joint swelling, myalgias and neck stiffness  Skin: Negative for pallor and rash  Neurological: Negative for dizziness, tremors, seizures, syncope, light-headedness and headaches  Hematological: Negative for adenopathy  Psychiatric/Behavioral: Positive for sleep disturbance  Negative for behavioral problems  The patient is nervous/anxious            Past Medical History:   Diagnosis Date    Anxiety     Cataract, bilateral     last assessed    Cervical radiculopathy     Disc disease, degenerative, cervical     Herpes simplex infection     Hypertension     Insomnia     Low back pain     Lumbar degenerative disc disease     Lumbar facet arthropathy     Lumbar radiculopathy     Lumbar stenosis without neurogenic claudication     Mononucleosis     Myofascial pain dysfunction syndrome     Osteoarthritis     shoulder region - unspecified laterality - last assessed 2/1/14    Plantar fasciitis     Ptosis, both eyelids     last assessed 10/6/16    Ptosis, congenital, left     Retinal detachment     last assessed 12/11/14 right eye    Sacroiliitis ([facility] [address] )     Thyroid disease     Thyrotoxicosis     last assessed 5/17/13    Vertigo          Current Outpatient Medications:     busPIRone (BUSPAR) 5 mg tablet, Take 1 tablet (5 mg total) by mouth 4 (four) times a day (Patient taking differently: Take 5 mg by mouth 3 (three) times a day ), Disp: 360 tablet, Rfl: 1    diclofenac (VOLTAREN) 75 mg EC tablet, Take 1 tablet (75 mg total) by mouth 2 (two) times a day, Disp: 180 tablet, Rfl: 1    fluticasone (FLONASE) 50 mcg/act nasal spray, 1 spray into each nostril daily (Patient taking differently: 1 spray into each nostril as needed ), Disp: 16 g, Rfl: 3    LORazepam (ATIVAN) 1 mg tablet, Take 1 5 tablets (1 5 mg total) by mouth daily at bedtime as needed for anxiety (Patient taking differently: Take 1 mg by mouth daily at bedtime as needed for anxiety ), Disp: 135 tablet, Rfl: 1    methimazole (TAPAZOLE) 5 mg tablet, Take 1 tablet (5 mg total) by mouth daily, Disp: 90 tablet, Rfl: 1    penciclovir (DENAVIR) 1 % cream, Apply topically daily (Patient taking differently: Apply topically daily as needed ), Disp: 5 g, Rfl: 1    predniSONE 10 mg tablet, 4 tabs daily for 2 days then 3 tabs x 1 day then 2 tabs x 1 day then 1 tab x 1 day take with food, Disp: 15 tablet, Rfl: 0    pregabalin (LYRICA) 50 mg capsule, Take 1 capsule (50 mg total) by mouth 3 (three) times a day, Disp: 270 capsule, Rfl: 1    propranolol (INDERAL) 10 mg tablet, Take 10 mg by mouth 2 (two) times a day  , Disp: , Rfl:     quinapril (ACCUPRIL) 10 mg tablet, Take 1 tablet (10 mg total) by mouth daily, Disp: 90 tablet, Rfl: 1    RESTASIS 0 05 % ophthalmic emulsion, Administer 1 drop to both eyes 2 (two) times a day, Disp: , Rfl: 3    Allergies   Allergen Reactions    Other Dermatitis     Adhesive tape - please use ONLY PAPER TAPE    Scopolamine Other (See Comments)     Severe, debilitating headache      Flexeril [Cyclobenzaprine] Dizziness and Fatigue    Naproxen      Other reaction(s): Unknown Allergic Reaction  Annotation - [id number]: nightmares    Penicillins      Other reaction(s): Unknown Allergic Reaction  Annotation - [id number]: childhood reaction    Promethazine-Codeine      Reaction Date: [id number]; Annotation - [id number]: nightmares;  Annotation - [id number]: nightmares    Tramadol      Other reaction(s): Unknown Allergic Reaction  Annotation - [id number]: PT HAS NIGHTMARES FROM TRAMADOL    Wound Dressing Adhesive Rash       Social History   Past Surgical History:   Procedure Laterality Date    OR SURG IMPLNT NEUROELECT,EPIDURAL Left 1/26/2016    Procedure: PLACEMENT OF THORACIC SPINAL CORD STIMULATOR WITH LEFT BUTTOCK IMPLANTABLE PULSE GENERATOR (IMPULSE MONITORING); Surgeon: Cinthia Mccoy MD;  Location: [facility];  Service: Neurosurgery    RETINAL DETACHMENT SURGERY Right      Family History   Problem Relation Age of Onset   [employer] Breast cancer Mother    [employer] COPD Mother     Hypertension Mother         essential    Heart attack Father         acute MI    Emphysema Father     Hypertension Father         essential    Other Father         prehypertension    Liver cancer Maternal Aunt        Objective:  /88 (BP Location: Left arm, Patient Position: Sitting, Cuff Size: Adult)   Pulse 60   Temp 97 9 °F (36 6 °C) (Oral)   Ht 5' 4" (1 626 m)   Wt 82 1 kg (181 lb)   LMP 11/26/2015   SpO2 97%   BMI 31 07 kg/m²        Physical Exam   Constitutional: She is oriented to person, place, and time  She appears well-developed and well-nourished  No distress  HENT:   Head: Normocephalic and atraumatic  Right Ear: External ear normal    Left Ear: External ear normal    Nose: Nose normal    Mouth/Throat: Oropharynx is clear and moist  No oropharyngeal exudate  Eyes: Pupils are equal, round, and reactive to light  Conjunctivae and EOM are normal  Right eye exhibits no discharge  Left eye exhibits no discharge  No scleral icterus  Swelling below the left eye has reduced in size  Neck: Normal range of motion  Neck supple  No JVD present  No tracheal deviation present  No thyromegaly present  Cardiovascular: Normal rate, regular rhythm, normal heart sounds and intact distal pulses  Exam reveals no gallop and no friction rub  No murmur heard  Pulmonary/Chest: Effort normal and breath sounds normal  No respiratory distress  She has no wheezes  She has no rales  She exhibits no tenderness  Abdominal: Soft  Bowel sounds are normal  She exhibits no distension and no mass  There is no tenderness  There is no rebound and no guarding  Musculoskeletal: Normal range of motion  She exhibits no edema, tenderness or deformity  Lymphadenopathy:     She has no cervical adenopathy  Neurological: She is alert and oriented to person, place, and time  She has normal reflexes  No cranial nerve deficit  She exhibits normal muscle tone  Coordination normal    Skin: Skin is warm and dry  No rash noted  She is not diaphoretic  No erythema  No pallor  Psychiatric: She has a normal mood and affect   Her behavior is normal

## 2019-08-30 NOTE — PATIENT INSTRUCTIONS
General Allergic Reaction   AMBULATORY CARE:   A general allergic reaction  is your body's response to an allergen  Allergens include medicines, food, insect stings, animal dander, mold, latex, chemicals, and dust mites  Pollen from trees, grass, and weeds can also cause an allergic reaction  Seek care immediately if:   · You have a skin rash, hives, swelling, or itching that gets worse  · You have trouble breathing, shortness of breath, wheezing, or coughing  · Your throat tightens, or your lips or tongue swell  · You have trouble swallowing or speaking  · You have dizziness, lightheadedness, fainting, or confusion  · You have nausea, vomiting, diarrhea, or abdominal cramps  · You have chest pain or tightness  Contact your healthcare provider if:   · You have questions or concerns about your condition or care  Treatment for a general allergic reaction  may include medicines to relieve certain allergy symptoms such as itching, sneezing, and swelling  You may take them as a pill or use drops in your nose or eyes  Topical treatments may be given to put directly on your skin to help decrease itching or swelling  Manage allergic reactions:   · Avoid the allergen  that you think may have caused your allergic reaction  · Use cold compresses  on your skin or eyes if they were affected by the allergic reaction  Cold compresses may help to soothe your skin or eyes  · Rinse your nasal passages  with a saline solution  Daily rinsing may help clear your nose of allergens  · Do not smoke  Your allergy symptoms may decrease if you are not around smoke  Nicotine and other chemicals in cigarettes and cigars can also cause lung damage  Ask your healthcare provider for information if you currently smoke and need help to quit  E-cigarettes or smokeless tobacco still contain nicotine  Talk to your healthcare provider before you use these products    Follow up with your healthcare provider as directed:  Write down your questions so you remember to ask them during your visits  © 2017 [employer] [employer] Information is for End User's use only and may not be sold, redistributed or otherwise used for commercial purposes  All illustrations and images included in CareNotes® are the copyrighted property of [employer]  or Yobany Betancur  The above information is an  only  It is not intended as medical advice for individual conditions or treatments  Talk to your doctor, nurse or pharmacist before following any medical regimen to see if it is safe and effective for you

## 2019-08-30 NOTE — TELEPHONE ENCOUNTER
Pt called because she is hyper and could not fall as sleep at all last night   Be thinks it might be from the predniSONE   also went for a steroid injection on her back and the BP was 176/114

## 2019-08-30 NOTE — TELEPHONE ENCOUNTER
Thank you  Prednisone has that side effect  Continue to check BP at home and callif any issues  Increase water intake

## 2019-09-05 ENCOUNTER — TELEPHONE (OUTPATIENT)
Dept: PAIN MEDICINE | Facility: CLINIC | Age: 64
End: 2019-09-05

## 2019-09-10 ENCOUNTER — TELEPHONE (OUTPATIENT)
Dept: INTERNAL MEDICINE CLINIC | Facility: CLINIC | Age: 64
End: 2019-09-10

## 2019-09-10 DIAGNOSIS — F41.9 ANXIETY: ICD-10-CM

## 2019-09-10 DIAGNOSIS — B00.1 COLD SORE: Primary | ICD-10-CM

## 2019-09-10 DIAGNOSIS — G89.4 PAIN SYNDROME, CHRONIC: ICD-10-CM

## 2019-09-10 RX ORDER — VALACYCLOVIR HYDROCHLORIDE 500 MG/1
500 TABLET, FILM COATED ORAL DAILY PRN
Qty: 30 TABLET | Refills: 1 | Status: SHIPPED | OUTPATIENT
Start: 2019-09-10 | End: 2019-11-18 | Stop reason: SDUPTHER

## 2019-09-10 RX ORDER — LORAZEPAM 1 MG/1
1.5 TABLET ORAL
Qty: 135 TABLET | Refills: 1 | Status: SHIPPED | OUTPATIENT
Start: 2019-09-10 | End: 2019-11-18 | Stop reason: SDUPTHER

## 2019-09-10 NOTE — TELEPHONE ENCOUNTER
Patient called she is asking about the ultrasound you spoke t her about  She was not sure if that what you had wanted to order  She is asking if it can be done before the new year bc of her deductible  Please call her back

## 2019-10-22 ENCOUNTER — HOSPITAL ENCOUNTER (OUTPATIENT)
Dept: RADIOLOGY | Age: 64
Discharge: HOME | End: 2019-10-22
Payer: COMMERCIAL

## 2019-10-22 DIAGNOSIS — N28.1 KIDNEY CYST, ACQUIRED: ICD-10-CM

## 2019-10-22 PROCEDURE — 76770 US EXAM ABDO BACK WALL COMP: CPT

## 2019-10-25 PROBLEM — K80.20 CALCULUS OF GALLBLADDER WITHOUT CHOLECYSTITIS WITHOUT OBSTRUCTION: Status: ACTIVE | Noted: 2019-10-25

## 2019-10-30 ENCOUNTER — TELEPHONE (OUTPATIENT)
Dept: PAIN MEDICINE | Facility: CLINIC | Age: 64
End: 2019-10-30

## 2019-10-30 NOTE — TELEPHONE ENCOUNTER
Patient   [phone]  Dr Chapito Zamora    Patient is requesting a call back from [facility]   Please follow up thank you

## 2019-11-01 ENCOUNTER — TELEPHONE (OUTPATIENT)
Dept: RADIOLOGY | Facility: CLINIC | Age: 64
End: 2019-11-01

## 2019-11-01 DIAGNOSIS — M51.16 INTERVERTEBRAL DISC DISORDER WITH RADICULOPATHY OF LUMBAR REGION: ICD-10-CM

## 2019-11-01 DIAGNOSIS — M48.061 LUMBAR STENOSIS WITHOUT NEUROGENIC CLAUDICATION: Primary | ICD-10-CM

## 2019-11-01 NOTE — TELEPHONE ENCOUNTER
S/w pt, states her pain is just starting to act up again  Pain is from lower back, into buttocks and down left leg  Pt states "the pain isn't horrific yet but I know within about a month it will be"  Pt states she would like injection before the new year because her insurance will reset and she will need to pay out of pocket until deductible is met for the year      Please advise, thank you

## 2019-11-01 NOTE — TELEPHONE ENCOUNTER
Patient called and is requesting another injection before the end of the year for her back  Please advise   Thank you

## 2019-11-03 DIAGNOSIS — B00.1 COLD SORE: ICD-10-CM

## 2019-11-04 RX ORDER — VALACYCLOVIR HYDROCHLORIDE 500 MG/1
500 TABLET, FILM COATED ORAL DAILY PRN
Qty: 30 TABLET | Refills: 1 | OUTPATIENT
Start: 2019-11-04 | End: 2019-12-04

## 2019-11-06 ENCOUNTER — CLINICAL SUPPORT (OUTPATIENT)
Dept: INTERNAL MEDICINE CLINIC | Age: 64
End: 2019-11-06
Payer: COMMERCIAL

## 2019-11-06 ENCOUNTER — OFFICE VISIT (OUTPATIENT)
Dept: PAIN MEDICINE | Facility: CLINIC | Age: 64
End: 2019-11-06
Payer: COMMERCIAL

## 2019-11-06 VITALS
RESPIRATION RATE: 16 BRPM | BODY MASS INDEX: 30.39 KG/M2 | HEART RATE: 66 BPM | WEIGHT: 178 LBS | SYSTOLIC BLOOD PRESSURE: 126 MMHG | DIASTOLIC BLOOD PRESSURE: 70 MMHG | HEIGHT: 64 IN

## 2019-11-06 DIAGNOSIS — M51.36 LUMBAR DEGENERATIVE DISC DISEASE: ICD-10-CM

## 2019-11-06 DIAGNOSIS — M48.061 LUMBAR STENOSIS WITHOUT NEUROGENIC CLAUDICATION: ICD-10-CM

## 2019-11-06 DIAGNOSIS — M79.18 MYOFASCIAL PAIN SYNDROME: ICD-10-CM

## 2019-11-06 DIAGNOSIS — M54.16 LUMBAR RADICULOPATHY: Primary | ICD-10-CM

## 2019-11-06 DIAGNOSIS — M47.816 LUMBAR FACET ARTHROPATHY: ICD-10-CM

## 2019-11-06 DIAGNOSIS — Z23 NEED FOR INFLUENZA VACCINATION: Primary | ICD-10-CM

## 2019-11-06 PROCEDURE — 99214 OFFICE O/P EST MOD 30 MIN: CPT | Performed by: NURSE PRACTITIONER

## 2019-11-06 PROCEDURE — 90682 RIV4 VACC RECOMBINANT DNA IM: CPT

## 2019-11-06 PROCEDURE — 90471 IMMUNIZATION ADMIN: CPT

## 2019-11-06 NOTE — PROGRESS NOTES
Assessment:  1  Lumbar radiculopathy    2  Lumbar degenerative disc disease    3  Lumbar facet arthropathy    4  Myofascial pain syndrome    5  Lumbar stenosis without neurogenic claudication        Plan:  Debbi Kilgore is a 60 y o  female with a history of lumbar radiculopathy, cervical radiculopathy, lumbar degenerative disc disease, lumbar facet arthropathy, lumbar stenosis and myofascial pain syndrome  The patient continues with ongoing left-sided low back pain  She reports that her left-sided low back pain was reduced by 85% after undergoing a left L4-L5 transforaminal steroid injection on 8/29/2019  She reports that her symptoms are slowly starting to re-emerge  She would like to proceed with an additional injection  However, she would like to hold off on the scheduling the injection to just prior to Christmas  She was educated on the procedures most common risks  She verbalized understanding would like to proceed with the procedure  Therefore she will be scheduled for a date in December to proceed with this injection  The patient will continue on Lyrica as prescribed  She reports that she does not need a refill this medication at this time  Complete risks and benefits including bleeding, infection, tissue reaction, nerve injury and allergic reaction were discussed  The approach was demonstrated using models and literature was provided  The patient will follow up after her left L4-L5 transforaminal epidural steroid injection, or sooner with worsening symptoms  My impressions and treatment recommendations were discussed in detail with the patient who verbalized understanding and had no further questions  Discharge instructions were provided  I personally saw and examined the patient and I agree with the above discussed plan of care  No orders of the defined types were placed in this encounter  No orders of the defined types were placed in this encounter        History of Present Illness:  Sravani Doe is a 60 y o  female with a history of lumbar radiculopathy, cervical radiculopathy, lumbar degenerative disc disease, lumbar facet arthropathy, lumbar stenosis and myofascial pain syndrome  The patient was last seen in the office on 08/29/2019 where she underwent left L4-L5 transformational epidural steroid injection, which provided her 85% pain relief for greater than 2 months  She is reporting that symptoms are slowly starting to re-emerge at this time  She presents for a follow up office visit in regards to Back Pain and Leg Pain  The patients current symptoms include left sided low back pain that radiates into her left buttocks and down the anterior aspect of her left thigh stopping at her knee  She denies bilateral leg weakness, or bowel or bladder issues  She describes her pain as dull aching, throbbing, shooting pain that is intermittent nature with symptoms worsening during the evening hours  The patient currently rates her pain a 4/10 numeric pain scale  Current pain medications includes:  Lyrica 50 mg 3 times daily   The patient reports that this regimen is providing 85% pain relief  The patient is reporting no side effects from this pain medication regimen  I have personally reviewed and/or updated the patient's past medical history, past surgical history, family history, social history, current medications, allergies, and vital signs today  Review of Systems   Respiratory: Negative for shortness of breath  Cardiovascular: Negative for chest pain  Gastrointestinal: Negative for constipation, diarrhea, nausea and vomiting  Musculoskeletal: Positive for gait problem  Negative for arthralgias, joint swelling and myalgias  DROM  Pain: back, buttocks, LLE   Skin: Negative for rash  Neurological: Negative for dizziness, seizures and weakness  All other systems reviewed and are negative        Patient Active Problem List   Diagnosis    Rosacea, acne    Lumbar degenerative disc disease    Lumbar radiculopathy    Anxiety    Benign essential HTN    Cervical radiculopathy    Disc disorder of cervical region    Hot flash, menopausal    Lumbar facet arthropathy    Lumbar stenosis without neurogenic claudication    Myofascial pain syndrome    Obesity (BMI 30-39  9)    Pain syndrome, chronic    Positive depression screening    Primary osteoarthritis of right knee    Right knee injury    Sacroiliitis (HCC)    Tear of medial meniscus of right knee, current    Bulge of lumbar disc without myelopathy    Spondylolisthesis of lumbar region    Arthritis of right acromioclavicular joint    Other fatigue    Hives    Rash    Food poisoning    Idiopathic hypotension    Left lower quadrant pain    Adverse effect of adrenal cortical steroids, initial encounter    Calculus of gallbladder without cholecystitis without obstruction       Past Medical History:   Diagnosis Date    Anxiety     Cataract, bilateral     last assessed    Cervical radiculopathy     Disc disease, degenerative, cervical     Herpes simplex infection     Hypertension     Insomnia     Low back pain     Lumbar degenerative disc disease     Lumbar facet arthropathy     Lumbar radiculopathy     Lumbar stenosis without neurogenic claudication     Mononucleosis     Myofascial pain dysfunction syndrome     Osteoarthritis     shoulder region - unspecified laterality - last assessed 2/1/14    Plantar fasciitis     Ptosis, both eyelids     last assessed 10/6/16    Ptosis, congenital, left     Retinal detachment     last assessed 12/11/14 right eye    Sacroiliitis ([facility] [address]     Thyroid disease     Thyrotoxicosis     last assessed 5/17/13    Vertigo        Past Surgical History:   Procedure Laterality Date    SD SURG IMPLNT NEUROELECT,EPIDURAL Left 1/26/2016    Procedure: PLACEMENT OF THORACIC SPINAL CORD STIMULATOR WITH LEFT BUTTOCK IMPLANTABLE PULSE GENERATOR (IMPULSE MONITORING); Surgeon: Otto Ramirez MD;  Location: [facility] OR;  Service: Neurosurgery    RETINAL DETACHMENT SURGERY Right        Family History   Problem Relation Age of Onset   [facility] Breast cancer Mother    [facility] COPD Mother     Hypertension Mother         essential    Heart attack Father         acute MI    Emphysema Father     Hypertension Father         essential    Other Father         prehypertension    Liver cancer Maternal Aunt        Social History     Occupational History    Occupation: Business owner     Comment: [employer]-full time working   Tobacco Use    Smoking status: Never Smoker    Smokeless tobacco: Never Used   Substance and Sexual Activity    Alcohol use:  Yes     Alcohol/week: 1 0 standard drinks     Types: 1 Glasses of wine per week     Frequency: Monthly or less     Drinks per session: 1 or 2     Binge frequency: Never    Drug use: No    Sexual activity: Yes       Current Outpatient Medications on File Prior to Visit   Medication Sig    busPIRone (BUSPAR) 5 mg tablet Take 1 tablet (5 mg total) by mouth 4 (four) times a day (Patient taking differently: Take 5 mg by mouth 3 (three) times a day )    diclofenac (VOLTAREN) 75 mg EC tablet Take 1 tablet (75 mg total) by mouth 2 (two) times a day    fluticasone (FLONASE) 50 mcg/act nasal spray 1 spray into each nostril daily (Patient taking differently: 1 spray into each nostril as needed )    LORazepam (ATIVAN) 1 mg tablet Take 1 5 tablets (1 5 mg total) by mouth daily at bedtime as needed for anxiety    methimazole (TAPAZOLE) 5 mg tablet Take 1 tablet (5 mg total) by mouth daily    penciclovir (DENAVIR) 1 % cream Apply topically daily (Patient taking differently: Apply topically daily as needed )    predniSONE 10 mg tablet 4 tabs daily for 2 days then 3 tabs x 1 day then 2 tabs x 1 day then 1 tab x 1 day take with food    pregabalin (LYRICA) 50 mg capsule Take 1 capsule (50 mg total) by mouth 3 (three) times a day    propranolol (INDERAL) 10 mg tablet Take 10 mg by mouth 2 (two) times a day      quinapril (ACCUPRIL) 10 mg tablet Take 1 tablet (10 mg total) by mouth daily    RESTASIS 0 05 % ophthalmic emulsion Administer 1 drop to both eyes 2 (two) times a day    valACYclovir (VALTREX) 500 mg tablet Take 1 tablet (500 mg total) by mouth daily as needed (Cold Sores)     No current facility-administered medications on file prior to visit  Allergies   Allergen Reactions    Other Dermatitis     Adhesive tape - please use ONLY PAPER TAPE    Scopolamine Other (See Comments)     Severe, debilitating headache      Flexeril [Cyclobenzaprine] Dizziness and Fatigue    Naproxen      Other reaction(s): Unknown Allergic Reaction  Annotation - [id number]: nightmares    Penicillins      Other reaction(s): Unknown Allergic Reaction  Annotation - [id number]: childhood reaction    Promethazine-Codeine      Reaction Date: [id number]; Annotation - [id number]: nightmares; Annotation - [id number]: nightmares    Tramadol      Other reaction(s): Unknown Allergic Reaction  Annotation - [id number]: PT HAS NIGHTMARES FROM TRAMADOL    Wound Dressing Adhesive Rash       Physical Exam:    /70   Pulse 66   Resp 16   Ht 5' 4" (1 626 m)   Wt 80 7 kg (178 lb)   LMP 11/26/2015   BMI 30 55 kg/m²     Constitutional:normal, well developed, well nourished, alert, in no distress and non-toxic and no overt pain behavior   and obese  Eyes:anicteric  HEENT:grossly intact  Neck:supple, symmetric, trachea midline and no masses   Pulmonary:even and unlabored  Cardiovascular:No edema or pitting edema present  Skin:Normal without rashes or lesions and well hydrated  Psychiatric:Mood and affect appropriate  Neurologic:Cranial Nerves II-XII grossly intact  Musculoskeletal:antalgic     Lumbar Spine Exam    Appearance:  Normal lordosis  Palpation/Tenderness:  left lumbar paraspinal tenderness  Sensory:  no sensory deficits noted  Range of Motion:  Flexion: Minimally limited  with pain  Extension:  Minimally limited  with pain  Lateral Flexion - Left:  Minimally limited  with pain  Lateral Flexion - Right:  Minimally limited  with pain  Rotation - Left:  Minimally limited  with pain  Rotation - Right:  Minimally limited  with pain  Motor Strength:  Left hip flexion:  5/5  Right hip flexion:  5/5  Left knee extension:  5/5  Right knee extension:  5/5  Left foot dorsiflexion:  5/5  Left foot plantar flexion:  5/5  Right foot dorsiflexion:  5/5  Right foot plantar flexion:  5/5        Imaging  MRI LUMBAR SPINE WITHOUT CONTRAST     INDICATION: [id number]: Spinal stenosis, lumbar region without neurogenic claudication  Left-sided low back pain and leg pain      COMPARISON:  11/7/2017     TECHNIQUE: Limited scanning was performed using MR guidelines based on  limitations  Sagittal T2, coronal T2, axial gradient echo and axial T2 as well as sagittal SPGR imaging was obtained      Medical device: The patient has a st fabby stimulator [id number]  which is MR compatible  Scanning was performed as per the 's gridlines with attention to device appropriate DANUTA levels         IMAGE QUALITY:  Diagnostic     FINDINGS: There is a transitional vertebral body at the lumbosacral junction  For the purposes of this study, the first conically shaped vertebral body will be designated S1  If spinal intervention is indicated, correlation with radiography recommended      Vertebral body levels are labeled on  image 7, series 5  The numbering convention used previously will be followed on the current study        ALIGNMENT:  Trace grade 1 anterolisthesis of L5 on S1 is retrospectively stable as is a minimal levoscoliosis mid lumbar spine apex at the L3 segment      MARROW SIGNAL:  Normal marrow signal is identified within the visualized bony structures  No discrete marrow lesion      DISTAL CORD AND CONUS:  Normal size and signal within the distal cord and conus    The conus ends at the L2 level      PARASPINAL SOFT TISSUES:  Artifact in the left subcutaneous soft tissues in the low back noted likely related to stimulator leads      SACRUM:  Normal signal within the sacrum  No evidence of insufficiency or stress fracture      LOWER THORACIC DISC SPACES:  Normal disc height and signal   No disc herniation, canal stenosis or foraminal narrowing      LUMBAR DISC SPACES:     L1-L2:  Normal      L2-L3:  Normal      L3-L4:  There is a diffuse disk bulge  No significant central canal or neural foraminal narrowing  Bilateral facet hypertrophy noted  This level is similar to the prior study      L4-L5:  Small left paracentral disc protrusion  There is bilateral facet hypertrophy  Mild to moderate central canal narrowing  Mild left neural foraminal narrowing  Right neural foramen patent  This level is similar to the prior study      L5-S1:  There is uncovering the intervertebral disc space  There is bilateral facet hypertrophy  There is a superimposed left neural foraminal disc protrusion  Moderate left neural foraminal narrowing  Moderate central canal narrowing  Mild right   neural foraminal narrowing  This level is similar to the prior study      IMPRESSION:     1  There is a transitional vertebral body at the lumbosacral junction  If spinal intervention is indicated, correlation with radiography recommended  2   Scattered spondylotic changes are similar to the prior study

## 2019-11-14 DIAGNOSIS — L71.9 ROSACEA, ACNE: Primary | ICD-10-CM

## 2019-11-14 RX ORDER — DOXYCYCLINE HYCLATE 100 MG/1
100 CAPSULE ORAL EVERY 12 HOURS SCHEDULED
Qty: 180 CAPSULE | Refills: 1 | Status: SHIPPED | OUTPATIENT
Start: 2019-11-14 | End: 2019-12-06 | Stop reason: SDUPTHER

## 2019-11-18 DIAGNOSIS — B00.1 RECURRENT COLD SORES: ICD-10-CM

## 2019-11-18 DIAGNOSIS — E05.90 HYPERTHYROIDISM: ICD-10-CM

## 2019-11-18 DIAGNOSIS — F41.9 ANXIETY: ICD-10-CM

## 2019-11-18 DIAGNOSIS — B00.1 COLD SORE: ICD-10-CM

## 2019-11-18 RX ORDER — LORAZEPAM 1 MG/1
1.5 TABLET ORAL
Qty: 135 TABLET | Refills: 1 | Status: SHIPPED | OUTPATIENT
Start: 2019-11-18 | End: 2019-12-06 | Stop reason: SDUPTHER

## 2019-11-18 RX ORDER — BUSPIRONE HYDROCHLORIDE 5 MG/1
5 TABLET ORAL 4 TIMES DAILY
Qty: 360 TABLET | Refills: 1 | Status: SHIPPED | OUTPATIENT
Start: 2019-11-18 | End: 2019-12-03 | Stop reason: SDUPTHER

## 2019-11-18 RX ORDER — PROPRANOLOL HYDROCHLORIDE 10 MG/1
10 TABLET ORAL 2 TIMES DAILY
Qty: 90 TABLET | Refills: 1 | Status: SHIPPED | OUTPATIENT
Start: 2019-11-18 | End: 2019-12-03 | Stop reason: SDUPTHER

## 2019-11-18 RX ORDER — METHIMAZOLE 5 MG/1
5 TABLET ORAL DAILY
Qty: 90 TABLET | Refills: 1 | Status: SHIPPED | OUTPATIENT
Start: 2019-11-18 | End: 2019-12-03 | Stop reason: SDUPTHER

## 2019-11-18 RX ORDER — VALACYCLOVIR HYDROCHLORIDE 500 MG/1
500 TABLET, FILM COATED ORAL DAILY PRN
Qty: 30 TABLET | Refills: 1 | Status: SHIPPED | OUTPATIENT
Start: 2019-11-18 | End: 2020-06-29 | Stop reason: SDUPTHER

## 2019-11-26 ENCOUNTER — TELEPHONE (OUTPATIENT)
Dept: INTERNAL MEDICINE CLINIC | Facility: CLINIC | Age: 64
End: 2019-11-26

## 2019-11-26 ENCOUNTER — HOSPITAL ENCOUNTER (OUTPATIENT)
Dept: RADIOLOGY | Age: 64
Discharge: HOME | End: 2019-11-26
Payer: COMMERCIAL

## 2019-11-26 VITALS — WEIGHT: 172 LBS | BODY MASS INDEX: 29.37 KG/M2 | HEIGHT: 64 IN

## 2019-11-26 DIAGNOSIS — Z12.31 ENCOUNTER FOR SCREENING MAMMOGRAM FOR MALIGNANT NEOPLASM OF BREAST: ICD-10-CM

## 2019-11-26 PROCEDURE — 77063 BREAST TOMOSYNTHESIS BI: CPT

## 2019-11-26 PROCEDURE — 77067 SCR MAMMO BI INCL CAD: CPT

## 2019-11-26 NOTE — TELEPHONE ENCOUNTER
Patient had called wondering about her results from her ultrasound from 10/22/19 please review and call back

## 2019-12-03 DIAGNOSIS — F41.9 ANXIETY: ICD-10-CM

## 2019-12-03 DIAGNOSIS — E05.90 HYPERTHYROIDISM: ICD-10-CM

## 2019-12-03 RX ORDER — BUSPIRONE HYDROCHLORIDE 5 MG/1
5 TABLET ORAL 4 TIMES DAILY
Qty: 360 TABLET | Refills: 1 | Status: SHIPPED | OUTPATIENT
Start: 2019-12-03 | End: 2020-06-29 | Stop reason: SDUPTHER

## 2019-12-03 RX ORDER — METHIMAZOLE 5 MG/1
5 TABLET ORAL DAILY
Qty: 90 TABLET | Refills: 1 | Status: SHIPPED | OUTPATIENT
Start: 2019-12-03 | End: 2020-03-09 | Stop reason: SDUPTHER

## 2019-12-03 RX ORDER — PROPRANOLOL HYDROCHLORIDE 10 MG/1
10 TABLET ORAL 2 TIMES DAILY
Qty: 180 TABLET | Refills: 1 | Status: SHIPPED | OUTPATIENT
Start: 2019-12-03 | End: 2020-06-29 | Stop reason: SDUPTHER

## 2019-12-03 NOTE — TELEPHONE ENCOUNTER
Refills canceled though [facility] pharmacy  Patient would like to please get them form the mail order pharmacy, so she has 90 tablets at a time  Also cheaper for her to get through the mail order pharmacy       Last O/V: 7/8/19  Next O/V: 12/27/19

## 2019-12-04 ENCOUNTER — TELEPHONE (OUTPATIENT)
Dept: PAIN MEDICINE | Facility: CLINIC | Age: 64
End: 2019-12-04

## 2019-12-06 ENCOUNTER — PROCEDURE VISIT (OUTPATIENT)
Dept: PAIN MEDICINE | Facility: CLINIC | Age: 64
End: 2019-12-06
Payer: COMMERCIAL

## 2019-12-06 VITALS — WEIGHT: 172 LBS | BODY MASS INDEX: 29.52 KG/M2

## 2019-12-06 DIAGNOSIS — G89.29 CHRONIC RIGHT SHOULDER PAIN: Primary | ICD-10-CM

## 2019-12-06 DIAGNOSIS — M25.511 CHRONIC RIGHT SHOULDER PAIN: Primary | ICD-10-CM

## 2019-12-06 DIAGNOSIS — I10 ESSENTIAL HYPERTENSION: ICD-10-CM

## 2019-12-06 DIAGNOSIS — G89.4 PAIN SYNDROME, CHRONIC: ICD-10-CM

## 2019-12-06 DIAGNOSIS — F41.9 ANXIETY: ICD-10-CM

## 2019-12-06 DIAGNOSIS — L71.9 ROSACEA, ACNE: ICD-10-CM

## 2019-12-06 PROCEDURE — 20611 DRAIN/INJ JOINT/BURSA W/US: CPT | Performed by: ANESTHESIOLOGY

## 2019-12-06 RX ORDER — BUPIVACAINE HYDROCHLORIDE 2.5 MG/ML
10 INJECTION, SOLUTION INFILTRATION; PERINEURAL ONCE
Status: COMPLETED | OUTPATIENT
Start: 2019-12-06 | End: 2019-12-06

## 2019-12-06 RX ORDER — LORAZEPAM 1 MG/1
1.5 TABLET ORAL
Qty: 135 TABLET | Refills: 1 | Status: SHIPPED | OUTPATIENT
Start: 2019-12-06 | End: 2020-04-14 | Stop reason: SDUPTHER

## 2019-12-06 RX ORDER — DOXYCYCLINE HYCLATE 100 MG/1
100 CAPSULE ORAL EVERY 12 HOURS SCHEDULED
Qty: 180 CAPSULE | Refills: 1 | Status: SHIPPED | OUTPATIENT
Start: 2019-12-06 | End: 2020-06-03

## 2019-12-06 RX ORDER — QUINAPRIL 10 MG/1
10 TABLET ORAL DAILY
Qty: 90 TABLET | Refills: 1 | Status: SHIPPED | OUTPATIENT
Start: 2019-12-06 | End: 2020-04-20

## 2019-12-06 RX ORDER — METHYLPREDNISOLONE ACETATE 40 MG/ML
40 INJECTION, SUSPENSION INTRA-ARTICULAR; INTRALESIONAL; INTRAMUSCULAR; SOFT TISSUE ONCE
Status: COMPLETED | OUTPATIENT
Start: 2019-12-06 | End: 2019-12-06

## 2019-12-06 RX ORDER — DICLOFENAC SODIUM 75 MG/1
75 TABLET, DELAYED RELEASE ORAL 2 TIMES DAILY
Qty: 180 TABLET | Refills: 1 | Status: SHIPPED | OUTPATIENT
Start: 2019-12-06 | End: 2020-09-08 | Stop reason: SDUPTHER

## 2019-12-06 RX ADMIN — BUPIVACAINE HYDROCHLORIDE 10 ML: 2.5 INJECTION, SOLUTION INFILTRATION; PERINEURAL at 15:34

## 2019-12-06 RX ADMIN — METHYLPREDNISOLONE ACETATE 40 MG: 40 INJECTION, SUSPENSION INTRA-ARTICULAR; INTRALESIONAL; INTRAMUSCULAR; SOFT TISSUE at 15:35

## 2019-12-06 NOTE — PROGRESS NOTES
Pre-procedure Diagnosis:   1  Chronic right shoulder pain      Post-procedure Diagnosis:   1  Chronic right shoulder pain      Operation Title(s):  Right subacromial bursa injection under ultrasound guidance  Attending Surgeon:   Brittany Brady MD  Anesthesia:   Local    Indications: The patient is a 60y o  year-old female with a diagnosis of   1  Chronic right shoulder pain      The patient's history and physical exam were reviewed  The risks, benefits and alternatives to the procedure were discussed, and all questions were answered to the patient's satisfaction  The patient agreed to proceed, and written informed consent was obtained  Procedure in Detail: The patient was brought into the exam room and placed in the sitting position on the exam room chair  The right shoulder was prepped with chloraprep and draped in a sterile manner  A sterile ultrasound probe cover and gel was placed over a 3 75 MHz curvilinear transducer probe  The probe was placed over the shoulder to visualize the subdeltoid/subacromial bursal region  Optimal needle path was determined and the skin and subcutaneous tissues in the area was anesthetized with 1% lidocaine  Then, under ultrasound guidance, a 2 inch ultrasound needle was advanced until the needle entered the bursa region  After visualization of the tip in the target area and negative aspiration for blood, a solution consisting of 3 mL 0 25% bupivacaine and 1 mL Depo-Medrol (40mg/ml) was easily injected  The patient's shoulder was cleansed and a bandage was placed over the sites of needle insertion  Disposition: The patient tolerated the procedure well and there were no apparent complications  The patient was given written discharge instructions for the procedure

## 2019-12-06 NOTE — TELEPHONE ENCOUNTER
PT called and stated medications need to be sent to mail order pharmacy due to insurance company   [facility] was contacted and remaninig scripts were canceled   Last O/V: 7/8/19  Next O/V:12/27/19

## 2019-12-13 ENCOUNTER — TELEPHONE (OUTPATIENT)
Dept: PAIN MEDICINE | Facility: CLINIC | Age: 64
End: 2019-12-13

## 2019-12-16 NOTE — TELEPHONE ENCOUNTER
Spoke with pt who states that she is feeling better and that she feels like she got about 70% relief from injection and that she has no pain today advised if pain comes back to call for a follow up

## 2019-12-26 ENCOUNTER — HOSPITAL ENCOUNTER (OUTPATIENT)
Dept: RADIOLOGY | Facility: CLINIC | Age: 64
Discharge: HOME | End: 2019-12-26
Attending: ANESTHESIOLOGY | Admitting: ANESTHESIOLOGY
Payer: COMMERCIAL

## 2019-12-26 VITALS
SYSTOLIC BLOOD PRESSURE: 132 MMHG | RESPIRATION RATE: 18 BRPM | HEART RATE: 67 BPM | OXYGEN SATURATION: 99 % | TEMPERATURE: 97.6 F | DIASTOLIC BLOOD PRESSURE: 71 MMHG

## 2019-12-26 DIAGNOSIS — M51.16 INTERVERTEBRAL DISC DISORDER WITH RADICULOPATHY OF LUMBAR REGION: ICD-10-CM

## 2019-12-26 PROCEDURE — 64483 NJX AA&/STRD TFRM EPI L/S 1: CPT | Performed by: ANESTHESIOLOGY

## 2019-12-26 PROCEDURE — 64484 NJX AA&/STRD TFRM EPI L/S EA: CPT | Performed by: ANESTHESIOLOGY

## 2019-12-26 RX ORDER — METHYLPREDNISOLONE ACETATE 80 MG/ML
80 INJECTION, SUSPENSION INTRA-ARTICULAR; INTRALESIONAL; INTRAMUSCULAR; PARENTERAL; SOFT TISSUE ONCE
Status: COMPLETED | OUTPATIENT
Start: 2019-12-26 | End: 2019-12-26

## 2019-12-26 RX ORDER — BUPIVACAINE HCL/PF 2.5 MG/ML
10 VIAL (ML) INJECTION ONCE
Status: COMPLETED | OUTPATIENT
Start: 2019-12-26 | End: 2019-12-26

## 2019-12-26 RX ORDER — 0.9 % SODIUM CHLORIDE 0.9 %
10 VIAL (ML) INJECTION ONCE
Status: COMPLETED | OUTPATIENT
Start: 2019-12-26 | End: 2019-12-26

## 2019-12-26 RX ADMIN — METHYLPREDNISOLONE ACETATE 80 MG: 80 INJECTION, SUSPENSION INTRA-ARTICULAR; INTRALESIONAL; INTRAMUSCULAR; PARENTERAL; SOFT TISSUE at 10:47

## 2019-12-26 RX ADMIN — Medication 4 ML: at 10:45

## 2019-12-26 RX ADMIN — SODIUM CHLORIDE 4 ML: 9 INJECTION, SOLUTION INTRAMUSCULAR; INTRAVENOUS; SUBCUTANEOUS at 10:45

## 2019-12-26 RX ADMIN — IOHEXOL 1 ML: 300 INJECTION, SOLUTION INTRAVENOUS at 10:47

## 2019-12-26 RX ADMIN — BUPIVACAINE HYDROCHLORIDE 2 ML: 2.5 INJECTION, SOLUTION EPIDURAL; INFILTRATION; INTRACAUDAL at 10:47

## 2019-12-26 NOTE — DISCHARGE INSTR - LAB
Epidural Steroid Injection   WHAT YOU NEED TO KNOW:   An epidural steroid injection (PRATIK) is a procedure to inject steroid medicine into the epidural space  The epidural space is between your spinal cord and vertebrae  Steroids reduce inflammation and fluid buildup in your spine that may be causing pain  You may be given pain medicine along with the steroids  ACTIVITY  · Do not drive or operate machinery today  · No strenuous activity today - bending, lifting, etc   · You may resume normal activites starting tomorrow - start slowly and as tolerated  · You may shower today, but no tub baths or hot tubs  · You may have numbness for several hours from the local anesthetic  Please use caution and common sense, especially with weight-bearing activities  CARE OF THE INJECTION SITE  · If you have soreness or pain, apply ice to the area today (20 minutes on/20 minutes off)  · Starting tomorrow, you may use warm, moist heat or ice if needed  · You may have an increase or change in your discomfort for 36-48 hours after your treatment  · Apply ice and continue with any pain medication you have been prescribed  · Notify [facility] if you have any of the following: redness, drainage, swelling, headache, stiff neck or fever above 100°F     SPECIAL INSTRUCTIONS  · Our office will contact you in approximately 7 days for a progress report  MEDICATIONS  · Continue to take all routine medications  · Our office may have instructed you to hold some medications  If you have a problem specifically related to your procedure, please call our office at [phone]  Problems not related to your procedure should be directed to your primary care physician

## 2019-12-26 NOTE — H&P
History of Present Illness: The patient is a 60 y o  female who presents with complaints of left lower back and leg pain secondary to lumbar stenosis and is here today for left L4 and left L5 transforaminal epidural steroid injection  Patient Active Problem List   Diagnosis    Rosacea, acne    Lumbar degenerative disc disease    Lumbar radiculopathy    Anxiety    Benign essential HTN    Cervical radiculopathy    Disc disorder of cervical region    Hot flash, menopausal    Lumbar facet arthropathy    Lumbar stenosis without neurogenic claudication    Myofascial pain syndrome    Obesity (BMI 30-39  9)    Pain syndrome, chronic    Positive depression screening    Primary osteoarthritis of right knee    Right knee injury    Sacroiliitis (HCC)    Tear of medial meniscus of right knee, current    Bulge of lumbar disc without myelopathy    Spondylolisthesis of lumbar region    Arthritis of right acromioclavicular joint    Other fatigue    Hives    Rash    Food poisoning    Idiopathic hypotension    Left lower quadrant pain    Adverse effect of adrenal cortical steroids, initial encounter    Calculus of gallbladder without cholecystitis without obstruction    Chronic right shoulder pain       Past Medical History:   Diagnosis Date    Anxiety     Cataract, bilateral     last assessed    Cervical radiculopathy     Disc disease, degenerative, cervical     Herpes simplex infection     Hypertension     Insomnia     Low back pain     Lumbar degenerative disc disease     Lumbar facet arthropathy     Lumbar radiculopathy     Lumbar stenosis without neurogenic claudication     Mononucleosis     Myofascial pain dysfunction syndrome     Osteoarthritis     shoulder region - unspecified laterality - last assessed 2/1/14    Plantar fasciitis     Ptosis, both eyelids     last assessed 10/6/16    Ptosis, congenital, left     Retinal detachment     last assessed 12/11/14 right eye    Sacroiliitis ([facility] [address]     Thyroid disease     Thyrotoxicosis     last assessed 5/17/13    Vertigo        Past Surgical History:   Procedure Laterality Date    [place] SURG IMPLNT NEUROELECT,EPIDURAL Left 1/26/2016    Procedure: PLACEMENT OF THORACIC SPINAL CORD STIMULATOR WITH LEFT BUTTOCK IMPLANTABLE PULSE GENERATOR (IMPULSE MONITORING);   Surgeon: Jillian Donald MD;  Location: [facility];  Service: Neurosurgery    RETINAL DETACHMENT SURGERY Right          Current Outpatient Medications:     busPIRone (BUSPAR) 5 mg tablet, Take 1 tablet (5 mg total) by mouth 4 (four) times a day, Disp: 360 tablet, Rfl: 1    diclofenac (VOLTAREN) 75 mg EC tablet, Take 1 tablet (75 mg total) by mouth 2 (two) times a day, Disp: 180 tablet, Rfl: 1    doxycycline hyclate (VIBRAMYCIN) 100 mg capsule, Take 1 capsule (100 mg total) by mouth every 12 (twelve) hours, Disp: 180 capsule, Rfl: 1    fluticasone (FLONASE) 50 mcg/act nasal spray, 1 spray into each nostril daily (Patient taking differently: 1 spray into each nostril as needed ), Disp: 16 g, Rfl: 3    LORazepam (ATIVAN) 1 mg tablet, Take 1 5 tablets (1 5 mg total) by mouth daily at bedtime as needed for anxiety, Disp: 135 tablet, Rfl: 1    methimazole (TAPAZOLE) 5 mg tablet, Take 1 tablet (5 mg total) by mouth daily, Disp: 90 tablet, Rfl: 1    penciclovir (DENAVIR) 1 % cream, Apply topically daily as needed (ulcer), Disp: 5 g, Rfl: 5    pregabalin (LYRICA) 50 mg capsule, Take 1 capsule (50 mg total) by mouth 3 (three) times a day, Disp: 270 capsule, Rfl: 1    propranolol (INDERAL) 10 mg tablet, Take 1 tablet (10 mg total) by mouth 2 (two) times a day, Disp: 180 tablet, Rfl: 1    quinapril (ACCUPRIL) 10 mg tablet, Take 1 tablet (10 mg total) by mouth daily, Disp: 90 tablet, Rfl: 1    RESTASIS 0 05 % ophthalmic emulsion, Administer 1 drop to both eyes 2 (two) times a day, Disp: , Rfl: 3    valACYclovir (VALTREX) 500 mg tablet, Take 1 tablet (500 mg total) by mouth daily as needed (Cold Sores), Disp: 30 tablet, Rfl: 1    Current Facility-Administered Medications:     bupivacaine (PF) (MARCAINE) 0 25 % injection 10 mL, 10 mL, Epidural, Once, Caprice Rutledge MD    iohexol (OMNIPAQUE) 300 mg/mL injection 50 mL, 50 mL, Epidural, Once, Caprice Rutledge MD    lidocaine (PF) (XYLOCAINE-MPF) 2 % injection 5 mL, 5 mL, Infiltration, Once, Caprice Rutledge MD    methylPREDNISolone acetate (DEPO-MEDROL) injection 80 mg, 80 mg, Epidural, Once, Caprice Rutledge MD    sodium chloride (PF) 0 9 % injection 10 mL, 10 mL, Infiltration, Once, Caprice Rutledge MD    Allergies   Allergen Reactions    Other Dermatitis     Adhesive tape - please use ONLY PAPER TAPE    Scopolamine Other (See Comments)     Severe, debilitating headache      Flexeril [Cyclobenzaprine] Dizziness and Fatigue    Naproxen      Other reaction(s): Unknown Allergic Reaction  Annotation - [id number]: nightmares    Penicillins      Other reaction(s): Unknown Allergic Reaction  Annotation - [id number]: childhood reaction    Promethazine-Codeine      Reaction Date: [id number]; Annotation - [id number]: nightmares; Annotation - [id number]: nightmares    Tramadol      Other reaction(s): Unknown Allergic Reaction  Annotation - [id number]: PT HAS NIGHTMARES FROM TRAMADOL    Wound Dressing Adhesive Rash       Physical Exam:   Vitals:    12/26/19 1032   BP: 119/78   Pulse: 63   Resp: 18   Temp: 97 6 °F (36 4 °C)   SpO2: 98%     General: Awake, Alert, Oriented x 3, Mood and affect appropriate  Respiratory: Respirations even and unlabored  Cardiovascular: Peripheral pulses intact; no edema  Musculoskeletal Exam:   Left lower back tenderness    ASA Score: 2    Patient/Chart Verification  Patient ID Verified: Verbal  ID Band Applied: No  Consents Confirmed: Procedural, To be obtained in the Pre-Procedure area  H&P( within 30 days) Verified:  To be obtained in the Pre-Procedure area  Interval H&P(within 24 hr) Complete (required for Outpatients and Surgery Admit only): To be obtained in the Pre-Procedure area  Allergies Reviewed: Yes  Anticoag/NSAID held?: NA  Currently on antibiotics?: No    Assessment:   1   Intervertebral disc disorder with radiculopathy of lumbar region        Plan: Left L4-5 TF PRATIK

## 2019-12-27 ENCOUNTER — OFFICE VISIT (OUTPATIENT)
Dept: INTERNAL MEDICINE CLINIC | Facility: CLINIC | Age: 64
End: 2019-12-27
Payer: COMMERCIAL

## 2019-12-27 VITALS
TEMPERATURE: 97.9 F | HEART RATE: 63 BPM | HEIGHT: 64 IN | BODY MASS INDEX: 29.64 KG/M2 | WEIGHT: 173.6 LBS | DIASTOLIC BLOOD PRESSURE: 70 MMHG | OXYGEN SATURATION: 98 % | SYSTOLIC BLOOD PRESSURE: 126 MMHG

## 2019-12-27 DIAGNOSIS — F41.9 ANXIETY: ICD-10-CM

## 2019-12-27 DIAGNOSIS — I10 BENIGN ESSENTIAL HTN: Primary | ICD-10-CM

## 2019-12-27 DIAGNOSIS — Z13.220 SCREENING CHOLESTEROL LEVEL: ICD-10-CM

## 2019-12-27 DIAGNOSIS — E05.90 HYPERTHYROIDISM: ICD-10-CM

## 2019-12-27 DIAGNOSIS — L71.9 ROSACEA, ACNE: ICD-10-CM

## 2019-12-27 DIAGNOSIS — G89.4 PAIN SYNDROME, CHRONIC: ICD-10-CM

## 2019-12-27 PROBLEM — S89.91XA RIGHT KNEE INJURY: Status: RESOLVED | Noted: 2017-11-28 | Resolved: 2019-12-27

## 2019-12-27 PROBLEM — R53.83 OTHER FATIGUE: Status: RESOLVED | Noted: 2019-07-08 | Resolved: 2019-12-27

## 2019-12-27 PROBLEM — L50.9 HIVES: Status: RESOLVED | Noted: 2019-07-08 | Resolved: 2019-12-27

## 2019-12-27 PROBLEM — I95.0 IDIOPATHIC HYPOTENSION: Status: RESOLVED | Noted: 2019-08-12 | Resolved: 2019-12-27

## 2019-12-27 PROBLEM — R21 RASH: Status: RESOLVED | Noted: 2019-07-08 | Resolved: 2019-12-27

## 2019-12-27 PROBLEM — A05.9 FOOD POISONING: Status: RESOLVED | Noted: 2019-08-12 | Resolved: 2019-12-27

## 2019-12-27 PROBLEM — E66.9 OBESITY (BMI 30-39.9): Status: RESOLVED | Noted: 2017-12-27 | Resolved: 2019-12-27

## 2019-12-27 PROBLEM — R10.32 LEFT LOWER QUADRANT PAIN: Status: RESOLVED | Noted: 2019-08-15 | Resolved: 2019-12-27

## 2019-12-27 PROBLEM — S83.241A TEAR OF MEDIAL MENISCUS OF RIGHT KNEE, CURRENT: Status: RESOLVED | Noted: 2017-12-08 | Resolved: 2019-12-27

## 2019-12-27 PROCEDURE — 1036F TOBACCO NON-USER: CPT | Performed by: FAMILY MEDICINE

## 2019-12-27 PROCEDURE — 3078F DIAST BP <80 MM HG: CPT | Performed by: FAMILY MEDICINE

## 2019-12-27 PROCEDURE — 99214 OFFICE O/P EST MOD 30 MIN: CPT | Performed by: FAMILY MEDICINE

## 2019-12-27 PROCEDURE — 3074F SYST BP LT 130 MM HG: CPT | Performed by: FAMILY MEDICINE

## 2019-12-27 PROCEDURE — 3008F BODY MASS INDEX DOCD: CPT | Performed by: FAMILY MEDICINE

## 2019-12-27 NOTE — PROGRESS NOTES
Assessment/Plan:    1  Benign essential HTN  -     Comprehensive metabolic panel; Future  -     CBC and differential; Future  -     Vitamin D 25 hydroxy; Future    2  Hyperthyroidism  -     TSH, 3rd generation; Future    3  Rosacea, acne    4  Pain syndrome, chronic    5  Screening cholesterol level  -     Lipid panel; Future    6  Anxiety            Great job with KETO, diet, increasing exercise and weight loss  Due for labs at next visit  There are no Patient Instructions on file for this visit  Return in about 6 months (around 6/27/2020) for Recheck  Subjective:      Patient ID: Luis Fernando Tyson is a 60 y o  female  Chief Complaint   Patient presents with    Follow-up     Patient is here for 6 months f/u for chronic conditions  Pt does not have any new complains at the moment  HPI   Rosacea, patient was placed on Metrogel by her endocrinologist and she has been using it for 1 month   She feels that is not helping  Jon Martínez has facial flushing for several years which are exacerbated by stress, hypertension, heat, and hot flashes  Jon Baltazarman would like to discuss other options  5/16/18: started doxy 1 week ago, no relief yet  Still flushing and worse in the mornings  10/15/18:  Helping a lot, taking doxy BID, ran out 1 week ago, re start at BID and then decrease to monthly  Dc 2019 taking doxy BID iith food, no issues     Insomnia, patient states she does not sleep well which is very multifactorial including the pain and how flashes   She used to be on estrogen replacement currently does not take any   She does take Ativan at night to help her sleep which helped marginally  5/16/18: taking 1 5 mg  And advil pm and it has helped- sleeping through the night now and slightly  More tired in am   10/15/18: taking ativan and sleeping well with it 3/1/19:   July 2019, has not tried to decrease her lorazepam and does take it every night to sleep  Dec 2019 usually doing very well but  Lately having some issues with sleep  Thinks its from the holidays     Depression:  Patient is a depression was exacerbated by the pain and inability to sleep  Camila He is always tired and fatigued and is having difficulty with activities of daily living around the house like cleaning and cooking as well as work  Leigh Hatch is here today and can cooperate always   She feels that she is up to any medications or the long medications right now  8/13/18: buspar was increased to 10 mg per but she felt tired on it and went back to 5 mg BId, has gained weight  10/15/18: taking bupar 5 mg- 2 tabs at [facility],  12/21/18: stable  March 2019, no issues  Takes 1- 5 mg BuSpar in the morning than 1 with lunch and 2 at night  July 2019:  Doing very well with present medications and feels much better since losing weight  Dec 2019 taking 2 buspar at night and 1/2 in the morning       The following portions of the patient's history were reviewed and updated as appropriate: allergies, current medications, past family history, past medical history, past social history, past surgical history and problem list     Review of Systems      Constitutional:  Denies fever or chills ,  18 lb weight loss since July 2019   Intentional with KETO diet  Eyes:  Denies double or blurry vision  HENT:  Denies nasal congestion or sore throat   Respiratory:  Denies cough or shortness of breath or wheezing  Cardiovascular:  Denies palpitations or chest pain  GI:  Denies abdominal pain, nausea, or vomiting  Integument:  Denies rash , no open areas  Neurologic:  Denies headache or focal weakness, no dizziness      Current Outpatient Medications   Medication Sig Dispense Refill    busPIRone (BUSPAR) 5 mg tablet Take 1 tablet (5 mg total) by mouth 4 (four) times a day 360 tablet 1    diclofenac (VOLTAREN) 75 mg EC tablet Take 1 tablet (75 mg total) by mouth 2 (two) times a day 180 tablet 1    doxycycline hyclate (VIBRAMYCIN) 100 mg capsule Take 1 capsule (100 mg total) by mouth every 12 (twelve) hours 180 capsule 1    fluticasone (FLONASE) 50 mcg/act nasal spray 1 spray into each nostril daily (Patient taking differently: 1 spray into each nostril as needed ) 16 g 3    LORazepam (ATIVAN) 1 mg tablet Take 1 5 tablets (1 5 mg total) by mouth daily at bedtime as needed for anxiety 135 tablet 1    methimazole (TAPAZOLE) 5 mg tablet Take 1 tablet (5 mg total) by mouth daily 90 tablet 1    penciclovir (DENAVIR) 1 % cream Apply topically daily as needed (ulcer) 5 g 5    pregabalin (LYRICA) 50 mg capsule Take 1 capsule (50 mg total) by mouth 3 (three) times a day 270 capsule 1    propranolol (INDERAL) 10 mg tablet Take 1 tablet (10 mg total) by mouth 2 (two) times a day 180 tablet 1    quinapril (ACCUPRIL) 10 mg tablet Take 1 tablet (10 mg total) by mouth daily 90 tablet 1    RESTASIS 0 05 % ophthalmic emulsion Administer 1 drop to both eyes 2 (two) times a day  3    valACYclovir (VALTREX) 500 mg tablet Take 1 tablet (500 mg total) by mouth daily as needed (Cold Sores) 30 tablet 1     No current facility-administered medications for this visit  Objective:    /70 (BP Location: Left arm, Patient Position: Sitting, Cuff Size: Large)   Pulse 63   Temp 97 9 °F (36 6 °C) (Oral)   Ht 5' 4" (1 626 m) Comment: shoes on  Wt 78 7 kg (173 lb 9 6 oz) Comment: shoes on  LMP 11/26/2015   SpO2 98%   BMI 29 80 kg/m²        Physical Exam       Constitutional:  Well developed, well nourished, no acute distress, non-toxic appearance   Eyes:  PERRL, conjunctiva normal , non icteric sclera  HENT:  Atraumatic, oropharynx dry   Neck-  supple   Respiratory:  CTA b/l, normal breath sounds, no rales, no wheezing   Cardiovascular:  RRR, no murmurs, no LE edema b/l  GI:  Soft, nondistended, normal bowel sounds x 4, nontender, no organomegaly, no mass, no rebound, no guarding   Neurologic:  no focal deficits noted   Psychiatric:  Speech and behavior appropriate , AAO x 3        Chelsea Villar, DO

## 2019-12-30 DIAGNOSIS — B00.1 COLD SORE: ICD-10-CM

## 2019-12-30 RX ORDER — VALACYCLOVIR HYDROCHLORIDE 500 MG/1
500 TABLET, FILM COATED ORAL DAILY PRN
Qty: 30 TABLET | Refills: 1 | Status: CANCELLED | OUTPATIENT
Start: 2019-12-30 | End: 2020-01-29

## 2019-12-30 NOTE — TELEPHONE ENCOUNTER
This was filled on 11/18 for 30 with 1 refill for PRN use  If she is getting cold sores more often and is requesting a refill this soon, which is too soon, Dr Gino Lacey should be made aware   Please call patient and ensure she is aware of the refill first  TY

## 2020-01-01 NOTE — TELEPHONE ENCOUNTER
Last O/V:  10-      Next O/V: 12- ORTHOPEDIC PA PROGRESS NOTE  HÉCTOR SERENA      83y Female                                                                                                                               POD #    2    STATUS POST:               Pre-Op Dx: Osteoarthritis of right hip    Post-Op Dx:  Osteoarthritis of right hip    Procedure: Replacement of right hip joint  Total replacement of hip joint by anterior approach                                                Pain (0-10): 4  Current Pain Management:  [ ] PCA   [x ] Po Analgesics [ ] IM /IV [employer]     T(F): 98.1  HR: 61  BP: 103/61  RR: 16  SpO2: 94%                        8.4    6.65  )-----------( 108      ( 01 Jan 2020 07:14 )             27.0                     01-01    139  |  106  |  27<H>  ----------------------------<  78  4.1   |  26  |  1.06    Ca    8.0<L>      01 Jan 2020 07:14      Physical Exam :    -   Dressing changed sterile.   -   Wound C/D/I.   -   Distal Neurvascular status intact grossly.   -   Warm well perfused; capillary refill <3 seconds   -   (+)EHL/FHL 5/5  -   (+) Sensation to light touch  -   (-) Calf tenderness Bilaterally    A/P: 83y Female s/p Osteoarthritis of right hip    -   Ortho Stable  -   Pain control   -   Medicine to follow  -   DVT ppx:     [x ]SCDs     [x ] ASA     [ ] Eliquis     [ ] Lovenox  -   Weight bearing status:  WBAT [x ]        PWB    [ ]     TTWB  [ ]      NWB  [ ]   -  Dispo:     Home [x ]     Acute Rehab [ ]     MEENU [ ]     TBD [ ]

## 2020-01-02 ENCOUNTER — TELEPHONE (OUTPATIENT)
Dept: PAIN MEDICINE | Facility: CLINIC | Age: 65
End: 2020-01-02

## 2020-02-27 ENCOUNTER — TELEPHONE (OUTPATIENT)
Dept: PAIN MEDICINE | Facility: CLINIC | Age: 65
End: 2020-02-27

## 2020-02-27 DIAGNOSIS — M48.061 LUMBAR STENOSIS WITHOUT NEUROGENIC CLAUDICATION: Primary | ICD-10-CM

## 2020-02-27 NOTE — TELEPHONE ENCOUNTER
Pt called stating her pain has returned in same area and would like a repeat injection  Pt last had a Lt L4 L5 Tfesi on 12/26/19  Patient states she has new insurance with a $70 00 OV co-pay and would like to prevent that if possible  Please advise  Thanks

## 2020-02-27 NOTE — TELEPHONE ENCOUNTER
Pt says she left a VM with Pat 1 week ago and has not heard back  She has new health insurance as of this year ([employer])  Does she need an appt to get another procedure scheduled? Pt would like another repeat injection scheduled in march  She says she has a 70$ co-pay and would like to try to avoid having to come in for a f/u so she does not have to pay the co-pay   # [phone]

## 2020-02-28 NOTE — TELEPHONE ENCOUNTER
Scheduled pt for Lt L4 L5 Tfesi for 3/6/20   Went over pre-procedure instructions below:  Pt denies rx blood thinners  Nothing to eat or drink 1 hr prior to procedure  Need to arrange transportation  Proper clothing for procedure  If ill or placed on antibiotics please call to reschedule

## 2020-03-06 ENCOUNTER — HOSPITAL ENCOUNTER (OUTPATIENT)
Dept: RADIOLOGY | Facility: CLINIC | Age: 65
Discharge: HOME | End: 2020-03-06
Attending: ANESTHESIOLOGY | Admitting: ANESTHESIOLOGY
Payer: COMMERCIAL

## 2020-03-06 VITALS
TEMPERATURE: 97.4 F | OXYGEN SATURATION: 98 % | RESPIRATION RATE: 18 BRPM | DIASTOLIC BLOOD PRESSURE: 93 MMHG | SYSTOLIC BLOOD PRESSURE: 143 MMHG | HEART RATE: 64 BPM

## 2020-03-06 DIAGNOSIS — M48.061 LUMBAR STENOSIS WITHOUT NEUROGENIC CLAUDICATION: ICD-10-CM

## 2020-03-06 PROCEDURE — 64483 NJX AA&/STRD TFRM EPI L/S 1: CPT | Performed by: ANESTHESIOLOGY

## 2020-03-06 PROCEDURE — 64484 NJX AA&/STRD TFRM EPI L/S EA: CPT | Performed by: ANESTHESIOLOGY

## 2020-03-06 RX ORDER — BUPIVACAINE HCL/PF 2.5 MG/ML
10 VIAL (ML) INJECTION ONCE
Status: COMPLETED | OUTPATIENT
Start: 2020-03-06 | End: 2020-03-06

## 2020-03-06 RX ORDER — METHYLPREDNISOLONE ACETATE 80 MG/ML
80 INJECTION, SUSPENSION INTRA-ARTICULAR; INTRALESIONAL; INTRAMUSCULAR; PARENTERAL; SOFT TISSUE ONCE
Status: COMPLETED | OUTPATIENT
Start: 2020-03-06 | End: 2020-03-06

## 2020-03-06 RX ORDER — 0.9 % SODIUM CHLORIDE 0.9 %
10 VIAL (ML) INJECTION ONCE
Status: COMPLETED | OUTPATIENT
Start: 2020-03-06 | End: 2020-03-06

## 2020-03-06 RX ADMIN — IOHEXOL 1 ML: 300 INJECTION, SOLUTION INTRAVENOUS at 09:47

## 2020-03-06 RX ADMIN — SODIUM CHLORIDE 5 ML: 9 INJECTION, SOLUTION INTRAMUSCULAR; INTRAVENOUS; SUBCUTANEOUS at 09:47

## 2020-03-06 RX ADMIN — METHYLPREDNISOLONE ACETATE 80 MG: 80 INJECTION, SUSPENSION INTRA-ARTICULAR; INTRALESIONAL; INTRAMUSCULAR; PARENTERAL; SOFT TISSUE at 09:47

## 2020-03-06 RX ADMIN — Medication 2 ML: at 09:46

## 2020-03-06 RX ADMIN — BUPIVACAINE HYDROCHLORIDE 4 ML: 2.5 INJECTION, SOLUTION EPIDURAL; INFILTRATION; INTRACAUDAL at 09:46

## 2020-03-06 NOTE — H&P
History of Present Illness: The patient is a 60 y o  female who presents with complaints of left lower back and leg pain secondary spinal stenosis and is here today for left L4-5 transforaminal epidural steroid injection      Patient Active Problem List   Diagnosis    Rosacea, acne    Lumbar degenerative disc disease    Lumbar radiculopathy    Anxiety    Benign essential HTN    Cervical radiculopathy    Disc disorder of cervical region    Hot flash, menopausal    Lumbar facet arthropathy    Lumbar stenosis without neurogenic claudication    Myofascial pain syndrome    Pain syndrome, chronic    Positive depression screening    Primary osteoarthritis of right knee    Bulge of lumbar disc without myelopathy    Spondylolisthesis of lumbar region    Arthritis of right acromioclavicular joint    Adverse effect of adrenal cortical steroids, initial encounter    Calculus of gallbladder without cholecystitis without obstruction    Chronic right shoulder pain    Hyperthyroidism       Past Medical History:   Diagnosis Date    Anxiety     Cataract, bilateral     last assessed    Cervical radiculopathy     Disc disease, degenerative, cervical     Herpes simplex infection     Hypertension     Insomnia     Low back pain     Lumbar degenerative disc disease     Lumbar facet arthropathy     Lumbar radiculopathy     Lumbar stenosis without neurogenic claudication     Mononucleosis     Myofascial pain dysfunction syndrome     Osteoarthritis     shoulder region - unspecified laterality - last assessed 2/1/14    Plantar fasciitis     Ptosis, both eyelids     last assessed 10/6/16    Ptosis, congenital, left     Retinal detachment     last assessed 12/11/14 right eye    Sacroiliitis ([facility] [address] )     Thyroid disease     Thyrotoxicosis     last assessed 5/17/13    Vertigo        Past Surgical History:   Procedure Laterality Date    AL SURG IMPLNT NEUROELECT,EPIDURAL Left 1/26/2016    Procedure: PLACEMENT OF THORACIC SPINAL CORD STIMULATOR WITH LEFT BUTTOCK IMPLANTABLE PULSE GENERATOR (IMPULSE MONITORING);   Surgeon: Larry Cavazos MD;  Location: [facility];  Service: Neurosurgery    RETINAL DETACHMENT SURGERY Right          Current Outpatient Medications:     busPIRone (BUSPAR) 5 mg tablet, Take 1 tablet (5 mg total) by mouth 4 (four) times a day, Disp: 360 tablet, Rfl: 1    diclofenac (VOLTAREN) 75 mg EC tablet, Take 1 tablet (75 mg total) by mouth 2 (two) times a day, Disp: 180 tablet, Rfl: 1    doxycycline hyclate (VIBRAMYCIN) 100 mg capsule, Take 1 capsule (100 mg total) by mouth every 12 (twelve) hours, Disp: 180 capsule, Rfl: 1    fluticasone (FLONASE) 50 mcg/act nasal spray, 1 spray into each nostril daily (Patient taking differently: 1 spray into each nostril as needed ), Disp: 16 g, Rfl: 3    LORazepam (ATIVAN) 1 mg tablet, Take 1 5 tablets (1 5 mg total) by mouth daily at bedtime as needed for anxiety, Disp: 135 tablet, Rfl: 1    methimazole (TAPAZOLE) 5 mg tablet, Take 1 tablet (5 mg total) by mouth daily, Disp: 90 tablet, Rfl: 1    penciclovir (DENAVIR) 1 % cream, Apply topically daily as needed (ulcer), Disp: 5 g, Rfl: 5    pregabalin (LYRICA) 50 mg capsule, Take 1 capsule (50 mg total) by mouth 3 (three) times a day, Disp: 270 capsule, Rfl: 1    propranolol (INDERAL) 10 mg tablet, Take 1 tablet (10 mg total) by mouth 2 (two) times a day, Disp: 180 tablet, Rfl: 1    quinapril (ACCUPRIL) 10 mg tablet, Take 1 tablet (10 mg total) by mouth daily, Disp: 90 tablet, Rfl: 1    RESTASIS 0 05 % ophthalmic emulsion, Administer 1 drop to both eyes 2 (two) times a day, Disp: , Rfl: 3    valACYclovir (VALTREX) 500 mg tablet, Take 1 tablet (500 mg total) by mouth daily as needed (Cold Sores), Disp: 30 tablet, Rfl: 1    Current Facility-Administered Medications:     bupivacaine (PF) (MARCAINE) 0 25 % injection 10 mL, 10 mL, Epidural, Once, Imani Abad MD    iohexol (OMNIPAQUE) 300 mg/mL injection 50 mL, 50 mL, Epidural, Once, Caprice Rutledge MD    lidocaine (PF) (XYLOCAINE-MPF) 2 % injection 5 mL, 5 mL, Infiltration, Once, Caprice Rutledge MD    methylPREDNISolone acetate (DEPO-MEDROL) injection 80 mg, 80 mg, Epidural, Once, Caprice Rutledge MD    sodium chloride (PF) 0 9 % injection 10 mL, 10 mL, Infiltration, Once, Caprice Rutledge MD    Allergies   Allergen Reactions    Other Dermatitis     Adhesive tape - please use ONLY PAPER TAPE    Scopolamine Other (See Comments)     Severe, debilitating headache      Flexeril [Cyclobenzaprine] Dizziness and Fatigue    Naproxen      Other reaction(s): Unknown Allergic Reaction  Annotation - [id number]: nightmares    Penicillins      Other reaction(s): Unknown Allergic Reaction  Annotation - [id number]: childhood reaction    Promethazine-Codeine      Reaction Date: [id number]; Annotation - [id number]: nightmares; Annotation - [id number]: nightmares    Tramadol      Other reaction(s): Unknown Allergic Reaction  Annotation - [id number]: PT HAS NIGHTMARES FROM TRAMADOL    Wound Dressing Adhesive Rash       Physical Exam:   Vitals:    03/06/20 0928   BP: 142/85   Pulse: 66   Resp: 18   Temp: (!) 97 4 °F (36 3 °C)   SpO2: 98%     General: Awake, Alert, Oriented x 3, Mood and affect appropriate  Respiratory: Respirations even and unlabored  Cardiovascular: Peripheral pulses intact; no edema  Musculoskeletal Exam:   Left lower back tenderness    ASA Score: 2    Patient/Chart Verification  Patient ID Verified: Verbal  ID Band Applied: No  Consents Confirmed: Procedural, To be obtained in the Pre-Procedure area  H&P( within 30 days) Verified: To be obtained in the Pre-Procedure area  Allergies Reviewed: Yes(bandiads ok per pt)  Anticoag/NSAID held?: No  Currently on antibiotics?: No    Assessment:   1   Lumbar stenosis without neurogenic claudication        Plan: Left L4-5 TF PRATIK

## 2020-03-09 DIAGNOSIS — E05.90 HYPERTHYROIDISM: ICD-10-CM

## 2020-03-09 RX ORDER — METHIMAZOLE 5 MG/1
5 TABLET ORAL DAILY
Qty: 90 TABLET | Refills: 1 | Status: SHIPPED | OUTPATIENT
Start: 2020-03-09 | End: 2020-12-30 | Stop reason: SDUPTHER

## 2020-03-09 NOTE — TELEPHONE ENCOUNTER
NOV 06/25/2020  LOV 12/27/2020  Pt states she can no longer use [facility]    Please send to [facility]

## 2020-03-13 ENCOUNTER — TELEPHONE (OUTPATIENT)
Dept: PAIN MEDICINE | Facility: CLINIC | Age: 65
End: 2020-03-13

## 2020-04-06 ENCOUNTER — TELEMEDICINE (OUTPATIENT)
Dept: INTERNAL MEDICINE CLINIC | Age: 65
End: 2020-04-06
Payer: COMMERCIAL

## 2020-04-06 ENCOUNTER — TELEPHONE (OUTPATIENT)
Dept: INTERNAL MEDICINE CLINIC | Facility: CLINIC | Age: 65
End: 2020-04-06

## 2020-04-06 VITALS
HEART RATE: 62 BPM | SYSTOLIC BLOOD PRESSURE: 137 MMHG | BODY MASS INDEX: 29.02 KG/M2 | HEIGHT: 64 IN | WEIGHT: 170 LBS | DIASTOLIC BLOOD PRESSURE: 80 MMHG

## 2020-04-06 DIAGNOSIS — F41.9 ANXIETY: ICD-10-CM

## 2020-04-06 DIAGNOSIS — F51.01 PRIMARY INSOMNIA: Primary | ICD-10-CM

## 2020-04-06 PROCEDURE — 99213 OFFICE O/P EST LOW 20 MIN: CPT | Performed by: FAMILY MEDICINE

## 2020-04-06 RX ORDER — LORAZEPAM 1 MG/1
1.5 TABLET ORAL
Qty: 135 TABLET | Refills: 1 | Status: CANCELLED | OUTPATIENT
Start: 2020-04-06

## 2020-04-06 RX ORDER — MIRTAZAPINE 15 MG/1
15 TABLET, FILM COATED ORAL
Qty: 15 TABLET | Refills: 0 | Status: SHIPPED | OUTPATIENT
Start: 2020-04-06 | End: 2020-05-01

## 2020-04-13 ENCOUNTER — TELEPHONE (OUTPATIENT)
Dept: PAIN MEDICINE | Facility: CLINIC | Age: 65
End: 2020-04-13

## 2020-04-13 DIAGNOSIS — F51.01 PRIMARY INSOMNIA: ICD-10-CM

## 2020-04-13 DIAGNOSIS — M47.816 LUMBAR SPONDYLOSIS: ICD-10-CM

## 2020-04-13 DIAGNOSIS — M79.18 MYOFASCIAL PAIN SYNDROME: ICD-10-CM

## 2020-04-13 DIAGNOSIS — M54.12 CERVICAL RADICULOPATHY: ICD-10-CM

## 2020-04-13 RX ORDER — MIRTAZAPINE 15 MG/1
TABLET, FILM COATED ORAL
Qty: 15 TABLET | Refills: 0 | OUTPATIENT
Start: 2020-04-13

## 2020-04-13 RX ORDER — PREGABALIN 50 MG/1
50 CAPSULE ORAL 3 TIMES DAILY
Qty: 270 CAPSULE | Refills: 1 | Status: SHIPPED | OUTPATIENT
Start: 2020-04-13 | End: 2020-06-17 | Stop reason: SDUPTHER

## 2020-04-14 ENCOUNTER — TELEPHONE (OUTPATIENT)
Dept: INTERNAL MEDICINE CLINIC | Facility: CLINIC | Age: 65
End: 2020-04-14

## 2020-04-14 DIAGNOSIS — F41.9 ANXIETY: ICD-10-CM

## 2020-04-14 RX ORDER — LORAZEPAM 1 MG/1
1.5 TABLET ORAL
Qty: 135 TABLET | Refills: 1 | Status: SHIPPED | OUTPATIENT
Start: 2020-04-14 | End: 2020-06-29 | Stop reason: SDUPTHER

## 2020-04-20 ENCOUNTER — TELEPHONE (OUTPATIENT)
Dept: INTERNAL MEDICINE CLINIC | Facility: CLINIC | Age: 65
End: 2020-04-20

## 2020-04-20 DIAGNOSIS — E05.90 HYPERTHYROIDISM: ICD-10-CM

## 2020-04-20 DIAGNOSIS — I10 ESSENTIAL HYPERTENSION: ICD-10-CM

## 2020-04-20 RX ORDER — QUINAPRIL 10 MG/1
TABLET ORAL
Qty: 180 TABLET | Refills: 0
Start: 2020-04-20 | End: 2020-06-15 | Stop reason: SDUPTHER

## 2020-04-20 RX ORDER — QUINAPRIL 10 MG/1
10 TABLET ORAL 2 TIMES DAILY
Qty: 180 TABLET | Refills: 0
Start: 2020-04-20 | End: 2020-04-20

## 2020-04-29 ENCOUNTER — TELEPHONE (OUTPATIENT)
Dept: INTERNAL MEDICINE CLINIC | Facility: CLINIC | Age: 65
End: 2020-04-29

## 2020-05-01 ENCOUNTER — TELEMEDICINE (OUTPATIENT)
Dept: INTERNAL MEDICINE CLINIC | Age: 65
End: 2020-05-01
Payer: COMMERCIAL

## 2020-05-01 ENCOUNTER — OFFICE VISIT (OUTPATIENT)
Dept: PAIN MEDICINE | Facility: CLINIC | Age: 65
End: 2020-05-01
Payer: COMMERCIAL

## 2020-05-01 VITALS
WEIGHT: 173 LBS | HEART RATE: 65 BPM | TEMPERATURE: 97.9 F | SYSTOLIC BLOOD PRESSURE: 159 MMHG | DIASTOLIC BLOOD PRESSURE: 89 MMHG | BODY MASS INDEX: 27.92 KG/M2

## 2020-05-01 VITALS
BODY MASS INDEX: 27.8 KG/M2 | WEIGHT: 173 LBS | HEIGHT: 66 IN | SYSTOLIC BLOOD PRESSURE: 157 MMHG | DIASTOLIC BLOOD PRESSURE: 100 MMHG | HEART RATE: 74 BPM

## 2020-05-01 DIAGNOSIS — M43.16 SPONDYLOLISTHESIS OF LUMBAR REGION: ICD-10-CM

## 2020-05-01 DIAGNOSIS — M46.1 SACROILIITIS (HCC): Primary | ICD-10-CM

## 2020-05-01 DIAGNOSIS — I10 BENIGN ESSENTIAL HTN: Primary | ICD-10-CM

## 2020-05-01 DIAGNOSIS — M48.061 LUMBAR STENOSIS WITHOUT NEUROGENIC CLAUDICATION: ICD-10-CM

## 2020-05-01 DIAGNOSIS — M70.62 GREATER TROCHANTERIC BURSITIS OF LEFT HIP: ICD-10-CM

## 2020-05-01 PROCEDURE — 99214 OFFICE O/P EST MOD 30 MIN: CPT | Performed by: ANESTHESIOLOGY

## 2020-05-01 PROCEDURE — 3077F SYST BP >= 140 MM HG: CPT | Performed by: ANESTHESIOLOGY

## 2020-05-01 PROCEDURE — 1036F TOBACCO NON-USER: CPT | Performed by: ANESTHESIOLOGY

## 2020-05-01 PROCEDURE — 3079F DIAST BP 80-89 MM HG: CPT | Performed by: ANESTHESIOLOGY

## 2020-05-01 PROCEDURE — 3008F BODY MASS INDEX DOCD: CPT | Performed by: FAMILY MEDICINE

## 2020-05-01 PROCEDURE — 99213 OFFICE O/P EST LOW 20 MIN: CPT | Performed by: NURSE PRACTITIONER

## 2020-05-01 RX ORDER — METHYLPREDNISOLONE 4 MG/1
TABLET ORAL
Qty: 21 TABLET | Refills: 0 | Status: SHIPPED | OUTPATIENT
Start: 2020-05-01 | End: 2020-06-17

## 2020-05-07 ENCOUNTER — TELEPHONE (OUTPATIENT)
Dept: PAIN MEDICINE | Facility: MEDICAL CENTER | Age: 65
End: 2020-05-07

## 2020-05-07 DIAGNOSIS — G89.4 CHRONIC PAIN SYNDROME: Primary | ICD-10-CM

## 2020-05-29 ENCOUNTER — HOSPITAL ENCOUNTER (OUTPATIENT)
Dept: RADIOLOGY | Facility: CLINIC | Age: 65
Discharge: HOME | End: 2020-05-29
Attending: ANESTHESIOLOGY
Payer: COMMERCIAL

## 2020-05-29 VITALS
SYSTOLIC BLOOD PRESSURE: 131 MMHG | HEART RATE: 61 BPM | TEMPERATURE: 97.8 F | DIASTOLIC BLOOD PRESSURE: 82 MMHG | OXYGEN SATURATION: 99 % | RESPIRATION RATE: 18 BRPM

## 2020-05-29 DIAGNOSIS — M70.62 GREATER TROCHANTERIC BURSITIS OF LEFT HIP: ICD-10-CM

## 2020-05-29 DIAGNOSIS — M46.1 SACROILIITIS (HCC): ICD-10-CM

## 2020-05-29 PROCEDURE — 20610 DRAIN/INJ JOINT/BURSA W/O US: CPT | Performed by: ANESTHESIOLOGY

## 2020-05-29 PROCEDURE — 27096 INJECT SACROILIAC JOINT: CPT | Performed by: ANESTHESIOLOGY

## 2020-05-29 PROCEDURE — 77002 NEEDLE LOCALIZATION BY XRAY: CPT | Performed by: ANESTHESIOLOGY

## 2020-05-29 PROCEDURE — 77002 NEEDLE LOCALIZATION BY XRAY: CPT

## 2020-05-29 RX ORDER — BUPIVACAINE HCL/PF 2.5 MG/ML
10 VIAL (ML) INJECTION ONCE
Status: COMPLETED | OUTPATIENT
Start: 2020-05-29 | End: 2020-05-29

## 2020-05-29 RX ORDER — 0.9 % SODIUM CHLORIDE 0.9 %
10 VIAL (ML) INJECTION ONCE
Status: COMPLETED | OUTPATIENT
Start: 2020-05-29 | End: 2020-05-29

## 2020-05-29 RX ORDER — METHYLPREDNISOLONE ACETATE 80 MG/ML
80 INJECTION, SUSPENSION INTRA-ARTICULAR; INTRALESIONAL; INTRAMUSCULAR; PARENTERAL; SOFT TISSUE ONCE
Status: COMPLETED | OUTPATIENT
Start: 2020-05-29 | End: 2020-05-29

## 2020-05-29 RX ADMIN — Medication 5 ML: at 09:16

## 2020-05-29 RX ADMIN — IOHEXOL 2 ML: 300 INJECTION, SOLUTION INTRAVENOUS at 09:18

## 2020-05-29 RX ADMIN — SODIUM CHLORIDE 5 ML: 9 INJECTION, SOLUTION INTRAMUSCULAR; INTRAVENOUS; SUBCUTANEOUS at 09:16

## 2020-05-29 RX ADMIN — METHYLPREDNISOLONE ACETATE 80 MG: 80 INJECTION, SUSPENSION INTRA-ARTICULAR; INTRALESIONAL; INTRAMUSCULAR; PARENTERAL; SOFT TISSUE at 09:21

## 2020-05-29 RX ADMIN — BUPIVACAINE HYDROCHLORIDE 7 ML: 2.5 INJECTION, SOLUTION EPIDURAL; INFILTRATION; INTRACAUDAL at 09:21

## 2020-06-05 ENCOUNTER — TELEPHONE (OUTPATIENT)
Dept: PAIN MEDICINE | Facility: CLINIC | Age: 65
End: 2020-06-05

## 2020-06-15 DIAGNOSIS — I10 ESSENTIAL HYPERTENSION: ICD-10-CM

## 2020-06-15 RX ORDER — QUINAPRIL 10 MG/1
TABLET ORAL
Qty: 180 TABLET | Refills: 0
Start: 2020-06-15 | End: 2020-06-17 | Stop reason: SDUPTHER

## 2020-06-16 NOTE — TELEPHONE ENCOUNTER
Pt said she has been having a lot of pain   She said she has been limping  She said she feels like the pain is in her hip socket  She said the one part of the injection worked and there other part didn't  She wants to know if she should come in to see your for an appt  She said she comes in this Thursday to see Luis Alfredo Gomez to adjust her controller  And next week she will be on Vacation

## 2020-06-16 NOTE — TELEPHONE ENCOUNTER
LM for patient to call and schedule  However, no open appointments on Friday at this time  Can she be virtual if need be? Or see Jhonathan Duque tomorrow?

## 2020-06-17 ENCOUNTER — OFFICE VISIT (OUTPATIENT)
Dept: PAIN MEDICINE | Facility: CLINIC | Age: 65
End: 2020-06-17
Payer: COMMERCIAL

## 2020-06-17 VITALS
TEMPERATURE: 98.2 F | SYSTOLIC BLOOD PRESSURE: 123 MMHG | HEART RATE: 67 BPM | BODY MASS INDEX: 27.92 KG/M2 | WEIGHT: 173 LBS | DIASTOLIC BLOOD PRESSURE: 74 MMHG

## 2020-06-17 DIAGNOSIS — I10 ESSENTIAL HYPERTENSION: ICD-10-CM

## 2020-06-17 DIAGNOSIS — G89.4 PAIN SYNDROME, CHRONIC: Primary | ICD-10-CM

## 2020-06-17 DIAGNOSIS — M54.12 CERVICAL RADICULOPATHY: ICD-10-CM

## 2020-06-17 DIAGNOSIS — M51.16 INTERVERTEBRAL DISC DISORDER WITH RADICULOPATHY OF LUMBAR REGION: ICD-10-CM

## 2020-06-17 DIAGNOSIS — M47.816 LUMBAR SPONDYLOSIS: ICD-10-CM

## 2020-06-17 DIAGNOSIS — M79.18 MYOFASCIAL PAIN SYNDROME: ICD-10-CM

## 2020-06-17 PROCEDURE — 1036F TOBACCO NON-USER: CPT | Performed by: ANESTHESIOLOGY

## 2020-06-17 PROCEDURE — 99214 OFFICE O/P EST MOD 30 MIN: CPT | Performed by: ANESTHESIOLOGY

## 2020-06-17 PROCEDURE — 3078F DIAST BP <80 MM HG: CPT | Performed by: ANESTHESIOLOGY

## 2020-06-17 PROCEDURE — 3074F SYST BP LT 130 MM HG: CPT | Performed by: ANESTHESIOLOGY

## 2020-06-17 RX ORDER — TIZANIDINE 4 MG/1
4 TABLET ORAL 2 TIMES DAILY
Qty: 180 TABLET | Refills: 1 | Status: SHIPPED | OUTPATIENT
Start: 2020-06-17 | End: 2020-12-14

## 2020-06-17 RX ORDER — QUINAPRIL 10 MG/1
10 TABLET ORAL 4 TIMES DAILY
Qty: 360 TABLET | Refills: 1 | Status: SHIPPED | OUTPATIENT
Start: 2020-06-17 | End: 2020-12-30 | Stop reason: SDUPTHER

## 2020-06-17 RX ORDER — PREGABALIN 50 MG/1
CAPSULE ORAL
Qty: 270 CAPSULE | Refills: 1 | Status: SHIPPED | OUTPATIENT
Start: 2020-06-17 | End: 2020-12-30 | Stop reason: SDUPTHER

## 2020-06-19 ENCOUNTER — TELEPHONE (OUTPATIENT)
Dept: PAIN MEDICINE | Facility: CLINIC | Age: 65
End: 2020-06-19

## 2020-06-19 NOTE — TELEPHONE ENCOUNTER
S/w pt, states she was informed by her pharmacy that she will not be able to get next refill of lyrica until 7/11; however, she will run out of medication on 7/5 because she had been advised to take an extra pill on days when her pain was more severe  Pt said the extra pill during the day did not help so she does not plan on continuing to take the additional pill  Pt said she has gabapentin 100mg capsules leftover from a previous script and asked if this could be used to fill the gap between running out of lyrica and her next fill on 7/11  Advised pt not to take old prescriptions  Please advise, as pt will need lyrica for 7/5-7/11  Thank you

## 2020-06-19 NOTE — TELEPHONE ENCOUNTER
Patient   [phone]  Dr Karri Louis    Patient is saying that her pharmacy can only fill her script on 7/11/2020, which means that she will be 5 days short  She wants to know if she can use her old script of gabapentin 100 mg for the last  5 days instead?  She says that she has to come back for a procedure on 6/30/2020    Please follow up when Dr Angy Hernandez

## 2020-06-19 NOTE — TELEPHONE ENCOUNTER
S/w pt, advised of FQ's notation  She asked for clarification of gabapentin dosage  Pt said she has 100mg capsules of gabapentin, originally this was prescribed to be taken TID, but pt said she is only taking 50mg of lyrica TID  Please advise how much gabapentin pt should take, thank you  Per pt, ok to leave detailed message

## 2020-06-29 ENCOUNTER — OFFICE VISIT (OUTPATIENT)
Dept: INTERNAL MEDICINE CLINIC | Age: 65
End: 2020-06-29
Payer: COMMERCIAL

## 2020-06-29 VITALS
TEMPERATURE: 98.4 F | HEART RATE: 68 BPM | DIASTOLIC BLOOD PRESSURE: 82 MMHG | SYSTOLIC BLOOD PRESSURE: 128 MMHG | BODY MASS INDEX: 29.57 KG/M2 | OXYGEN SATURATION: 97 % | WEIGHT: 183.2 LBS

## 2020-06-29 DIAGNOSIS — F41.9 ANXIETY: ICD-10-CM

## 2020-06-29 DIAGNOSIS — G89.4 PAIN SYNDROME, CHRONIC: ICD-10-CM

## 2020-06-29 DIAGNOSIS — I10 BENIGN ESSENTIAL HTN: Primary | ICD-10-CM

## 2020-06-29 DIAGNOSIS — E05.90 HYPERTHYROIDISM: ICD-10-CM

## 2020-06-29 DIAGNOSIS — M51.26 BULGE OF LUMBAR DISC WITHOUT MYELOPATHY: ICD-10-CM

## 2020-06-29 DIAGNOSIS — B00.1 COLD SORE: ICD-10-CM

## 2020-06-29 DIAGNOSIS — L71.9 ROSACEA, ACNE: ICD-10-CM

## 2020-06-29 PROCEDURE — 1036F TOBACCO NON-USER: CPT | Performed by: FAMILY MEDICINE

## 2020-06-29 PROCEDURE — 99214 OFFICE O/P EST MOD 30 MIN: CPT | Performed by: FAMILY MEDICINE

## 2020-06-29 PROCEDURE — 3077F SYST BP >= 140 MM HG: CPT | Performed by: FAMILY MEDICINE

## 2020-06-29 PROCEDURE — 3079F DIAST BP 80-89 MM HG: CPT | Performed by: FAMILY MEDICINE

## 2020-06-29 NOTE — PROGRESS NOTES
Assessment/Plan:    1  Benign essential HTN    2  Hyperthyroidism  -     propranolol (INDERAL) 10 mg tablet; Take 1 tablet (10 mg total) by mouth 2 (two) times a day    3  Pain syndrome, chronic    4  Rosacea, acne    5  Anxiety  -     busPIRone (BUSPAR) 5 mg tablet; Take 1 tablet (5 mg total) by mouth 4 (four) times a day  -     LORazepam (ATIVAN) 1 mg tablet; Take 1 5 tablets (1 5 mg total) by mouth daily at bedtime as needed for anxiety    6  Bulge of lumbar disc without myelopathy    7  Cold sore  -     valACYclovir (VALTREX) 500 mg tablet; Take 1 tablet (500 mg total) by mouth daily as needed (Cold Sores)    Await laboratory data  Continue management with pain specialist   No changes to medications today  Recommend 10 lb weight loss that she has gained recently on vacation  Patient agreeable and all questions and concerns addressed  There are no Patient Instructions on file for this visit  No follow-ups on file  Subjective:      Patient ID: Tiffanie Garcia is a 63 y o  female  Chief Complaint   Patient presents with    Follow-up     Pt is here today for 6 month follow-up  HPI       Rosacea, patient was placed on Metrogel by her endocrinologist and she has been using it for 1 month   She feels that is not helping  Jake Vee has facial flushing for several years which are exacerbated by stress, hypertension, heat, and hot flashes  Jake Vee would like to discuss other options  5/16/18: started doxy 1 week ago, no relief yet  Still flushing and worse in the mornings  10/15/18: Pily Hem a lot, taking doxy BID, ran out 1 week ago, re start at BID and then decrease to monthly  Dc 2019 taking doxy BID iith food, no issues     Insomnia, patient states she does not sleep well which is very multifactorial including the pain and how flashes   She used to be on estrogen replacement currently does not take any   She does take Ativan at night to help her sleep which helped marginally  5/16/18: taking 1 5 mg  And advil pm and it has helped- sleeping through the night now and slightly  More tired in am   10/15/18: taking ativan and sleeping well with it 3/1/19:   July 2019, has not tried to decrease her lorazepam and does take it every night to sleep  Dec 2019 usually doing very well but  Lately having some issues with sleep  Thinks its from the holidays  June 2020, stable     Depression:  Patient is a depression was exacerbated by the pain and inability to sleep  Pily Hill is always tired and fatigued and is having difficulty with activities of daily living around the house like cleaning and cooking as well as work  Jossue Mccord is here today and can cooperate always   She feels that she is up to any medications or the long medications right now  8/13/18: buspar was increased to 10 mg per but she felt tired on it and went back to 5 mg BId, has gained weight  10/15/18: taking bupar 5 mg- 2 tabs at [facility],  12/21/18: stable  March 2019, no issues   Takes 1- 5 mg BuSpar in the morning than 1 with lunch and 2 at night    July 2019:  Doing very well with present medications and feels much better since losing weight  Dec 2019 taking 2 buspar at night and 1/2 in the morning    June 2020, relatively stable with no suicidal or homicidal ideations    Chronic pain, not too much better than previously  Follows with pain management        The following portions of the patient's history were reviewed and updated as appropriate: allergies, current medications, past family history, past medical history, past social history, past surgical history and problem list     Review of Systems      Constitutional:  Denies fever or chills , 10 lb weight gain since last visit  Eyes:  Denies double or blurry vision  HENT:  Denies nasal congestion or sore throat   Respiratory:  Denies cough or shortness of breath or wheezing  Cardiovascular:  Denies palpitations or chest pain  GI:  Denies abdominal pain, nausea, or vomiting  Integument:  Denies rash , no open areas  Neurologic:  Denies headache or focal weakness, no dizziness      Current Outpatient Medications   Medication Sig Dispense Refill    busPIRone (BUSPAR) 5 mg tablet Take 1 tablet (5 mg total) by mouth 4 (four) times a day 360 tablet 1    Cetirizine HCl (ZYRTEC PO) Take by mouth as needed      diclofenac (VOLTAREN) 75 mg EC tablet Take 1 tablet (75 mg total) by mouth 2 (two) times a day (Patient taking differently: Take 75 mg by mouth 3 (three) times a day ) 180 tablet 1    Esomeprazole Magnesium (NEXIUM PO) Take 1 capsule by mouth daily      fluticasone (FLONASE) 50 mcg/act nasal spray 1 spray into each nostril daily (Patient taking differently: 1 spray into each nostril as needed ) 16 g 3    LORazepam (ATIVAN) 1 mg tablet Take 1 5 tablets (1 5 mg total) by mouth daily at bedtime as needed for anxiety 135 tablet 1    methimazole (TAPAZOLE) 5 mg tablet Take 1 tablet (5 mg total) by mouth daily 90 tablet 1    penciclovir (DENAVIR) 1 % cream Apply topically daily as needed (ulcer) 5 g 5    pregabalin (LYRICA) 50 mg capsule Take 1 in the AM and 2 at bedtime 270 capsule 1    propranolol (INDERAL) 10 mg tablet Take 1 tablet (10 mg total) by mouth 2 (two) times a day 180 tablet 1    quinapril (ACCUPRIL) 10 mg tablet Take 1 tablet (10 mg total) by mouth 4 (four) times a day 360 tablet 1    RESTASIS 0 05 % ophthalmic emulsion Administer 1 drop to both eyes 2 (two) times a day  3    tiZANidine (ZANAFLEX) 4 mg tablet Take 1 tablet (4 mg total) by mouth 2 (two) times a day 180 tablet 1    valACYclovir (VALTREX) 500 mg tablet Take 1 tablet (500 mg total) by mouth daily as needed (Cold Sores) 30 tablet 1     No current facility-administered medications for this visit          Objective:    /82 (BP Location: Left arm, Patient Position: Sitting, Cuff Size: Adult)   Pulse 68   Temp 98 4 °F (36 9 °C) (Tympanic)   Wt 83 1 kg (183 lb 3 2 oz)   LMP 11/26/2015   SpO2 97%   BMI 29 57 kg/m² Physical Exam    Constitutional:  Well developed, well nourished, no acute distress, non-toxic appearance   Eyes:  PERRL, conjunctiva normal , non icteric sclera  HENT:  Atraumatic, oropharynx moist  Neck-  supple   Respiratory:  CTA b/l, normal breath sounds, no rales, no wheezing   Cardiovascular:  RRR, no murmurs, no LE edema b/l  GI:  Soft, nondistended, normal bowel sounds x 4, nontender, no organomegaly, no mass, no rebound, no guarding   Neurologic:  no focal deficits noted   Psychiatric:  Speech and behavior appropriate , AAO x 3           Elvis Sharma DO

## 2020-06-30 ENCOUNTER — HOSPITAL ENCOUNTER (OUTPATIENT)
Dept: RADIOLOGY | Facility: CLINIC | Age: 65
Discharge: HOME | End: 2020-06-30
Attending: ANESTHESIOLOGY | Admitting: ANESTHESIOLOGY
Payer: COMMERCIAL

## 2020-06-30 VITALS
RESPIRATION RATE: 16 BRPM | SYSTOLIC BLOOD PRESSURE: 154 MMHG | HEART RATE: 60 BPM | DIASTOLIC BLOOD PRESSURE: 87 MMHG | OXYGEN SATURATION: 98 % | TEMPERATURE: 97.9 F

## 2020-06-30 DIAGNOSIS — E78.00 HYPERCHOLESTEREMIA: Primary | ICD-10-CM

## 2020-06-30 DIAGNOSIS — M51.16 INTERVERTEBRAL DISC DISORDER WITH RADICULOPATHY OF LUMBAR REGION: ICD-10-CM

## 2020-06-30 DIAGNOSIS — R79.89 HIGH SERUM HIGH DENSITY LIPOPROTEIN (HDL): ICD-10-CM

## 2020-06-30 LAB
25(OH)D3+25(OH)D2 SERPL-MCNC: 54.3 NG/ML (ref 30–100)
ALBUMIN SERPL-MCNC: 4.3 G/DL (ref 3.8–4.8)
ALBUMIN/GLOB SERPL: 2 {RATIO} (ref 1.2–2.2)
ALP SERPL-CCNC: 107 IU/L (ref 39–117)
ALT SERPL-CCNC: 23 IU/L (ref 0–32)
AST SERPL-CCNC: 34 IU/L (ref 0–40)
BASOPHILS # BLD AUTO: 0.1 X10E3/UL (ref 0–0.2)
BASOPHILS NFR BLD AUTO: 1 %
BILIRUB SERPL-MCNC: 0.7 MG/DL (ref 0–1.2)
BUN SERPL-MCNC: 12 MG/DL (ref 8–27)
BUN/CREAT SERPL: 19 (ref 12–28)
CALCIUM SERPL-MCNC: 9.6 MG/DL (ref 8.7–10.3)
CHLORIDE SERPL-SCNC: 93 MMOL/L (ref 96–106)
CHOLEST SERPL-MCNC: 228 MG/DL (ref 100–199)
CO2 SERPL-SCNC: 24 MMOL/L (ref 20–29)
CREAT SERPL-MCNC: 0.63 MG/DL (ref 0.57–1)
EOSINOPHIL # BLD AUTO: 0.1 X10E3/UL (ref 0–0.4)
EOSINOPHIL NFR BLD AUTO: 1 %
ERYTHROCYTE [DISTWIDTH] IN BLOOD BY AUTOMATED COUNT: 14.4 % (ref 11.7–15.4)
GLOBULIN SER-MCNC: 2.2 G/DL (ref 1.5–4.5)
GLUCOSE SERPL-MCNC: 90 MG/DL (ref 65–99)
HCT VFR BLD AUTO: 38.2 % (ref 34–46.6)
HDLC SERPL-MCNC: 77 MG/DL
HGB BLD-MCNC: 12.4 G/DL (ref 11.1–15.9)
IMM GRANULOCYTES # BLD: 0 X10E3/UL (ref 0–0.1)
IMM GRANULOCYTES NFR BLD: 0 %
LDLC SERPL CALC-MCNC: 134 MG/DL (ref 0–99)
LYMPHOCYTES # BLD AUTO: 1.5 X10E3/UL (ref 0.7–3.1)
LYMPHOCYTES NFR BLD AUTO: 25 %
MCH RBC QN AUTO: 30.5 PG (ref 26.6–33)
MCHC RBC AUTO-ENTMCNC: 32.5 G/DL (ref 31.5–35.7)
MCV RBC AUTO: 94 FL (ref 79–97)
MONOCYTES # BLD AUTO: 0.5 X10E3/UL (ref 0.1–0.9)
MONOCYTES NFR BLD AUTO: 8 %
NEUTROPHILS # BLD AUTO: 3.9 X10E3/UL (ref 1.4–7)
NEUTROPHILS NFR BLD AUTO: 65 %
PLATELET # BLD AUTO: 302 X10E3/UL (ref 150–450)
POTASSIUM SERPL-SCNC: 5.2 MMOL/L (ref 3.5–5.2)
PROT SERPL-MCNC: 6.5 G/DL (ref 6–8.5)
RBC # BLD AUTO: 4.06 X10E6/UL (ref 3.77–5.28)
SL AMB EGFR AFRICAN AMERICAN: 110 ML/MIN/1.73
SL AMB EGFR NON AFRICAN AMERICAN: 96 ML/MIN/1.73
SL AMB VLDL CHOLESTEROL CALC: 17 MG/DL (ref 5–40)
SODIUM SERPL-SCNC: 133 MMOL/L (ref 134–144)
TRIGL SERPL-MCNC: 85 MG/DL (ref 0–149)
TSH SERPL DL<=0.005 MIU/L-ACNC: 2.21 UIU/ML (ref 0.45–4.5)
WBC # BLD AUTO: 6 X10E3/UL (ref 3.4–10.8)

## 2020-06-30 PROCEDURE — 64484 NJX AA&/STRD TFRM EPI L/S EA: CPT | Performed by: ANESTHESIOLOGY

## 2020-06-30 PROCEDURE — 64483 NJX AA&/STRD TFRM EPI L/S 1: CPT | Performed by: ANESTHESIOLOGY

## 2020-06-30 RX ORDER — METHYLPREDNISOLONE ACETATE 80 MG/ML
80 INJECTION, SUSPENSION INTRA-ARTICULAR; INTRALESIONAL; INTRAMUSCULAR; PARENTERAL; SOFT TISSUE ONCE
Status: COMPLETED | OUTPATIENT
Start: 2020-06-30 | End: 2020-06-30

## 2020-06-30 RX ORDER — BUPIVACAINE HCL/PF 2.5 MG/ML
10 VIAL (ML) INJECTION ONCE
Status: COMPLETED | OUTPATIENT
Start: 2020-06-30 | End: 2020-06-30

## 2020-06-30 RX ORDER — 0.9 % SODIUM CHLORIDE 0.9 %
10 VIAL (ML) INJECTION ONCE
Status: COMPLETED | OUTPATIENT
Start: 2020-06-30 | End: 2020-06-30

## 2020-06-30 RX ADMIN — Medication 4 ML: at 14:31

## 2020-06-30 RX ADMIN — IOHEXOL 1 ML: 300 INJECTION, SOLUTION INTRAVENOUS at 14:33

## 2020-06-30 RX ADMIN — METHYLPREDNISOLONE ACETATE 80 MG: 80 INJECTION, SUSPENSION INTRA-ARTICULAR; INTRALESIONAL; INTRAMUSCULAR; PARENTERAL; SOFT TISSUE at 14:34

## 2020-06-30 RX ADMIN — SODIUM CHLORIDE 4 ML: 9 INJECTION, SOLUTION INTRAMUSCULAR; INTRAVENOUS; SUBCUTANEOUS at 14:31

## 2020-06-30 RX ADMIN — BUPIVACAINE HYDROCHLORIDE 2 ML: 2.5 INJECTION, SOLUTION EPIDURAL; INFILTRATION; INTRACAUDAL at 14:34

## 2020-06-30 NOTE — H&P
History of Present Illness: The patient is a 63 y o  female who presents with complaints of left lower back and leg pain secondary to lumbar disc herniation and stenosis and is here today for left L3-4 transforaminal epidural steroid injection      Patient Active Problem List   Diagnosis    Rosacea, acne    Lumbar degenerative disc disease    Lumbar radiculopathy    Anxiety    Benign essential HTN    Cervical radiculopathy    Disc disorder of cervical region    Hot flash, menopausal    Lumbar facet arthropathy    Lumbar stenosis without neurogenic claudication    Myofascial pain syndrome    Pain syndrome, chronic    Positive depression screening    Primary osteoarthritis of right knee    Bulge of lumbar disc without myelopathy    Spondylolisthesis of lumbar region    Arthritis of right acromioclavicular joint    Adverse effect of adrenal cortical steroids, initial encounter    Calculus of gallbladder without cholecystitis without obstruction    Chronic right shoulder pain    Hyperthyroidism    Primary insomnia    Greater trochanteric bursitis of left hip       Past Medical History:   Diagnosis Date    Anxiety     Cataract, bilateral     last assessed    Cervical radiculopathy     Disc disease, degenerative, cervical     Herpes simplex infection     Hypertension     Insomnia     Low back pain     Lumbar degenerative disc disease     Lumbar facet arthropathy     Lumbar radiculopathy     Lumbar stenosis without neurogenic claudication     Mononucleosis     Myofascial pain dysfunction syndrome     Osteoarthritis     shoulder region - unspecified laterality - last assessed 2/1/14    Plantar fasciitis     Ptosis, both eyelids     last assessed 10/6/16    Ptosis, congenital, left     Retinal detachment     last assessed 12/11/14 right eye    Sacroiliitis ([address] )     Thyroid disease     Thyrotoxicosis     last assessed 5/17/13    Vertigo        Past Surgical History:   Procedure Laterality Date    [place] SURG IMPLNT NEUROELECT,EPIDURAL Left 1/26/2016    Procedure: PLACEMENT OF THORACIC SPINAL CORD STIMULATOR WITH LEFT BUTTOCK IMPLANTABLE PULSE GENERATOR (IMPULSE MONITORING);   Surgeon: Prem Cantu MD;  Location: [facility];  Service: Neurosurgery    RETINAL DETACHMENT SURGERY Right          Current Outpatient Medications:     busPIRone (BUSPAR) 5 mg tablet, Take 1 tablet (5 mg total) by mouth 4 (four) times a day, Disp: 360 tablet, Rfl: 1    Cetirizine HCl (ZYRTEC PO), Take by mouth as needed, Disp: , Rfl:     diclofenac (VOLTAREN) 75 mg EC tablet, Take 1 tablet (75 mg total) by mouth 2 (two) times a day (Patient taking differently: Take 75 mg by mouth 3 (three) times a day ), Disp: 180 tablet, Rfl: 1    Esomeprazole Magnesium (NEXIUM PO), Take 1 capsule by mouth daily, Disp: , Rfl:     fluticasone (FLONASE) 50 mcg/act nasal spray, 1 spray into each nostril daily (Patient taking differently: 1 spray into each nostril as needed ), Disp: 16 g, Rfl: 3    LORazepam (ATIVAN) 1 mg tablet, Take 1 5 tablets (1 5 mg total) by mouth daily at bedtime as needed for anxiety, Disp: 135 tablet, Rfl: 1    methimazole (TAPAZOLE) 5 mg tablet, Take 1 tablet (5 mg total) by mouth daily, Disp: 90 tablet, Rfl: 1    penciclovir (DENAVIR) 1 % cream, Apply topically daily as needed (ulcer), Disp: 5 g, Rfl: 5    pregabalin (LYRICA) 50 mg capsule, Take 1 in the AM and 2 at bedtime, Disp: 270 capsule, Rfl: 1    propranolol (INDERAL) 10 mg tablet, Take 1 tablet (10 mg total) by mouth 2 (two) times a day, Disp: 180 tablet, Rfl: 1    quinapril (ACCUPRIL) 10 mg tablet, Take 1 tablet (10 mg total) by mouth 4 (four) times a day, Disp: 360 tablet, Rfl: 1    RESTASIS 0 05 % ophthalmic emulsion, Administer 1 drop to both eyes 2 (two) times a day, Disp: , Rfl: 3    tiZANidine (ZANAFLEX) 4 mg tablet, Take 1 tablet (4 mg total) by mouth 2 (two) times a day, Disp: 180 tablet, Rfl: 1    valACYclovir (VALTREX) 500 mg tablet, Take 1 tablet (500 mg total) by mouth daily as needed (Cold Sores), Disp: 30 tablet, Rfl: 1    Allergies   Allergen Reactions    Other Dermatitis     Adhesive tape - please use ONLY PAPER TAPE    Scopolamine Other (See Comments)     Severe, debilitating headache      Flexeril [Cyclobenzaprine] Dizziness and Fatigue    Naproxen      Other reaction(s): Unknown Allergic Reaction  Annotation - [id number]: nightmares    Penicillins      Other reaction(s): Unknown Allergic Reaction  Annotation - [id number]: childhood reaction    Promethazine-Codeine      Reaction Date: [id number]; Annotation - [id number]: nightmares; Annotation - [id number]: nightmares    Tramadol      Other reaction(s): Unknown Allergic Reaction  Annotation - [id number]: PT HAS NIGHTMARES FROM TRAMADOL    Wound Dressing Adhesive Rash       Physical Exam:   Vitals:    06/30/20 1416   BP: 148/86   Pulse: 65   Resp: 20   Temp: 97 9 °F (36 6 °C)   SpO2: 97%     General: Awake, Alert, Oriented x 3, Mood and affect appropriate  Respiratory: Respirations even and unlabored  Cardiovascular: Peripheral pulses intact; no edema  Musculoskeletal Exam:   Left lower back tenderness    ASA Score: 2    Patient/Chart Verification  Patient ID Verified: Verbal  Consents Confirmed: To be obtained in the Pre-Procedure area  H&P( within 30 days) Verified: To be obtained in the Pre-Procedure area  Allergies Reviewed: Yes  Anticoag/NSAID held?: NA  Currently on antibiotics?: No    Assessment:   1   Intervertebral disc disorder with radiculopathy of lumbar region        Plan: Left L3-4 TF PRATIK

## 2020-06-30 NOTE — DISCHARGE INSTR - LAB
Epidural Steroid Injection   WHAT YOU NEED TO KNOW:   An epidural steroid injection (PRATIK) is a procedure to inject steroid medicine into the epidural space  The epidural space is between your spinal cord and vertebrae  Steroids reduce inflammation and fluid buildup in your spine that may be causing pain  You may be given pain medicine along with the steroids  ACTIVITY  · Do not drive or operate machinery today  · No strenuous activity today - bending, lifting, etc   · You may resume normal activites starting tomorrow - start slowly and as tolerated  · You may shower today, but no tub baths or hot tubs  · You may have numbness for several hours from the local anesthetic  Please use caution and common sense, especially with weight-bearing activities  CARE OF THE INJECTION SITE  · If you have soreness or pain, apply ice to the area today (20 minutes on/20 minutes off)  · Starting tomorrow, you may use warm, moist heat or ice if needed  · You may have an increase or change in your discomfort for 36-48 hours after your treatment  · Apply ice and continue with any pain medication you have been prescribed  · Notify [facility] if you have any of the following: redness, drainage, swelling, headache, stiff neck or fever above 100°F     SPECIAL INSTRUCTIONS  · Our office will contact you in approximately 7 days for a progress report  MEDICATIONS  · Continue to take all routine medications  · Our office may have instructed you to hold some medications  If you have a problem specifically related to your procedure, please call our office at [phone]  Problems not related to your procedure should be directed to your primary care physician

## 2020-07-02 RX ORDER — LORAZEPAM 1 MG/1
1.5 TABLET ORAL
Qty: 135 TABLET | Refills: 1 | Status: SHIPPED | OUTPATIENT
Start: 2020-07-02 | End: 2020-12-30 | Stop reason: SDUPTHER

## 2020-07-02 RX ORDER — BUSPIRONE HYDROCHLORIDE 5 MG/1
5 TABLET ORAL 4 TIMES DAILY
Qty: 360 TABLET | Refills: 1 | Status: SHIPPED | OUTPATIENT
Start: 2020-07-02 | End: 2020-12-30 | Stop reason: SDUPTHER

## 2020-07-02 RX ORDER — VALACYCLOVIR HYDROCHLORIDE 500 MG/1
500 TABLET, FILM COATED ORAL DAILY PRN
Qty: 30 TABLET | Refills: 1 | Status: SHIPPED | OUTPATIENT
Start: 2020-07-02 | End: 2020-12-30 | Stop reason: SDUPTHER

## 2020-07-02 RX ORDER — PROPRANOLOL HYDROCHLORIDE 10 MG/1
10 TABLET ORAL 2 TIMES DAILY
Qty: 180 TABLET | Refills: 1 | Status: SHIPPED | OUTPATIENT
Start: 2020-07-02 | End: 2020-12-30 | Stop reason: SDUPTHER

## 2020-07-07 ENCOUNTER — TELEPHONE (OUTPATIENT)
Dept: PAIN MEDICINE | Facility: CLINIC | Age: 65
End: 2020-07-07

## 2020-08-02 DIAGNOSIS — B00.1 COLD SORE: ICD-10-CM

## 2020-08-03 RX ORDER — VALACYCLOVIR HYDROCHLORIDE 500 MG/1
500 TABLET, FILM COATED ORAL DAILY PRN
Qty: 30 TABLET | Refills: 1 | OUTPATIENT
Start: 2020-08-03 | End: 2020-09-02

## 2020-09-08 DIAGNOSIS — G89.4 PAIN SYNDROME, CHRONIC: ICD-10-CM

## 2020-09-08 RX ORDER — DICLOFENAC SODIUM 75 MG/1
75 TABLET, DELAYED RELEASE ORAL 2 TIMES DAILY
Qty: 180 TABLET | Refills: 1 | Status: SHIPPED | OUTPATIENT
Start: 2020-09-08 | End: 2021-03-05 | Stop reason: SDUPTHER

## 2020-09-11 ENCOUNTER — OFFICE VISIT (OUTPATIENT)
Dept: PAIN MEDICINE | Facility: CLINIC | Age: 65
End: 2020-09-11
Payer: COMMERCIAL

## 2020-09-11 VITALS
SYSTOLIC BLOOD PRESSURE: 138 MMHG | WEIGHT: 193 LBS | BODY MASS INDEX: 30.29 KG/M2 | TEMPERATURE: 97.6 F | HEART RATE: 82 BPM | RESPIRATION RATE: 18 BRPM | HEIGHT: 67 IN | DIASTOLIC BLOOD PRESSURE: 76 MMHG

## 2020-09-11 DIAGNOSIS — G89.4 PAIN SYNDROME, CHRONIC: Primary | ICD-10-CM

## 2020-09-11 DIAGNOSIS — M51.36 LUMBAR DEGENERATIVE DISC DISEASE: ICD-10-CM

## 2020-09-11 DIAGNOSIS — M47.816 LUMBAR FACET ARTHROPATHY: ICD-10-CM

## 2020-09-11 DIAGNOSIS — M54.16 LUMBAR RADICULOPATHY: ICD-10-CM

## 2020-09-11 DIAGNOSIS — M51.26 BULGE OF LUMBAR DISC WITHOUT MYELOPATHY: ICD-10-CM

## 2020-09-11 DIAGNOSIS — M48.061 LUMBAR STENOSIS WITHOUT NEUROGENIC CLAUDICATION: ICD-10-CM

## 2020-09-11 DIAGNOSIS — M43.16 SPONDYLOLISTHESIS OF LUMBAR REGION: ICD-10-CM

## 2020-09-11 PROCEDURE — 99214 OFFICE O/P EST MOD 30 MIN: CPT | Performed by: NURSE PRACTITIONER

## 2020-09-11 PROCEDURE — 1036F TOBACCO NON-USER: CPT | Performed by: NURSE PRACTITIONER

## 2020-09-11 NOTE — PROGRESS NOTES
Assessment:  1  Pain syndrome, chronic    2  Lumbar radiculopathy    3  Lumbar stenosis without neurogenic claudication    4  Spondylolisthesis of lumbar region    5  Lumbar facet arthropathy    6  Lumbar degenerative disc disease    7  Bulge of lumbar disc without myelopathy        Plan:  Luke Olmstead is a 63 y o  female who was last seen 6/30/2020 presents for a follow up office visit in regards to chronic pain syndrome secondary to lumbar intervertebral disc disorder with radiculopathy, lumbar spondylosis, and myofascial pain  I discussed with the patient that she may benefit from repeating epidural steroid injections  The most common risk of the procedure were reviewed with the patient would like to proceed  After conferring with Sheryl Holley, an order was placed for left L3-L4 transforaminal epidural steroid injection  Complete risks and benefits including bleeding, infection, tissue reaction, nerve injury and allergic reaction were discussed  The approach was demonstrated using models and literature was provided  Verbal and written consent was obtained  The patient will continue with Lyrica, tizanidine, and diclofenac as prescribed  The patient reports she does not need refills of these medications at this time  The patient was instructed to call for refills of these medications if needed prior to next scheduled office visit  The patient will follow-up after a left L3-L4 transforaminal epidural steroid injection or sooner if symptoms worsen in the interim  The patient was agreeable and verbalized understanding  My impressions and treatment recommendations were discussed in detail with the patient who verbalized understanding and had no further questions  Discharge instructions were provided  I personally saw and examined the patient and I agree with the above discussed plan of care      Orders Placed This Encounter   Procedures    FL spine and pain procedure     Standing Status: Future     Standing Expiration Date:   9/11/2024     Order Specific Question:   Reason for Exam:     Answer:   left L3-L4 TFESI     Order Specific Question:   Anticoagulant hold needed? Answer:   no     No orders of the defined types were placed in this encounter  History of Present Illness:  Elma Narayanan is a 63 y o  female who was last seen 6/30/2020 presents for a follow up office visit in regards to chronic pain syndrome secondary to lumbar intervertebral disc disorder with radiculopathy, lumbar spondylosis, and myofascial pain  The patients current symptoms include Hip Pain; Leg Pain (LLE); and Shoulder Pain (Right)  The patient currently reports ongoing low back pain and left lower extremity pain that is slightly improved since last office visit  The patient describes the pain as intermittent, but worse in the evening, and as dull aching pain  At the last office visit the patient underwent left L3-L4 transforaminal epidural steroid injection which she reports provided her 80% relief of her pain symptoms up until this time  The patient reports the pain is worse with prolonged standing, and prolonged sitting  The patient reports multiple epidural injections in the past to include epidural steroid injections at L4 and L5, as well as a left sacroiliac joint injection  The patient denies muscle weakness or bowel or bladder dysfunction  The patient currently rates her pain as 6/10 on numeric rating scale  The patient has a spinal cord stimulator, which she reports she had reset in June, which has helped with her low back and left lower extremity pain symptoms  Current pain medications include:  Lyrica,   50 mg, 1 in the a m , and 2 at bedtime, and tizanidine 4 mg by mouth twice daily  The patient also is prescribed diclofenac 75 mg by mouth twice daily  The patient reports this pain medication regimen provides 85% relief of her pain symptoms with no noted side effects at this time      I have personally reviewed and/or updated the patient's past medical history, past surgical history, family history, social history, current medications, allergies, and vital signs today  Review of Systems   Constitutional: Negative for fever and unexpected weight change  HENT: Negative for trouble swallowing  Eyes: Negative for visual disturbance  Respiratory: Negative for shortness of breath and wheezing  Cardiovascular: Negative for chest pain and palpitations  Gastrointestinal: Negative for constipation, diarrhea, nausea and vomiting  Endocrine: Negative for cold intolerance, heat intolerance and polydipsia  Genitourinary: Negative for difficulty urinating and frequency  Musculoskeletal: Positive for gait problem  Negative for arthralgias, joint swelling and myalgias  LLE PAin  Left Hip  Right Shoulder   Skin: Negative for rash  Neurological: Negative for dizziness, seizures, syncope, weakness and headaches  Hematological: Does not bruise/bleed easily  Psychiatric/Behavioral: Negative for dysphoric mood  All other systems reviewed and are negative        Patient Active Problem List   Diagnosis    Rosacea, acne    Lumbar degenerative disc disease    Lumbar radiculopathy    Anxiety    Benign essential HTN    Cervical radiculopathy    Disc disorder of cervical region    Hot flash, menopausal    Lumbar facet arthropathy    Lumbar stenosis without neurogenic claudication    Myofascial pain syndrome    Pain syndrome, chronic    Positive depression screening    Primary osteoarthritis of right knee    Bulge of lumbar disc without myelopathy    Spondylolisthesis of lumbar region    Arthritis of right acromioclavicular joint    Adverse effect of adrenal cortical steroids, initial encounter    Calculus of gallbladder without cholecystitis without obstruction    Chronic right shoulder pain    Hyperthyroidism    Primary insomnia    Greater trochanteric bursitis of left hip       Past Medical History:   Diagnosis Date    Anxiety     Cataract, bilateral     last assessed    Cervical radiculopathy     Disc disease, degenerative, cervical     Herpes simplex infection     Hypertension     Insomnia     Low back pain     Lumbar degenerative disc disease     Lumbar facet arthropathy     Lumbar radiculopathy     Lumbar stenosis without neurogenic claudication     Mononucleosis     Myofascial pain dysfunction syndrome     Osteoarthritis     shoulder region - unspecified laterality - last assessed 2/1/14    Plantar fasciitis     Ptosis, both eyelids     last assessed 10/6/16    Ptosis, congenital, left     Retinal detachment     last assessed 12/11/14 right eye    Sacroiliitis ([address] )     Thyroid disease     Thyrotoxicosis     last assessed 5/17/13    Vertigo        Past Surgical History:   Procedure Laterality Date    [place] SURG IMPLNT NEUROELECT,EPIDURAL Left 1/26/2016    Procedure: PLACEMENT OF THORACIC SPINAL CORD STIMULATOR WITH LEFT BUTTOCK IMPLANTABLE PULSE GENERATOR (IMPULSE MONITORING);   Surgeon: Bela Garcia MD;  Location: [facility] OR;  Service: Neurosurgery    RETINAL DETACHMENT SURGERY Right        Family History   Problem Relation Age of Onset    Breast cancer Mother 80    COPD Mother     Hypertension Mother         essential    Heart attack Father         acute MI    Emphysema Father     Hypertension Father         essential    Other Father         prehypertension    No Known Problems Maternal Grandmother     No Known Problems Maternal Grandfather     No Known Problems Paternal Grandmother     No Known Problems Paternal Grandfather     No Known Problems Sister     No Known Problems Son     No Known Problems Sister     No Known Problems Paternal Aunt     No Known Problems Paternal Aunt     No Known Problems Paternal Aunt     No Known Problems Paternal Aunt     No Known Problems Paternal Aunt        Social History     Occupational History  Occupation: Business owner     Comment: [employer]ola time working   Tobacco Use    Smoking status: Never Smoker    Smokeless tobacco: Never Used   Substance and Sexual Activity    Alcohol use: Yes     Alcohol/week: 1 0 standard drinks     Types: 1 Glasses of wine per week     Frequency: Monthly or less     Drinks per session: 1 or 2     Binge frequency: Never    Drug use: No    Sexual activity: Yes       Current Outpatient Medications on File Prior to Visit   Medication Sig    busPIRone (BUSPAR) 5 mg tablet Take 1 tablet (5 mg total) by mouth 4 (four) times a day    Cetirizine HCl (ZYRTEC PO) Take by mouth as needed    diclofenac (VOLTAREN) 75 mg EC tablet Take 1 tablet (75 mg total) by mouth 2 (two) times a day    Esomeprazole Magnesium (NEXIUM PO) Take 1 capsule by mouth daily    fluticasone (FLONASE) 50 mcg/act nasal spray 1 spray into each nostril daily (Patient taking differently: 1 spray into each nostril as needed )    LORazepam (ATIVAN) 1 mg tablet Take 1 5 tablets (1 5 mg total) by mouth daily at bedtime as needed for anxiety    methimazole (TAPAZOLE) 5 mg tablet Take 1 tablet (5 mg total) by mouth daily    penciclovir (DENAVIR) 1 % cream Apply topically daily as needed (ulcer)    pregabalin (LYRICA) 50 mg capsule Take 1 in the AM and 2 at bedtime    propranolol (INDERAL) 10 mg tablet Take 1 tablet (10 mg total) by mouth 2 (two) times a day    quinapril (ACCUPRIL) 10 mg tablet Take 1 tablet (10 mg total) by mouth 4 (four) times a day    RESTASIS 0 05 % ophthalmic emulsion Administer 1 drop to both eyes 2 (two) times a day    tiZANidine (ZANAFLEX) 4 mg tablet Take 1 tablet (4 mg total) by mouth 2 (two) times a day    valACYclovir (VALTREX) 500 mg tablet Take 1 tablet (500 mg total) by mouth daily as needed (Cold Sores)     No current facility-administered medications on file prior to visit          Allergies   Allergen Reactions    Other Dermatitis     Adhesive tape - please use ONLY PAPER TAPE    Scopolamine Other (See Comments)     Severe, debilitating headache      Flexeril [Cyclobenzaprine] Dizziness and Fatigue    Naproxen      Other reaction(s): Unknown Allergic Reaction  Annotation - [id number]: nightmares    Penicillins      Other reaction(s): Unknown Allergic Reaction  Annotation - [id number]: childhood reaction    Promethazine-Codeine      Reaction Date: [id number]; Annotation - [id number]: nightmares; Annotation - [id number]: nightmares    Tramadol      Other reaction(s): Unknown Allergic Reaction  Annotation - [id number]: PT HAS NIGHTMARES FROM TRAMADOL    Wound Dressing Adhesive Rash       Physical Exam:    /76   Pulse 82   Temp 97 6 °F (36 4 °C) (Oral)   Resp 18   Ht 5' 6 5" (1 689 m)   Wt 87 5 kg (193 lb)   LMP 11/26/2015   BMI 30 68 kg/m²     Constitutional:normal, well developed, well nourished, alert, in no distress and non-toxic and no overt pain behavior   and overweight  Eyes:anicteric  HEENT:grossly intact  Neck:supple, symmetric, trachea midline and no masses   Pulmonary:even and unlabored  Cardiovascular:No edema or pitting edema present  Skin:Normal without rashes or lesions and well hydrated  Psychiatric:Mood and affect appropriate  Neurologic:Cranial Nerves II-XII grossly intact  Musculoskeletal:normal     Lumbar Spine Exam    Appearance:  Normal lordosis  Palpation/Tenderness:  no tenderness or spasm  Sensory:  no sensory deficits noted  Range of Motion:  Flexion:  Minimally limited  with pain  Extension:  No limitation  with pain  Lateral Flexion - Left:  Minimally limited  with pain  Lateral Flexion - Right:  No limitation  without pain  Motor Strength:  Left hip flexion:  5/5  Left hip extension:  5/5  Right hip flexion:  5/5  Right hip extension:  5/5  Left knee flexion:  5/5  Left knee extension:  5/5  Right knee flexion:  5/5  Right knee extension:  5/5  Left foot dorsiflexion:  5/5  Left foot plantar flexion:  5/5  Right foot dorsiflexion: 5/5  Right foot plantar flexion:  5/5  Special Tests:  Left Straight Leg Test:  positive  Right Straight Leg Test:  negative    Imaging  MRI LUMBAR SPINE WITHOUT CONTRAST (10/11/2018)     INDICATION: [id number]: Spinal stenosis, lumbar region without neurogenic claudication    Left-sided low back pain and leg pain      COMPARISON:  11/7/2017     TECHNIQUE: Limited scanning was performed using MR guidelines based on  limitations   Sagittal T2, coronal T2, axial gradient echo and axial T2 as well as sagittal SPGR imaging was obtained      Medical device:  The patient has a st fabby stimulator OONi is MR compatible   Scanning was performed as per the 's gridlines with attention to device appropriate DANUTA levels         IMAGE QUALITY:  Diagnostic     FINDINGS: There is a transitional vertebral body at the lumbosacral junction  For the purposes of this study, the first conically shaped vertebral body will be designated S1  If spinal intervention is indicated, correlation with radiography recommended      Vertebral body levels are labeled on  image 7, series 5   The numbering convention used previously will be followed on the current study        ALIGNMENT:  Trace grade 1 anterolisthesis of L5 on S1 is retrospectively stable as is a minimal levoscoliosis mid lumbar spine apex at the L3 segment      MARROW SIGNAL:  Normal marrow signal is identified within the visualized bony structures   No discrete marrow lesion      DISTAL CORD AND CONUS:  Normal size and signal within the distal cord and conus   The conus ends at the L2 level      PARASPINAL SOFT TISSUES:  Artifact in the left subcutaneous soft tissues in the low back noted likely related to stimulator leads      SACRUM:  Normal signal within the sacrum   No evidence of insufficiency or stress fracture      LOWER THORACIC DISC SPACES:  Normal disc height and signal   No disc herniation, canal stenosis or foraminal narrowing      LUMBAR DISC SPACES:     L1-L2:  Normal      L2-L3:  Normal      L3-L4:  There is a diffuse disk bulge   No significant central canal or neural foraminal narrowing   Bilateral facet hypertrophy noted  This level is similar to the prior study      L4-L5:  Small left paracentral disc protrusion   There is bilateral facet hypertrophy   Mild to moderate central canal narrowing   Mild left neural foraminal narrowing   Right neural foramen patent  This level is similar to the prior study      L5-S1:  There is uncovering the intervertebral disc space   There is bilateral facet hypertrophy  Michelle Marine is a superimposed left neural foraminal disc protrusion   Moderate left neural foraminal narrowing   Moderate central canal narrowing   Mild right   neural foraminal narrowing  This level is similar to the prior study

## 2020-09-11 NOTE — TELEPHONE ENCOUNTER
Patient  [phone]    Dr Mariajose Miranda    Patient is returning [facility] call  Please call patient to advise  Pk

## 2020-09-14 NOTE — PATIENT INSTRUCTIONS
Epidural Steroid Injection   AMBULATORY CARE:   What you need to know about an epidural steroid injection (PRATIK):  An PRATIK is a procedure to inject steroid medicine into the epidural space  The epidural space is between your spinal cord and vertebrae  Steroids reduce inflammation and fluid buildup in your spine that may be causing pain  You may be given pain medicine along with the steroids  How to prepare for an PRATIK:  Your healthcare provider will talk to you about how to prepare for your procedure  He will tell you what medicines to take or not take on the day of your procedure  You may need to stop taking blood thinners or other medicines several days before your procedure  You may need to adjust any diabetes medicine you take on the day of your procedure  Steroid medicine can increase your blood sugar level  What will happen during an PRATIK:   · You will be given medicine to numb the procedure area  You will be awake for the procedure, but you will not feel pain  You may also be given medicine to help you relax during the procedure  Contrast liquid will be used to help your healthcare provider see the area better  Tell the healthcare provider if you have ever had an allergic reaction to contrast liquid  · Your healthcare provider may place the needle into your neck area, middle of your back, or tailbone area  He may inject the medicine next to the nerves that are causing your pain  He may instead inject the medicine into a larger area of the epidural space  This helps the medicine spread to more nerves  Your healthcare provider will use a fluoroscope to help guide the needle to the right place  A fluoroscope is a type of x-ray  After the procedure, a bandage will be placed over the injection site to prevent infection  Risks of an PRATIK:  You may have temporary or permanent nerve damage or paralysis  You may have bleeding or develop a serious infection, such as meningitis (swelling of the brain coverings)  An abscess may also develop  You may need surgery to fix the abscess  You may have a seizure, anxiety, or trouble sleeping  If you are a man, you may have temporary erectile dysfunction (not able to have an erection)  Care for your wound as directed: You may remove the bandage before you go to bed the day of your procedure  You may take a shower, but do not take a bath for at least 24 hours  Self-care:   · Do not drive,  use machines, or do strenuous activity for 24 hours after your procedure or as directed  · Continue other treatments  as directed  Steroid injections alone will not control your pain  The injections are meant to be used with other treatments, such as physical therapy  Seek care immediately if:   · Blood soaks through your bandage  · Your wound is red, swollen, or draining pus  · You have a fever or chills, severe back pain, and the procedure area is sensitive to the touch  · You have a seizure  · You have trouble moving your legs  · You have weakness or numbness in your legs  · You cannot control when you urinate or have a bowel movement  Contact your healthcare provider if:   · You have nausea or are vomiting  · Your face or neck is red for a few days and you feel warm  · You have more pain than you had before the procedure  · You have swelling in your hands or feet  · You have questions or concerns about your condition or care  Follow up with your healthcare provider as directed:  Write down your questions so you remember to ask them during your visits  © 2017 [employer] [employer] Information is for End User's use only and may not be sold, redistributed or otherwise used for commercial purposes  All illustrations and images included in CareNotes® are the copyrighted property of [employer] , [employer]  or Yob[employer] Betancur  The above information is an  only  It is not intended as medical advice for individual conditions or treatments  Talk to your doctor, nurse or pharmacist before following any medical regimen to see if it is safe and effective for you

## 2020-09-15 ENCOUNTER — TELEPHONE (OUTPATIENT)
Dept: PAIN MEDICINE | Facility: CLINIC | Age: 65
End: 2020-09-15

## 2020-09-15 DIAGNOSIS — M47.816 LUMBAR FACET ARTHROPATHY: Primary | ICD-10-CM

## 2020-09-15 NOTE — TELEPHONE ENCOUNTER
Patient has borrowed her husbands Diclofenac Sodium Topical Gel 1% and it has been working well, she has finally hit her deductible for her insurance with a  script it will be free, could you please prescribe? Thank you       Pharmacy: [facility], [facility]     90 day supply if approved

## 2020-09-15 NOTE — TELEPHONE ENCOUNTER
Pt requesting a rx for Diclofenac gel, pt met deductible and a rx would be free  Pt asking for a 90 day supply if possible

## 2020-09-16 ENCOUNTER — TRANSCRIBE ORDERS (OUTPATIENT)
Dept: PAIN MEDICINE | Facility: CLINIC | Age: 65
End: 2020-09-16

## 2020-09-29 ENCOUNTER — HOSPITAL ENCOUNTER (OUTPATIENT)
Dept: RADIOLOGY | Facility: CLINIC | Age: 65
Discharge: HOME | End: 2020-09-29
Attending: ANESTHESIOLOGY | Admitting: ANESTHESIOLOGY
Payer: COMMERCIAL

## 2020-09-29 VITALS
RESPIRATION RATE: 20 BRPM | SYSTOLIC BLOOD PRESSURE: 147 MMHG | TEMPERATURE: 97.4 F | HEART RATE: 69 BPM | DIASTOLIC BLOOD PRESSURE: 91 MMHG | OXYGEN SATURATION: 99 %

## 2020-09-29 DIAGNOSIS — M54.16 LUMBAR RADICULOPATHY: ICD-10-CM

## 2020-09-29 PROCEDURE — 64483 NJX AA&/STRD TFRM EPI L/S 1: CPT | Performed by: ANESTHESIOLOGY

## 2020-09-29 PROCEDURE — 64484 NJX AA&/STRD TFRM EPI L/S EA: CPT | Performed by: ANESTHESIOLOGY

## 2020-09-29 RX ORDER — BUPIVACAINE HCL/PF 2.5 MG/ML
10 VIAL (ML) INJECTION ONCE
Status: COMPLETED | OUTPATIENT
Start: 2020-09-29 | End: 2020-09-29

## 2020-09-29 RX ORDER — 0.9 % SODIUM CHLORIDE 0.9 %
10 VIAL (ML) INJECTION ONCE
Status: COMPLETED | OUTPATIENT
Start: 2020-09-29 | End: 2020-09-29

## 2020-09-29 RX ORDER — METHYLPREDNISOLONE ACETATE 80 MG/ML
80 INJECTION, SUSPENSION INTRA-ARTICULAR; INTRALESIONAL; INTRAMUSCULAR; PARENTERAL; SOFT TISSUE ONCE
Status: COMPLETED | OUTPATIENT
Start: 2020-09-29 | End: 2020-09-29

## 2020-09-29 RX ADMIN — Medication 5 ML: at 14:09

## 2020-09-29 RX ADMIN — IOHEXOL 1 ML: 300 INJECTION, SOLUTION INTRAVENOUS at 14:11

## 2020-09-29 RX ADMIN — SODIUM CHLORIDE 5 ML: 9 INJECTION, SOLUTION INTRAMUSCULAR; INTRAVENOUS; SUBCUTANEOUS at 14:09

## 2020-09-29 RX ADMIN — METHYLPREDNISOLONE ACETATE 80 MG: 80 INJECTION, SUSPENSION INTRA-ARTICULAR; INTRALESIONAL; INTRAMUSCULAR; PARENTERAL; SOFT TISSUE at 14:11

## 2020-09-29 RX ADMIN — BUPIVACAINE HYDROCHLORIDE 2 ML: 2.5 INJECTION, SOLUTION EPIDURAL; INFILTRATION; INTRACAUDAL at 14:11

## 2020-10-02 DIAGNOSIS — B00.1 COLD SORE: ICD-10-CM

## 2020-10-02 RX ORDER — VALACYCLOVIR HYDROCHLORIDE 500 MG/1
500 TABLET, FILM COATED ORAL DAILY PRN
Qty: 30 TABLET | Refills: 1 | OUTPATIENT
Start: 2020-10-02 | End: 2020-11-01

## 2020-10-06 ENCOUNTER — TELEPHONE (OUTPATIENT)
Dept: PAIN MEDICINE | Facility: CLINIC | Age: 65
End: 2020-10-06

## 2020-10-06 DIAGNOSIS — G89.29 CHRONIC RIGHT SHOULDER PAIN: Primary | ICD-10-CM

## 2020-10-06 DIAGNOSIS — M25.511 CHRONIC RIGHT SHOULDER PAIN: Primary | ICD-10-CM

## 2020-10-19 ENCOUNTER — PROCEDURE VISIT (OUTPATIENT)
Dept: PAIN MEDICINE | Facility: CLINIC | Age: 65
End: 2020-10-19
Payer: COMMERCIAL

## 2020-10-19 ENCOUNTER — TRANSCRIBE ORDERS (OUTPATIENT)
Dept: PAIN MEDICINE | Facility: CLINIC | Age: 65
End: 2020-10-19

## 2020-10-19 VITALS
TEMPERATURE: 97.7 F | DIASTOLIC BLOOD PRESSURE: 92 MMHG | HEART RATE: 67 BPM | BODY MASS INDEX: 30.68 KG/M2 | SYSTOLIC BLOOD PRESSURE: 140 MMHG | WEIGHT: 193 LBS

## 2020-10-19 DIAGNOSIS — G89.29 CHRONIC RIGHT SHOULDER PAIN: Primary | ICD-10-CM

## 2020-10-19 DIAGNOSIS — M25.511 CHRONIC RIGHT SHOULDER PAIN: Primary | ICD-10-CM

## 2020-10-19 PROCEDURE — 20611 DRAIN/INJ JOINT/BURSA W/US: CPT | Performed by: ANESTHESIOLOGY

## 2020-10-19 RX ORDER — BUPIVACAINE HYDROCHLORIDE 2.5 MG/ML
10 INJECTION, SOLUTION EPIDURAL; INFILTRATION; INTRACAUDAL ONCE
Status: COMPLETED | OUTPATIENT
Start: 2020-10-19 | End: 2020-10-19

## 2020-10-19 RX ORDER — METHYLPREDNISOLONE ACETATE 40 MG/ML
40 INJECTION, SUSPENSION INTRA-ARTICULAR; INTRALESIONAL; INTRAMUSCULAR; SOFT TISSUE ONCE
Status: COMPLETED | OUTPATIENT
Start: 2020-10-19 | End: 2020-10-19

## 2020-10-19 RX ADMIN — BUPIVACAINE HYDROCHLORIDE 10 ML: 2.5 INJECTION, SOLUTION EPIDURAL; INFILTRATION; INTRACAUDAL at 14:24

## 2020-10-19 RX ADMIN — METHYLPREDNISOLONE ACETATE 40 MG: 40 INJECTION, SUSPENSION INTRA-ARTICULAR; INTRALESIONAL; INTRAMUSCULAR; SOFT TISSUE at 14:24

## 2020-10-26 ENCOUNTER — TELEPHONE (OUTPATIENT)
Dept: PAIN MEDICINE | Facility: CLINIC | Age: 65
End: 2020-10-26

## 2020-10-28 ENCOUNTER — TELEPHONE (OUTPATIENT)
Dept: PAIN MEDICINE | Facility: CLINIC | Age: 65
End: 2020-10-28

## 2020-10-28 DIAGNOSIS — G89.29 CHRONIC RIGHT SHOULDER PAIN: Primary | ICD-10-CM

## 2020-10-28 DIAGNOSIS — M25.511 CHRONIC RIGHT SHOULDER PAIN: Primary | ICD-10-CM

## 2020-11-13 ENCOUNTER — TELEPHONE (OUTPATIENT)
Dept: PAIN MEDICINE | Facility: MEDICAL CENTER | Age: 65
End: 2020-11-13

## 2020-11-13 DIAGNOSIS — M48.062 LUMBAR STENOSIS WITH NEUROGENIC CLAUDICATION: ICD-10-CM

## 2020-11-13 DIAGNOSIS — M48.061 LUMBAR STENOSIS WITHOUT NEUROGENIC CLAUDICATION: Primary | ICD-10-CM

## 2020-11-16 ENCOUNTER — HOSPITAL ENCOUNTER (OUTPATIENT)
Dept: RADIOLOGY | Facility: HOSPITAL | Age: 65
Discharge: HOME | End: 2020-11-16
Payer: COMMERCIAL

## 2020-11-16 DIAGNOSIS — G89.29 CHRONIC RIGHT SHOULDER PAIN: ICD-10-CM

## 2020-11-16 DIAGNOSIS — M25.511 CHRONIC RIGHT SHOULDER PAIN: ICD-10-CM

## 2020-11-16 PROCEDURE — 73221 MRI JOINT UPR EXTREM W/O DYE: CPT

## 2020-11-16 PROCEDURE — G1004 CDSM NDSC: HCPCS

## 2020-11-17 ENCOUNTER — TELEPHONE (OUTPATIENT)
Dept: RADIOLOGY | Facility: CLINIC | Age: 65
End: 2020-11-17

## 2020-11-17 ENCOUNTER — TELEPHONE (OUTPATIENT)
Dept: PAIN MEDICINE | Facility: CLINIC | Age: 65
End: 2020-11-17

## 2020-11-17 DIAGNOSIS — M75.101 NONTRAUMATIC TEAR OF RIGHT SUPRASPINATUS TENDON: Primary | ICD-10-CM

## 2020-11-17 DIAGNOSIS — M48.062 LUMBAR STENOSIS WITH NEUROGENIC CLAUDICATION: ICD-10-CM

## 2020-11-30 ENCOUNTER — HOSPITAL ENCOUNTER (OUTPATIENT)
Dept: RADIOLOGY | Age: 65
Discharge: HOME | End: 2020-11-30
Payer: COMMERCIAL

## 2020-11-30 VITALS — WEIGHT: 190 LBS | HEIGHT: 66 IN | BODY MASS INDEX: 30.53 KG/M2

## 2020-11-30 DIAGNOSIS — Z12.31 ENCOUNTER FOR SCREENING MAMMOGRAM FOR MALIGNANT NEOPLASM OF BREAST: ICD-10-CM

## 2020-11-30 PROCEDURE — 77063 BREAST TOMOSYNTHESIS BI: CPT

## 2020-11-30 PROCEDURE — 77067 SCR MAMMO BI INCL CAD: CPT

## 2020-12-08 ENCOUNTER — HOSPITAL ENCOUNTER (OUTPATIENT)
Dept: RADIOLOGY | Facility: CLINIC | Age: 65
Discharge: HOME | End: 2020-12-08
Attending: ANESTHESIOLOGY | Admitting: ANESTHESIOLOGY
Payer: COMMERCIAL

## 2020-12-08 ENCOUNTER — TELEPHONE (OUTPATIENT)
Dept: RADIOLOGY | Facility: CLINIC | Age: 65
End: 2020-12-08

## 2020-12-08 VITALS
SYSTOLIC BLOOD PRESSURE: 120 MMHG | TEMPERATURE: 97.2 F | RESPIRATION RATE: 20 BRPM | DIASTOLIC BLOOD PRESSURE: 83 MMHG | HEART RATE: 62 BPM | OXYGEN SATURATION: 98 %

## 2020-12-08 DIAGNOSIS — M48.062 LUMBAR STENOSIS WITH NEUROGENIC CLAUDICATION: ICD-10-CM

## 2020-12-08 DIAGNOSIS — G89.4 CHRONIC PAIN SYNDROME: Primary | ICD-10-CM

## 2020-12-08 PROCEDURE — 64483 NJX AA&/STRD TFRM EPI L/S 1: CPT | Performed by: ANESTHESIOLOGY

## 2020-12-08 PROCEDURE — 64484 NJX AA&/STRD TFRM EPI L/S EA: CPT | Performed by: ANESTHESIOLOGY

## 2020-12-08 RX ORDER — METHYLPREDNISOLONE ACETATE 80 MG/ML
80 INJECTION, SUSPENSION INTRA-ARTICULAR; INTRALESIONAL; INTRAMUSCULAR; PARENTERAL; SOFT TISSUE ONCE
Status: COMPLETED | OUTPATIENT
Start: 2020-12-08 | End: 2020-12-08

## 2020-12-08 RX ORDER — BUPIVACAINE HCL/PF 2.5 MG/ML
10 VIAL (ML) INJECTION ONCE
Status: COMPLETED | OUTPATIENT
Start: 2020-12-08 | End: 2020-12-08

## 2020-12-08 RX ORDER — 0.9 % SODIUM CHLORIDE 0.9 %
10 VIAL (ML) INJECTION ONCE
Status: COMPLETED | OUTPATIENT
Start: 2020-12-08 | End: 2020-12-08

## 2020-12-08 RX ADMIN — BUPIVACAINE HYDROCHLORIDE 2 ML: 2.5 INJECTION, SOLUTION EPIDURAL; INFILTRATION; INTRACAUDAL at 11:26

## 2020-12-08 RX ADMIN — SODIUM CHLORIDE 5 ML: 9 INJECTION, SOLUTION INTRAMUSCULAR; INTRAVENOUS; SUBCUTANEOUS at 11:26

## 2020-12-08 RX ADMIN — METHYLPREDNISOLONE ACETATE 80 MG: 80 INJECTION, SUSPENSION INTRA-ARTICULAR; INTRALESIONAL; INTRAMUSCULAR; PARENTERAL; SOFT TISSUE at 11:26

## 2020-12-08 RX ADMIN — Medication 5 ML: at 11:26

## 2020-12-08 RX ADMIN — IOHEXOL 1 ML: 300 INJECTION, SOLUTION INTRAVENOUS at 11:25

## 2020-12-09 ENCOUNTER — HOSPITAL ENCOUNTER (OUTPATIENT)
Dept: MAMMOGRAPHY | Facility: CLINIC | Age: 65
Discharge: HOME | End: 2020-12-09
Payer: COMMERCIAL

## 2020-12-09 ENCOUNTER — HOSPITAL ENCOUNTER (OUTPATIENT)
Dept: ULTRASOUND IMAGING | Facility: CLINIC | Age: 65
Discharge: HOME | End: 2020-12-09
Payer: COMMERCIAL

## 2020-12-09 VITALS — HEIGHT: 66 IN | BODY MASS INDEX: 30.53 KG/M2 | WEIGHT: 190 LBS

## 2020-12-09 DIAGNOSIS — R92.8 ABNORMAL SCREENING MAMMOGRAM: ICD-10-CM

## 2020-12-09 PROCEDURE — 76642 ULTRASOUND BREAST LIMITED: CPT

## 2020-12-09 PROCEDURE — 77065 DX MAMMO INCL CAD UNI: CPT

## 2020-12-09 PROCEDURE — G0279 TOMOSYNTHESIS, MAMMO: HCPCS

## 2020-12-11 ENCOUNTER — TELEPHONE (OUTPATIENT)
Dept: OBGYN CLINIC | Facility: CLINIC | Age: 65
End: 2020-12-11

## 2020-12-13 DIAGNOSIS — M79.18 MYOFASCIAL PAIN SYNDROME: ICD-10-CM

## 2020-12-14 RX ORDER — TIZANIDINE 4 MG/1
4 TABLET ORAL 2 TIMES DAILY
Qty: 180 TABLET | Refills: 1 | Status: SHIPPED | OUTPATIENT
Start: 2020-12-14 | End: 2021-06-04 | Stop reason: SDUPTHER

## 2020-12-15 ENCOUNTER — TELEPHONE (OUTPATIENT)
Dept: PAIN MEDICINE | Facility: CLINIC | Age: 65
End: 2020-12-15

## 2020-12-25 LAB
ALBUMIN SERPL-MCNC: 4.3 G/DL (ref 3.8–4.8)
ALBUMIN/GLOB SERPL: 1.9 {RATIO} (ref 1.2–2.2)
ALP SERPL-CCNC: 125 IU/L (ref 39–117)
ALT SERPL-CCNC: 18 IU/L (ref 0–32)
AST SERPL-CCNC: 25 IU/L (ref 0–40)
BILIRUB SERPL-MCNC: 0.6 MG/DL (ref 0–1.2)
BUN SERPL-MCNC: 12 MG/DL (ref 8–27)
BUN/CREAT SERPL: 16 (ref 12–28)
CALCIUM SERPL-MCNC: 9.4 MG/DL (ref 8.7–10.3)
CHLORIDE SERPL-SCNC: 102 MMOL/L (ref 96–106)
CHOLEST SERPL-MCNC: 235 MG/DL (ref 100–199)
CO2 SERPL-SCNC: 27 MMOL/L (ref 20–29)
CREAT SERPL-MCNC: 0.73 MG/DL (ref 0.57–1)
GLOBULIN SER-MCNC: 2.3 G/DL (ref 1.5–4.5)
GLUCOSE SERPL-MCNC: 85 MG/DL (ref 65–99)
HDLC SERPL-MCNC: 77 MG/DL
LDLC SERPL CALC-MCNC: 146 MG/DL (ref 0–99)
POTASSIUM SERPL-SCNC: 4.4 MMOL/L (ref 3.5–5.2)
PROT SERPL-MCNC: 6.6 G/DL (ref 6–8.5)
SL AMB EGFR AFRICAN AMERICAN: 101 ML/MIN/1.73
SL AMB EGFR NON AFRICAN AMERICAN: 88 ML/MIN/1.73
SL AMB VLDL CHOLESTEROL CALC: 12 MG/DL (ref 5–40)
SODIUM SERPL-SCNC: 139 MMOL/L (ref 134–144)
TRIGL SERPL-MCNC: 70 MG/DL (ref 0–149)

## 2020-12-30 ENCOUNTER — TELEMEDICINE (OUTPATIENT)
Dept: INTERNAL MEDICINE CLINIC | Facility: CLINIC | Age: 65
End: 2020-12-30
Payer: COMMERCIAL

## 2020-12-30 VITALS — DIASTOLIC BLOOD PRESSURE: 87 MMHG | SYSTOLIC BLOOD PRESSURE: 139 MMHG

## 2020-12-30 DIAGNOSIS — L71.9 ROSACEA: Primary | ICD-10-CM

## 2020-12-30 DIAGNOSIS — L71.9 ROSACEA, ACNE: ICD-10-CM

## 2020-12-30 DIAGNOSIS — M47.816 LUMBAR SPONDYLOSIS: ICD-10-CM

## 2020-12-30 DIAGNOSIS — F51.01 PRIMARY INSOMNIA: ICD-10-CM

## 2020-12-30 DIAGNOSIS — M79.18 MYOFASCIAL PAIN SYNDROME: ICD-10-CM

## 2020-12-30 DIAGNOSIS — I10 ESSENTIAL HYPERTENSION: ICD-10-CM

## 2020-12-30 DIAGNOSIS — E05.90 HYPERTHYROIDISM: ICD-10-CM

## 2020-12-30 DIAGNOSIS — I10 BENIGN ESSENTIAL HTN: ICD-10-CM

## 2020-12-30 DIAGNOSIS — E78.5 HYPERLIPIDEMIA, UNSPECIFIED HYPERLIPIDEMIA TYPE: ICD-10-CM

## 2020-12-30 DIAGNOSIS — Z11.59 ENCOUNTER FOR HEPATITIS C SCREENING TEST FOR LOW RISK PATIENT: ICD-10-CM

## 2020-12-30 DIAGNOSIS — B00.1 COLD SORE: ICD-10-CM

## 2020-12-30 DIAGNOSIS — M54.12 CERVICAL RADICULOPATHY: ICD-10-CM

## 2020-12-30 DIAGNOSIS — F41.9 ANXIETY: ICD-10-CM

## 2020-12-30 DIAGNOSIS — M47.816 LUMBAR FACET ARTHROPATHY: ICD-10-CM

## 2020-12-30 PROCEDURE — 3075F SYST BP GE 130 - 139MM HG: CPT | Performed by: NURSE PRACTITIONER

## 2020-12-30 PROCEDURE — 3079F DIAST BP 80-89 MM HG: CPT | Performed by: NURSE PRACTITIONER

## 2020-12-30 PROCEDURE — 1036F TOBACCO NON-USER: CPT | Performed by: NURSE PRACTITIONER

## 2020-12-30 PROCEDURE — 99214 OFFICE O/P EST MOD 30 MIN: CPT | Performed by: NURSE PRACTITIONER

## 2020-12-30 RX ORDER — PREGABALIN 50 MG/1
CAPSULE ORAL
Qty: 270 CAPSULE | Refills: 3 | Status: SHIPPED | OUTPATIENT
Start: 2020-12-30 | End: 2021-07-12 | Stop reason: SDUPTHER

## 2020-12-30 RX ORDER — VALACYCLOVIR HYDROCHLORIDE 500 MG/1
500 TABLET, FILM COATED ORAL DAILY PRN
Qty: 30 TABLET | Refills: 1 | Status: SHIPPED | OUTPATIENT
Start: 2020-12-30 | End: 2021-07-27 | Stop reason: SDUPTHER

## 2020-12-30 RX ORDER — QUINAPRIL 20 MG/1
20 TABLET ORAL 2 TIMES DAILY
Qty: 180 TABLET | Refills: 1 | Status: SHIPPED | OUTPATIENT
Start: 2020-12-30 | End: 2021-05-11 | Stop reason: SDUPTHER

## 2020-12-30 RX ORDER — LORAZEPAM 1 MG/1
1.5 TABLET ORAL
Qty: 135 TABLET | Refills: 1 | Status: SHIPPED | OUTPATIENT
Start: 2020-12-30 | End: 2021-07-07 | Stop reason: SDUPTHER

## 2020-12-30 RX ORDER — PROPRANOLOL HYDROCHLORIDE 10 MG/1
10 TABLET ORAL 2 TIMES DAILY
Qty: 180 TABLET | Refills: 1 | Status: SHIPPED | OUTPATIENT
Start: 2020-12-30 | End: 2021-05-11 | Stop reason: SDUPTHER

## 2020-12-30 RX ORDER — DOXYCYCLINE HYCLATE 100 MG/1
100 CAPSULE ORAL EVERY 12 HOURS SCHEDULED
Qty: 180 CAPSULE | Refills: 1 | Status: SHIPPED | OUTPATIENT
Start: 2020-12-30 | End: 2021-01-28

## 2020-12-30 RX ORDER — METHIMAZOLE 5 MG/1
5 TABLET ORAL DAILY
Qty: 90 TABLET | Refills: 1 | Status: SHIPPED | OUTPATIENT
Start: 2020-12-30 | End: 2021-05-11 | Stop reason: SDUPTHER

## 2020-12-30 RX ORDER — BUSPIRONE HYDROCHLORIDE 10 MG/1
10 TABLET ORAL 3 TIMES DAILY
Qty: 270 TABLET | Refills: 1 | Status: SHIPPED | OUTPATIENT
Start: 2020-12-30 | End: 2021-08-10 | Stop reason: SDUPTHER

## 2021-01-04 DIAGNOSIS — F41.9 ANXIETY: ICD-10-CM

## 2021-01-05 RX ORDER — BUSPIRONE HYDROCHLORIDE 5 MG/1
5 TABLET ORAL 4 TIMES DAILY
Qty: 360 TABLET | Refills: 1 | OUTPATIENT
Start: 2021-01-05

## 2021-01-17 DIAGNOSIS — M47.816 LUMBAR FACET ARTHROPATHY: ICD-10-CM

## 2021-01-24 DIAGNOSIS — B00.1 COLD SORE: ICD-10-CM

## 2021-01-26 RX ORDER — VALACYCLOVIR HYDROCHLORIDE 500 MG/1
500 TABLET, FILM COATED ORAL DAILY PRN
Qty: 30 TABLET | Refills: 1 | OUTPATIENT
Start: 2021-01-26 | End: 2021-02-25

## 2021-01-28 ENCOUNTER — OFFICE VISIT (OUTPATIENT)
Dept: INTERNAL MEDICINE CLINIC | Facility: CLINIC | Age: 66
End: 2021-01-28
Payer: COMMERCIAL

## 2021-01-28 VITALS
SYSTOLIC BLOOD PRESSURE: 158 MMHG | HEART RATE: 89 BPM | WEIGHT: 203 LBS | DIASTOLIC BLOOD PRESSURE: 90 MMHG | OXYGEN SATURATION: 100 % | TEMPERATURE: 98.8 F | BODY MASS INDEX: 32.62 KG/M2 | HEIGHT: 66 IN

## 2021-01-28 DIAGNOSIS — R00.2 PALPITATIONS: Primary | ICD-10-CM

## 2021-01-28 DIAGNOSIS — L71.9 ROSACEA: ICD-10-CM

## 2021-01-28 DIAGNOSIS — F41.9 ANXIETY: ICD-10-CM

## 2021-01-28 PROCEDURE — 99213 OFFICE O/P EST LOW 20 MIN: CPT | Performed by: INTERNAL MEDICINE

## 2021-01-28 PROCEDURE — 3080F DIAST BP >= 90 MM HG: CPT | Performed by: INTERNAL MEDICINE

## 2021-01-28 PROCEDURE — 3008F BODY MASS INDEX DOCD: CPT | Performed by: INTERNAL MEDICINE

## 2021-01-28 PROCEDURE — 1036F TOBACCO NON-USER: CPT | Performed by: INTERNAL MEDICINE

## 2021-01-28 PROCEDURE — 3077F SYST BP >= 140 MM HG: CPT | Performed by: INTERNAL MEDICINE

## 2021-01-28 PROCEDURE — 93000 ELECTROCARDIOGRAM COMPLETE: CPT | Performed by: INTERNAL MEDICINE

## 2021-01-28 RX ORDER — DOXYCYCLINE HYCLATE 100 MG/1
100 CAPSULE ORAL EVERY 12 HOURS SCHEDULED
Qty: 180 CAPSULE | Refills: 1
Start: 2021-01-28 | End: 2021-05-11 | Stop reason: SDUPTHER

## 2021-01-28 NOTE — PROGRESS NOTES
Assessment/Plan:    Palpitations  Likely anxiety induced  POC EKG: NSR without ST or T-wave abnormalities  Anxiety  Continue lorazepam 1 5 mg at bedtime as needed for increased agitation/insomnia  She is currently taking BuSpar 5 mg in the morning and 10 mg in the evening to which I recommend she increase her dose to 10 mg 3 times a day  Diagnoses and all orders for this visit:    Palpitations  -     POCT ECG    Anxiety    Rosacea  -     doxycycline hyclate (VIBRAMYCIN) 100 mg capsule; Take 1 capsule (100 mg total) by mouth every 12 (twelve) hours                  Subjective:      Patient ID: Xavier Veras is a 63 y o  female  Chief Complaint   Patient presents with    Palpitations     Patient is here c/o palpitation for the last couple weeks, but starting on Sunday it has been getting worse  Pt also has been getting irritable, abnormal mood changes, patient states  62-year-old female is seen today with concern for palpitations of 2 weeks duration that are progressively worsening  She is also experiencing irritability and mood swings  Symptoms are random and occur more towards the end of the day  Symptoms last approximately 2 hours and are self limited especially with calming exercises  She is compliant with her medication regimen  She takes lorazepam 1 5 mg daily at bedtime  She has checked her vitals during these episodes and reports normal blood pressure and heart rate readings  Palpitations  This is a new problem  The current episode started 1 to 4 weeks ago  The problem occurs intermittently  The problem has been unchanged  Pertinent negatives include no abdominal pain, chest pain, chills, congestion, coughing, diaphoresis, fatigue, fever, headaches, nausea, numbness, sore throat, vomiting or weakness         The following portions of the patient's history were reviewed and updated as appropriate: allergies, current medications, past family history, past medical history, past social history, past surgical history and problem list     Review of Systems   Constitutional: Negative for activity change, appetite change, chills, diaphoresis, fatigue and fever  HENT: Negative for congestion, postnasal drip, rhinorrhea, sinus pressure, sinus pain, sneezing and sore throat  Eyes: Negative for visual disturbance  Respiratory: Negative for apnea, cough, choking, chest tightness, shortness of breath and wheezing  Cardiovascular: Negative for chest pain, palpitations and leg swelling  Gastrointestinal: Negative for abdominal distention, abdominal pain, anal bleeding, blood in stool, constipation, diarrhea, nausea and vomiting  Endocrine: Negative for cold intolerance and heat intolerance  Genitourinary: Negative for difficulty urinating, dysuria and hematuria  Musculoskeletal: Negative  Skin: Negative  Neurological: Negative for dizziness, weakness, light-headedness, numbness and headaches  Hematological: Negative for adenopathy  Psychiatric/Behavioral: Negative for agitation, sleep disturbance and suicidal ideas  All other systems reviewed and are negative          Past Medical History:   Diagnosis Date    Anxiety     Cataract, bilateral     last assessed    Cervical radiculopathy     Disc disease, degenerative, cervical     Herpes simplex infection     Hypertension     Insomnia     Low back pain     Lumbar degenerative disc disease     Lumbar facet arthropathy     Lumbar radiculopathy     Lumbar stenosis without neurogenic claudication     Mononucleosis     Myofascial pain dysfunction syndrome     Osteoarthritis     shoulder region - unspecified laterality - last assessed 2/1/14    Plantar fasciitis     Ptosis, both eyelids     last assessed 10/6/16    Ptosis, congenital, left     Retinal detachment     last assessed 12/11/14 right eye    Sacroiliitis ([facility] [address] )     Thyroid disease     Thyrotoxicosis     last assessed 5/17/13    Vertigo Current Outpatient Medications:     busPIRone (BUSPAR) 10 mg tablet, Take 1 tablet (10 mg total) by mouth 3 (three) times a day (Patient taking differently: Take 10 mg by mouth 3 (three) times a day 5mg AM and 10mg PM), Disp: 270 tablet, Rfl: 1    Cetirizine HCl (ZYRTEC PO), Take by mouth as needed, Disp: , Rfl:     diclofenac (VOLTAREN) 75 mg EC tablet, Take 1 tablet (75 mg total) by mouth 2 (two) times a day, Disp: 180 tablet, Rfl: 1    Diclofenac Sodium (VOLTAREN) 1 %, APPLY 2 GRAMS TO AFFECTED AREA 4 TIMES A DAY, Disp: 200 g, Rfl: 1    Esomeprazole Magnesium (NEXIUM PO), Take 1 capsule by mouth daily, Disp: , Rfl:     fluticasone (FLONASE) 50 mcg/act nasal spray, 1 spray into each nostril daily (Patient taking differently: 1 spray into each nostril as needed ), Disp: 16 g, Rfl: 3    LORazepam (ATIVAN) 1 mg tablet, Take 1 5 tablets (1 5 mg total) by mouth daily at bedtime as needed for anxiety, Disp: 135 tablet, Rfl: 1    methimazole (TAPAZOLE) 5 mg tablet, Take 1 tablet (5 mg total) by mouth daily, Disp: 90 tablet, Rfl: 1    penciclovir (DENAVIR) 1 % cream, Apply topically daily as needed (ulcer), Disp: 5 g, Rfl: 5    pregabalin (LYRICA) 50 mg capsule, Take 1 in the AM and 2 at bedtime, Disp: 270 capsule, Rfl: 3    propranolol (INDERAL) 10 mg tablet, Take 1 tablet (10 mg total) by mouth 2 (two) times a day, Disp: 180 tablet, Rfl: 1    quinapril (ACCUPRIL) 20 mg tablet, Take 1 tablet (20 mg total) by mouth 2 (two) times a day (Patient taking differently: Take 20 mg by mouth 2 (two) times a day 10mg AM and 20mg PM), Disp: 180 tablet, Rfl: 1    RESTASIS 0 05 % ophthalmic emulsion, Administer 1 drop to both eyes 2 (two) times a day, Disp: , Rfl: 3    tiZANidine (ZANAFLEX) 4 mg tablet, TAKE 1 TABLET (4 MG TOTAL) BY MOUTH 2 (TWO) TIMES A DAY, Disp: 180 tablet, Rfl: 1    valACYclovir (VALTREX) 500 mg tablet, Take 1 tablet (500 mg total) by mouth daily as needed (Cold Sores), Disp: 30 tablet, Rfl: 1    doxycycline hyclate (VIBRAMYCIN) 100 mg capsule, Take 1 capsule (100 mg total) by mouth every 12 (twelve) hours, Disp: 180 capsule, Rfl: 1    Allergies   Allergen Reactions    Other Dermatitis     Adhesive tape - please use ONLY PAPER TAPE    Scopolamine Other (See Comments)     Severe, debilitating headache      Flexeril [Cyclobenzaprine] Dizziness and Fatigue    Naproxen      Other reaction(s): Unknown Allergic Reaction  Annotation - [id number]: nightmares    Penicillins      Other reaction(s): Unknown Allergic Reaction  Annotation - [id number]: childhood reaction    Promethazine-Codeine      Reaction Date: [id number]; Annotation - [id number]: nightmares; Annotation - [id number]: nightmares    Tramadol      Other reaction(s): Unknown Allergic Reaction  Annotation - [id number]: PT HAS NIGHTMARES FROM TRAMADOL    Wound Dressing Adhesive Rash       Social History   Past Surgical History:   Procedure Laterality Date    ID SURG IMPLNT NEUROELECT,EPIDURAL Left 1/26/2016    Procedure: PLACEMENT OF THORACIC SPINAL CORD STIMULATOR WITH LEFT BUTTOCK IMPLANTABLE PULSE GENERATOR (IMPULSE MONITORING);   Surgeon: Nilsa Macias MD;  Location: [facility] OR;  Service: Neurosurgery    RETINAL DETACHMENT SURGERY Right      Family History   Problem Relation Age of Onset    Breast cancer Mother 80    COPD Mother     Hypertension Mother         essential    Heart attack Father         acute MI    Emphysema Father     Hypertension Father         essential    Other Father         prehypertension    No Known Problems Maternal Grandmother     No Known Problems Maternal Grandfather     No Known Problems Paternal Grandmother     No Known Problems Paternal Grandfather     No Known Problems Sister     No Known Problems Son     No Known Problems Sister     No Known Problems Paternal Aunt     No Known Problems Paternal Aunt     No Known Problems Paternal Aunt     No Known Problems Paternal Aunt     No Known Problems Paternal Aunt     Liver cancer Maternal Uncle        Objective:  /90 (BP Location: Left arm, Patient Position: Sitting, Cuff Size: Large)   Pulse 89   Temp 98 8 °F (37 1 °C) (Temporal)   Ht 5' 6" (1 676 m)   Wt 92 1 kg (203 lb)   LMP 11/26/2015   SpO2 100%   BMI 32 77 kg/m²     Recent Results (from the past 1344 hour(s))   Comprehensive metabolic panel    Collection Time: 12/23/20  8:13 AM   Result Value Ref Range    Glucose, Random 85 65 - 99 mg/dL    BUN 12 8 - 27 mg/dL    Creatinine 0 73 0 57 - 1 00 mg/dL    eGFR Non  88 >59 mL/min/1 73    eGFR  101 >59 mL/min/1 73    SL AMB BUN/CREATININE RATIO 16 12 - 28    Sodium 139 134 - 144 mmol/L    Potassium 4 4 3 5 - 5 2 mmol/L    Chloride 102 96 - 106 mmol/L    CO2 27 20 - 29 mmol/L    CALCIUM 9 4 8 7 - 10 3 mg/dL    Protein, Total 6 6 6 0 - 8 5 g/dL    Albumin 4 3 3 8 - 4 8 g/dL    Globulin, Total 2 3 1 5 - 4 5 g/dL    Albumin/Globulin Ratio 1 9 1 2 - 2 2    TOTAL BILIRUBIN 0 6 0 0 - 1 2 mg/dL    Alk Phos Isoenzymes 125 (H) 39 - 117 IU/L    AST 25 0 - 40 IU/L    ALT 18 0 - 32 IU/L   Lipid panel    Collection Time: 12/23/20  8:13 AM   Result Value Ref Range    Cholesterol, Total 235 (H) 100 - 199 mg/dL    Triglycerides 70 0 - 149 mg/dL    HDL 77 >39 mg/dL    VLDL Cholesterol Calculated 12 5 - 40 mg/dL    LDL Calculated 146 (H) 0 - 99 mg/dL            Physical Exam  Vitals signs and nursing note reviewed  Constitutional:       General: She is not in acute distress  Appearance: She is well-developed  She is not diaphoretic  HENT:      Head: Normocephalic and atraumatic  Eyes:      General:         Right eye: No discharge  Left eye: No discharge  Conjunctiva/sclera: Conjunctivae normal       Pupils: Pupils are equal, round, and reactive to light  Neck:      Musculoskeletal: Normal range of motion and neck supple  Thyroid: No thyromegaly  Vascular: No JVD     Cardiovascular:      Rate and Rhythm: Normal rate and regular rhythm  Heart sounds: Normal heart sounds  No murmur  No friction rub  No gallop  Pulmonary:      Effort: Pulmonary effort is normal  No respiratory distress  Breath sounds: Normal breath sounds  No wheezing or rales  Chest:      Chest wall: No tenderness  Abdominal:      General: There is no distension  Palpations: Abdomen is soft  Tenderness: There is no abdominal tenderness  Musculoskeletal: Normal range of motion  General: No tenderness or deformity  Lymphadenopathy:      Cervical: No cervical adenopathy  Skin:     General: Skin is warm and dry  Coloration: Skin is not pale  Findings: No erythema or rash  Neurological:      Mental Status: She is alert and oriented to person, place, and time  Cranial Nerves: No cranial nerve deficit  Coordination: Coordination normal    Psychiatric:         Behavior: Behavior normal          Thought Content:  Thought content normal          Judgment: Judgment normal

## 2021-01-28 NOTE — ASSESSMENT & PLAN NOTE
Continue lorazepam 1 5 mg at bedtime as needed for increased agitation/insomnia  She is currently taking BuSpar 5 mg in the morning and 10 mg in the evening to which I recommend she increase her dose to 10 mg 3 times a day

## 2021-02-08 ENCOUNTER — ANNUAL EXAM (OUTPATIENT)
Dept: OBGYN CLINIC | Facility: MEDICAL CENTER | Age: 66
End: 2021-02-08
Payer: COMMERCIAL

## 2021-02-08 VITALS
BODY MASS INDEX: 32.21 KG/M2 | WEIGHT: 200.4 LBS | HEIGHT: 66 IN | SYSTOLIC BLOOD PRESSURE: 124 MMHG | DIASTOLIC BLOOD PRESSURE: 80 MMHG

## 2021-02-08 DIAGNOSIS — Z01.419 ENCOUNTER FOR GYNECOLOGICAL EXAMINATION (GENERAL) (ROUTINE) WITHOUT ABNORMAL FINDINGS: Primary | ICD-10-CM

## 2021-02-08 DIAGNOSIS — Z11.51 SCREENING FOR HPV (HUMAN PAPILLOMAVIRUS): ICD-10-CM

## 2021-02-08 DIAGNOSIS — Z12.31 ENCOUNTER FOR SCREENING MAMMOGRAM FOR MALIGNANT NEOPLASM OF BREAST: ICD-10-CM

## 2021-02-08 PROCEDURE — 99396 PREV VISIT EST AGE 40-64: CPT | Performed by: NURSE PRACTITIONER

## 2021-02-08 NOTE — PROGRESS NOTES
Encounter for gynecological examination (general) (routine) without abnormal findings  Benign findings on routine gyn exam  Recommended monthly SBE, annual CBE and annual screening mammo  ASCCP guidelines reviewed and pap with cotesting noted to be up to date; this low risk patient was advised she meets criteria to d/c pap screening at age 74  Colonoscopy noted to be up to date  The patient denies STI risk factors and declines testing at this time  Reviewed diet/activity recommendations:  Encouraged daily Ca++ and vitamin D intake as well as daily weight bearing exercise for promotion of bone health    Discussed postmenopausal considerations and symptoms to report  RTO in one year for routine annual gyn exam or sooner PRN  Diagnoses and all orders for this visit:    Encounter for screening mammogram for malignant neoplasm of breast  -     Mammo screening bilateral w 3d & cad; Future    Encounter for gynecological examination (general) (routine) without abnormal findings         Arik Everett   1955    CC:  Yearly exam    Alfredo Hamilton is a  63 y o  female here for yearly exam  She is postmenopausal since age 64 and has had no vaginal bleeding  She denies abnormal vaginal discharge, itching, odor or dryness  She denies breast concerns, abdominal/pelvic pain or bladder/bowel dysfunction  Denies stress incontinence  Having some hot flashes  at night but better than they used to be  Sexual activity: She is not currently sexually active     STD testing: She does not want STD testing today       Last Pap: 5/17 neg/no HPV  Last Mammo: 11/20 right asymmetry with dx done-+ calcifications -KELLEY 2  SBE: sometimes   Last Colonoscopy: 10/13 due 2023      Family hx of breast cancer: mom @ 80   Family hx of ovarian cancer: denies  Family hx of colon cancer: denies    She has her own Spotzot and one adult son       Current Outpatient Medications:     busPIRone (BUSPAR) 10 mg tablet, Take 1 tablet (10 mg total) by mouth 3 (three) times a day (Patient taking differently: Take 10 mg by mouth 3 (three) times a day 5mg AM and 10mg PM), Disp: 270 tablet, Rfl: 1    Cetirizine HCl (ZYRTEC PO), Take by mouth as needed, Disp: , Rfl:     diclofenac (VOLTAREN) 75 mg EC tablet, Take 1 tablet (75 mg total) by mouth 2 (two) times a day, Disp: 180 tablet, Rfl: 1    Diclofenac Sodium (VOLTAREN) 1 %, APPLY 2 GRAMS TO AFFECTED AREA 4 TIMES A DAY, Disp: 200 g, Rfl: 1    doxycycline hyclate (VIBRAMYCIN) 100 mg capsule, Take 1 capsule (100 mg total) by mouth every 12 (twelve) hours, Disp: 180 capsule, Rfl: 1    Esomeprazole Magnesium (NEXIUM PO), Take 1 capsule by mouth daily, Disp: , Rfl:     fluticasone (FLONASE) 50 mcg/act nasal spray, 1 spray into each nostril daily (Patient taking differently: 1 spray into each nostril as needed ), Disp: 16 g, Rfl: 3    LORazepam (ATIVAN) 1 mg tablet, Take 1 5 tablets (1 5 mg total) by mouth daily at bedtime as needed for anxiety, Disp: 135 tablet, Rfl: 1    methimazole (TAPAZOLE) 5 mg tablet, Take 1 tablet (5 mg total) by mouth daily, Disp: 90 tablet, Rfl: 1    penciclovir (DENAVIR) 1 % cream, Apply topically daily as needed (ulcer), Disp: 5 g, Rfl: 5    pregabalin (LYRICA) 50 mg capsule, Take 1 in the AM and 2 at bedtime, Disp: 270 capsule, Rfl: 3    propranolol (INDERAL) 10 mg tablet, Take 1 tablet (10 mg total) by mouth 2 (two) times a day, Disp: 180 tablet, Rfl: 1    quinapril (ACCUPRIL) 20 mg tablet, Take 1 tablet (20 mg total) by mouth 2 (two) times a day (Patient taking differently: Take 20 mg by mouth 2 (two) times a day 10mg AM and 20mg PM), Disp: 180 tablet, Rfl: 1    RESTASIS 0 05 % ophthalmic emulsion, Administer 1 drop to both eyes 2 (two) times a day, Disp: , Rfl: 3    tiZANidine (ZANAFLEX) 4 mg tablet, TAKE 1 TABLET (4 MG TOTAL) BY MOUTH 2 (TWO) TIMES A DAY, Disp: 180 tablet, Rfl: 1    valACYclovir (VALTREX) 500 mg tablet, Take 1 tablet (500 mg total) by mouth daily as needed (Cold Sores), Disp: 30 tablet, Rfl: 1  Social History     Socioeconomic History    Marital status: /Civil Union     Spouse name: Not on file    Number of children: Not on file    Years of education: Not on file    Highest education level: Not on file   Occupational History    Occupation: Business owner     Comment: [employer] time working   Social Needs    Financial resource strain: Not on file    Food insecurity     Worry: Not on file     Inability: Not on file   [employer] needs     Medical: Not on file     Non-medical: Not on file   Tobacco Use    Smoking status: Never Smoker    Smokeless tobacco: Never Used   Substance and Sexual Activity    Alcohol use:  Yes     Alcohol/week: 1 0 standard drinks     Types: 1 Glasses of wine per week     Frequency: Monthly or less     Drinks per session: 1 or 2     Binge frequency: Never    Drug use: No    Sexual activity: Not Currently     Partners: Male   Lifestyle    Physical activity     Days per week: Not on file     Minutes per session: Not on file    Stress: Not on file   Relationships    Social connections     Talks on phone: Not on file     Gets together: Not on file     Attends Evangelical service: Not on file     Active member of club or organization: Not on file     Attends meetings of clubs or organizations: Not on file     Relationship status: Not on file    Intimate partner violence     Fear of current or ex partner: Not on file     Emotionally abused: Not on file     Physically abused: Not on file     Forced sexual activity: Not on file   Other Topics Concern    Not on file   Social History Narrative    Daily caffeinated cola consumption    3 cups coffee/day     Family History   Problem Relation Age of Onset    Breast cancer Mother 80    COPD Mother     Hypertension Mother         essential    Heart attack Father         acute MI    Emphysema Father     Hypertension Father         essential    Other Father prehypertension    No Known Problems Maternal Grandmother     No Known Problems Maternal Grandfather     No Known Problems Paternal Grandmother     No Known Problems Paternal Grandfather     No Known Problems Sister     No Known Problems Son     No Known Problems Sister     No Known Problems Paternal Aunt     No Known Problems Paternal Aunt     No Known Problems Paternal Aunt     No Known Problems Paternal Aunt     No Known Problems Paternal Aunt     Liver cancer Maternal Uncle      Past Medical History:   Diagnosis Date    Anxiety     Cataract, bilateral     last assessed    Cervical radiculopathy     Disc disease, degenerative, cervical     Herpes simplex infection     Hypertension     Insomnia     Low back pain     Lumbar degenerative disc disease     Lumbar facet arthropathy     Lumbar radiculopathy     Lumbar stenosis without neurogenic claudication     Mononucleosis     Myofascial pain dysfunction syndrome     Osteoarthritis     shoulder region - unspecified laterality - last assessed 2/1/14    Plantar fasciitis     Ptosis, both eyelids     last assessed 10/6/16    Ptosis, congenital, left     Retinal detachment     last assessed 12/11/14 right eye    Sacroiliitis ([facility] [address] )     Thyroid disease     Thyrotoxicosis     last assessed 5/17/13    Vertigo         Review of Systems   Constitutional: Negative for appetite change, fatigue and unexpected weight change  Respiratory: Negative for shortness of breath  Cardiovascular: Negative for chest pain and leg swelling  Gastrointestinal: Negative for abdominal pain  Endocrine: Negative for cold intolerance and heat intolerance  Breasts:  Negative for breast tenderness or masses  Genitourinary: Negative for dysuria, flank pain, frequency, genital sores, hematuria and pelvic pain  Negative for stress incontinence  Musculoskeletal: Negative for back pain  Neurological: Negative for headaches       O:  Blood pressure 124/80, height 5' 6" (1 676 m), weight 90 9 kg (200 lb 6 4 oz), last menstrual period 11/26/2015, not currently breastfeeding  Patient appears well and is not in distress  Neck is supple without masses  Normal thyroid  Heart regular rate and rhythm  Lungs CTA bilaterally   Breasts are symmetrical without mass, tenderness, nipple discharge, skin changes or adenopathy  Abdomen is soft and nontender without masses  External genitals are normal without lesions or rashes  Urethral meatus and urethra are normal  Bladder is normal to palpation  Vagina is normal without discharge or bleeding  Atrophic changes noted    Cervix is normal without discharge or lesion  Uterus is normal, mobile, nontender without palpable mass  Adnexa are normal, nontender, without palpable mass     Skin warm and dry   Capillary refill < 2 seconds  Alert and oriented x 3 with normal affect

## 2021-02-08 NOTE — ASSESSMENT & PLAN NOTE
Benign findings on routine gyn exam  Recommended monthly SBE, annual CBE and annual screening mammo  ASCCP guidelines reviewed and pap with cotesting noted to be up to date; this low risk patient was advised she meets criteria to d/c pap screening at age 74  Colonoscopy noted to be up to date  The patient denies STI risk factors and declines testing at this time  Reviewed diet/activity recommendations:  Encouraged daily Ca++ and vitamin D intake as well as daily weight bearing exercise for promotion of bone health    Discussed postmenopausal considerations and symptoms to report  RTO in one year for routine annual gyn exam or sooner PRN

## 2021-02-10 LAB
CYTOLOGIST CVX/VAG CYTO: NORMAL
DX ICD CODE: NORMAL
OTHER STN SPEC: NORMAL
OTHER STN SPEC: NORMAL
PATH REPORT.FINAL DX SPEC: NORMAL
SL AMB NOTE:: NORMAL
SL AMB SPECIMEN ADEQUACY: NORMAL
SL AMB TEST METHODOLOGY: NORMAL

## 2021-02-19 ENCOUNTER — IMMUNIZATIONS (OUTPATIENT)
Dept: FAMILY MEDICINE CLINIC | Facility: HOSPITAL | Age: 66
End: 2021-02-19

## 2021-02-19 DIAGNOSIS — Z23 ENCOUNTER FOR IMMUNIZATION: Primary | ICD-10-CM

## 2021-02-19 PROCEDURE — 0001A SARS-COV-2 / COVID-19 MRNA VACCINE (PFIZER-BIONTECH) 30 MCG: CPT

## 2021-02-19 PROCEDURE — 91300 SARS-COV-2 / COVID-19 MRNA VACCINE (PFIZER-BIONTECH) 30 MCG: CPT

## 2021-02-25 ENCOUNTER — TELEPHONE (OUTPATIENT)
Dept: OBGYN CLINIC | Facility: CLINIC | Age: 66
End: 2021-02-25

## 2021-02-28 DIAGNOSIS — G89.4 PAIN SYNDROME, CHRONIC: ICD-10-CM

## 2021-02-28 DIAGNOSIS — M47.816 LUMBAR FACET ARTHROPATHY: ICD-10-CM

## 2021-03-05 ENCOUNTER — TELEPHONE (OUTPATIENT)
Dept: PAIN MEDICINE | Facility: CLINIC | Age: 66
End: 2021-03-05

## 2021-03-05 DIAGNOSIS — G89.4 PAIN SYNDROME, CHRONIC: ICD-10-CM

## 2021-03-05 DIAGNOSIS — M51.16 INTERVERTEBRAL DISC DISORDER WITH RADICULOPATHY OF LUMBAR REGION: Primary | ICD-10-CM

## 2021-03-05 RX ORDER — DICLOFENAC SODIUM 75 MG/1
75 TABLET, DELAYED RELEASE ORAL 2 TIMES DAILY
Qty: 180 TABLET | Refills: 1 | Status: SHIPPED | OUTPATIENT
Start: 2021-03-05 | End: 2021-08-23 | Stop reason: SDUPTHER

## 2021-03-05 NOTE — TELEPHONE ENCOUNTER
S/w pt, states she hadn't realized diclofenac was prescribed by Dr Latonya Campbell, she will reach out for refill  Pt did ask about the possibility of a repeat injection  Pt said pain is in L lower back, buttocks, and radiating into LLE  Pt had L L3-4 TFESI on 12/8, states it is the same pain she had experienced prior to that injection and she did get relief from previous injection  Please advise, thank you

## 2021-03-05 NOTE — TELEPHONE ENCOUNTER
Pt contacted Call Center requested refill of their medication  Medication Name:  diclofenac    Dosage of Med:  75 mg    Frequency of Med:  2 x a day    Remaining Medication:  0    Pharmacy and Location:    [facility]  Preferred Callback Phone Number:  [phone]    Thank you

## 2021-03-08 NOTE — TELEPHONE ENCOUNTER
Patient says she has a covid vaccine booster scheduled on 3/12  Does she have to wait for her procedure with [facility]?  # [phone]

## 2021-03-08 NOTE — TELEPHONE ENCOUNTER
RN told pt she will need to wait 2 wks after the booster to have the next inj  RN told pt that she should make the  aware when she calls her to set up the inj and I will also send message to  as well

## 2021-03-12 ENCOUNTER — IMMUNIZATIONS (OUTPATIENT)
Dept: FAMILY MEDICINE CLINIC | Facility: HOSPITAL | Age: 66
End: 2021-03-12

## 2021-03-12 DIAGNOSIS — Z23 ENCOUNTER FOR IMMUNIZATION: Primary | ICD-10-CM

## 2021-03-12 PROCEDURE — 0002A SARS-COV-2 / COVID-19 MRNA VACCINE (PFIZER-BIONTECH) 30 MCG: CPT

## 2021-03-12 PROCEDURE — 91300 SARS-COV-2 / COVID-19 MRNA VACCINE (PFIZER-BIONTECH) 30 MCG: CPT

## 2021-03-16 RX ORDER — DICLOFENAC SODIUM 75 MG/1
TABLET, DELAYED RELEASE ORAL
Qty: 180 TABLET | Refills: 1 | OUTPATIENT
Start: 2021-03-16

## 2021-03-23 ENCOUNTER — OFFICE VISIT (OUTPATIENT)
Dept: INTERNAL MEDICINE CLINIC | Facility: CLINIC | Age: 66
End: 2021-03-23
Payer: COMMERCIAL

## 2021-03-23 VITALS
SYSTOLIC BLOOD PRESSURE: 144 MMHG | DIASTOLIC BLOOD PRESSURE: 80 MMHG | TEMPERATURE: 98.9 F | HEART RATE: 61 BPM | HEIGHT: 66 IN | OXYGEN SATURATION: 97 % | WEIGHT: 201 LBS | BODY MASS INDEX: 32.3 KG/M2

## 2021-03-23 DIAGNOSIS — I10 BENIGN ESSENTIAL HTN: ICD-10-CM

## 2021-03-23 DIAGNOSIS — E05.90 HYPERTHYROIDISM: Primary | ICD-10-CM

## 2021-03-23 DIAGNOSIS — L71.9 ROSACEA, ACNE: ICD-10-CM

## 2021-03-23 DIAGNOSIS — F41.9 ANXIETY: ICD-10-CM

## 2021-03-23 DIAGNOSIS — F51.01 PRIMARY INSOMNIA: ICD-10-CM

## 2021-03-23 DIAGNOSIS — M54.12 CERVICAL RADICULOPATHY: ICD-10-CM

## 2021-03-23 DIAGNOSIS — M54.16 LUMBAR RADICULOPATHY: ICD-10-CM

## 2021-03-23 PROBLEM — R00.2 PALPITATIONS: Status: RESOLVED | Noted: 2021-01-28 | Resolved: 2021-03-23

## 2021-03-23 PROCEDURE — 3008F BODY MASS INDEX DOCD: CPT | Performed by: INTERNAL MEDICINE

## 2021-03-23 PROCEDURE — 3079F DIAST BP 80-89 MM HG: CPT | Performed by: INTERNAL MEDICINE

## 2021-03-23 PROCEDURE — 3077F SYST BP >= 140 MM HG: CPT | Performed by: INTERNAL MEDICINE

## 2021-03-23 PROCEDURE — 99214 OFFICE O/P EST MOD 30 MIN: CPT | Performed by: INTERNAL MEDICINE

## 2021-03-23 PROCEDURE — 3725F SCREEN DEPRESSION PERFORMED: CPT | Performed by: INTERNAL MEDICINE

## 2021-03-23 PROCEDURE — 1036F TOBACCO NON-USER: CPT | Performed by: INTERNAL MEDICINE

## 2021-03-23 RX ORDER — DULOXETIN HYDROCHLORIDE 20 MG/1
20 CAPSULE, DELAYED RELEASE ORAL
Qty: 30 CAPSULE | Refills: 3 | Status: SHIPPED | OUTPATIENT
Start: 2021-03-23 | End: 2021-06-22

## 2021-03-23 NOTE — PROGRESS NOTES
Assessment/Plan:    Hyperthyroidism  Continue methimazole 5 mg daily  Will recheck TSH with reflex free T4  Benign essential HTN  Controlled  Continue propanolol 10 mg twice a day and quinapril 40 mg daily  Rosacea, acne  Continue doxycycline and will refill metronidazole cream     Anxiety  Continue BuSpar 10 mg 3 times a day  Will start Cymbalta 20 mg daily  Re-evaluate in 3 months  Primary insomnia  Will start Cymbalta 20 mg daily  Continue BuSpar 10 mg 3 times a day  Continue Ativan 1 5 mg at bedtime as needed  Diagnoses and all orders for this visit:    Hyperthyroidism  -     CBC; Future  -     Comprehensive metabolic panel; Future  -     Lipid panel; Future  -     TSH, 3rd generation with Free T4 reflex; Future    Benign essential HTN  -     CBC; Future  -     Comprehensive metabolic panel; Future  -     Lipid panel; Future  -     TSH, 3rd generation with Free T4 reflex; Future    Anxiety  -     DULoxetine (CYMBALTA) 20 mg capsule; Take 1 capsule (20 mg total) by mouth daily at bedtime    Lumbar radiculopathy  -     DULoxetine (CYMBALTA) 20 mg capsule; Take 1 capsule (20 mg total) by mouth daily at bedtime    Cervical radiculopathy  -     DULoxetine (CYMBALTA) 20 mg capsule; Take 1 capsule (20 mg total) by mouth daily at bedtime    Rosacea, acne  -     metroNIDAZOLE (METROCREAM) 0 75 % cream; Apply topically daily at bedtime    Primary insomnia          BMI Counseling: Body mass index is 32 44 kg/m²  The BMI is above normal  Nutrition recommendations include decreasing portion sizes, encouraging healthy choices of fruits and vegetables, decreasing fast food intake, consuming healthier snacks, limiting drinks that contain sugar, moderation in carbohydrate intake, increasing intake of lean protein, reducing intake of saturated and trans fat and reducing intake of cholesterol  Exercise recommendations include moderate physical activity 150 minutes/week and exercising 3-5 times per week   No pharmacotherapy was ordered  Patient referred to PCP due to patient being overweight  Depression Screening and Follow-up Plan: Patient's depression screening was positive with a PHQ-2 score of 1  Clincally patient does not have depression  No treatment is required  Patient advised to follow-up with PCP for further management  Time spent during encounter: 25 minutes (counseling, reviewing medications, and discussing treatment and plan)    Subjective:      Patient ID: Weston Briceño is a 63 y o  female  Chief Complaint   Patient presents with    Follow-up     Patient is here for f/u Anxiety and HTN  Patient did not have blood work done  Pt does not have any new medical concerns  63year old female is seen today for follow-up of chronic conditions  No laboratory studies to review today  Anxiety:  She has been compliant with BuSpar 10 mg 3 times a day and sometimes takes 10 mg 4 times a day as she has been feeling persistently anxious than her normal baseline  She denies any panic attacks or thoughts of self-harm  Rosacea:  Stable with doxycycline  She does report having a recent exacerbation after using cosmetics  She is requesting a refill of her metronidazole cream         Anxiety  Presents for follow-up visit  Symptoms include nervous/anxious behavior  Patient reports no chest pain, dizziness, nausea, palpitations, shortness of breath or suicidal ideas  Symptoms occur occasionally  The severity of symptoms is mild  The patient sleeps 7 hours per night  The quality of sleep is fair  Compliance with medications is %  Hypertension  This is a chronic problem  The current episode started more than 1 year ago  The problem is unchanged  The problem is controlled  Associated symptoms include anxiety  Pertinent negatives include no chest pain, headaches, palpitations or shortness of breath  Past treatments include beta blockers and ACE inhibitors   The current treatment provides moderate improvement  There are no compliance problems  Identifiable causes of hypertension include a thyroid problem  Thyroid Problem  Presents for follow-up visit  Symptoms include anxiety  Patient reports no cold intolerance, constipation, diaphoresis, diarrhea, fatigue, heat intolerance or palpitations  The symptoms have been stable  The following portions of the patient's history were reviewed and updated as appropriate: allergies, current medications, past family history, past medical history, past social history, past surgical history and problem list     Review of Systems   Constitutional: Negative for activity change, appetite change, chills, diaphoresis, fatigue and fever  HENT: Negative for congestion, postnasal drip, rhinorrhea, sinus pressure, sinus pain, sneezing and sore throat  Eyes: Negative for visual disturbance  Respiratory: Negative for apnea, cough, choking, chest tightness, shortness of breath and wheezing  Cardiovascular: Negative for chest pain, palpitations and leg swelling  Gastrointestinal: Negative for abdominal distention, abdominal pain, anal bleeding, blood in stool, constipation, diarrhea, nausea and vomiting  Endocrine: Negative for cold intolerance and heat intolerance  Genitourinary: Negative for difficulty urinating, dysuria and hematuria  Musculoskeletal: Negative  Skin: Negative  Neurological: Negative for dizziness, weakness, light-headedness, numbness and headaches  Hematological: Negative for adenopathy  Psychiatric/Behavioral: Negative for agitation, sleep disturbance and suicidal ideas  The patient is nervous/anxious  All other systems reviewed and are negative          Past Medical History:   Diagnosis Date    Anxiety     Cataract, bilateral     last assessed    Cervical radiculopathy     Disc disease, degenerative, cervical     Herpes simplex infection     Hypertension     Insomnia     Low back pain     Lumbar degenerative disc disease     Lumbar facet arthropathy     Lumbar radiculopathy     Lumbar stenosis without neurogenic claudication     Mononucleosis     Myofascial pain dysfunction syndrome     Osteoarthritis     shoulder region - unspecified laterality - last assessed 2/1/14    Plantar fasciitis     Ptosis, both eyelids     last assessed 10/6/16    Ptosis, congenital, left     Retinal detachment     last assessed 12/11/14 right eye    Sacroiliitis ([address] )     Thyroid disease     Thyrotoxicosis     last assessed 5/17/13    Vertigo          Current Outpatient Medications:     busPIRone (BUSPAR) 10 mg tablet, Take 1 tablet (10 mg total) by mouth 3 (three) times a day (Patient taking differently: Take 10 mg by mouth 3 (three) times a day 5mg AM and 10mg PM), Disp: 270 tablet, Rfl: 1    Cetirizine HCl (ZYRTEC PO), Take by mouth as needed, Disp: , Rfl:     diclofenac (VOLTAREN) 75 mg EC tablet, Take 1 tablet (75 mg total) by mouth 2 (two) times a day, Disp: 180 tablet, Rfl: 1    Diclofenac Sodium (VOLTAREN) 1 %, APPLY 2 GRAMS TO AFFECTED AREA 4 TIMES A DAY, Disp: 200 g, Rfl: 1    doxycycline hyclate (VIBRAMYCIN) 100 mg capsule, Take 1 capsule (100 mg total) by mouth every 12 (twelve) hours, Disp: 180 capsule, Rfl: 1    Esomeprazole Magnesium (NEXIUM PO), Take 1 capsule by mouth daily, Disp: , Rfl:     fluticasone (FLONASE) 50 mcg/act nasal spray, 1 spray into each nostril daily (Patient taking differently: 1 spray into each nostril as needed ), Disp: 16 g, Rfl: 3    LORazepam (ATIVAN) 1 mg tablet, Take 1 5 tablets (1 5 mg total) by mouth daily at bedtime as needed for anxiety, Disp: 135 tablet, Rfl: 1    methimazole (TAPAZOLE) 5 mg tablet, Take 1 tablet (5 mg total) by mouth daily, Disp: 90 tablet, Rfl: 1    penciclovir (DENAVIR) 1 % cream, Apply topically daily as needed (ulcer), Disp: 5 g, Rfl: 5    pregabalin (LYRICA) 50 mg capsule, Take 1 in the AM and 2 at bedtime, Disp: 270 capsule, Rfl: 3   propranolol (INDERAL) 10 mg tablet, Take 1 tablet (10 mg total) by mouth 2 (two) times a day, Disp: 180 tablet, Rfl: 1    quinapril (ACCUPRIL) 20 mg tablet, Take 1 tablet (20 mg total) by mouth 2 (two) times a day (Patient taking differently: Take 20 mg by mouth 2 (two) times a day 10mg AM and 20mg PM), Disp: 180 tablet, Rfl: 1    RESTASIS 0 05 % ophthalmic emulsion, Administer 1 drop to both eyes 2 (two) times a day, Disp: , Rfl: 3    tiZANidine (ZANAFLEX) 4 mg tablet, TAKE 1 TABLET (4 MG TOTAL) BY MOUTH 2 (TWO) TIMES A DAY, Disp: 180 tablet, Rfl: 1    DULoxetine (CYMBALTA) 20 mg capsule, Take 1 capsule (20 mg total) by mouth daily at bedtime, Disp: 30 capsule, Rfl: 3    metroNIDAZOLE (METROCREAM) 0 75 % cream, Apply topically daily at bedtime, Disp: 45 g, Rfl: 2    valACYclovir (VALTREX) 500 mg tablet, Take 1 tablet (500 mg total) by mouth daily as needed (Cold Sores), Disp: 30 tablet, Rfl: 1    Allergies   Allergen Reactions    Other Dermatitis     Adhesive tape - please use ONLY PAPER TAPE    Scopolamine Other (See Comments)     Severe, debilitating headache      Flexeril [Cyclobenzaprine] Dizziness and Fatigue    Naproxen      Other reaction(s): Unknown Allergic Reaction  Annotation - [id number]: nightmares    Penicillins      Other reaction(s): Unknown Allergic Reaction  Annotation - [id number]: childhood reaction    Promethazine-Codeine      Reaction Date: [id number]; Annotation - [id number]: nightmares; Annotation - [id number]: nightmares    Tramadol      Other reaction(s): Unknown Allergic Reaction  Annotation - [id number]: PT HAS NIGHTMARES FROM TRAMADOL    Wound Dressing Adhesive Rash       Social History   Past Surgical History:   Procedure Laterality Date    NV SURG IMPLNT NEUROELECT,EPIDURAL Left 1/26/2016    Procedure: PLACEMENT OF THORACIC SPINAL CORD STIMULATOR WITH LEFT BUTTOCK IMPLANTABLE PULSE GENERATOR (IMPULSE MONITORING);   Surgeon: Jackie Wick MD;  Location:  MAIN OR;  Service: Neurosurgery    RETINAL DETACHMENT SURGERY Right      Family History   Problem Relation Age of Onset    Breast cancer Mother 80    COPD Mother     Hypertension Mother         essential    Heart attack Father         acute MI    Emphysema Father     Hypertension Father         essential    Other Father         prehypertension    No Known Problems Maternal Grandmother     No Known Problems Maternal Grandfather     No Known Problems Paternal Grandmother     No Known Problems Paternal Grandfather     No Known Problems Sister     No Known Problems Son     No Known Problems Sister     No Known Problems Paternal Aunt     No Known Problems Paternal Aunt     No Known Problems Paternal Aunt     No Known Problems Paternal Aunt     No Known Problems Paternal Aunt     Liver cancer Maternal Uncle        Objective:  /80 (BP Location: Left arm, Patient Position: Sitting, Cuff Size: Large)   Pulse 61   Temp 98 9 °F (37 2 °C) (Temporal)   Ht 5' 6" (1 676 m)   Wt 91 2 kg (201 lb)   LMP 11/26/2015   SpO2 97%   BMI 32 44 kg/m²     Recent Results (from the past 1344 hour(s))   IGP,rfx Apt HPV ASCU,16/18,45    Collection Time: 02/08/21  3:20 PM   Result Value Ref Range    Diagnosis: Comment     Specimen Adequacy Comment     Clinician Provided ICD10 Comment     Performed by: Angel Galindo Note: Comment     Test Methodology: Comment     REBA HASSAN   Comment             Physical Exam  Vitals signs and nursing note reviewed  Constitutional:       General: She is not in acute distress  Appearance: She is well-developed  She is not diaphoretic  HENT:      Head: Normocephalic and atraumatic  Eyes:      General:         Right eye: No discharge  Left eye: No discharge  Conjunctiva/sclera: Conjunctivae normal       Pupils: Pupils are equal, round, and reactive to light  Neck:      Musculoskeletal: Normal range of motion and neck supple  Thyroid: No thyromegaly        Vascular: No JVD  Cardiovascular:      Rate and Rhythm: Normal rate and regular rhythm  Heart sounds: Normal heart sounds  No murmur  No friction rub  No gallop  Pulmonary:      Effort: Pulmonary effort is normal  No respiratory distress  Breath sounds: Normal breath sounds  No wheezing or rales  Chest:      Chest wall: No tenderness  Abdominal:      General: There is no distension  Palpations: Abdomen is soft  Tenderness: There is no abdominal tenderness  Musculoskeletal: Normal range of motion  General: No tenderness or deformity  Lymphadenopathy:      Cervical: No cervical adenopathy  Skin:     General: Skin is warm and dry  Coloration: Skin is not pale  Findings: No erythema or rash  Neurological:      Mental Status: She is alert and oriented to person, place, and time  Cranial Nerves: No cranial nerve deficit  Coordination: Coordination normal    Psychiatric:         Behavior: Behavior normal          Thought Content:  Thought content normal          Judgment: Judgment normal

## 2021-03-26 ENCOUNTER — HOSPITAL ENCOUNTER (OUTPATIENT)
Dept: RADIOLOGY | Facility: CLINIC | Age: 66
Discharge: HOME | End: 2021-03-26
Payer: COMMERCIAL

## 2021-03-26 VITALS
SYSTOLIC BLOOD PRESSURE: 140 MMHG | RESPIRATION RATE: 18 BRPM | OXYGEN SATURATION: 99 % | DIASTOLIC BLOOD PRESSURE: 86 MMHG | HEART RATE: 67 BPM | TEMPERATURE: 98.5 F

## 2021-03-26 DIAGNOSIS — M51.16 INTERVERTEBRAL DISC DISORDER WITH RADICULOPATHY OF LUMBAR REGION: ICD-10-CM

## 2021-03-26 PROCEDURE — 64484 NJX AA&/STRD TFRM EPI L/S EA: CPT | Performed by: ANESTHESIOLOGY

## 2021-03-26 PROCEDURE — 64483 NJX AA&/STRD TFRM EPI L/S 1: CPT | Performed by: ANESTHESIOLOGY

## 2021-03-26 RX ORDER — METHYLPREDNISOLONE ACETATE 80 MG/ML
80 INJECTION, SUSPENSION INTRA-ARTICULAR; INTRALESIONAL; INTRAMUSCULAR; PARENTERAL; SOFT TISSUE ONCE
Status: COMPLETED | OUTPATIENT
Start: 2021-03-26 | End: 2021-03-26

## 2021-03-26 RX ORDER — 0.9 % SODIUM CHLORIDE 0.9 %
10 VIAL (ML) INJECTION ONCE
Status: COMPLETED | OUTPATIENT
Start: 2021-03-26 | End: 2021-03-26

## 2021-03-26 RX ORDER — BUPIVACAINE HCL/PF 2.5 MG/ML
10 VIAL (ML) INJECTION ONCE
Status: COMPLETED | OUTPATIENT
Start: 2021-03-26 | End: 2021-03-26

## 2021-03-26 RX ADMIN — SODIUM CHLORIDE 5 ML: 9 INJECTION, SOLUTION INTRAMUSCULAR; INTRAVENOUS; SUBCUTANEOUS at 10:54

## 2021-03-26 RX ADMIN — Medication 5 ML: at 10:54

## 2021-03-26 RX ADMIN — METHYLPREDNISOLONE ACETATE 80 MG: 80 INJECTION, SUSPENSION INTRA-ARTICULAR; INTRALESIONAL; INTRAMUSCULAR; PARENTERAL; SOFT TISSUE at 10:57

## 2021-03-26 RX ADMIN — BUPIVACAINE HYDROCHLORIDE 2 ML: 2.5 INJECTION, SOLUTION EPIDURAL; INFILTRATION; INTRACAUDAL at 10:57

## 2021-03-26 RX ADMIN — IOHEXOL 1 ML: 300 INJECTION, SOLUTION INTRAVENOUS at 10:57

## 2021-03-26 NOTE — H&P
History of Present Illness: The patient is a 63 y o  female who presents with complaints of  Left lower back and leg pain secondary to lumbar stenosis and is here today for left-sided L3 and L4 transforaminal epidural steroid injection      Patient Active Problem List   Diagnosis    Rosacea, acne    Lumbar degenerative disc disease    Lumbar radiculopathy    Anxiety    Benign essential HTN    Cervical radiculopathy    Disc disorder of cervical region    Hot flash, menopausal    Lumbar facet arthropathy    Lumbar stenosis without neurogenic claudication    Myofascial pain syndrome    Pain syndrome, chronic    Positive depression screening    Primary osteoarthritis of right knee    Bulge of lumbar disc without myelopathy    Spondylolisthesis of lumbar region    Arthritis of right acromioclavicular joint    Adverse effect of adrenal cortical steroids, initial encounter    Calculus of gallbladder without cholecystitis without obstruction    Chronic right shoulder pain    Hyperthyroidism    Primary insomnia    Greater trochanteric bursitis of left hip    Encounter for gynecological examination (general) (routine) without abnormal findings       Past Medical History:   Diagnosis Date    Anxiety     Cataract, bilateral     last assessed    Cervical radiculopathy     Disc disease, degenerative, cervical     Herpes simplex infection     Hypertension     Insomnia     Low back pain     Lumbar degenerative disc disease     Lumbar facet arthropathy     Lumbar radiculopathy     Lumbar stenosis without neurogenic claudication     Mononucleosis     Myofascial pain dysfunction syndrome     Osteoarthritis     shoulder region - unspecified laterality - last assessed 2/1/14    Plantar fasciitis     Ptosis, both eyelids     last assessed 10/6/16    Ptosis, congenital, left     Retinal detachment     last assessed 12/11/14 right eye    Sacroiliitis ([address] )     Thyroid disease     Thyrotoxicosis last assessed 5/17/13    Vertigo        Past Surgical History:   Procedure Laterality Date    PA SURG IMPLNT NEUROELECT,EPIDURAL Left 1/26/2016    Procedure: PLACEMENT OF THORACIC SPINAL CORD STIMULATOR WITH LEFT BUTTOCK IMPLANTABLE PULSE GENERATOR (IMPULSE MONITORING);   Surgeon: Patty Saenz MD;  Location: [facility];  Service: Neurosurgery    RETINAL DETACHMENT SURGERY Right          Current Outpatient Medications:     busPIRone (BUSPAR) 10 mg tablet, Take 1 tablet (10 mg total) by mouth 3 (three) times a day (Patient taking differently: Take 10 mg by mouth 3 (three) times a day 5mg AM and 10mg PM), Disp: 270 tablet, Rfl: 1    Cetirizine HCl (ZYRTEC PO), Take by mouth as needed, Disp: , Rfl:     diclofenac (VOLTAREN) 75 mg EC tablet, Take 1 tablet (75 mg total) by mouth 2 (two) times a day, Disp: 180 tablet, Rfl: 1    Diclofenac Sodium (VOLTAREN) 1 %, APPLY 2 GRAMS TO AFFECTED AREA 4 TIMES A DAY, Disp: 200 g, Rfl: 1    doxycycline hyclate (VIBRAMYCIN) 100 mg capsule, Take 1 capsule (100 mg total) by mouth every 12 (twelve) hours, Disp: 180 capsule, Rfl: 1    DULoxetine (CYMBALTA) 20 mg capsule, Take 1 capsule (20 mg total) by mouth daily at bedtime, Disp: 30 capsule, Rfl: 3    Esomeprazole Magnesium (NEXIUM PO), Take 1 capsule by mouth daily, Disp: , Rfl:     fluticasone (FLONASE) 50 mcg/act nasal spray, 1 spray into each nostril daily (Patient taking differently: 1 spray into each nostril as needed ), Disp: 16 g, Rfl: 3    LORazepam (ATIVAN) 1 mg tablet, Take 1 5 tablets (1 5 mg total) by mouth daily at bedtime as needed for anxiety, Disp: 135 tablet, Rfl: 1    methimazole (TAPAZOLE) 5 mg tablet, Take 1 tablet (5 mg total) by mouth daily, Disp: 90 tablet, Rfl: 1    metroNIDAZOLE (METROCREAM) 0 75 % cream, Apply topically daily at bedtime, Disp: 45 g, Rfl: 2    penciclovir (DENAVIR) 1 % cream, Apply topically daily as needed (ulcer), Disp: 5 g, Rfl: 5    pregabalin (LYRICA) 50 mg capsule, Take 1 in the AM and 2 at bedtime, Disp: 270 capsule, Rfl: 3    propranolol (INDERAL) 10 mg tablet, Take 1 tablet (10 mg total) by mouth 2 (two) times a day, Disp: 180 tablet, Rfl: 1    quinapril (ACCUPRIL) 20 mg tablet, Take 1 tablet (20 mg total) by mouth 2 (two) times a day (Patient taking differently: Take 20 mg by mouth 2 (two) times a day 10mg AM and 20mg PM), Disp: 180 tablet, Rfl: 1    RESTASIS 0 05 % ophthalmic emulsion, Administer 1 drop to both eyes 2 (two) times a day, Disp: , Rfl: 3    tiZANidine (ZANAFLEX) 4 mg tablet, TAKE 1 TABLET (4 MG TOTAL) BY MOUTH 2 (TWO) TIMES A DAY, Disp: 180 tablet, Rfl: 1    valACYclovir (VALTREX) 500 mg tablet, Take 1 tablet (500 mg total) by mouth daily as needed (Cold Sores), Disp: 30 tablet, Rfl: 1    Current Facility-Administered Medications:     bupivacaine (PF) (MARCAINE) 0 25 % injection 10 mL, 10 mL, Epidural, Once, Barbi MD Zak    iohexol (OMNIPAQUE) 300 mg/mL injection 50 mL, 50 mL, Epidural, Once, Barbi MD Zak    lidocaine (PF) (XYLOCAINE-MPF) 2 % injection 5 mL, 5 mL, Infiltration, Once, Barbi MD Zak    methylPREDNISolone acetate (DEPO-MEDROL) injection 80 mg, 80 mg, Epidural, Once, Barbi Light, MD    sodium chloride (PF) 0 9 % injection 10 mL, 10 mL, Infiltration, Once, Barbirobert Crespo MD    Allergies   Allergen Reactions    Other Dermatitis     Adhesive tape - please use ONLY PAPER TAPE    Scopolamine Other (See Comments)     Severe, debilitating headache      Flexeril [Cyclobenzaprine] Dizziness and Fatigue    Naproxen      Other reaction(s): Unknown Allergic Reaction  Annotation - [id number]: nightmares    Penicillins      Other reaction(s): Unknown Allergic Reaction  Annotation - [id number]: childhood reaction    Promethazine-Codeine      Reaction Date: [id number]; Annotation - [id number]: nightmares;  Annotation - [id number]: nightmares    Tramadol      Other reaction(s): Unknown Allergic Reaction  Annotation - [id number]: PT HAS NIGHTMARES FROM TRAMADOL    Wound Dressing Adhesive Rash       Physical Exam:   Vitals:    03/26/21 1031   BP: 134/80   Pulse: 63   Resp: 18   Temp: 98 5 °F (36 9 °C)   SpO2: 99%     General: Awake, Alert, Oriented x 3, Mood and affect appropriate  Respiratory: Respirations even and unlabored  Cardiovascular: Peripheral pulses intact; no edema  Musculoskeletal Exam:   Left lower back tenderness    ASA Score: 3    Patient/Chart Verification  Patient ID Verified: Verbal  ID Band Applied: No  Consents Confirmed: Procedural, To be obtained in the Pre-Procedure area  H&P( within 30 days) Verified: To be obtained in the Pre-Procedure area  Allergies Reviewed: Yes  Anticoag/NSAID held?: No  Currently on antibiotics?: No    Assessment:   1   Intervertebral disc disorder with radiculopathy of lumbar region        Plan: Left L3-4 TF PRATIK

## 2021-03-26 NOTE — DISCHARGE INSTR - LAB
Epidural Steroid Injection   WHAT YOU NEED TO KNOW:   An epidural steroid injection (PRATIK) is a procedure to inject steroid medicine into the epidural space  The epidural space is between your spinal cord and vertebrae  Steroids reduce inflammation and fluid buildup in your spine that may be causing pain  You may be given pain medicine along with the steroids  ACTIVITY  · Do not drive or operate machinery today  · No strenuous activity today - bending, lifting, etc   · You may resume normal activites starting tomorrow - start slowly and as tolerated  · You may shower today, but no tub baths or hot tubs  · You may have numbness for several hours from the local anesthetic  Please use caution and common sense, especially with weight-bearing activities  CARE OF THE INJECTION SITE  · If you have soreness or pain, apply ice to the area today (20 minutes on/20 minutes off)  · Starting tomorrow, you may use warm, moist heat or ice if needed  · You may have an increase or change in your discomfort for 36-48 hours after your treatment  · Apply ice and continue with any pain medication you have been prescribed  · Notify [facility] if you have any of the following: redness, drainage, swelling, headache, stiff neck or fever above 100°F     SPECIAL INSTRUCTIONS  · Our office will contact you in approximately 7 days for a progress report  MEDICATIONS  · Continue to take all routine medications  · Our office may have instructed you to hold some medications  As no general anesthesia was used in today's procedure, you should not experience any side effects related to anesthesia  If you have a problem specifically related to your procedure, please call our office at [phone]  Problems not related to your procedure should be directed to your primary care physician

## 2021-04-14 DIAGNOSIS — M47.816 LUMBAR FACET ARTHROPATHY: ICD-10-CM

## 2021-05-08 LAB
ALBUMIN SERPL-MCNC: 4.1 G/DL (ref 3.8–4.8)
ALBUMIN/GLOB SERPL: 1.6 {RATIO} (ref 1.2–2.2)
ALP SERPL-CCNC: 141 IU/L (ref 39–117)
ALT SERPL-CCNC: 24 IU/L (ref 0–32)
AST SERPL-CCNC: 30 IU/L (ref 0–40)
BASOPHILS # BLD AUTO: 0.1 X10E3/UL (ref 0–0.2)
BASOPHILS NFR BLD AUTO: 1 %
BILIRUB SERPL-MCNC: 0.7 MG/DL (ref 0–1.2)
BUN SERPL-MCNC: 12 MG/DL (ref 8–27)
BUN/CREAT SERPL: 17 (ref 12–28)
CALCIUM SERPL-MCNC: 9.7 MG/DL (ref 8.7–10.3)
CHLORIDE SERPL-SCNC: 99 MMOL/L (ref 96–106)
CHOLEST SERPL-MCNC: 246 MG/DL (ref 100–199)
CO2 SERPL-SCNC: 26 MMOL/L (ref 20–29)
CREAT SERPL-MCNC: 0.72 MG/DL (ref 0.57–1)
EOSINOPHIL # BLD AUTO: 0.1 X10E3/UL (ref 0–0.4)
EOSINOPHIL NFR BLD AUTO: 2 %
ERYTHROCYTE [DISTWIDTH] IN BLOOD BY AUTOMATED COUNT: 13.4 % (ref 11.7–15.4)
GLOBULIN SER-MCNC: 2.6 G/DL (ref 1.5–4.5)
GLUCOSE SERPL-MCNC: 95 MG/DL (ref 65–99)
HCT VFR BLD AUTO: 39.8 % (ref 34–46.6)
HDLC SERPL-MCNC: 76 MG/DL
HGB BLD-MCNC: 12.8 G/DL (ref 11.1–15.9)
IMM GRANULOCYTES # BLD: 0 X10E3/UL (ref 0–0.1)
IMM GRANULOCYTES NFR BLD: 0 %
LDLC SERPL CALC-MCNC: 155 MG/DL (ref 0–99)
LYMPHOCYTES # BLD AUTO: 1.6 X10E3/UL (ref 0.7–3.1)
LYMPHOCYTES NFR BLD AUTO: 32 %
MCH RBC QN AUTO: 29.9 PG (ref 26.6–33)
MCHC RBC AUTO-ENTMCNC: 32.2 G/DL (ref 31.5–35.7)
MCV RBC AUTO: 93 FL (ref 79–97)
MONOCYTES # BLD AUTO: 0.5 X10E3/UL (ref 0.1–0.9)
MONOCYTES NFR BLD AUTO: 10 %
NEUTROPHILS # BLD AUTO: 2.7 X10E3/UL (ref 1.4–7)
NEUTROPHILS NFR BLD AUTO: 55 %
PLATELET # BLD AUTO: 321 X10E3/UL (ref 150–450)
POTASSIUM SERPL-SCNC: 4.9 MMOL/L (ref 3.5–5.2)
PROT SERPL-MCNC: 6.7 G/DL (ref 6–8.5)
RBC # BLD AUTO: 4.28 X10E6/UL (ref 3.77–5.28)
SL AMB EGFR AFRICAN AMERICAN: 102 ML/MIN/1.73
SL AMB EGFR NON AFRICAN AMERICAN: 89 ML/MIN/1.73
SL AMB VLDL CHOLESTEROL CALC: 15 MG/DL (ref 5–40)
SODIUM SERPL-SCNC: 136 MMOL/L (ref 134–144)
TRIGL SERPL-MCNC: 89 MG/DL (ref 0–149)
TSH SERPL DL<=0.005 MIU/L-ACNC: 2.19 UIU/ML (ref 0.45–4.5)
WBC # BLD AUTO: 4.9 X10E3/UL (ref 3.4–10.8)

## 2021-05-11 ENCOUNTER — OFFICE VISIT (OUTPATIENT)
Dept: INTERNAL MEDICINE CLINIC | Age: 66
End: 2021-05-11
Payer: COMMERCIAL

## 2021-05-11 VITALS
TEMPERATURE: 98 F | DIASTOLIC BLOOD PRESSURE: 72 MMHG | WEIGHT: 204 LBS | HEIGHT: 66 IN | OXYGEN SATURATION: 98 % | BODY MASS INDEX: 32.78 KG/M2 | SYSTOLIC BLOOD PRESSURE: 128 MMHG | HEART RATE: 63 BPM

## 2021-05-11 DIAGNOSIS — E78.00 HYPERCHOLESTEREMIA: ICD-10-CM

## 2021-05-11 DIAGNOSIS — I10 ESSENTIAL HYPERTENSION: ICD-10-CM

## 2021-05-11 DIAGNOSIS — M70.62 GREATER TROCHANTERIC BURSITIS OF LEFT HIP: ICD-10-CM

## 2021-05-11 DIAGNOSIS — E28.319 ASYMPTOMATIC PREMATURE MENOPAUSE: ICD-10-CM

## 2021-05-11 DIAGNOSIS — L71.9 ROSACEA, ACNE: Primary | ICD-10-CM

## 2021-05-11 DIAGNOSIS — Z79.52 LONG TERM SYSTEMIC STEROID USER: ICD-10-CM

## 2021-05-11 DIAGNOSIS — G89.4 PAIN SYNDROME, CHRONIC: ICD-10-CM

## 2021-05-11 DIAGNOSIS — F51.01 PRIMARY INSOMNIA: ICD-10-CM

## 2021-05-11 DIAGNOSIS — E05.90 HYPERTHYROIDISM: ICD-10-CM

## 2021-05-11 DIAGNOSIS — I10 BENIGN ESSENTIAL HTN: ICD-10-CM

## 2021-05-11 DIAGNOSIS — L71.9 ROSACEA: ICD-10-CM

## 2021-05-11 PROCEDURE — 99214 OFFICE O/P EST MOD 30 MIN: CPT | Performed by: FAMILY MEDICINE

## 2021-05-11 PROCEDURE — 3008F BODY MASS INDEX DOCD: CPT | Performed by: NURSE PRACTITIONER

## 2021-05-11 RX ORDER — PROPRANOLOL HYDROCHLORIDE 10 MG/1
10 TABLET ORAL 2 TIMES DAILY
Qty: 180 TABLET | Refills: 1 | Status: SHIPPED | OUTPATIENT
Start: 2021-05-11 | End: 2021-10-11 | Stop reason: SDUPTHER

## 2021-05-11 RX ORDER — QUINAPRIL 20 MG/1
20 TABLET ORAL 2 TIMES DAILY
Qty: 180 TABLET | Refills: 1 | Status: SHIPPED | OUTPATIENT
Start: 2021-05-11 | End: 2022-02-28 | Stop reason: SDUPTHER

## 2021-05-11 RX ORDER — METHIMAZOLE 5 MG/1
5 TABLET ORAL DAILY
Qty: 90 TABLET | Refills: 1 | Status: SHIPPED | OUTPATIENT
Start: 2021-05-11 | End: 2021-11-30 | Stop reason: SDUPTHER

## 2021-05-11 RX ORDER — DOXYCYCLINE HYCLATE 100 MG/1
100 CAPSULE ORAL EVERY 12 HOURS SCHEDULED
Qty: 180 CAPSULE | Refills: 1
Start: 2021-05-11 | End: 2021-05-17 | Stop reason: SDUPTHER

## 2021-05-11 NOTE — PROGRESS NOTES
Assessment/Plan:    1  Rosacea, acne    2  Hyperthyroidism  -     propranolol (INDERAL) 10 mg tablet; Take 1 tablet (10 mg total) by mouth 2 (two) times a day  -     methimazole (TAPAZOLE) 5 mg tablet; Take 1 tablet (5 mg total) by mouth daily  -     TSH, 3rd generation with Free T4 reflex; Future    3  Primary insomnia    4  Pain syndrome, chronic    5  Benign essential HTN    6  Hypercholesteremia  -     Lipid panel; Future  -     Comprehensive metabolic panel; Future    7  Essential hypertension  -     quinapril (ACCUPRIL) 20 mg tablet; Take 1 tablet (20 mg total) by mouth 2 (two) times a day    8  Rosacea  -     doxycycline hyclate (VIBRAMYCIN) 100 mg capsule; Take 1 capsule (100 mg total) by mouth every 12 (twelve) hours    9  Long term systemic steroid user  -     DXA bone density spine hip and pelvis; Future; Expected date: 05/11/2021    10  Greater trochanteric bursitis of left hip  -     DXA bone density spine hip and pelvis; Future; Expected date: 05/11/2021    11  Asymptomatic premature menopause  -     DXA bone density spine hip and pelvis; Future; Expected date: 05/11/2021      BMI Counseling: Body mass index is 32 93 kg/m²  The BMI is above normal  Nutrition recommendations include moderation in carbohydrate intake  Exercise recommendations include exercising 3-5 times per week  No pharmacotherapy was ordered  Discussed questions regarding BuSpar and quinapril  No changes to those dosages today  She would like to retry Cymbalta and will report any side effects  She has an appointment in 1 month that I want her to keep  Will schedule bone density study since patient has history of chronic systemic steroid use and will repeat all routine lab work in 4 months  All her questions and concerns were addressed today to the best my ability    There are no Patient Instructions on file for this visit  Return for Next scheduled follow up and in 4months      Subjective:      Patient ID: Lexy Soliz Deborah Burger is a 61 y o  female  Chief Complaint   Patient presents with    Follow-up     2 month fu labs 5/6/21 - htn- please discuss quinapril dose and fill        HPI   Rosacea, patient was placed on Metrogel by her endocrinologist and she has been using it for 1 month   She feels that is not helping  Abdiburak Ortizarmando has facial flushing for several years which are exacerbated by stress, hypertension, heat, and hot flashes  Jarrod Cortés would like to discuss other options  5/16/18: started doxy 1 week ago, no relief yet  Still flushing and worse in the mornings  10/15/18: Machelle Damian a lot, taking doxy BID, ran out 1 week ago, re start at BID and then decrease to monthly  Dc 2019 taking doxy BID iith food, no issues  May 2021, taking doxycycline b i d  For several months and started Metrogel which she thinks floors upper symptoms      Insomnia, patient states she does not sleep well which is very multifactorial including the pain and how flashes   She used to be on estrogen replacement currently does not take any   She does take Ativan at night to help her sleep which helped marginally  5/16/18: taking 1 5 mg  And advil pm and it has helped- sleeping through the night now and slightly  More tired in am   10/15/18: taking ativan and sleeping well with it 3/1/19:   July 2019, has not tried to decrease her lorazepam and does take it every night to sleep  Dec 2019 usually doing very well but  Lately having some issues with sleep  Thinks its from the holidays  June 2020, stable  May 2021, sleeping okay usually with her medications however she does wake up in the middle of the night and feels a high level of anxiety which she does not know what to do about  She cannot sleep without her lorazepam which she is taking faithfully    At last appointment with another provider, she was started on Cymbalta but currently is not taking and she cannot remember what side effects she had     Depression:  Patient is a depression was exacerbated by the pain and inability to sleep  Ryan Zayas is always tired and fatigued and is having difficulty with activities of daily living around the house like cleaning and cooking as well as work  Akin Noyola is here today and can cooperate always   She feels that she is up to any medications or the long medications right now  8/13/18: buspar was increased to 10 mg per but she felt tired on it and went back to 5 mg BId, has gained weight  10/15/18: taking bupar 5 mg- 2 tabs at [facility],  12/21/18: stable  March 2019, no issues   Takes 1- 5 mg BuSpar in the morning than 1 with lunch and 2 at night    July 2019:  Doing very well with present medications and feels much better since losing weight  Dec 2019 taking 2 buspar at night and 1/2 in the morning    June 2020, relatively stable with no suicidal or homicidal ideations   May 2021, depression is relatively well controlled however currently her anxiety is at exacerbations since [place] time  She stated everything is normal with no problems at home or work no changes in her medications and no over-the-counter things that she has introduced to her daily routine  Other than not eating well and putting on some weight she states everything is fine  She recently had some lab work and her thyroid is controlled and she is compliant with her medications  She was started on Cymbalta few months ago from another provider which she took for few days and stopped but she cannot remember why she stopped her what side effects she had  She takes BuSpar -total of 30 mg per day but cannot take a full dose in the morning because it makes her groggy  She is taking her lorazepam at night  She states she feels like in the middle of the night at her or jump out of her chest but then her  checks her blood pressure as well as heart rate since he is a respiratory therapist in everything is normal   She does not drink any caffeine or alcohol towards bedtime and does not know why this is happening    It does not happen every night and can happen in the daytime as well randomly      Chronic pain, not too much better than previously    Follows with pain management    May 2021, follows with pain management and has a pain pump       The following portions of the patient's history were reviewed and updated as appropriate: allergies, current medications, past family history, past medical history, past social history, past surgical history and problem list     Review of Systems      Constitutional:  Denies fever or chills gradual weight gain  Eyes:  Denies double or blurry vision  HENT:  Denies nasal congestion or sore throat   Respiratory:  Denies cough or shortness of breath or wheezing  Cardiovascular:  Denies palpitations or chest pain  GI:  Denies abdominal pain, nausea, or vomiting, no loose stools  : normal bowel and bladder  Integument:  Denies rash , no open areas  Neurologic:  Denies headache or focal weakness, no dizziness        Current Outpatient Medications   Medication Sig Dispense Refill    busPIRone (BUSPAR) 10 mg tablet Take 1 tablet (10 mg total) by mouth 3 (three) times a day (Patient taking differently: Take 10 mg by mouth 3 (three) times a day 5mg AM and 10mg PM) 270 tablet 1    Cetirizine HCl (ZYRTEC PO) Take by mouth as needed      diclofenac (VOLTAREN) 75 mg EC tablet Take 1 tablet (75 mg total) by mouth 2 (two) times a day 180 tablet 1    Diclofenac Sodium (VOLTAREN) 1 % APPLY 2 GRAMS TO AFFECTED AREA 4 TIMES A  g 1    doxycycline hyclate (VIBRAMYCIN) 100 mg capsule Take 1 capsule (100 mg total) by mouth every 12 (twelve) hours 180 capsule 1    Esomeprazole Magnesium (NEXIUM PO) Take 1 capsule by mouth daily      fluticasone (FLONASE) 50 mcg/act nasal spray 1 spray into each nostril daily (Patient taking differently: 1 spray into each nostril as needed ) 16 g 3    LORazepam (ATIVAN) 1 mg tablet Take 1 5 tablets (1 5 mg total) by mouth daily at bedtime as needed for anxiety 135 tablet 1    methimazole (TAPAZOLE) 5 mg tablet Take 1 tablet (5 mg total) by mouth daily 90 tablet 1    metroNIDAZOLE (METROCREAM) 0 75 % cream Apply topically daily at bedtime 45 g 2    penciclovir (DENAVIR) 1 % cream Apply topically daily as needed (ulcer) 5 g 5    pregabalin (LYRICA) 50 mg capsule Take 1 in the AM and 2 at bedtime 270 capsule 3    propranolol (INDERAL) 10 mg tablet Take 1 tablet (10 mg total) by mouth 2 (two) times a day 180 tablet 1    quinapril (ACCUPRIL) 20 mg tablet Take 1 tablet (20 mg total) by mouth 2 (two) times a day 180 tablet 1    RESTASIS 0 05 % ophthalmic emulsion Administer 1 drop to both eyes 2 (two) times a day  3    tiZANidine (ZANAFLEX) 4 mg tablet TAKE 1 TABLET (4 MG TOTAL) BY MOUTH 2 (TWO) TIMES A  tablet 1    DULoxetine (CYMBALTA) 20 mg capsule Take 1 capsule (20 mg total) by mouth daily at bedtime (Patient not taking: Reported on 5/11/2021) 30 capsule 3    valACYclovir (VALTREX) 500 mg tablet Take 1 tablet (500 mg total) by mouth daily as needed (Cold Sores) 30 tablet 1     No current facility-administered medications for this visit          Objective:    /72 (BP Location: Left arm, Patient Position: Sitting, Cuff Size: Large)   Pulse 63   Temp 98 °F (36 7 °C) (Temporal)   Ht 5' 6" (1 676 m)   Wt 92 5 kg (204 lb)   LMP 11/26/2015   SpO2 98%   BMI 32 93 kg/m²        Physical Exam       Constitutional:  Well developed, well nourished, no acute distress, non-toxic appearance   Eyes:  PERRL, conjunctiva normal , non icteric sclera  HENT:  Atraumatic, oropharynx moist  Neck-  supple   Respiratory:  CTA b/l, normal breath sounds, no rales, no wheezing   Cardiovascular:  RRR, no murmurs, no LE edema b/l  GI:  Soft, nondistended, normal bowel sounds x 4, nontender, no organomegaly, no mass, no rebound, no guarding   Neurologic:  no focal deficits noted   Psychiatric:  Speech and behavior appropriate , AAO x 3        Velta [place], DO

## 2021-05-17 DIAGNOSIS — L71.9 ROSACEA: ICD-10-CM

## 2021-05-17 RX ORDER — DOXYCYCLINE HYCLATE 100 MG/1
100 CAPSULE ORAL EVERY 12 HOURS SCHEDULED
Qty: 180 CAPSULE | Refills: 3 | Status: SHIPPED | OUTPATIENT
Start: 2021-05-17 | End: 2021-11-13

## 2021-05-17 NOTE — TELEPHONE ENCOUNTER
Pt called and stated [facility] never received e-script for Doxycycline  I called [facility] and confirmed they did not receive this script         Please advise

## 2021-06-02 ENCOUNTER — TELEPHONE (OUTPATIENT)
Dept: PAIN MEDICINE | Facility: CLINIC | Age: 66
End: 2021-06-02

## 2021-06-02 NOTE — TELEPHONE ENCOUNTER
RN s/w pt who reports her pain level has been consistently between 6-9/10 depending on what she is doing during the day  Pain is in the same location as previously, LB and left leg  Pt confirmed she has had both covid vaccines  Pt is on doxycycline long term for rosacea  Order repeat inj or set up ov?     FYI: 3/26/21 Lt L3-4 TFESI         12/8/20 Lt L3-L4 TFESI          9/29/20 Lt L3-L4 TFESI          6/30/20 Lt L3-l4 TFESI

## 2021-06-03 ENCOUNTER — TELEPHONE (OUTPATIENT)
Dept: PAIN MEDICINE | Facility: CLINIC | Age: 66
End: 2021-06-03

## 2021-06-03 NOTE — TELEPHONE ENCOUNTER
Patient will call back  She was offered 6/4/21 with Cong Mathias at 1:45pm  She wasn't sure if she could take that appt   Provided call back number

## 2021-06-03 NOTE — TELEPHONE ENCOUNTER
Patient was called to schedule a re eval with the NP  She was offered Friday 6/4/21 with Prachi Prabhakar at 1:45pm  She will be calling back to schedule  She is not sure if she could take the date/time offered   If patient calls back please schedule her

## 2021-06-04 ENCOUNTER — OFFICE VISIT (OUTPATIENT)
Dept: PAIN MEDICINE | Facility: CLINIC | Age: 66
End: 2021-06-04
Payer: COMMERCIAL

## 2021-06-04 ENCOUNTER — TRANSCRIBE ORDERS (OUTPATIENT)
Dept: PAIN MEDICINE | Facility: CLINIC | Age: 66
End: 2021-06-04

## 2021-06-04 VITALS
HEART RATE: 72 BPM | WEIGHT: 204 LBS | DIASTOLIC BLOOD PRESSURE: 80 MMHG | BODY MASS INDEX: 32.93 KG/M2 | SYSTOLIC BLOOD PRESSURE: 130 MMHG

## 2021-06-04 DIAGNOSIS — M47.816 LUMBAR FACET ARTHROPATHY: ICD-10-CM

## 2021-06-04 DIAGNOSIS — M43.16 SPONDYLOLISTHESIS OF LUMBAR REGION: ICD-10-CM

## 2021-06-04 DIAGNOSIS — M54.16 LUMBAR RADICULOPATHY: ICD-10-CM

## 2021-06-04 DIAGNOSIS — G89.4 CHRONIC PAIN SYNDROME: Primary | ICD-10-CM

## 2021-06-04 DIAGNOSIS — M51.36 LUMBAR DEGENERATIVE DISC DISEASE: ICD-10-CM

## 2021-06-04 DIAGNOSIS — M48.061 LUMBAR STENOSIS WITHOUT NEUROGENIC CLAUDICATION: ICD-10-CM

## 2021-06-04 DIAGNOSIS — M25.551 RIGHT HIP PAIN: ICD-10-CM

## 2021-06-04 DIAGNOSIS — M79.18 MYOFASCIAL PAIN SYNDROME: ICD-10-CM

## 2021-06-04 PROCEDURE — 99214 OFFICE O/P EST MOD 30 MIN: CPT | Performed by: NURSE PRACTITIONER

## 2021-06-04 PROCEDURE — 1036F TOBACCO NON-USER: CPT | Performed by: NURSE PRACTITIONER

## 2021-06-04 RX ORDER — TIZANIDINE 4 MG/1
4 TABLET ORAL 2 TIMES DAILY
Qty: 180 TABLET | Refills: 1 | Status: SHIPPED | OUTPATIENT
Start: 2021-06-04 | End: 2022-05-02 | Stop reason: SDUPTHER

## 2021-06-04 NOTE — PATIENT INSTRUCTIONS
Epidural Steroid Injection   AMBULATORY CARE:   What you need to know about an epidural steroid injection (PRATIK):  An PRATIK is a procedure to inject steroid medicine into the epidural space  The epidural space is between your spinal cord and vertebrae  Steroids reduce inflammation and fluid buildup in your spine that may be causing pain  You may be given pain medicine along with the steroids  How to prepare for an PRATIK:  Your healthcare provider will talk to you about how to prepare for your procedure  He or she will tell you what medicines to take or not take on the day of your procedure  You may need to stop taking blood thinners or other medicines several days before your procedure  You may need to adjust any diabetes medicine you take on the day of your procedure  Steroid medicine can increase your blood sugar level  Arrange for someone to drive you home when you are discharged  What will happen during an PRATIK:   · You will be given medicine to numb the procedure area  You will be awake for the procedure, but you will not feel pain  You may also be given medicine to help you relax  Contrast liquid will be used to help your healthcare provider see the area better  Tell the healthcare provider if you have ever had an allergic reaction to contrast liquid  · Your healthcare provider may place the needle into your neck area, middle of your back, or tailbone area  He may inject the medicine next to the nerves that are causing your pain  He may instead inject the medicine into a larger area of the epidural space  This helps the medicine spread to more nerves  Your healthcare provider will use a fluoroscope to help guide the needle to the right place  A fluoroscope is a type of x-ray  After the procedure, a bandage will be placed over the injection site to prevent infection  What will happen after an PRATIK:  You will have a bandage over the injection site to prevent infection   Your healthcare provider will tell you when you can bathe and any activity guidelines  You will be able to go home  Risks of an PRATIK:  You may have temporary or permanent nerve damage or paralysis  You may have bleeding or develop a serious infection, such as meningitis (swelling of the brain coverings)  An abscess may also develop  An abscess is a pus-filled area under the skin  You may need surgery to fix the abscess  You may have a seizure, anxiety, or trouble sleeping  If you are a man, you may have temporary erectile dysfunction (not able to have an erection)  Call your local emergency number (911 in the [address] [place] [address],[address]) if:   · You have a seizure  · You have trouble moving your legs  Seek care immediately if:   · Blood soaks through your bandage  · You have a fever or chills, severe back pain, and the procedure area is sensitive to the touch  · You cannot control when you urinate or have a bowel movement  Call your doctor if:   · You have weakness or numbness in your legs  · Your wound is red, swollen, or draining pus  · You have nausea or are vomiting  · Your face or neck is red and you feel warm  · You have more pain than you had before the procedure  · You have swelling in your hands or feet  · You have questions or concerns about your condition or care  Care for your wound as directed: You may remove the bandage before you go to bed the day of your procedure  You may take a shower, but do not take a bath for at least 24 hours  Self-care:   · Do not drive,  use machines, or do strenuous activity for 24 hours after your procedure or as directed  · Continue other treatments  as directed  Steroid injections alone will not control your pain  The injections are meant to be used with other treatments, such as physical therapy  Follow up with your doctor as directed:  Write down your questions so you remember to ask them during your visits     © Copyright [employer] 2020 Information is for End User's use only and may not be sold, redistributed or otherwise used for commercial purposes  All illustrations and images included in CareNotes® are the copyrighted property of A [employer] M [employer] Inc  or Jose Elias Sanchez  The above information is an  only  It is not intended as medical advice for individual conditions or treatments  Talk to your doctor, nurse or pharmacist before following any medical regimen to see if it is safe and effective for you

## 2021-06-04 NOTE — PROGRESS NOTES
Assessment:  1  Chronic pain syndrome    2  Right hip pain    3  Myofascial pain syndrome    4  Lumbar radiculopathy    5  Lumbar stenosis without neurogenic claudication    6  Lumbar degenerative disc disease    7  Lumbar facet arthropathy    8  Spondylolisthesis of lumbar region        Plan:  Radhika Blanton is a 61 y o  female who was last seen 3/26/2021 presents for a follow up office visit in regards to chronic pain syndrome secondary to lumbar radiculopathy, lumbar spinal stenosis, lumbar facet arthropathy and with [employer] spinal cord stimulator  Upon  Examination, the patient does have bilateral lumbar paraspinal trigger points as well as right subscapular trigger points  I discussed trigger point injections with the patient  The patient like to move forward with the epidural steroid injection at this time, and will re-evaluate the trigger points after the injection is complete  I discussed the use of her muscle relaxer to help with the trigger point pain as well as physical therapy with the Graston technique to help break up the muscle masses  The most common risk and benefits of the epidural procedure were reviewed with the patient, who would like to proceed  An order was placed for left L3-L4 transforaminal epidural steroid injection  Complete risks and benefits including bleeding, infection, tissue reaction, nerve injury and allergic reaction were discussed  The approach was demonstrated using models and literature was provided  Verbal and written consent was obtained  I will order a right hip x-ray for the patient to rule out any injury  The patient will keep her scheduled appointment for the DEXA scan on 06/11/2021  I will follow up with the patient results of the x-ray once available  The patient will continue with her current pain medication regimen as prescribed  Prescriptions for Lyrica and tizanidine were electronically sent to the patient's pharmacy on file      The patient will follow-up after left L3-L4 transforaminal epidural steroid injection and as noted above or sooner if symptoms worsen in the interim  The patient was agreeable and verbalized understanding  My impressions and treatment recommendations were discussed in detail with the patient who verbalized understanding and had no further questions  Discharge instructions were provided  I personally saw and examined the patient and I agree with the above discussed plan of care  Orders Placed This Encounter   Procedures    XR hip/pelv 2-3 vws right if performed     Standing Status:   Future     Standing Expiration Date:   6/4/2025     Scheduling Instructions:      Bring along any outside films relating to this procedure   FL spine and pain procedure     Standing Status:   Future     Standing Expiration Date:   6/4/2025     Order Specific Question:   Reason for Exam:     Answer:   left L3-L4 TFESI     Order Specific Question:   Anticoagulant hold needed? Answer:   no     New Medications Ordered This Visit   Medications    tiZANidine (ZANAFLEX) 4 mg tablet     Sig: Take 1 tablet (4 mg total) by mouth 2 (two) times a day     Dispense:  180 tablet     Refill:  1       History of Present Illness:  Danny Rowe is a 61 y o  female who was last seen 3/26/2021 presents for a follow up office visit in regards to chronic pain syndrome secondary to lumbar radiculopathy, lumbar spinal stenosis, lumbar facet arthropathy and with Abbott spinal cord stimulator  The patients current symptoms include Back Pain (re-eval), Buttocks Pain, Leg Pain (left side), and Hip Pain (right hip)  The patient currently reports ongoing low back pain that is worse since the last visit  The last visit, the patient underwent left L3 -L4 transforaminal epidural steroid injection, which she reports provided significant improvement in her low back pain symptoms  At this time, the patient reports low back pain and tightness    She describes her pain as constant, burning, dull aching pain that radiates into her left lower extremity, that is worse at night  The patient also reports approximately 2 weeks ago she stepped down from a curb and now has had right hip pain  The patient was seen by her PCP who ordered a DEXA scan  The patient's DEXA scan is scheduled for 06/11/2021  The patient denies muscle weakness and reports no bowel or bladder dysfunction, and is able to complete ADLs with minimal difficulty  The patient currently rates her pain as 8/10 on the numeric rating scale  The patient is interested in repeating the epidural steroid injection at this time  Current pain medications include:  Lyrica 50 mg, 1 capsule in morning, 2 capsules at bedtime, and tizanidine 4 mg by mouth twice daily  The patient is also prescribed diclofenac 75 mg by mouth twice daily by her PCP  The patient reports this pain medication regimen provides 80% relief of her pain symptoms in conjunction with epidural steroid injections, with no side effects  I have personally reviewed and/or updated the patient's past medical history, past surgical history, family history, social history, current medications, allergies, and vital signs today  Review of Systems   Respiratory: Negative for shortness of breath  Cardiovascular: Negative for chest pain  Gastrointestinal: Negative for constipation, diarrhea, nausea and vomiting  Musculoskeletal: Positive for back pain, gait problem and joint swelling  Negative for arthralgias and myalgias  Skin: Negative for rash  Neurological: Negative for dizziness, seizures and weakness  All other systems reviewed and are negative        Patient Active Problem List   Diagnosis    Rosacea, acne    Lumbar degenerative disc disease    Lumbar radiculopathy    Anxiety    Benign essential HTN    Cervical radiculopathy    Disc disorder of cervical region    Hot flash, menopausal    Lumbar facet arthropathy  Lumbar stenosis without neurogenic claudication    Myofascial pain syndrome    Pain syndrome, chronic    Positive depression screening    Primary osteoarthritis of right knee    Bulge of lumbar disc without myelopathy    Spondylolisthesis of lumbar region    Arthritis of right acromioclavicular joint    Adverse effect of adrenal cortical steroids, initial encounter    Calculus of gallbladder without cholecystitis without obstruction    Chronic right shoulder pain    Hyperthyroidism    Primary insomnia    Greater trochanteric bursitis of left hip    Encounter for gynecological examination (general) (routine) without abnormal findings    Long term systemic steroid user    Asymptomatic premature menopause       Past Medical History:   Diagnosis Date    Anxiety     Cataract, bilateral     last assessed    Cervical radiculopathy     Disc disease, degenerative, cervical     Herpes simplex infection     Hypertension     Insomnia     Low back pain     Lumbar degenerative disc disease     Lumbar facet arthropathy     Lumbar radiculopathy     Lumbar stenosis without neurogenic claudication     Mononucleosis     Myofascial pain dysfunction syndrome     Osteoarthritis     shoulder region - unspecified laterality - last assessed 2/1/14    Plantar fasciitis     Ptosis, both eyelids     last assessed 10/6/16    Ptosis, congenital, left     Retinal detachment     last assessed 12/11/14 right eye    Sacroiliitis ([address] )     Thyroid disease     Thyrotoxicosis     last assessed 5/17/13    Vertigo        Past Surgical History:   Procedure Laterality Date    MI SURG IMPLNT NEUROELECT,EPIDURAL Left 1/26/2016    Procedure: PLACEMENT OF THORACIC SPINAL CORD STIMULATOR WITH LEFT BUTTOCK IMPLANTABLE PULSE GENERATOR (IMPULSE MONITORING);   Surgeon: Олег Mares MD;  Location: [facility];  Service: Neurosurgery    RETINAL DETACHMENT SURGERY Right        Family History   Problem Relation Age of Onset    Breast cancer Mother 80    COPD Mother     Hypertension Mother         essential    Heart attack Father         acute MI    Emphysema Father     Hypertension Father         essential    Other Father         prehypertension    No Known Problems Maternal Grandmother     No Known Problems Maternal Grandfather     No Known Problems Paternal Grandmother     No Known Problems Paternal Grandfather     No Known Problems Sister     No Known Problems Son     No Known Problems Sister     No Known Problems Paternal Aunt     No Known Problems Paternal Aunt     No Known Problems Paternal Aunt     No Known Problems Paternal Aunt     No Known Problems Paternal Aunt     Liver cancer Maternal Uncle        Social History     Occupational History    Occupation: Business owner     Comment: [employer]-full time working   Tobacco Use    Smoking status: Never Smoker    Smokeless tobacco: Never Used   Substance and Sexual Activity    Alcohol use:  Yes     Alcohol/week: 1 0 standard drinks     Types: 1 Glasses of wine per week     Frequency: Monthly or less     Drinks per session: 1 or 2     Binge frequency: Never    Drug use: No    Sexual activity: Not Currently     Partners: Male       Current Outpatient Medications on File Prior to Visit   Medication Sig    busPIRone (BUSPAR) 10 mg tablet Take 1 tablet (10 mg total) by mouth 3 (three) times a day (Patient taking differently: Take 10 mg by mouth 3 (three) times a day 5mg AM and 10mg PM)    Cetirizine HCl (ZYRTEC PO) Take by mouth as needed    diclofenac (VOLTAREN) 75 mg EC tablet Take 1 tablet (75 mg total) by mouth 2 (two) times a day    Diclofenac Sodium (VOLTAREN) 1 % APPLY 2 GRAMS TO AFFECTED AREA 4 TIMES A DAY    doxycycline hyclate (VIBRAMYCIN) 100 mg capsule Take 1 capsule (100 mg total) by mouth every 12 (twelve) hours    Esomeprazole Magnesium (NEXIUM PO) Take 1 capsule by mouth daily    fluticasone (FLONASE) 50 mcg/act nasal spray 1 spray into each nostril daily (Patient taking differently: 1 spray into each nostril as needed )    LORazepam (ATIVAN) 1 mg tablet Take 1 5 tablets (1 5 mg total) by mouth daily at bedtime as needed for anxiety    methimazole (TAPAZOLE) 5 mg tablet Take 1 tablet (5 mg total) by mouth daily    metroNIDAZOLE (METROCREAM) 0 75 % cream Apply topically daily at bedtime    penciclovir (DENAVIR) 1 % cream Apply topically daily as needed (ulcer)    pregabalin (LYRICA) 50 mg capsule Take 1 in the AM and 2 at bedtime    propranolol (INDERAL) 10 mg tablet Take 1 tablet (10 mg total) by mouth 2 (two) times a day    quinapril (ACCUPRIL) 20 mg tablet Take 1 tablet (20 mg total) by mouth 2 (two) times a day    RESTASIS 0 05 % ophthalmic emulsion Administer 1 drop to both eyes 2 (two) times a day    DULoxetine (CYMBALTA) 20 mg capsule Take 1 capsule (20 mg total) by mouth daily at bedtime (Patient not taking: Reported on 5/11/2021)    valACYclovir (VALTREX) 500 mg tablet Take 1 tablet (500 mg total) by mouth daily as needed (Cold Sores)    [DISCONTINUED] tiZANidine (ZANAFLEX) 4 mg tablet TAKE 1 TABLET (4 MG TOTAL) BY MOUTH 2 (TWO) TIMES A DAY     No current facility-administered medications on file prior to visit  Allergies   Allergen Reactions    Other Dermatitis     Adhesive tape - please use ONLY PAPER TAPE    Scopolamine Other (See Comments)     Severe, debilitating headache      Flexeril [Cyclobenzaprine] Dizziness and Fatigue    Naproxen      Other reaction(s): Unknown Allergic Reaction  Annotation - [id number]: nightmares    Penicillins      Other reaction(s): Unknown Allergic Reaction  Annotation - [id number]: childhood reaction    Promethazine-Codeine      Reaction Date: [id number]; Annotation - [id number]: nightmares;  Annotation - [id number]: nightmares    Tramadol      Other reaction(s): Unknown Allergic Reaction  Annotation - [id number]: PT HAS NIGHTMARES FROM TRAMADOL    Wound Dressing Adhesive Rash Physical Exam:    /80   Pulse 72   Wt 92 5 kg (204 lb)   LMP 11/26/2015   BMI 32 93 kg/m²     Constitutional:normal, well developed, well nourished, alert, in no distress and non-toxic and no overt pain behavior  and overweight  Eyes:anicteric  HEENT:grossly intact  Neck:supple, symmetric, trachea midline and no masses   Pulmonary:even and unlabored  Cardiovascular:No edema or pitting edema present  Skin:Normal without rashes or lesions and well hydrated  Psychiatric:Mood and affect appropriate  Neurologic:Cranial Nerves II-XII grossly intact  Musculoskeletal:normal     Lumbar Spine Exam    Appearance:  Normal lordosis  Palpation/Tenderness:  left lumbar paraspinal tenderness  right lumbar paraspinal tenderness  palpable bilateral lumbar paraspinal trigger points  Range of Motion:  Flexion:  Minimally limited  with pain  Extension:  Minimally limited  with pain  Motor Strength:  Left hip flexion:  5/5  Left hip extension:  5/5  Right hip flexion:  5/5  Right hip extension:  5/5  Left knee flexion:  5/5  Left knee extension:  5/5  Right knee flexion:  5/5  Right knee extension:  5/5  Left foot dorsiflexion:  5/5  Left foot plantar flexion:  5/5  Right foot dorsiflexion:  5/5  Right foot plantar flexion:  5/5    Imaging  MRI LUMBAR SPINE WITHOUT CONTRAST     INDICATION: [id number]: Spinal stenosis, lumbar region without neurogenic claudication  Left-sided low back pain and leg pain      COMPARISON:  11/7/2017     TECHNIQUE: Limited scanning was performed using MR guidelines based on  limitations  Sagittal T2, coronal T2, axial gradient echo and axial T2 as well as sagittal SPGR imaging was obtained      Medical device: The patient has a st fabby stimulator [id number]  which is MR compatible    Scanning was performed as per the 's gridlines with attention to device appropriate DANUTA levels         IMAGE QUALITY:  Diagnostic     FINDINGS: There is a transitional vertebral body at the lumbosacral junction  For the purposes of this study, the first conically shaped vertebral body will be designated S1  If spinal intervention is indicated, correlation with radiography recommended      Vertebral body levels are labeled on  image 7, series 5  The numbering convention used previously will be followed on the current study        ALIGNMENT:  Trace grade 1 anterolisthesis of L5 on S1 is retrospectively stable as is a minimal levoscoliosis mid lumbar spine apex at the L3 segment      MARROW SIGNAL:  Normal marrow signal is identified within the visualized bony structures  No discrete marrow lesion      DISTAL CORD AND CONUS:  Normal size and signal within the distal cord and conus  The conus ends at the L2 level      PARASPINAL SOFT TISSUES:  Artifact in the left subcutaneous soft tissues in the low back noted likely related to stimulator leads      SACRUM:  Normal signal within the sacrum  No evidence of insufficiency or stress fracture      LOWER THORACIC DISC SPACES:  Normal disc height and signal   No disc herniation, canal stenosis or foraminal narrowing      LUMBAR DISC SPACES:     L1-L2:  Normal      L2-L3:  Normal      L3-L4:  There is a diffuse disk bulge  No significant central canal or neural foraminal narrowing  Bilateral facet hypertrophy noted  This level is similar to the prior study      L4-L5:  Small left paracentral disc protrusion  There is bilateral facet hypertrophy  Mild to moderate central canal narrowing  Mild left neural foraminal narrowing  Right neural foramen patent  This level is similar to the prior study      L5-S1:  There is uncovering the intervertebral disc space  There is bilateral facet hypertrophy  There is a superimposed left neural foraminal disc protrusion  Moderate left neural foraminal narrowing  Moderate central canal narrowing  Mild right   neural foraminal narrowing  This level is similar to the prior study      IMPRESSION:     1   There is a transitional vertebral body at the lumbosacral junction  If spinal intervention is indicated, correlation with radiography recommended    2   Scattered spondylotic changes are similar to the prior study

## 2021-06-08 ENCOUNTER — TELEPHONE (OUTPATIENT)
Dept: PAIN MEDICINE | Facility: CLINIC | Age: 66
End: 2021-06-08

## 2021-06-08 ENCOUNTER — APPOINTMENT (OUTPATIENT)
Dept: RADIOLOGY | Facility: MEDICAL CENTER | Age: 66
End: 2021-06-08
Payer: COMMERCIAL

## 2021-06-08 DIAGNOSIS — M25.551 RIGHT HIP PAIN: ICD-10-CM

## 2021-06-08 PROCEDURE — 73502 X-RAY EXAM HIP UNI 2-3 VIEWS: CPT

## 2021-06-08 NOTE — TELEPHONE ENCOUNTER
Scheduled pt for Lt L3 L4 Tfesi for 7/13/21  Pt denies rx blood thinners  Went over pre-procedure instructions below:  Nothing to eat or drink 1 hr prior to procedure  Need to arrange transportation  Proper clothing for procedure  If ill or placed on antibiotics please call to reschedule  Covid/travel/ and vaccine instructions

## 2021-06-09 ENCOUNTER — PROCEDURE VISIT (OUTPATIENT)
Dept: PAIN MEDICINE | Facility: CLINIC | Age: 66
End: 2021-06-09
Payer: COMMERCIAL

## 2021-06-09 ENCOUNTER — TRANSCRIBE ORDERS (OUTPATIENT)
Dept: PAIN MEDICINE | Facility: CLINIC | Age: 66
End: 2021-06-09

## 2021-06-09 VITALS
SYSTOLIC BLOOD PRESSURE: 109 MMHG | WEIGHT: 204 LBS | HEART RATE: 65 BPM | DIASTOLIC BLOOD PRESSURE: 83 MMHG | BODY MASS INDEX: 32.93 KG/M2

## 2021-06-09 DIAGNOSIS — M79.18 MYOFASCIAL PAIN SYNDROME: Primary | ICD-10-CM

## 2021-06-09 PROCEDURE — 20552 NJX 1/MLT TRIGGER POINT 1/2: CPT | Performed by: ANESTHESIOLOGY

## 2021-06-09 RX ORDER — BUPIVACAINE HYDROCHLORIDE 2.5 MG/ML
10 INJECTION, SOLUTION EPIDURAL; INFILTRATION; INTRACAUDAL ONCE
Status: COMPLETED | OUTPATIENT
Start: 2021-06-09 | End: 2021-06-09

## 2021-06-09 RX ADMIN — BUPIVACAINE HYDROCHLORIDE 10 ML: 2.5 INJECTION, SOLUTION EPIDURAL; INFILTRATION; INTRACAUDAL at 09:10

## 2021-06-09 NOTE — PROGRESS NOTES
Pre-procedure Diagnosis:   Myofascial pain  Post-procedure Diagnosis:   Myofascial pain  Operation Title(s):  Trigger point injections into right thoracic paraspinal x2  Attending Surgeon:   Margie Dangelo MD  Anesthesia:   Local    Indications: The patient is a 61y o  year-old female with a diagnosis of myofascial pain  The patient's history and physical exam were reviewed  The risks, benefits and alternatives to the procedure were discussed, and all questions were answered to the patient's satisfaction  The patient agreed to proceed, and written informed consent was obtained  Procedure in Detail: The patient was brought into the exam room and placed in the sitting position on the exam room chair with the head flexed on a pillow  Trigger points were identified in the:  Right thoracic paraspinal x2    Areas were cleansed with alcohol  Then, using a dry needling technique, each trigger point was injected with a 1 5 inch 25 gauge needle and 1 mL 0 25% bupivacaine     Disposition: The patient tolerated the procedure well and there were no apparent complications  The patient was given written discharge instructions for the procedure

## 2021-06-11 ENCOUNTER — HOSPITAL ENCOUNTER (OUTPATIENT)
Dept: RADIOLOGY | Facility: MEDICAL CENTER | Age: 66
Discharge: HOME | End: 2021-06-11
Payer: COMMERCIAL

## 2021-06-11 DIAGNOSIS — M70.62 GREATER TROCHANTERIC BURSITIS OF LEFT HIP: ICD-10-CM

## 2021-06-11 DIAGNOSIS — Z13.820 SCREENING FOR OSTEOPOROSIS: ICD-10-CM

## 2021-06-11 DIAGNOSIS — E28.319 ASYMPTOMATIC PREMATURE MENOPAUSE: ICD-10-CM

## 2021-06-11 DIAGNOSIS — Z79.52 LONG TERM SYSTEMIC STEROID USER: ICD-10-CM

## 2021-06-11 PROCEDURE — 77080 DXA BONE DENSITY AXIAL: CPT

## 2021-06-14 DIAGNOSIS — M54.12 CERVICAL RADICULOPATHY: ICD-10-CM

## 2021-06-14 DIAGNOSIS — M54.16 LUMBAR RADICULOPATHY: ICD-10-CM

## 2021-06-14 DIAGNOSIS — F41.9 ANXIETY: ICD-10-CM

## 2021-06-14 RX ORDER — DULOXETIN HYDROCHLORIDE 20 MG/1
20 CAPSULE, DELAYED RELEASE ORAL
Qty: 90 CAPSULE | Refills: 1 | OUTPATIENT
Start: 2021-06-14

## 2021-06-18 ENCOUNTER — TELEPHONE (OUTPATIENT)
Dept: PAIN MEDICINE | Facility: CLINIC | Age: 66
End: 2021-06-18

## 2021-06-18 NOTE — TELEPHONE ENCOUNTER
I discussed the results of the patients hip XR with the patient to include treatment options: physical therapy, oral anti-inflammatory medications, and steroid injections  The patient is currently taking diclofenac and reports she has not had any more pain since the previous incidence  I discussed with the patient should her pain increase, we geraldine consider steroid injections to the hip  The patient was agreeable and verbalized understanding

## 2021-06-21 NOTE — TELEPHONE ENCOUNTER
FYI: Received call from pt to try and move her TFESI appt up sooner from 7/13, Dr Mariajose Miranda had a cancellation this Friday 06/25, verified no precert required w insurance company, called pt and rescheduled to 06/25/21 arriving at 10:10  Went over pre procedure instructions, no vaccinations 2 weeks prior/post proc, NPO 1 hr prior, if sick or on abx needs to call to rs, wear loose, comf clothing- no buttons/zippers, needs   Pt verbalized understanding  PAT written up on your desk

## 2021-06-22 ENCOUNTER — OFFICE VISIT (OUTPATIENT)
Dept: INTERNAL MEDICINE CLINIC | Facility: CLINIC | Age: 66
End: 2021-06-22
Payer: COMMERCIAL

## 2021-06-22 VITALS
BODY MASS INDEX: 32.8 KG/M2 | TEMPERATURE: 98.6 F | HEIGHT: 67 IN | OXYGEN SATURATION: 99 % | SYSTOLIC BLOOD PRESSURE: 124 MMHG | HEART RATE: 71 BPM | DIASTOLIC BLOOD PRESSURE: 80 MMHG | WEIGHT: 209 LBS

## 2021-06-22 DIAGNOSIS — F41.9 ANXIETY: Primary | ICD-10-CM

## 2021-06-22 PROCEDURE — 99213 OFFICE O/P EST LOW 20 MIN: CPT | Performed by: FAMILY MEDICINE

## 2021-06-22 PROCEDURE — 1036F TOBACCO NON-USER: CPT | Performed by: FAMILY MEDICINE

## 2021-06-22 PROCEDURE — 3074F SYST BP LT 130 MM HG: CPT | Performed by: FAMILY MEDICINE

## 2021-06-22 PROCEDURE — 3079F DIAST BP 80-89 MM HG: CPT | Performed by: FAMILY MEDICINE

## 2021-06-22 PROCEDURE — 3008F BODY MASS INDEX DOCD: CPT | Performed by: FAMILY MEDICINE

## 2021-06-22 NOTE — PROGRESS NOTES
Assessment/Plan:    1  Anxiety  -     Ambulatory referral to Psychiatry; Future        advised to not take the time BuSpar and see if it controls her anxiety symptoms and lessens the fatigue and lethargy  She does not want it change at this time  I did offer that option to her  I also recommended that she tell her pain management doctor that none of the modalities that they have tried so far have given her any meaningful relief and at this time it is hindering her activities of daily living and her ability to live a normal active life and do any yd work or gardening which she really enjoys  She also has to think 10 times on where she can not go on vacation that she is too afraid have pain and a problem  She will call me if there is any adverse issues and I also recommended following up in seeking out counseling for coping strategies for anxiety and writing down at the diarrhea a when she has flare up of any symptoms which will give us poorly more objective outlook    There are no Patient Instructions on file for this visit  Return for Next scheduled follow up  Subjective:      Patient ID: Ofe Amezcua is a 61 y o  female  Chief Complaint   Patient presents with    Follow-up     no new concerns, no refills, dexa 06/11/2021, xray 06/08/2021       HPI  Depression:  Patient is a depression was exacerbated by the pain and inability to sleep  Alonso Oconnell is always tired and fatigued and is having difficulty with activities of daily living around the house like cleaning and cooking as well as work  Chilo Cosby is here today and can cooperate always   She feels that she is up to any medications or the long medications right now       June 2020, relatively stable with no suicidal or homicidal ideations   May 2021, depression is relatively well controlled however currently her anxiety is at exacerbations since [place] time    She stated everything is normal with no problems at home or work no changes in her medications and no over-the-counter things that she has introduced to her daily routine  Other than not eating well and putting on some weight she states everything is fine  She recently had some lab work and her thyroid is controlled and she is compliant with her medications  She was started on Cymbalta few months ago from another provider which she took for few days and stopped but she cannot remember why she stopped her what side effects she had  She takes BuSpar -total of 30 mg per day but cannot take a full dose in the morning because it makes her groggy  She is taking her lorazepam at night  She states she feels like in the middle of the night at her or jump out of her chest but then her  checks her blood pressure as well as heart rate since he is a respiratory therapist in everything is normal   She does not drink any caffeine or alcohol towards bedtime and does not know why this is happening  It does not happen every night and can happen in the daytime as well randomly   June 2021, took Cymbalta for 1 day and had dizziness  There have been no actual real changes in her mood, anxiety and depression symptoms or pain  There is no HI or SI however activities of daily living as well as things she likes to do like gardening really way heavy on her mentally    Now she is having at stress and anxiety in choosing a vacation spot because of the pain and she feels that it really limits her ability to live life to the fullest   She is compliant with pain management but feels that none of the procedures give her any meaningful relief        The following portions of the patient's history were reviewed and updated as appropriate: allergies, current medications, past family history, past medical history, past social history, past surgical history and problem list     Review of Systems      Constitutional:  Denies fever or chills   Eyes:  Denies double or blurry vision  HENT:  Denies nasal congestion or sore throat   Respiratory:  Denies cough or shortness of breath or wheezing  Cardiovascular:  Denies palpitations or chest pain  GI:  Denies abdominal pain, nausea, or vomiting, no loose stools, no reflux  Integument:  Denies rash , no open areas  Neurologic:  Denies headache or focal weakness, no dizziness  : no dysuria      Current Outpatient Medications   Medication Sig Dispense Refill    busPIRone (BUSPAR) 10 mg tablet Take 1 tablet (10 mg total) by mouth 3 (three) times a day (Patient taking differently: Take 10 mg by mouth 3 (three) times a day 5mg AM and 10mg PM) 270 tablet 1    Cetirizine HCl (ZYRTEC PO) Take by mouth as needed      diclofenac (VOLTAREN) 75 mg EC tablet Take 1 tablet (75 mg total) by mouth 2 (two) times a day 180 tablet 1    Diclofenac Sodium (VOLTAREN) 1 % APPLY 2 GRAMS TO AFFECTED AREA 4 TIMES A  g 1    doxycycline hyclate (VIBRAMYCIN) 100 mg capsule Take 1 capsule (100 mg total) by mouth every 12 (twelve) hours (Patient taking differently: Take 100 mg by mouth daily ) 180 capsule 3    Esomeprazole Magnesium (NEXIUM PO) Take 1 capsule by mouth daily      fluticasone (FLONASE) 50 mcg/act nasal spray 1 spray into each nostril daily (Patient taking differently: 1 spray into each nostril as needed ) 16 g 3    LORazepam (ATIVAN) 1 mg tablet Take 1 5 tablets (1 5 mg total) by mouth daily at bedtime as needed for anxiety 135 tablet 1    methimazole (TAPAZOLE) 5 mg tablet Take 1 tablet (5 mg total) by mouth daily 90 tablet 1    penciclovir (DENAVIR) 1 % cream Apply topically daily as needed (ulcer) 5 g 5    pregabalin (LYRICA) 50 mg capsule Take 1 in the AM and 2 at bedtime 270 capsule 3    propranolol (INDERAL) 10 mg tablet Take 1 tablet (10 mg total) by mouth 2 (two) times a day 180 tablet 1    quinapril (ACCUPRIL) 20 mg tablet Take 1 tablet (20 mg total) by mouth 2 (two) times a day 180 tablet 1    RESTASIS 0 05 % ophthalmic emulsion Administer 1 drop to both eyes 2 (two) times a day  3    tiZANidine (ZANAFLEX) 4 mg tablet Take 1 tablet (4 mg total) by mouth 2 (two) times a day 180 tablet 1    valACYclovir (VALTREX) 500 mg tablet Take 1 tablet (500 mg total) by mouth daily as needed (Cold Sores) 30 tablet 1    metroNIDAZOLE (METROCREAM) 0 75 % cream Apply topically daily at bedtime (Patient not taking: Reported on 6/22/2021) 45 g 2     No current facility-administered medications for this visit         Objective:    /80 (BP Location: Left arm, Patient Position: Sitting, Cuff Size: Standard)   Pulse 71   Temp 98 6 °F (37 °C) (Temporal)   Ht 5' 6 5" (1 689 m)   Wt 94 8 kg (209 lb)   LMP 11/26/2015   SpO2 99% Comment: ra  BMI 33 23 kg/m²        Physical Exam         Constitutional:  Well developed, well nourished, no acute distress, non-toxic appearance   Eyes:  PERRL, conjunctiva normal , non icteric sclera  HENT:  Atraumatic, oropharynx moist  Neck-  supple   Respiratory:  CTA b/l, normal breath sounds, no rales, no wheezing   Cardiovascular:  RRR, no murmurs, no LE edema b/l  GI:  Soft, nondistended, normal bowel sounds x 4, nontender, no organomegaly, no mass, no rebound, no guarding   Neurologic:  no focal deficits noted   Psychiatric:  Speech and behavior appropriate , AAO x 3      Kaelyn Hernandez DO

## 2021-06-25 ENCOUNTER — HOSPITAL ENCOUNTER (OUTPATIENT)
Dept: RADIOLOGY | Facility: CLINIC | Age: 66
Discharge: HOME | End: 2021-06-25
Attending: ANESTHESIOLOGY | Admitting: ANESTHESIOLOGY
Payer: COMMERCIAL

## 2021-06-25 VITALS
HEART RATE: 68 BPM | SYSTOLIC BLOOD PRESSURE: 153 MMHG | TEMPERATURE: 98 F | RESPIRATION RATE: 18 BRPM | DIASTOLIC BLOOD PRESSURE: 83 MMHG | OXYGEN SATURATION: 98 %

## 2021-06-25 DIAGNOSIS — M48.061 LUMBAR STENOSIS WITHOUT NEUROGENIC CLAUDICATION: ICD-10-CM

## 2021-06-25 DIAGNOSIS — M54.16 LUMBAR RADICULOPATHY: ICD-10-CM

## 2021-06-25 PROCEDURE — 64484 NJX AA&/STRD TFRM EPI L/S EA: CPT | Performed by: ANESTHESIOLOGY

## 2021-06-25 PROCEDURE — 64483 NJX AA&/STRD TFRM EPI L/S 1: CPT | Performed by: ANESTHESIOLOGY

## 2021-06-25 RX ORDER — 0.9 % SODIUM CHLORIDE 0.9 %
10 VIAL (ML) INJECTION ONCE
Status: COMPLETED | OUTPATIENT
Start: 2021-06-25 | End: 2021-06-25

## 2021-06-25 RX ORDER — BUPIVACAINE HCL/PF 2.5 MG/ML
10 VIAL (ML) INJECTION ONCE
Status: COMPLETED | OUTPATIENT
Start: 2021-06-25 | End: 2021-06-25

## 2021-06-25 RX ORDER — METHYLPREDNISOLONE ACETATE 80 MG/ML
80 INJECTION, SUSPENSION INTRA-ARTICULAR; INTRALESIONAL; INTRAMUSCULAR; PARENTERAL; SOFT TISSUE ONCE
Status: COMPLETED | OUTPATIENT
Start: 2021-06-25 | End: 2021-06-25

## 2021-06-25 RX ADMIN — SODIUM CHLORIDE 5 ML: 9 INJECTION, SOLUTION INTRAMUSCULAR; INTRAVENOUS; SUBCUTANEOUS at 10:40

## 2021-06-25 RX ADMIN — BUPIVACAINE HYDROCHLORIDE 2 ML: 2.5 INJECTION, SOLUTION EPIDURAL; INFILTRATION; INTRACAUDAL at 10:42

## 2021-06-25 RX ADMIN — METHYLPREDNISOLONE ACETATE 80 MG: 80 INJECTION, SUSPENSION INTRA-ARTICULAR; INTRALESIONAL; INTRAMUSCULAR; PARENTERAL; SOFT TISSUE at 10:41

## 2021-06-25 RX ADMIN — Medication 5 ML: at 10:40

## 2021-06-25 RX ADMIN — IOHEXOL 1 ML: 300 INJECTION, SOLUTION INTRAVENOUS at 10:41

## 2021-06-25 NOTE — DISCHARGE INSTR - LAB
Epidural Steroid Injection   WHAT YOU NEED TO KNOW:   An epidural steroid injection (PRATIK) is a procedure to inject steroid medicine into the epidural space  The epidural space is between your spinal cord and vertebrae  Steroids reduce inflammation and fluid buildup in your spine that may be causing pain  You may be given pain medicine along with the steroids  ACTIVITY  · Do not drive or operate machinery today  · No strenuous activity today - bending, lifting, etc   · You may resume normal activites starting tomorrow - start slowly and as tolerated  · You may shower today, but no tub baths or hot tubs  · You may have numbness for several hours from the local anesthetic  Please use caution and common sense, especially with weight-bearing activities  CARE OF THE INJECTION SITE  · If you have soreness or pain, apply ice to the area today (20 minutes on/20 minutes off)  · Starting tomorrow, you may use warm, moist heat or ice if needed  · You may have an increase or change in your discomfort for 36-48 hours after your treatment  · Apply ice and continue with any pain medication you have been prescribed  · Notify [facility] if you have any of the following: redness, drainage, swelling, headache, stiff neck or fever above 100°F     SPECIAL INSTRUCTIONS  · Our office will contact you in approximately 7 days for a progress report  MEDICATIONS  · Continue to take all routine medications  · Our office may have instructed you to hold some medications  As no general anesthesia was used in today's procedure, you should not experience any side effects related to anesthesia  If you have a problem specifically related to your procedure, please call our office at [phone]  Problems not related to your procedure should be directed to your primary care physician

## 2021-06-25 NOTE — H&P
History of Present Illness: The patient is a 61 y o  female who presents with complaints of left lower back and leg pain secondary spinal stenosis and is here today for left L3-4 transforaminal epidural steroid injection      Patient Active Problem List   Diagnosis    Rosacea, acne    Lumbar degenerative disc disease    Lumbar radiculopathy    Anxiety    Benign essential HTN    Cervical radiculopathy    Disc disorder of cervical region    Hot flash, menopausal    Lumbar facet arthropathy    Lumbar stenosis without neurogenic claudication    Myofascial pain syndrome    Pain syndrome, chronic    Positive depression screening    Primary osteoarthritis of right knee    Bulge of lumbar disc without myelopathy    Spondylolisthesis of lumbar region    Arthritis of right acromioclavicular joint    Adverse effect of adrenal cortical steroids, initial encounter    Calculus of gallbladder without cholecystitis without obstruction    Chronic right shoulder pain    Hyperthyroidism    Primary insomnia    Greater trochanteric bursitis of left hip    Encounter for gynecological examination (general) (routine) without abnormal findings    Long term systemic steroid user    Asymptomatic premature menopause       Past Medical History:   Diagnosis Date    Anxiety     Cataract, bilateral     last assessed    Cervical radiculopathy     Disc disease, degenerative, cervical     Herpes simplex infection     Hypertension     Insomnia     Low back pain     Lumbar degenerative disc disease     Lumbar facet arthropathy     Lumbar radiculopathy     Lumbar stenosis without neurogenic claudication     Mononucleosis     Myofascial pain dysfunction syndrome     Osteoarthritis     shoulder region - unspecified laterality - last assessed 2/1/14    Plantar fasciitis     Ptosis, both eyelids     last assessed 10/6/16    Ptosis, congenital, left     Retinal detachment     last assessed 12/11/14 right eye    Sacroiliitis ([facility] [address] )     Thyroid disease     Thyrotoxicosis     last assessed 5/17/13    Vertigo        Past Surgical History:   Procedure Laterality Date    KS SURG IMPLNT NEUROELECT,EPIDURAL Left 1/26/2016    Procedure: PLACEMENT OF THORACIC SPINAL CORD STIMULATOR WITH LEFT BUTTOCK IMPLANTABLE PULSE GENERATOR (IMPULSE MONITORING);   Surgeon: Prem Cantu MD;  Location: [facility];  Service: Neurosurgery    RETINAL DETACHMENT SURGERY Right          Current Outpatient Medications:     busPIRone (BUSPAR) 10 mg tablet, Take 1 tablet (10 mg total) by mouth 3 (three) times a day (Patient taking differently: Take 10 mg by mouth 3 (three) times a day 5mg AM and 10mg PM), Disp: 270 tablet, Rfl: 1    Cetirizine HCl (ZYRTEC PO), Take by mouth as needed, Disp: , Rfl:     diclofenac (VOLTAREN) 75 mg EC tablet, Take 1 tablet (75 mg total) by mouth 2 (two) times a day, Disp: 180 tablet, Rfl: 1    Diclofenac Sodium (VOLTAREN) 1 %, APPLY 2 GRAMS TO AFFECTED AREA 4 TIMES A DAY, Disp: 200 g, Rfl: 1    doxycycline hyclate (VIBRAMYCIN) 100 mg capsule, Take 1 capsule (100 mg total) by mouth every 12 (twelve) hours (Patient taking differently: Take 100 mg by mouth daily ), Disp: 180 capsule, Rfl: 3    Esomeprazole Magnesium (NEXIUM PO), Take 1 capsule by mouth daily, Disp: , Rfl:     fluticasone (FLONASE) 50 mcg/act nasal spray, 1 spray into each nostril daily (Patient taking differently: 1 spray into each nostril as needed ), Disp: 16 g, Rfl: 3    LORazepam (ATIVAN) 1 mg tablet, Take 1 5 tablets (1 5 mg total) by mouth daily at bedtime as needed for anxiety, Disp: 135 tablet, Rfl: 1    methimazole (TAPAZOLE) 5 mg tablet, Take 1 tablet (5 mg total) by mouth daily, Disp: 90 tablet, Rfl: 1    metroNIDAZOLE (METROCREAM) 0 75 % cream, Apply topically daily at bedtime (Patient not taking: Reported on 6/22/2021), Disp: 45 g, Rfl: 2    penciclovir (DENAVIR) 1 % cream, Apply topically daily as needed (ulcer), Disp: 5 g, Rfl: 5    pregabalin (LYRICA) 50 mg capsule, Take 1 in the AM and 2 at bedtime, Disp: 270 capsule, Rfl: 3    propranolol (INDERAL) 10 mg tablet, Take 1 tablet (10 mg total) by mouth 2 (two) times a day, Disp: 180 tablet, Rfl: 1    quinapril (ACCUPRIL) 20 mg tablet, Take 1 tablet (20 mg total) by mouth 2 (two) times a day, Disp: 180 tablet, Rfl: 1    RESTASIS 0 05 % ophthalmic emulsion, Administer 1 drop to both eyes 2 (two) times a day, Disp: , Rfl: 3    tiZANidine (ZANAFLEX) 4 mg tablet, Take 1 tablet (4 mg total) by mouth 2 (two) times a day, Disp: 180 tablet, Rfl: 1    valACYclovir (VALTREX) 500 mg tablet, Take 1 tablet (500 mg total) by mouth daily as needed (Cold Sores), Disp: 30 tablet, Rfl: 1    Current Facility-Administered Medications:     bupivacaine (PF) (MARCAINE) 0 25 % injection 10 mL, 10 mL, Epidural, Once, Andreea Juárez MD    iohexol (OMNIPAQUE) 300 mg/mL injection 50 mL, 50 mL, Epidural, Once, Andreea Juárez MD    lidocaine (PF) (XYLOCAINE-MPF) 2 % injection 5 mL, 5 mL, Infiltration, Once, Andreea Juárez MD    methylPREDNISolone acetate (DEPO-MEDROL) injection 80 mg, 80 mg, Epidural, Once, Andreea Juárez MD    sodium chloride (PF) 0 9 % injection 10 mL, 10 mL, Infiltration, Once, Andreea Juárez MD    Allergies   Allergen Reactions    Other Dermatitis     Adhesive tape - please use ONLY PAPER TAPE    Scopolamine Other (See Comments)     Severe, debilitating headache      Flexeril [Cyclobenzaprine] Dizziness and Fatigue    Naproxen      Other reaction(s): Unknown Allergic Reaction  Annotation - [id number]: nightmares    Penicillins      Other reaction(s): Unknown Allergic Reaction  Annotation - [id number]: childhood reaction    Promethazine-Codeine      Reaction Date: [id number]; Annotation - [id number]: nightmares;  Annotation - [id number]: nightmares    Tramadol      Other reaction(s): Unknown Allergic Reaction  Annotation - [id number]: PT HAS NIGHTMARES FROM TRAMADOL    Wound Dressing Adhesive Rash       Physical Exam:   Vitals:    06/25/21 1019   BP: 146/89   Pulse: 66   Resp: 18   Temp: 98 °F (36 7 °C)   SpO2: 98%     General: Awake, Alert, Oriented x 3, Mood and affect appropriate  Respiratory: Respirations even and unlabored  Cardiovascular: Peripheral pulses intact; no edema  Musculoskeletal Exam:  Left low Back tenderness    ASA Score: 2    Patient/Chart Verification  Patient ID Verified: Verbal  ID Band Applied: No  Consents Confirmed: Procedural, To be obtained in the Pre-Procedure area  H&P( within 30 days) Verified: To be obtained in the Pre-Procedure area  Allergies Reviewed: Yes  Anticoag/NSAID held?: No (chronic abx for rosatia - denies infection  FQ informed)  Currently on antibiotics?: No    Assessment:   1  Lumbar radiculopathy    2   Lumbar stenosis without neurogenic claudication        Plan: left L3-L4 TFESI

## 2021-07-02 ENCOUNTER — TELEPHONE (OUTPATIENT)
Dept: PAIN MEDICINE | Facility: CLINIC | Age: 66
End: 2021-07-02

## 2021-07-02 DIAGNOSIS — M51.16 INTERVERTEBRAL DISC DISORDER WITH RADICULOPATHY OF LUMBAR REGION: ICD-10-CM

## 2021-07-02 DIAGNOSIS — M48.061 LUMBAR STENOSIS WITHOUT NEUROGENIC CLAUDICATION: Primary | ICD-10-CM

## 2021-07-02 NOTE — TELEPHONE ENCOUNTER
Pt reports slight improvement post inj   Pain level 2/10  Pt aware I will call next week for an update

## 2021-07-07 DIAGNOSIS — F41.9 ANXIETY: ICD-10-CM

## 2021-07-08 RX ORDER — LORAZEPAM 1 MG/1
1.5 TABLET ORAL
Qty: 135 TABLET | Refills: 1 | Status: SHIPPED | OUTPATIENT
Start: 2021-07-08 | End: 2021-12-27 | Stop reason: SDUPTHER

## 2021-07-09 NOTE — TELEPHONE ENCOUNTER
Pt reports still no improvement 2wk post inj   Pain level 7/10  Pt wants to know if she can come in for a sooner appt

## 2021-07-12 ENCOUNTER — TELEPHONE (OUTPATIENT)
Dept: PAIN MEDICINE | Facility: CLINIC | Age: 66
End: 2021-07-12

## 2021-07-12 DIAGNOSIS — M79.18 MYOFASCIAL PAIN SYNDROME: ICD-10-CM

## 2021-07-12 DIAGNOSIS — M54.12 CERVICAL RADICULOPATHY: ICD-10-CM

## 2021-07-12 DIAGNOSIS — M47.816 LUMBAR SPONDYLOSIS: ICD-10-CM

## 2021-07-12 RX ORDER — PREGABALIN 50 MG/1
CAPSULE ORAL
Qty: 270 CAPSULE | Refills: 1 | Status: SHIPPED | OUTPATIENT
Start: 2021-07-12 | End: 2021-09-21 | Stop reason: SDUPTHER

## 2021-07-12 NOTE — TELEPHONE ENCOUNTER
Pt contacted Call Center requested refill of their medication          Medication Name: pregabalin (LYRICA          Dosage of Med: 50 mg       Frequency of Med: 2xs a day       Remaining Medication: enough for today       Pharmacy and Location:    [facility]/PHARMACY [address] [place] [address], [place] - [address]    In order for her insurance to pay for it, it must be a 90 day script

## 2021-07-12 NOTE — TELEPHONE ENCOUNTER
RN s/w pharmacist b/c [facility] sent a script for pregabalin 50 mg (1) in AM and (2) at HS, #270 w/ 3 RF on 12/30/20  Pharmacist said b/c it is a controlled substance the script can on be valid for 6 months, a new script would be required   Can you pls send a new 90 day script for pt's pregabalin to [facility] on file  Pt's last seen 6/9/21 w/ [employer]

## 2021-07-13 NOTE — TELEPHONE ENCOUNTER
Per communication consent, [facility] requesting pt cb with update on current symptoms  CB# OH provided

## 2021-07-15 RX ORDER — METHYLPREDNISOLONE 4 MG/1
TABLET ORAL
Qty: 21 TABLET | Refills: 0 | Status: SHIPPED | OUTPATIENT
Start: 2021-07-15 | End: 2021-08-30

## 2021-07-15 NOTE — TELEPHONE ENCOUNTER
Pt called stating she is going on vacation 9/25 and if there is anything they he can do before that would be greatly appreciated    Pt # [phone]

## 2021-07-15 NOTE — TELEPHONE ENCOUNTER
S/w pt, states she continues to have pain and has had very little improvement with injection  Pt states she is still having pain especially with walking  Please advise, thank you

## 2021-07-15 NOTE — TELEPHONE ENCOUNTER
Would like her to try a medrol dosepak    E-rx sent to [facility]     Also would like her to get repogrammed with [facility]

## 2021-07-15 NOTE — TELEPHONE ENCOUNTER
S/w pt, advised of the same  Advised instructions on packaging, avoid NSAIDs while taking steroid pack  Pt verbalized understanding and appreciation  Advised to cb with any further questions or concerns

## 2021-07-27 DIAGNOSIS — B00.1 COLD SORE: ICD-10-CM

## 2021-07-27 NOTE — TELEPHONE ENCOUNTER
Last O/V: 6/22/2021   Next O/V: 10/5/2021    Medication requested:   Requested Prescriptions     Pending Prescriptions Disp Refills    valACYclovir (VALTREX) 500 mg tablet 30 tablet 1     Sig: Take 1 tablet (500 mg total) by mouth daily as needed (Cold Sores)          Pharmacy: [facility]

## 2021-07-28 RX ORDER — VALACYCLOVIR HYDROCHLORIDE 500 MG/1
500 TABLET, FILM COATED ORAL DAILY PRN
Qty: 30 TABLET | Refills: 1 | Status: SHIPPED | OUTPATIENT
Start: 2021-07-28 | End: 2021-12-10 | Stop reason: SDUPTHER

## 2021-08-06 NOTE — TELEPHONE ENCOUNTER
CNR letter sent Physical Therapy    Co-treat with: Occupational therapist  Precautions:  Medical precautions:  fall risk;. 8/4  Adm to 7212 s/p1.  Hardware revision and removal L3 with L3-4 fusion evaluation  2.  L2-3 by lateral lumbar laminectomy with removal of L2 and L3 lamina, affecting a decompression from  L2-L4  3.  Left L2-3 posterior lumbar interbody fusion  4.  Revision add-on bilateral L2-3 posterior spinal fusion, resulting in an L2-S1 posterior spinal fusion  5.  Segmental fixation using expedium titanium system L2-3 bilaterally  6.  Implantation pro-Ti bullet cage x1  7.  Local bone, infuse BMP, fiber graft  8.  SSEP monitoring, running and triggered EMGs    PT/OT eval  AAT  BLT precautions  Lines:     Basic: O2 and IV    Complex: E.V.D. drain (PCA)      Lines in chart and on patient reviewed, cautions maintained throughout session.  Spine:     Lumbar: no twisting, no bending, no lifting greater than 10 # and lumbar brace on when out of bed  Safety Measures: bed alarm and bed rails    SUBJECTIVE  Patient agreed to participate in therapy this date.  Patient verbally agrees to allow the following to be present during session: spouse  Spouse states you only call\" gerson Amezcua when he's in trouble.\"  Patient / Family Goal: maximize function    Pain     Location: Lumbar region    At onset of session (out of 10): 5     OBJECTIVE     Oriented to person, place and situation     Arousal alertness: delayed responses to stimuli  Patient activity tolerance: 1 to 2 activity to rest  Balance    Sitting: Static: moderate assist back unsupported, double lower extremity support and double upper extremity support  Trial 1: 60  Trial 2: 120    Standing - Firm Surface - Eyes Open: Static: maximal assist, moderate assist and 2 persons double upper extremity support  Trial 1: 10  Trial 2: 10    Bed Mobility:      Rolling right: moderate assist, with verbal cues and with tactile cues      Side-lying to sit: moderate assist, with verbal cues  and with tactile cues  Training completed:    Tasks: rolling right and supine to sit    Education details: body mechanics, patient safety and patient requires additional training  Transfers:    Assistive devices: 2 person and non-mechanical sit to stand lift    Sit to stand: 2 persons, with verbal cues, with tactile cues and moderate assist (Saira-stedy)  Trial 2: moderate assist, 2 persons, with verbal cues and with tactile cues    Stand to sit: 2 persons, moderate assist, with verbal cues and with tactile cues  Trial 2: moderate assist, 2 persons, with verbal cues and with tactile cues  Training completed:    Tasks: stand to sit and sit to stand    Education details: body mechanics, patient safety and patient requires additional training    Saira Stedy: Pt can pull self up on bar yet is unable to assume upright position. Unable to maintain semi upright position more than several seconds due to weakness.      Interventions     Seated    Lower Extremity: Bilateral: toe raises, heel raises, knee flexion and knee extensions, AROM and AAROM, 10 reps, 1 sets  Skilled input: Verbal instruction/cues, tactile instruction/cues and posture correction  Verbal Consent: Writer verbally educated and received verbal consent for hand placement, positioning of patient, and techniques to be performed today from patient for clothing adjustments for techniques, hand placement and palpation for techniques and therapist position for techniques as described above and how they are pertinent to the patient's plan of care.       ASSESSMENT    Impairments: activity tolerance, strength and pain  Functional Limitations: all functional mobility  Recommended Consults: PT: OT, Physical Medicine and Rehabilitation Physician  Discharge Recommendations   Recommendation for Discharge: PT IL: Patient is appropriate for intensive daily Physical Therapy with the oversight of a Physical Medicine and Rehabilitation physician        PT/OT Mobility Equipment  for Discharge: has RW   PT/OT ADL Equipment for Discharge: tbd      Therapy Diagnosis:  Other Abnormalities of Gait and Mobility    Skilled therapy is required to address these limitations in attempt to maximize the patient's independence.  Progress: slow progress, decreased activity tolerance    Pain at end of session: RN informed on pain level 7/10    End of Session:   Location: in chair  Safety measures: alarm system in place/re-engaged, call light within reach and lines intact  Handoff to: nurse    PLAN    Suggestions for next session as indicated: PT Frequency: 6 days/week  Frequency Comments: Ortho spine 2/6 IRF last seen 8.6    Therapist wearing gloves and surgical mask during session.    A minimum of 8 minutes per session x 1 week.    Interventions: balance, body mechanics, strengthening, bed mobility and functional transfer training  Agreement to plan and goals: patient agrees with goals and treatment plan        GOALS  Review Date: 8/6/2021  Long Term Goals: (to be met by time of discharge from hospital)  Sidelying to sit: Patient will complete bed mobility for sidelying to sit minimal assist.  Status: progressing/ongoing  Sit to sidelying: Patient will complete bed mobility for sit to sidelying minimal assist.  Status: progressing/ongoing  Sit to stand: Patient will complete sit to stand transfer with 2-wheeled walker, minimal assist.   Status: progressing/ongoing  Stand to sit: Patient will complete stand to sit transfer with 2-wheeled walker, minimal assist.   Status: progressing/ongoing  Ambulation (even): Patient will ambulate on even surface for 25 feet with 2-wheeled walker, minimal assist.     Documented in the chart in the following areas: Assessment.      Therapy procedure time and total treatment time can be found documented on the Time Entry flowsheet

## 2021-08-10 DIAGNOSIS — F41.9 ANXIETY: ICD-10-CM

## 2021-08-10 NOTE — TELEPHONE ENCOUNTER
6/22/2021  10/5/2021  Requested Prescriptions     Pending Prescriptions Disp Refills    busPIRone (BUSPAR) 10 mg tablet 270 tablet 1     Sig: Take 1 tablet (10 mg total) by mouth 3 (three) times a day

## 2021-08-11 RX ORDER — BUSPIRONE HYDROCHLORIDE 10 MG/1
10 TABLET ORAL 3 TIMES DAILY
Qty: 270 TABLET | Refills: 1 | Status: SHIPPED | OUTPATIENT
Start: 2021-08-11 | End: 2022-04-25 | Stop reason: SDUPTHER

## 2021-08-23 DIAGNOSIS — G89.4 PAIN SYNDROME, CHRONIC: ICD-10-CM

## 2021-08-23 RX ORDER — DICLOFENAC SODIUM 75 MG/1
75 TABLET, DELAYED RELEASE ORAL 2 TIMES DAILY
Qty: 180 TABLET | Refills: 1 | Status: SHIPPED | OUTPATIENT
Start: 2021-08-23 | End: 2022-02-28 | Stop reason: SDUPTHER

## 2021-08-30 ENCOUNTER — OFFICE VISIT (OUTPATIENT)
Dept: PAIN MEDICINE | Facility: CLINIC | Age: 66
End: 2021-08-30
Payer: COMMERCIAL

## 2021-08-30 VITALS
DIASTOLIC BLOOD PRESSURE: 76 MMHG | RESPIRATION RATE: 18 BRPM | HEART RATE: 66 BPM | WEIGHT: 213 LBS | SYSTOLIC BLOOD PRESSURE: 130 MMHG | HEIGHT: 67 IN | BODY MASS INDEX: 33.43 KG/M2

## 2021-08-30 DIAGNOSIS — M79.18 MYOFASCIAL PAIN SYNDROME: ICD-10-CM

## 2021-08-30 DIAGNOSIS — M47.816 LUMBAR SPONDYLOSIS: ICD-10-CM

## 2021-08-30 DIAGNOSIS — M51.16 INTERVERTEBRAL DISC DISORDER WITH RADICULOPATHY OF LUMBAR REGION: Primary | ICD-10-CM

## 2021-08-30 DIAGNOSIS — M54.12 CERVICAL RADICULOPATHY: ICD-10-CM

## 2021-08-30 PROCEDURE — 1036F TOBACCO NON-USER: CPT | Performed by: ANESTHESIOLOGY

## 2021-08-30 PROCEDURE — 3078F DIAST BP <80 MM HG: CPT | Performed by: ANESTHESIOLOGY

## 2021-08-30 PROCEDURE — 3075F SYST BP GE 130 - 139MM HG: CPT | Performed by: ANESTHESIOLOGY

## 2021-08-30 PROCEDURE — 99214 OFFICE O/P EST MOD 30 MIN: CPT | Performed by: ANESTHESIOLOGY

## 2021-08-30 PROCEDURE — 3008F BODY MASS INDEX DOCD: CPT | Performed by: ANESTHESIOLOGY

## 2021-08-30 RX ORDER — PREDNISONE 10 MG/1
TABLET ORAL
Qty: 30 TABLET | Refills: 0 | Status: SHIPPED | OUTPATIENT
Start: 2021-08-30 | End: 2021-12-21 | Stop reason: ALTCHOICE

## 2021-08-30 NOTE — PROGRESS NOTES
Assessment:  1  Intervertebral disc disorder with radiculopathy of lumbar region    2  Cervical radiculopathy    3  Myofascial pain syndrome    4  Lumbar spondylosis        Plan:  Patient experiencing worsening pain in the left lower back into the left leg with worsening weakness so at this time I will order an updated MRI of the lumbar spine to evaluate further  I advised her I will call the results and discuss treatment moving forward  For now, will have her take an extra tablet of the Lyrica 50 mg in the afternoon since that appears to be the worst time of day for her  I will also start her on a prednisone taper since the Medrol Dosepak prescribed in July was not helpful  We did discuss re-evaluation with the spine surgeon but we will hold off until the results of the MRI  She would like to hold off until she has Medicare  My impressions and treatment recommendations were discussed in detail with the patient who verbalized understanding and had no further questions  Discharge instructions were provided  I personally saw and examined the patient and I agree with the above discussed plan of care  Orders Placed This Encounter   Procedures    MRI lumbar spine without contrast     Standing Status:   Future     Standing Expiration Date:   8/30/2025     Scheduling Instructions: There is no preparation for this test  Please leave your jewelry and valuables at home, wedding rings are the exception  Magnetic nail polish must be removed prior to arrival for your test  Please bring your insurance cards, a form of photo ID and a list of your medications with you  Arrive 15 minutes prior to your appointment time in order to register  Please bring any prior CT or MRI studies of this area that were not performed at a [facility] facility  To schedule this appointment, please contact Central Scheduling at [phone]              Prior to your appointment, please make sure you complete the MRI Screening Form when you e-Check in for your appointment  This will be available starting 7 days before your appointment in [address]  You may receive an e-mail with an activation code if you do not have a [employer] account  If you do not have access to a device, we will complete your screening at your appointment  Order Specific Question:   What is the patient's sedation requirement? Answer:   No Sedation     Order Specific Question:   Does the patient have metallic implants? Answer:   Yes     Comments:   h/o SCS     Order Specific Question:   Release to patient through [employer]     Answer:   Immediate     Order Specific Question:   Is order priority selected as STAT? Answer:   No     Order Specific Question:   Reason for Exam (FREE TEXT)     Answer:   worsening lbp into the left leg with weakness     New Medications Ordered This Visit   Medications    predniSONE 10 mg tablet     Sig: Take 4 pills for 4 days, 3 pills for 3 days, 2 pills for 2 days, then 1 pill for 1 day     Dispense:  30 tablet     Refill:  0       History of Present Illness:  Luis Miguel Tavares is a 61 y o  female who presents for a follow up office visit in regards to Hip Pain (B/L) and Back Pain  The patient's history spinal stenosis with radiculopathy which turns for follow-up  She reports worsening pain symptoms in the left low back traveling down the entire left leg with weakness  She is walking hunched over  Symptoms are worst in the afternoon at times rated 10/10 on numeric rating scale  She had reprogramming of the spinal cord stimulator 2 weeks ago but does not feel it is helpful  Medrol Dosepak prescribed in July was not helpful  She does take Lyrica 50 mg in the morning 100 mg at bedtime and that is helpful  I have personally reviewed and/or updated the patient's past medical history, past surgical history, family history, social history, current medications, allergies, and vital signs today       Review of Systems Respiratory: Negative for shortness of breath  Cardiovascular: Negative for chest pain  Gastrointestinal: Negative for constipation, diarrhea, nausea and vomiting  Musculoskeletal: Positive for back pain and gait problem  Negative for arthralgias, joint swelling and myalgias  Right Hip Pain   Skin: Negative for rash  Neurological: Positive for weakness  Negative for dizziness and seizures  All other systems reviewed and are negative        Patient Active Problem List   Diagnosis    Rosacea, acne    Lumbar degenerative disc disease    Lumbar radiculopathy    Anxiety    Benign essential HTN    Cervical radiculopathy    Disc disorder of cervical region    Hot flash, menopausal    Lumbar facet arthropathy    Lumbar stenosis without neurogenic claudication    Myofascial pain syndrome    Pain syndrome, chronic    Positive depression screening    Primary osteoarthritis of right knee    Bulge of lumbar disc without myelopathy    Spondylolisthesis of lumbar region    Arthritis of right acromioclavicular joint    Adverse effect of adrenal cortical steroids, initial encounter    Calculus of gallbladder without cholecystitis without obstruction    Chronic right shoulder pain    Hyperthyroidism    Primary insomnia    Greater trochanteric bursitis of left hip    Encounter for gynecological examination (general) (routine) without abnormal findings    Long term systemic steroid user    Asymptomatic premature menopause       Past Medical History:   Diagnosis Date    Anxiety     Cataract, bilateral     last assessed    Cervical radiculopathy     Disc disease, degenerative, cervical     Herpes simplex infection     Hypertension     Insomnia     Low back pain     Lumbar degenerative disc disease     Lumbar facet arthropathy     Lumbar radiculopathy     Lumbar stenosis without neurogenic claudication     Mononucleosis     Myofascial pain dysfunction syndrome     Osteoarthritis shoulder region - unspecified laterality - last assessed 2/1/14    Plantar fasciitis     Ptosis, both eyelids     last assessed 10/6/16    Ptosis, congenital, left     Retinal detachment     last assessed 12/11/14 right eye    Sacroiliitis ([address] )     Thyroid disease     Thyrotoxicosis     last assessed 5/17/13    Vertigo        Past Surgical History:   Procedure Laterality Date    NM SURG IMPLNT NEUROELECT,EPIDURAL Left 1/26/2016    Procedure: PLACEMENT OF THORACIC SPINAL CORD STIMULATOR WITH LEFT BUTTOCK IMPLANTABLE PULSE GENERATOR (IMPULSE MONITORING); Surgeon: German Lazcano MD;  Location:  MAIN OR;  Service: Neurosurgery    RETINAL DETACHMENT SURGERY Right        Family History   Problem Relation Age of Onset    Breast cancer Mother 80    COPD Mother     Hypertension Mother         essential    Heart attack Father         acute MI    Emphysema Father     Hypertension Father         essential    Other Father         prehypertension    No Known Problems Maternal Grandmother     No Known Problems Maternal Grandfather     No Known Problems Paternal Grandmother     No Known Problems Paternal Grandfather     No Known Problems Sister     No Known Problems Son     No Known Problems Sister     No Known Problems Paternal Aunt     No Known Problems Paternal Aunt     No Known Problems Paternal Aunt     No Known Problems Paternal Aunt     No Known Problems Paternal Aunt     Liver cancer Maternal Uncle        Social History     Occupational History    Occupation: Business owner     Comment: [employer]-full time working   Tobacco Use    Smoking status: Never Smoker    Smokeless tobacco: Never Used   Vaping Use    Vaping Use: Never used   Substance and Sexual Activity    Alcohol use:  Yes     Alcohol/week: 1 0 standard drinks     Types: 1 Glasses of wine per week    Drug use: No    Sexual activity: Not Currently     Partners: Male       Current Outpatient Medications on File Prior to Visit   Medication Sig    busPIRone (BUSPAR) 10 mg tablet Take 1 tablet (10 mg total) by mouth 3 (three) times a day    Cetirizine HCl (ZYRTEC PO) Take by mouth as needed    diclofenac (VOLTAREN) 75 mg EC tablet Take 1 tablet (75 mg total) by mouth 2 (two) times a day    Diclofenac Sodium (VOLTAREN) 1 % APPLY 2 GRAMS TO AFFECTED AREA 4 TIMES A DAY    doxycycline hyclate (VIBRAMYCIN) 100 mg capsule Take 1 capsule (100 mg total) by mouth every 12 (twelve) hours (Patient taking differently: Take 100 mg by mouth daily )    Esomeprazole Magnesium (NEXIUM PO) Take 1 capsule by mouth daily    fluticasone (FLONASE) 50 mcg/act nasal spray 1 spray into each nostril daily (Patient taking differently: 1 spray into each nostril as needed )    LORazepam (ATIVAN) 1 mg tablet Take 1 5 tablets (1 5 mg total) by mouth daily at bedtime as needed for anxiety    methimazole (TAPAZOLE) 5 mg tablet Take 1 tablet (5 mg total) by mouth daily    penciclovir (DENAVIR) 1 % cream Apply topically daily as needed (ulcer)    pregabalin (LYRICA) 50 mg capsule Take 1 in the AM and 2 at bedtime    propranolol (INDERAL) 10 mg tablet Take 1 tablet (10 mg total) by mouth 2 (two) times a day    quinapril (ACCUPRIL) 20 mg tablet Take 1 tablet (20 mg total) by mouth 2 (two) times a day    RESTASIS 0 05 % ophthalmic emulsion Administer 1 drop to both eyes 2 (two) times a day    tiZANidine (ZANAFLEX) 4 mg tablet Take 1 tablet (4 mg total) by mouth 2 (two) times a day    valACYclovir (VALTREX) 500 mg tablet Take 1 tablet (500 mg total) by mouth daily as needed (Cold Sores)    [DISCONTINUED] methylPREDNISolone 4 MG tablet therapy pack Use as directed on package    metroNIDAZOLE (METROCREAM) 0 75 % cream Apply topically daily at bedtime (Patient not taking: Reported on 6/22/2021)     No current facility-administered medications on file prior to visit         Allergies   Allergen Reactions    Other Dermatitis     Adhesive tape - please use ONLY PAPER TAPE    Scopolamine Other (See Comments)     Severe, debilitating headache      Flexeril [Cyclobenzaprine] Dizziness and Fatigue    Naproxen      Other reaction(s): Unknown Allergic Reaction  Annotation - [id number]: nightmares    Penicillins      Other reaction(s): Unknown Allergic Reaction  Annotation - [id number]: childhood reaction    Promethazine-Codeine      Reaction Date: [id number]; Annotation - [id number]: nightmares; Annotation - [id number]: nightmares    Tramadol      Other reaction(s): Unknown Allergic Reaction  Annotation - [id number]: PT HAS NIGHTMARES FROM TRAMADOL    Wound Dressing Adhesive Rash       Physical Exam:    /76   Pulse 66   Resp 18   Ht 5' 6 5" (1 689 m)   Wt 96 6 kg (213 lb)   LMP 11/26/2015   BMI 33 86 kg/m²     Constitutional:normal, well developed, well nourished, alert, in no distress and non-toxic and no overt pain behavior  and obese  Eyes:anicteric  HEENT:grossly intact  Neck:supple, symmetric, trachea midline and no masses   Pulmonary:even and unlabored  Cardiovascular:No edema or pitting edema present  Skin:Normal without rashes or lesions and well hydrated  Psychiatric:Mood and affect appropriate  Neurologic:Cranial Nerves II-XII grossly intact  Musculoskeletal:antalgic     Lumbar Spine Exam  Appearance:  Normal lordosis  Palpation/Tenderness:  left lumbar paraspinal tenderness  left sacroiliac joint tenderness  left piriformis tenderness  Range of Motion:  Flexion:   Moderately limited  with pain  Extension:  Moderately limited  with pain  Motor Strength:  Left hip flexion:  4/5  Left hip extension:  5/5  Right hip flexion:  5/5  Right hip extension:  5/5  Left knee flexion:  5/5  Left knee extension:  4/5  Right knee flexion:  5/5  Right knee extension:  5/5  Left foot dorsiflexion:  5/5  Left foot plantar flexion:  5/5  Right foot dorsiflexion:  5/5  Right foot plantar flexion:  5/5  Reflexes:  Left Patellar:  1+   Right Patellar:  2+   Left Achilles:  2+   Right Achilles:  2+   Special Tests:  Left Straight Leg Test:  positive  Right Straight Leg Test:  negative    Imaging    MRI LUMBAR SPINE WITHOUT CONTRAST (10/11/2018)     INDICATION: [id number]: Spinal stenosis, lumbar region without neurogenic claudication  Left-sided low back pain and leg pain      COMPARISON:  11/7/2017     TECHNIQUE: Limited scanning was performed using MR guidelines based on  limitations  Sagittal T2, coronal T2, axial gradient echo and axial T2 as well as sagittal SPGR imaging was obtained      Medical device: The patient has a st fabby stimulator [id number]  which is MR compatible  Scanning was performed as per the 's gridlines with attention to device appropriate DANUTA levels         IMAGE QUALITY:  Diagnostic     FINDINGS: There is a transitional vertebral body at the lumbosacral junction  For the purposes of this study, the first conically shaped vertebral body will be designated S1  If spinal intervention is indicated, correlation with radiography recommended      Vertebral body levels are labeled on  image 7, series 5  The numbering convention used previously will be followed on the current study        ALIGNMENT:  Trace grade 1 anterolisthesis of L5 on S1 is retrospectively stable as is a minimal levoscoliosis mid lumbar spine apex at the L3 segment      MARROW SIGNAL:  Normal marrow signal is identified within the visualized bony structures  No discrete marrow lesion      DISTAL CORD AND CONUS:  Normal size and signal within the distal cord and conus  The conus ends at the L2 level      PARASPINAL SOFT TISSUES:  Artifact in the left subcutaneous soft tissues in the low back noted likely related to stimulator leads      SACRUM:  Normal signal within the sacrum   No evidence of insufficiency or stress fracture      LOWER THORACIC DISC SPACES:  Normal disc height and signal   No disc herniation, canal stenosis or foraminal narrowing      LUMBAR DISC SPACES:     L1-L2:  Normal      L2-L3:  Normal      L3-L4:  There is a diffuse disk bulge  No significant central canal or neural foraminal narrowing  Bilateral facet hypertrophy noted  This level is similar to the prior study      L4-L5:  Small left paracentral disc protrusion  There is bilateral facet hypertrophy  Mild to moderate central canal narrowing  Mild left neural foraminal narrowing  Right neural foramen patent  This level is similar to the prior study      L5-S1:  There is uncovering the intervertebral disc space  There is bilateral facet hypertrophy  There is a superimposed left neural foraminal disc protrusion  Moderate left neural foraminal narrowing  Moderate central canal narrowing  Mild right   neural foraminal narrowing   This level is similar to the prior study

## 2021-09-08 DIAGNOSIS — M47.816 LUMBAR SPONDYLOSIS: ICD-10-CM

## 2021-09-08 DIAGNOSIS — M54.12 CERVICAL RADICULOPATHY: ICD-10-CM

## 2021-09-08 DIAGNOSIS — M79.18 MYOFASCIAL PAIN SYNDROME: ICD-10-CM

## 2021-09-08 NOTE — TELEPHONE ENCOUNTER
Patient calling for an update regarding scheduling MRI for lower left back  She is also wanted to get an injection on right hand side of spine before she goes on vacation 9/26   She understands the first thing to take care of is MRI per FQ    Please advise    [phone]

## 2021-09-09 NOTE — TELEPHONE ENCOUNTER
Hi Dr Felicia Jessica,  Could you please check your my chart message from patient and advise if you would like me to schedule her for an injection   Thanks

## 2021-09-10 NOTE — TELEPHONE ENCOUNTER
Scheduled pt for left L3, L4, L5 TF PRATIK for 9/24/21  Pt denies rx blood thinners  Went over pre-procedure instructions below:  Nothing to eat or drink 1 hr prior to procedure  Need to arrange transportation  Proper clothing for procedure  If ill or placed on antibiotics please call to reschedule  Covid/travel/ and vaccine instructions

## 2021-09-14 NOTE — TELEPHONE ENCOUNTER
Pt also sent [employer] message with the same info   Forwarded to [address] [place] [address] Road

## 2021-09-14 NOTE — TELEPHONE ENCOUNTER
Patient called to give you update there are 46 people with implants in front of her waiting to get daja MRI, she just wanted to make you aware and make sure it was ok to proceed with procedure       Thank you     [phone]

## 2021-09-17 ENCOUNTER — OFFICE VISIT (OUTPATIENT)
Dept: INTERNAL MEDICINE CLINIC | Facility: CLINIC | Age: 66
End: 2021-09-17
Payer: COMMERCIAL

## 2021-09-17 VITALS
WEIGHT: 219.4 LBS | BODY MASS INDEX: 34.44 KG/M2 | DIASTOLIC BLOOD PRESSURE: 74 MMHG | TEMPERATURE: 98.3 F | OXYGEN SATURATION: 99 % | HEIGHT: 67 IN | SYSTOLIC BLOOD PRESSURE: 132 MMHG | HEART RATE: 62 BPM

## 2021-09-17 DIAGNOSIS — I10 BENIGN ESSENTIAL HTN: ICD-10-CM

## 2021-09-17 DIAGNOSIS — R60.0 BILATERAL LEG EDEMA: Primary | ICD-10-CM

## 2021-09-17 PROCEDURE — 99214 OFFICE O/P EST MOD 30 MIN: CPT | Performed by: NURSE PRACTITIONER

## 2021-09-17 RX ORDER — FUROSEMIDE 20 MG/1
20 TABLET ORAL 2 TIMES DAILY
Qty: 5 TABLET | Refills: 0 | Status: SHIPPED | OUTPATIENT
Start: 2021-09-17 | End: 2021-09-23 | Stop reason: SDUPTHER

## 2021-09-17 RX ORDER — LIFITEGRAST 50 MG/ML
SOLUTION/ DROPS OPHTHALMIC
COMMUNITY
Start: 2021-08-20 | End: 2022-07-12 | Stop reason: ALTCHOICE

## 2021-09-17 RX ORDER — POTASSIUM CHLORIDE 750 MG/1
10 TABLET, EXTENDED RELEASE ORAL DAILY
Qty: 5 TABLET | Refills: 0 | Status: SHIPPED | OUTPATIENT
Start: 2021-09-17 | End: 2021-09-23 | Stop reason: SDUPTHER

## 2021-09-17 NOTE — ASSESSMENT & PLAN NOTE
New onset bilateral lower extremity edema over the last few weeks  - start Lasix 20 mg daily x5 days  - start potassium daily x5 days  - can start compression stockings  - low-sodium diet  - elevate legs above heart level  - will check echo  - go for labs including BNP

## 2021-09-17 NOTE — PROGRESS NOTES
Assessment/Plan:    Problem List Items Addressed This Visit        Cardiovascular and Mediastinum    Benign essential HTN    Relevant Medications    furosemide (LASIX) 20 mg tablet    Other Relevant Orders    Echo complete with contrast if indicated       Other    Bilateral leg edema - Primary     New onset bilateral lower extremity edema over the last few weeks  - start Lasix 20 mg daily x5 days  - start potassium daily x5 days  - can start compression stockings  - low-sodium diet  - elevate legs above heart level  - will check echo  - go for labs including BNP         Relevant Medications    furosemide (LASIX) 20 mg tablet    potassium chloride (K-DUR,KLOR-CON) 10 mEq tablet    Other Relevant Orders    NT-BNP PRO    Echo complete with contrast if indicated          M*Modal software was used to dictate this note  It may contain errors with dictating incorrect words or incorrect spelling  Please contact the provider directly with any questions  Subjective:      Patient ID: Naomi Shanks is a 61 y o  female  HPI    Patient presents today with concern for bilateral LE edema  Onset of swelling was a few weeks ago but feels like it is worsening  She states she will have pain in her entire legs but denies any muscle aches  She states that her diet has been awful lately  Eating large portion sizes and a lot of junk food  High in salty and processed foods  Swelling is improved in her ankles first thing in the morning and worsens throughout the day  She does have chronic back issues  No hx of LE edema   No hx of blood clots  No recent prolonged sitting or travel     She is also complaining of a headache in her occipital area  She took tylenol yesterday and does feel like it is somewhat improved today  She was also using a lower desk chair yesterday and had to be lifting her head all day       The following portions of the patient's history were reviewed and updated as appropriate: allergies, current medications, past family history, past medical history, past social history, past surgical history and problem list     Review of Systems   Constitutional: Negative for chills and fever  Respiratory: Negative for chest tightness and shortness of breath  Denies orthopnea    Cardiovascular: Positive for leg swelling  Negative for chest pain and palpitations  Neurological: Positive for headaches           Past Medical History:   Diagnosis Date    Anxiety     Cataract, bilateral     last assessed    Cervical radiculopathy     Disc disease, degenerative, cervical     Herpes simplex infection     Hypertension     Insomnia     Low back pain     Lumbar degenerative disc disease     Lumbar facet arthropathy     Lumbar radiculopathy     Lumbar stenosis without neurogenic claudication     Mononucleosis     Myofascial pain dysfunction syndrome     Osteoarthritis     shoulder region - unspecified laterality - last assessed 2/1/14    Plantar fasciitis     Ptosis, both eyelids     last assessed 10/6/16    Ptosis, congenital, left     Retinal detachment     last assessed 12/11/14 right eye    Sacroiliitis ([address] )     Thyroid disease     Thyrotoxicosis     last assessed 5/17/13    Vertigo          Current Outpatient Medications:     busPIRone (BUSPAR) 10 mg tablet, Take 1 tablet (10 mg total) by mouth 3 (three) times a day (Patient taking differently: Take 10 mg by mouth 2 (two) times a day ), Disp: 270 tablet, Rfl: 1    Cetirizine HCl (ZYRTEC PO), Take by mouth as needed, Disp: , Rfl:     diclofenac (VOLTAREN) 75 mg EC tablet, Take 1 tablet (75 mg total) by mouth 2 (two) times a day, Disp: 180 tablet, Rfl: 1    Diclofenac Sodium (VOLTAREN) 1 %, APPLY 2 GRAMS TO AFFECTED AREA 4 TIMES A DAY, Disp: 200 g, Rfl: 1    doxycycline hyclate (VIBRAMYCIN) 100 mg capsule, Take 1 capsule (100 mg total) by mouth every 12 (twelve) hours (Patient taking differently: Take 100 mg by mouth daily ), Disp: 180 capsule, Rfl: 3    Esomeprazole Magnesium (NEXIUM PO), Take 1 capsule by mouth daily, Disp: , Rfl:     fluticasone (FLONASE) 50 mcg/act nasal spray, 1 spray into each nostril daily, Disp: 16 g, Rfl: 3    LORazepam (ATIVAN) 1 mg tablet, Take 1 5 tablets (1 5 mg total) by mouth daily at bedtime as needed for anxiety, Disp: 135 tablet, Rfl: 1    methimazole (TAPAZOLE) 5 mg tablet, Take 1 tablet (5 mg total) by mouth daily, Disp: 90 tablet, Rfl: 1    metroNIDAZOLE (METROCREAM) 0 75 % cream, Apply topically daily at bedtime, Disp: 45 g, Rfl: 2    penciclovir (DENAVIR) 1 % cream, Apply topically daily as needed (ulcer), Disp: 5 g, Rfl: 5    pregabalin (LYRICA) 50 mg capsule, Take 1 in the AM and 2 at bedtime (Patient taking differently: 4 (four) times a day Take 1 in the AM and 2 at bedtime), Disp: 270 capsule, Rfl: 1    propranolol (INDERAL) 10 mg tablet, Take 1 tablet (10 mg total) by mouth 2 (two) times a day, Disp: 180 tablet, Rfl: 1    quinapril (ACCUPRIL) 20 mg tablet, Take 1 tablet (20 mg total) by mouth 2 (two) times a day, Disp: 180 tablet, Rfl: 1    RESTASIS 0 05 % ophthalmic emulsion, Administer 1 drop to both eyes 2 (two) times a day, Disp: , Rfl: 3    tiZANidine (ZANAFLEX) 4 mg tablet, Take 1 tablet (4 mg total) by mouth 2 (two) times a day, Disp: 180 tablet, Rfl: 1    valACYclovir (VALTREX) 500 mg tablet, Take 1 tablet (500 mg total) by mouth daily as needed (Cold Sores), Disp: 30 tablet, Rfl: 1    furosemide (LASIX) 20 mg tablet, Take 1 tablet (20 mg total) by mouth 2 (two) times a day, Disp: 5 tablet, Rfl: 0    potassium chloride (K-DUR,KLOR-CON) 10 mEq tablet, Take 1 tablet (10 mEq total) by mouth daily, Disp: 5 tablet, Rfl: 0    predniSONE 10 mg tablet, Take 4 pills for 4 days, 3 pills for 3 days, 2 pills for 2 days, then 1 pill for 1 day (Patient not taking: Reported on 9/17/2021), Disp: 30 tablet, Rfl: 0    Xiidra 5 % op solution, INSTILL 1 DROP INTO BOTH EYES TWICE A DAY, Disp: , Rfl: Allergies   Allergen Reactions    Other Dermatitis     Adhesive tape - please use ONLY PAPER TAPE    Scopolamine Other (See Comments)     Severe, debilitating headache      Flexeril [Cyclobenzaprine] Dizziness and Fatigue    Naproxen      Other reaction(s): Unknown Allergic Reaction  Annotation - [id number]: nightmares    Penicillins      Other reaction(s): Unknown Allergic Reaction  Annotation - [id number]: childhood reaction    Promethazine-Codeine      Reaction Date: [id number]; Annotation - [id number]: nightmares; Annotation - [id number]: nightmares    Tramadol      Other reaction(s): Unknown Allergic Reaction  Annotation - [id number]: PT HAS NIGHTMARES FROM TRAMADOL    Wound Dressing Adhesive Rash       Social History   Past Surgical History:   Procedure Laterality Date    RI SURG IMPLNT NEUROELECT,EPIDURAL Left 1/26/2016    Procedure: PLACEMENT OF THORACIC SPINAL CORD STIMULATOR WITH LEFT BUTTOCK IMPLANTABLE PULSE GENERATOR (IMPULSE MONITORING);   Surgeon: Ez Payne MD;  Location:  MAIN OR;  Service: Neurosurgery    RETINAL DETACHMENT SURGERY Right      Family History   Problem Relation Age of Onset    Breast cancer Mother 80    COPD Mother     Hypertension Mother         essential    Heart attack Father         acute MI    Emphysema Father     Hypertension Father         essential    Other Father         prehypertension    No Known Problems Maternal Grandmother     No Known Problems Maternal Grandfather     No Known Problems Paternal Grandmother     No Known Problems Paternal Grandfather     No Known Problems Sister     No Known Problems Son     No Known Problems Sister     No Known Problems Paternal Aunt     No Known Problems Paternal Aunt     No Known Problems Paternal Aunt     No Known Problems Paternal Aunt     No Known Problems Paternal Aunt     Liver cancer Maternal Uncle        Objective:  /74 (BP Location: Left arm, Patient Position: Sitting, Cuff Size: Large) Pulse 62   Temp 98 3 °F (36 8 °C) (Temporal)   Ht 5' 6 5" (1 689 m)   Wt 99 5 kg (219 lb 6 4 oz)   LMP 11/26/2015   SpO2 99%   BMI 34 88 kg/m²      Physical Exam  Vitals reviewed  Constitutional:       General: She is not in acute distress  Appearance: Normal appearance  She is obese  She is not diaphoretic  HENT:      Head: Normocephalic and atraumatic  Eyes:      Extraocular Movements: Extraocular movements intact  Conjunctiva/sclera: Conjunctivae normal       Pupils: Pupils are equal, round, and reactive to light  Cardiovascular:      Rate and Rhythm: Normal rate and regular rhythm  Pulses:           Dorsalis pedis pulses are 2+ on the right side and 2+ on the left side  Posterior tibial pulses are 2+ on the right side and 2+ on the left side  Heart sounds: Normal heart sounds  No murmur heard  Pulmonary:      Effort: Pulmonary effort is normal  No respiratory distress  Breath sounds: Normal breath sounds  No wheezing, rhonchi or rales  Musculoskeletal:      Right lower leg: Edema (+1) present  Left lower leg: Edema ( +1) present  Comments: Calves equal 40 5 cm   No pedal edema   Skin:     General: Skin is warm and dry  Neurological:      Mental Status: She is alert and oriented to person, place, and time  Mental status is at baseline  Psychiatric:         Mood and Affect: Mood normal          Behavior: Behavior normal          Thought Content:  Thought content normal          Judgment: Judgment normal

## 2021-09-18 ENCOUNTER — APPOINTMENT (OUTPATIENT)
Dept: LAB | Facility: MEDICAL CENTER | Age: 66
End: 2021-09-18
Payer: COMMERCIAL

## 2021-09-18 DIAGNOSIS — E78.00 HYPERCHOLESTEREMIA: ICD-10-CM

## 2021-09-18 DIAGNOSIS — E05.90 HYPERTHYROIDISM: ICD-10-CM

## 2021-09-18 DIAGNOSIS — R60.0 BILATERAL LEG EDEMA: ICD-10-CM

## 2021-09-18 LAB
ALBUMIN SERPL BCP-MCNC: 3.5 G/DL (ref 3.5–5)
ALP SERPL-CCNC: 107 U/L (ref 46–116)
ALT SERPL W P-5'-P-CCNC: 43 U/L (ref 12–78)
ANION GAP SERPL CALCULATED.3IONS-SCNC: 4 MMOL/L (ref 4–13)
AST SERPL W P-5'-P-CCNC: 39 U/L (ref 5–45)
BILIRUB SERPL-MCNC: 1.51 MG/DL (ref 0.2–1)
BUN SERPL-MCNC: 10 MG/DL (ref 5–25)
CALCIUM SERPL-MCNC: 9 MG/DL (ref 8.3–10.1)
CHLORIDE SERPL-SCNC: 98 MMOL/L (ref 100–108)
CHOLEST SERPL-MCNC: 263 MG/DL (ref 50–200)
CO2 SERPL-SCNC: 28 MMOL/L (ref 21–32)
CREAT SERPL-MCNC: 0.68 MG/DL (ref 0.6–1.3)
GFR SERPL CREATININE-BSD FRML MDRD: 93 ML/MIN/1.73SQ M
GLUCOSE P FAST SERPL-MCNC: 95 MG/DL (ref 65–99)
HDLC SERPL-MCNC: 78 MG/DL
LDLC SERPL CALC-MCNC: 168 MG/DL (ref 0–100)
NONHDLC SERPL-MCNC: 185 MG/DL
NT-PROBNP SERPL-MCNC: 80 PG/ML
POTASSIUM SERPL-SCNC: 4.1 MMOL/L (ref 3.5–5.3)
PROT SERPL-MCNC: 7.2 G/DL (ref 6.4–8.2)
SODIUM SERPL-SCNC: 130 MMOL/L (ref 136–145)
TRIGL SERPL-MCNC: 84 MG/DL
TSH SERPL DL<=0.05 MIU/L-ACNC: 1.25 UIU/ML (ref 0.36–3.74)

## 2021-09-18 PROCEDURE — 36415 COLL VENOUS BLD VENIPUNCTURE: CPT

## 2021-09-18 PROCEDURE — 80061 LIPID PANEL: CPT

## 2021-09-18 PROCEDURE — 83880 ASSAY OF NATRIURETIC PEPTIDE: CPT

## 2021-09-18 PROCEDURE — 84443 ASSAY THYROID STIM HORMONE: CPT

## 2021-09-18 PROCEDURE — 80053 COMPREHEN METABOLIC PANEL: CPT

## 2021-09-20 ENCOUNTER — OFFICE VISIT (OUTPATIENT)
Dept: INTERNAL MEDICINE CLINIC | Age: 66
End: 2021-09-20
Payer: COMMERCIAL

## 2021-09-20 VITALS
HEIGHT: 65 IN | BODY MASS INDEX: 36.84 KG/M2 | SYSTOLIC BLOOD PRESSURE: 138 MMHG | TEMPERATURE: 98.3 F | WEIGHT: 221.1 LBS | DIASTOLIC BLOOD PRESSURE: 78 MMHG | HEART RATE: 75 BPM | OXYGEN SATURATION: 97 %

## 2021-09-20 DIAGNOSIS — E78.00 HYPERCHOLESTEREMIA: ICD-10-CM

## 2021-09-20 DIAGNOSIS — I10 BENIGN ESSENTIAL HTN: ICD-10-CM

## 2021-09-20 DIAGNOSIS — Z11.59 ENCOUNTER FOR HEPATITIS C SCREENING TEST FOR LOW RISK PATIENT: ICD-10-CM

## 2021-09-20 DIAGNOSIS — M85.89 OSTEOPENIA OF MULTIPLE SITES: Primary | ICD-10-CM

## 2021-09-20 DIAGNOSIS — R60.0 BILATERAL LEG EDEMA: ICD-10-CM

## 2021-09-20 DIAGNOSIS — E05.90 HYPERTHYROIDISM: ICD-10-CM

## 2021-09-20 PROCEDURE — 3008F BODY MASS INDEX DOCD: CPT | Performed by: FAMILY MEDICINE

## 2021-09-20 PROCEDURE — 3725F SCREEN DEPRESSION PERFORMED: CPT | Performed by: FAMILY MEDICINE

## 2021-09-20 PROCEDURE — 99214 OFFICE O/P EST MOD 30 MIN: CPT | Performed by: FAMILY MEDICINE

## 2021-09-20 PROCEDURE — 3078F DIAST BP <80 MM HG: CPT | Performed by: FAMILY MEDICINE

## 2021-09-20 PROCEDURE — 1036F TOBACCO NON-USER: CPT | Performed by: FAMILY MEDICINE

## 2021-09-20 PROCEDURE — 3075F SYST BP GE 130 - 139MM HG: CPT | Performed by: FAMILY MEDICINE

## 2021-09-20 RX ORDER — ATORVASTATIN CALCIUM 10 MG/1
10 TABLET, FILM COATED ORAL
Qty: 90 TABLET | Refills: 1 | Status: SHIPPED | OUTPATIENT
Start: 2021-09-20 | End: 2022-02-22 | Stop reason: SDUPTHER

## 2021-09-20 NOTE — PROGRESS NOTES
Assessment/Plan:    1  Osteopenia of multiple sites    2  Benign essential HTN    3  Hyperthyroidism  -     TSH, 3rd generation with Free T4 reflex; Future    4  Hypercholesteremia  -     Lipid panel; Future  -     Comprehensive metabolic panel; Future  -     atorvastatin (LIPITOR) 10 mg tablet; Take 1 tablet (10 mg total) by mouth daily at bedtime    5  Encounter for hepatitis C screening test for low risk patient  -     Hepatitis C antibody; Future    6  Bilateral leg edema      BMI Counseling: Body mass index is 36 79 kg/m²  The BMI is above normal  Nutrition recommendations include moderation in carbohydrate intake  Exercise recommendations include exercising 3-5 times per week  No pharmacotherapy was ordered  Rationale for BMI follow-up plan is due to patient being overweight or obese  Depression Screening and Follow-up Plan:   Patient was screened for depression during today's encounter  They screened negative with a PHQ-2 score of 1  There are no Patient Instructions on file for this visit  Return in about 4 months (around 1/20/2022) for Recheck cancel nov 1  Subjective:      Patient ID: Elaine Garcia is a 61 y o  female  Chief Complaint   Patient presents with    Results     Review lab results from 9/18/21  Echo scheduled on Thursday   Health maintenance     Mammogram is scheduled  HPI   Rosacea, patient was placed on Metrogel by her endocrinologist and she has been using it for 1 month   She feels that is not helping  Maureen Camron has facial flushing for several years which are exacerbated by stress, hypertension, heat, and hot flashes  Maureen Camron would like to discuss other options  5/16/18: started doxy 1 week ago, no relief yet  Still flushing and worse in the mornings  10/15/18: April Quiñones a lot, taking doxy BID, ran out 1 week ago, re start at BID and then decrease to monthly  Dc 2019 taking doxy BID iith food, no issues  May 2021, taking doxycycline b i d   For several months and started Metrogel which she thinks flairs up her symptoms  September 2021, stable     Insomnia, patient states she does not sleep well which is very multifactorial including the pain and how flashes   She used to be on estrogen replacement currently does not take any   She does take Ativan at night to help her sleep which helped marginally  5/16/18: taking 1 5 mg  And advil pm and it has helped- sleeping through the night now and slightly  More tired in am   10/15/18: taking ativan and sleeping well with it 3/1/19:   July 2019, has not tried to decrease her lorazepam and does take it every night to sleep  Dec 2019 usually doing very well but  Lately having some issues with sleep  Thinks its from the holidays  June 2020, stable  May 2021, sleeping okay usually with her medications however she does wake up in the middle of the night and feels a high level of anxiety which she does not know what to do about  She cannot sleep without her lorazepam which she is taking faithfully  At last appointment with another provider, she was started on Cymbalta but currently is not taking and she cannot remember what side effects she had  September 2021, not any better, pain keeps her up at night     Depression:  Patient is a depression was exacerbated by the pain and inability to sleep  Luz Choi is always tired and fatigued and is having difficulty with activities of daily living around the house like cleaning and cooking as well as work  Arvind Tidwell is here today and can cooperate always   She feels that she is up to any medications or the long medications right now    8/13/18: buspar was increased to 10 mg per but she felt tired on it and went back to 5 mg BId, has gained weight  10/15/18: taking bupar 5 mg- 2 tabs at [facility],  12/21/18: stable  March 2019, no issues   Takes 1- 5 mg BuSpar in the morning than 1 with lunch and 2 at night    July 2019:  Doing very well with present medications and feels much better since losing weight  Dec 2019 taking 2 buspar at night and 1/2 in the morning    June 2020, relatively stable with no suicidal or homicidal ideations   May 2021, depression is relatively well controlled however currently her anxiety is at exacerbations since [place] time  She stated everything is normal with no problems at home or work no changes in her medications and no over-the-counter things that she has introduced to her daily routine  Other than not eating well and putting on some weight she states everything is fine  She recently had some lab work and her thyroid is controlled and she is compliant with her medications  She was started on Cymbalta few months ago from another provider which she took for few days and stopped but she cannot remember why she stopped her what side effects she had  She takes BuSpar -total of 30 mg per day but cannot take a full dose in the morning because it makes her groggy  She is taking her lorazepam at night  She states she feels like in the middle of the night at her or jump out of her chest but then her  checks her blood pressure as well as heart rate since he is a respiratory therapist in everything is normal   She does not drink any caffeine or alcohol towards bedtime and does not know why this is happening  It does not happen every night and can happen in the daytime as well randomly   September 2021, flare up of depression due to pain     Chronic pain, not too much better than previously   Follows with pain management    May 2021, follows with pain management and has a pain pump  September 2021, worst, in general   This increases her weight, lack of activity and depression    She is getting steroid injections right now and is scheduled for MRI from her pain management doctors    Hyperlipidemia, increase in lipid levels due to increased weight and inactivity      The following portions of the patient's history were reviewed and updated as appropriate: allergies, current medications, past family history, past medical history, past social history, past surgical history and problem list     Review of Systems      Constitutional:  Denies fever or chills , + weight gain  Eyes:  Denies double or blurry vision  HENT:  Denies nasal congestion or sore throat   Respiratory:  Denies cough or shortness of breath or wheezing  Cardiovascular:  Denies palpitations or chest pain  GI:  Denies abdominal pain, nausea, or vomiting, no loose stools, no reflux  Integument:  Denies rash , no open areas  Neurologic:  Denies headache or focal weakness, no dizziness  : no dysuria      Current Outpatient Medications   Medication Sig Dispense Refill    busPIRone (BUSPAR) 10 mg tablet Take 1 tablet (10 mg total) by mouth 3 (three) times a day (Patient taking differently: Take 10 mg by mouth 2 (two) times a day ) 270 tablet 1    Cetirizine HCl (ZYRTEC PO) Take by mouth as needed      diclofenac (VOLTAREN) 75 mg EC tablet Take 1 tablet (75 mg total) by mouth 2 (two) times a day 180 tablet 1    Diclofenac Sodium (VOLTAREN) 1 % APPLY 2 GRAMS TO AFFECTED AREA 4 TIMES A  g 1    doxycycline hyclate (VIBRAMYCIN) 100 mg capsule Take 1 capsule (100 mg total) by mouth every 12 (twelve) hours (Patient taking differently: Take 100 mg by mouth daily ) 180 capsule 3    Esomeprazole Magnesium (NEXIUM PO) Take 1 capsule by mouth daily as needed       fluticasone (FLONASE) 50 mcg/act nasal spray 1 spray into each nostril daily (Patient taking differently: 1 spray into each nostril daily as needed ) 16 g 3    furosemide (LASIX) 20 mg tablet Take 1 tablet (20 mg total) by mouth 2 (two) times a day 5 tablet 0    LORazepam (ATIVAN) 1 mg tablet Take 1 5 tablets (1 5 mg total) by mouth daily at bedtime as needed for anxiety 135 tablet 1    methimazole (TAPAZOLE) 5 mg tablet Take 1 tablet (5 mg total) by mouth daily 90 tablet 1    potassium chloride (K-DUR,KLOR-CON) 10 mEq tablet Take 1 tablet (10 mEq total) by mouth daily 5 tablet 0    pregabalin (LYRICA) 50 mg capsule Take 1 in the AM and 2 at bedtime (Patient taking differently: 4 (four) times a day Take 1 in the AM and 2 at bedtime) 270 capsule 1    propranolol (INDERAL) 10 mg tablet Take 1 tablet (10 mg total) by mouth 2 (two) times a day 180 tablet 1    quinapril (ACCUPRIL) 20 mg tablet Take 1 tablet (20 mg total) by mouth 2 (two) times a day 180 tablet 1    RESTASIS 0 05 % ophthalmic emulsion Administer 1 drop to both eyes 2 (two) times a day  3    tiZANidine (ZANAFLEX) 4 mg tablet Take 1 tablet (4 mg total) by mouth 2 (two) times a day 180 tablet 1    atorvastatin (LIPITOR) 10 mg tablet Take 1 tablet (10 mg total) by mouth daily at bedtime 90 tablet 1    metroNIDAZOLE (METROCREAM) 0 75 % cream Apply topically daily at bedtime (Patient not taking: Reported on 9/20/2021) 45 g 2    penciclovir (DENAVIR) 1 % cream Apply topically daily as needed (ulcer) (Patient not taking: Reported on 9/20/2021) 5 g 5    predniSONE 10 mg tablet Take 4 pills for 4 days, 3 pills for 3 days, 2 pills for 2 days, then 1 pill for 1 day (Patient not taking: Reported on 9/17/2021) 30 tablet 0    valACYclovir (VALTREX) 500 mg tablet Take 1 tablet (500 mg total) by mouth daily as needed (Cold Sores) (Patient not taking: Reported on 9/20/2021) 30 tablet 1    Xiidra 5 % op solution INSTILL 1 DROP INTO BOTH EYES TWICE A DAY (Patient not taking: Reported on 9/20/2021)       No current facility-administered medications for this visit         Objective:    /78 (BP Location: Left arm, Patient Position: Sitting, Cuff Size: Large)   Pulse 75   Temp 98 3 °F (36 8 °C) (Temporal)   Ht 5' 5" (1 651 m)   Wt 100 kg (221 lb 1 6 oz)   LMP 11/26/2015   SpO2 97% Comment: Room Air  BMI 36 79 kg/m²        Physical Exam       Constitutional:  Well developed, well nourished, no acute distress, non-toxic appearance   Eyes:  PERRL, conjunctiva normal , non icteric sclera  HENT:  Atraumatic, oropharynx moist  Neck-  supple   Respiratory:  CTA b/l, normal breath sounds, no rales, no wheezing   Cardiovascular:  RRR, no murmurs, no LE edema b/l  GI:  Soft, nondistended, normal bowel sounds x 4, nontender, no organomegaly, no mass, no rebound, no guarding   Neurologic:  no focal deficits noted   Psychiatric:  Speech and behavior appropriate , AAO x 3        Melany Bailon DO

## 2021-09-21 ENCOUNTER — HOSPITAL ENCOUNTER (OUTPATIENT)
Dept: RADIOLOGY | Facility: HOSPITAL | Age: 66
Discharge: HOME | End: 2021-09-21
Payer: COMMERCIAL

## 2021-09-21 DIAGNOSIS — M51.16 INTERVERTEBRAL DISC DISORDER WITH RADICULOPATHY OF LUMBAR REGION: ICD-10-CM

## 2021-09-21 PROCEDURE — G1004 CDSM NDSC: HCPCS

## 2021-09-21 PROCEDURE — 72148 MRI LUMBAR SPINE W/O DYE: CPT

## 2021-09-21 RX ORDER — PREGABALIN 50 MG/1
CAPSULE ORAL
Qty: 360 CAPSULE | Refills: 1 | Status: SHIPPED | OUTPATIENT
Start: 2021-09-21 | End: 2022-04-19 | Stop reason: SDUPTHER

## 2021-09-21 NOTE — TELEPHONE ENCOUNTER
Pt is going to get her MRI done today 9/21/21  On pregablin  And FQ said she take a 4th pill during the day   Now pt will need a new script for     pregablin 50 mg 4x a day    [facility] on file correct    Pt# [phone]

## 2021-09-23 ENCOUNTER — TELEPHONE (OUTPATIENT)
Dept: INTERNAL MEDICINE CLINIC | Facility: CLINIC | Age: 66
End: 2021-09-23

## 2021-09-23 ENCOUNTER — HOSPITAL ENCOUNTER (OUTPATIENT)
Dept: NON INVASIVE DIAGNOSTICS | Facility: CLINIC | Age: 66
Discharge: HOME | End: 2021-09-23
Payer: COMMERCIAL

## 2021-09-23 DIAGNOSIS — I10 BENIGN ESSENTIAL HTN: ICD-10-CM

## 2021-09-23 DIAGNOSIS — R60.0 BILATERAL LEG EDEMA: ICD-10-CM

## 2021-09-23 PROCEDURE — 93306 TTE W/DOPPLER COMPLETE: CPT | Performed by: INTERNAL MEDICINE

## 2021-09-23 PROCEDURE — 93306 TTE W/DOPPLER COMPLETE: CPT

## 2021-09-23 NOTE — TELEPHONE ENCOUNTER
Patient called and stated she would like you to call her with the results of the ECHO , she wants to discuss  Also stated the swelling is returning after use of lasix

## 2021-09-23 NOTE — TELEPHONE ENCOUNTER
LMOM with echo results  Recommend improvement in blood pressure  Restart Lasix but at 20 mg daily instead of b i d    I sent prescription for Lasix and potassium to her pharmacy

## 2021-09-24 ENCOUNTER — HOSPITAL ENCOUNTER (OUTPATIENT)
Dept: RADIOLOGY | Facility: CLINIC | Age: 66
Discharge: HOME | End: 2021-09-24
Attending: ANESTHESIOLOGY | Admitting: ANESTHESIOLOGY
Payer: COMMERCIAL

## 2021-09-24 VITALS
DIASTOLIC BLOOD PRESSURE: 98 MMHG | HEART RATE: 73 BPM | SYSTOLIC BLOOD PRESSURE: 178 MMHG | RESPIRATION RATE: 18 BRPM | TEMPERATURE: 97.6 F | OXYGEN SATURATION: 97 %

## 2021-09-24 DIAGNOSIS — M51.16 LUMBAR DISC DISEASE WITH RADICULOPATHY: ICD-10-CM

## 2021-09-24 PROCEDURE — 64484 NJX AA&/STRD TFRM EPI L/S EA: CPT | Performed by: ANESTHESIOLOGY

## 2021-09-24 PROCEDURE — 64483 NJX AA&/STRD TFRM EPI L/S 1: CPT | Performed by: ANESTHESIOLOGY

## 2021-09-24 RX ORDER — METHYLPREDNISOLONE ACETATE 80 MG/ML
80 INJECTION, SUSPENSION INTRA-ARTICULAR; INTRALESIONAL; INTRAMUSCULAR; PARENTERAL; SOFT TISSUE ONCE
Status: COMPLETED | OUTPATIENT
Start: 2021-09-24 | End: 2021-09-24

## 2021-09-24 RX ORDER — 0.9 % SODIUM CHLORIDE 0.9 %
10 VIAL (ML) INJECTION ONCE
Status: COMPLETED | OUTPATIENT
Start: 2021-09-24 | End: 2021-09-24

## 2021-09-24 RX ORDER — BUPIVACAINE HCL/PF 2.5 MG/ML
10 VIAL (ML) INJECTION ONCE
Status: COMPLETED | OUTPATIENT
Start: 2021-09-24 | End: 2021-09-24

## 2021-09-24 RX ADMIN — METHYLPREDNISOLONE ACETATE 80 MG: 80 INJECTION, SUSPENSION INTRA-ARTICULAR; INTRALESIONAL; INTRAMUSCULAR; PARENTERAL; SOFT TISSUE at 13:31

## 2021-09-24 RX ADMIN — IOHEXOL 1 ML: 300 INJECTION, SOLUTION INTRAVENOUS at 13:30

## 2021-09-24 RX ADMIN — BUPIVACAINE HYDROCHLORIDE 2 ML: 2.5 INJECTION, SOLUTION EPIDURAL; INFILTRATION; INTRACAUDAL at 13:31

## 2021-09-24 RX ADMIN — Medication 5 ML: at 13:27

## 2021-09-24 RX ADMIN — SODIUM CHLORIDE 5 ML: 9 INJECTION, SOLUTION INTRAMUSCULAR; INTRAVENOUS; SUBCUTANEOUS at 13:27

## 2021-09-24 NOTE — H&P
History of Present Illness:  The patient is a 61 y o  female who presents with complaints of left lower back and leg pain secondary to stenosis and is here today for left L4-S1 transforaminal epidural steroid injection    Patient Active Problem List   Diagnosis    Rosacea, acne    Lumbar degenerative disc disease    Lumbar radiculopathy    Anxiety    Benign essential HTN    Cervical radiculopathy    Disc disorder of cervical region    Hot flash, menopausal    Lumbar facet arthropathy    Lumbar stenosis without neurogenic claudication    Myofascial pain syndrome    Pain syndrome, chronic    Positive depression screening    Primary osteoarthritis of right knee    Bulge of lumbar disc without myelopathy    Spondylolisthesis of lumbar region    Arthritis of right acromioclavicular joint    Adverse effect of adrenal cortical steroids, initial encounter    Calculus of gallbladder without cholecystitis without obstruction    Chronic right shoulder pain    Hyperthyroidism    Primary insomnia    Greater trochanteric bursitis of left hip    Encounter for gynecological examination (general) (routine) without abnormal findings    Long term systemic steroid user    Asymptomatic premature menopause    Bilateral leg edema    Osteopenia of multiple sites       Past Medical History:   Diagnosis Date    Anxiety     Cataract, bilateral     last assessed    Cervical radiculopathy     Disc disease, degenerative, cervical     Herpes simplex infection     Hypertension     Insomnia     Low back pain     Lumbar degenerative disc disease     Lumbar facet arthropathy     Lumbar radiculopathy     Lumbar stenosis without neurogenic claudication     Mononucleosis     Myofascial pain dysfunction syndrome     Osteoarthritis     shoulder region - unspecified laterality - last assessed 2/1/14    Plantar fasciitis     Ptosis, both eyelids     last assessed 10/6/16    Ptosis, congenital, left     Retinal detachment     last assessed 12/11/14 right eye    Sacroiliitis ([address] )     Thyroid disease     Thyrotoxicosis     last assessed 5/17/13    Vertigo        Past Surgical History:   Procedure Laterality Date    [place] SURG IMPLNT NEUROELECT,EPIDURAL Left 1/26/2016    Procedure: PLACEMENT OF THORACIC SPINAL CORD STIMULATOR WITH LEFT BUTTOCK IMPLANTABLE PULSE GENERATOR (IMPULSE MONITORING);   Surgeon: Martinez Noriega MD;  Location:  MAIN OR;  Service: Neurosurgery    RETINAL DETACHMENT SURGERY Right          Current Outpatient Medications:     atorvastatin (LIPITOR) 10 mg tablet, Take 1 tablet (10 mg total) by mouth daily at bedtime, Disp: 90 tablet, Rfl: 1    busPIRone (BUSPAR) 10 mg tablet, Take 1 tablet (10 mg total) by mouth 3 (three) times a day (Patient taking differently: Take 10 mg by mouth 2 (two) times a day ), Disp: 270 tablet, Rfl: 1    Cetirizine HCl (ZYRTEC PO), Take by mouth as needed, Disp: , Rfl:     diclofenac (VOLTAREN) 75 mg EC tablet, Take 1 tablet (75 mg total) by mouth 2 (two) times a day, Disp: 180 tablet, Rfl: 1    Diclofenac Sodium (VOLTAREN) 1 %, APPLY 2 GRAMS TO AFFECTED AREA 4 TIMES A DAY, Disp: 200 g, Rfl: 1    doxycycline hyclate (VIBRAMYCIN) 100 mg capsule, Take 1 capsule (100 mg total) by mouth every 12 (twelve) hours (Patient taking differently: Take 100 mg by mouth daily ), Disp: 180 capsule, Rfl: 3    Esomeprazole Magnesium (NEXIUM PO), Take 1 capsule by mouth daily as needed , Disp: , Rfl:     fluticasone (FLONASE) 50 mcg/act nasal spray, 1 spray into each nostril daily (Patient taking differently: 1 spray into each nostril daily as needed ), Disp: 16 g, Rfl: 3    furosemide (LASIX) 20 mg tablet, Take 1 tablet (20 mg total) by mouth daily, Disp: 30 tablet, Rfl: 3    LORazepam (ATIVAN) 1 mg tablet, Take 1 5 tablets (1 5 mg total) by mouth daily at bedtime as needed for anxiety, Disp: 135 tablet, Rfl: 1    methimazole (TAPAZOLE) 5 mg tablet, Take 1 tablet (5 mg total) by mouth daily, Disp: 90 tablet, Rfl: 1    metroNIDAZOLE (METROCREAM) 0 75 % cream, Apply topically daily at bedtime (Patient not taking: Reported on 9/20/2021), Disp: 45 g, Rfl: 2    penciclovir (DENAVIR) 1 % cream, Apply topically daily as needed (ulcer) (Patient not taking: Reported on 9/20/2021), Disp: 5 g, Rfl: 5    potassium chloride (K-DUR,KLOR-CON) 10 mEq tablet, Take 1 tablet (10 mEq total) by mouth daily, Disp: 30 tablet, Rfl: 5    predniSONE 10 mg tablet, Take 4 pills for 4 days, 3 pills for 3 days, 2 pills for 2 days, then 1 pill for 1 day (Patient not taking: Reported on 9/17/2021), Disp: 30 tablet, Rfl: 0    pregabalin (LYRICA) 50 mg capsule, Take 1 capsule in the AM, 1 capsule in the PM and 2 capsules at bedtime, Disp: 360 capsule, Rfl: 1    propranolol (INDERAL) 10 mg tablet, Take 1 tablet (10 mg total) by mouth 2 (two) times a day, Disp: 180 tablet, Rfl: 1    quinapril (ACCUPRIL) 20 mg tablet, Take 1 tablet (20 mg total) by mouth 2 (two) times a day, Disp: 180 tablet, Rfl: 1    RESTASIS 0 05 % ophthalmic emulsion, Administer 1 drop to both eyes 2 (two) times a day, Disp: , Rfl: 3    tiZANidine (ZANAFLEX) 4 mg tablet, Take 1 tablet (4 mg total) by mouth 2 (two) times a day, Disp: 180 tablet, Rfl: 1    valACYclovir (VALTREX) 500 mg tablet, Take 1 tablet (500 mg total) by mouth daily as needed (Cold Sores) (Patient not taking: Reported on 9/20/2021), Disp: 30 tablet, Rfl: 1    Xiidra 5 % op solution, INSTILL 1 DROP INTO BOTH EYES TWICE A DAY (Patient not taking: Reported on 9/20/2021), Disp: , Rfl:     Current Facility-Administered Medications:     bupivacaine (PF) (MARCAINE) 0 25 % injection 10 mL, 10 mL, Epidural, Once, Hussein Sanderson MD    iohexol (OMNIPAQUE) 300 mg/mL injection 50 mL, 50 mL, Epidural, Once, Hussein Sanderson MD    lidocaine (PF) (XYLOCAINE-MPF) 2 % injection 5 mL, 5 mL, Infiltration, Once, Hussein Sanderson MD    methylPREDNISolone acetate (DEPO-MEDROL) injection 80 mg, 80 mg, Epidural, Once, Arun Olmos MD    sodium chloride (PF) 0 9 % injection 10 mL, 10 mL, Infiltration, Once, Arun Olmos MD    Allergies   Allergen Reactions    Other Dermatitis     Adhesive tape - please use ONLY PAPER TAPE    Scopolamine Other (See Comments)     Severe, debilitating headache      Flexeril [Cyclobenzaprine] Dizziness and Fatigue    Naproxen      Other reaction(s): Unknown Allergic Reaction  Annotation - [id number]: nightmares    Penicillins      Other reaction(s): Unknown Allergic Reaction  Annotation - [id number]: childhood reaction    Promethazine-Codeine      Reaction Date: [id number]; Annotation - [id number]: nightmares; Annotation - [id number]: nightmares    Tramadol      Other reaction(s): Unknown Allergic Reaction  Annotation - [id number]: PT HAS NIGHTMARES FROM TRAMADOL    Wound Dressing Adhesive Rash       Physical Exam:   Vitals:    09/24/21 1312   BP: (!) 181/105   Pulse: 72   Resp: 18   Temp: 97 6 °F (36 4 °C)   SpO2: 95%     General: Awake, Alert, Oriented x 3, Mood and affect appropriate  Respiratory: Respirations even and unlabored  Cardiovascular: Peripheral pulses intact; no edema  Musculoskeletal Exam:  Left lower back pain    ASA Score: 2    Patient/Chart Verification  Patient ID Verified: Verbal  ID Band Applied: No  Consents Confirmed: Procedural, To be obtained in the Pre-Procedure area  H&P( within 30 days) Verified: To be obtained in the Pre-Procedure area  Interval H&P(within 24 hr) Complete (required for Outpatients and Surgery Admit only): To be obtained in the Pre-Procedure area  Allergies Reviewed: Yes  Anticoag/NSAID held?: NA  Currently on antibiotics?: No  Pregnancy denied?: NA    Assessment:   1   Lumbar disc disease with radiculopathy        Plan: left L3, L4, L5 TF PRATIK

## 2021-09-24 NOTE — DISCHARGE INSTR - LAB
Epidural Steroid Injection   WHAT YOU NEED TO KNOW:   An epidural steroid injection (PRATIK) is a procedure to inject steroid medicine into the epidural space  The epidural space is between your spinal cord and vertebrae  Steroids reduce inflammation and fluid buildup in your spine that may be causing pain  You may be given pain medicine along with the steroids  ACTIVITY  · Do not drive or operate machinery today  · No strenuous activity today - bending, lifting, etc   · You may resume normal activites starting tomorrow - start slowly and as tolerated  · You may shower today, but no tub baths or hot tubs  · You may have numbness for several hours from the local anesthetic  Please use caution and common sense, especially with weight-bearing activities  CARE OF THE INJECTION SITE  · If you have soreness or pain, apply ice to the area today (20 minutes on/20 minutes off)  · Starting tomorrow, you may use warm, moist heat or ice if needed  · You may have an increase or change in your discomfort for 36-48 hours after your treatment  · Apply ice and continue with any pain medication you have been prescribed  · Notify [facility] if you have any of the following: redness, drainage, swelling, headache, stiff neck or fever above 100°F     SPECIAL INSTRUCTIONS  · Our office will contact you in approximately 7 days for a progress report  MEDICATIONS  · Continue to take all routine medications  · Our office may have instructed you to hold some medications  As no general anesthesia was used in today's procedure, you should not experience any side effects related to anesthesia  If you have a problem specifically related to your procedure, please call our office at [phone]  Problems not related to your procedure should be directed to your primary care physician

## 2021-10-01 ENCOUNTER — TELEPHONE (OUTPATIENT)
Dept: PAIN MEDICINE | Facility: CLINIC | Age: 66
End: 2021-10-01

## 2021-10-11 DIAGNOSIS — E05.90 HYPERTHYROIDISM: ICD-10-CM

## 2021-10-11 RX ORDER — PROPRANOLOL HYDROCHLORIDE 10 MG/1
10 TABLET ORAL 2 TIMES DAILY
Qty: 180 TABLET | Refills: 1 | Status: SHIPPED | OUTPATIENT
Start: 2021-10-11 | End: 2021-11-30 | Stop reason: SDUPTHER

## 2021-11-15 ENCOUNTER — TELEPHONE (OUTPATIENT)
Dept: PAIN MEDICINE | Facility: CLINIC | Age: 66
End: 2021-11-15

## 2021-11-30 DIAGNOSIS — E05.90 HYPERTHYROIDISM: ICD-10-CM

## 2021-11-30 RX ORDER — METHIMAZOLE 5 MG/1
5 TABLET ORAL DAILY
Qty: 90 TABLET | Refills: 1 | Status: SHIPPED | OUTPATIENT
Start: 2021-11-30 | End: 2022-04-25 | Stop reason: SDUPTHER

## 2021-11-30 RX ORDER — PROPRANOLOL HYDROCHLORIDE 10 MG/1
10 TABLET ORAL 2 TIMES DAILY
Qty: 180 TABLET | Refills: 1 | Status: SHIPPED | OUTPATIENT
Start: 2021-11-30 | End: 2022-07-07 | Stop reason: SDUPTHER

## 2021-12-02 ENCOUNTER — TELEPHONE (OUTPATIENT)
Dept: PAIN MEDICINE | Facility: CLINIC | Age: 66
End: 2021-12-02

## 2021-12-02 ENCOUNTER — OFFICE VISIT (OUTPATIENT)
Dept: INTERNAL MEDICINE CLINIC | Facility: CLINIC | Age: 66
End: 2021-12-02
Payer: COMMERCIAL

## 2021-12-02 VITALS
BODY MASS INDEX: 36.67 KG/M2 | OXYGEN SATURATION: 98 % | HEIGHT: 65 IN | DIASTOLIC BLOOD PRESSURE: 80 MMHG | HEART RATE: 69 BPM | SYSTOLIC BLOOD PRESSURE: 116 MMHG | TEMPERATURE: 97.9 F | WEIGHT: 220.1 LBS

## 2021-12-02 DIAGNOSIS — M43.16 SPONDYLOLISTHESIS OF LUMBAR REGION: ICD-10-CM

## 2021-12-02 DIAGNOSIS — M54.16 LUMBAR RADICULOPATHY: ICD-10-CM

## 2021-12-02 DIAGNOSIS — E05.90 HYPERTHYROIDISM: Primary | ICD-10-CM

## 2021-12-02 DIAGNOSIS — R60.0 BILATERAL LEG EDEMA: ICD-10-CM

## 2021-12-02 DIAGNOSIS — F41.9 ANXIETY: ICD-10-CM

## 2021-12-02 DIAGNOSIS — M51.26 BULGE OF LUMBAR DISC WITHOUT MYELOPATHY: ICD-10-CM

## 2021-12-02 PROCEDURE — 3074F SYST BP LT 130 MM HG: CPT | Performed by: FAMILY MEDICINE

## 2021-12-02 PROCEDURE — 3008F BODY MASS INDEX DOCD: CPT | Performed by: FAMILY MEDICINE

## 2021-12-02 PROCEDURE — 3079F DIAST BP 80-89 MM HG: CPT | Performed by: FAMILY MEDICINE

## 2021-12-02 PROCEDURE — 1036F TOBACCO NON-USER: CPT | Performed by: FAMILY MEDICINE

## 2021-12-02 PROCEDURE — 99214 OFFICE O/P EST MOD 30 MIN: CPT | Performed by: FAMILY MEDICINE

## 2021-12-02 RX ORDER — AMITRIPTYLINE HYDROCHLORIDE 10 MG/1
10 TABLET, FILM COATED ORAL
Qty: 30 TABLET | Refills: 3 | Status: SHIPPED | OUTPATIENT
Start: 2021-12-02 | End: 2022-02-22 | Stop reason: SDUPTHER

## 2021-12-03 ENCOUNTER — APPOINTMENT (OUTPATIENT)
Dept: LAB | Facility: MEDICAL CENTER | Age: 66
End: 2021-12-03
Payer: COMMERCIAL

## 2021-12-03 DIAGNOSIS — E05.90 HYPERTHYROIDISM: ICD-10-CM

## 2021-12-03 LAB
T4 FREE SERPL-MCNC: 1.01 NG/DL (ref 0.76–1.46)
TSH SERPL DL<=0.05 MIU/L-ACNC: 0.83 UIU/ML (ref 0.36–3.74)

## 2021-12-03 PROCEDURE — 36415 COLL VENOUS BLD VENIPUNCTURE: CPT

## 2021-12-03 PROCEDURE — 84443 ASSAY THYROID STIM HORMONE: CPT

## 2021-12-03 PROCEDURE — 84439 ASSAY OF FREE THYROXINE: CPT

## 2021-12-08 NOTE — TELEPHONE ENCOUNTER
Pt confirmed she did not have an infection she had a tooth extracted and was put on abx on 12/2 just precautionary  Abx just finished  Told pt she can proceed with procedure on 12/21 as long as she doesn't have an active infection prior to procedure  Pt asking when can she have a flu shot? Told pt no flu shot 2 wks before or 2 wks after her scheduled inj on 12/21/21

## 2021-12-08 NOTE — TELEPHONE ENCOUNTER
Pt called in to let the doctor know that she is on antibiotics since 12/2 and can she still have the procedure   And also the flu shot can she get it  Please be advised thank you    Pt can be reached @ [phone]

## 2021-12-10 DIAGNOSIS — B00.1 COLD SORE: ICD-10-CM

## 2021-12-10 RX ORDER — VALACYCLOVIR HYDROCHLORIDE 500 MG/1
500 TABLET, FILM COATED ORAL DAILY PRN
Qty: 30 TABLET | Refills: 1 | Status: SHIPPED | OUTPATIENT
Start: 2021-12-10 | End: 2022-07-12

## 2021-12-21 ENCOUNTER — HOSPITAL ENCOUNTER (OUTPATIENT)
Dept: RADIOLOGY | Facility: CLINIC | Age: 66
Discharge: HOME | End: 2021-12-21
Attending: ANESTHESIOLOGY
Payer: COMMERCIAL

## 2021-12-21 VITALS
DIASTOLIC BLOOD PRESSURE: 87 MMHG | SYSTOLIC BLOOD PRESSURE: 151 MMHG | OXYGEN SATURATION: 96 % | TEMPERATURE: 97.8 F | HEART RATE: 66 BPM | RESPIRATION RATE: 20 BRPM

## 2021-12-21 DIAGNOSIS — M51.16 LUMBAR DISC DISEASE WITH RADICULOPATHY: ICD-10-CM

## 2021-12-21 PROCEDURE — 64484 NJX AA&/STRD TFRM EPI L/S EA: CPT | Performed by: ANESTHESIOLOGY

## 2021-12-21 PROCEDURE — 64483 NJX AA&/STRD TFRM EPI L/S 1: CPT | Performed by: ANESTHESIOLOGY

## 2021-12-21 RX ORDER — 0.9 % SODIUM CHLORIDE 0.9 %
4 VIAL (ML) INJECTION ONCE
Status: COMPLETED | OUTPATIENT
Start: 2021-12-21 | End: 2021-12-21

## 2021-12-21 RX ORDER — METHYLPREDNISOLONE ACETATE 80 MG/ML
80 INJECTION, SUSPENSION INTRA-ARTICULAR; INTRALESIONAL; INTRAMUSCULAR; PARENTERAL; SOFT TISSUE ONCE
Status: COMPLETED | OUTPATIENT
Start: 2021-12-21 | End: 2021-12-21

## 2021-12-21 RX ORDER — BUPIVACAINE HCL/PF 2.5 MG/ML
2 VIAL (ML) INJECTION ONCE
Status: COMPLETED | OUTPATIENT
Start: 2021-12-21 | End: 2021-12-21

## 2021-12-21 RX ADMIN — Medication 4 ML: at 14:52

## 2021-12-21 RX ADMIN — BUPIVACAINE HYDROCHLORIDE 2 ML: 2.5 INJECTION, SOLUTION EPIDURAL; INFILTRATION; INTRACAUDAL at 14:55

## 2021-12-21 RX ADMIN — METHYLPREDNISOLONE ACETATE 80 MG: 80 INJECTION, SUSPENSION INTRA-ARTICULAR; INTRALESIONAL; INTRAMUSCULAR; PARENTERAL; SOFT TISSUE at 14:55

## 2021-12-21 RX ADMIN — IOHEXOL 1 ML: 300 INJECTION, SOLUTION INTRAVENOUS at 14:55

## 2021-12-27 DIAGNOSIS — R60.0 BILATERAL LEG EDEMA: ICD-10-CM

## 2021-12-27 DIAGNOSIS — F41.9 ANXIETY: ICD-10-CM

## 2021-12-27 RX ORDER — FUROSEMIDE 20 MG/1
20 TABLET ORAL DAILY
Qty: 30 TABLET | Refills: 3 | Status: SHIPPED | OUTPATIENT
Start: 2021-12-27 | End: 2022-04-25 | Stop reason: SDUPTHER

## 2021-12-27 RX ORDER — LORAZEPAM 1 MG/1
1.5 TABLET ORAL
Qty: 135 TABLET | Refills: 1 | Status: SHIPPED | OUTPATIENT
Start: 2021-12-27

## 2021-12-28 ENCOUNTER — TELEPHONE (OUTPATIENT)
Dept: PAIN MEDICINE | Facility: CLINIC | Age: 66
End: 2021-12-28

## 2022-01-04 ENCOUNTER — TELEMEDICINE (OUTPATIENT)
Dept: INTERNAL MEDICINE CLINIC | Facility: OTHER | Age: 67
End: 2022-01-04
Payer: COMMERCIAL

## 2022-01-04 VITALS — BODY MASS INDEX: 36.65 KG/M2 | HEIGHT: 65 IN | WEIGHT: 220 LBS

## 2022-01-04 DIAGNOSIS — B34.9 VIRAL INFECTION, UNSPECIFIED: Primary | ICD-10-CM

## 2022-01-04 PROCEDURE — 99441 PR PHYS/QHP TELEPHONE EVALUATION 5-10 MIN: CPT | Performed by: NURSE PRACTITIONER

## 2022-01-04 PROCEDURE — 87636 SARSCOV2 & INF A&B AMP PRB: CPT | Performed by: NURSE PRACTITIONER

## 2022-01-04 NOTE — PROGRESS NOTES
COVID-19 Outpatient Progress Note    Assessment/Plan:    Problem List Items Addressed This Visit     None      Visit Diagnoses     Viral infection, unspecified    -  Primary    Relevant Orders    COVID/FLU- Collected at [facility] or [facility]         Disposition:     Referred patient to centralized site to test for COVID-19/Influenza  Advised she can use flonase, mucinex, tylenol and ibuprofen prn for symptom relief  Discussed to isolate, if positive, continue for 5 days and if symptoms are improved after 5 days she can discontinue isolation but continue masking for an additional 5 days  advised to follow up if symptoms worsen or fail to improve     I have spent 7 minutes directly with the patient  Encounter provider CARMEN Day    Provider located at [address] [place] [address]    Recent Visits  No visits were found meeting these conditions  Showing recent visits within past 7 days and meeting all other requirements  Today's Visits  Date Type Provider Dept   01/04/22 CARMEN [facility]   Showing today's visits and meeting all other requirements  Future Appointments  No visits were found meeting these conditions  Showing future appointments within next 150 days and meeting all other requirements       Patient agrees to participate in a virtual check in via telephone or video visit instead of presenting to the office to address urgent/immediate medical needs  Patient is aware this is a billable service  After connecting through [facility], the patient was identified by name and date of birth  Marlene Archer was informed that this was a telemedicine visit and that the exam was being conducted confidentially over secure lines  My office door was closed  No one else was in the room   Marlene Archer acknowledged consent and understanding of privacy and security of the telemedicine visit  I informed the patient that I have reviewed her record in Epic and presented the opportunity for her to ask any questions regarding the visit today  The patient agreed to participate  Verification of patient location:  Patient is located in the following state in which I hold an active license: [place]    Subjective:   Erendira Stallings is a 61 y o  female who is concerned about COVID-19  Patient's symptoms include fatigue, nasal congestion, rhinorrhea (mild), sore throat, cough (very intermittent) and headache  Patient denies fever, chills, shortness of breath, chest tightness, nausea, vomiting, diarrhea and myalgias       Date of symptom onset: 1/3/2021  COVID-19 vaccination status: Fully vaccinated with booster    Exposure:   Contact with a person who is under investigation (PUI) for or who is positive for COVID-19 within the last 14 days?: No    Lab Results   Component Value Date    SARSCOV2 NOT DETECTED 08/15/2021     Past Medical History:   Diagnosis Date    Anxiety     Cataract, bilateral     last assessed    Cervical radiculopathy     Disc disease, degenerative, cervical     Herpes simplex infection     Hypertension     Insomnia     Low back pain     Lumbar degenerative disc disease     Lumbar facet arthropathy     Lumbar radiculopathy     Lumbar stenosis without neurogenic claudication     Mononucleosis     Myofascial pain dysfunction syndrome     Osteoarthritis     shoulder region - unspecified laterality - last assessed 2/1/14    Plantar fasciitis     Ptosis, both eyelids     last assessed 10/6/16    Ptosis, congenital, left     Retinal detachment     last assessed 12/11/14 right eye    Sacroiliitis ([address] )     Thyroid disease     Thyrotoxicosis     last assessed 5/17/13    Vertigo      Past Surgical History:   Procedure Laterality Date    [place] SURG IMPLNT NEUROELECT,EPIDURAL Left 1/26/2016    Procedure: PLACEMENT OF THORACIC SPINAL CORD STIMULATOR WITH LEFT BUTTOCK IMPLANTABLE PULSE GENERATOR (IMPULSE MONITORING);   Surgeon: Quinn Hatchet, MD;  Location: [facility];  Service: Neurosurgery    RETINAL DETACHMENT SURGERY Right      Current Outpatient Medications   Medication Sig Dispense Refill    amitriptyline (ELAVIL) 10 mg tablet Take 1 tablet (10 mg total) by mouth daily at bedtime 30 tablet 3    atorvastatin (LIPITOR) 10 mg tablet Take 1 tablet (10 mg total) by mouth daily at bedtime 90 tablet 1    busPIRone (BUSPAR) 10 mg tablet Take 1 tablet (10 mg total) by mouth 3 (three) times a day 270 tablet 1    Cetirizine HCl (ZYRTEC PO) Take by mouth as needed      diclofenac (VOLTAREN) 75 mg EC tablet Take 1 tablet (75 mg total) by mouth 2 (two) times a day 180 tablet 1    Diclofenac Sodium (VOLTAREN) 1 % APPLY 2 GRAMS TO AFFECTED AREA 4 TIMES A DAY (Patient taking differently: as needed  ) 200 g 1    Esomeprazole Magnesium (NEXIUM PO) Take 1 capsule by mouth daily as needed       fluticasone (FLONASE) 50 mcg/act nasal spray 1 spray into each nostril daily (Patient taking differently: 1 spray into each nostril daily as needed ) 16 g 3    furosemide (LASIX) 20 mg tablet Take 1 tablet (20 mg total) by mouth daily 30 tablet 3    LORazepam (ATIVAN) 1 mg tablet Take 1 5 tablets (1 5 mg total) by mouth daily at bedtime as needed for anxiety 135 tablet 1    methimazole (TAPAZOLE) 5 mg tablet Take 1 tablet (5 mg total) by mouth daily 90 tablet 1    metroNIDAZOLE (METROCREAM) 0 75 % cream Apply topically daily at bedtime 45 g 2    penciclovir (DENAVIR) 1 % cream Apply topically daily as needed (ulcer) 5 g 5    potassium chloride (K-DUR,KLOR-CON) 10 mEq tablet Take 1 tablet (10 mEq total) by mouth daily 30 tablet 5    pregabalin (LYRICA) 50 mg capsule Take 1 capsule in the AM, 1 capsule in the PM and 2 capsules at bedtime 360 capsule 1    propranolol (INDERAL) 10 mg tablet Take 1 tablet (10 mg total) by mouth 2 (two) times a day 180 tablet 1    RESTASIS 0 05 % ophthalmic emulsion Administer 1 drop to both eyes 2 (two) times a day  3    tiZANidine (ZANAFLEX) 4 mg tablet Take 1 tablet (4 mg total) by mouth 2 (two) times a day 180 tablet 1    valACYclovir (VALTREX) 500 mg tablet Take 1 tablet (500 mg total) by mouth daily as needed (Cold Sores) 30 tablet 1    Xiidra 5 % op solution INSTILL 1 DROP INTO BOTH EYES TWICE A DAY      quinapril (ACCUPRIL) 20 mg tablet Take 1 tablet (20 mg total) by mouth 2 (two) times a day 180 tablet 1     No current facility-administered medications for this visit  Allergies   Allergen Reactions    Other Dermatitis     Adhesive tape - please use ONLY PAPER TAPE    Scopolamine Other (See Comments)     Severe, debilitating headache      Flexeril [Cyclobenzaprine] Dizziness and Fatigue    Naproxen      Other reaction(s): Unknown Allergic Reaction  Annotation - [id number]: nightmares    Penicillins      Other reaction(s): Unknown Allergic Reaction  Annotation - [id number]: childhood reaction    Promethazine-Codeine      Reaction Date: [id number]; Annotation - [id number]: nightmares; Annotation - [id number]: nightmares    Tramadol      Other reaction(s): Unknown Allergic Reaction  Annotation - [id number]: PT HAS NIGHTMARES FROM TRAMADOL    Wound Dressing Adhesive Rash       Review of Systems   Constitutional: Positive for fatigue  Negative for chills and fever  HENT: Positive for congestion, rhinorrhea (mild) and sore throat  Respiratory: Positive for cough (very intermittent)  Negative for chest tightness and shortness of breath  Gastrointestinal: Negative for diarrhea, nausea and vomiting  Musculoskeletal: Negative for myalgias  Neurological: Positive for headaches  Objective:    Vitals:    01/04/22 0831   Weight: 99 8 kg (220 lb)   Height: 5' 5" (1 651 m)       Physical Exam  Constitutional:       General: She is not in acute distress  Appearance: She is ill-appearing   She is not toxic-appearing or diaphoretic  HENT:      Head: Normocephalic and atraumatic  Nose: Congestion present  Pulmonary:      Effort: Pulmonary effort is normal  No respiratory distress  Comments: No cough  No conversational dyspnea  Neurological:      Mental Status: She is alert  Mental status is at baseline  Psychiatric:         Mood and Affect: Mood normal          Behavior: Behavior normal          VIRTUAL VISIT [address] [facility] verbally agrees to participate in [employer]  Pt is aware that [employer] could be limited without vital signs or the ability to perform a full hands-on physical Mauricio Ko understands she or the provider may request at any time to terminate the video visit and request the patient to seek care or treatment in person

## 2022-01-10 ENCOUNTER — TELEPHONE (OUTPATIENT)
Dept: INTERNAL MEDICINE CLINIC | Facility: OTHER | Age: 67
End: 2022-01-10

## 2022-01-10 NOTE — TELEPHONE ENCOUNTER
Pt is calling to ask if we can send In a rx for her her  had a virtual today with dr Vero Engel  She did have one last week  Her covid was negative but she has been sick for over a week  Can the same antibiotic be sent for her as well

## 2022-01-11 NOTE — TELEPHONE ENCOUNTER
Since I did not see her and I do not know her symptoms, it would be impossible for me to know what to send for her  Thanks!

## 2022-01-18 ENCOUNTER — OFFICE VISIT (OUTPATIENT)
Dept: INTERNAL MEDICINE CLINIC | Age: 67
End: 2022-01-18
Payer: COMMERCIAL

## 2022-01-18 VITALS
TEMPERATURE: 98.2 F | WEIGHT: 218 LBS | DIASTOLIC BLOOD PRESSURE: 64 MMHG | BODY MASS INDEX: 36.32 KG/M2 | HEIGHT: 65 IN | SYSTOLIC BLOOD PRESSURE: 114 MMHG | HEART RATE: 81 BPM | OXYGEN SATURATION: 99 %

## 2022-01-18 DIAGNOSIS — Z13.220 SCREENING CHOLESTEROL LEVEL: ICD-10-CM

## 2022-01-18 DIAGNOSIS — I10 BENIGN ESSENTIAL HTN: Primary | ICD-10-CM

## 2022-01-18 DIAGNOSIS — E05.90 HYPERTHYROIDISM: ICD-10-CM

## 2022-01-18 DIAGNOSIS — Z23 NEED FOR INFLUENZA VACCINATION: ICD-10-CM

## 2022-01-18 DIAGNOSIS — R60.0 BILATERAL LEG EDEMA: ICD-10-CM

## 2022-01-18 PROCEDURE — 3008F BODY MASS INDEX DOCD: CPT | Performed by: FAMILY MEDICINE

## 2022-01-18 PROCEDURE — 3074F SYST BP LT 130 MM HG: CPT | Performed by: FAMILY MEDICINE

## 2022-01-18 PROCEDURE — 3078F DIAST BP <80 MM HG: CPT | Performed by: FAMILY MEDICINE

## 2022-01-18 PROCEDURE — 90682 RIV4 VACC RECOMBINANT DNA IM: CPT

## 2022-01-18 PROCEDURE — 1036F TOBACCO NON-USER: CPT | Performed by: FAMILY MEDICINE

## 2022-01-18 PROCEDURE — 90471 IMMUNIZATION ADMIN: CPT

## 2022-01-18 PROCEDURE — 99214 OFFICE O/P EST MOD 30 MIN: CPT | Performed by: FAMILY MEDICINE

## 2022-01-18 NOTE — PROGRESS NOTES
Assessment/Plan:    1  Benign essential HTN  -     Comprehensive metabolic panel; Future    2  Hyperthyroidism  -     TSH, 3rd generation with Free T4 reflex; Future  -     CBC and differential; Future    3  Screening cholesterol level  -     Lipid panel; Future    4  Bilateral leg edema            There are no Patient Instructions on file for this visit  Return in about 4 months (around 5/18/2022) for Recheck  Subjective:      Patient ID: Ofe Amezcua is a 61 y o  female  Chief Complaint   Patient presents with    Follow-up     htn- labs 12/3/21       HPI    Rosacea, patient was placed on Metrogel by her endocrinologist and she has been using it for 1 month   She feels that is not helping  Moisesgustavo Kourtney has facial flushing for several years which are exacerbated by stress, hypertension, heat, and hot flashes  Alonso Oconnell would like to discuss other options  5/16/18: started doxy 1 week ago, no relief yet  Still flushing and worse in the mornings  10/15/18: Shaylee Gomes a lot, taking doxy BID, ran out 1 week ago, re start at BID and then decrease to monthly  Dc 2019 taking doxy BID iith food, no issues  May 2021, taking doxycycline b i d  For several months and started Metrogel which she thinks flairs up her symptoms  September 2021, stable  January 2022, on doxycycline twice a day and tolerating well      Insomnia, patient states she does not sleep well which is very multifactorial including the pain and how flashes   She used to be on estrogen replacement currently does not take any   She does take Ativan at night to help her sleep which helped marginally  5/16/18: taking 1 5 mg  And advil pm and it has helped- sleeping through the night now and slightly  More tired in am   10/15/18: taking ativan and sleeping well with it 3/1/19:   July 2019, has not tried to decrease her lorazepam and does take it every night to sleep  Dec 2019 usually doing very well but  Lately having some issues with sleep   Thinks its from the holidays  June 2020, stable  May 2021, sleeping okay usually with her medications however she does wake up in the middle of the night and feels a high level of anxiety which she does not know what to do about   She cannot sleep without her lorazepam which she is taking faithfully   At last appointment with another provider, she was started on Cymbalta but currently is not taking and she cannot remember what side effects she had  September 2021, not any better, pain keeps her up at night  Jan 2022:  Sleeping very well with the addition of Elavil and tolerating  No side effects     Depression:  Patient is a depression was exacerbated by the pain and inability to sleep  [facility] is always tired and fatigued and is having difficulty with activities of daily living around the house like cleaning and cooking as well as work  Drea Sy is here today and can cooperate always   She feels that she is up to any medications or the long medications right now    8/13/18: buspar was increased to 10 mg per but she felt tired on it and went back to 5 mg BId, has gained weight  10/15/18: taking bupar 5 mg- 2 tabs at [facility],  12/21/18: stable  March 2019, no issues   Takes 1- 5 mg BuSpar in the morning than 1 with lunch and 2 at night    July 2019:  Doing very well with present medications and feels much better since losing weight  Dec 2019 taking 2 buspar at night and 1/2 in the morning    June 2020, relatively stable with no suicidal or homicidal ideations   May 2021, depression is relatively well controlled however currently her anxiety is at exacerbations since Baker time   She stated everything is normal with no problems at home or work no changes in her medications and no over-the-counter things that she has introduced to her daily routine   Other than not eating well and putting on some weight she states everything is fine   She recently had some lab work and her thyroid is controlled and she is compliant with her medications  Jake Vee was started on Cymbalta few months ago from another provider which she took for few days and stopped but she cannot remember why she stopped her what side effects she had   She takes BuSpar -total of 30 mg per day but cannot take a full dose in the morning because it makes her groggy  Jake Vee is taking her lorazepam at night   She states she feels like in the middle of the night at her or jump out of her chest but then her  checks her blood pressure as well as heart rate since he is a respiratory therapist in everything is normal   She does not drink any caffeine or alcohol towards bedtime and does not know why this is happening   It does not happen every night and can happen in the daytime as well randomly   September 2021, flare up of depression due to pain  January 2022, significantly improved since she is sleeping better and pain is better with the addition of Elavil  She has decreased her BuSpar and is only taking it once or twice a day now instead of 3 times a day  No breakthrough symptoms  No HI or SI      Chronic pain, not too much better than previously   Follows with pain management    May 2021, follows with pain management and has a pain pump  September 2021, worst, in general   This increases her weight, lack of activity and depression  She is getting steroid injections right now and is scheduled for MRI from her pain management doctors  January 2022, received another steroid injection which has significantly helped but that was only about 3 weeks ago  She is not sure how long it lasts and can only get it every 3 months  Usually does not last her 3 months    Elavil has helped her sleep quality as well as helped with the pain and she has an appointment tomorrow for neuro surgery opinion for minimally invasive surgery        Hyperlipidemia, increase in lipid levels due to increased weight and inactivity        L edema; resolved with diabetic socks at give her compression as well as daily Lasix  No issues            The following portions of the patient's history were reviewed and updated as appropriate: allergies, current medications, past family history, past medical history, past social history, past surgical history and problem list     Review of Systems      Constitutional:  Denies fever or chills   Eyes:  Denies double , blurry vision or eye pain  HENT:  Denies nasal congestion or sore throat   Respiratory:  Denies cough or shortness of breath or wheezing  Cardiovascular:  Denies palpitations or chest pain  GI:  Denies abdominal pain, nausea, or vomiting, no loose stools, no reflux  Integument:  Denies rash , no open areas  Neurologic:  Denies headache or focal weakness, no dizziness  : no dysuria, or hematuria        Current Outpatient Medications   Medication Sig Dispense Refill    amitriptyline (ELAVIL) 10 mg tablet Take 1 tablet (10 mg total) by mouth daily at bedtime 30 tablet 3    atorvastatin (LIPITOR) 10 mg tablet Take 1 tablet (10 mg total) by mouth daily at bedtime 90 tablet 1    busPIRone (BUSPAR) 10 mg tablet Take 1 tablet (10 mg total) by mouth 3 (three) times a day 270 tablet 1    Cetirizine HCl (ZYRTEC PO) Take by mouth as needed      diclofenac (VOLTAREN) 75 mg EC tablet Take 1 tablet (75 mg total) by mouth 2 (two) times a day 180 tablet 1    Diclofenac Sodium (VOLTAREN) 1 % APPLY 2 GRAMS TO AFFECTED AREA 4 TIMES A  g 1    Esomeprazole Magnesium (NEXIUM PO) Take 1 capsule by mouth daily as needed       furosemide (LASIX) 20 mg tablet Take 1 tablet (20 mg total) by mouth daily 30 tablet 3    LORazepam (ATIVAN) 1 mg tablet Take 1 5 tablets (1 5 mg total) by mouth daily at bedtime as needed for anxiety 135 tablet 1    methimazole (TAPAZOLE) 5 mg tablet Take 1 tablet (5 mg total) by mouth daily 90 tablet 1    metroNIDAZOLE (METROCREAM) 0 75 % cream Apply topically daily at bedtime 45 g 2    penciclovir (DENAVIR) 1 % cream Apply topically daily as needed (ulcer) 5 g 5    potassium chloride (K-DUR,KLOR-CON) 10 mEq tablet Take 1 tablet (10 mEq total) by mouth daily 30 tablet 5    pregabalin (LYRICA) 50 mg capsule Take 1 capsule in the AM, 1 capsule in the PM and 2 capsules at bedtime 360 capsule 1    propranolol (INDERAL) 10 mg tablet Take 1 tablet (10 mg total) by mouth 2 (two) times a day 180 tablet 1    quinapril (ACCUPRIL) 20 mg tablet Take 1 tablet (20 mg total) by mouth 2 (two) times a day 180 tablet 1    RESTASIS 0 05 % ophthalmic emulsion Administer 1 drop to both eyes 2 (two) times a day  3    tiZANidine (ZANAFLEX) 4 mg tablet Take 1 tablet (4 mg total) by mouth 2 (two) times a day 180 tablet 1    valACYclovir (VALTREX) 500 mg tablet Take 1 tablet (500 mg total) by mouth daily as needed (Cold Sores) 30 tablet 1    Xiidra 5 % op solution INSTILL 1 DROP INTO BOTH EYES TWICE A DAY      fluticasone (FLONASE) 50 mcg/act nasal spray 1 spray into each nostril daily (Patient not taking: Reported on 1/18/2022 ) 16 g 3     No current facility-administered medications for this visit         Objective:    /64   Pulse 81   Temp 98 2 °F (36 8 °C) (Temporal)   Ht 5' 5" (1 651 m)   Wt 98 9 kg (218 lb)   LMP 11/26/2015   SpO2 99%   BMI 36 28 kg/m²        Physical Exam       Constitutional:  Well developed, well nourished, no acute distress, non-toxic appearance   Eyes:  PERRL, conjunctiva normal , non icteric sclera  HENT:  Atraumatic, oropharynx moist  Neck-  supple   Respiratory:  CTA b/l, normal breath sounds, no rales, no wheezing   Cardiovascular:  RRR, no murmurs, no LE edema b/l  GI:  Soft, nondistended, normal bowel sounds x 4, nontender, no organomegaly, no mass, no rebound, no guarding   Neurologic:  no focal deficits noted   Psychiatric:  Speech and behavior appropriate , AAO x 3        Kaelyn Hernandez DO

## 2022-01-19 ENCOUNTER — TELEPHONE (OUTPATIENT)
Dept: PAIN MEDICINE | Facility: CLINIC | Age: 67
End: 2022-01-19

## 2022-01-19 NOTE — TELEPHONE ENCOUNTER
Med refill   Name of medication:pregabalin (LYRICA) 50 mg capsule       Frequency:Take 1 capsule in the AM, 1 capsule in the PM and 2 capsules at bedtime    How many tablets left:0    Pharmacy: [facility], [address] [place] [address]   [address] [place] [address] [place]riel [place] [id number]   Phone:  [phone]  Fax:  [phone]     Best call back # [phone] day supply please     Thank you

## 2022-01-19 NOTE — TELEPHONE ENCOUNTER
RN s/w pharmacist and confirmed pt still has 1 refill left on file, they can get ready for pt in a couple of hours  They are closed from 1:30-2 pm for lunch  RN s/w pt and informed her of the same

## 2022-02-18 ENCOUNTER — HOSPITAL ENCOUNTER (OUTPATIENT)
Dept: RADIOLOGY | Age: 67
Discharge: HOME | End: 2022-02-18
Payer: COMMERCIAL

## 2022-02-18 VITALS — BODY MASS INDEX: 36.32 KG/M2 | WEIGHT: 218 LBS | HEIGHT: 65 IN

## 2022-02-18 DIAGNOSIS — Z12.31 ENCOUNTER FOR SCREENING MAMMOGRAM FOR MALIGNANT NEOPLASM OF BREAST: ICD-10-CM

## 2022-02-18 PROCEDURE — 77063 BREAST TOMOSYNTHESIS BI: CPT

## 2022-02-18 PROCEDURE — 77067 SCR MAMMO BI INCL CAD: CPT

## 2022-02-22 ENCOUNTER — TELEPHONE (OUTPATIENT)
Dept: PAIN MEDICINE | Facility: CLINIC | Age: 67
End: 2022-02-22

## 2022-02-22 DIAGNOSIS — R60.0 BILATERAL LEG EDEMA: ICD-10-CM

## 2022-02-22 DIAGNOSIS — M54.16 LUMBAR RADICULOPATHY: ICD-10-CM

## 2022-02-22 DIAGNOSIS — E78.00 HYPERCHOLESTEREMIA: ICD-10-CM

## 2022-02-22 RX ORDER — ATORVASTATIN CALCIUM 10 MG/1
10 TABLET, FILM COATED ORAL
Qty: 90 TABLET | Refills: 1 | Status: SHIPPED | OUTPATIENT
Start: 2022-02-22 | End: 2022-07-12

## 2022-02-22 RX ORDER — AMITRIPTYLINE HYDROCHLORIDE 10 MG/1
10 TABLET, FILM COATED ORAL
Qty: 90 TABLET | Refills: 1 | Status: SHIPPED | OUTPATIENT
Start: 2022-02-22 | End: 2022-07-12 | Stop reason: SDUPTHER

## 2022-02-22 RX ORDER — POTASSIUM CHLORIDE 750 MG/1
10 TABLET, EXTENDED RELEASE ORAL DAILY
Qty: 90 TABLET | Refills: 1 | Status: SHIPPED | OUTPATIENT
Start: 2022-02-22 | End: 2022-07-12 | Stop reason: SDUPTHER

## 2022-02-22 NOTE — TELEPHONE ENCOUNTER
Pt called in to see of Dr Bolivar Elizabeth would still like to see her tomorrow or wait until after the appt on the 28th with the Neuro Surgeon  Pt sent a message through My Chart to the doctor and is awaiting the response   Please be advised thank you    Pt can be reached @ [phone]

## 2022-02-28 ENCOUNTER — TELEPHONE (OUTPATIENT)
Dept: OBGYN CLINIC | Facility: CLINIC | Age: 67
End: 2022-02-28

## 2022-02-28 DIAGNOSIS — I10 ESSENTIAL HYPERTENSION: ICD-10-CM

## 2022-02-28 DIAGNOSIS — G89.4 PAIN SYNDROME, CHRONIC: ICD-10-CM

## 2022-02-28 RX ORDER — QUINAPRIL 20 MG/1
20 TABLET ORAL 2 TIMES DAILY
Qty: 180 TABLET | Refills: 1 | Status: SHIPPED | OUTPATIENT
Start: 2022-02-28 | End: 2022-07-12 | Stop reason: SDUPTHER

## 2022-02-28 RX ORDER — DICLOFENAC SODIUM 75 MG/1
75 TABLET, DELAYED RELEASE ORAL 2 TIMES DAILY
Qty: 180 TABLET | Refills: 1 | Status: SHIPPED | OUTPATIENT
Start: 2022-02-28

## 2022-02-28 NOTE — TELEPHONE ENCOUNTER
Spoke with Barbara Villanueva  Mammogram results are normal  Advised to repeat screening in one year

## 2022-03-02 ENCOUNTER — TELEPHONE (OUTPATIENT)
Dept: PAIN MEDICINE | Facility: CLINIC | Age: 67
End: 2022-03-02

## 2022-03-02 NOTE — TELEPHONE ENCOUNTER
Patient was offered 3/14 same day appt, I called  and he was already fully booked  [address] [facility] desk offered 3/21 and patient accepted       Thank you

## 2022-03-21 ENCOUNTER — OFFICE VISIT (OUTPATIENT)
Dept: PAIN MEDICINE | Facility: CLINIC | Age: 67
End: 2022-03-21
Payer: COMMERCIAL

## 2022-03-21 VITALS
SYSTOLIC BLOOD PRESSURE: 137 MMHG | DIASTOLIC BLOOD PRESSURE: 90 MMHG | WEIGHT: 218 LBS | BODY MASS INDEX: 36.28 KG/M2 | HEART RATE: 68 BPM

## 2022-03-21 DIAGNOSIS — M19.011 ARTHRITIS OF RIGHT ACROMIOCLAVICULAR JOINT: ICD-10-CM

## 2022-03-21 DIAGNOSIS — M51.16 INTERVERTEBRAL DISC DISORDER WITH RADICULOPATHY OF LUMBAR REGION: ICD-10-CM

## 2022-03-21 DIAGNOSIS — M48.062 LUMBAR STENOSIS WITH NEUROGENIC CLAUDICATION: Primary | ICD-10-CM

## 2022-03-21 PROCEDURE — 3080F DIAST BP >= 90 MM HG: CPT | Performed by: ANESTHESIOLOGY

## 2022-03-21 PROCEDURE — 3075F SYST BP GE 130 - 139MM HG: CPT | Performed by: ANESTHESIOLOGY

## 2022-03-21 PROCEDURE — 99214 OFFICE O/P EST MOD 30 MIN: CPT | Performed by: ANESTHESIOLOGY

## 2022-03-21 NOTE — PROGRESS NOTES
Assessment:  1  Lumbar stenosis with neurogenic claudication    2  Arthritis of right acromioclavicular joint    3  Intervertebral disc disorder with radiculopathy of lumbar region        Plan:  The patient is experiencing recurrence of pain in her lower back down the left leg as well as in the right shoulder  At this time, I discussed proceeding with a repeat right shoulder injection which provided her nearly 1 year relief  She would like to proceed and will be scheduled under ultrasound guidance  In regards to the lower back and leg symptoms, she would like a 2nd opinion and was given referral to see Dr Ashlyn Soriano at [facility] in regards to spinal fusion surgery  In addition, she will be scheduled for repeat left L4, left L5, left S1 transforaminal epidural steroid injection since this provided significant relief for her  Complete risks and benefits including bleeding, infection, tissue reaction, nerve injury and allergic reaction were discussed  The approach was demonstrated using models and literature was provided  Verbal and written consent was obtained  My impressions and treatment recommendations were discussed in detail with the patient who verbalized understanding and had no further questions  Discharge instructions were provided  I personally saw and examined the patient and I agree with the above discussed plan of care  Orders Placed This Encounter   Procedures    FL spine and pain procedure     Standing Status:   Future     Standing Expiration Date:   3/24/2026     Order Specific Question:   Reason for Exam:     Answer:   Right L3, L4, L5 TF PRATIK     Order Specific Question:   Anticoagulant hold needed?      Answer:   No    Ambulatory referral to Orthopedic Surgery     Standing Status:   Future     Standing Expiration Date:   3/21/2023     Referral Priority:   Routine     Referral Type:   Consult - AMB     Referral Reason:   Specialty Services Required     Referred to Provider:   Nadeem Staples MD Requested Specialty:   Orthopedic Surgery     Number of Visits Requested:   1     Expiration Date:   3/21/2023     No orders of the defined types were placed in this encounter  History of Present Illness:  Danny Rowe is a 61 y o  female who presents for a follow up office visit in regards to Back Pain and Shoulder Pain (right side)  The patient reports that she saw Dr Prasanna Rios for consultation in regards to her spinal stenosis and he has recommended a spinal fusion  She reports that the last epidural steroid injection performed in December provided excellent relief up until about a week ago and now symptoms have recurred in the low back down the left leg described to be throbbing aching  She is also experiencing recurrence pain in her right shoulder  In the past she has undergone right shoulder injection with excellent relief and would like a repeat of that  I have personally reviewed and/or updated the patient's past medical history, past surgical history, family history, social history, current medications, allergies, and vital signs today  Review of Systems   Respiratory: Negative for shortness of breath  Cardiovascular: Negative for chest pain  Gastrointestinal: Negative for constipation, diarrhea, nausea and vomiting  Musculoskeletal: Positive for back pain, gait problem and joint swelling  Negative for arthralgias and myalgias  Skin: Negative for rash  Neurological: Negative for dizziness, seizures and weakness  All other systems reviewed and are negative        Patient Active Problem List   Diagnosis    Rosacea, acne    Lumbar degenerative disc disease    Lumbar radiculopathy    Anxiety    Benign essential HTN    Cervical radiculopathy    Disc disorder of cervical region    Hot flash, menopausal    Lumbar facet arthropathy    Lumbar stenosis without neurogenic claudication    Myofascial pain syndrome    Pain syndrome, chronic    Positive depression screening    Primary osteoarthritis of right knee    Bulge of lumbar disc without myelopathy    Spondylolisthesis of lumbar region    Arthritis of right acromioclavicular joint    Adverse effect of adrenal cortical steroids, initial encounter    Calculus of gallbladder without cholecystitis without obstruction    Chronic right shoulder pain    Hyperthyroidism    Primary insomnia    Greater trochanteric bursitis of left hip    Encounter for gynecological examination (general) (routine) without abnormal findings    Long term systemic steroid user    Asymptomatic premature menopause    Bilateral leg edema    Osteopenia of multiple sites       Past Medical History:   Diagnosis Date    Anxiety     Cataract, bilateral     last assessed    Cervical radiculopathy     Disc disease, degenerative, cervical     Herpes simplex infection     Hypertension     Insomnia     Low back pain     Lumbar degenerative disc disease     Lumbar facet arthropathy     Lumbar radiculopathy     Lumbar stenosis without neurogenic claudication     Mononucleosis     Myofascial pain dysfunction syndrome     Osteoarthritis     shoulder region - unspecified laterality - last assessed 2/1/14    Plantar fasciitis     Ptosis, both eyelids     last assessed 10/6/16    Ptosis, congenital, left     Retinal detachment     last assessed 12/11/14 right eye    Sacroiliitis ([address] )     Thyroid disease     Thyrotoxicosis     last assessed 5/17/13    Vertigo        Past Surgical History:   Procedure Laterality Date    MI SURG IMPLNT NEUROELECT,EPIDURAL Left 1/26/2016    Procedure: PLACEMENT OF THORACIC SPINAL CORD STIMULATOR WITH LEFT BUTTOCK IMPLANTABLE PULSE GENERATOR (IMPULSE MONITORING);   Surgeon: Mikey Casas MD;  Location:  MAIN OR;  Service: Neurosurgery    RETINAL DETACHMENT SURGERY Right        Family History   Problem Relation Age of Onset    Breast cancer Mother 80    COPD Mother     Hypertension Mother         essential  Heart attack Father         acute MI    Emphysema Father     Hypertension Father         essential    Other Father         prehypertension    No Known Problems Maternal Grandmother     No Known Problems Maternal Grandfather     No Known Problems Paternal Grandmother     No Known Problems Paternal Grandfather     No Known Problems Sister     No Known Problems Son     No Known Problems Sister     No Known Problems Paternal Aunt     No Known Problems Paternal Aunt     No Known Problems Paternal Aunt     No Known Problems Paternal Aunt     No Known Problems Paternal Aunt     Liver cancer Maternal Uncle        Social History     Occupational History    Occupation: Business owner     Comment: [employer]-full time working   Tobacco Use    Smoking status: Never Smoker    Smokeless tobacco: Never Used   Vaping Use    Vaping Use: Never used   Substance and Sexual Activity    Alcohol use:  Yes     Alcohol/week: 1 0 standard drink     Types: 1 Glasses of wine per week    Drug use: No    Sexual activity: Not Currently     Partners: Male       Current Outpatient Medications on File Prior to Visit   Medication Sig    amitriptyline (ELAVIL) 10 mg tablet Take 1 tablet (10 mg total) by mouth daily at bedtime    atorvastatin (LIPITOR) 10 mg tablet Take 1 tablet (10 mg total) by mouth daily at bedtime    busPIRone (BUSPAR) 10 mg tablet Take 1 tablet (10 mg total) by mouth 3 (three) times a day    Cetirizine HCl (ZYRTEC PO) Take by mouth as needed    diclofenac (VOLTAREN) 75 mg EC tablet Take 1 tablet (75 mg total) by mouth 2 (two) times a day    Diclofenac Sodium (VOLTAREN) 1 % APPLY 2 GRAMS TO AFFECTED AREA 4 TIMES A DAY    Esomeprazole Magnesium (NEXIUM PO) Take 1 capsule by mouth daily as needed     fluticasone (FLONASE) 50 mcg/act nasal spray 1 spray into each nostril daily (Patient not taking: Reported on 1/18/2022 )    furosemide (LASIX) 20 mg tablet Take 1 tablet (20 mg total) by mouth daily    LORazepam (ATIVAN) 1 mg tablet Take 1 5 tablets (1 5 mg total) by mouth daily at bedtime as needed for anxiety    methimazole (TAPAZOLE) 5 mg tablet Take 1 tablet (5 mg total) by mouth daily    metroNIDAZOLE (METROCREAM) 0 75 % cream Apply topically daily at bedtime    penciclovir (DENAVIR) 1 % cream Apply topically daily as needed (ulcer)    potassium chloride (K-DUR,KLOR-CON) 10 mEq tablet Take 1 tablet (10 mEq total) by mouth daily    pregabalin (LYRICA) 50 mg capsule Take 1 capsule in the AM, 1 capsule in the PM and 2 capsules at bedtime    propranolol (INDERAL) 10 mg tablet Take 1 tablet (10 mg total) by mouth 2 (two) times a day    quinapril (ACCUPRIL) 20 mg tablet Take 1 tablet (20 mg total) by mouth 2 (two) times a day    RESTASIS 0 05 % ophthalmic emulsion Administer 1 drop to both eyes 2 (two) times a day    tiZANidine (ZANAFLEX) 4 mg tablet Take 1 tablet (4 mg total) by mouth 2 (two) times a day    valACYclovir (VALTREX) 500 mg tablet Take 1 tablet (500 mg total) by mouth daily as needed (Cold Sores)    Xiidra 5 % op solution INSTILL 1 DROP INTO BOTH EYES TWICE A DAY     No current facility-administered medications on file prior to visit  Allergies   Allergen Reactions    Other Dermatitis     Adhesive tape - please use ONLY PAPER TAPE    Scopolamine Other (See Comments)     Severe, debilitating headache      Flexeril [Cyclobenzaprine] Dizziness and Fatigue    Naproxen      Other reaction(s): Unknown Allergic Reaction  Annotation - [id number]: nightmares    Penicillins      Other reaction(s): Unknown Allergic Reaction  Annotation - [id number]: childhood reaction    Promethazine-Codeine      Reaction Date: [id number]; Annotation - [id number]: nightmares;  Annotation - [id number]: nightmares    Tramadol      Other reaction(s): Unknown Allergic Reaction  Annotation - [id number]: PT HAS NIGHTMARES FROM TRAMADOL    Wound Dressing Adhesive Rash       Physical Exam:    /90 Pulse 68   Wt 98 9 kg (218 lb)   LMP 11/26/2015   BMI 36 28 kg/m²     Constitutional:normal, well developed, well nourished, alert, in no distress and non-toxic and no overt pain behavior  and obese  Eyes:anicteric  HEENT:grossly intact  Neck:supple, symmetric, trachea midline and no masses   Pulmonary:even and unlabored  Cardiovascular:No edema or pitting edema present  Skin:Normal without rashes or lesions and well hydrated  Psychiatric:Mood and affect appropriate  Neurologic:Cranial Nerves II-XII grossly intact  Musculoskeletal:normal     Lumbar Spine Exam  Appearance:  Normal lordosis  Palpation/Tenderness:  left lumbar paraspinal tenderness  left sacroiliac joint tenderness  Range of Motion: deferred  Motor Strength:  Left hip flexion:  5/5  Left hip extension:  5/5  Right hip flexion:  5/5  Right hip extension:  5/5  Left knee flexion:  5/5  Left knee extension:  5/5  Right knee flexion:  5/5  Right knee extension:  5/5  Left foot dorsiflexion:  5/5  Left foot plantar flexion:  5/5  Right foot dorsiflexion:  5/5  Right foot plantar flexion:  5/5    Imaging    MRI examination of lumbar spine was performed  (2/7/2022)    TECHNIQUE: Sagittal T1, T2, STIR, axial T2-weighted sequences  HISTORY: Osteoarthritis     FINDINGS:     The examination is degraded by motion artifact  There appears a transitional vertebra  For numbering purposes, the most inferior   vertebra with lumbar morphology is referred to as L5  The designated L5 appears   slightly sacralized  The examination is degraded by motion artifact  Alignment: Grade 1 anterolisthesis of L3-L4 and L4-L5  Lumbar lordosis   preserved  Vertebrae: Vertebral body heights are maintained  Bone marrow signal appears   within normal limits  Conus medullaris: Conus medullaris ends at T12-L1 level  No definite abnormal   signal seen of conus medullaris       There appears disc bulge at T10-T11, T11-T12 and T12-L1 with impression on   ventral thecal sac  This is imaged on sagittal view only  L1-L2: Unremarkable  L2-L3: Disc dehydration and mild disc bulge slightly impressing ventral thecal   sac  L3-L4: Disc dehydration, slight disc space narrowing, disc bulge, right greater   than left bilateral facet joint arthrosis and ligamentum flavum thickening,   causing moderate to severe spinal stenosis, and mild to moderate right   neuroforaminal narrowing  L4-L5: Disc dehydration, disc space narrowing, disc bulge, bilateral facet joint   hypertrophic arthrosis and ligamentum flavum thickening, causing severe spinal   stenosis and bilateral lateral recess stenosis  Mild bilateral neuroforaminal   narrowing  L5-S1: Mild facet joint arthrosis  No spinal stenosis or neuroforaminal   narrowing  Procedure Note    Matthew Rich MD - 02/07/2022   Formatting of this note might be different from the original    MRI examination of lumbar spine was performed  TECHNIQUE: Sagittal T1, T2, STIR, axial T2-weighted sequences  HISTORY: Osteoarthritis     FINDINGS:     The examination is degraded by motion artifact  There appears a transitional vertebra  For numbering purposes, the most inferior   vertebra with lumbar morphology is referred to as L5  The designated L5 appears   slightly sacralized  The examination is degraded by motion artifact  Alignment: Grade 1 anterolisthesis of L3-L4 and L4-L5  Lumbar lordosis   preserved  Vertebrae: Vertebral body heights are maintained  Bone marrow signal appears   within normal limits  Conus medullaris: Conus medullaris ends at T12-L1 level  No definite abnormal   signal seen of conus medullaris  There appears disc bulge at T10-T11, T11-T12 and T12-L1 with impression on   ventral thecal sac  This is imaged on sagittal view only  L1-L2: Unremarkable  L2-L3: Disc dehydration and mild disc bulge slightly impressing ventral thecal   sac       L3-L4: Disc dehydration, slight disc space narrowing, disc bulge, right greater   than left bilateral facet joint arthrosis and ligamentum flavum thickening,   causing moderate to severe spinal stenosis, and mild to moderate right   neuroforaminal narrowing  L4-L5: Disc dehydration, disc space narrowing, disc bulge, bilateral facet joint   hypertrophic arthrosis and ligamentum flavum thickening, causing severe spinal   stenosis and bilateral lateral recess stenosis  Mild bilateral neuroforaminal   narrowing  L5-S1: Mild facet joint arthrosis  No spinal stenosis or neuroforaminal   narrowing

## 2022-03-24 ENCOUNTER — TELEPHONE (OUTPATIENT)
Dept: PAIN MEDICINE | Facility: CLINIC | Age: 67
End: 2022-03-24

## 2022-03-24 NOTE — TELEPHONE ENCOUNTER
Please schedule her for right shoulder injection under u/s guidance    I have also placed order left L3, left L4, left L5 TF PRATIK

## 2022-03-25 NOTE — TELEPHONE ENCOUNTER
Pt called in to  schedule a procedure  Please be advised thank you    Pt can be reached @  [phone]

## 2022-04-01 ENCOUNTER — ANNUAL EXAM (OUTPATIENT)
Dept: OBGYN CLINIC | Facility: MEDICAL CENTER | Age: 67
End: 2022-04-01
Payer: MEDICARE

## 2022-04-01 VITALS — WEIGHT: 218 LBS | HEIGHT: 65 IN | BODY MASS INDEX: 36.32 KG/M2

## 2022-04-01 DIAGNOSIS — Z01.419 WOMEN'S ANNUAL ROUTINE GYNECOLOGICAL EXAMINATION: Primary | ICD-10-CM

## 2022-04-01 DIAGNOSIS — Z12.31 ENCOUNTER FOR SCREENING MAMMOGRAM FOR MALIGNANT NEOPLASM OF BREAST: ICD-10-CM

## 2022-04-01 PROCEDURE — 3008F BODY MASS INDEX DOCD: CPT | Performed by: ANESTHESIOLOGY

## 2022-04-01 PROCEDURE — G0101 CA SCREEN;PELVIC/BREAST EXAM: HCPCS | Performed by: STUDENT IN AN ORGANIZED HEALTH CARE EDUCATION/TRAINING PROGRAM

## 2022-04-01 NOTE — PROGRESS NOTES
Assessment/Plan:    60 yo F - annual exam       Problem List Items Addressed This Visit    Visit Diagnoses     Women's annual routine gynecological examination    -  Primary  Pap not indicated today  Pap at next visit then OK to stop  Routine visit in 2 years per medicare  Encounter for screening mammogram for malignant neoplasm of breast        Relevant Orders    Mammo screening bilateral w 3d & cad            Subjective:      Patient ID: Michael Gates is a 61 y o  female  This is a 61 y o  postmenopausal  who presents for annual exam  She denies vaginal bleeding, discharge, or pelvic pain  She denies urinary issues including stress and urge incontinence  She has regular bowel movements  She has no issues with intercourse  Last pap smear: 21 - negative  No HPV  NO h/o abnormal   Last mammogram: 22 benign  Colon Cancer screenin yrs ago per pt - to repeat in 10 years  DEXA: 21     Family history:  Breast cancer - mother at 80  No colon or ovarian cancer  BMI 36 -   Exercise - needs back surgery, has pain w/ walking  Hoping to get back to exercise when feeling better  The following portions of the patient's history were reviewed and updated as appropriate: allergies, current medications, past family history, past medical history, past social history, past surgical history and problem list     Review of Systems   Constitutional: Negative  HENT: Negative  Eyes: Negative  Respiratory: Negative  Cardiovascular: Negative  Gastrointestinal: Negative  Endocrine: Negative  Genitourinary: Negative for dyspareunia, dysuria, frequency, menstrual problem, pelvic pain, vaginal discharge and vaginal pain  Musculoskeletal: Negative  Skin: Negative  Allergic/Immunologic: Negative  Neurological: Negative  Hematological: Negative  Psychiatric/Behavioral: Negative            Objective:      Ht 5' 5" (1 651 m)   Wt 98 9 kg (218 lb)   LMP 2015 BMI 36 28 kg/m²          Physical Exam  Vitals reviewed  Exam conducted with a chaperone present  Cardiovascular:      Rate and Rhythm: Normal rate  Pulmonary:      Effort: Pulmonary effort is normal    Chest:   Breasts: Breasts are symmetrical       Right: No mass, nipple discharge, skin change or tenderness  Left: No mass, nipple discharge, skin change or tenderness  Abdominal:      Palpations: Abdomen is soft  Genitourinary:     Labia:         Right: No rash  Left: No rash  Vagina: Normal  No signs of injury  Cervix: No cervical motion tenderness, discharge, friability or lesion  Uterus: Not deviated, not enlarged, not fixed and not tender  Adnexa:         Right: No mass, tenderness or fullness  Left: No mass, tenderness or fullness  Musculoskeletal:      Cervical back: Normal range of motion  Skin:     General: Skin is warm and dry  Neurological:      Mental Status: She is alert and oriented to person, place, and time

## 2022-04-04 ENCOUNTER — PROCEDURE VISIT (OUTPATIENT)
Dept: PAIN MEDICINE | Facility: CLINIC | Age: 67
End: 2022-04-04
Payer: MEDICARE

## 2022-04-04 VITALS
SYSTOLIC BLOOD PRESSURE: 140 MMHG | BODY MASS INDEX: 36.28 KG/M2 | HEART RATE: 69 BPM | WEIGHT: 218 LBS | DIASTOLIC BLOOD PRESSURE: 86 MMHG

## 2022-04-04 DIAGNOSIS — M25.511 CHRONIC RIGHT SHOULDER PAIN: Primary | ICD-10-CM

## 2022-04-04 DIAGNOSIS — G89.29 CHRONIC RIGHT SHOULDER PAIN: Primary | ICD-10-CM

## 2022-04-04 PROCEDURE — 20611 DRAIN/INJ JOINT/BURSA W/US: CPT | Performed by: ANESTHESIOLOGY

## 2022-04-04 RX ORDER — METHYLPREDNISOLONE ACETATE 40 MG/ML
40 INJECTION, SUSPENSION INTRA-ARTICULAR; INTRALESIONAL; INTRAMUSCULAR; SOFT TISSUE ONCE
Status: COMPLETED | OUTPATIENT
Start: 2022-04-04 | End: 2022-04-04

## 2022-04-04 RX ORDER — BUPIVACAINE HYDROCHLORIDE 2.5 MG/ML
10 INJECTION, SOLUTION EPIDURAL; INFILTRATION; INTRACAUDAL ONCE
Status: COMPLETED | OUTPATIENT
Start: 2022-04-04 | End: 2022-04-04

## 2022-04-04 RX ADMIN — METHYLPREDNISOLONE ACETATE 40 MG: 40 INJECTION, SUSPENSION INTRA-ARTICULAR; INTRALESIONAL; INTRAMUSCULAR; SOFT TISSUE at 14:48

## 2022-04-04 RX ADMIN — BUPIVACAINE HYDROCHLORIDE 10 ML: 2.5 INJECTION, SOLUTION EPIDURAL; INFILTRATION; INTRACAUDAL at 14:47

## 2022-04-04 NOTE — PROGRESS NOTES
Pre-procedure Diagnosis:   1  Chronic right shoulder pain      Post-procedure Diagnosis:   1  Chronic right shoulder pain      Operation Title(s):  Right subacromial bursa injection under ultrasound guidance  Attending Surgeon:   Viktor Erickson MD  Anesthesia:   Local    Indications: The patient is a 61y o  year-old female with a diagnosis of   1  Chronic right shoulder pain      The patient's history and physical exam were reviewed  The risks, benefits and alternatives to the procedure were discussed, and all questions were answered to the patient's satisfaction  The patient agreed to proceed, and written informed consent was obtained  Procedure in Detail: The patient was brought into the exam room and placed in the sitting position on the exam room chair  The right shoulder was prepped with chloraprep and draped in a sterile manner  A sterile ultrasound probe cover and gel was placed over a 3 75 MHz curvilinear transducer probe  The probe was placed over the shoulder to visualize the subdeltoid/subacromial bursal region  Optimal needle path was determined and the skin and subcutaneous tissues in the area was anesthetized with 1% lidocaine  Then, under ultrasound guidance, a 2 inch ultrasound needle was advanced until the needle entered the bursa region  After visualization of the tip in the target area and negative aspiration for blood, a solution consisting of 3 mL 0 25% bupivacaine and 1 mL Depo-Medrol (40mg/ml) was easily injected  The patient's shoulder was cleansed and a bandage was placed over the sites of needle insertion  Disposition: The patient tolerated the procedure well and there were no apparent complications  The patient was given written discharge instructions for the procedure

## 2022-04-11 ENCOUNTER — TELEPHONE (OUTPATIENT)
Dept: PAIN MEDICINE | Facility: CLINIC | Age: 67
End: 2022-04-11

## 2022-04-19 ENCOUNTER — TELEPHONE (OUTPATIENT)
Dept: RADIOLOGY | Facility: CLINIC | Age: 67
End: 2022-04-19

## 2022-04-19 ENCOUNTER — HOSPITAL ENCOUNTER (OUTPATIENT)
Dept: RADIOLOGY | Facility: CLINIC | Age: 67
Discharge: HOME | End: 2022-04-19
Attending: ANESTHESIOLOGY
Payer: MEDICARE

## 2022-04-19 VITALS
RESPIRATION RATE: 20 BRPM | DIASTOLIC BLOOD PRESSURE: 86 MMHG | SYSTOLIC BLOOD PRESSURE: 135 MMHG | OXYGEN SATURATION: 96 % | HEART RATE: 66 BPM | TEMPERATURE: 96 F

## 2022-04-19 DIAGNOSIS — M48.062 LUMBAR STENOSIS WITH NEUROGENIC CLAUDICATION: ICD-10-CM

## 2022-04-19 DIAGNOSIS — M79.18 MYOFASCIAL PAIN SYNDROME: ICD-10-CM

## 2022-04-19 DIAGNOSIS — M47.816 LUMBAR SPONDYLOSIS: ICD-10-CM

## 2022-04-19 DIAGNOSIS — M54.12 CERVICAL RADICULOPATHY: ICD-10-CM

## 2022-04-19 DIAGNOSIS — M51.16 INTERVERTEBRAL DISC DISORDER WITH RADICULOPATHY OF LUMBAR REGION: ICD-10-CM

## 2022-04-19 PROCEDURE — 64484 NJX AA&/STRD TFRM EPI L/S EA: CPT | Performed by: ANESTHESIOLOGY

## 2022-04-19 PROCEDURE — 64483 NJX AA&/STRD TFRM EPI L/S 1: CPT | Performed by: ANESTHESIOLOGY

## 2022-04-19 RX ORDER — 0.9 % SODIUM CHLORIDE 0.9 %
4 VIAL (ML) INJECTION ONCE
Status: COMPLETED | OUTPATIENT
Start: 2022-04-19 | End: 2022-04-19

## 2022-04-19 RX ORDER — PREGABALIN 50 MG/1
CAPSULE ORAL
Qty: 360 CAPSULE | Refills: 3 | Status: SHIPPED | OUTPATIENT
Start: 2022-04-19

## 2022-04-19 RX ORDER — METHYLPREDNISOLONE ACETATE 80 MG/ML
80 INJECTION, SUSPENSION INTRA-ARTICULAR; INTRALESIONAL; INTRAMUSCULAR; PARENTERAL; SOFT TISSUE ONCE
Status: COMPLETED | OUTPATIENT
Start: 2022-04-19 | End: 2022-04-19

## 2022-04-19 RX ORDER — BUPIVACAINE HCL/PF 2.5 MG/ML
2 VIAL (ML) INJECTION ONCE
Status: COMPLETED | OUTPATIENT
Start: 2022-04-19 | End: 2022-04-19

## 2022-04-19 RX ADMIN — METHYLPREDNISOLONE ACETATE 80 MG: 80 INJECTION, SUSPENSION INTRA-ARTICULAR; INTRALESIONAL; INTRAMUSCULAR; PARENTERAL; SOFT TISSUE at 10:33

## 2022-04-19 RX ADMIN — Medication 4 ML: at 10:30

## 2022-04-19 RX ADMIN — IOHEXOL 1 ML: 300 INJECTION, SOLUTION INTRAVENOUS at 10:33

## 2022-04-19 RX ADMIN — BUPIVACAINE HYDROCHLORIDE 2 ML: 2.5 INJECTION, SOLUTION EPIDURAL; INFILTRATION; INTRACAUDAL at 10:33

## 2022-04-19 NOTE — TELEPHONE ENCOUNTER
Pt here for inj and stated she needed RF fore her lyrica 50 mg (1) in am, (1) in afternoon and (2) at [facility] sent to [facility] on file  Med works

## 2022-04-19 NOTE — H&P
History of Present Illness:  The patient is a 61 y o  female who presents with complaints of left lower back and leg pain secondary to stenosis and is here today for left L3-4 5 transforaminal epidural steroid injection    Patient Active Problem List   Diagnosis    Rosacea, acne    Lumbar degenerative disc disease    Lumbar radiculopathy    Anxiety    Benign essential HTN    Cervical radiculopathy    Disc disorder of cervical region    Hot flash, menopausal    Lumbar facet arthropathy    Lumbar stenosis without neurogenic claudication    Myofascial pain syndrome    Pain syndrome, chronic    Positive depression screening    Primary osteoarthritis of right knee    Bulge of lumbar disc without myelopathy    Spondylolisthesis of lumbar region    Arthritis of right acromioclavicular joint    Adverse effect of adrenal cortical steroids, initial encounter    Calculus of gallbladder without cholecystitis without obstruction    Chronic right shoulder pain    Hyperthyroidism    Primary insomnia    Greater trochanteric bursitis of left hip    Encounter for gynecological examination (general) (routine) without abnormal findings    Long term systemic steroid user    Asymptomatic premature menopause    Bilateral leg edema    Osteopenia of multiple sites       Past Medical History:   Diagnosis Date    Anxiety     Cataract, bilateral     last assessed    Cervical radiculopathy     Disc disease, degenerative, cervical     Herpes simplex infection     Hypertension     Insomnia     Low back pain     Lumbar degenerative disc disease     Lumbar facet arthropathy     Lumbar radiculopathy     Lumbar stenosis without neurogenic claudication     Mononucleosis     Myofascial pain dysfunction syndrome     Osteoarthritis     shoulder region - unspecified laterality - last assessed 2/1/14    Plantar fasciitis     Ptosis, both eyelids     last assessed 10/6/16    Ptosis, congenital, left     Retinal detachment     last assessed 12/11/14 right eye    Sacroiliitis ([address] )     Thyroid disease     Thyrotoxicosis     last assessed 5/17/13    Vertigo        Past Surgical History:   Procedure Laterality Date    [place] SURG IMPLNT NEUROELECT,EPIDURAL Left 1/26/2016    Procedure: PLACEMENT OF THORACIC SPINAL CORD STIMULATOR WITH LEFT BUTTOCK IMPLANTABLE PULSE GENERATOR (IMPULSE MONITORING);   Surgeon: Alexandra Aguilar MD;  Location: [facility];  Service: Neurosurgery    RETINAL DETACHMENT SURGERY Right          Current Outpatient Medications:     amitriptyline (ELAVIL) 10 mg tablet, Take 1 tablet (10 mg total) by mouth daily at bedtime, Disp: 90 tablet, Rfl: 1    atorvastatin (LIPITOR) 10 mg tablet, Take 1 tablet (10 mg total) by mouth daily at bedtime, Disp: 90 tablet, Rfl: 1    busPIRone (BUSPAR) 10 mg tablet, Take 1 tablet (10 mg total) by mouth 3 (three) times a day, Disp: 270 tablet, Rfl: 1    Cetirizine HCl (ZYRTEC PO), Take by mouth as needed, Disp: , Rfl:     diclofenac (VOLTAREN) 75 mg EC tablet, Take 1 tablet (75 mg total) by mouth 2 (two) times a day, Disp: 180 tablet, Rfl: 1    Diclofenac Sodium (VOLTAREN) 1 %, APPLY 2 GRAMS TO AFFECTED AREA 4 TIMES A DAY, Disp: 200 g, Rfl: 1    Esomeprazole Magnesium (NEXIUM PO), Take 1 capsule by mouth daily as needed , Disp: , Rfl:     fluticasone (FLONASE) 50 mcg/act nasal spray, 1 spray into each nostril daily (Patient not taking: Reported on 1/18/2022 ), Disp: 16 g, Rfl: 3    furosemide (LASIX) 20 mg tablet, Take 1 tablet (20 mg total) by mouth daily, Disp: 30 tablet, Rfl: 3    LORazepam (ATIVAN) 1 mg tablet, Take 1 5 tablets (1 5 mg total) by mouth daily at bedtime as needed for anxiety, Disp: 135 tablet, Rfl: 1    methimazole (TAPAZOLE) 5 mg tablet, Take 1 tablet (5 mg total) by mouth daily, Disp: 90 tablet, Rfl: 1    metroNIDAZOLE (METROCREAM) 0 75 % cream, Apply topically daily at bedtime, Disp: 45 g, Rfl: 2    penciclovir (DENAVIR) 1 % cream, Apply topically daily as needed (ulcer), Disp: 5 g, Rfl: 5    potassium chloride (K-DUR,KLOR-CON) 10 mEq tablet, Take 1 tablet (10 mEq total) by mouth daily, Disp: 90 tablet, Rfl: 1    pregabalin (LYRICA) 50 mg capsule, Take 1 capsule in the AM, 1 capsule in the PM and 2 capsules at bedtime, Disp: 360 capsule, Rfl: 1    propranolol (INDERAL) 10 mg tablet, Take 1 tablet (10 mg total) by mouth 2 (two) times a day, Disp: 180 tablet, Rfl: 1    quinapril (ACCUPRIL) 20 mg tablet, Take 1 tablet (20 mg total) by mouth 2 (two) times a day, Disp: 180 tablet, Rfl: 1    RESTASIS 0 05 % ophthalmic emulsion, Administer 1 drop to both eyes 2 (two) times a day, Disp: , Rfl: 3    tiZANidine (ZANAFLEX) 4 mg tablet, Take 1 tablet (4 mg total) by mouth 2 (two) times a day, Disp: 180 tablet, Rfl: 1    valACYclovir (VALTREX) 500 mg tablet, Take 1 tablet (500 mg total) by mouth daily as needed (Cold Sores), Disp: 30 tablet, Rfl: 1    Xiidra 5 % op solution, INSTILL 1 DROP INTO BOTH EYES TWICE A DAY, Disp: , Rfl:   No current facility-administered medications for this encounter  Allergies   Allergen Reactions    Other Dermatitis     Adhesive tape - please use ONLY PAPER TAPE    Scopolamine Other (See Comments)     Severe, debilitating headache      Flexeril [Cyclobenzaprine] Dizziness and Fatigue    Naproxen      Other reaction(s): Unknown Allergic Reaction  Annotation - [id number]: nightmares    Penicillins      Other reaction(s): Unknown Allergic Reaction  Annotation - [id number]: childhood reaction    Promethazine-Codeine      Reaction Date: [id number]; Annotation - [id number]: nightmares;  Annotation - [id number]: nightmares    Tramadol      Other reaction(s): Unknown Allergic Reaction  Annotation - [id number]: PT HAS NIGHTMARES FROM TRAMADOL    Wound Dressing Adhesive Rash       Physical Exam:   Vitals:    04/19/22 1018   BP: 117/80   Pulse: 74   Resp: 20   Temp: (!) 96 °F (35 6 °C)   SpO2: 96%     General: Awake, Alert, Oriented x 3, Mood and affect appropriate  Respiratory: Respirations even and unlabored  Cardiovascular: Peripheral pulses intact; no edema  Musculoskeletal Exam:  Left lower back pain    ASA Score: 2    Patient/Chart Verification  Patient ID Verified: Verbal  Consents Confirmed: To be obtained in the Pre-Procedure area  Interval H&P(within 24 hr) Complete (required for Outpatients and Surgery Admit only): To be obtained in the Pre-Procedure area  Allergies Reviewed: Yes  Anticoag/NSAID held?: NA  Currently on antibiotics?: No    Assessment:   1  Lumbar stenosis with neurogenic claudication    2   Intervertebral disc disorder with radiculopathy of lumbar region        Plan: Lt  L3, L4, L5 TF PRATIK

## 2022-04-19 NOTE — DISCHARGE INSTR - LAB
Epidural Steroid Injection   WHAT YOU NEED TO KNOW:   An epidural steroid injection (PRATIK) is a procedure to inject steroid medicine into the epidural space  The epidural space is between your spinal cord and vertebrae  Steroids reduce inflammation and fluid buildup in your spine that may be causing pain  You may be given pain medicine along with the steroids  ACTIVITY  Do not drive or operate machinery today  No strenuous activity today - bending, lifting, etc   You may resume normal activites starting tomorrow - start slowly and as tolerated  You may shower today, but no tub baths or hot tubs  You may have numbness for several hours from the local anesthetic  Please use caution and common sense, especially with weight-bearing activities  CARE OF THE INJECTION SITE  If you have soreness or pain, apply ice to the area today (20 minutes on/20 minutes off)  Starting tomorrow, you may use warm, moist heat or ice if needed  You may have an increase or change in your discomfort for 36-48 hours after your treatment  Apply ice and continue with any pain medication you have been prescribed  Notify the [facility] if you have any of the following: redness, drainage, swelling, headache, stiff neck or fever above 100°F     SPECIAL INSTRUCTIONS  Our office will contact you in approximately 7 days for a progress report  MEDICATIONS  Continue to take all routine medications  Our office may have instructed you to hold some medications  As no general anesthesia was used in today's procedure, you should not experience any side effects related to anesthesia  If you have a problem specifically related to your procedure, please call our office at [phone]  Problems not related to your procedure should be directed to your primary care physician

## 2022-04-25 DIAGNOSIS — R60.0 BILATERAL LEG EDEMA: ICD-10-CM

## 2022-04-25 DIAGNOSIS — E05.90 HYPERTHYROIDISM: ICD-10-CM

## 2022-04-25 DIAGNOSIS — F41.9 ANXIETY: ICD-10-CM

## 2022-04-25 RX ORDER — FUROSEMIDE 20 MG/1
20 TABLET ORAL DAILY
Qty: 30 TABLET | Refills: 3 | Status: SHIPPED | OUTPATIENT
Start: 2022-04-25 | End: 2022-07-07 | Stop reason: SDUPTHER

## 2022-04-25 RX ORDER — BUSPIRONE HYDROCHLORIDE 10 MG/1
10 TABLET ORAL 3 TIMES DAILY
Qty: 270 TABLET | Refills: 1 | Status: SHIPPED | OUTPATIENT
Start: 2022-04-25

## 2022-04-25 RX ORDER — METHIMAZOLE 5 MG/1
5 TABLET ORAL DAILY
Qty: 90 TABLET | Refills: 1 | Status: SHIPPED | OUTPATIENT
Start: 2022-04-25

## 2022-04-26 ENCOUNTER — TELEPHONE (OUTPATIENT)
Dept: PAIN MEDICINE | Facility: CLINIC | Age: 67
End: 2022-04-26

## 2022-04-29 ENCOUNTER — TELEPHONE (OUTPATIENT)
Dept: PAIN MEDICINE | Facility: CLINIC | Age: 67
End: 2022-04-29

## 2022-04-29 DIAGNOSIS — M79.18 MYOFASCIAL PAIN SYNDROME: ICD-10-CM

## 2022-04-29 RX ORDER — TIZANIDINE 4 MG/1
4 TABLET ORAL 2 TIMES DAILY
Qty: 180 TABLET | Refills: 1 | Status: CANCELLED | OUTPATIENT
Start: 2022-04-29

## 2022-04-29 NOTE — TELEPHONE ENCOUNTER
Pt contacted Call Center requested refill of their medication          Medication Name: tiZANidine (ZANAFLEX    Dosage of Med:4 mg tablet     Frequency of Med: 2xs a day     Remaining Medication: 0    Pharmacy and Location:  [facility]/PHARMACY #[id number]- [place] - [address]

## 2022-05-02 RX ORDER — TIZANIDINE 4 MG/1
4 TABLET ORAL 2 TIMES DAILY
Qty: 180 TABLET | Refills: 1 | Status: SHIPPED | OUTPATIENT
Start: 2022-05-02

## 2022-05-02 NOTE — TELEPHONE ENCOUNTER
Spoke to pt regarding medication refill  Pt stated her last fill date was 11/29/21  Pt stated she takes medication 1 5 pill at night and sometimes   5 during the day  Pt stated that the medication works well for her with no side effects      Pt last OV 3/21

## 2022-05-16 ENCOUNTER — TELEMEDICINE (OUTPATIENT)
Dept: INTERNAL MEDICINE CLINIC | Facility: OTHER | Age: 67
End: 2022-05-16
Payer: MEDICARE

## 2022-05-16 VITALS — BODY MASS INDEX: 36.32 KG/M2 | WEIGHT: 218 LBS | HEIGHT: 65 IN

## 2022-05-16 DIAGNOSIS — U07.1 COVID-19: Primary | ICD-10-CM

## 2022-05-16 PROCEDURE — 99213 OFFICE O/P EST LOW 20 MIN: CPT | Performed by: INTERNAL MEDICINE

## 2022-05-16 RX ORDER — BENZONATATE 200 MG/1
200 CAPSULE ORAL 3 TIMES DAILY PRN
Qty: 20 CAPSULE | Refills: 0 | Status: SHIPPED | OUTPATIENT
Start: 2022-05-16 | End: 2022-07-12 | Stop reason: ALTCHOICE

## 2022-05-16 NOTE — PROGRESS NOTES
COVID-19 Outpatient Progress Note    Assessment/Plan:    Problem List Items Addressed This Visit        Other    ROSARIO-48 - Primary     Discussed Molnupirvair therapy to which she is agreeable  Discussed EUA fact sheet  Recommended she continue Mucinex and will prescribe benzonatate for cough  Discussed taking Tylenol as needed for fever/chills or headaches  She is to contact our office for worsening symptoms  Will follow-up in 1 week  Relevant Medications    molnupiravir 200 mg capsule    benzonatate (TESSALON) 200 MG capsule         Disposition:     I recommended continued isolation until at least 24 hours have passed since recovery defined as resolution of fever without the use of fever-reducing medications AND improvement in COVID symptoms AND 10 days have passed since onset of symptoms (or 10 days have passed since date of first positive viral diagnostic test for asymptomatic patients)  I have spent 15 minutes directly with the patient  Greater than 50% of this time was spent in counseling/coordination of care regarding: diagnostic results, prognosis, risks and benefits of treatment options, instructions for management, patient and family education, importance of treatment compliance, risk factor reductions and impressions  Encounter provider Clara Nevarez MD    Provider located at [address] [place] [address]    Recent Visits  No visits were found meeting these conditions  Showing recent visits within past 7 days and meeting all other requirements  Today's Visits  Date Type Provider Dept   05/16/22 Telemedicine Clara Nevarez MD [facility]   Showing today's visits and meeting all other requirements  Future Appointments  No visits were found meeting these conditions    Showing future appointments within next 150 days and meeting all other requirements     This virtual check-in was done via [address] and patient was informed that this is a secure, HIPAA-compliant platform  She agrees to proceed  Patient agrees to participate in a virtual check in via telephone or video visit instead of presenting to the office to address urgent/immediate medical needs  Patient is aware this is a billable service  After connecting through [facility], the patient was identified by name and date of birth  Rosette Morgan was informed that this was a telemedicine visit and that the exam was being conducted confidentially over secure lines  My office door was closed  No one else was in the room  Rosette Morgan acknowledged consent and understanding of privacy and security of the telemedicine visit  I informed the patient that I have reviewed her record in Epic and presented the opportunity for her to ask any questions regarding the visit today  The patient agreed to participate  Verification of patient location:  Patient is located in the following state in which I hold an active license: [place]    Subjective:   Rosette Morgan is a 74 y o  female who has been screened for COVID-19  Symptom change since last report: unchanged  Patient's symptoms include fatigue, malaise, nasal congestion, rhinorrhea, sore throat, cough, myalgias and headache  Patient denies fever, chills, anosmia, loss of taste, shortness of breath, chest tightness, abdominal pain, nausea, vomiting and diarrhea  - Date of symptom onset: 5/14/2022  - Date of exposure: 5/12/2022  - Date of positive COVID-19 test: 5/15/2022  Type of test: Home antigen  COVID-19 vaccination status: Fully vaccinated with booster    Ed Carol has been staying home and has isolated themselves in her home  She is taking care to not share personal items and is cleaning all surfaces that are touched often, like counters, tabletops, and doorknobs using household cleaning sprays or wipes   She is wearing a mask when she leaves her room  61-year-old female is seen today with acute URI symptoms with testing positive for COVID-19 via home rapid antigen test     Her  tested positive for COVID-19 last week  She has been feeling very congested  She has been taking Mucinex for her symptoms  Lab Results   Component Value Date    SARSCOV2 Negative 01/04/2022    SARSCOV2 NOT DETECTED 08/15/2021     Past Medical History:   Diagnosis Date    Anxiety     Cataract, bilateral     last assessed    Cervical radiculopathy     Disc disease, degenerative, cervical     Herpes simplex infection     Hypertension     Insomnia     Low back pain     Lumbar degenerative disc disease     Lumbar facet arthropathy     Lumbar radiculopathy     Lumbar stenosis without neurogenic claudication     Mononucleosis     Myofascial pain dysfunction syndrome     Osteoarthritis     shoulder region - unspecified laterality - last assessed 2/1/14    Plantar fasciitis     Ptosis, both eyelids     last assessed 10/6/16    Ptosis, congenital, left     Retinal detachment     last assessed 12/11/14 right eye    Sacroiliitis ([address] )     Thyroid disease     Thyrotoxicosis     last assessed 5/17/13    Vertigo      Past Surgical History:   Procedure Laterality Date    [place] SURG IMPLNT NEUROELECT,EPIDURAL Left 1/26/2016    Procedure: PLACEMENT OF THORACIC SPINAL CORD STIMULATOR WITH LEFT BUTTOCK IMPLANTABLE PULSE GENERATOR (IMPULSE MONITORING);   Surgeon: Ze Chang MD;  Location:  MAIN OR;  Service: Neurosurgery    RETINAL DETACHMENT SURGERY Right      Current Outpatient Medications   Medication Sig Dispense Refill    amitriptyline (ELAVIL) 10 mg tablet Take 1 tablet (10 mg total) by mouth daily at bedtime 90 tablet 1    atorvastatin (LIPITOR) 10 mg tablet Take 1 tablet (10 mg total) by mouth daily at bedtime 90 tablet 1    benzonatate (TESSALON) 200 MG capsule Take 1 capsule (200 mg total) by mouth as needed in the morning and 1 capsule (200 mg total) as needed at noon and 1 capsule (200 mg total) as needed in the evening for cough   20 capsule 0    busPIRone (BUSPAR) 10 mg tablet Take 1 tablet (10 mg total) by mouth 3 (three) times a day 270 tablet 1    Cetirizine HCl (ZYRTEC PO) Take by mouth as needed      diclofenac (VOLTAREN) 75 mg EC tablet Take 1 tablet (75 mg total) by mouth 2 (two) times a day 180 tablet 1    Diclofenac Sodium (VOLTAREN) 1 % APPLY 2 GRAMS TO AFFECTED AREA 4 TIMES A DAY (Patient taking differently: as needed) 200 g 1    Esomeprazole Magnesium (NEXIUM PO) Take 1 capsule by mouth daily as needed       fluticasone (FLONASE) 50 mcg/act nasal spray 1 spray into each nostril daily (Patient taking differently: 1 spray into each nostril as needed) 16 g 3    furosemide (LASIX) 20 mg tablet Take 1 tablet (20 mg total) by mouth daily 30 tablet 3    LORazepam (ATIVAN) 1 mg tablet Take 1 5 tablets (1 5 mg total) by mouth daily at bedtime as needed for anxiety 135 tablet 1    methimazole (TAPAZOLE) 5 mg tablet Take 1 tablet (5 mg total) by mouth daily 90 tablet 1    metroNIDAZOLE (METROCREAM) 0 75 % cream Apply topically daily at bedtime (Patient taking differently: Apply topically as needed) 45 g 2    molnupiravir 200 mg capsule Take 4 capsules (800 mg total) by mouth every 12 (twelve) hours for 5 days 40 capsule 0    penciclovir (DENAVIR) 1 % cream Apply topically daily as needed (ulcer) 5 g 5    potassium chloride (K-DUR,KLOR-CON) 10 mEq tablet Take 1 tablet (10 mEq total) by mouth daily 90 tablet 1    pregabalin (LYRICA) 50 mg capsule Take 1 capsule in the AM, 1 capsule in the PM and 2 capsules at bedtime 360 capsule 3    propranolol (INDERAL) 10 mg tablet Take 1 tablet (10 mg total) by mouth 2 (two) times a day 180 tablet 1    quinapril (ACCUPRIL) 20 mg tablet Take 1 tablet (20 mg total) by mouth 2 (two) times a day 180 tablet 1    RESTASIS 0 05 % ophthalmic emulsion Administer 1 drop to both eyes 2 (two) times a day  3    tiZANidine (ZANAFLEX) 4 mg tablet Take 1 tablet (4 mg total) by mouth 2 (two) times a day 180 tablet 1    valACYclovir (VALTREX) 500 mg tablet Take 1 tablet (500 mg total) by mouth daily as needed (Cold Sores) 30 tablet 1    Xiidra 5 % op solution INSTILL 1 DROP INTO BOTH EYES TWICE A DAY (Patient not taking: Reported on 5/16/2022)       No current facility-administered medications for this visit  Allergies   Allergen Reactions    Other Dermatitis     Adhesive tape - please use ONLY PAPER TAPE    Scopolamine Other (See Comments)     Severe, debilitating headache      Flexeril [Cyclobenzaprine] Dizziness and Fatigue    Naproxen      Other reaction(s): Unknown Allergic Reaction  Annotation - [id number]: nightmares    Penicillins      Other reaction(s): Unknown Allergic Reaction  Annotation - [id number]: childhood reaction    Promethazine-Codeine      Reaction Date: [id number]; Annotation - [id number]: nightmares; Annotation - [id number]: nightmares    Tramadol      Other reaction(s): Unknown Allergic Reaction  Annotation - [id number]: PT HAS NIGHTMARES FROM TRAMADOL    Wound Dressing Adhesive Rash       Review of Systems   Constitutional: Positive for fatigue  Negative for activity change, appetite change, chills, diaphoresis and fever  HENT: Positive for congestion, rhinorrhea and sore throat  Negative for postnasal drip, sinus pressure, sinus pain and sneezing  Eyes: Negative for visual disturbance  Respiratory: Positive for cough  Negative for apnea, choking, chest tightness, shortness of breath and wheezing  Cardiovascular: Negative for chest pain, palpitations and leg swelling  Gastrointestinal: Negative for abdominal distention, abdominal pain, anal bleeding, blood in stool, constipation, diarrhea, nausea and vomiting  Endocrine: Negative for cold intolerance and heat intolerance  Genitourinary: Negative for difficulty urinating, dysuria and hematuria  Musculoskeletal: Positive for myalgias  Skin: Negative  Neurological: Positive for headaches  Negative for dizziness, weakness, light-headedness and numbness  Hematological: Negative for adenopathy  Psychiatric/Behavioral: Negative for agitation, sleep disturbance and suicidal ideas  All other systems reviewed and are negative  Objective:    Vitals:    05/16/22 1040   Weight: 98 9 kg (218 lb)   Height: 5' 5" (1 651 m)       Physical Exam  Vitals and nursing note reviewed  Constitutional:       General: She is not in acute distress  Appearance: Normal appearance  She is ill-appearing  She is not toxic-appearing  HENT:      Head: Normocephalic and atraumatic  Mouth/Throat:      Mouth: Mucous membranes are moist       Pharynx: Oropharynx is clear  No oropharyngeal exudate or posterior oropharyngeal erythema  Eyes:      General: No scleral icterus  Extraocular Movements: Extraocular movements intact  Conjunctiva/sclera: Conjunctivae normal    Pulmonary:      Effort: Pulmonary effort is normal  No respiratory distress  Abdominal:      General: Abdomen is flat  Tenderness: There is no abdominal tenderness  Musculoskeletal:         General: No swelling, deformity or signs of injury  Normal range of motion  Cervical back: Normal range of motion  Right lower leg: No edema  Left lower leg: No edema  Skin:     General: Skin is warm and dry  Coloration: Skin is not jaundiced or pale  Findings: No bruising, erythema, lesion or rash  Neurological:      General: No focal deficit present  Mental Status: She is alert and oriented to person, place, and time  Mental status is at baseline  Cranial Nerves: No cranial nerve deficit  Sensory: No sensory deficit  Psychiatric:         Mood and Affect: Mood normal          Behavior: Behavior normal          Thought Content:  Thought content normal          Judgment: Judgment normal          VIRTUAL VISIT DISCLAIMER    Usha Cee verbally agrees to participate in [employer]  Pt is aware that [employer] could be limited without vital signs or the ability to perform a full hands-on physical Enoch Latham understands she or the provider may request at any time to terminate the video visit and request the patient to seek care or treatment in person

## 2022-05-16 NOTE — ASSESSMENT & PLAN NOTE
Discussed Molnupirvair therapy to which she is agreeable  Discussed EUA fact sheet  Recommended she continue Mucinex and will prescribe benzonatate for cough  Discussed taking Tylenol as needed for fever/chills or headaches  She is to contact our office for worsening symptoms  Will follow-up in 1 week

## 2022-05-23 ENCOUNTER — TELEMEDICINE (OUTPATIENT)
Dept: INTERNAL MEDICINE CLINIC | Facility: OTHER | Age: 67
End: 2022-05-23
Payer: MEDICARE

## 2022-05-23 VITALS — HEIGHT: 65 IN | BODY MASS INDEX: 36.32 KG/M2 | WEIGHT: 218 LBS

## 2022-05-23 DIAGNOSIS — U07.1 COVID-19: Primary | ICD-10-CM

## 2022-05-23 PROCEDURE — 99213 OFFICE O/P EST LOW 20 MIN: CPT | Performed by: INTERNAL MEDICINE

## 2022-05-23 NOTE — PROGRESS NOTES
COVID-19 Outpatient Progress Note    Assessment/Plan:    Problem List Items Addressed This Visit        Other    [facility]-05 - Primary     She reports that her symptoms have improved  She completed Molnupiravir therapy without side effects  She has 1 more day of wearing a mask in public  She is to contact office for any further questions or concerns  Disposition:     Patient has COVID-19 infection  Based off CDC guidelines, they were recommended to isolate for 5 days from the date of the positive test  If they remain asymptomatic, isolation may be ended followed by 5 days of wearing a mask when around othes to minimize risk of infecting others  If they have a fever, continue to stay home until fever resolves for at least 24 hours  I have spent 15 minutes directly with the patient  Greater than 50% of this time was spent in counseling/coordination of care regarding: diagnostic results, prognosis, risks and benefits of treatment options, instructions for management, patient and family education, importance of treatment compliance, risk factor reductions and impressions  Encounter provider Thomas Garcia MD    Provider located at [address] [place] [address]    Recent Visits  Date Type Provider Dept   05/16/22 Telemedicine Thomas Garcia MD [facility]   Showing recent visits within past 7 days and meeting all other requirements  Today's Visits  Date Type Provider Dept   05/23/22 Telemedicine Thomas Garcia MD [facility]   Showing today's visits and meeting all other requirements  Future Appointments  No visits were found meeting these conditions    Showing future appointments within next 150 days and meeting all other requirements     This virtual check-in was done via [facility] and patient was informed that this is a secure, HIPAA-compliant platform  She agrees to proceed  Patient agrees to participate in a virtual check in via telephone or video visit instead of presenting to the office to address urgent/immediate medical needs  Patient is aware this is a billable service  After connecting through [facility], the patient was identified by name and date of birth  Luis Fernando Tyson was informed that this was a telemedicine visit and that the exam was being conducted confidentially over secure lines  My office door was closed  No one else was in the room  Luis Fernando Tyson acknowledged consent and understanding of privacy and security of the telemedicine visit  I informed the patient that I have reviewed her record in Epic and presented the opportunity for her to ask any questions regarding the visit today  The patient agreed to participate  Verification of patient location:  Patient is located in the following state in which I hold an active license: [place]    Subjective:   Luis Fernando Tyson is a 74 y o  female who has been screened for COVID-19  Symptom change since last report: resolving  Patient denies fever, chills, fatigue, congestion, rhinorrhea, sore throat, cough, shortness of breath, chest tightness, abdominal pain, nausea, vomiting, diarrhea and headaches  - Date of symptom onset: 5/14/2022  - Date of exposure: 5/12/2022      COVID-19 vaccination status: Fully vaccinated with booster    Wes Tabares has been staying home and has isolated themselves in her home  She is taking care to not share personal items and is cleaning all surfaces that are touched often, like counters, tabletops, and doorknobs using household cleaning sprays or wipes  She is wearing a mask when she leaves her room  65-year-old female seen today for follow-up of COVID-19 infection  She completed Molnupiravir therapy without any side effects  Her symptoms have essentially resolved    She has no active complaints or concerns at this time     Lab Results   Component Value Date    SARSCOV2 Negative 01/04/2022    SARSCOV2 NOT DETECTED 08/15/2021     Past Medical History:   Diagnosis Date    Anxiety     Cataract, bilateral     last assessed    Cervical radiculopathy     COVID-19 05/15/2022    Disc disease, degenerative, cervical     Herpes simplex infection     Hypertension     Insomnia     Low back pain     Lumbar degenerative disc disease     Lumbar facet arthropathy     Lumbar radiculopathy     Lumbar stenosis without neurogenic claudication     Mononucleosis     Myofascial pain dysfunction syndrome     Osteoarthritis     shoulder region - unspecified laterality - last assessed 2/1/14    Plantar fasciitis     Ptosis, both eyelids     last assessed 10/6/16    Ptosis, congenital, left     Retinal detachment     last assessed 12/11/14 right eye    Sacroiliitis ([address] )     Thyroid disease     Thyrotoxicosis     last assessed 5/17/13    Vertigo      Past Surgical History:   Procedure Laterality Date    ME SURG IMPLNT NEUROELECT,EPIDURAL Left 1/26/2016    Procedure: PLACEMENT OF THORACIC SPINAL CORD STIMULATOR WITH LEFT BUTTOCK IMPLANTABLE PULSE GENERATOR (IMPULSE MONITORING); Surgeon: Radha Mata MD;  [facility];  Service: Neurosurgery    RETINAL DETACHMENT SURGERY Right      Current Outpatient Medications   Medication Sig Dispense Refill    amitriptyline (ELAVIL) 10 mg tablet Take 1 tablet (10 mg total) by mouth daily at bedtime 90 tablet 1    atorvastatin (LIPITOR) 10 mg tablet Take 1 tablet (10 mg total) by mouth daily at bedtime 90 tablet 1    benzonatate (TESSALON) 200 MG capsule Take 1 capsule (200 mg total) by mouth as needed in the morning and 1 capsule (200 mg total) as needed at noon and 1 capsule (200 mg total) as needed in the evening for cough   20 capsule 0    busPIRone (BUSPAR) 10 mg tablet Take 1 tablet (10 mg total) by mouth 3 (three) times a day 270 tablet 1    Cetirizine HCl (ZYRTEC PO) Take by mouth as needed      diclofenac (VOLTAREN) 75 mg EC tablet Take 1 tablet (75 mg total) by mouth 2 (two) times a day 180 tablet 1    Diclofenac Sodium (VOLTAREN) 1 % APPLY 2 GRAMS TO AFFECTED AREA 4 TIMES A DAY (Patient taking differently: as needed) 200 g 1    Esomeprazole Magnesium (NEXIUM PO) Take 1 capsule by mouth daily as needed       fluticasone (FLONASE) 50 mcg/act nasal spray 1 spray into each nostril daily (Patient taking differently: 1 spray into each nostril as needed) 16 g 3    furosemide (LASIX) 20 mg tablet Take 1 tablet (20 mg total) by mouth daily 30 tablet 3    LORazepam (ATIVAN) 1 mg tablet Take 1 5 tablets (1 5 mg total) by mouth daily at bedtime as needed for anxiety 135 tablet 1    methimazole (TAPAZOLE) 5 mg tablet Take 1 tablet (5 mg total) by mouth daily 90 tablet 1    metroNIDAZOLE (METROCREAM) 0 75 % cream Apply topically daily at bedtime (Patient taking differently: Apply topically as needed) 45 g 2    penciclovir (DENAVIR) 1 % cream Apply topically daily as needed (ulcer) 5 g 5    potassium chloride (K-DUR,KLOR-CON) 10 mEq tablet Take 1 tablet (10 mEq total) by mouth daily 90 tablet 1    pregabalin (LYRICA) 50 mg capsule Take 1 capsule in the AM, 1 capsule in the PM and 2 capsules at bedtime 360 capsule 3    propranolol (INDERAL) 10 mg tablet Take 1 tablet (10 mg total) by mouth 2 (two) times a day 180 tablet 1    quinapril (ACCUPRIL) 20 mg tablet Take 1 tablet (20 mg total) by mouth 2 (two) times a day 180 tablet 1    RESTASIS 0 05 % ophthalmic emulsion Administer 1 drop to both eyes 2 (two) times a day  3    tiZANidine (ZANAFLEX) 4 mg tablet Take 1 tablet (4 mg total) by mouth 2 (two) times a day 180 tablet 1    Xiidra 5 % op solution       valACYclovir (VALTREX) 500 mg tablet Take 1 tablet (500 mg total) by mouth daily as needed (Cold Sores) 30 tablet 1     No current facility-administered medications for this visit       Allergies   Allergen Reactions    Other Dermatitis     Adhesive tape - please use ONLY PAPER TAPE    Scopolamine Other (See Comments)     Severe, debilitating headache      Flexeril [Cyclobenzaprine] Dizziness and Fatigue    Naproxen      Other reaction(s): Unknown Allergic Reaction  Annotation - [id number]: nightmares    Penicillins      Other reaction(s): Unknown Allergic Reaction  Annotation - [id number]: childhood reaction    Promethazine-Codeine      Reaction Date: [id number]; Annotation - [id number]: nightmares; Annotation - [id number]: nightmares    Tramadol      Other reaction(s): Unknown Allergic Reaction  Annotation - [id number]: PT HAS NIGHTMARES FROM TRAMADOL    Wound Dressing Adhesive Rash       Review of Systems   Constitutional: Negative for activity change, appetite change, chills, diaphoresis, fatigue and fever  HENT: Negative for congestion, postnasal drip, rhinorrhea, sinus pressure, sinus pain, sneezing and sore throat  Eyes: Negative for visual disturbance  Respiratory: Negative for apnea, cough, choking, chest tightness, shortness of breath and wheezing  Cardiovascular: Negative for chest pain, palpitations and leg swelling  Gastrointestinal: Negative for abdominal distention, abdominal pain, anal bleeding, blood in stool, constipation, diarrhea, nausea and vomiting  Endocrine: Negative for cold intolerance and heat intolerance  Genitourinary: Negative for difficulty urinating, dysuria and hematuria  Musculoskeletal: Negative  Skin: Negative  Neurological: Negative for dizziness, weakness, light-headedness, numbness and headaches  Hematological: Negative for adenopathy  Psychiatric/Behavioral: Negative for agitation, sleep disturbance and suicidal ideas  All other systems reviewed and are negative  Objective:    Vitals:    05/23/22 1000   Weight: 98 9 kg (218 lb)   Height: 5' 5" (1 651 m)       Physical Exam  Vitals and nursing note reviewed     Constitutional:       General: She is not in acute distress  Appearance: Normal appearance  She is not ill-appearing or toxic-appearing  HENT:      Head: Normocephalic and atraumatic  Mouth/Throat:      Mouth: Mucous membranes are moist       Pharynx: Oropharynx is clear  No oropharyngeal exudate or posterior oropharyngeal erythema  Eyes:      General: No scleral icterus  Extraocular Movements: Extraocular movements intact  Conjunctiva/sclera: Conjunctivae normal    Pulmonary:      Effort: Pulmonary effort is normal  No respiratory distress  Abdominal:      General: Abdomen is flat  Tenderness: There is no abdominal tenderness  Musculoskeletal:         General: No swelling, deformity or signs of injury  Normal range of motion  Cervical back: Normal range of motion  Right lower leg: No edema  Left lower leg: No edema  Skin:     General: Skin is warm and dry  Coloration: Skin is not jaundiced or pale  Findings: No bruising, erythema, lesion or rash  Neurological:      General: No focal deficit present  Mental Status: She is alert and oriented to person, place, and time  Mental status is at baseline  Cranial Nerves: No cranial nerve deficit  Sensory: No sensory deficit  Psychiatric:         Mood and Affect: Mood normal          Behavior: Behavior normal          Thought Content: Thought content normal          Judgment: Judgment normal          VIRTUAL VISIT DISCLAIMER    Maynor Guzman verbally agrees to participate in [employer]  Pt is aware that [employer] could be limited without vital signs or the ability to perform a full hands-on physical Lucie Georgia understands she or the provider may request at any time to terminate the video visit and request the patient to seek care or treatment in person

## 2022-05-23 NOTE — ASSESSMENT & PLAN NOTE
She reports that her symptoms have improved  She completed Molnupiravir therapy without side effects  She has 1 more day of wearing a mask in public  She is to contact office for any further questions or concerns

## 2022-06-10 ENCOUNTER — TELEPHONE (OUTPATIENT)
Dept: INTERNAL MEDICINE CLINIC | Age: 67
End: 2022-06-10

## 2022-06-10 ENCOUNTER — OFFICE VISIT (OUTPATIENT)
Dept: INTERNAL MEDICINE CLINIC | Age: 67
End: 2022-06-10
Payer: MEDICARE

## 2022-06-10 VITALS
BODY MASS INDEX: 36.99 KG/M2 | OXYGEN SATURATION: 99 % | WEIGHT: 222 LBS | HEIGHT: 65 IN | DIASTOLIC BLOOD PRESSURE: 82 MMHG | TEMPERATURE: 97.8 F | SYSTOLIC BLOOD PRESSURE: 128 MMHG | HEART RATE: 85 BPM
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
## 2022-06-10 DIAGNOSIS — L03.119 CELLULITIS OF LOWER LEG: Primary | ICD-10-CM

## 2022-06-10 DIAGNOSIS — S80.861A INSECT BITE OF RIGHT LOWER LEG, INITIAL ENCOUNTER: ICD-10-CM

## 2022-06-10 DIAGNOSIS — L71.9 ROSACEA, ACNE: ICD-10-CM

## 2022-06-10 PROCEDURE — 99214 OFFICE O/P EST MOD 30 MIN: CPT | Performed by: INTERNAL MEDICINE

## 2022-06-10 RX ORDER — LEVOFLOXACIN 500 MG/1
500 TABLET, FILM COATED ORAL EVERY 24 HOURS
Qty: 7 TABLET | Refills: 0 | Status: SHIPPED | OUTPATIENT
Start: 2022-06-10 | End: 2022-06-17

## 2022-06-10 NOTE — TELEPHONE ENCOUNTER
6/10/22   rec call about interaction of levaquin and tizanadine from [facility]- - per Dr Alice Desai pt is to stop tizanadine while taking the levaquin- pt understands and will stop the tizanadine while on levaquin- gave message to [facility]

## 2022-06-10 NOTE — PROGRESS NOTES
Assessment/Plan:    Insect bite on right lower leg  -bite wounds cleaned and antibiotic ointment applied  -there is no residual insect body left in the wound  -patient is up-to-date with her Tdap  -will order a Lyme antibody titer to be done in 2 weeks    Cellulitis of right lower leg  -patient is allergic to penicillin  -will start patient on levofloxacin 500 mg daily for 7 days  -she was counseled to keep well hydrated and to take the antibiotic with food and to use a probiotic while taking antibiotics    Rosacea acne  -currently on suppressive doxycycline  -continue with doxycycline  -will avoid using doxycycline for her cellulitis     Diagnoses and all orders for this visit:    Cellulitis of lower leg  -     levofloxacin (LEVAQUIN) 500 mg tablet; Take 1 tablet (500 mg total) by mouth every 24 hours for 7 days    Insect bite of right lower leg, initial encounter  -     levofloxacin (LEVAQUIN) 500 mg tablet; Take 1 tablet (500 mg total) by mouth every 24 hours for 7 days  -     Lyme Antibody Profile with reflex to WB; Future    Rosacea, acne    BMI 36 0-36 9,adult          BMI Counseling: Body mass index is 36 94 kg/m²  The BMI is above normal  Nutrition recommendations include limiting drinks that contain sugar, reducing intake of saturated and trans fat and reducing intake of cholesterol  Exercise recommendations include moderate physical activity 150 minutes/week  No pharmacotherapy was ordered  Patient referred to PCP  Rationale for BMI follow-up plan is due to patient being overweight or obese  Depression Screening and Follow-up Plan: Patient was screened for depression during today's encounter  They screened negative with a PHQ-2 score of 0  Subjective:      Patient ID: Maynor Guzman is a 74 y o  female  HPI  Pt states that she believes that she got a bug bite last weekend and she never felt the bite but afterwards she noticed itching and noticed a bug bites    Subsequently, she states that it got sore  She has been using a spray for bug bites or sun burn  She states that it feels like it is going down  She is concerned because the center is black and she is concerned about tick bites and states that she actually felt a tick crawl out of her shirt the same day that she noticed this rash  She states that she has a number of them on her legs  Has never been bitten by a tick as for as she knows  Pt has been taking doxycycline 100  Mg daily for over a year for her Rosecea  She has a hx of gerd  She has an allergy to penicillins from when she was little but does not know what it did to her  She cannot remember her last tetanus shot but a review of her medical records shows that her last tetanus shot was in 2013  She denies fever, chills,  headache, dizziness, chest pain, shortnessof breath, glandular swelling, nausea, vomiting abdominal pain, diarrhea, constipation  The following portions of the patient's history were reviewed and updated as appropriate:   She  has a past medical history of Anxiety, Cataract, bilateral, Cervical radiculopathy, COVID-19 (05/15/2022), Disc disease, degenerative, cervical, Herpes simplex infection, Hypertension, Insomnia, Low back pain, Lumbar degenerative disc disease, Lumbar facet arthropathy, Lumbar radiculopathy, Lumbar stenosis without neurogenic claudication, Mononucleosis, Myofascial pain dysfunction syndrome, Osteoarthritis, Plantar fasciitis, Ptosis, both eyelids, Ptosis, congenital, left, Retinal detachment, Sacroiliitis ([address] ), Thyroid disease, Thyrotoxicosis, and Vertigo    She   Patient Active Problem List    Diagnosis Date Noted    COVID-19 05/16/2022    Osteopenia of multiple sites 09/20/2021    Bilateral leg edema 09/17/2021    Long term systemic steroid user 05/11/2021    Asymptomatic premature menopause 05/11/2021    Encounter for gynecological examination (general) (routine) without abnormal findings 02/08/2021    Greater trochanteric bursitis of left hip     Primary insomnia 04/06/2020    Hyperthyroidism 12/27/2019    Chronic right shoulder pain     Calculus of gallbladder without cholecystitis without obstruction 10/25/2019    Adverse effect of adrenal cortical steroids, initial encounter 08/30/2019    Arthritis of right acromioclavicular joint     Spondylolisthesis of lumbar region 11/08/2018    Bulge of lumbar disc without myelopathy 10/15/2018    Rosacea, acne 04/13/2018    BMI 36 0-36 9,adult 12/27/2017    Primary osteoarthritis of right knee 11/28/2017    Hot flash, menopausal 05/12/2017    Positive depression screening 05/12/2017    Lumbar facet arthropathy 07/22/2015    Pain syndrome, chronic 01/06/2015    Benign essential HTN 06/20/2014    Lumbar degenerative disc disease 05/12/2014    Lumbar radiculopathy 05/12/2014    Lumbar stenosis without neurogenic claudication 05/12/2014    Disc disorder of cervical region 03/10/2014    Myofascial pain syndrome 03/10/2014    Cervical radiculopathy 02/17/2014    Anxiety 12/28/2012     She  has a past surgical history that includes Retinal detachment surgery (Right) and pr surg implnt neuroelect,epidural (Left, 1/26/2016)  Her family history includes Breast cancer (age of onset: 80) in her mother; COPD in her mother; Emphysema in her father; Heart attack in her father; Hypertension in her father and mother; Liver cancer in her maternal uncle; No Known Problems in her maternal grandfather, maternal grandmother, paternal aunt, paternal aunt, paternal aunt, paternal aunt, paternal aunt, paternal grandfather, paternal grandmother, sister, sister, and son; Other in her father  She  reports that she has never smoked  She has never used smokeless tobacco  She reports current alcohol use of about 1 0 standard drink of alcohol per week  She reports that she does not use drugs    Current Outpatient Medications   Medication Sig Dispense Refill    amitriptyline (ELAVIL) 10 mg tablet Take 1 tablet (10 mg total) by mouth daily at bedtime 90 tablet 1    atorvastatin (LIPITOR) 10 mg tablet Take 1 tablet (10 mg total) by mouth daily at bedtime 90 tablet 1    busPIRone (BUSPAR) 10 mg tablet Take 1 tablet (10 mg total) by mouth 3 (three) times a day 270 tablet 1    Cetirizine HCl (ZYRTEC PO) Take by mouth as needed      diclofenac (VOLTAREN) 75 mg EC tablet Take 1 tablet (75 mg total) by mouth 2 (two) times a day 180 tablet 1    Diclofenac Sodium (VOLTAREN) 1 % APPLY 2 GRAMS TO AFFECTED AREA 4 TIMES A DAY (Patient taking differently: as needed) 200 g 1    Esomeprazole Magnesium (NEXIUM PO) Take 1 capsule by mouth daily as needed       fluticasone (FLONASE) 50 mcg/act nasal spray 1 spray into each nostril daily (Patient taking differently: 1 spray into each nostril as needed) 16 g 3    furosemide (LASIX) 20 mg tablet Take 1 tablet (20 mg total) by mouth daily 30 tablet 3    levofloxacin (LEVAQUIN) 500 mg tablet Take 1 tablet (500 mg total) by mouth every 24 hours for 7 days 7 tablet 0    LORazepam (ATIVAN) 1 mg tablet Take 1 5 tablets (1 5 mg total) by mouth daily at bedtime as needed for anxiety 135 tablet 1    methimazole (TAPAZOLE) 5 mg tablet Take 1 tablet (5 mg total) by mouth daily 90 tablet 1    potassium chloride (K-DUR,KLOR-CON) 10 mEq tablet Take 1 tablet (10 mEq total) by mouth daily 90 tablet 1    pregabalin (LYRICA) 50 mg capsule Take 1 capsule in the AM, 1 capsule in the PM and 2 capsules at bedtime 360 capsule 3    propranolol (INDERAL) 10 mg tablet Take 1 tablet (10 mg total) by mouth 2 (two) times a day 180 tablet 1    quinapril (ACCUPRIL) 20 mg tablet Take 1 tablet (20 mg total) by mouth 2 (two) times a day 180 tablet 1    RESTASIS 0 05 % ophthalmic emulsion Administer 1 drop to both eyes 2 (two) times a day  3    tiZANidine (ZANAFLEX) 4 mg tablet Take 1 tablet (4 mg total) by mouth 2 (two) times a day 180 tablet 1    benzonatate (TESSALON) 200 MG capsule Take 1 capsule (200 mg total) by mouth as needed in the morning and 1 capsule (200 mg total) as needed at noon and 1 capsule (200 mg total) as needed in the evening for cough  (Patient not taking: Reported on 6/10/2022) 20 capsule 0    metroNIDAZOLE (METROCREAM) 0 75 % cream Apply topically daily at bedtime (Patient not taking: Reported on 6/10/2022) 45 g 2    penciclovir (DENAVIR) 1 % cream Apply topically daily as needed (ulcer) (Patient not taking: Reported on 6/10/2022) 5 g 5    valACYclovir (VALTREX) 500 mg tablet Take 1 tablet (500 mg total) by mouth daily as needed (Cold Sores) 30 tablet 1    Xiidra 5 % op solution  (Patient not taking: Reported on 6/10/2022)       No current facility-administered medications for this visit       Current Outpatient Medications on File Prior to Visit   Medication Sig    amitriptyline (ELAVIL) 10 mg tablet Take 1 tablet (10 mg total) by mouth daily at bedtime    atorvastatin (LIPITOR) 10 mg tablet Take 1 tablet (10 mg total) by mouth daily at bedtime    busPIRone (BUSPAR) 10 mg tablet Take 1 tablet (10 mg total) by mouth 3 (three) times a day    Cetirizine HCl (ZYRTEC PO) Take by mouth as needed    diclofenac (VOLTAREN) 75 mg EC tablet Take 1 tablet (75 mg total) by mouth 2 (two) times a day    Diclofenac Sodium (VOLTAREN) 1 % APPLY 2 GRAMS TO AFFECTED AREA 4 TIMES A DAY (Patient taking differently: as needed)    Esomeprazole Magnesium (NEXIUM PO) Take 1 capsule by mouth daily as needed     fluticasone (FLONASE) 50 mcg/act nasal spray 1 spray into each nostril daily (Patient taking differently: 1 spray into each nostril as needed)    furosemide (LASIX) 20 mg tablet Take 1 tablet (20 mg total) by mouth daily    LORazepam (ATIVAN) 1 mg tablet Take 1 5 tablets (1 5 mg total) by mouth daily at bedtime as needed for anxiety    methimazole (TAPAZOLE) 5 mg tablet Take 1 tablet (5 mg total) by mouth daily    potassium chloride (K-DUR,KLOR-CON) 10 mEq tablet Take 1 tablet (10 mEq total) by mouth daily    pregabalin (LYRICA) 50 mg capsule Take 1 capsule in the AM, 1 capsule in the PM and 2 capsules at bedtime    propranolol (INDERAL) 10 mg tablet Take 1 tablet (10 mg total) by mouth 2 (two) times a day    quinapril (ACCUPRIL) 20 mg tablet Take 1 tablet (20 mg total) by mouth 2 (two) times a day    RESTASIS 0 05 % ophthalmic emulsion Administer 1 drop to both eyes 2 (two) times a day    tiZANidine (ZANAFLEX) 4 mg tablet Take 1 tablet (4 mg total) by mouth 2 (two) times a day    benzonatate (TESSALON) 200 MG capsule Take 1 capsule (200 mg total) by mouth as needed in the morning and 1 capsule (200 mg total) as needed at noon and 1 capsule (200 mg total) as needed in the evening for cough  (Patient not taking: Reported on 6/10/2022)    metroNIDAZOLE (METROCREAM) 0 75 % cream Apply topically daily at bedtime (Patient not taking: Reported on 6/10/2022)    penciclovir (DENAVIR) 1 % cream Apply topically daily as needed (ulcer) (Patient not taking: Reported on 6/10/2022)    valACYclovir (VALTREX) 500 mg tablet Take 1 tablet (500 mg total) by mouth daily as needed (Cold Sores)    Xiidra 5 % op solution  (Patient not taking: Reported on 6/10/2022)     No current facility-administered medications on file prior to visit  She is allergic to other, scopolamine, flexeril [cyclobenzaprine], naproxen, penicillins, promethazine-codeine, tramadol, and wound dressing adhesive       Review of Systems   Constitutional: Positive for diaphoresis (2/2 hot flashes)  Negative for activity change, chills, fatigue, fever and unexpected weight change  HENT: Negative for ear pain, postnasal drip, rhinorrhea, sinus pressure and sore throat  Eyes: Negative for pain  Respiratory: Negative for cough, choking, chest tightness, shortness of breath and wheezing  Cardiovascular: Negative for chest pain, palpitations and leg swelling     Gastrointestinal: Negative for abdominal pain, constipation, diarrhea, nausea and vomiting  Genitourinary: Negative for dysuria and hematuria  Musculoskeletal: Positive for back pain (chronic and unchanged)  Negative for arthralgias, gait problem, joint swelling, myalgias and neck stiffness  Skin: Positive for rash  Negative for pallor  Neurological: Negative for dizziness, tremors, seizures, syncope, light-headedness and headaches  Hematological: Negative for adenopathy  Psychiatric/Behavioral: Negative for behavioral problems  Objective:      /82 (BP Location: Left arm, Patient Position: Sitting, Cuff Size: Standard)   Pulse 85   Temp 97 8 °F (36 6 °C) (Temporal)   Ht 5' 5" (1 651 m)   Wt 101 kg (222 lb)   LMP 11/26/2015   SpO2 99%   BMI 36 94 kg/m²          Physical Exam  Constitutional:       General: She is not in acute distress  Appearance: She is well-developed  She is not diaphoretic  HENT:      Head: Normocephalic and atraumatic  Right Ear: External ear normal       Left Ear: External ear normal       Nose: Nose normal       Mouth/Throat:      Mouth: Mucous membranes are dry  Pharynx: Posterior oropharyngeal erythema (Mild oropharyngeal erythema with dry mucous membranes) present  No oropharyngeal exudate  Eyes:      General: No scleral icterus  Right eye: No discharge  Left eye: No discharge  Conjunctiva/sclera: Conjunctivae normal       Pupils: Pupils are equal, round, and reactive to light  Neck:      Thyroid: No thyromegaly  Vascular: No JVD  Trachea: No tracheal deviation  Cardiovascular:      Rate and Rhythm: Normal rate and regular rhythm  Heart sounds: Murmur ( 2/6 systolic murmur maximal in the aortic valve region and radiating to the carotids) heard  Systolic murmur is present with a grade of 2/6  No friction rub  No gallop  Pulmonary:      Effort: Pulmonary effort is normal  No respiratory distress  Breath sounds: Normal breath sounds  No wheezing or rales     Chest: Chest wall: No tenderness  Abdominal:      General: Bowel sounds are normal  There is no distension  Palpations: Abdomen is soft  There is no mass  Tenderness: There is no abdominal tenderness  There is no guarding or rebound  Musculoskeletal:         General: No tenderness or deformity  Normal range of motion  Cervical back: Normal range of motion and neck supple  Legs:    Lymphadenopathy:      Cervical: No cervical adenopathy  Skin:     General: Skin is warm and dry  Coloration: Skin is not pale  Findings: Erythema and rash ( erythematous rash on the posterior aspect of the right lower leg with signs of excoriation and some hyperpigmented/black center with some surrounding cellulitis with induration, no insect parts identified) present  Neurological:      Mental Status: She is alert and oriented to person, place, and time  Cranial Nerves: No cranial nerve deficit  Motor: No abnormal muscle tone  Coordination: Coordination normal       Deep Tendon Reflexes: Reflexes are normal and symmetric  Psychiatric:         Behavior: Behavior normal            Orders Only on 01/04/2022   Component Date Value Ref Range Status    SARS-CoV-2 01/04/2022 Negative  Negative Final    INFLUENZA A PCR 01/04/2022 Negative  Negative Final    INFLUENZA B PCR 01/04/2022 Negative  Negative Final   Appointment on 12/03/2021   Component Date Value Ref Range Status    TSH 3RD GENERATON 12/03/2021 0 834  0 358 - 3 740 uIU/mL Final    The recommended reference ranges for TSH during pregnancy are as follows:   First trimester 0 1 to 2 5 uIU/mL   Second trimester  0 2 to 3 0 uIU/mL   Third trimester 0 3 to 3 0 uIU/m    Note: Normal ranges may not apply to patients who are transgender, non-binary, or whose legal sex, sex at birth, and gender identity differ      Free T4 12/03/2021 1 01  0 76 - 1 46 ng/dL Final    Specimen collection should occur prior to Sulfasalazine administration due to the potential for falsely elevated results  Appointment on 09/18/2021   Component Date Value Ref Range Status    Cholesterol 09/18/2021 263 (A) 50 - 200 mg/dL Final    Cholesterol:       Desirable         <200 mg/dl       Borderline         200-239 mg/dl       High              >239 mg/dl           Triglycerides 09/18/2021 84  <=150 mg/dL Final    Triglyceride:     Normal          <150 mg/dl     Borderline High 150-199 mg/dl     High            200-499 mg/dl        Very High       >499 mg/dl    Specimen collection should occur prior to N-Acetylcysteine or Metamizole administration due to the potential for falsely depressed results   HDL, Direct 09/18/2021 78  >=40 mg/dL Final    HDL Cholesterol:       Low     <41 mg/dL  Specimen collection should occur prior to Metamizole administration due to the potential for falsley depressed results   LDL Calculated 09/18/2021 168 (A) 0 - 100 mg/dL Final    LDL Cholesterol:     Optimal           <100 mg/dl     Near Optimal      100-129 mg/dl     Above Optimal       Borderline High 130-159 mg/dl       High            160-189 mg/dl       Very High       >189 mg/dl         This screening LDL is a calculated result  It does not have the accuracy of the Direct Measured LDL in the monitoring of patients with hyperlipidemia and/or statin therapy  Direct Measure LDL (JHI164) must be ordered separately in these patients      Non-HDL-Chol (CHOL-HDL) 09/18/2021 185  mg/dl Final    Sodium 09/18/2021 130 (A) 136 - 145 mmol/L Final    Potassium 09/18/2021 4 1  3 5 - 5 3 mmol/L Final    Chloride 09/18/2021 98 (A) 100 - 108 mmol/L Final    CO2 09/18/2021 28  21 - 32 mmol/L Final    ANION GAP 09/18/2021 4  4 - 13 mmol/L Final    BUN 09/18/2021 10  5 - 25 mg/dL Final    Creatinine 09/18/2021 0 68  0 60 - 1 30 mg/dL Final    Standardized to IDMS reference method    Glucose, Fasting 09/18/2021 95  65 - 99 mg/dL Final    Specimen collection should occur prior to Sulfasalazine administration due to the potential for falsely depressed results  Specimen collection should occur prior to Sulfapyridine administration due to the potential for falsely elevated results   Calcium 09/18/2021 9 0  8 3 - 10 1 mg/dL Final    AST 09/18/2021 39  5 - 45 U/L Final    Specimen collection should occur prior to Sulfasalazine administration due to the potential for falsely depressed results   ALT 09/18/2021 43  12 - 78 U/L Final    Specimen collection should occur prior to Sulfasalazine and/or Sulfapyridine administration due to the potential for falsely depressed results   Alkaline Phosphatase 09/18/2021 107  46 - 116 U/L Final    Total Protein 09/18/2021 7 2  6 4 - 8 2 g/dL Final    Albumin 09/18/2021 3 5  3 5 - 5 0 g/dL Final    Total Bilirubin 09/18/2021 1 51 (A) 0 20 - 1 00 mg/dL Final    Use of this assay is not recommended for patients undergoing treatment with eltrombopag due to the potential for falsely elevated results   eGFR 09/18/2021 93  ml/min/1 73sq m Final    TSH 3RD GENERATON 09/18/2021 1 250  0 358 - 3 740 uIU/mL Final    The recommended reference ranges for TSH during pregnancy are as follows:   First trimester 0 1 to 2 5 uIU/mL   Second trimester  0 2 to 3 0 uIU/mL   Third trimester 0 3 to 3 0 uIU/m    Note: Normal ranges may not apply to patients who are transgender, non-binary, or whose legal sex, sex at birth, and gender identity differ      NT-proBNP 09/18/2021 80  <125 pg/mL Final

## 2022-06-21 ENCOUNTER — TELEPHONE (OUTPATIENT)
Dept: PAIN MEDICINE | Facility: CLINIC | Age: 67
End: 2022-06-21

## 2022-06-21 ENCOUNTER — RA CDI HCC (OUTPATIENT)
Dept: OTHER | Facility: HOSPITAL | Age: 67
End: 2022-06-21

## 2022-06-21 NOTE — TELEPHONE ENCOUNTER
Spoke to pt regarding pain in left lower back radiating into left leg  Pt described pain as throbbing and aching  Pt stated she is taking pregabalin and tizanidine with some relief  Pt inquiring if she can repeat injection TFESI on 4/19/22

## 2022-06-21 NOTE — PROGRESS NOTES
[address]  coding opportunities       Chart reviewed, no opportunity found: CHART REVIEWED, NO OPPORTUNITY FOUND        Patients Insurance     Medicare Insurance: Medicare

## 2022-07-07 DIAGNOSIS — R60.0 BILATERAL LEG EDEMA: ICD-10-CM

## 2022-07-07 DIAGNOSIS — E05.90 HYPERTHYROIDISM: ICD-10-CM

## 2022-07-07 RX ORDER — PROPRANOLOL HYDROCHLORIDE 10 MG/1
10 TABLET ORAL 2 TIMES DAILY
Qty: 180 TABLET | Refills: 1 | Status: SHIPPED | OUTPATIENT
Start: 2022-07-07

## 2022-07-07 RX ORDER — FUROSEMIDE 20 MG/1
20 TABLET ORAL DAILY
Qty: 90 TABLET | Refills: 1 | Status: SHIPPED | OUTPATIENT
Start: 2022-07-07 | End: 2022-08-30 | Stop reason: SDUPTHER

## 2022-07-12 ENCOUNTER — OFFICE VISIT (OUTPATIENT)
Dept: INTERNAL MEDICINE CLINIC | Facility: OTHER | Age: 67
End: 2022-07-12
Payer: MEDICARE

## 2022-07-12 VITALS
BODY MASS INDEX: 37.32 KG/M2 | HEIGHT: 65 IN | WEIGHT: 224 LBS | DIASTOLIC BLOOD PRESSURE: 70 MMHG | TEMPERATURE: 98.6 F | OXYGEN SATURATION: 98 % | SYSTOLIC BLOOD PRESSURE: 124 MMHG | HEART RATE: 73 BPM

## 2022-07-12 DIAGNOSIS — M94.9 DISORDER OF CARTILAGE, UNSPECIFIED: ICD-10-CM

## 2022-07-12 DIAGNOSIS — E66.01 OBESITY, MORBID (HCC): ICD-10-CM

## 2022-07-12 DIAGNOSIS — I10 ESSENTIAL HYPERTENSION: ICD-10-CM

## 2022-07-12 DIAGNOSIS — Z23 NEED FOR PNEUMOCOCCAL VACCINATION: ICD-10-CM

## 2022-07-12 DIAGNOSIS — R60.0 BILATERAL LEG EDEMA: ICD-10-CM

## 2022-07-12 DIAGNOSIS — M85.89 OSTEOPENIA OF MULTIPLE SITES: ICD-10-CM

## 2022-07-12 DIAGNOSIS — M54.16 LUMBAR RADICULOPATHY: ICD-10-CM

## 2022-07-12 DIAGNOSIS — I10 BENIGN ESSENTIAL HTN: ICD-10-CM

## 2022-07-12 DIAGNOSIS — E05.90 HYPERTHYROIDISM: ICD-10-CM

## 2022-07-12 DIAGNOSIS — F51.01 PRIMARY INSOMNIA: ICD-10-CM

## 2022-07-12 DIAGNOSIS — Z00.00 WELCOME TO MEDICARE PREVENTIVE VISIT: Primary | ICD-10-CM

## 2022-07-12 DIAGNOSIS — E28.319 ASYMPTOMATIC PREMATURE MENOPAUSE: ICD-10-CM

## 2022-07-12 PROBLEM — I51.7 ATRIAL ENLARGEMENT, LEFT: Status: ACTIVE | Noted: 2022-07-12

## 2022-07-12 PROBLEM — N95.1 HOT FLASH, MENOPAUSAL: Status: RESOLVED | Noted: 2017-05-12 | Resolved: 2022-07-12

## 2022-07-12 PROBLEM — Z13.31 POSITIVE DEPRESSION SCREENING: Status: RESOLVED | Noted: 2017-05-12 | Resolved: 2022-07-12

## 2022-07-12 PROCEDURE — G0402 INITIAL PREVENTIVE EXAM: HCPCS | Performed by: FAMILY MEDICINE

## 2022-07-12 PROCEDURE — 99214 OFFICE O/P EST MOD 30 MIN: CPT | Performed by: FAMILY MEDICINE

## 2022-07-12 PROCEDURE — 1123F ACP DISCUSS/DSCN MKR DOCD: CPT

## 2022-07-12 PROCEDURE — G0009 ADMIN PNEUMOCOCCAL VACCINE: HCPCS

## 2022-07-12 PROCEDURE — G0403 EKG FOR INITIAL PREVENT EXAM: HCPCS | Performed by: FAMILY MEDICINE

## 2022-07-12 PROCEDURE — 90677 PCV20 VACCINE IM: CPT

## 2022-07-12 RX ORDER — AMITRIPTYLINE HYDROCHLORIDE 10 MG/1
10 TABLET, FILM COATED ORAL
Qty: 90 TABLET | Refills: 1 | Status: SHIPPED | OUTPATIENT
Start: 2022-07-12

## 2022-07-12 RX ORDER — POTASSIUM CHLORIDE 750 MG/1
10 TABLET, EXTENDED RELEASE ORAL DAILY
Qty: 90 TABLET | Refills: 1 | Status: SHIPPED | OUTPATIENT
Start: 2022-07-12

## 2022-07-12 RX ORDER — QUINAPRIL 20 MG/1
20 TABLET ORAL 2 TIMES DAILY
Qty: 180 TABLET | Refills: 1 | Status: SHIPPED | OUTPATIENT
Start: 2022-07-12 | End: 2023-01-08

## 2022-07-12 NOTE — PROGRESS NOTES
Answers for HPI/ROS submitted by the patient on 7/12/2022  How would you rate your overall health?: good  Compared to last year, how is your physical health?: same  In general, how satisfied are you with your life?: very satisfied  Compared to last year, how is your eyesight?: slightly worse  Compared to last year, how is your hearing?: same  Compared to last year, how is your emotional/mental health?: slightly better  How often is anger a problem for you?: never, rarely  How often do you feel unusually tired/fatigued?: sometimes  In the past 7 days, how much pain have you experienced?: a lot  If you answered "some" or "a lot", please rate the severity of your pain on a scale of 1 to 10 (1 being the least severe pain and 10 being the most intense pain)  : 7/10  In the past 6 months, have you lost or gained 10 pounds without trying?: No  One or more falls in the last year: No  In the past 6 months, have you accidentally leaked urine?: No  Do you have trouble with the stairs inside or outside your home?: Yes  Does your home have working smoke alarms?: Yes  Does your home have a carbon monoxide monitor?: No  Which safety hazards (if any) have you experienced in your home? Please select all that apply : none  How would you describe your current diet?  Please select all that apply : Regular  In addition to prescription medications, are you taking any over-the-counter supplements?: Yes  If yes, what supplements are you taking?: vitamins  Can you manage your medications?: Yes  Are you currently taking any opioid medications?: No  Can you walk and transfer into and out of your bed and chair?: Yes  Can you dress and groom yourself?: Yes  Can you bathe or shower yourself?: Yes  Can you feed yourself?: Yes  Can you do your laundry/ housekeeping?: No  Can you manage your money, pay your bills, and track your expenses?: Yes  Can you make your own meals?: Yes  Can you do your own shopping?: Yes  Please list your DME (Durable Medical Equipment) supplier, if you use one : [employer]  Within the last 12 months, have you had any hospitalizations or Emergency Department visits?: No  Do you have a living will?: Yes  Do you have a Durable POA (Power of ) for healthcare decisions?: Yes  Do you have an Advanced Directive for end of life decisions?: Yes  How often have you used an illegal drug (including marijuana) or a prescription medication for non-medical reasons in the past year?: never  What is the typical number of drinks you consume in a day?: 0  What is the typical number of drinks you consume in a week?: 0  How often did you have a drink containing alcohol in the past year?: monthly or less  How many drinks did you have on a typical day  when you were drinking in the past year?: 1 to 2  How often did you have 6 or more drinks on one occasion in the past year?: never    Assessment/Plan:    1  Welcome to Medicare preventive visit    2  Hyperthyroidism    3  Osteopenia of multiple sites  -     DXA bone density spine hip and pelvis; Future; Expected date: 07/12/2022  -     Vitamin D 25 hydroxy; Future    4  Benign essential HTN    5  Bilateral leg edema  -     potassium chloride (K-DUR,KLOR-CON) 10 mEq tablet; Take 1 tablet (10 mEq total) by mouth daily    6  Asymptomatic premature menopause  -     DXA bone density spine hip and pelvis; Future; Expected date: 07/12/2022    7  Obesity, morbid ([address] )    8  Disorder of cartilage, unspecified   -     Vitamin D 25 hydroxy; Future    9  Essential hypertension  -     quinapril (ACCUPRIL) 20 mg tablet; Take 1 tablet (20 mg total) by mouth 2 (two) times a day    10  Lumbar radiculopathy  -     amitriptyline (ELAVIL) 10 mg tablet; Take 1 tablet (10 mg total) by mouth daily at bedtime    11  Primary insomnia      BMI Counseling: Body mass index is 37 28 kg/m²  The BMI is above normal  Nutrition recommendations include moderation in carbohydrate intake   Exercise recommendations include exercising 3-5 times per week  No pharmacotherapy was ordered  Rationale for BMI follow-up plan is due to patient being overweight or obese  Depression Screening and Follow-up Plan: Patient was screened for depression during today's encounter  They screened negative with a PHQ-2 score of 0  Patient Instructions       Medicare Preventive Visit Patient Instructions  Thank you for completing your Welcome to Medicare Visit or Medicare Annual Wellness Visit today  Your next wellness visit will be due in one year (7/13/2023)  The screening/preventive services that you may require over the next 5-10 years are detailed below  Some tests may not apply to you based off risk factors and/or age  Screening tests ordered at today's visit but not completed yet may show as past due  Also, please note that scanned in results may not display below  Preventive Screenings:  Service Recommendations Previous Testing/Comments   Colorectal Cancer Screening  * Colonoscopy    * Fecal Occult Blood Test (FOBT)/Fecal Immunochemical Test (FIT)  * Fecal DNA/Cologuard Test  * Flexible Sigmoidoscopy Age: 54-65 years old   Colonoscopy: every 10 years (may be performed more frequently if at higher risk)  OR  FOBT/FIT: every 1 year  OR  Cologuard: every 3 years  OR  Sigmoidoscopy: every 5 years  Screening may be recommended earlier than age 48 if at higher risk for colorectal cancer  Also, an individualized decision between you and your healthcare provider will decide whether screening between the ages of 74-80 would be appropriate  Colonoscopy: 10/28/2013  FOBT/FIT: Not on file  Cologuard: Not on file  Sigmoidoscopy: Not on file    Screening Current     Breast Cancer Screening Age: 36 years old  Frequency: every 1-2 years  Not required if history of left and right mastectomy Mammogram: 02/18/2022    Screening Current   Cervical Cancer Screening Between the ages of 21-29, pap smear recommended once every 3 years     Between the ages of 30-65, can perform pap smear with HPV co-testing every 5 years  Recommendations may differ for women with a history of total hysterectomy, cervical cancer, or abnormal pap smears in past  Pap Smear: 04/01/2022    Screening Not Indicated   Hepatitis C Screening Once for adults born between 1945 and 1965  More frequently in patients at high risk for Hepatitis C Hep C Antibody: Not on file        Diabetes Screening 1-2 times per year if you're at risk for diabetes or have pre-diabetes Fasting glucose: 95 mg/dL   A1C: 5 6 %    Screening Current   Cholesterol Screening Once every 5 years if you don't have a lipid disorder  May order more often based on risk factors  Lipid panel: 09/18/2021    Screening Not Indicated  History Lipid Disorder     Other Preventive Screenings Covered by Medicare:  1  Abdominal Aortic Aneurysm (AAA) Screening: covered once if your at risk  You're considered to be at risk if you have a family history of AAA  2  Lung Cancer Screening: covers low dose CT scan once per year if you meet all of the following conditions: (1) Age 50-69; (2) No signs or symptoms of lung cancer; (3) Current smoker or have quit smoking within the last 15 years; (4) You have a tobacco smoking history of at least 30 pack years (packs per day multiplied by number of years you smoked); (5) You get a written order from a healthcare provider  3  Glaucoma Screening: covered annually if you're considered high risk: (1) You have diabetes OR (2) Family history of glaucoma OR (3)  aged 48 and older OR (3)  American aged 72 and older  3   Osteoporosis Screening: covered every 2 years if you meet one of the following conditions: (1) You're estrogen deficient and at risk for osteoporosis based off medical history and other findings; (2) Have a vertebral abnormality; (3) On glucocorticoid therapy for more than 3 months; (4) Have primary hyperparathyroidism; (5) On osteoporosis medications and need to assess response to drug therapy  · Last bone density test (DXA Scan): 06/11/2021   5  HIV Screening: covered annually if you're between the age of 15-65  Also covered annually if you are younger than 13 and older than 72 with risk factors for HIV infection  For pregnant patients, it is covered up to 3 times per pregnancy  Immunizations:  Immunization Recommendations   Influenza Vaccine Annual influenza vaccination during flu season is recommended for all persons aged >= 6 months who do not have contraindications   Pneumococcal Vaccine (Prevnar and Pneumovax)  * Prevnar = PCV13  * Pneumovax = PPSV23   Adults 25-60 years old: 1-3 doses may be recommended based on certain risk factors  Adults 72 years old: Prevnar (PCV13) vaccine recommended followed by Pneumovax (PPSV23) vaccine  If already received PPSV23 since turning 65, then PCV13 recommended at least one year after PPSV23 dose  Hepatitis B Vaccine 3 dose series if at intermediate or high risk (ex: diabetes, end stage renal disease, liver disease)   Tetanus (Td) Vaccine - COST NOT COVERED BY MEDICARE PART B Following completion of primary series, a booster dose should be given every 10 years to maintain immunity against tetanus  Td may also be given as tetanus wound prophylaxis  Tdap Vaccine - COST NOT COVERED BY MEDICARE PART B Recommended at least once for all adults  For pregnant patients, recommended with each pregnancy     Shingles Vaccine (Shingrix) - COST NOT COVERED BY MEDICARE PART B  2 shot series recommended in those aged 48 and above     Health Maintenance Due:      Topic Date Due    Hepatitis C Screening  Never done    HIV Screening  Never done    Breast Cancer Screening: Mammogram  02/18/2023    Colorectal Cancer Screening  10/28/2023    Cervical Cancer Screening  02/08/2024     Immunizations Due:      Topic Date Due    Pneumococcal Vaccine: 65+ Years (1 - PCV) Never done    COVID-19 Vaccine (4 - Booster for Brandicted series) 01/25/2022    Influenza Vaccine (1) 09/01/2022     Advance Directives   What are advance directives? Advance directives are legal documents that state your wishes and plans for medical care  These plans are made ahead of time in case you lose your ability to make decisions for yourself  Advance directives can apply to any medical decision, such as the treatments you want, and if you want to donate organs  What are the types of advance directives? There are many types of advance directives, and each state has rules about how to use them  You may choose a combination of any of the following:  · Living will: This is a written record of the treatment you want  You can also choose which treatments you do not want, which to limit, and which to stop at a certain time  This includes surgery, medicine, IV fluid, and tube feedings  · Durable power of  for healthcare [place] [facility]): This is a written record that states who you want to make healthcare choices for you when you are unable to make them for yourself  This person, called a proxy, is usually a family member or a friend  You may choose more than 1 proxy  · Do not resuscitate (DNR) order:  A DNR order is used in case your heart stops beating or you stop breathing  It is a request not to have certain forms of treatment, such as CPR  A DNR order may be included in other types of advance directives  · Medical directive: This covers the care that you want if you are in a coma, near death, or unable to make decisions for yourself  You can list the treatments you want for each condition  Treatment may include pain medicine, surgery, blood transfusions, dialysis, IV or tube feedings, and a ventilator (breathing machine)  · Values history: This document has questions about your views, beliefs, and how you feel and think about life  This information can help others choose the care that you would choose  Why are advance directives important?   An advance directive helps you control your care  Although spoken wishes may be used, it is better to have your wishes written down  Spoken wishes can be misunderstood, or not followed  Treatments may be given even if you do not want them  An advance directive may make it easier for your family to make difficult choices about your care  Weight Management   Why it is important to manage your weight:  Being overweight increases your risk of health conditions such as heart disease, high blood pressure, type 2 diabetes, and certain types of cancer  It can also increase your risk for osteoarthritis, sleep apnea, and other respiratory problems  Aim for a slow, steady weight loss  Even a small amount of weight loss can lower your risk of health problems  How to lose weight safely:  A safe and healthy way to lose weight is to eat fewer calories and get regular exercise  You can lose up about 1 pound a week by decreasing the number of calories you eat by 500 calories each day  Healthy meal plan for weight management:  A healthy meal plan includes a variety of foods, contains fewer calories, and helps you stay healthy  A healthy meal plan includes the following:  · Eat whole-grain foods more often  A healthy meal plan should contain fiber  Fiber is the part of grains, fruits, and vegetables that is not broken down by your body  Whole-grain foods are healthy and provide extra fiber in your diet  Some examples of whole-grain foods are whole-wheat breads and pastas, oatmeal, brown rice, and bulgur  · Eat a variety of vegetables every day  Include dark, leafy greens such as spinach, kale, pham greens, and mustard greens  Eat yellow and orange vegetables such as carrots, sweet potatoes, and winter squash  · Eat a variety of fruits every day  Choose fresh or canned fruit (canned in its own juice or light syrup) instead of juice  Fruit juice has very little or no fiber  · Eat low-fat dairy foods  Drink fat-free (skim) milk or 1% milk   Eat fat-free yogurt and low-fat cottage cheese  Try low-fat cheeses such as mozzarella and other reduced-fat cheeses  · Choose meat and other protein foods that are low in fat  Choose beans or other legumes such as split peas or lentils  Choose fish, skinless poultry (chicken or turkey), or lean cuts of red meat (beef or pork)  Before you cook meat or poultry, cut off any visible fat  · Use less fat and oil  Try baking foods instead of frying them  Add less fat, such as margarine, sour cream, regular salad dressing and mayonnaise to foods  Eat fewer high-fat foods  Some examples of high-fat foods include french fries, doughnuts, ice cream, and cakes  · Eat fewer sweets  Limit foods and drinks that are high in sugar  This includes candy, cookies, regular soda, and sweetened drinks  Exercise:  Exercise at least 30 minutes per day on most days of the week  Some examples of exercise include walking, biking, dancing, and swimming  You can also fit in more physical activity by taking the stairs instead of the elevator or parking farther away from stores  Ask your healthcare provider about the best exercise plan for you  © Copyright [employer] 2018 Information is for End User's use only and may not be sold, redistributed or otherwise used for commercial purposes  All illustrations and images included in CareNotes® are the copyrighted property of [employer]  or [address]        Return in about 6 months (around 1/12/2023) for Recheck  Discussed weight gain with patient  Encouraged to lose weight  Check lab work ASAP due to increasing her Lasix on her own  She will need a new prescription for after lab work is reviewed  Subjective:      Patient ID: Marlene Archer is a 74 y o  female      Chief Complaint   Patient presents with   [facility] [place] Wellness Visit     Welcome to medicare, EKG, VISION,     Hypertension       HPI      Rosacea, patient was placed on Metrogel by her endocrinologist and she has been using it for 1 month   She feels that is not helping  Anaid Jacobson has facial flushing for several years which are exacerbated by stress, hypertension, heat, and hot flashes  Anaid Jacobson would like to discuss other options  5/16/18: started doxy 1 week ago, no relief yet  Still flushing and worse in the mornings  10/15/18: Michael Malin a lot, taking doxy BID, ran out 1 week ago, re start at BID and then decrease to monthly  Dc 2019 taking doxy BID iith food, no issues  May 2021, taking doxycycline b i d  For several months and started Metrogel which she thinks flairs up her symptoms  September 2021, stable  January 2022, on doxycycline twice a day and tolerating well  July 2022, stable, no issues      Insomnia, patient states she does not sleep well which is very multifactorial including the pain and how flashes   She used to be on estrogen replacement currently does not take any   She does take Ativan at night to help her sleep which helped marginally  5/16/18: taking 1 5 mg  And advil pm and it has helped- sleeping through the night now and slightly  More tired in am   10/15/18: taking ativan and sleeping well with it 3/1/19:   July 2019, has not tried to decrease her lorazepam and does take it every night to sleep  Dec 2019 usually doing very well but  Lately having some issues with sleep  Thinks its from the holidays  June 2020, stable  May 2021, sleeping okay usually with her medications however she does wake up in the middle of the night and feels a high level of anxiety which she does not know what to do about   She cannot sleep without her lorazepam which she is taking faithfully   At last appointment with another provider, she was started on Cymbalta but currently is not taking and she cannot remember what side effects she had  September 2021, not any better, pain keeps her up at night  Jan 2022:  Sleeping very well with the addition of Elavil and tolerating    No side effects  July 2022, stable and working well       Depression:  Patient is a depression was exacerbated by the pain and inability to sleep  Celine Gunter is always tired and fatigued and is having difficulty with activities of daily living around the house like cleaning and cooking as well as work  Tequila Dolan is here today and can cooperate always   She feels that she is up to any medications or the long medications right now    8/13/18: buspar was increased to 10 mg per but she felt tired on it and went back to 5 mg BId, has gained weight  10/15/18: taking bupar 5 mg- 2 tabs at [facility],  12/21/18: stable  March 2019, no issues   Takes 1- 5 mg BuSpar in the morning than 1 with lunch and 2 at night    July 2019:  Doing very well with present medications and feels much better since losing weight  Dec 2019 taking 2 buspar at night and 1/2 in the morning    June 2020, relatively stable with no suicidal or homicidal ideations   May 2021, depression is relatively well controlled however currently her anxiety is at exacerbations since [place] time   She stated everything is normal with no problems at home or work no changes in her medications and no over-the-counter things that she has introduced to her daily routine   Other than not eating well and putting on some weight she states everything is fine   She recently had some lab work and her thyroid is controlled and she is compliant with her medications   She was started on Cymbalta few months ago from another provider which she took for few days and stopped but she cannot remember why she stopped her what side effects she had   She takes BuSpar -total of 30 mg per day but cannot take a full dose in the morning because it makes her groggy  Celine Gunter is taking her lorazepam at night   She states she feels like in the middle of the night at her or jump out of her chest but then her  checks her blood pressure as well as heart rate since he is a respiratory therapist in everything is normal   She does not drink any caffeine or alcohol towards bedtime and does not know why this is happening   It does not happen every night and can happen in the daytime as well randomly   September 2021, flare up of depression due to pain  January 2022, significantly improved since she is sleeping better and pain is better with the addition of Elavil  She has decreased her BuSpar and is only taking it once or twice a day now instead of 3 times a day  No breakthrough symptoms  No HI or SI      July 2022, stable, no HI or SI  No exacerbations     Chronic pain, not too much better than previously   Follows with pain management    May 2021, follows with pain management and has a pain pump  September 2021, worst, in general   This increases her weight, lack of activity and depression  [place] is getting steroid injections right now and is scheduled for MRI from her pain management doctors  January 2022, received another steroid injection which has significantly helped but that was only about 3 weeks ago  She is not sure how long it lasts and can only get it every 3 months  Usually does not last her 3 months  Elavil has helped her sleep quality as well as helped with the pain and she has an appointment tomorrow for neuro surgery opinion for minimally invasive surgery    July 2022, in January she had an epidural injection which worked really well for her however her most recent1 in April has not given her any relief     Hyperlipidemia, increase in lipid levels due to increased weight and inactivity  July 2022, patient continues to gain weight  She has not had recent lab work for her cholesterol        L edema; resolved with diabetic socks at give her compression as well as daily Lasix  No issues   [place] 2022, was doing well and noticed an increase in edema a few weeks back  Increase her Lasix from 1 tablet daily due to and that has helped her a lot  She has been taking 40 milligrams Lasix for 2 weeks    She has not had any recent blood work            The following portions of the patient's history were reviewed and updated as appropriate: allergies, current medications, past family history, past medical history, past social history, past surgical history and problem list     Review of Systems      Constitutional:  Denies fever or chills   Eyes:  Denies double , blurry vision or eye pain  HENT:  Denies nasal congestion or sore throat   Respiratory:  Denies cough or shortness of breath or wheezing  Cardiovascular:  Denies palpitations or chest pain  GI:  Denies abdominal pain, nausea, or vomiting, no loose stools, no reflux  Integument:  Denies rash , no open areas  Neurologic:  Denies headache or focal weakness, no dizziness  : no dysuria, or hematuria        Current Outpatient Medications   Medication Sig Dispense Refill    amitriptyline (ELAVIL) 10 mg tablet Take 1 tablet (10 mg total) by mouth daily at bedtime 90 tablet 1    busPIRone (BUSPAR) 10 mg tablet Take 1 tablet (10 mg total) by mouth 3 (three) times a day 270 tablet 1    Cetirizine HCl (ZYRTEC PO) Take by mouth as needed      diclofenac (VOLTAREN) 75 mg EC tablet Take 1 tablet (75 mg total) by mouth 2 (two) times a day 180 tablet 1    Diclofenac Sodium (VOLTAREN) 1 % APPLY 2 GRAMS TO AFFECTED AREA 4 TIMES A DAY (Patient taking differently: as needed) 200 g 1    Esomeprazole Magnesium (NEXIUM PO) Take 1 capsule by mouth daily as needed       fluticasone (FLONASE) 50 mcg/act nasal spray 1 spray into each nostril daily (Patient taking differently: 1 spray into each nostril as needed) 16 g 3    furosemide (LASIX) 20 mg tablet Take 1 tablet (20 mg total) by mouth daily 90 tablet 1    LORazepam (ATIVAN) 1 mg tablet Take 1 5 tablets (1 5 mg total) by mouth daily at bedtime as needed for anxiety 135 tablet 1    methimazole (TAPAZOLE) 5 mg tablet Take 1 tablet (5 mg total) by mouth daily 90 tablet 1    potassium chloride (K-DUR,KLOR-CON) 10 mEq tablet Take 1 tablet (10 mEq total) by mouth daily 90 tablet 1    pregabalin (LYRICA) 50 mg capsule Take 1 capsule in the AM, 1 capsule in the PM and 2 capsules at bedtime 360 capsule 3    propranolol (INDERAL) 10 mg tablet Take 1 tablet (10 mg total) by mouth 2 (two) times a day 180 tablet 1    quinapril (ACCUPRIL) 20 mg tablet Take 1 tablet (20 mg total) by mouth 2 (two) times a day 180 tablet 1    RESTASIS 0 05 % ophthalmic emulsion Administer 1 drop to both eyes 2 (two) times a day  3    tiZANidine (ZANAFLEX) 4 mg tablet Take 1 tablet (4 mg total) by mouth 2 (two) times a day 180 tablet 1    valACYclovir (VALTREX) 500 mg tablet Take 1 tablet (500 mg total) by mouth daily as needed (Cold Sores) 30 tablet 1    metroNIDAZOLE (METROCREAM) 0 75 % cream Apply topically daily at bedtime (Patient not taking: No sig reported) 45 g 2    penciclovir (DENAVIR) 1 % cream Apply topically daily as needed (ulcer) (Patient not taking: No sig reported) 5 g 5     No current facility-administered medications for this visit  Objective:    /70 (BP Location: Left arm, Patient Position: Sitting, Cuff Size: Large)   Pulse 73   Temp 98 6 °F (37 °C) (Temporal)   Ht 5' 5" (1 651 m)   Wt 102 kg (224 lb)   LMP 11/26/2015   SpO2 98%   BMI 37 28 kg/m²        Physical Exam       Constitutional:  Well developed, well nourished, no acute distress, non-toxic appearance   Eyes:  PERRL, conjunctiva normal , non icteric sclera  HENT:  Atraumatic, oropharynx moist  Neck-  supple   Respiratory:  CTA b/l, normal breath sounds, no rales, no wheezing   Cardiovascular:  RRR, no murmurs, no LE edema b/l  GI:  Soft, nondistended, normal bowel sounds x 4, nontender, no organomegaly, no mass, no rebound, no guarding   Neurologic:  no focal deficits noted   Psychiatric:  Speech and behavior appropriate , AAO x 3        EKG: normal EKG, normal sinus rhythm   Left atrial enlargement      Thompsons Code, DO

## 2022-07-12 NOTE — PATIENT INSTRUCTIONS
Medicare Preventive Visit Patient Instructions  Thank you for completing your Welcome to Medicare Visit or Medicare Annual Wellness Visit today  Your next wellness visit will be due in one year (7/13/2023)  The screening/preventive services that you may require over the next 5-10 years are detailed below  Some tests may not apply to you based off risk factors and/or age  Screening tests ordered at today's visit but not completed yet may show as past due  Also, please note that scanned in results may not display below  Preventive Screenings:  Service Recommendations Previous Testing/Comments   Colorectal Cancer Screening  * Colonoscopy    * Fecal Occult Blood Test (FOBT)/Fecal Immunochemical Test (FIT)  * Fecal DNA/Cologuard Test  * Flexible Sigmoidoscopy Age: 54-65 years old   Colonoscopy: every 10 years (may be performed more frequently if at higher risk)  OR  FOBT/FIT: every 1 year  OR  Cologuard: every 3 years  OR  Sigmoidoscopy: every 5 years  Screening may be recommended earlier than age 48 if at higher risk for colorectal cancer  Also, an individualized decision between you and your healthcare provider will decide whether screening between the ages of 74-80 would be appropriate  Colonoscopy: 10/28/2013  FOBT/FIT: Not on file  Cologuard: Not on file  Sigmoidoscopy: Not on file    Screening Current     Breast Cancer Screening Age: 36 years old  Frequency: every 1-2 years  Not required if history of left and right mastectomy Mammogram: 02/18/2022    Screening Current   Cervical Cancer Screening Between the ages of 21-29, pap smear recommended once every 3 years  Between the ages of 33-67, can perform pap smear with HPV co-testing every 5 years     Recommendations may differ for women with a history of total hysterectomy, cervical cancer, or abnormal pap smears in past  Pap Smear: 04/01/2022    Screening Not Indicated   Hepatitis C Screening Once for adults born between 1945 and 1965  More frequently in patients at high risk for Hepatitis C Hep C Antibody: Not on file        Diabetes Screening 1-2 times per year if you're at risk for diabetes or have pre-diabetes Fasting glucose: 95 mg/dL   A1C: 5 6 %    Screening Current   Cholesterol Screening Once every 5 years if you don't have a lipid disorder  May order more often based on risk factors  Lipid panel: 09/18/2021    Screening Not Indicated  History Lipid Disorder     Other Preventive Screenings Covered by Medicare:  1  Abdominal Aortic Aneurysm (AAA) Screening: covered once if your at risk  You're considered to be at risk if you have a family history of AAA  2  Lung Cancer Screening: covers low dose CT scan once per year if you meet all of the following conditions: (1) Age 50-69; (2) No signs or symptoms of lung cancer; (3) Current smoker or have quit smoking within the last 15 years; (4) You have a tobacco smoking history of at least 30 pack years (packs per day multiplied by number of years you smoked); (5) You get a written order from a healthcare provider  3  Glaucoma Screening: covered annually if you're considered high risk: (1) You have diabetes OR (2) Family history of glaucoma OR (3)  aged 48 and older OR (3)  American aged 72 and older  3  Osteoporosis Screening: covered every 2 years if you meet one of the following conditions: (1) You're estrogen deficient and at risk for osteoporosis based off medical history and other findings; (2) Have a vertebral abnormality; (3) On glucocorticoid therapy for more than 3 months; (4) Have primary hyperparathyroidism; (5) On osteoporosis medications and need to assess response to drug therapy  · Last bone density test (DXA Scan): 06/11/2021   5  HIV Screening: covered annually if you're between the age of 15-65  Also covered annually if you are younger than 13 and older than 72 with risk factors for HIV infection   For pregnant patients, it is covered up to 3 times per pregnancy  Immunizations:  Immunization Recommendations   Influenza Vaccine Annual influenza vaccination during flu season is recommended for all persons aged >= 6 months who do not have contraindications   Pneumococcal Vaccine (Prevnar and Pneumovax)  * Prevnar = PCV13  * Pneumovax = PPSV23   Adults 25-60 years old: 1-3 doses may be recommended based on certain risk factors  Adults 72 years old: Prevnar (PCV13) vaccine recommended followed by Pneumovax (PPSV23) vaccine  If already received PPSV23 since turning 65, then PCV13 recommended at least one year after PPSV23 dose  Hepatitis B Vaccine 3 dose series if at intermediate or high risk (ex: diabetes, end stage renal disease, liver disease)   Tetanus (Td) Vaccine - COST NOT COVERED BY MEDICARE PART B Following completion of primary series, a booster dose should be given every 10 years to maintain immunity against tetanus  Td may also be given as tetanus wound prophylaxis  Tdap Vaccine - COST NOT COVERED BY MEDICARE PART B Recommended at least once for all adults  For pregnant patients, recommended with each pregnancy  Shingles Vaccine (Shingrix) - COST NOT COVERED BY MEDICARE PART B  2 shot series recommended in those aged 48 and above     Health Maintenance Due:      Topic Date Due    Hepatitis C Screening  Never done    HIV Screening  Never done    Breast Cancer Screening: Mammogram  02/18/2023    Colorectal Cancer Screening  10/28/2023    Cervical Cancer Screening  02/08/2024     Immunizations Due:      Topic Date Due    Pneumococcal Vaccine: 65+ Years (1 - PCV) Never done    COVID-19 Vaccine (4 - Booster for Pfizer series) 01/25/2022    Influenza Vaccine (1) 09/01/2022     Advance Directives   What are advance directives? Advance directives are legal documents that state your wishes and plans for medical care  These plans are made ahead of time in case you lose your ability to make decisions for yourself   Advance directives can apply to any medical decision, such as the treatments you want, and if you want to donate organs  What are the types of advance directives? There are many types of advance directives, and each state has rules about how to use them  You may choose a combination of any of the following:  · Living will: This is a written record of the treatment you want  You can also choose which treatments you do not want, which to limit, and which to stop at a certain time  This includes surgery, medicine, IV fluid, and tube feedings  · Durable power of  for healthcare [facility]): This is a written record that states who you want to make healthcare choices for you when you are unable to make them for yourself  This person, called a proxy, is usually a family member or a friend  You may choose more than 1 proxy  · Do not resuscitate (DNR) order:  A DNR order is used in case your heart stops beating or you stop breathing  It is a request not to have certain forms of treatment, such as CPR  A DNR order may be included in other types of advance directives  · Medical directive: This covers the care that you want if you are in a coma, near death, or unable to make decisions for yourself  You can list the treatments you want for each condition  Treatment may include pain medicine, surgery, blood transfusions, dialysis, IV or tube feedings, and a ventilator (breathing machine)  · Values history: This document has questions about your views, beliefs, and how you feel and think about life  This information can help others choose the care that you would choose  Why are advance directives important? An advance directive helps you control your care  Although spoken wishes may be used, it is better to have your wishes written down  Spoken wishes can be misunderstood, or not followed  Treatments may be given even if you do not want them  An advance directive may make it easier for your family to make difficult choices about your care  Weight Management   Why it is important to manage your weight:  Being overweight increases your risk of health conditions such as heart disease, high blood pressure, type 2 diabetes, and certain types of cancer  It can also increase your risk for osteoarthritis, sleep apnea, and other respiratory problems  Aim for a slow, steady weight loss  Even a small amount of weight loss can lower your risk of health problems  How to lose weight safely:  A safe and healthy way to lose weight is to eat fewer calories and get regular exercise  You can lose up about 1 pound a week by decreasing the number of calories you eat by 500 calories each day  Healthy meal plan for weight management:  A healthy meal plan includes a variety of foods, contains fewer calories, and helps you stay healthy  A healthy meal plan includes the following:  · Eat whole-grain foods more often  A healthy meal plan should contain fiber  Fiber is the part of grains, fruits, and vegetables that is not broken down by your body  Whole-grain foods are healthy and provide extra fiber in your diet  Some examples of whole-grain foods are whole-wheat breads and pastas, oatmeal, brown rice, and bulgur  · Eat a variety of vegetables every day  Include dark, leafy greens such as spinach, kale, pham greens, and mustard greens  Eat yellow and orange vegetables such as carrots, sweet potatoes, and winter squash  · Eat a variety of fruits every day  Choose fresh or canned fruit (canned in its own juice or light syrup) instead of juice  Fruit juice has very little or no fiber  · Eat low-fat dairy foods  Drink fat-free (skim) milk or 1% milk  Eat fat-free yogurt and low-fat cottage cheese  Try low-fat cheeses such as mozzarella and other reduced-fat cheeses  · Choose meat and other protein foods that are low in fat  Choose beans or other legumes such as split peas or lentils   Choose fish, skinless poultry (chicken or turkey), or lean cuts of red meat (beef or pork)  Before you cook meat or poultry, cut off any visible fat  · Use less fat and oil  Try baking foods instead of frying them  Add less fat, such as margarine, sour cream, regular salad dressing and mayonnaise to foods  Eat fewer high-fat foods  Some examples of high-fat foods include french fries, doughnuts, ice cream, and cakes  · Eat fewer sweets  Limit foods and drinks that are high in sugar  This includes candy, cookies, regular soda, and sweetened drinks  Exercise:  Exercise at least 30 minutes per day on most days of the week  Some examples of exercise include walking, biking, dancing, and swimming  You can also fit in more physical activity by taking the stairs instead of the elevator or parking farther away from stores  Ask your healthcare provider about the best exercise plan for you  © Copyright [employer] 2018 Information is for End User's use only and may not be sold, redistributed or otherwise used for commercial purposes   All illustrations and images included in CareNotes® are the copyrighted property of [employer]  or [address] [place] [address]

## 2022-07-12 NOTE — PROGRESS NOTES
Assessment and Plan:     Problem List Items Addressed This Visit        Endocrine    Hyperthyroidism       Cardiovascular and Mediastinum    Benign essential HTN    Relevant Medications    quinapril (ACCUPRIL) 20 mg tablet       Nervous and Auditory    Lumbar radiculopathy    Relevant Medications    amitriptyline (ELAVIL) 10 mg tablet       Musculoskeletal and Integument    Osteopenia of multiple sites    Relevant Orders    DXA bone density spine hip and pelvis    Vitamin D 25 hydroxy       Other    Primary insomnia    Asymptomatic premature menopause    Relevant Orders    DXA bone density spine hip and pelvis    Bilateral leg edema    Relevant Medications    potassium chloride (K-DUR,KLOR-CON) 10 mEq tablet    Welcome to Medicare preventive visit - Primary    Obesity, morbid ([address] )      Other Visit Diagnoses     Disorder of cartilage, unspecified         Relevant Orders    Vitamin D 25 hydroxy    Essential hypertension        Relevant Medications    quinapril (ACCUPRIL) 20 mg tablet        BMI Counseling: Body mass index is 37 28 kg/m²  The BMI is above normal  Nutrition recommendations include moderation in carbohydrate intake  Exercise recommendations include exercising 3-5 times per week  No pharmacotherapy was ordered  Rationale for BMI follow-up plan is due to patient being overweight or obese  Depression Screening and Follow-up Plan: Patient was screened for depression during today's encounter  They screened negative with a PHQ-2 score of 0  Preventive health issues were discussed with patient, and age appropriate screening tests were ordered as noted in patient's After Visit Summary  Personalized health advice and appropriate referrals for health education or preventive services given if needed, as noted in patient's After Visit Summary       History of Present Illness:     Patient presents for a Medicare Wellness Visit    HPI   Patient Care Team:  Kelly Coronel DO as PCP - General Zakiya Damon FARIDA Call MD Ronny Media, MD     Review of Systems:     Review of Systems     Problem List:     Patient Active Problem List   Diagnosis    Rosacea, acne    Lumbar degenerative disc disease    Lumbar radiculopathy    Anxiety    Benign essential HTN    Cervical radiculopathy    Disc disorder of cervical region    Lumbar facet arthropathy    Lumbar stenosis without neurogenic claudication    Myofascial pain syndrome    Pain syndrome, chronic    Primary osteoarthritis of right knee    Bulge of lumbar disc without myelopathy    Spondylolisthesis of lumbar region    Arthritis of right acromioclavicular joint    Adverse effect of adrenal cortical steroids, initial encounter    Calculus of gallbladder without cholecystitis without obstruction    Chronic right shoulder pain    Hyperthyroidism    Primary insomnia    Encounter for gynecological examination (general) (routine) without abnormal findings    Long term systemic steroid user    Asymptomatic premature menopause    Bilateral leg edema    Osteopenia of multiple sites    COVID-23    Welcome to Medicare preventive visit    Atrial enlargement, left    Obesity, morbid ([address] )      Past Medical and Surgical History:     Past Medical History:   Diagnosis Date    Anxiety     Cataract, bilateral     last assessed    Cervical radiculopathy     COVID-19 05/15/2022    Disc disease, degenerative, cervical     Herpes simplex infection     Hypertension     Insomnia     Low back pain     Lumbar degenerative disc disease     Lumbar facet arthropathy     Lumbar radiculopathy     Lumbar stenosis without neurogenic claudication     Mononucleosis     Myofascial pain dysfunction syndrome     Osteoarthritis     shoulder region - unspecified laterality - last assessed 2/1/14    Plantar fasciitis     Ptosis, both eyelids     last assessed 10/6/16    Ptosis, congenital, left     Retinal detachment     last assessed 12/11/14 right eye    Sacroiliitis ([address] )     Thyroid disease     Thyrotoxicosis     last assessed 5/17/13    Vertigo      Past Surgical History:   Procedure Laterality Date    ME SURG IMPLNT NEUROELECT,EPIDURAL Left 1/26/2016    Procedure: PLACEMENT OF THORACIC SPINAL CORD STIMULATOR WITH LEFT BUTTOCK IMPLANTABLE PULSE GENERATOR (IMPULSE MONITORING); Surgeon: Denny Stovall MD;  Location: [facility];  Service: Neurosurgery    RETINAL DETACHMENT SURGERY Right       Family History:     Family History   Problem Relation Age of Onset    Breast cancer Mother 80    COPD Mother     Hypertension Mother         essential    Heart attack Father         acute MI    Emphysema Father     Hypertension Father         essential    Other Father         prehypertension    No Known Problems Maternal Grandmother     No Known Problems Maternal Grandfather     No Known Problems Paternal Grandmother     No Known Problems Paternal Grandfather     No Known Problems Sister     No Known Problems Son     No Known Problems Sister     No Known Problems Paternal Aunt     No Known Problems Paternal Aunt     No Known Problems Paternal Aunt     No Known Problems Paternal Aunt     No Known Problems Paternal Aunt     Liver cancer Maternal Uncle       Social History:     Social History     Socioeconomic History    Marital status: /Civil Union     Spouse name: None    Number of children: None    Years of education: None    Highest education level: None   Occupational History    Occupation: Business owner     Comment: [employer]-full time working   Tobacco Use    Smoking status: Never Smoker    Smokeless tobacco: Never Used   Vaping Use    Vaping Use: Never used   Substance and Sexual Activity    Alcohol use:  Yes     Alcohol/week: 1 0 standard drink     Types: 1 Glasses of wine per week    Drug use: No    Sexual activity: Not Currently     Partners: Male   Other Topics Concern    None Social History Narrative    Daily caffeinated cola consumption    3 cups coffee/day     Social Determinants of Health     Financial Resource Strain: Not on file   Food Insecurity: Not on file   Transportation Needs: Not on file   Physical Activity: Not on file   Stress: Not on file   Social Connections: Not on file   Intimate Partner Violence: Not on file   Housing Stability: Not on file      Medications and Allergies:     Current Outpatient Medications   Medication Sig Dispense Refill    amitriptyline (ELAVIL) 10 mg tablet Take 1 tablet (10 mg total) by mouth daily at bedtime 90 tablet 1    busPIRone (BUSPAR) 10 mg tablet Take 1 tablet (10 mg total) by mouth 3 (three) times a day 270 tablet 1    Cetirizine HCl (ZYRTEC PO) Take by mouth as needed      diclofenac (VOLTAREN) 75 mg EC tablet Take 1 tablet (75 mg total) by mouth 2 (two) times a day 180 tablet 1    Diclofenac Sodium (VOLTAREN) 1 % APPLY 2 GRAMS TO AFFECTED AREA 4 TIMES A DAY (Patient taking differently: as needed) 200 g 1    Esomeprazole Magnesium (NEXIUM PO) Take 1 capsule by mouth daily as needed       fluticasone (FLONASE) 50 mcg/act nasal spray 1 spray into each nostril daily (Patient taking differently: 1 spray into each nostril as needed) 16 g 3    furosemide (LASIX) 20 mg tablet Take 1 tablet (20 mg total) by mouth daily 90 tablet 1    LORazepam (ATIVAN) 1 mg tablet Take 1 5 tablets (1 5 mg total) by mouth daily at bedtime as needed for anxiety 135 tablet 1    methimazole (TAPAZOLE) 5 mg tablet Take 1 tablet (5 mg total) by mouth daily 90 tablet 1    potassium chloride (K-DUR,KLOR-CON) 10 mEq tablet Take 1 tablet (10 mEq total) by mouth daily 90 tablet 1    pregabalin (LYRICA) 50 mg capsule Take 1 capsule in the AM, 1 capsule in the PM and 2 capsules at bedtime 360 capsule 3    propranolol (INDERAL) 10 mg tablet Take 1 tablet (10 mg total) by mouth 2 (two) times a day 180 tablet 1    quinapril (ACCUPRIL) 20 mg tablet Take 1 tablet (20 mg total) by mouth 2 (two) times a day 180 tablet 1    RESTASIS 0 05 % ophthalmic emulsion Administer 1 drop to both eyes 2 (two) times a day  3    tiZANidine (ZANAFLEX) 4 mg tablet Take 1 tablet (4 mg total) by mouth 2 (two) times a day 180 tablet 1    valACYclovir (VALTREX) 500 mg tablet Take 1 tablet (500 mg total) by mouth daily as needed (Cold Sores) 30 tablet 1    metroNIDAZOLE (METROCREAM) 0 75 % cream Apply topically daily at bedtime (Patient not taking: No sig reported) 45 g 2    penciclovir (DENAVIR) 1 % cream Apply topically daily as needed (ulcer) (Patient not taking: No sig reported) 5 g 5     No current facility-administered medications for this visit  Allergies   Allergen Reactions    Other Dermatitis     Adhesive tape - please use ONLY PAPER TAPE    Scopolamine Other (See Comments)     Severe, debilitating headache      Flexeril [Cyclobenzaprine] Dizziness and Fatigue    Naproxen      Other reaction(s): Unknown Allergic Reaction  Annotation - [id number]: nightmares    Penicillins      Other reaction(s): Unknown Allergic Reaction  Annotation - [id number]: childhood reaction    Promethazine-Codeine      Reaction Date: [id number]; Annotation - [id number]: nightmares;  Annotation - [id number]: nightmares    Tramadol      Other reaction(s): Unknown Allergic Reaction  Annotation - [id number]: PT HAS NIGHTMARES FROM TRAMADOL    Wound Dressing Adhesive Rash      Immunizations:     Immunization History   Administered Date(s) Administered    COVID-19 PFIZER VACCINE 0 3 ML IM 02/19/2021, 03/12/2021, 10/25/2021    Influenza Quadrivalent Preservative Free 3 years and older IM 01/08/2018    Influenza Quadrivalent, 6-35 Months IM 11/14/2016    Influenza, injectable, quadrivalent, preservative free 0 5 mL 10/15/2018, 09/15/2020    Influenza, recombinant, quadrivalent,injectable, preservative free 11/06/2019, 01/18/2022    Influenza, seasonal, injectable 02/27/2011, 11/12/2013, 12/11/2014, 11/02/2015    Td (adult), adsorbed 12/30/2013    Zoster Vaccine Recombinant 09/15/2020, 01/02/2021      Health Maintenance:         Topic Date Due    Hepatitis C Screening  Never done    HIV Screening  Never done    Breast Cancer Screening: Mammogram  02/18/2023    Colorectal Cancer Screening  10/28/2023    Cervical Cancer Screening  02/08/2024         Topic Date Due    Pneumococcal Vaccine: 65+ Years (1 - PCV) Never done    COVID-19 Vaccine (4 - Booster for Pfizer series) 01/25/2022    Influenza Vaccine (1) 09/01/2022      Medicare Screening Tests and Risk Assessments:     Rahatanais Joselyncassandra is here for her Welcome to Medicare visit  Last Medicare Wellness visit information reviewed, patient interviewed and updates made to the record today  Health Risk Assessment:   Patient rates overall health as good  Patient feels that their physical health rating is same  Patient is very satisfied with their life  Eyesight was rated as slightly worse  Hearing was rated as same  Patient feels that their emotional and mental health rating is slightly better  Patients states they are never, rarely angry  Patient states they are sometimes unusually tired/fatigued  Pain experienced in the last 7 days has been a lot  Patient's pain rating has been 7/10  Patient states that she has experienced no weight loss or gain in last 6 months  Depression Screening:   PHQ-2 Score: 0      Fall Risk Screening: In the past year, patient has experienced: no history of falling in past year      Urinary Incontinence Screening:   Patient has not leaked urine accidently in the last six months  Home Safety:  Patient has trouble with stairs inside or outside of their home  Patient has working smoke alarms and has no working carbon monoxide detector  Home safety hazards include: none  Nutrition:   Current diet is Regular  Medications:   Patient is currently taking over-the-counter supplements  OTC medications include: vitamins  Patient is able to manage medications  Activities of Daily Living (ADLs)/Instrumental Activities of Daily Living (IADLs):   Walk and transfer into and out of bed and chair?: Yes  Dress and groom yourself?: Yes    Bathe or shower yourself?: Yes    Feed yourself? Yes  Do your laundry/housekeeping?: No  Manage your money, pay your bills and track your expenses?: Yes  Make your own meals?: Yes    Do your own shopping?: Yes    Durable Medical Equipment Suppliers  [employer]    Previous Hospitalizations:   Any hospitalizations or ED visits within the last 12 months?: No      Advance Care Planning:   Living will: Yes    Durable POA for healthcare: Yes    Advanced directive: Yes      Cognitive Screening:   Provider or family/friend/caregiver concerned regarding cognition?: No    PREVENTIVE SCREENINGS      Cardiovascular Screening:    General: Screening Not Indicated and History Lipid Disorder      Diabetes Screening:     General: Screening Current      Colorectal Cancer Screening:     General: Screening Current      Breast Cancer Screening:     General: Screening Current      Cervical Cancer Screening:    General: Screening Not Indicated      Lung Cancer Screening:     General: Screening Not Indicated    Screening, Brief Intervention, and Referral to Treatment (SBIRT)    Screening  Typical number of drinks in a day: 0  Typical number of drinks in a week: 0  Interpretation: Low risk drinking behavior  AUDIT-C Screenin) How often did you have a drink containing alcohol in the past year? monthly or less  2) How many drinks did you have on a typical day when you were drinking in the past year?  1 to 2  3) How often did you have 6 or more drinks on one occasion in the past year? never    AUDIT-C Score: 1  Interpretation: Score 0-2 (female): Negative screen for alcohol misuse    Single Item Drug Screening:  How often have you used an illegal drug (including marijuana) or a prescription medication for non-medical reasons in the past year? never    Single Item Drug Screen Score: 0  Interpretation: Negative screen for possible drug use disorder    Brief Intervention  Alcohol & drug use screenings were reviewed  No concerns regarding substance use disorder identified  Other Counseling Topics:   Car/seat belt/driving safety, skin self-exam, sunscreen and regular weightbearing exercise and calcium and vitamin D intake        Visual Acuity Screening    Right eye Left eye Both eyes   Without correction:      With correction: 20/30 20/30 20/25        Physical Exam:     /70 (BP Location: Left arm, Patient Position: Sitting, Cuff Size: Large)   Pulse 73   Temp 98 6 °F (37 °C) (Temporal)   Ht 5' 5" (1 651 m)   Wt 102 kg (224 lb)   LMP 11/26/2015   SpO2 98%   BMI 37 28 kg/m²     Physical Exam     Cleavon Carrier, DO

## 2022-07-14 NOTE — TELEPHONE ENCOUNTER
S/w pt, she had her pneumonia vaccine 2 days ago and is scheduled for an PRATIK next week, pt asked if that will be a problem  RN advised we will have to reschedule her injection 2 weeks after the vaccine, as steroids can weaken the effects of the vaccine  Pt very upset and asked to get in asap due to severity of pain  RN advised with d/w FQ and call back

## 2022-07-14 NOTE — TELEPHONE ENCOUNTER
Patient called asking if her pneumonia vaccine will interfere with her injection- patients procedure is on 7/21 , pt had vaccine on 7/12       [phone]

## 2022-07-26 DIAGNOSIS — B00.1 RECURRENT COLD SORES: ICD-10-CM

## 2022-07-26 RX ORDER — PENCICLOVIR 1 %
CREAM (GRAM) TOPICAL DAILY PRN
Qty: 5 G | Refills: 5 | Status: SHIPPED | OUTPATIENT
Start: 2022-07-26

## 2022-07-26 NOTE — TELEPHONE ENCOUNTER
Patient has been prescribed Denavir cream 1% in the past by her dentist   Per medicare if the dentist prescribes this it will not be covered   Dentist stated to patient to contact pcp for continuation of refills     Please advise thank you

## 2022-07-28 ENCOUNTER — HOSPITAL ENCOUNTER (OUTPATIENT)
Dept: RADIOLOGY | Facility: CLINIC | Age: 67
Discharge: HOME | End: 2022-07-28
Admitting: ANESTHESIOLOGY
Payer: MEDICARE

## 2022-07-28 VITALS
OXYGEN SATURATION: 97 % | DIASTOLIC BLOOD PRESSURE: 80 MMHG | RESPIRATION RATE: 20 BRPM | TEMPERATURE: 97.4 F | HEART RATE: 71 BPM | SYSTOLIC BLOOD PRESSURE: 140 MMHG

## 2022-07-28 DIAGNOSIS — M51.16 LUMBAR DISC DISEASE WITH RADICULOPATHY: ICD-10-CM

## 2022-07-28 DIAGNOSIS — M48.062 SPINAL STENOSIS, LUMBAR REGION, WITH NEUROGENIC CLAUDICATION: ICD-10-CM

## 2022-07-28 PROCEDURE — 64484 NJX AA&/STRD TFRM EPI L/S EA: CPT | Performed by: ANESTHESIOLOGY

## 2022-07-28 PROCEDURE — A9585 GADOBUTROL INJECTION: HCPCS | Performed by: ANESTHESIOLOGY

## 2022-07-28 PROCEDURE — 64483 NJX AA&/STRD TFRM EPI L/S 1: CPT | Performed by: ANESTHESIOLOGY

## 2022-07-28 RX ORDER — 0.9 % SODIUM CHLORIDE 0.9 %
4 VIAL (ML) INJECTION ONCE
Status: COMPLETED | OUTPATIENT
Start: 2022-07-28 | End: 2022-07-28

## 2022-07-28 RX ORDER — BUPIVACAINE HCL/PF 2.5 MG/ML
2 VIAL (ML) INJECTION ONCE
Status: COMPLETED | OUTPATIENT
Start: 2022-07-28 | End: 2022-07-28

## 2022-07-28 RX ORDER — METHYLPREDNISOLONE ACETATE 80 MG/ML
80 INJECTION, SUSPENSION INTRA-ARTICULAR; INTRALESIONAL; INTRAMUSCULAR; PARENTERAL; SOFT TISSUE ONCE
Status: COMPLETED | OUTPATIENT
Start: 2022-07-28 | End: 2022-07-28

## 2022-07-28 RX ADMIN — Medication 4 ML: at 10:59

## 2022-07-28 RX ADMIN — METHYLPREDNISOLONE ACETATE 80 MG: 80 INJECTION, SUSPENSION INTRA-ARTICULAR; INTRALESIONAL; INTRAMUSCULAR; PARENTERAL; SOFT TISSUE at 11:04

## 2022-07-28 RX ADMIN — BUPIVACAINE HYDROCHLORIDE 2 ML: 2.5 INJECTION, SOLUTION EPIDURAL; INFILTRATION; INTRACAUDAL at 11:04

## 2022-07-28 RX ADMIN — GADOBUTROL 1 ML: 604.72 INJECTION INTRAVENOUS at 11:04

## 2022-07-28 NOTE — DISCHARGE INSTR - LAB
Epidural Steroid Injection   WHAT YOU NEED TO KNOW:   An epidural steroid injection (PRATIK) is a procedure to inject steroid medicine into the epidural space  The epidural space is between your spinal cord and vertebrae  Steroids reduce inflammation and fluid buildup in your spine that may be causing pain  You may be given pain medicine along with the steroids  ACTIVITY  Do not drive or operate machinery today  No strenuous activity today - bending, lifting, etc   You may resume normal activites starting tomorrow - start slowly and as tolerated  You may shower today, but no tub baths or hot tubs  You may have numbness for several hours from the local anesthetic  Please use caution and common sense, especially with weight-bearing activities  CARE OF THE INJECTION SITE  If you have soreness or pain, apply ice to the area today (20 minutes on/20 minutes off)  Starting tomorrow, you may use warm, moist heat or ice if needed  You may have an increase or change in your discomfort for 36-48 hours after your treatment  Apply ice and continue with any pain medication you have been prescribed  Notify the [facility] if you have any of the following: redness, drainage, swelling, headache, stiff neck or fever above 100°F     SPECIAL INSTRUCTIONS  Our office will contact you in approximately 7 days for a progress report  MEDICATIONS  Continue to take all routine medications  Our office may have instructed you to hold some medications  As no general anesthesia was used in today's procedure, you should not experience any side effects related to anesthesia  If you have a problem specifically related to your procedure, please call our office at [phone]  Problems not related to your procedure should be directed to your primary care physician

## 2022-07-28 NOTE — H&P
History of Present Illness: The patient is a 74 y o  female who presents with complaints of left lower back and leg pain is here today for left L3, left L4, left L5 transforaminal epidural steroid injection      Patient Active Problem List   Diagnosis    Rosacea, acne    Lumbar degenerative disc disease    Lumbar radiculopathy    Anxiety    Benign essential HTN    Cervical radiculopathy    Disc disorder of cervical region    Lumbar facet arthropathy    Lumbar stenosis without neurogenic claudication    Myofascial pain syndrome    Pain syndrome, chronic    Primary osteoarthritis of right knee    Bulge of lumbar disc without myelopathy    Spondylolisthesis of lumbar region    Arthritis of right acromioclavicular joint    Adverse effect of adrenal cortical steroids, initial encounter    Calculus of gallbladder without cholecystitis without obstruction    Chronic right shoulder pain    Hyperthyroidism    Primary insomnia    Encounter for gynecological examination (general) (routine) without abnormal findings    Long term systemic steroid user    Asymptomatic premature menopause    Bilateral leg edema    Osteopenia of multiple sites    COVID-23    Welcome to Medicare preventive visit    Atrial enlargement, left    Obesity, morbid ([address] )       Past Medical History:   Diagnosis Date    Anxiety     Cataract, bilateral     last assessed    Cervical radiculopathy     COVID-19 05/15/2022    Disc disease, degenerative, cervical     Herpes simplex infection     Hypertension     Insomnia     Low back pain     Lumbar degenerative disc disease     Lumbar facet arthropathy     Lumbar radiculopathy     Lumbar stenosis without neurogenic claudication     Mononucleosis     Myofascial pain dysfunction syndrome     Osteoarthritis     shoulder region - unspecified laterality - last assessed 2/1/14    Plantar fasciitis     Ptosis, both eyelids     last assessed 10/6/16    Ptosis, congenital, left     Retinal detachment     last assessed 12/11/14 right eye    Sacroiliitis ([address] )     Thyroid disease     Thyrotoxicosis     last assessed 5/17/13    Vertigo        Past Surgical History:   Procedure Laterality Date    [facility] SURG IMPLNT NEUROELECT,EPIDURAL Left 1/26/2016    Procedure: PLACEMENT OF THORACIC SPINAL CORD STIMULATOR WITH LEFT BUTTOCK IMPLANTABLE PULSE GENERATOR (IMPULSE MONITORING);   Surgeon: Олег Mares MD;  Location: [facility];  Service: Neurosurgery    RETINAL DETACHMENT SURGERY Right          Current Outpatient Medications:     amitriptyline (ELAVIL) 10 mg tablet, Take 1 tablet (10 mg total) by mouth daily at bedtime, Disp: 90 tablet, Rfl: 1    busPIRone (BUSPAR) 10 mg tablet, Take 1 tablet (10 mg total) by mouth 3 (three) times a day, Disp: 270 tablet, Rfl: 1    Cetirizine HCl (ZYRTEC PO), Take by mouth as needed, Disp: , Rfl:     diclofenac (VOLTAREN) 75 mg EC tablet, Take 1 tablet (75 mg total) by mouth 2 (two) times a day, Disp: 180 tablet, Rfl: 1    Diclofenac Sodium (VOLTAREN) 1 %, APPLY 2 GRAMS TO AFFECTED AREA 4 TIMES A DAY (Patient taking differently: as needed), Disp: 200 g, Rfl: 1    Esomeprazole Magnesium (NEXIUM PO), Take 1 capsule by mouth daily as needed , Disp: , Rfl:     fluticasone (FLONASE) 50 mcg/act nasal spray, 1 spray into each nostril daily (Patient taking differently: 1 spray into each nostril as needed), Disp: 16 g, Rfl: 3    furosemide (LASIX) 20 mg tablet, Take 1 tablet (20 mg total) by mouth daily, Disp: 90 tablet, Rfl: 1    LORazepam (ATIVAN) 1 mg tablet, Take 1 5 tablets (1 5 mg total) by mouth daily at bedtime as needed for anxiety, Disp: 135 tablet, Rfl: 1    methimazole (TAPAZOLE) 5 mg tablet, Take 1 tablet (5 mg total) by mouth daily, Disp: 90 tablet, Rfl: 1    metroNIDAZOLE (METROCREAM) 0 75 % cream, Apply topically daily at bedtime (Patient not taking: No sig reported), Disp: 45 g, Rfl: 2    penciclovir (Denavir) 1 % cream, Apply topically daily as needed (ulcer), Disp: 5 g, Rfl: 5    potassium chloride (K-DUR,KLOR-CON) 10 mEq tablet, Take 1 tablet (10 mEq total) by mouth daily, Disp: 90 tablet, Rfl: 1    pregabalin (LYRICA) 50 mg capsule, Take 1 capsule in the AM, 1 capsule in the PM and 2 capsules at bedtime, Disp: 360 capsule, Rfl: 3    propranolol (INDERAL) 10 mg tablet, Take 1 tablet (10 mg total) by mouth 2 (two) times a day, Disp: 180 tablet, Rfl: 1    quinapril (ACCUPRIL) 20 mg tablet, Take 1 tablet (20 mg total) by mouth 2 (two) times a day, Disp: 180 tablet, Rfl: 1    RESTASIS 0 05 % ophthalmic emulsion, Administer 1 drop to both eyes 2 (two) times a day, Disp: , Rfl: 3    tiZANidine (ZANAFLEX) 4 mg tablet, Take 1 tablet (4 mg total) by mouth 2 (two) times a day, Disp: 180 tablet, Rfl: 1    valACYclovir (VALTREX) 500 mg tablet, Take 1 tablet (500 mg total) by mouth daily as needed (Cold Sores), Disp: 30 tablet, Rfl: 1  No current facility-administered medications for this encounter  Allergies   Allergen Reactions    Other Dermatitis     Adhesive tape - please use ONLY PAPER TAPE    Scopolamine Other (See Comments)     Severe, debilitating headache      Flexeril [Cyclobenzaprine] Dizziness and Fatigue    Naproxen      Other reaction(s): Unknown Allergic Reaction  Annotation - [id number]: nightmares    Penicillins      Other reaction(s): Unknown Allergic Reaction  Annotation - [id number]: childhood reaction    Promethazine-Codeine      Reaction Date: [id number]; Annotation - [id number]: nightmares;  Annotation - [id number]: nightmares    Tramadol      Other reaction(s): Unknown Allergic Reaction  Annotation - [id number]: PT HAS NIGHTMARES FROM TRAMADOL    Wound Dressing Adhesive Rash       Physical Exam:   Vitals:    07/28/22 1048   BP: 122/80   Pulse: 70   Resp: 20   Temp: (!) 97 4 °F (36 3 °C)   SpO2: 95%     General: Awake, Alert, Oriented x 3, Mood and affect appropriate  Respiratory: Respirations even and unlabored  Cardiovascular: Peripheral pulses intact; no edema  Musculoskeletal Exam:  Left lower back and leg pain    ASA Score: 3    Patient/Chart Verification  Patient ID Verified: Verbal  Consents Confirmed: To be obtained in the Pre-Procedure area  Interval H&P(within 24 hr) Complete (required for Outpatients and Surgery Admit only): To be obtained in the Pre-Procedure area  Allergies Reviewed: Yes  Anticoag/NSAID held?: NA  Currently on antibiotics?: No    Assessment:   1  Lumbar disc disease with radiculopathy    2   Spinal stenosis, lumbar region, with neurogenic claudication        Plan: Lt  L3, L4, L5 TF PRATIK

## 2022-08-04 ENCOUNTER — TELEPHONE (OUTPATIENT)
Dept: RADIOLOGY | Facility: CLINIC | Age: 67
End: 2022-08-04

## 2022-08-10 ENCOUNTER — TELEPHONE (OUTPATIENT)
Dept: PAIN MEDICINE | Facility: CLINIC | Age: 67
End: 2022-08-10

## 2022-08-10 NOTE — TELEPHONE ENCOUNTER
Patient   [phone]  Dr STEELE    Patient called in stating that she would like to move forward with scheduling te shoulder injection

## 2022-08-11 NOTE — TELEPHONE ENCOUNTER
Pt called asking for Rt shoulder inj to be scheduled  Pt aware FQ is out of office until 8/16, pt aware order will be placed upon FQ return  Last Rt shoulder inj was 10/2020

## 2022-08-17 DIAGNOSIS — F41.9 ANXIETY: ICD-10-CM

## 2022-08-17 DIAGNOSIS — E78.00 HYPERCHOLESTEREMIA: ICD-10-CM

## 2022-08-17 DIAGNOSIS — G89.4 PAIN SYNDROME, CHRONIC: ICD-10-CM

## 2022-08-18 RX ORDER — ATORVASTATIN CALCIUM 10 MG/1
10 TABLET, FILM COATED ORAL
Qty: 90 TABLET | Refills: 1 | Status: SHIPPED | OUTPATIENT
Start: 2022-08-18

## 2022-08-18 RX ORDER — LORAZEPAM 1 MG/1
1.5 TABLET ORAL
Qty: 135 TABLET | Refills: 1 | Status: SHIPPED | OUTPATIENT
Start: 2022-08-18

## 2022-08-18 RX ORDER — DICLOFENAC SODIUM 75 MG/1
75 TABLET, DELAYED RELEASE ORAL 2 TIMES DAILY
Qty: 180 TABLET | Refills: 1 | Status: SHIPPED | OUTPATIENT
Start: 2022-08-18

## 2022-08-19 DIAGNOSIS — L71.9 ROSACEA, ACNE: Primary | ICD-10-CM

## 2022-08-19 RX ORDER — DOXYCYCLINE HYCLATE 100 MG/1
100 CAPSULE ORAL EVERY 12 HOURS SCHEDULED
Qty: 180 CAPSULE | Refills: 0 | Status: SHIPPED | OUTPATIENT
Start: 2022-08-19 | End: 2022-11-17

## 2022-08-19 NOTE — TELEPHONE ENCOUNTER
NOV: 23      Patient calling for a refill of her doxycycline hyclate 100 mg 2 times daily   Looks like it  21, do you want her to continue

## 2022-08-20 ENCOUNTER — OFFICE VISIT (OUTPATIENT)
Dept: URGENT CARE | Facility: MEDICAL CENTER | Age: 67
End: 2022-08-20
Payer: MEDICARE

## 2022-08-20 VITALS
RESPIRATION RATE: 18 BRPM | HEART RATE: 82 BPM | DIASTOLIC BLOOD PRESSURE: 83 MMHG | OXYGEN SATURATION: 96 % | HEIGHT: 66 IN | TEMPERATURE: 98 F | SYSTOLIC BLOOD PRESSURE: 163 MMHG | BODY MASS INDEX: 35.36 KG/M2 | WEIGHT: 220 LBS

## 2022-08-20 DIAGNOSIS — L23.7 POISON IVY DERMATITIS: Primary | ICD-10-CM

## 2022-08-20 PROCEDURE — G0463 HOSPITAL OUTPT CLINIC VISIT: HCPCS | Performed by: PHYSICIAN ASSISTANT

## 2022-08-20 PROCEDURE — 99213 OFFICE O/P EST LOW 20 MIN: CPT | Performed by: PHYSICIAN ASSISTANT

## 2022-08-20 RX ORDER — PREDNISONE 20 MG/1
40 TABLET ORAL DAILY
Qty: 10 TABLET | Refills: 0 | Status: SHIPPED | OUTPATIENT
Start: 2022-08-20 | End: 2022-08-25

## 2022-08-20 NOTE — PATIENT INSTRUCTIONS
Poison ivy dermatitis  Prednisone once daily x5 days  Follow up with PCP in 3-5 days  Proceed to  ER if symptoms worsen  Poison Ivy   WHAT YOU NEED TO KNOW:   Poison ivy is a plant that can cause an itchy, uncomfortable rash on your skin  Poison ivy grows as a shrub or vine in woods, fields, and areas of thick [place]  It has 3 bright green leaves on each stem that turn red in [place]  DISCHARGE INSTRUCTIONS:   Medicines:   Antiseptic or drying creams or ointments: These medicines may be used to dry out the rash and decrease the itching  These products may be available without a doctor's order  Steroids: This medicine helps decrease itching and inflammation  It can be given as a cream to apply to your skin or as a pill  Antihistamines: This medicine may help decrease itching and help you sleep  It is available without a doctor's order  Take your medicine as directed  Contact your healthcare provider if you think your medicine is not helping or if you have side effects  Tell him of her if you are allergic to any medicine  Keep a list of the medicines, vitamins, and herbs you take  Include the amounts, and when and why you take them  Bring the list or the pill bottles to follow-up visits  Carry your medicine list with you in case of an emergency  Follow up with your doctor as directed:  Write down your questions so you remember to ask them during your visits  How your poison ivy rash spreads: You cannot spread poison ivy by touching your rash or the liquid from your blisters  Poison ivy is spread only if you scratch your skin while it still has oil on it  You may think your rash is spreading because new rashes appear over a number of days  This happens because areas covered by thin skin break out in a rash first  Your face or forearms may develop a rash before thicker areas, such as the palms of your hands  Self-care:   Keep your rash clean and dry:  Wash it with soap and water   Gently pat it dry with a clean towel  Try not to scratch or rub your rash: This can cause your skin to become infected  Use a compress on your rash:  Dip a clean washcloth in cool water  Wring it out and place it on your rash  Leave the washcloth on your skin for 15 minutes  Do this at least 3 times per day  Take a cornstarch or oatmeal bath: If your rash is too large to cover with wet washcloths, take 3 or 4 cornstarch baths daily  Mix 1 pound of cornstarch with a little water to make a paste  Add the paste to a tub full of water and mix well  You may also use colloidal oatmeal in the bath water  Use lukewarm water  Avoid hot water because it may cause your itching to increase  Prevent a poison ivy rash in the future:   Wear skin protection:  Wear long pants, a long-sleeved shirt, and gloves  Use a skin block lotion to protect your skin from poison ivy oil  You can find this at a drugstore without a prescription  Wash clothing after possible exposure: If you think you have been near a poison ivy plant, wash the clothes you were wearing separately from other clothes  Rinse the washing machine well after you take the clothes out  Scrub boots and shoes with warm, soapy water  Dry clean items and clothing that you cannot wash in water  Poison ivy oil is sticky and can stay on surfaces for a long time  It can cause a new rash even years later  Bathe your pet:  Use warm water and shampoo on your pet's fur  This will prevent the spread of oil to your skin, car, and home  Wear long sleeves, long pants, and gloves while washing pets or any items that may have oil on them  Reduce exposure to poison ivy:  Do not touch plants that look like poison ivy  Keep your yard free of poison ivy  While protecting your skin, remove the plant and the roots  Place them in a plastic bag and seal the bag tightly  Do not burn poison ivy plants: This can spread the oil through the air   If you breathe the oil into your lungs, you could have swelling and serious breathing problems  Oil that clings to the fire [place] can land on your skin and cause a rash  Contact your healthcare provider if:   You have pus, soft yellow scabs, or tenderness on the rash  The itching gets worse or keeps you awake at night  The rash covers more than 1/4 of your skin or spreads to your eyes, mouth, or genital area  The rash is not better after 2 to 3 weeks  You have tender, swollen glands on the sides of your neck  You have swelling in your arms and legs  You have questions or concerns about your condition or care  Return to the emergency department if:   You have a fever  You have redness, swelling, and tenderness around the rash  You have trouble breathing  © Copyright [employer] 2022 Information is for End User's use only and may not be sold, redistributed or otherwise used for commercial purposes  All illustrations and images included in CareNotes® are the copyrighted property of A D A M , Inc  or Jose Elias Siegel   The above information is an  only  It is not intended as medical advice for individual conditions or treatments  Talk to your doctor, nurse or pharmacist before following any medical regimen to see if it is safe and effective for you

## 2022-08-20 NOTE — PROGRESS NOTES
[facility]        NAME: Kishore Sandoval is a 74 y o  female  : 1955    MRN: [id number]  DATE: 2022  TIME: 10:12 AM    Assessment and Plan   Poison ivy dermatitis [L23 7]  1  Poison ivy dermatitis  predniSONE 20 mg tablet         Patient Instructions     Poison ivy dermatitis  Prednisone once daily x5 days  Follow up with PCP in 3-5 days  Proceed to  ER if symptoms worsen  Chief Complaint     Chief Complaint   Patient presents with    Rash     Poison rash to her face and chest           History of Present Illness       62-year-old female who presents complaining of rash that has slowly been spreading after working in the garden  Denies fevers, chills, sore throat      Review of Systems   Review of Systems   Constitutional: Negative  HENT: Negative  Eyes: Negative  Respiratory: Negative  Negative for cough, chest tightness, shortness of breath, wheezing and stridor  Cardiovascular: Negative  Negative for chest pain, palpitations and leg swelling  Skin: Positive for rash           Current Medications       Current Outpatient Medications:     amitriptyline (ELAVIL) 10 mg tablet, Take 1 tablet (10 mg total) by mouth daily at bedtime, Disp: 90 tablet, Rfl: 1    atorvastatin (LIPITOR) 10 mg tablet, Take 1 tablet (10 mg total) by mouth daily at bedtime, Disp: 90 tablet, Rfl: 1    busPIRone (BUSPAR) 10 mg tablet, Take 1 tablet (10 mg total) by mouth 3 (three) times a day, Disp: 270 tablet, Rfl: 1    Cetirizine HCl (ZYRTEC PO), Take by mouth as needed, Disp: , Rfl:     diclofenac (VOLTAREN) 75 mg EC tablet, Take 1 tablet (75 mg total) by mouth 2 (two) times a day, Disp: 180 tablet, Rfl: 1    Diclofenac Sodium (VOLTAREN) 1 %, APPLY 2 GRAMS TO AFFECTED AREA 4 TIMES A DAY (Patient taking differently: as needed), Disp: 200 g, Rfl: 1    doxycycline hyclate (VIBRAMYCIN) 100 mg capsule, Take 1 capsule (100 mg total) by mouth every 12 (twelve) hours, Disp: 180 capsule, Rfl: 0    Esomeprazole Magnesium (NEXIUM PO), Take 1 capsule by mouth daily as needed , Disp: , Rfl:     fluticasone (FLONASE) 50 mcg/act nasal spray, 1 spray into each nostril daily (Patient taking differently: 1 spray into each nostril as needed), Disp: 16 g, Rfl: 3    furosemide (LASIX) 20 mg tablet, Take 1 tablet (20 mg total) by mouth daily, Disp: 90 tablet, Rfl: 1    LORazepam (ATIVAN) 1 mg tablet, Take 1 5 tablets (1 5 mg total) by mouth daily at bedtime as needed for anxiety, Disp: 135 tablet, Rfl: 1    methimazole (TAPAZOLE) 5 mg tablet, Take 1 tablet (5 mg total) by mouth daily, Disp: 90 tablet, Rfl: 1    penciclovir (Denavir) 1 % cream, Apply topically daily as needed (ulcer), Disp: 5 g, Rfl: 5    potassium chloride (K-DUR,KLOR-CON) 10 mEq tablet, Take 1 tablet (10 mEq total) by mouth daily, Disp: 90 tablet, Rfl: 1    predniSONE 20 mg tablet, Take 2 tablets (40 mg total) by mouth daily for 5 days, Disp: 10 tablet, Rfl: 0    pregabalin (LYRICA) 50 mg capsule, Take 1 capsule in the AM, 1 capsule in the PM and 2 capsules at bedtime, Disp: 360 capsule, Rfl: 3    propranolol (INDERAL) 10 mg tablet, Take 1 tablet (10 mg total) by mouth 2 (two) times a day, Disp: 180 tablet, Rfl: 1    quinapril (ACCUPRIL) 20 mg tablet, Take 1 tablet (20 mg total) by mouth 2 (two) times a day, Disp: 180 tablet, Rfl: 1    RESTASIS 0 05 % ophthalmic emulsion, Administer 1 drop to both eyes 2 (two) times a day, Disp: , Rfl: 3    tiZANidine (ZANAFLEX) 4 mg tablet, Take 1 tablet (4 mg total) by mouth 2 (two) times a day, Disp: 180 tablet, Rfl: 1    metroNIDAZOLE (METROCREAM) 0 75 % cream, Apply topically daily at bedtime (Patient not taking: No sig reported), Disp: 45 g, Rfl: 2    valACYclovir (VALTREX) 500 mg tablet, Take 1 tablet (500 mg total) by mouth daily as needed (Cold Sores), Disp: 30 tablet, Rfl: 1    Current Allergies     Allergies as of 08/20/2022 - Reviewed 08/20/2022   Allergen Reaction Noted    Other Dermatitis 01/26/2016    Scopolamine Other (See Comments) 01/26/2016    Flexeril [cyclobenzaprine] Dizziness and Fatigue 03/10/2014    Naproxen  01/11/2016    Penicillins  05/17/2013    Promethazine-codeine  04/21/2012    Tramadol  01/14/2014    Wound dressing adhesive Rash 01/11/2016            The following portions of the patient's history were reviewed and updated as appropriate: allergies, current medications, past family history, past medical history, past social history, past surgical history and problem list      Past Medical History:   Diagnosis Date    Anxiety     Cataract, bilateral     last assessed    Cervical radiculopathy     COVID-19 05/15/2022    Disc disease, degenerative, cervical     Herpes simplex infection     Hypertension     Insomnia     Low back pain     Lumbar degenerative disc disease     Lumbar facet arthropathy     Lumbar radiculopathy     Lumbar stenosis without neurogenic claudication     Mononucleosis     Myofascial pain dysfunction syndrome     Osteoarthritis     shoulder region - unspecified laterality - last assessed 2/1/14    Plantar fasciitis     Ptosis, both eyelids     last assessed 10/6/16    Ptosis, congenital, left     Retinal detachment     last assessed 12/11/14 right eye    Sacroiliitis ([address] )     Thyroid disease     Thyrotoxicosis     last assessed 5/17/13    Vertigo        Past Surgical History:   Procedure Laterality Date    IA SURG IMPLNT NEUROELECT,EPIDURAL Left 1/26/2016    Procedure: PLACEMENT OF THORACIC SPINAL CORD STIMULATOR WITH LEFT BUTTOCK IMPLANTABLE PULSE GENERATOR (IMPULSE MONITORING);   Surgeon: Denny Stovall MD;  Location:  MAIN OR;  Service: Neurosurgery    RETINAL DETACHMENT SURGERY Right        Family History   Problem Relation Age of Onset    Breast cancer Mother 80    COPD Mother     Hypertension Mother         essential    Heart attack Father         acute MI    Emphysema Father     Hypertension Father essential    Other Father         prehypertension    No Known Problems Maternal Grandmother     No Known Problems Maternal Grandfather     No Known Problems Paternal Grandmother     No Known Problems Paternal Grandfather     No Known Problems Sister     No Known Problems Son     No Known Problems Sister     No Known Problems Paternal Aunt     No Known Problems Paternal Aunt     No Known Problems Paternal Aunt     No Known Problems Paternal Aunt     No Known Problems Paternal Aunt     Liver cancer Maternal Uncle          Medications have been verified  Objective   /83   Pulse 82   Temp 98 °F (36 7 °C)   Resp 18   Ht 5' 6" (1 676 m)   Wt 99 8 kg (220 lb)   LMP 11/26/2015   SpO2 96%   BMI 35 51 kg/m²        Physical Exam     Physical Exam  Constitutional:       General: She is not in acute distress  Appearance: She is well-developed  She is not diaphoretic  HENT:      Head: Normocephalic and atraumatic  Right Ear: Hearing normal       Left Ear: Hearing normal       Mouth/Throat:      Mouth: Mucous membranes are moist       Pharynx: Oropharynx is clear  Uvula midline  No oropharyngeal exudate or posterior oropharyngeal erythema  Cardiovascular:      Rate and Rhythm: Normal rate and regular rhythm  Heart sounds: Normal heart sounds  Pulmonary:      Effort: Pulmonary effort is normal       Breath sounds: Normal breath sounds  Musculoskeletal:      Cervical back: Normal range of motion and neck supple     Skin:

## 2022-08-25 ENCOUNTER — TELEPHONE (OUTPATIENT)
Dept: INTERNAL MEDICINE CLINIC | Facility: OTHER | Age: 67
End: 2022-08-25

## 2022-08-25 NOTE — TELEPHONE ENCOUNTER
Dr Saranya Perez wanted patient to get lab work to check her kidneys to see how much lasix she should be taking  Patient is currently on steroids and wanted to know if that would effect the lab results and if so how long should she wait after being off the steroids to get the labs drawn?

## 2022-08-25 NOTE — TELEPHONE ENCOUNTER
Please let patient know that she can have the labs done at anytime  The steroids shouldn't affect her kidney function

## 2022-08-26 ENCOUNTER — OFFICE VISIT (OUTPATIENT)
Dept: URGENT CARE | Facility: MEDICAL CENTER | Age: 67
End: 2022-08-26
Payer: MEDICARE

## 2022-08-26 ENCOUNTER — APPOINTMENT (OUTPATIENT)
Dept: LAB | Facility: MEDICAL CENTER | Age: 67
End: 2022-08-26
Payer: MEDICARE

## 2022-08-26 VITALS
RESPIRATION RATE: 18 BRPM | HEIGHT: 66 IN | HEART RATE: 82 BPM | WEIGHT: 221 LBS | OXYGEN SATURATION: 96 % | DIASTOLIC BLOOD PRESSURE: 88 MMHG | TEMPERATURE: 97.7 F | BODY MASS INDEX: 35.52 KG/M2 | SYSTOLIC BLOOD PRESSURE: 165 MMHG

## 2022-08-26 DIAGNOSIS — L23.7 ALLERGIC CONTACT DERMATITIS DUE TO PLANTS, EXCEPT FOOD: Primary | ICD-10-CM

## 2022-08-26 DIAGNOSIS — M85.89 OSTEOPENIA OF MULTIPLE SITES: ICD-10-CM
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
## 2022-08-26 DIAGNOSIS — E05.90 HYPERTHYROIDISM: ICD-10-CM

## 2022-08-26 DIAGNOSIS — I10 BENIGN ESSENTIAL HTN: ICD-10-CM

## 2022-08-26 DIAGNOSIS — S80.861A INSECT BITE OF RIGHT LOWER LEG, INITIAL ENCOUNTER: ICD-10-CM

## 2022-08-26 DIAGNOSIS — Z11.59 ENCOUNTER FOR HEPATITIS C SCREENING TEST FOR LOW RISK PATIENT: ICD-10-CM

## 2022-08-26 DIAGNOSIS — Z13.220 SCREENING CHOLESTEROL LEVEL: ICD-10-CM

## 2022-08-26 DIAGNOSIS — M94.9 DISORDER OF CARTILAGE, UNSPECIFIED: ICD-10-CM

## 2022-08-26 LAB
25(OH)D3 SERPL-MCNC: 83.3 NG/ML (ref 30–100)
ALBUMIN SERPL BCP-MCNC: 3.3 G/DL (ref 3.5–5)
ALP SERPL-CCNC: 144 U/L (ref 46–116)
ALT SERPL W P-5'-P-CCNC: 42 U/L (ref 12–78)
ANION GAP SERPL CALCULATED.3IONS-SCNC: 5 MMOL/L (ref 4–13)
AST SERPL W P-5'-P-CCNC: 22 U/L (ref 5–45)
B BURGDOR IGG+IGM SER-ACNC: 0.2 AI
BASOPHILS # BLD AUTO: 0.07 THOUSANDS/ΜL (ref 0–0.1)
BASOPHILS NFR BLD AUTO: 1 % (ref 0–1)
BILIRUB SERPL-MCNC: 1.2 MG/DL (ref 0.2–1)
BUN SERPL-MCNC: 15 MG/DL (ref 5–25)
CALCIUM ALBUM COR SERPL-MCNC: 10 MG/DL (ref 8.3–10.1)
CALCIUM SERPL-MCNC: 9.4 MG/DL (ref 8.3–10.1)
CHLORIDE SERPL-SCNC: 101 MMOL/L (ref 96–108)
CHOLEST SERPL-MCNC: 195 MG/DL
CO2 SERPL-SCNC: 31 MMOL/L (ref 21–32)
CREAT SERPL-MCNC: 0.83 MG/DL (ref 0.6–1.3)
EOSINOPHIL # BLD AUTO: 0.12 THOUSAND/ΜL (ref 0–0.61)
EOSINOPHIL NFR BLD AUTO: 2 % (ref 0–6)
ERYTHROCYTE [DISTWIDTH] IN BLOOD BY AUTOMATED COUNT: 13.6 % (ref 11.6–15.1)
GFR SERPL CREATININE-BSD FRML MDRD: 74 ML/MIN/1.73SQ M
GLUCOSE P FAST SERPL-MCNC: 86 MG/DL (ref 65–99)
HCT VFR BLD AUTO: 41.3 % (ref 34.8–46.1)
HCV AB SER QL: NORMAL
HDLC SERPL-MCNC: 78 MG/DL
HGB BLD-MCNC: 13.3 G/DL (ref 11.5–15.4)
IMM GRANULOCYTES # BLD AUTO: 0.05 THOUSAND/UL (ref 0–0.2)
IMM GRANULOCYTES NFR BLD AUTO: 1 % (ref 0–2)
LDLC SERPL CALC-MCNC: 92 MG/DL (ref 0–100)
LYMPHOCYTES # BLD AUTO: 3.73 THOUSANDS/ΜL (ref 0.6–4.47)
LYMPHOCYTES NFR BLD AUTO: 46 % (ref 14–44)
MCH RBC QN AUTO: 30.3 PG (ref 26.8–34.3)
MCHC RBC AUTO-ENTMCNC: 32.2 G/DL (ref 31.4–37.4)
MCV RBC AUTO: 94 FL (ref 82–98)
MONOCYTES # BLD AUTO: 0.58 THOUSAND/ΜL (ref 0.17–1.22)
MONOCYTES NFR BLD AUTO: 7 % (ref 4–12)
NEUTROPHILS # BLD AUTO: 3.42 THOUSANDS/ΜL (ref 1.85–7.62)
NEUTS SEG NFR BLD AUTO: 43 % (ref 43–75)
NONHDLC SERPL-MCNC: 117 MG/DL
NRBC BLD AUTO-RTO: 0 /100 WBCS
PLATELET # BLD AUTO: 377 THOUSANDS/UL (ref 149–390)
PMV BLD AUTO: 9.8 FL (ref 8.9–12.7)
POTASSIUM SERPL-SCNC: 3.8 MMOL/L (ref 3.5–5.3)
PROT SERPL-MCNC: 7.6 G/DL (ref 6.4–8.4)
RBC # BLD AUTO: 4.39 MILLION/UL (ref 3.81–5.12)
SODIUM SERPL-SCNC: 137 MMOL/L (ref 135–147)
T4 FREE SERPL-MCNC: 1.27 NG/DL (ref 0.76–1.46)
TRIGL SERPL-MCNC: 125 MG/DL
TSH SERPL DL<=0.05 MIU/L-ACNC: 0.01 UIU/ML (ref 0.45–4.5)
WBC # BLD AUTO: 7.97 THOUSAND/UL (ref 4.31–10.16)

## 2022-08-26 PROCEDURE — 86618 LYME DISEASE ANTIBODY: CPT

## 2022-08-26 PROCEDURE — 80061 LIPID PANEL: CPT

## 2022-08-26 PROCEDURE — 84443 ASSAY THYROID STIM HORMONE: CPT

## 2022-08-26 PROCEDURE — 82306 VITAMIN D 25 HYDROXY: CPT

## 2022-08-26 PROCEDURE — 36415 COLL VENOUS BLD VENIPUNCTURE: CPT

## 2022-08-26 PROCEDURE — 99213 OFFICE O/P EST LOW 20 MIN: CPT | Performed by: PHYSICIAN ASSISTANT

## 2022-08-26 PROCEDURE — G0463 HOSPITAL OUTPT CLINIC VISIT: HCPCS | Performed by: PHYSICIAN ASSISTANT

## 2022-08-26 PROCEDURE — 86803 HEPATITIS C AB TEST: CPT

## 2022-08-26 PROCEDURE — 80053 COMPREHEN METABOLIC PANEL: CPT

## 2022-08-26 PROCEDURE — 84439 ASSAY OF FREE THYROXINE: CPT

## 2022-08-26 PROCEDURE — 85025 COMPLETE CBC W/AUTO DIFF WBC: CPT

## 2022-08-26 RX ORDER — PREDNISONE 20 MG/1
TABLET ORAL
Qty: 36 TABLET | Refills: 0 | Status: SHIPPED | OUTPATIENT
Start: 2022-08-26 | End: 2022-09-12

## 2022-08-26 NOTE — PATIENT INSTRUCTIONS
1  Over-the-counter Benadryl 25-50 mg every 4-6 hours as needed for itch  2  Go to the ER for any significantly worsening rash  3  Follow up back here with primary care in 7-10 days for any persistent rash

## 2022-08-26 NOTE — PROGRESS NOTES
[facility]        NAME: Delaney Abraham is a 74 y o  female  : 1955    MRN: [id number]  DATE: 2022  TIME: 10:10 AM    Assessment and Plan   Allergic contact dermatitis due to plants, except food [L23 7]  1  Allergic contact dermatitis due to plants, except food  predniSONE 20 mg tablet         Patient Instructions     1  Over-the-counter Benadryl 25-50 mg every 4-6 hours as needed for itch  2  Go to the ER for any significantly worsening rash  3  Follow up back here with primary care in 7-10 days for any persistent rash  Chief Complaint     Chief Complaint   Patient presents with    Rash     Pt  With a rash for the past 9 days  Was seen her last weekend for the same, and her face has improved with steroids, but she reports the rash continues to pop up in other places  History of Present Illness       51-year-old female patient with persistent red, itchy rash over her neck, torso, and both upper extremities  She was seen here about 6 days ago and treated with a 5 day course of prednisone which seemed to be working fine  However, when the prednisone ended rash began coming back  Denies any throat involvement, shortness of breath, or any other associated complaints  Review of Systems   Review of Systems   Constitutional: Negative for chills and fever  HENT: Negative for ear pain and sore throat  Eyes: Negative for pain and visual disturbance  Respiratory: Negative for cough and shortness of breath  Cardiovascular: Negative for chest pain and palpitations  Gastrointestinal: Negative for abdominal pain and vomiting  Genitourinary: Negative for dysuria and hematuria  Musculoskeletal: Negative for arthralgias and back pain  Skin: Positive for rash  Negative for color change  Neurological: Negative for seizures and syncope  All other systems reviewed and are negative          Current Medications       Current Outpatient Medications:    amitriptyline (ELAVIL) 10 mg tablet, Take 1 tablet (10 mg total) by mouth daily at bedtime, Disp: 90 tablet, Rfl: 1    atorvastatin (LIPITOR) 10 mg tablet, Take 1 tablet (10 mg total) by mouth daily at bedtime, Disp: 90 tablet, Rfl: 1    busPIRone (BUSPAR) 10 mg tablet, Take 1 tablet (10 mg total) by mouth 3 (three) times a day, Disp: 270 tablet, Rfl: 1    Cetirizine HCl (ZYRTEC PO), Take by mouth as needed, Disp: , Rfl:     diclofenac (VOLTAREN) 75 mg EC tablet, Take 1 tablet (75 mg total) by mouth 2 (two) times a day, Disp: 180 tablet, Rfl: 1    Diclofenac Sodium (VOLTAREN) 1 %, APPLY 2 GRAMS TO AFFECTED AREA 4 TIMES A DAY (Patient taking differently: as needed), Disp: 200 g, Rfl: 1    doxycycline hyclate (VIBRAMYCIN) 100 mg capsule, Take 1 capsule (100 mg total) by mouth every 12 (twelve) hours, Disp: 180 capsule, Rfl: 0    Esomeprazole Magnesium (NEXIUM PO), Take 1 capsule by mouth daily as needed , Disp: , Rfl:     fluticasone (FLONASE) 50 mcg/act nasal spray, 1 spray into each nostril daily (Patient taking differently: 1 spray into each nostril as needed), Disp: 16 g, Rfl: 3    furosemide (LASIX) 20 mg tablet, Take 1 tablet (20 mg total) by mouth daily, Disp: 90 tablet, Rfl: 1    LORazepam (ATIVAN) 1 mg tablet, Take 1 5 tablets (1 5 mg total) by mouth daily at bedtime as needed for anxiety, Disp: 135 tablet, Rfl: 1    methimazole (TAPAZOLE) 5 mg tablet, Take 1 tablet (5 mg total) by mouth daily, Disp: 90 tablet, Rfl: 1    penciclovir (Denavir) 1 % cream, Apply topically daily as needed (ulcer), Disp: 5 g, Rfl: 5    potassium chloride (K-DUR,KLOR-CON) 10 mEq tablet, Take 1 tablet (10 mEq total) by mouth daily, Disp: 90 tablet, Rfl: 1    predniSONE 20 mg tablet, 3 tabs by mouth all at once daily for 10 days, then 2 tabs for 2 days, then 1 tab for 2 days  , Disp: 36 tablet, Rfl: 0    pregabalin (LYRICA) 50 mg capsule, Take 1 capsule in the AM, 1 capsule in the PM and 2 capsules at bedtime, Disp: 360 capsule, Rfl: 3    propranolol (INDERAL) 10 mg tablet, Take 1 tablet (10 mg total) by mouth 2 (two) times a day, Disp: 180 tablet, Rfl: 1    quinapril (ACCUPRIL) 20 mg tablet, Take 1 tablet (20 mg total) by mouth 2 (two) times a day, Disp: 180 tablet, Rfl: 1    RESTASIS 0 05 % ophthalmic emulsion, Administer 1 drop to both eyes 2 (two) times a day, Disp: , Rfl: 3    tiZANidine (ZANAFLEX) 4 mg tablet, Take 1 tablet (4 mg total) by mouth 2 (two) times a day, Disp: 180 tablet, Rfl: 1    metroNIDAZOLE (METROCREAM) 0 75 % cream, Apply topically daily at bedtime (Patient not taking: No sig reported), Disp: 45 g, Rfl: 2    valACYclovir (VALTREX) 500 mg tablet, Take 1 tablet (500 mg total) by mouth daily as needed (Cold Sores), Disp: 30 tablet, Rfl: 1    Current Allergies     Allergies as of 08/26/2022 - Reviewed 08/26/2022   Allergen Reaction Noted    Other Dermatitis 01/26/2016    Scopolamine Other (See Comments) 01/26/2016    Flexeril [cyclobenzaprine] Dizziness and Fatigue 03/10/2014    Naproxen  01/11/2016    Penicillins  05/17/2013    Promethazine-codeine  04/21/2012    Tramadol  01/14/2014    Wound dressing adhesive Rash 01/11/2016            The following portions of the patient's history were reviewed and updated as appropriate: allergies, current medications, past family history, past medical history, past social history, past surgical history and problem list      Past Medical History:   Diagnosis Date    Anxiety     Cataract, bilateral     last assessed    Cervical radiculopathy     COVID-19 05/15/2022    Disc disease, degenerative, cervical     Herpes simplex infection     Hypertension     Insomnia     Low back pain     Lumbar degenerative disc disease     Lumbar facet arthropathy     Lumbar radiculopathy     Lumbar stenosis without neurogenic claudication     Mononucleosis     Myofascial pain dysfunction syndrome     Osteoarthritis     shoulder region - unspecified laterality - last assessed 2/1/14    Plantar fasciitis     Ptosis, both eyelids     last assessed 10/6/16    Ptosis, congenital, left     Retinal detachment     last assessed 12/11/14 right eye    Sacroiliitis ([address] )     Thyroid disease     Thyrotoxicosis     last assessed 5/17/13    Vertigo        Past Surgical History:   Procedure Laterality Date    IA SURG IMPLNT NEUROELECT,EPIDURAL Left 1/26/2016    Procedure: PLACEMENT OF THORACIC SPINAL CORD STIMULATOR WITH LEFT BUTTOCK IMPLANTABLE PULSE GENERATOR (IMPULSE MONITORING); Surgeon: Estefania Serna MD;  Location:  MAIN OR;  Service: Neurosurgery    RETINAL DETACHMENT SURGERY Right        Family History   Problem Relation Age of Onset    Breast cancer Mother 80    COPD Mother     Hypertension Mother         essential    Heart attack Father         acute MI    Emphysema Father     Hypertension Father         essential    Other Father         prehypertension    No Known Problems Maternal Grandmother     No Known Problems Maternal Grandfather     No Known Problems Paternal Grandmother     No Known Problems Paternal Grandfather     No Known Problems Sister     No Known Problems Son     No Known Problems Sister     No Known Problems Paternal Aunt     No Known Problems Paternal Aunt     No Known Problems Paternal Aunt     No Known Problems Paternal Aunt     No Known Problems Paternal Aunt     Liver cancer Maternal Uncle          Medications have been verified  Objective   /88   Pulse 82   Temp 97 7 °F (36 5 °C)   Resp 18   Ht 5' 6" (1 676 m)   Wt 100 kg (221 lb)   LMP 11/26/2015   SpO2 96%   BMI 35 67 kg/m²        Physical Exam     Physical Exam  Constitutional:       Appearance: Normal appearance  HENT:      Head: Normocephalic  Nose: Nose normal    Eyes:      Conjunctiva/sclera: Conjunctivae normal       Pupils: Pupils are equal, round, and reactive to light  Cardiovascular:      Rate and Rhythm: Normal rate  Pulmonary:      Effort: Pulmonary effort is normal    Musculoskeletal:         General: Normal range of motion  Cervical back: Normal range of motion  Skin:     Capillary Refill: Capillary refill takes less than 2 seconds  Comments: Red, papulovesicular rash noted in streaks and patches over the neck, torso, and both upper and lower extremities  Neurological:      General: No focal deficit present  Mental Status: She is alert and oriented to person, place, and time     Psychiatric:         Mood and Affect: Mood normal          Behavior: Behavior normal

## 2022-08-29 ENCOUNTER — TELEPHONE (OUTPATIENT)
Dept: PAIN MEDICINE | Facility: MEDICAL CENTER | Age: 67
End: 2022-08-29

## 2022-08-29 DIAGNOSIS — E05.90 HYPERTHYROIDISM: Primary | ICD-10-CM

## 2022-08-29 DIAGNOSIS — E78.00 HYPERCHOLESTEREMIA: ICD-10-CM

## 2022-08-29 NOTE — TELEPHONE ENCOUNTER
Patient calling stating  Had to extend medication due to her poison Ivy.    Please advise       Patient can be reached at [phone]. ty

## 2022-08-30 DIAGNOSIS — R60.0 BILATERAL LEG EDEMA: ICD-10-CM

## 2022-08-30 RX ORDER — FUROSEMIDE 40 MG/1
40 TABLET ORAL DAILY
Qty: 90 TABLET | Refills: 1 | Status: SHIPPED | OUTPATIENT
Start: 2022-08-30 | End: 2022-12-30

## 2022-09-12 ENCOUNTER — PROCEDURE VISIT (OUTPATIENT)
Dept: PAIN MEDICINE | Facility: CLINIC | Age: 67
End: 2022-09-12
Payer: MEDICARE

## 2022-09-12 VITALS
SYSTOLIC BLOOD PRESSURE: 139 MMHG | WEIGHT: 221 LBS | DIASTOLIC BLOOD PRESSURE: 81 MMHG | HEART RATE: 77 BPM | BODY MASS INDEX: 35.67 KG/M2

## 2022-09-12 DIAGNOSIS — M25.511 CHRONIC RIGHT SHOULDER PAIN: Primary | ICD-10-CM

## 2022-09-12 DIAGNOSIS — G89.29 CHRONIC RIGHT SHOULDER PAIN: Primary | ICD-10-CM

## 2022-09-12 PROCEDURE — 20611 DRAIN/INJ JOINT/BURSA W/US: CPT | Performed by: ANESTHESIOLOGY

## 2022-09-12 NOTE — PROGRESS NOTES
Pre-procedure Diagnosis:  Chronic right shoulder pain  Post-procedure Diagnosis:  Chronic right shoulder pain  Operation Title(s):  Right subacromial bursa injection under ultrasound guidance  Attending Surgeon:   Dennis Nageotte, MD  Anesthesia:   Local    Indications: The patient is a 74y o  year-old female with a diagnosis of chronic right shoulder pain  The patient's history and physical exam were reviewed  The risks, benefits and alternatives to the procedure were discussed, and all questions were answered to the patient's satisfaction  The patient agreed to proceed, and written informed consent was obtained  Procedure in Detail: The patient was brought into the exam room and placed in the sitting position on the exam room chair  The right shoulder was prepped with chloraprep and draped in a sterile manner  A sterile ultrasound probe cover and gel was placed over a 3 75 MHz curvilinear transducer probe  The probe was placed over the shoulder to visualize the subdeltoid/subacromial bursal region  Optimal needle path was determined and the skin and subcutaneous tissues in the area was anesthetized with 1% lidocaine  Then, under ultrasound guidance, a 2 inch ultrasound needle was advanced until the needle entered the bursa region  After visualization of the tip in the target area and negative aspiration for blood, a solution consisting of 3 mL 0 25% bupivacaine and 1 mL Depo-Medrol (40mg/ml) was easily injected  The patient's shoulder was cleansed and a bandage was placed over the sites of needle insertion  Disposition: The patient tolerated the procedure well and there were no apparent complications  The patient was given written discharge instructions for the procedure

## 2022-09-19 ENCOUNTER — TELEPHONE (OUTPATIENT)
Dept: PAIN MEDICINE | Facility: CLINIC | Age: 67
End: 2022-09-19

## 2022-09-21 NOTE — TELEPHONE ENCOUNTER
Patient is inquiring about getting a back injection, she states that the day of her procedure you discussed her getting in done in October  She didn't receive a call from the  and just thought she'd ask

## 2022-09-22 ENCOUNTER — TELEPHONE (OUTPATIENT)
Dept: PAIN MEDICINE | Facility: CLINIC | Age: 67
End: 2022-09-22

## 2022-09-22 DIAGNOSIS — M51.16 INTERVERTEBRAL DISC DISORDER WITH RADICULOPATHY OF LUMBAR REGION: Primary | ICD-10-CM

## 2022-09-22 NOTE — TELEPHONE ENCOUNTER
Order placed for left L3, L4, L5 TF PRATIK      Not till mid November though because she had at end of July

## 2022-10-11 PROBLEM — Z00.00 WELCOME TO MEDICARE PREVENTIVE VISIT: Status: RESOLVED | Noted: 2022-07-12 | Resolved: 2022-10-11

## 2022-10-26 DIAGNOSIS — M79.18 MYOFASCIAL PAIN SYNDROME: ICD-10-CM

## 2022-10-26 RX ORDER — TIZANIDINE 4 MG/1
TABLET ORAL
Qty: 180 TABLET | Refills: 1 | Status: SHIPPED | OUTPATIENT
Start: 2022-10-26

## 2022-11-02 ENCOUNTER — HOSPITAL ENCOUNTER (OUTPATIENT)
Dept: RADIOLOGY | Facility: CLINIC | Age: 67
Discharge: HOME | End: 2022-11-02

## 2022-11-02 VITALS
SYSTOLIC BLOOD PRESSURE: 140 MMHG | RESPIRATION RATE: 17 BRPM | HEART RATE: 77 BPM | TEMPERATURE: 96.8 F | OXYGEN SATURATION: 95 % | DIASTOLIC BLOOD PRESSURE: 73 MMHG

## 2022-11-02 DIAGNOSIS — M51.16 INTERVERTEBRAL DISC DISORDER WITH RADICULOPATHY OF LUMBAR REGION: ICD-10-CM

## 2022-11-02 RX ORDER — METHYLPREDNISOLONE ACETATE 80 MG/ML
80 INJECTION, SUSPENSION INTRA-ARTICULAR; INTRALESIONAL; INTRAMUSCULAR; PARENTERAL; SOFT TISSUE ONCE
Status: COMPLETED | OUTPATIENT
Start: 2022-11-02 | End: 2022-11-02

## 2022-11-02 RX ORDER — 0.9 % SODIUM CHLORIDE 0.9 %
4 VIAL (ML) INJECTION ONCE
Status: COMPLETED | OUTPATIENT
Start: 2022-11-02 | End: 2022-11-02

## 2022-11-02 RX ORDER — BUPIVACAINE HCL/PF 2.5 MG/ML
2 VIAL (ML) INJECTION ONCE
Status: COMPLETED | OUTPATIENT
Start: 2022-11-02 | End: 2022-11-02

## 2022-11-02 RX ADMIN — Medication 4 ML: at 14:47

## 2022-11-02 RX ADMIN — METHYLPREDNISOLONE ACETATE 80 MG: 80 INJECTION, SUSPENSION INTRA-ARTICULAR; INTRALESIONAL; INTRAMUSCULAR; PARENTERAL; SOFT TISSUE at 14:49

## 2022-11-02 RX ADMIN — BUPIVACAINE HYDROCHLORIDE 2 ML: 2.5 INJECTION, SOLUTION EPIDURAL; INFILTRATION; INTRACAUDAL at 14:49

## 2022-11-02 RX ADMIN — IOHEXOL 1 ML: 300 INJECTION, SOLUTION INTRAVENOUS at 14:49

## 2022-11-02 NOTE — DISCHARGE INSTR - APPOINTMENTS
Epidural Steroid Injection   WHAT YOU NEED TO KNOW:   An epidural steroid injection (PRATIK) is a procedure to inject steroid medicine into the epidural space  The epidural space is between your spinal cord and vertebrae  Steroids reduce inflammation and fluid buildup in your spine that may be causing pain  You may be given pain medicine along with the steroids  ACTIVITY  Do not drive or operate machinery today  No strenuous activity today - bending, lifting, etc   You may resume normal activites starting tomorrow - start slowly and as tolerated  You may shower today, but no tub baths or hot tubs  You may have numbness for several hours from the local anesthetic  Please use caution and common sense, especially with weight-bearing activities  CARE OF THE INJECTION SITE  If you have soreness or pain, apply ice to the area today (20 minutes on/20 minutes off)  Starting tomorrow, you may use warm, moist heat or ice if needed  You may have an increase or change in your discomfort for 36-48 hours after your treatment  Apply ice and continue with any pain medication you have been prescribed  Notify the [facility] if you have any of the following: redness, drainage, swelling, headache, stiff neck or fever above 100°F     SPECIAL INSTRUCTIONS  Our office will contact you in approximately 7 days for a progress report  MEDICATIONS  Continue to take all routine medications  Our office may have instructed you to hold some medications  As no general anesthesia was used in today's procedure, you should not experience any side effects related to anesthesia  If you are diabetic, the steroids used in today's injection may temporarily increase your blood sugar levels after the first few days after your injection  Please keep a close eye on your sugars and alert the doctor who manages your diabetes if your sugars are significantly high from your baseline or you are symptomatic       If you have a problem specifically related to your procedure, please call our office at [phone]  Problems not related to your procedure should be directed to your primary care physician

## 2022-11-02 NOTE — H&P
History of Present Illness: The patient is a 74 y o  female who presents with complaints of left lower back and leg pain is here today for left L3, left L4 left, left L5 transforaminal epidural steroid injection    Past Medical History:   Diagnosis Date   • Anxiety    • Cataract, bilateral     last assessed   • Cervical radiculopathy    • COVID-19 05/15/2022   • Disc disease, degenerative, cervical    • Herpes simplex infection    • Hypertension    • Insomnia    • Low back pain    • Lumbar degenerative disc disease    • Lumbar facet arthropathy    • Lumbar radiculopathy    • Lumbar stenosis without neurogenic claudication    • Mononucleosis    • Myofascial pain dysfunction syndrome    • Osteoarthritis     shoulder region - unspecified laterality - last assessed 2/1/14   • Plantar fasciitis    • Ptosis, both eyelids     last assessed 10/6/16   • Ptosis, congenital, left    • Retinal detachment     last assessed 12/11/14 right eye   • Sacroiliitis ([address] )    • Thyroid disease    • Thyrotoxicosis     last assessed 5/17/13   • Vertigo        Past Surgical History:   Procedure Laterality Date   • FL SURG IMPLNT NEUROELECT,EPIDURAL Left 1/26/2016    Procedure: PLACEMENT OF THORACIC SPINAL CORD STIMULATOR WITH LEFT BUTTOCK IMPLANTABLE PULSE GENERATOR (IMPULSE MONITORING);   Surgeon: Dana Aguilar MD;  Location:  MAIN OR;  Service: Neurosurgery   • RETINAL DETACHMENT SURGERY Right          Current Outpatient Medications:   •  amitriptyline (ELAVIL) 10 mg tablet, Take 1 tablet (10 mg total) by mouth daily at bedtime, Disp: 90 tablet, Rfl: 1  •  atorvastatin (LIPITOR) 10 mg tablet, Take 1 tablet (10 mg total) by mouth daily at bedtime, Disp: 90 tablet, Rfl: 1  •  busPIRone (BUSPAR) 10 mg tablet, Take 1 tablet (10 mg total) by mouth 3 (three) times a day, Disp: 270 tablet, Rfl: 1  •  Cetirizine HCl (ZYRTEC PO), Take by mouth as needed, Disp: , Rfl:   •  diclofenac (VOLTAREN) 75 mg EC tablet, Take 1 tablet (75 mg total) by mouth 2 (two) times a day, Disp: 180 tablet, Rfl: 1  •  Diclofenac Sodium (VOLTAREN) 1 %, APPLY 2 GRAMS TO AFFECTED AREA 4 TIMES A DAY (Patient taking differently: as needed), Disp: 200 g, Rfl: 1  •  doxycycline hyclate (VIBRAMYCIN) 100 mg capsule, Take 1 capsule (100 mg total) by mouth every 12 (twelve) hours, Disp: 180 capsule, Rfl: 0  •  Esomeprazole Magnesium (NEXIUM PO), Take 1 capsule by mouth daily as needed , Disp: , Rfl:   •  fluticasone (FLONASE) 50 mcg/act nasal spray, 1 spray into each nostril daily (Patient taking differently: 1 spray into each nostril as needed), Disp: 16 g, Rfl: 3  •  furosemide (LASIX) 40 mg tablet, Take 1 tablet (40 mg total) by mouth daily, Disp: 90 tablet, Rfl: 1  •  LORazepam (ATIVAN) 1 mg tablet, Take 1 5 tablets (1 5 mg total) by mouth daily at bedtime as needed for anxiety, Disp: 135 tablet, Rfl: 1  •  methimazole (TAPAZOLE) 5 mg tablet, Take 1 tablet (5 mg total) by mouth daily, Disp: 90 tablet, Rfl: 1  •  metroNIDAZOLE (METROCREAM) 0 75 % cream, Apply topically daily at bedtime (Patient not taking: No sig reported), Disp: 45 g, Rfl: 2  •  penciclovir (Denavir) 1 % cream, Apply topically daily as needed (ulcer), Disp: 5 g, Rfl: 5  •  potassium chloride (K-DUR,KLOR-CON) 10 mEq tablet, Take 1 tablet (10 mEq total) by mouth daily, Disp: 90 tablet, Rfl: 1  •  pregabalin (LYRICA) 50 mg capsule, Take 1 capsule in the AM, 1 capsule in the PM and 2 capsules at bedtime, Disp: 360 capsule, Rfl: 3  •  propranolol (INDERAL) 10 mg tablet, Take 1 tablet (10 mg total) by mouth 2 (two) times a day, Disp: 180 tablet, Rfl: 1  •  quinapril (ACCUPRIL) 20 mg tablet, Take 1 tablet (20 mg total) by mouth 2 (two) times a day, Disp: 180 tablet, Rfl: 1  •  RESTASIS 0 05 % ophthalmic emulsion, Administer 1 drop to both eyes 2 (two) times a day, Disp: , Rfl: 3  •  tiZANidine (ZANAFLEX) 4 mg tablet, TAKE 1 TABLET BY MOUTH 2 TIMES A DAY , Disp: 180 tablet, Rfl: 1  •  valACYclovir (VALTREX) 500 mg tablet, Take 1 tablet (500 mg total) by mouth daily as needed (Cold Sores), Disp: 30 tablet, Rfl: 1    Current Facility-Administered Medications:   •  bupivacaine (PF) (MARCAINE) 0 25 % injection 2 mL, 2 mL, Epidural, Once, Catarina Watts MD  •  iohexol (OMNIPAQUE) 300 mg/mL injection 1 mL, 1 mL, Epidural, Once, Catarina Watts MD  •  lidocaine (PF) (XYLOCAINE-MPF) 2 % injection 4 mL, 4 mL, Infiltration, Once, Catarina Watts MD  •  methylPREDNISolone acetate (DEPO-MEDROL) injection 80 mg, 80 mg, Epidural, Once, Catarina Watts MD  •  sodium chloride (PF) 0 9 % injection 4 mL, 4 mL, Infiltration, Once, Catarina Watts MD    Allergies   Allergen Reactions   • Other Dermatitis     Adhesive tape - please use ONLY PAPER TAPE   • Scopolamine Other (See Comments)     Severe, debilitating headache     • Flexeril [Cyclobenzaprine] Dizziness and Fatigue   • Naproxen      Other reaction(s): Unknown Allergic Reaction  Annotation - [id number]: nightmares   • Penicillins      Other reaction(s): Unknown Allergic Reaction  Annotation - [id number]: childhood reaction   • Promethazine-Codeine      Reaction Date: [id number]; Annotation - [id number]: nightmares; Annotation - [id number]: nightmares   • Tramadol      Other reaction(s): Unknown Allergic Reaction  Annotation - [id number]: PT HAS NIGHTMARES FROM TRAMADOL   • Wound Dressing Adhesive Rash       Physical Exam:   Vitals:    11/02/22 1430   BP: 130/82   Pulse: 78   Resp: 18   Temp: (!) 96 8 °F (36 °C)   SpO2: 97%     General: Awake, Alert, Oriented x 3, Mood and affect appropriate  Respiratory: Respirations even and unlabored  Cardiovascular: Peripheral pulses intact; no edema  Musculoskeletal Exam:  Lower back pain    ASA Score: 3    Patient/Chart Verification  Patient ID Verified: Verbal  ID Band Applied: No  Consents Confirmed: Procedural, To be obtained in the Pre-Procedure area  Allergies Reviewed: Yes  Anticoag/NSAID held?: No  Currently on antibiotics?: No    Assessment:   1  Intervertebral disc disorder with radiculopathy of lumbar region        Plan: Left L3, L4, L5 TF PRATIK

## 2022-11-09 ENCOUNTER — TELEPHONE (OUTPATIENT)
Dept: PAIN MEDICINE | Facility: CLINIC | Age: 67
End: 2022-11-09

## 2022-11-22 ENCOUNTER — RA CDI HCC (OUTPATIENT)
Dept: OTHER | Facility: HOSPITAL | Age: 67
End: 2022-11-22

## 2022-11-23 ENCOUNTER — TELEPHONE (OUTPATIENT)
Dept: PAIN MEDICINE | Facility: CLINIC | Age: 67
End: 2022-11-23

## 2022-11-23 ENCOUNTER — APPOINTMENT (OUTPATIENT)
Dept: LAB | Facility: MEDICAL CENTER | Age: 67
End: 2022-11-23

## 2022-11-23 DIAGNOSIS — M54.12 CERVICAL RADICULOPATHY: ICD-10-CM

## 2022-11-23 DIAGNOSIS — E05.90 HYPERTHYROIDISM: ICD-10-CM

## 2022-11-23 DIAGNOSIS — F41.9 ANXIETY: ICD-10-CM

## 2022-11-23 DIAGNOSIS — M79.18 MYOFASCIAL PAIN SYNDROME: ICD-10-CM

## 2022-11-23 DIAGNOSIS — M47.816 LUMBAR SPONDYLOSIS: ICD-10-CM

## 2022-11-23 LAB
T4 FREE SERPL-MCNC: 0.79 NG/DL (ref 0.76–1.46)
TSH SERPL DL<=0.05 MIU/L-ACNC: 1.57 UIU/ML (ref 0.45–4.5)

## 2022-11-23 RX ORDER — LORAZEPAM 1 MG/1
1.5 TABLET ORAL
Qty: 45 TABLET | Refills: 0 | Status: SHIPPED | OUTPATIENT
Start: 2022-11-23

## 2022-11-23 RX ORDER — PREGABALIN 50 MG/1
CAPSULE ORAL
Qty: 360 CAPSULE | Refills: 1 | Status: SHIPPED | OUTPATIENT
Start: 2022-11-23

## 2022-11-23 NOTE — TELEPHONE ENCOUNTER
Can you send a new script for the lyrica 50 mg, 1 in AM, 1 in PM and 2 at HS  Per pharmacy lyrica scripts are only good for 6 months  Last Rx was for # 360  with 3 refills, new script should be # 360 with 1 refill

## 2022-11-23 NOTE — TELEPHONE ENCOUNTER
Pt contacted Call Center requested refill of their medication  Medication Name: Lyrica    Dosage of Med: 50 mg      Frequency of Med: 1 in am-1 in pm-2 at bed      Remaining Medication:  none      Pharmacy and Location: [facility] on file correct        Pt  Preferred Callback Phone Number: [phone]    Thank you

## 2022-12-01 ENCOUNTER — CONSULT (OUTPATIENT)
Dept: INTERNAL MEDICINE CLINIC | Facility: OTHER | Age: 67
End: 2022-12-01

## 2022-12-01 VITALS
TEMPERATURE: 98.4 F | WEIGHT: 234.6 LBS | OXYGEN SATURATION: 97 % | SYSTOLIC BLOOD PRESSURE: 140 MMHG | DIASTOLIC BLOOD PRESSURE: 82 MMHG | HEART RATE: 79 BPM | HEIGHT: 66 IN | BODY MASS INDEX: 37.7 KG/M2

## 2022-12-01 DIAGNOSIS — M43.16 SPONDYLOLISTHESIS OF LUMBAR REGION: ICD-10-CM

## 2022-12-01 DIAGNOSIS — I51.7 ATRIAL ENLARGEMENT, LEFT: ICD-10-CM

## 2022-12-01 DIAGNOSIS — R63.5 WEIGHT GAIN: Primary | ICD-10-CM

## 2022-12-01 DIAGNOSIS — Z01.818 PRE-OPERATIVE CLEARANCE: ICD-10-CM

## 2022-12-01 DIAGNOSIS — G89.4 PAIN SYNDROME, CHRONIC: ICD-10-CM

## 2022-12-01 DIAGNOSIS — M47.816 LUMBAR FACET ARTHROPATHY: ICD-10-CM

## 2022-12-01 DIAGNOSIS — I10 BENIGN ESSENTIAL HTN: ICD-10-CM

## 2022-12-01 DIAGNOSIS — M48.061 LUMBAR STENOSIS WITHOUT NEUROGENIC CLAUDICATION: ICD-10-CM

## 2022-12-01 RX ORDER — CHLORHEXIDINE GLUCONATE 213 G/1000ML
SOLUTION TOPICAL
COMMUNITY
Start: 2022-10-26

## 2022-12-01 NOTE — PROGRESS NOTES
Assessment/Plan:    1  Weight gain    2  Lumbar facet arthropathy    3  Lumbar stenosis without neurogenic claudication    4  Spondylolisthesis of lumbar region    5  Atrial enlargement, left    6  Benign essential HTN    7  Pain syndrome, chronic    CMP reviewed, patient cleared for surgery with minimal risk          There are no Patient Instructions on file for this visit  Return for Next scheduled follow up  Subjective:      Patient ID: Manav Ruiz is a 74 y o  female  Chief Complaint   Patient presents with   • Pre-op Exam     Pre-Op, Lumbar Spine Surgery, Dr Jason Koehler, No EKG needed, Labs Ordered 12/20/2022       HPI     -Bilateral laminectomy L4-5, possibly L3-4, TLIF left L4-5, possible SCS battery correct cpt codes; 95031,49146,40379, 20273,19315,27599       Rosacea, patient was placed on Metrogel by her endocrinologist and she has been using it for 1 month   She feels that is not helping   She has facial flushing for several years which are exacerbated by stress, hypertension, heat, and hot flashes  Checo Hernandez would like to discuss other options  5/16/18: started doxy 1 week ago, no relief yet  Still flushing and worse in the mornings  10/15/18: Mia Amas a lot, taking doxy BID, ran out 1 week ago, re start at BID and then decrease to monthly  Dc 2019 taking doxy BID iith food, no issues  May 2021, taking doxycycline b i d  For several months and started Metrogel which she thinks flairs up her symptoms  September 2021, stable  January 2022, on doxycycline twice a day and tolerating well  July 2022, stable, no issues      Insomnia, patient states she does not sleep well which is very multifactorial including the pain and how flashes   She used to be on estrogen replacement currently does not take any   She does take Ativan at night to help her sleep which helped marginally  5/16/18: taking 1 5 mg  And advil pm and it has helped- sleeping through the night now and slightly  More tired in am   10/15/18: taking ativan and sleeping well with it 3/1/19:   July 2019, has not tried to decrease her lorazepam and does take it every night to sleep  Dec 2019 usually doing very well but  Lately having some issues with sleep  Thinks its from the holidays  June 2020, stable  May 2021, sleeping okay usually with her medications however she does wake up in the middle of the night and feels a high level of anxiety which she does not know what to do about   She cannot sleep without her lorazepam which she is taking faithfully   At last appointment with another provider, she was started on Cymbalta but currently is not taking and she cannot remember what side effects she had  September 2021, not any better, pain keeps her up at night  Jan 2022:  Sleeping very well with the addition of Elavil and tolerating   No side effects  July 2022, stable and working well        Depression:  Patient is a depression was exacerbated by the pain and inability to sleep  Kacie Kim is always tired and fatigued and is having difficulty with activities of daily living around the house like cleaning and cooking as well as work  [place] Fulling is here today and can cooperate always   She feels that she is up to any medications or the long medications right now    8/13/18: buspar was increased to 10 mg per but she felt tired on it and went back to 5 mg BId, has gained weight  10/15/18: taking bupar 5 mg- 2 tabs at [facility],  12/21/18: stable  March 2019, no issues   Takes 1- 5 mg BuSpar in the morning than 1 with lunch and 2 at night    July 2019:  Doing very well with present medications and feels much better since losing weight  Dec 2019 taking 2 buspar at night and 1/2 in the morning    June 2020, relatively stable with no suicidal or homicidal ideations   May 2021, depression is relatively well controlled however currently her anxiety is at exacerbations since [place] time   She stated everything is normal with no problems at home or work no changes in her medications and no over-the-counter things that she has introduced to her daily routine   Other than not eating well and putting on some weight she states everything is fine   She recently had some lab work and her thyroid is controlled and she is compliant with her medications  Christnie Sanford was started on Cymbalta few months ago from another provider which she took for few days and stopped but she cannot remember why she stopped her what side effects she had   She takes BuSpar -total of 30 mg per day but cannot take a full dose in the morning because it makes her groggy  Christine Sanford is taking her lorazepam at night   She states she feels like in the middle of the night at her or jump out of her chest but then her  checks her blood pressure as well as heart rate since he is a respiratory therapist in everything is normal   She does not drink any caffeine or alcohol towards bedtime and does not know why this is happening   It does not happen every night and can happen in the daytime as well randomly   September 2021, flare up of depression due to pain  January 2022, significantly improved since she is sleeping better and pain is better with the addition of Elavil   She has decreased her BuSpar and is only taking it once or twice a day now instead of 3 times a day   No breakthrough symptoms   No HI or SI      July 2022, stable, no HI or SI  No exacerbations     Chronic pain, not too much better than previously   Follows with pain management    May 2021, follows with pain management and has a pain pump  September 2021, worst, in general   This increases her weight, lack of activity and depression  Christine Sanford is getting steroid injections right now and is scheduled for MRI from her pain management doctors  January 2022, received another steroid injection which has significantly helped but that was only about 3 weeks ago  Christine Sanford is not sure how long it lasts and can only get it every 3 months  Jeferson Beck does not last her 3 months  Leo Gore has helped her sleep quality as well as helped with the pain and she has an appointment tomorrow for neuro surgery opinion for minimally invasive surgery    July 2022, in January she had an epidural injection which worked really well for her however her most recent1 in April has not given her any relief     Hyperlipidemia, increase in lipid levels due to increased weight and inactivity  July 2022, patient continues to gain weight    She has not had recent lab work for her cholesterol                    The following portions of the patient's history were reviewed and updated as appropriate: allergies, current medications, past family history, past medical history, past social history, past surgical history and problem list     Review of Systems      Constitutional:  Denies fever or chills , + weight gain  Eyes:  Denies double , blurry vision or eye pain  HENT:  Denies nasal congestion or sore throat   Respiratory:  Denies cough or shortness of breath or wheezing  Cardiovascular:  Denies palpitations or chest pain  GI:  Denies abdominal pain, nausea, or vomiting, no loose stools, no reflux  Integument:  Denies rash , no open areas  Neurologic:  Denies headache or focal weakness, no dizziness  : no dysuria, or hematuria      Current Outpatient Medications   Medication Sig Dispense Refill   • amitriptyline (ELAVIL) 10 mg tablet Take 1 tablet (10 mg total) by mouth daily at bedtime 90 tablet 1   • atorvastatin (LIPITOR) 10 mg tablet Take 1 tablet (10 mg total) by mouth daily at bedtime 90 tablet 1   • busPIRone (BUSPAR) 10 mg tablet Take 1 tablet (10 mg total) by mouth 3 (three) times a day 270 tablet 1   • Cetirizine HCl (ZYRTEC PO) Take by mouth as needed     • diclofenac (VOLTAREN) 75 mg EC tablet Take 1 tablet (75 mg total) by mouth 2 (two) times a day 180 tablet 1   • Diclofenac Sodium (VOLTAREN) 1 % APPLY 2 GRAMS TO AFFECTED AREA 4 TIMES A DAY (Patient taking differently: as needed) 200 g 1   • Esomeprazole Magnesium (NEXIUM PO) Take 1 capsule by mouth daily as needed      • fluticasone (FLONASE) 50 mcg/act nasal spray 1 spray into each nostril daily (Patient taking differently: 1 spray into each nostril as needed) 16 g 3   • furosemide (LASIX) 40 mg tablet Take 1 tablet (40 mg total) by mouth daily 90 tablet 1   • LORazepam (ATIVAN) 1 mg tablet Take 1 5 tablets (1 5 mg total) by mouth daily at bedtime as needed for anxiety 45 tablet 0   • methimazole (TAPAZOLE) 5 mg tablet Take 1 tablet (5 mg total) by mouth daily 90 tablet 1   • penciclovir (Denavir) 1 % cream Apply topically daily as needed (ulcer) 5 g 5   • potassium chloride (K-DUR,KLOR-CON) 10 mEq tablet Take 1 tablet (10 mEq total) by mouth daily 90 tablet 1   • pregabalin (LYRICA) 50 mg capsule Take 1 capsule in the AM, 1 capsule in the PM and 2 capsules at bedtime 360 capsule 1   • propranolol (INDERAL) 10 mg tablet Take 1 tablet (10 mg total) by mouth 2 (two) times a day 180 tablet 1   • quinapril (ACCUPRIL) 20 mg tablet Take 1 tablet (20 mg total) by mouth 2 (two) times a day 180 tablet 1   • RESTASIS 0 05 % ophthalmic emulsion Administer 1 drop to both eyes 2 (two) times a day  3   • tiZANidine (ZANAFLEX) 4 mg tablet TAKE 1 TABLET BY MOUTH 2 TIMES A DAY  180 tablet 1   • valACYclovir (VALTREX) 500 mg tablet Take 1 tablet (500 mg total) by mouth daily as needed (Cold Sores) 30 tablet 1   • Hibiclens 4 % external liquid WASH SURGICAL SITE ONCE A DAY FOR 5 DAYS PRIOR TO SURGERY AND THE MORNING OF SURGERY (Patient not taking: Reported on 12/1/2022)     • metroNIDAZOLE (METROCREAM) 0 75 % cream Apply topically daily at bedtime (Patient not taking: Reported on 6/10/2022) 45 g 2   • mupirocin (BACTROBAN) 2 % ointment APPLY A SMALL AMOUNT TO THE BILATERAL NOSTRILS TWICE A DAY FOR 5 DAYS PRIOR TO SURGERY (Patient not taking: Reported on 12/1/2022)       No current facility-administered medications for this visit         Objective:    /82 (BP Location: Left arm, Patient Position: Sitting, Cuff Size: Large)   Pulse 79   Temp 98 4 °F (36 9 °C) (Temporal)   Ht 5' 6" (1 676 m)   Wt 106 kg (234 lb 9 6 oz)   LMP 11/26/2015   SpO2 97% Comment: RA  BMI 37 87 kg/m²        Physical Exam      Constitutional:  Well developed, well nourished, no acute distress, non-toxic appearance   Eyes:  PERRL, conjunctiva normal , non icteric sclera  HENT:  Atraumatic, oropharynx moist  Neck-  supple   Respiratory:  CTA b/l, normal breath sounds, no rales, no wheezing   Cardiovascular:  RRR, no murmurs, no LE edema b/l  GI:  Soft, nondistended, normal bowel sounds x 4, nontender, no organomegaly, no mass, no rebound, no guarding   Neurologic:  no focal deficits noted   Psychiatric:  Speech and behavior appropriate , AAO x 3      Coby Patel, DO

## 2022-12-05 ENCOUNTER — APPOINTMENT (OUTPATIENT)
Dept: LAB | Facility: MEDICAL CENTER | Age: 67
End: 2022-12-05

## 2022-12-05 ENCOUNTER — TELEPHONE (OUTPATIENT)
Dept: INTERNAL MEDICINE CLINIC | Facility: OTHER | Age: 67
End: 2022-12-05

## 2022-12-05 DIAGNOSIS — Z01.818 OTHER SPECIFIED PRE-OPERATIVE EXAMINATION: ICD-10-CM

## 2022-12-05 LAB
ALBUMIN SERPL BCP-MCNC: 3.5 G/DL (ref 3.5–5)
ALP SERPL-CCNC: 134 U/L (ref 46–116)
ALT SERPL W P-5'-P-CCNC: 53 U/L (ref 12–78)
ANION GAP SERPL CALCULATED.3IONS-SCNC: 5 MMOL/L (ref 4–13)
AST SERPL W P-5'-P-CCNC: 42 U/L (ref 5–45)
BILIRUB SERPL-MCNC: 0.92 MG/DL (ref 0.2–1)
BUN SERPL-MCNC: 13 MG/DL (ref 5–25)
CALCIUM SERPL-MCNC: 9.7 MG/DL (ref 8.3–10.1)
CHLORIDE SERPL-SCNC: 102 MMOL/L (ref 96–108)
CO2 SERPL-SCNC: 28 MMOL/L (ref 21–32)
CREAT SERPL-MCNC: 0.73 MG/DL (ref 0.6–1.3)
GFR SERPL CREATININE-BSD FRML MDRD: 86 ML/MIN/1.73SQ M
GLUCOSE P FAST SERPL-MCNC: 106 MG/DL (ref 65–99)
POTASSIUM SERPL-SCNC: 4.6 MMOL/L (ref 3.5–5.3)
PROT SERPL-MCNC: 7.4 G/DL (ref 6.4–8.4)
SODIUM SERPL-SCNC: 135 MMOL/L (ref 135–147)

## 2022-12-05 NOTE — TELEPHONE ENCOUNTER
Patient calling in regards to her CMP lab work she had completed  She was calling to see if you could give her a call and review that with her?

## 2022-12-15 ENCOUNTER — TELEPHONE (OUTPATIENT)
Dept: INTERNAL MEDICINE CLINIC | Age: 67
End: 2022-12-15

## 2022-12-15 NOTE — TELEPHONE ENCOUNTER
[facility] called stating that they would need to visit note to be updated to state she is cleared for surgery  The form that was filled out states that it was pending on the labs and the visit note states that as well  May you please update the visit note from 12/01/2022 and sign off on it      Let me know when completed for me to fax the visit note over and the labs    Fax to Amina Steele at [phone]    Phone number is [phone]

## 2022-12-15 NOTE — PROCEDURES
Date of service: 12/15/22       Chief Complaint   Patient presents with   • Office Visit     Physical           HPI:   Isaias Reno is a 26 year old male  with past medical history of ulcerative colitis, history of allergy to peanuts, allergic rhinitis who presents for a follow-up management visit.  Recently completed surveillance colonoscopy which was showing stable disease process.  Blood work completed showing normal blood counts normal renal function.    States that his energy level has been appropriate, has been maintaining a healthy diet.  Has been playing sports and exercising regularly.    Has been consuming more caffeine recently due to job training, has returned to drinking water.    Blood pressure recheck is 132/72    There are no further complaints.     Review:    Current Outpatient Medications   Medication Sig Dispense Refill   • Fexofenadine HCl (ALLEGRA PO) Take 1 tablet by mouth daily.     • ADALimumab (Humira Pen) 40 MG/0.4ML citrate free Inject 1 pen (40mg) into the skin every 14 days. 2 each 11   • EPINEPHrine (EpiPen 2-Moshe) 0.3 MG/0.3ML auto-injector Inject 0.3 mLs into the muscle as needed (Use as directed per Action pl). 2 each 2   • fluticasone (FLONASE) 50 MCG/ACT nasal spray Spray 1 spray in each nostril 2 times daily. 16 g 12     No current facility-administered medications for this visit.     ALLERGIES:   Allergen Reactions   • Nuts   (Food) ANAPHYLAXIS     Past Medical History:   Diagnosis Date   • Anesthesia     no reaction   • COVID-19 08/2021   • Inflammatory bowel disease    • Ulcerative colitis (CMS/HCC)         REMOVAL ADENOIDS,PRIMARY,<13 Y/O                                Comment: age 7    COLONOSCOPY DIAGNOSTIC                                        NO PAST SURGERIES                                           Family History   Problem Relation Age of Onset   • Hypertension Mother    • Hypertension Father        ROS:  Review of Systems   Constitutional: Negative for chills and  Procedure      Pre-procedure Diagnosis: Right knee pain  Post-procedure Diagnosis: Right knee pain  Operation Title(s):  Right knee intra-articular injection under ultrasound guidance  Attending Surgeon:   Юлия Serrato MD  Anesthesia:   Local    Indications: The patient is a 63year-old female with a diagnosis of right knee pain secondary to osteoarthritis and meniscal tear  The patient's history and physical exam were reviewed  The risks, benefits and alternatives to the procedure were discussed, and all questions were answered to the patient's satisfaction  The patient agreed to proceed, and written informed consent was obtained  Procedure in Detail: The patient was brought into the exam room and placed on the exam room table  The right knee was prepped with chloraprep and draped in a sterile manner  A sterile ultrasound probe cover and gel was placed over a 3 75 MHz curvilinear transducer probe  The probe was placed over the right knee to visualize the suprapatellar space  Optimal needle path was determined  Then, under ultrasound guidance, a 2 inch ultrasound needle was advanced until the needle entered the region  After visualization of the tip in the target area and negative aspiration for blood, a mixture of bupivacaine 0 25% 3 mL and Depo-Medrol 40 mg/mL 1 mL was injected into bursal region  The patient's knee was cleaned and a bandage was placed over the sites of needle insertion  Disposition: The patient tolerated the procedure well and there were no apparent complications  Written discharge instructions for the procedure were given          Signatures   Electronically signed by : Shara Cornell MD; Dec  8 2017  3:59PM EST                       (Author) fever.   HENT: Negative for sore throat.    Respiratory: Negative for cough, shortness of breath and wheezing.    Cardiovascular: Negative for chest pain.   Gastrointestinal: Negative for constipation, diarrhea and vomiting.   Genitourinary: Negative for frequency.   Musculoskeletal: Negative for joint pain.   Skin: Negative for rash.   Neurological: Negative for speech change and weakness.           Visit Vitals  /72 (BP Location: LUE - Left upper extremity)   Pulse 77   Temp 97.4 °F (36.3 °C)   Resp 16   Ht 5' 6\" (1.676 m)   Wt 116.6 kg (257 lb 0.9 oz)   SpO2 98%   BMI 41.49 kg/m²        Physical Exam  Constitutional:       Appearance: Normal appearance.   HENT:      Head: Normocephalic and atraumatic.      Right Ear: Tympanic membrane and ear canal normal.      Left Ear: Tympanic membrane and ear canal normal.      Nose: Nose normal.      Neck: Normal range of motion.   Eyes:      Pupils: Pupils are equal, round, and reactive to light.   Cardiovascular:      Rate and Rhythm: Normal rate and regular rhythm.      Pulses: Normal pulses.      Heart sounds: Normal heart sounds.   Pulmonary:      Effort: Pulmonary effort is normal.      Breath sounds: Normal breath sounds.   Abdominal:      General: Abdomen is flat. There is no distension.      Tenderness: There is no abdominal tenderness.   Musculoskeletal:         General: No deformity.      Right lower leg: No edema.      Left lower leg: No edema.   Skin:     Capillary Refill: Capillary refill takes less than 2 seconds.   Neurological:      General: No focal deficit present.      Mental Status: He is alert and oriented to person, place, and time. Mental status is at baseline.      Gait: Gait normal.         Assessment/Plan:  Assessment     Esparza was seen today for office visit.    Diagnoses and all orders for this visit:    Follow-up examination  -Sleep, diet, activity and technology hygiene discussed   -Continue active lifestyle with healthy  nutrition  -Continue follow-up with gastroenterology  -Lab work currently up-to-date, repeat lab work in spring 2023            Butch Dunbar D.O.  Primary Care   12/15/22  [facility]  [address] [place] [id number]     Patient Privacy Notice     The 21st Century Cures Act makes medical notes like these available to patients in the interest of transparency. Please be advised that this is a medical document. Medical documents are intended to carry relevant information and the clinical opinion of the practitioner. The medical note is intended as medical provider to provider communication, and may appear blunt or direct. It is written in medical language, and may contain abbreviations or verbiage that are unfamiliar.

## 2022-12-27 ENCOUNTER — TELEPHONE (OUTPATIENT)
Dept: INTERNAL MEDICINE CLINIC | Facility: OTHER | Age: 67
End: 2022-12-27

## 2022-12-27 ENCOUNTER — TRANSITIONAL CARE MANAGEMENT (OUTPATIENT)
Dept: INTERNAL MEDICINE CLINIC | Facility: OTHER | Age: 67
End: 2022-12-27

## 2022-12-27 NOTE — TELEPHONE ENCOUNTER
Pt will call her surgeon but she thinks the kidney specialists put her on the limit and she does not have a follow up with them. Still instructed her to discuss with surgeon.

## 2022-12-27 NOTE — TELEPHONE ENCOUNTER
Pt has more questions. Sent some questions in TCM note to you previously    Additional questions:    Pt is limited to 1 quart of water a day and is to eat protein. Pt wants to know if she can add 1 cup of skim milk a day to her intake. Pt is weak and thinks she may be getting dehydrated. Please advise.

## 2022-12-27 NOTE — TELEPHONE ENCOUNTER
LMOM for pt to call me at [facility] office    Scheduled TCM with Dr Fred Goodwin on 12/30/2022 in [place] office at 10:30 AM.  Need to check with patient to see if she can make appt. Also need to finish TCM note.

## 2022-12-30 ENCOUNTER — RA CDI HCC (OUTPATIENT)
Dept: OTHER | Facility: HOSPITAL | Age: 67
End: 2022-12-30

## 2022-12-30 ENCOUNTER — OFFICE VISIT (OUTPATIENT)
Dept: INTERNAL MEDICINE CLINIC | Age: 67
End: 2022-12-30

## 2022-12-30 ENCOUNTER — APPOINTMENT (OUTPATIENT)
Dept: LAB | Age: 67
End: 2022-12-30

## 2022-12-30 VITALS
BODY MASS INDEX: 36.35 KG/M2 | HEART RATE: 78 BPM | OXYGEN SATURATION: 97 % | WEIGHT: 226.2 LBS | DIASTOLIC BLOOD PRESSURE: 76 MMHG | SYSTOLIC BLOOD PRESSURE: 122 MMHG | HEIGHT: 66 IN | TEMPERATURE: 98.3 F

## 2022-12-30 DIAGNOSIS — E05.90 HYPERTHYROIDISM: ICD-10-CM

## 2022-12-30 DIAGNOSIS — F41.9 ANXIETY: ICD-10-CM

## 2022-12-30 DIAGNOSIS — E87.1 HYPOSMOLALITY SYNDROME: ICD-10-CM

## 2022-12-30 DIAGNOSIS — I10 BENIGN ESSENTIAL HTN: Primary | ICD-10-CM

## 2022-12-30 DIAGNOSIS — E22.2 SIADH (SYNDROME OF INAPPROPRIATE ADH PRODUCTION) (HCC): ICD-10-CM

## 2022-12-30 DIAGNOSIS — Z01.818 OTHER SPECIFIED PRE-OPERATIVE EXAMINATION: ICD-10-CM

## 2022-12-30 DIAGNOSIS — G89.4 PAIN SYNDROME, CHRONIC: ICD-10-CM

## 2022-12-30 DIAGNOSIS — D62 ACUTE BLOOD LOSS ANEMIA: ICD-10-CM

## 2022-12-30 DIAGNOSIS — M48.061 LUMBAR STENOSIS WITHOUT NEUROGENIC CLAUDICATION: ICD-10-CM

## 2022-12-30 DIAGNOSIS — Z91.048 ALLERGY TO ADHESIVE TAPE: ICD-10-CM

## 2022-12-30 DIAGNOSIS — M43.16 SPONDYLOLISTHESIS OF LUMBAR REGION: ICD-10-CM

## 2022-12-30 DIAGNOSIS — M51.36 LUMBAR DEGENERATIVE DISC DISEASE: ICD-10-CM

## 2022-12-30 DIAGNOSIS — E87.1 HYPONATREMIA: ICD-10-CM

## 2022-12-30 DIAGNOSIS — E78.00 HYPERCHOLESTEREMIA: ICD-10-CM

## 2022-12-30 DIAGNOSIS — B00.1 COLD SORE: ICD-10-CM

## 2022-12-30 PROBLEM — Z79.52 LONG TERM SYSTEMIC STEROID USER: Status: RESOLVED | Noted: 2021-05-11 | Resolved: 2022-12-30

## 2022-12-30 PROBLEM — Z01.419 ENCOUNTER FOR GYNECOLOGICAL EXAMINATION (GENERAL) (ROUTINE) WITHOUT ABNORMAL FINDINGS: Status: RESOLVED | Noted: 2021-02-08 | Resolved: 2022-12-30

## 2022-12-30 PROBLEM — E28.319 ASYMPTOMATIC PREMATURE MENOPAUSE: Status: RESOLVED | Noted: 2021-05-11 | Resolved: 2022-12-30

## 2022-12-30 PROBLEM — E66.01 OBESITY, MORBID (HCC): Status: RESOLVED | Noted: 2022-07-12 | Resolved: 2022-12-30

## 2022-12-30 PROBLEM — U07.1 COVID-19: Status: RESOLVED | Noted: 2022-05-16 | Resolved: 2022-12-30

## 2022-12-30 PROBLEM — R60.0 BILATERAL LEG EDEMA: Status: RESOLVED | Noted: 2021-09-17 | Resolved: 2022-12-30

## 2022-12-30 PROBLEM — R63.5 WEIGHT GAIN: Status: RESOLVED | Noted: 2022-12-01 | Resolved: 2022-12-30

## 2022-12-30 LAB
ANION GAP SERPL CALCULATED.3IONS-SCNC: 4 MMOL/L (ref 4–13)
BUN SERPL-MCNC: 19 MG/DL (ref 5–25)
CALCIUM SERPL-MCNC: 10 MG/DL (ref 8.3–10.1)
CHLORIDE SERPL-SCNC: 105 MMOL/L (ref 96–108)
CO2 SERPL-SCNC: 29 MMOL/L (ref 21–32)
CREAT SERPL-MCNC: 0.65 MG/DL (ref 0.6–1.3)
GFR SERPL CREATININE-BSD FRML MDRD: 93 ML/MIN/1.73SQ M
GLUCOSE SERPL-MCNC: 101 MG/DL (ref 65–140)
POTASSIUM SERPL-SCNC: 4.5 MMOL/L (ref 3.5–5.3)
SODIUM SERPL-SCNC: 138 MMOL/L (ref 135–147)

## 2022-12-30 RX ORDER — DIPHENOXYLATE HYDROCHLORIDE AND ATROPINE SULFATE 2.5; .025 MG/1; MG/1
1 TABLET ORAL EVERY MORNING
COMMUNITY

## 2022-12-30 RX ORDER — LORAZEPAM 1 MG/1
1.5 TABLET ORAL
Qty: 45 TABLET | Refills: 0 | Status: SHIPPED | OUTPATIENT
Start: 2022-12-30 | End: 2023-01-19 | Stop reason: SDUPTHER

## 2022-12-30 RX ORDER — AMOXICILLIN 250 MG
1 CAPSULE ORAL 2 TIMES DAILY
COMMUNITY
Start: 2022-12-25 | End: 2023-12-25

## 2022-12-30 RX ORDER — ACETAMINOPHEN 500 MG
1000 TABLET ORAL EVERY 8 HOURS PRN
COMMUNITY
Start: 2022-12-25 | End: 2023-01-04

## 2022-12-30 RX ORDER — PROPRANOLOL HYDROCHLORIDE 10 MG/1
10 TABLET ORAL 2 TIMES DAILY
Qty: 180 TABLET | Refills: 1 | Status: SHIPPED | OUTPATIENT
Start: 2022-12-30

## 2022-12-30 RX ORDER — VALACYCLOVIR HYDROCHLORIDE 500 MG/1
500 TABLET, FILM COATED ORAL DAILY PRN
Qty: 30 TABLET | Refills: 1 | Status: SHIPPED | OUTPATIENT
Start: 2022-12-30 | End: 2023-02-28

## 2022-12-30 NOTE — PROGRESS NOTES
Assessment/Plan:    1  Benign essential HTN    2  Anxiety  -     LORazepam (ATIVAN) 1 mg tablet; Take 1 5 tablets (1 5 mg total) by mouth daily at bedtime as needed for anxiety    3  Hyperthyroidism  -     propranolol (INDERAL) 10 mg tablet; Take 1 tablet (10 mg total) by mouth 2 (two) times a day    4  Cold sore  -     valACYclovir (VALTREX) 500 mg tablet; Take 1 tablet (500 mg total) by mouth daily as needed (Cold Sores)    5  Lumbar degenerative disc disease    6  BMI 36 0-36 9,adult    7  Lumbar stenosis without neurogenic claudication    8  Pain syndrome, chronic    9  Spondylolisthesis of lumbar region    10  Acute blood loss anemia  -     CBC and differential; Future    11  SIADH (syndrome of inappropriate ADH production) ([facility] [address] )    12  Hyponatremia    13  Allergy to adhesive tape            There are no Patient Instructions on file for this visit  Return for Next scheduled follow up  Subjective:      Patient ID: Vinayak Salter is a 74 y o  female  Chief Complaint   Patient presents with   • Transition of Care Management     TCM d/c 12/25 [facility] hyponatremia altered mental status       HPI       From d/c summary :  "Hospital Course  This is a brief description of the patient's hospital stay; please refer to medical chart for further details  Vinayak Salter is a 74 y o  female with PMHx signifcant for hyperthyroidism, HTN, HLD, GERD, spondylolisthesis of lumbar region who is transferred to [facility] from [address] [place] [address] with c/o of [address] [place] [address] on 12/22/2022 12:05 AM  Patient went for L4-L5 bilateral lumbar laminectomy and foramenotomy's for decompression of spinal stenosis and also L4-L5 fusion for spondylolisthesis on 12/20/2022  On 12/21, patient was noted to be feeling unwell and endorsed nausea, pain, and headache  Later in the evening on 12/21, patient was evaluated by on-call physician   There is some change in mental status noted including difficulty with speech and inability to answer orientation questions  [facility] work-up  Prior to transfer, given 250 cc bolus, IV labetalol 10 mg  Upon arrival at [facility], labs were notable for leukocytosis of 15 9 with neutrophil predominance  BMP was notable for sodium of 117  T bili was elevated to 1 6  Serum Osm low at 248  Urine Osm WNL at 484  UA with proteinuria, ketonuria, hematuria  Blood and urine cultures pending  Received 1L bolus of LR in ED  CXR unremarkable  CTH negative for acute intracranial process  CT A/P remarkable for subcutaneous gas and likely seroma at the level of L4-L5  Some mild wall thickening of bladder  Admitted to ICU  Na corrected to 125  Nephrology consulted  Transferred out of ICU 12/23  Nephrology added 1200cc fluid restriction  Sodium has improved to 133  He removed and able to void independently  Hgb has remained stable and pain adequately controlled  Appetite is improving  Seen by PT/OT and cleared for discharge to home  Given lab rx for BMP in 3 days, cont with fluid restriction  Continue to HOLD prior home medications of lasix, elavil, and buspar  Quinapril changed to once daily for now until BP comes up as she recovers  Follow up with PCP 1 week  Medically stable for DC "      Sodium was as low as 117, she is feeling better at  Home, PT will start next week, she is eating increased protein, she is having some  Constipation  She still has the spinal stimulator in place and they plan on keep ing it there  She has not turned it on yet and forgot to ask her spine surgeon         The following portions of the patient's history were reviewed and updated as appropriate: allergies, current medications, past family history, past medical history, past social history, past surgical history and problem list     Review of Systems      Constitutional:  Denies fever or chills   Eyes:  Denies double , blurry vision or eye pain  HENT:  Denies nasal congestion or sore throat   Respiratory:  Denies cough or shortness of breath or wheezing  Cardiovascular:  Denies palpitations or chest pain  GI:  Denies abdominal pain, nausea, or vomiting, no loose stools, no reflux  Integument:  Denies rash , no open areas  Neurologic:  Denies headache or focal weakness, no dizziness  : no dysuria, or hematuria        Current Outpatient Medications   Medication Sig Dispense Refill   • Ascorbic Acid (VITAMIN C PO) Take 1 tablet by mouth daily     • atorvastatin (LIPITOR) 10 mg tablet Take 1 tablet (10 mg total) by mouth daily at bedtime 90 tablet 1   • Cetirizine HCl (ZYRTEC PO) Take by mouth as needed     • Cholecalciferol 50 MCG (2000 UT) CAPS Take 1 capsule by mouth every morning     • Esomeprazole Magnesium (NEXIUM PO) Take 1 capsule by mouth daily as needed      • fluticasone (FLONASE) 50 mcg/act nasal spray 1 spray into each nostril daily (Patient taking differently: 1 spray into each nostril as needed) 16 g 3   • LORazepam (ATIVAN) 1 mg tablet Take 1 5 tablets (1 5 mg total) by mouth daily at bedtime as needed for anxiety 45 tablet 0   • methimazole (TAPAZOLE) 5 mg tablet Take 1 tablet (5 mg total) by mouth daily 90 tablet 1   • multivitamin (THERAGRAN) TABS Take 1 tablet by mouth every morning     • penciclovir (Denavir) 1 % cream Apply topically daily as needed (ulcer) 5 g 5   • Polyethyl Glycol-Propyl Glycol (SYSTANE OP) Apply to eye if needed     • pregabalin (LYRICA) 50 mg capsule Take 1 capsule in the AM, 1 capsule in the PM and 2 capsules at bedtime 360 capsule 1   • Probiotic Product (PROBIOTIC PO) Take 1 capsule by mouth every morning     • propranolol (INDERAL) 10 mg tablet Take 1 tablet (10 mg total) by mouth 2 (two) times a day 180 tablet 1   • quinapril (ACCUPRIL) 20 mg tablet Take 1 tablet (20 mg total) by mouth 2 (two) times a day (Patient taking differently: Take 20 mg by mouth daily) 180 tablet 1   • RESTASIS 0 05 % ophthalmic emulsion Administer 1 drop to both eyes 2 (two) times a day  3   • valACYclovir (VALTREX) 500 mg tablet Take 1 tablet (500 mg total) by mouth daily as needed (Cold Sores) 30 tablet 1   • metroNIDAZOLE (METROCREAM) 0 75 % cream Apply topically daily at bedtime (Patient not taking: Reported on 6/10/2022) 45 g 2   • senna-docusate sodium (SENOKOT S) 8 6-50 mg per tablet Take 1 tablet by mouth 2 (two) times a day (Patient not taking: Reported on 12/30/2022)     • tiZANidine (ZANAFLEX) 4 mg tablet Take 1 tablet in the a m , 1 tablet in the p m  and 2 tablets at bedtime 360 tablet 1     No current facility-administered medications for this visit         Objective:    /76 (BP Location: Left arm, Patient Position: Sitting, Cuff Size: Large)   Pulse 78   Temp 98 3 °F (36 8 °C) (Temporal)   Ht 5' 6" (1 676 m)   Wt 103 kg (226 lb 3 2 oz)   LMP 11/26/2015   SpO2 97%   BMI 36 51 kg/m²        Physical Exam       Constitutional:  Well developed, well nourished, no acute distress, non-toxic appearance   Eyes:  PERRL, conjunctiva normal , non icteric sclera  HENT:  Atraumatic, oropharynx moist  Neck-  supple   Respiratory:  CTA b/l, normal breath sounds, no rales, no wheezing   Cardiovascular:  RRR, no murmurs, no LE edema b/l  GI:  Soft, nondistended, normal bowel sounds x 4, nontender, no organomegaly, no mass, no rebound, no guarding   Neurologic:  no focal deficits noted   Psychiatric:  Speech and behavior appropriate , AAO x 3  MS with walker  Skin:       Ltanya Hem, DO

## 2023-01-03 ENCOUNTER — TELEPHONE (OUTPATIENT)
Dept: PAIN MEDICINE | Facility: MEDICAL CENTER | Age: 68
End: 2023-01-03

## 2023-01-03 ENCOUNTER — TELEPHONE (OUTPATIENT)
Dept: INTERNAL MEDICINE CLINIC | Facility: OTHER | Age: 68
End: 2023-01-03

## 2023-01-03 DIAGNOSIS — M79.18 MYOFASCIAL PAIN SYNDROME: ICD-10-CM

## 2023-01-03 DIAGNOSIS — E87.1 HYPONATREMIA: Primary | ICD-10-CM

## 2023-01-03 NOTE — TELEPHONE ENCOUNTER
Caller: Patient    Doctor: Moon De Santiago    Reason for call: would like a call back regarding her pain meds when she was admitted in the hospital    Call back#: [phone]

## 2023-01-03 NOTE — TELEPHONE ENCOUNTER
Patient calling to see what the results of her labs were and if she is able to drink more water yet? The tylenol isn't helping with the pain and she was unable to sleep last night so she took an extra pregablin and an extra 1/4 of tizanidine  She is wondering if you have any other recommendations for her?

## 2023-01-03 NOTE — TELEPHONE ENCOUNTER
S/w pt, states she had L4-L5 fusion from [facility]  After surgery, she ended being admitted to ICU for low potassium and sodium  Upon discharged, diclofenac was discontinued due to low sodium and potassium  Pt had new labs drawn, her Na+ and K+ are all back to normal  Pt says she has pain all over her back, not just her incision  Pt says she can't take oxycodone, makes her hallucinate  She is taking tizanidine and lyrica  Pt wants to know if we can help her with her pain

## 2023-01-04 RX ORDER — TIZANIDINE 4 MG/1
TABLET ORAL
Qty: 360 TABLET | Refills: 1 | Status: SHIPPED | OUTPATIENT
Start: 2023-01-04

## 2023-01-04 NOTE — TELEPHONE ENCOUNTER
Caller: Addis    Doctor: Sharon Barragan    Reason for call: over all great, low back is stiff, when she goes to lay down it takes to long for medication to kick in, currently taking 50 mg of Lyrica 2 in morning and 2 at night   Tizanidine taking 8 mg total at night can she increase that? Not sleeping to good  She is sorry she could not speak with Dr Newman Room her phone  as she picked up       Call back#: [phone]

## 2023-01-04 NOTE — TELEPHONE ENCOUNTER
S/w pt and advised of same and also advised not to drive or operate machinery until she knows how the medication will effect her  Pt verbalized understanding

## 2023-01-04 NOTE — TELEPHONE ENCOUNTER
Have her increase the tizanidine to 4 mg in the morning, 4 mg in the p m  and 8 mg at bedtime   New e-rx sent    Would recommend that she increase the Lyrica to 50 mg 3 capsules twice a day she can tolerate that and use what she has on hand

## 2023-01-04 NOTE — TELEPHONE ENCOUNTER
Left VM to see how she was doing after the surgery  Will likely have her increase her lyrica when she calls back  Just verify her current dosing

## 2023-01-18 ENCOUNTER — TELEPHONE (OUTPATIENT)
Dept: PAIN MEDICINE | Facility: CLINIC | Age: 68
End: 2023-01-18

## 2023-01-19 DIAGNOSIS — F41.9 ANXIETY: ICD-10-CM

## 2023-01-19 RX ORDER — LORAZEPAM 1 MG/1
1.5 TABLET ORAL
Qty: 45 TABLET | Refills: 0 | Status: SHIPPED | OUTPATIENT
Start: 2023-01-19

## 2023-01-20 DIAGNOSIS — M47.816 LUMBAR FACET ARTHROPATHY: ICD-10-CM

## 2023-02-03 DIAGNOSIS — E05.90 HYPERTHYROIDISM: ICD-10-CM

## 2023-02-03 RX ORDER — METHIMAZOLE 5 MG/1
5 TABLET ORAL DAILY
Qty: 90 TABLET | Refills: 1 | Status: SHIPPED | OUTPATIENT
Start: 2023-02-03

## 2023-02-10 ENCOUNTER — TELEPHONE (OUTPATIENT)
Dept: INTERNAL MEDICINE CLINIC | Age: 68
End: 2023-02-10

## 2023-02-10 DIAGNOSIS — R30.0 DYSURIA: Primary | ICD-10-CM

## 2023-02-10 NOTE — TELEPHONE ENCOUNTER
Patient is not able to come to the office due to not able to drive or doesn't have someone to bring her to the office  Can we put in the order for her to get at the lab close to her and then call her with the results    Can we do a virtual visit with her??

## 2023-02-10 NOTE — TELEPHONE ENCOUNTER
LMOM to let patient know that we put in the order for the U/A for her to have done before we can prescribe her anything  I also stated that if she can't wait to get the results of the test to go to the urgent care to get it checked    Otherwise, we will contact her after the results come in if she gets it done

## 2023-02-10 NOTE — TELEPHONE ENCOUNTER
Patient is starting with UTI symptoms today  Started with frequency, then painful with urination  Patient hasn't gotten cleared to drive to come in and doesn't have anyone to bring her        Are we able to do a virtual visit for her, prescribed something for her as well    Where to put her if ok to do virtula    Please call mobile phone

## 2023-02-15 ENCOUNTER — APPOINTMENT (OUTPATIENT)
Dept: LAB | Age: 68
End: 2023-02-15

## 2023-02-15 DIAGNOSIS — R30.0 DYSURIA: ICD-10-CM

## 2023-02-15 LAB
AMORPH URATE CRY URNS QL MICRO: ABNORMAL
BACTERIA UR QL AUTO: ABNORMAL /HPF
BILIRUB UR QL STRIP: NEGATIVE
CLARITY UR: ABNORMAL
COLOR UR: YELLOW
GLUCOSE UR STRIP-MCNC: NEGATIVE MG/DL
HGB UR QL STRIP.AUTO: ABNORMAL
KETONES UR STRIP-MCNC: NEGATIVE MG/DL
LEUKOCYTE ESTERASE UR QL STRIP: ABNORMAL
NITRITE UR QL STRIP: NEGATIVE
NON-SQ EPI CELLS URNS QL MICRO: ABNORMAL /HPF
PH UR STRIP.AUTO: 6.5 [PH]
PROT UR STRIP-MCNC: ABNORMAL MG/DL
RBC #/AREA URNS AUTO: ABNORMAL /HPF
SP GR UR STRIP.AUTO: 1.01 (ref 1–1.03)
UROBILINOGEN UR STRIP-ACNC: <2 MG/DL
WBC #/AREA URNS AUTO: ABNORMAL /HPF
WBC CLUMPS # UR AUTO: PRESENT /UL

## 2023-02-16 DIAGNOSIS — N30.00 ACUTE CYSTITIS WITHOUT HEMATURIA: Primary | ICD-10-CM

## 2023-02-16 RX ORDER — CIPROFLOXACIN 500 MG/1
500 TABLET, FILM COATED ORAL EVERY 12 HOURS SCHEDULED
Qty: 14 TABLET | Refills: 0 | Status: SHIPPED | OUTPATIENT
Start: 2023-02-16 | End: 2023-02-23

## 2023-02-17 LAB — BACTERIA UR CULT: ABNORMAL

## 2023-02-20 ENCOUNTER — TELEPHONE (OUTPATIENT)
Dept: INTERNAL MEDICINE CLINIC | Facility: OTHER | Age: 68
End: 2023-02-20

## 2023-02-20 DIAGNOSIS — F41.9 ANXIETY: ICD-10-CM

## 2023-02-20 NOTE — TELEPHONE ENCOUNTER
Confirmed with patient and it is for the rosacea  On the phone she also mentioned that before her surgery this past December, they took her off of 5 medications and one that she wanted to go back on was her buspar   She said she still has many of these at home but wanted to run this by you first

## 2023-02-20 NOTE — TELEPHONE ENCOUNTER
Patient called script line asking for her doxycylcine to be refilled but it is not on her current medication list  It is on a past list      Is the patient able to get a refill on this med? [facility]      Please advise, thank you

## 2023-02-21 DIAGNOSIS — L71.9 ROSACEA, ACNE: Primary | ICD-10-CM

## 2023-02-21 RX ORDER — DOXYCYCLINE HYCLATE 100 MG/1
100 CAPSULE ORAL EVERY 12 HOURS SCHEDULED
Qty: 60 CAPSULE | Refills: 3 | Status: SHIPPED | OUTPATIENT
Start: 2023-02-21 | End: 2023-04-12 | Stop reason: SDUPTHER

## 2023-02-27 RX ORDER — LORAZEPAM 1 MG/1
1.5 TABLET ORAL
Qty: 45 TABLET | Refills: 0 | Status: SHIPPED | OUTPATIENT
Start: 2023-02-27

## 2023-03-02 ENCOUNTER — TELEPHONE (OUTPATIENT)
Dept: PAIN MEDICINE | Facility: CLINIC | Age: 68
End: 2023-03-02

## 2023-03-02 NOTE — TELEPHONE ENCOUNTER
She can do this wean including off the bedtime dose, but if she starts to experience worse pain, then we'll just keep her on the bedtime dose

## 2023-03-02 NOTE — TELEPHONE ENCOUNTER
Caller: Addis    Doctor: Alonso Slaughter    Reason for call: patient requesting to speak with nurse regarding medication  pregabalin (LYRICA) 50 mg capsule     Surgeon told her to cut back on this medication so she is not sure what to do?     Call back#: [phone]

## 2023-03-02 NOTE — TELEPHONE ENCOUNTER
Pt had f/u with her back surgeon who recommended she start weaning off her lyrica  He suggested decreasing by 1 pill per week  She was on lyrica 50 mg (1) in am, (1) in afternoon and (2) at HS  10 days ago she eliminated the afternoon dose  Do you agree with her decreasing by 1 pill per week until off of them or do you want her on a daily bedtime dose? Pls advise  [facility] and she still has 1 90 day Rx on file

## 2023-03-24 DIAGNOSIS — F41.9 ANXIETY: ICD-10-CM

## 2023-03-24 DIAGNOSIS — E78.00 HYPERCHOLESTEREMIA: ICD-10-CM

## 2023-03-24 RX ORDER — ATORVASTATIN CALCIUM 10 MG/1
10 TABLET, FILM COATED ORAL
Qty: 90 TABLET | Refills: 1 | Status: SHIPPED | OUTPATIENT
Start: 2023-03-24

## 2023-03-24 RX ORDER — LORAZEPAM 1 MG/1
1.5 TABLET ORAL
Qty: 135 TABLET | Refills: 0 | Status: SHIPPED | OUTPATIENT
Start: 2023-03-24

## 2023-04-05 ENCOUNTER — OFFICE VISIT (OUTPATIENT)
Dept: INTERNAL MEDICINE CLINIC | Facility: OTHER | Age: 68
End: 2023-04-05

## 2023-04-05 VITALS
SYSTOLIC BLOOD PRESSURE: 132 MMHG | HEIGHT: 66 IN | HEART RATE: 69 BPM | RESPIRATION RATE: 18 BRPM | DIASTOLIC BLOOD PRESSURE: 84 MMHG | OXYGEN SATURATION: 98 % | TEMPERATURE: 97.8 F | BODY MASS INDEX: 37.48 KG/M2 | WEIGHT: 233.2 LBS

## 2023-04-05 DIAGNOSIS — I73.9 PERIPHERAL VASCULAR DISEASE (HCC): ICD-10-CM

## 2023-04-05 DIAGNOSIS — S83.92XA SPRAIN OF LEFT KNEE, UNSPECIFIED LIGAMENT, INITIAL ENCOUNTER: Primary | ICD-10-CM

## 2023-04-05 DIAGNOSIS — E22.2 SIADH (SYNDROME OF INAPPROPRIATE ADH PRODUCTION) (HCC): ICD-10-CM

## 2023-04-05 RX ORDER — METHYLPREDNISOLONE 4 MG/1
TABLET ORAL
Qty: 21 EACH | Refills: 0 | Status: SHIPPED | OUTPATIENT
Start: 2023-04-05

## 2023-04-05 RX ORDER — FUROSEMIDE 40 MG/1
40 TABLET ORAL DAILY
COMMUNITY

## 2023-04-05 RX ORDER — BUSPIRONE HYDROCHLORIDE 10 MG/1
10 TABLET ORAL DAILY
COMMUNITY

## 2023-04-05 RX ORDER — DICLOFENAC SODIUM 75 MG/1
75 TABLET, DELAYED RELEASE ORAL 2 TIMES DAILY
COMMUNITY

## 2023-04-05 NOTE — PROGRESS NOTES
Assessment/Plan:    Sprain of left knee  Will refer for physical therapy and start Medrol Dosepak  Continue ice compresses as needed  Diagnoses and all orders for this visit:    Sprain of left knee, unspecified ligament, initial encounter  -     methylPREDNISolone 4 MG tablet therapy pack; Use as directed on package  -     Ambulatory Referral to Physical Therapy; Future    Peripheral vascular disease (HCC)    SIADH (syndrome of inappropriate ADH production) ([facility] [address] )    Other orders  -     busPIRone (BUSPAR) 10 mg tablet; Take 10 mg by mouth in the morning 5 mg in am if needed and 10 mg at night  -     diclofenac (VOLTAREN) 75 mg EC tablet; Take 75 mg by mouth 2 (two) times a day  -     furosemide (LASIX) 40 mg tablet; Take 40 mg by mouth daily                Subjective:      Patient ID: Nico Isaac is a 74 y o  female  Chief Complaint   Patient presents with   • Knee Pain     Twisted left knee walking down steps yesterday morning  Have been icing    • hm     Phq, bmi f/u plan       74year old female is seen today with concern for left knee pain and swelling since yesterday  She denies any known injury  Symptoms started while walking down the stairs  She denies any known twisting injury  She has been putting a lot of pressure on her legs to compensate and decrease pressure on her back since undergoing lumbar spine fusion  Knee Pain   The incident occurred 12 to 24 hours ago  The incident occurred at home  There was no injury mechanism  The pain is present in the left knee  Pertinent negatives include no numbness  She reports no foreign bodies present  She has tried ice and NSAIDs for the symptoms         The following portions of the patient's history were reviewed and updated as appropriate: allergies, current medications, past family history, past medical history, past social history, past surgical history and problem list     Review of Systems   Constitutional: Negative for activity change, appetite change, chills, diaphoresis, fatigue and fever  HENT: Negative for congestion, postnasal drip, rhinorrhea, sinus pressure, sinus pain, sneezing and sore throat  Eyes: Negative for visual disturbance  Respiratory: Negative for apnea, cough, choking, chest tightness, shortness of breath and wheezing  Cardiovascular: Negative for chest pain, palpitations and leg swelling  Gastrointestinal: Negative for abdominal distention, abdominal pain, anal bleeding, blood in stool, constipation, diarrhea, nausea and vomiting  Endocrine: Negative for cold intolerance and heat intolerance  Genitourinary: Negative for difficulty urinating, dysuria and hematuria  Musculoskeletal: Negative  Skin: Negative  Neurological: Negative for dizziness, weakness, light-headedness, numbness and headaches  Hematological: Negative for adenopathy  Psychiatric/Behavioral: Negative for agitation, sleep disturbance and suicidal ideas  All other systems reviewed and are negative          Past Medical History:   Diagnosis Date   • Allergic    • Anxiety    • Asymptomatic premature menopause 5/11/2021   • Bilateral leg edema 9/17/2021   • Cataract, bilateral     last assessed   • Cervical radiculopathy    • Chronic right shoulder pain    • COVID-19 05/15/2022   • Disc disease, degenerative, cervical    • Disease of thyroid gland    • Encounter for gynecological examination (general) (routine) without abnormal findings 2/8/2021   • Herpes simplex infection    • Hypertension    • Insomnia    • Long term systemic steroid user 5/11/2021   • Low back pain    • Lumbar degenerative disc disease    • Lumbar facet arthropathy    • Lumbar radiculopathy    • Lumbar stenosis without neurogenic claudication    • Mononucleosis    • Myofascial pain dysfunction syndrome    • Obesity, morbid ([facility] [address] ) 7/12/2022   • Osteoarthritis     shoulder region - unspecified laterality - last assessed 2/1/14   • Plantar fasciitis    • Ptosis, both eyelids     last assessed 10/6/16   • Ptosis, congenital, left    • Retinal detachment     last assessed 12/11/14 right eye   • Sacroiliitis (HCC)    • Thyroid disease    • Thyrotoxicosis     last assessed 5/17/13   • Vertigo    • Weight gain 12/1/2022         Current Outpatient Medications:   •  Ascorbic Acid (VITAMIN C PO), Take 1 tablet by mouth daily, Disp: , Rfl:   •  atorvastatin (LIPITOR) 10 mg tablet, Take 1 tablet (10 mg total) by mouth daily at bedtime, Disp: 90 tablet, Rfl: 1  •  busPIRone (BUSPAR) 10 mg tablet, Take 10 mg by mouth in the morning 5 mg in am if needed and 10 mg at night, Disp: , Rfl:   •  Cetirizine HCl (ZYRTEC PO), Take by mouth as needed, Disp: , Rfl:   •  Cholecalciferol 50 MCG (2000 UT) CAPS, Take 1 capsule by mouth every morning, Disp: , Rfl:   •  diclofenac (VOLTAREN) 75 mg EC tablet, Take 75 mg by mouth 2 (two) times a day, Disp: , Rfl:   •  Diclofenac Sodium (VOLTAREN) 1 %, APPLY 2 GRAMS TO AFFECTED AREA 4 TIMES A DAY (Patient taking differently: if needed APPLY 2 GRAMS TO AFFECTED AREA 4 TIMES A DAY), Disp: 200 g, Rfl: 1  •  doxycycline hyclate (VIBRAMYCIN) 100 mg capsule, Take 1 capsule (100 mg total) by mouth every 12 (twelve) hours (Patient taking differently: Take 100 mg by mouth in the morning), Disp: 60 capsule, Rfl: 3  •  Esomeprazole Magnesium (NEXIUM PO), Take 1 capsule by mouth daily as needed , Disp: , Rfl:   •  fluticasone (FLONASE) 50 mcg/act nasal spray, 1 spray into each nostril daily (Patient taking differently: 1 spray into each nostril as needed), Disp: 16 g, Rfl: 3  •  furosemide (LASIX) 40 mg tablet, Take 40 mg by mouth daily, Disp: , Rfl:   •  LORazepam (ATIVAN) 1 mg tablet, Take 1 5 tablets (1 5 mg total) by mouth daily at bedtime as needed for anxiety, Disp: 135 tablet, Rfl: 0  •  methimazole (TAPAZOLE) 5 mg tablet, Take 1 tablet (5 mg total) by mouth daily, Disp: 90 tablet, Rfl: 1  •  methylPREDNISolone 4 MG tablet therapy pack, Use as directed on package, Disp: 21 each, Rfl: 0  •  multivitamin (THERAGRAN) TABS, Take 1 tablet by mouth every morning, Disp: , Rfl:   •  penciclovir (Denavir) 1 % cream, Apply topically daily as needed (ulcer), Disp: 5 g, Rfl: 5  •  Polyethyl Glycol-Propyl Glycol (SYSTANE OP), Apply to eye if needed, Disp: , Rfl:   •  pregabalin (LYRICA) 50 mg capsule, Take 1 capsule in the AM, 1 capsule in the PM and 2 capsules at bedtime (Patient taking differently: 50 mg 2 (two) times a day), Disp: 360 capsule, Rfl: 1  •  Probiotic Product (PROBIOTIC PO), Take 1 capsule by mouth every morning, Disp: , Rfl:   •  propranolol (INDERAL) 10 mg tablet, Take 1 tablet (10 mg total) by mouth 2 (two) times a day, Disp: 180 tablet, Rfl: 1  •  quinapril (ACCUPRIL) 20 mg tablet, Take 1 tablet (20 mg total) by mouth 2 (two) times a day (Patient taking differently: Take 20 mg by mouth daily), Disp: 180 tablet, Rfl: 1  •  RESTASIS 0 05 % ophthalmic emulsion, Administer 1 drop to both eyes 2 (two) times a day, Disp: , Rfl: 3  •  tiZANidine (ZANAFLEX) 4 mg tablet, Take 1 tablet in the a m , 1 tablet in the p m  and 2 tablets at bedtime (Patient taking differently: Take 4 mg by mouth daily at bedtime 2 5 at bedtime), Disp: 360 tablet, Rfl: 1  •  valACYclovir (VALTREX) 500 mg tablet, Take 1 tablet (500 mg total) by mouth daily as needed (Cold Sores), Disp: 30 tablet, Rfl: 1    Allergies   Allergen Reactions   • Other Dermatitis     Adhesive tape - please use ONLY PAPER TAPE   • Scopolamine Other (See Comments)     Severe, debilitating headache     • Flexeril [Cyclobenzaprine] Dizziness and Fatigue   • Naproxen      Other reaction(s): Unknown Allergic Reaction  Annotation - [id number]: nightmares   • Oxycodone Other (See Comments)     Night olsen    • Penicillins      Other reaction(s): Unknown Allergic Reaction  Annotation - [id number]: childhood reaction   • Promethazine-Codeine      Reaction Date: [id number]; Annotation - [id number]: nightmares;  Annotation - [id number]: "nightmares   • Tramadol      Other reaction(s): Unknown Allergic Reaction  Annotation - [id number]: PT HAS NIGHTMARES FROM TRAMADOL   • Medical Tape Rash   • Wound Dressing Adhesive Rash       Social History   Past Surgical History:   Procedure Laterality Date   • EYE SURGERY     • [place] RAYMUNDO IMPLTJ NSTIM ELTRDS PLATE/PADDLE EDRL Left 01/26/2016    Procedure: PLACEMENT OF THORACIC SPINAL CORD STIMULATOR WITH LEFT BUTTOCK IMPLANTABLE PULSE GENERATOR (IMPULSE MONITORING);   Surgeon: Joseph Valente MD;  Location:  MAIN OR;  Service: Neurosurgery   • RETINAL DETACHMENT SURGERY Right      Family History   Problem Relation Age of Onset   • Breast cancer Mother    • COPD Mother    • Hypertension Mother         essential   • Cancer Mother    • Glaucoma Mother    • Heart attack Father         acute MI   • Emphysema Father    • Hypertension Father         essential   • Other Father         prehypertension   • COPD Father    • No Known Problems Maternal Grandmother    • No Known Problems Maternal Grandfather    • No Known Problems Paternal Grandmother    • No Known Problems Paternal Grandfather    • No Known Problems Sister    • No Known Problems Son    • No Known Problems Sister    • No Known Problems Paternal Aunt    • No Known Problems Paternal Aunt    • No Known Problems Paternal Aunt    • No Known Problems Paternal Aunt    • No Known Problems Paternal Aunt    • Liver cancer Maternal Uncle        Objective:  /84 (BP Location: Left arm, Patient Position: Sitting, Cuff Size: Large)   Pulse 69   Temp 97 8 °F (36 6 °C) (Temporal)   Resp 18   Ht 5' 6\" (1 676 m)   Wt 106 kg (233 lb 3 2 oz)   LMP 11/26/2015   SpO2 98%   BMI 37 64 kg/m²     Recent Results (from the past 1344 hour(s))   UA w Reflex to Microscopic w Reflex to Culture -Lab Collect    Collection Time: 02/15/23 11:52 AM    Specimen: Urine, Clean Catch   Result Value Ref Range    Color, UA Yellow     Clarity, UA Turbid     Specific [place], UA 1 009 1 003 - " 1 030    pH, UA 6 5 4 5, 5 0, 5 5, 6 0, 6 5, 7 0, 7 5, 8 0    Leukocytes, UA Large (A) Negative    Nitrite, UA Negative Negative    Protein, UA 30 (1+) (A) Negative mg/dl    Glucose, UA Negative Negative mg/dl    Ketones, UA Negative Negative mg/dl    Urobilinogen, UA <2 0 <2 0 mg/dl mg/dl    Bilirubin, UA Negative Negative    Occult Blood, UA Small (A) Negative   Urine Microscopic    Collection Time: 02/15/23 11:52 AM   Result Value Ref Range    RBC, UA 10-20 (A) None Seen, 1-2 /hpf    WBC, UA Innumerable (A) None Seen, 1-2 /hpf    Epithelial Cells None Seen None Seen, Occasional /hpf    Bacteria, UA Occasional None Seen, Occasional /hpf    Amorphous Crystals, UA Occasional     WBC Clumps Present    Urine culture    Collection Time: 02/15/23 11:52 AM    Specimen: Urine, Clean Catch   Result Value Ref Range    Urine Culture 10,000-19,000 cfu/ml Proteus mirabilis (A)        Susceptibility    Proteus mirabilis - FAISAL     ZID Performed Yes       Ampicillin ($$) <=8 00 Susceptible ug/ml     Aztreonam ($$$)  <=4 Susceptible ug/ml     Cefazolin ($) <=2 00 Susceptible ug/ml     Ciprofloxacin ($)  <=0 25 Susceptible ug/ml     Gentamicin ($$) <=2 Susceptible ug/ml     Levofloxacin ($) <=0 50 Susceptible ug/ml     Nitrofurantoin >64 Resistant ug/ml     Tetracycline >8 Resistant ug/ml     Tobramycin ($) <=2 Susceptible ug/ml     Trimethoprim + Sulfamethoxazole ($$$) <=0 5/9 5 Susceptible ug/ml            Physical Exam  Vitals and nursing note reviewed  Constitutional:       General: She is not in acute distress  Appearance: She is well-developed  She is not diaphoretic  HENT:      Head: Normocephalic and atraumatic  Eyes:      General:         Right eye: No discharge  Left eye: No discharge  Conjunctiva/sclera: Conjunctivae normal       Pupils: Pupils are equal, round, and reactive to light  Neck:      Thyroid: No thyromegaly  Vascular: No JVD     Cardiovascular:      Rate and Rhythm: Normal rate and regular rhythm  Heart sounds: Normal heart sounds  No murmur heard  No friction rub  No gallop  Pulmonary:      Effort: Pulmonary effort is normal  No respiratory distress  Breath sounds: Normal breath sounds  No wheezing or rales  Chest:      Chest wall: No tenderness  Abdominal:      General: There is no distension  Palpations: Abdomen is soft  Tenderness: There is no abdominal tenderness  Musculoskeletal:         General: No deformity  Normal range of motion  Cervical back: Normal range of motion and neck supple  Left knee: Swelling (mild) present  Tenderness present over the lateral joint line  Left lower leg: No swelling (mild)  Lymphadenopathy:      Cervical: No cervical adenopathy  Skin:     General: Skin is warm and dry  Coloration: Skin is not pale  Findings: No erythema or rash  Neurological:      Mental Status: She is alert and oriented to person, place, and time  Cranial Nerves: No cranial nerve deficit  Coordination: Coordination normal    Psychiatric:         Behavior: Behavior normal          Thought Content:  Thought content normal          Judgment: Judgment normal

## 2023-04-20 ENCOUNTER — HOSPITAL ENCOUNTER (OUTPATIENT)
Dept: RADIOLOGY | Facility: HOSPITAL | Age: 68
Discharge: HOME | End: 2023-04-20

## 2023-04-20 DIAGNOSIS — M25.562 LEFT KNEE PAIN, UNSPECIFIED CHRONICITY: ICD-10-CM

## 2023-04-25 ENCOUNTER — TELEPHONE (OUTPATIENT)
Dept: OBGYN CLINIC | Facility: MEDICAL CENTER | Age: 68
End: 2023-04-25

## 2023-04-25 NOTE — TELEPHONE ENCOUNTER
Caller: Patient    Doctor: Lenora Castillo    Reason for call:     Patient was in to see Freeman Rollins on 4/20/23, the patient had an injection in her left knee but she is stating  There was no relief  She is in extreme pain every time she tries to walk, now he right knee is starting to   [place], she is using a cane  She is scheduled to see Dr Tamara Wyman in June and states she will not be able  To deal with the pain  She is asking for a call back      Call back#: [phone]

## 2023-04-26 DIAGNOSIS — M25.562 CHRONIC PAIN OF LEFT KNEE: Primary | ICD-10-CM

## 2023-04-26 DIAGNOSIS — G89.29 CHRONIC PAIN OF LEFT KNEE: Primary | ICD-10-CM

## 2023-04-26 RX ORDER — METHYLPREDNISOLONE 4 MG/1
TABLET ORAL
Qty: 1 EACH | Refills: 0 | Status: SHIPPED | OUTPATIENT
Start: 2023-04-26 | End: 2023-06-01

## 2023-04-26 NOTE — TELEPHONE ENCOUNTER
Caller: Patient    Doctor: PA    Reason for call: Patient calling in for an update on the message left yesterday  She reports the pain just gets worse  She has had cortisone injections before and has not experienced this  Please advise patient  She is not sure if she should go to ED       Call back#: [phone]

## 2023-05-11 ENCOUNTER — TELEPHONE (OUTPATIENT)
Dept: INTERNAL MEDICINE CLINIC | Facility: OTHER | Age: 68
End: 2023-05-11

## 2023-05-11 DIAGNOSIS — M51.36 LUMBAR DEGENERATIVE DISC DISEASE: ICD-10-CM

## 2023-05-11 DIAGNOSIS — M48.061 LUMBAR STENOSIS WITHOUT NEUROGENIC CLAUDICATION: Primary | ICD-10-CM

## 2023-05-11 DIAGNOSIS — I10 BENIGN ESSENTIAL HTN: ICD-10-CM

## 2023-05-11 RX ORDER — LISINOPRIL 20 MG/1
20 TABLET ORAL 2 TIMES DAILY
Qty: 180 TABLET | Refills: 1 | Status: SHIPPED | OUTPATIENT
Start: 2023-05-11

## 2023-05-11 RX ORDER — LISINOPRIL 20 MG/1
TABLET ORAL
Qty: 60 TABLET | Refills: 0 | OUTPATIENT
Start: 2023-05-11

## 2023-05-11 RX ORDER — AMITRIPTYLINE HYDROCHLORIDE 10 MG/1
10 TABLET, FILM COATED ORAL
Qty: 90 TABLET | Refills: 0 | Status: SHIPPED | OUTPATIENT
Start: 2023-05-11

## 2023-05-11 NOTE — TELEPHONE ENCOUNTER
Khushbu Hidalgo calling wanting us to send in a refill of her amitriptyline 10 mg tab, stating that she had to start taking them again   She no longer has that as an active med on her list  Is it ok to add?

## 2023-06-01 ENCOUNTER — OFFICE VISIT (OUTPATIENT)
Dept: INTERNAL MEDICINE CLINIC | Facility: OTHER | Age: 68
End: 2023-06-01

## 2023-06-01 VITALS
HEIGHT: 66 IN | BODY MASS INDEX: 37.12 KG/M2 | SYSTOLIC BLOOD PRESSURE: 130 MMHG | DIASTOLIC BLOOD PRESSURE: 82 MMHG | OXYGEN SATURATION: 98 % | TEMPERATURE: 96 F | WEIGHT: 231 LBS | HEART RATE: 84 BPM

## 2023-06-01 DIAGNOSIS — L23.7 POISON IVY DERMATITIS: Primary | ICD-10-CM

## 2023-06-01 RX ORDER — PREDNISONE 10 MG/1
TABLET ORAL
Qty: 30 TABLET | Refills: 0 | Status: SHIPPED | OUTPATIENT
Start: 2023-06-01 | End: 2023-06-13

## 2023-06-01 NOTE — PROGRESS NOTES
Assessment/Plan:    Problem List Items Addressed This Visit        Musculoskeletal and Integument    Poison ivy dermatitis - Primary     - Start prednisone taper as prescribed  -Follow-up if symptoms worsen or fail to improve         Relevant Medications    predniSONE 10 mg tablet            M*Modal software was used to dictate this note  It may contain errors with dictating incorrect words or incorrect spelling  Please contact the provider directly with any questions  Subjective:      Patient ID: Cruzito Moya is a 79 y o  female  HPI     Patient presents today with concern for poison ivy  She was weeding and gardening over the weekend on Sunday and Monday she started to notice poison  She has noticed new spots every day since  It is spread on your arms, legs, stomach and back  She is concerned about her poison ivy getting worse as she has had it very badly in the past and it spreads significantly on her  The following portions of the patient's history were reviewed and updated as appropriate: allergies, current medications, past family history, past medical history, past social history, past surgical history and problem list     Review of Systems   Skin: Positive for rash           Past Medical History:   Diagnosis Date   • Allergic    • Anxiety    • Asymptomatic premature menopause 5/11/2021   • Bilateral leg edema 9/17/2021   • Cataract, bilateral     last assessed   • Cervical radiculopathy    • Chronic right shoulder pain    • COVID-19 05/15/2022   • Disc disease, degenerative, cervical    • Disease of thyroid gland    • Encounter for gynecological examination (general) (routine) without abnormal findings 2/8/2021   • Herpes simplex infection    • Hypertension    • Insomnia    • Long term systemic steroid user 5/11/2021   • Low back pain    • Lumbar degenerative disc disease    • Lumbar facet arthropathy    • Lumbar radiculopathy    • Lumbar stenosis without neurogenic claudication    • Mononucleosis    • Myofascial pain dysfunction syndrome    • Obesity, morbid ([facility] [address] ) 7/12/2022   • Osteoarthritis     shoulder region - unspecified laterality - last assessed 2/1/14   • Plantar fasciitis    • Ptosis, both eyelids     last assessed 10/6/16   • Ptosis, congenital, left    • Retinal detachment     last assessed 12/11/14 right eye   • Sacroiliitis (HCC)    • Thyroid disease    • Thyrotoxicosis     last assessed 5/17/13   • Vertigo    • Weight gain 12/1/2022         Current Outpatient Medications:   •  amitriptyline (ELAVIL) 10 mg tablet, Take 1 tablet (10 mg total) by mouth daily at bedtime, Disp: 90 tablet, Rfl: 0  •  Ascorbic Acid (VITAMIN C PO), Take 1 tablet by mouth daily, Disp: , Rfl:   •  atorvastatin (LIPITOR) 10 mg tablet, Take 1 tablet (10 mg total) by mouth daily at bedtime, Disp: 90 tablet, Rfl: 1  •  busPIRone (BUSPAR) 10 mg tablet, Take 10 mg by mouth in the morning 5 mg in am if needed and 10 mg at night, Disp: , Rfl:   •  Cetirizine HCl (ZYRTEC PO), Take by mouth as needed, Disp: , Rfl:   •  Cholecalciferol 50 MCG (2000 UT) CAPS, Take 1 capsule by mouth every morning, Disp: , Rfl:   •  diclofenac (VOLTAREN) 75 mg EC tablet, Take 1 tablet (75 mg total) by mouth 2 (two) times a day, Disp: 180 tablet, Rfl: 1  •  Diclofenac Sodium (VOLTAREN) 1 %, APPLY 2 GRAMS TO AFFECTED AREA 4 TIMES A DAY (Patient taking differently: if needed APPLY 2 GRAMS TO AFFECTED AREA 4 TIMES A DAY), Disp: 200 g, Rfl: 1  •  doxycycline hyclate (VIBRAMYCIN) 100 mg capsule, Take 1 capsule (100 mg total) by mouth every 12 (twelve) hours, Disp: 60 capsule, Rfl: 3  •  Esomeprazole Magnesium (NEXIUM PO), Take 1 capsule by mouth daily as needed , Disp: , Rfl:   •  fluticasone (FLONASE) 50 mcg/act nasal spray, 1 spray into each nostril daily (Patient taking differently: 1 spray into each nostril as needed), Disp: 16 g, Rfl: 3  •  furosemide (LASIX) 40 mg tablet, Take 40 mg by mouth daily, Disp: , Rfl:   •  lisinopril (ZESTRIL) 20 mg tablet, Take 1 tablet (20 mg total) by mouth 2 (two) times a day, Disp: 180 tablet, Rfl: 1  •  LORazepam (ATIVAN) 1 mg tablet, Take 1 5 tablets (1 5 mg total) by mouth daily at bedtime as needed for anxiety, Disp: 135 tablet, Rfl: 0  •  methimazole (TAPAZOLE) 5 mg tablet, Take 1 tablet (5 mg total) by mouth daily, Disp: 90 tablet, Rfl: 1  •  multivitamin (THERAGRAN) TABS, Take 1 tablet by mouth every morning, Disp: , Rfl:   •  penciclovir (Denavir) 1 % cream, Apply topically daily as needed (ulcer), Disp: 5 g, Rfl: 5  •  Polyethyl Glycol-Propyl Glycol (SYSTANE OP), Apply to eye if needed, Disp: , Rfl:   •  predniSONE 10 mg tablet, Take 4 tablets (40 mg total) by mouth daily for 3 days, THEN 3 tablets (30 mg total) daily for 3 days, THEN 2 tablets (20 mg total) daily for 3 days, THEN 1 tablet (10 mg total) daily for 3 days  , Disp: 30 tablet, Rfl: 0  •  pregabalin (LYRICA) 50 mg capsule, Take 1 capsule in the AM, 1 capsule in the PM and 2 capsules at bedtime (Patient taking differently: 50 mg 2 (two) times a day), Disp: 360 capsule, Rfl: 1  •  Probiotic Product (PROBIOTIC PO), Take 1 capsule by mouth every morning, Disp: , Rfl:   •  propranolol (INDERAL) 10 mg tablet, Take 1 tablet (10 mg total) by mouth 2 (two) times a day, Disp: 180 tablet, Rfl: 1  •  RESTASIS 0 05 % ophthalmic emulsion, Administer 1 drop to both eyes 2 (two) times a day, Disp: , Rfl: 3  •  tiZANidine (ZANAFLEX) 4 mg tablet, Take 1 tablet in the a m , 1 tablet in the p m  and 2 tablets at bedtime (Patient taking differently: Take 4 mg by mouth daily at bedtime 2 5 at bedtime), Disp: 360 tablet, Rfl: 1  •  valACYclovir (VALTREX) 500 mg tablet, Take 1 tablet (500 mg total) by mouth daily as needed (Cold Sores), Disp: 30 tablet, Rfl: 1    Allergies   Allergen Reactions   • Other Dermatitis     Adhesive tape - please use ONLY PAPER TAPE   • Scopolamine Other (See Comments)     Severe, debilitating headache     • Flexeril [Cyclobenzaprine] Dizziness and Fatigue   • Naproxen      Other reaction(s): Unknown Allergic Reaction  Annotation - [id number]: nightmares   • Oxycodone Other (See Comments)     Night olsen    • Penicillins      Other reaction(s): Unknown Allergic Reaction  Annotation - [id number]: childhood reaction   • Promethazine-Codeine      Reaction Date: [id number]; Annotation - [id number]: nightmares; Annotation - [id number]: nightmares   • Tramadol      Other reaction(s): Unknown Allergic Reaction  Annotation - [id number]: PT HAS NIGHTMARES FROM TRAMADOL   • Medical Tape Rash   • Wound Dressing Adhesive Rash       Social History   Past Surgical History:   Procedure Laterality Date   • EYE SURGERY     • [place] RAYMUNDO IMPLTJ NSTIM ELTRDS PLATE/PADDLE EDRL Left 01/26/2016    Procedure: PLACEMENT OF THORACIC SPINAL CORD STIMULATOR WITH LEFT BUTTOCK IMPLANTABLE PULSE GENERATOR (IMPULSE MONITORING);   Surgeon: John Morton MD;  Location: [facility] OR;  Service: Neurosurgery   • RETINAL DETACHMENT SURGERY Right      Family History   Problem Relation Age of Onset   • Breast cancer Mother    • COPD Mother    • Hypertension Mother         essential   • Cancer Mother    • Glaucoma Mother    • Heart attack Father         acute MI   • Emphysema Father    • Hypertension Father         essential   • Other Father         prehypertension   • COPD Father    • No Known Problems Maternal Grandmother    • No Known Problems Maternal Grandfather    • No Known Problems Paternal Grandmother    • No Known Problems Paternal Grandfather    • No Known Problems Sister    • No Known Problems Son    • No Known Problems Sister    • No Known Problems Paternal Aunt    • No Known Problems Paternal Aunt    • No Known Problems Paternal Aunt    • No Known Problems Paternal Aunt    • No Known Problems Paternal Aunt    • Liver cancer Maternal Uncle        Objective:  /82 (BP Location: Left arm, Patient Position: Sitting, Cuff Size: Adult)   Pulse 84   Temp (!) 96 °F (35 6 °C) "(Temporal)   Ht 5' 6\" (1 676 m)   Wt 105 kg (231 lb)   LMP 11/26/2015   SpO2 98%   BMI 37 28 kg/m²      Physical Exam  Vitals reviewed  Constitutional:       General: She is not in acute distress  Appearance: Normal appearance  She is not diaphoretic  HENT:      Head: Normocephalic and atraumatic  Eyes:      Extraocular Movements: Extraocular movements intact  Conjunctiva/sclera: Conjunctivae normal       Pupils: Pupils are equal, round, and reactive to light  Pulmonary:      Effort: Pulmonary effort is normal  No respiratory distress  Skin:     Findings: Rash (vescicular rash in linear pattern on abdomen, low back, arms) present  Neurological:      Mental Status: She is alert and oriented to person, place, and time  Mental status is at baseline     Psychiatric:         Mood and Affect: Mood normal          Behavior: Behavior normal            "

## 2023-06-19 DIAGNOSIS — F41.9 ANXIETY: ICD-10-CM

## 2023-06-19 NOTE — TELEPHONE ENCOUNTER
TOO SOON TO FILL -- HOLD UNTIL 6/23/2023        Last OV:  6/1/2023  Last assessed: 12/30/2022  Next OV: 7/17/2023

## 2023-06-20 DIAGNOSIS — M54.12 CERVICAL RADICULOPATHY: ICD-10-CM

## 2023-06-20 DIAGNOSIS — M79.18 MYOFASCIAL PAIN SYNDROME: ICD-10-CM

## 2023-06-20 DIAGNOSIS — R60.0 BILATERAL LEG EDEMA: Primary | ICD-10-CM

## 2023-06-20 DIAGNOSIS — M47.816 LUMBAR SPONDYLOSIS: ICD-10-CM

## 2023-06-20 RX ORDER — PREGABALIN 50 MG/1
50 CAPSULE ORAL 2 TIMES DAILY
Qty: 180 CAPSULE | Refills: 1 | Status: SHIPPED | OUTPATIENT
Start: 2023-06-20

## 2023-06-20 NOTE — TELEPHONE ENCOUNTER
Patient called requesting this medication but hasn't had refills  Under as historical provider   Can someone please take a look at this before I refill, thank you

## 2023-06-22 RX ORDER — FUROSEMIDE 40 MG/1
40 TABLET ORAL DAILY
Qty: 90 TABLET | Refills: 1 | Status: SHIPPED | OUTPATIENT
Start: 2023-06-22

## 2023-06-22 NOTE — TELEPHONE ENCOUNTER
Spoke with patient  She said that after her surgery she was off of it for a few months and then restarted  She said that when it starts to get warm outside her legs begin to swell  It was originally why Dr Agustín Lu started this medication for her  Patient understood why we need labs done and she will be getting these done next week

## 2023-06-23 RX ORDER — LORAZEPAM 1 MG/1
1.5 TABLET ORAL
Qty: 135 TABLET | Refills: 0 | Status: SHIPPED | OUTPATIENT
Start: 2023-06-23

## 2023-07-05 DIAGNOSIS — E05.90 HYPERTHYROIDISM: ICD-10-CM

## 2023-07-05 RX ORDER — PROPRANOLOL HYDROCHLORIDE 10 MG/1
10 TABLET ORAL 2 TIMES DAILY
Qty: 180 TABLET | Refills: 1 | Status: SHIPPED | OUTPATIENT
Start: 2023-07-05

## 2023-08-02 DIAGNOSIS — E05.90 HYPERTHYROIDISM: ICD-10-CM

## 2023-08-02 RX ORDER — METHIMAZOLE 5 MG/1
5 TABLET ORAL DAILY
Qty: 90 TABLET | Refills: 1 | OUTPATIENT
Start: 2023-08-02

## 2023-08-03 DIAGNOSIS — E05.90 HYPERTHYROIDISM: ICD-10-CM

## 2023-08-03 RX ORDER — METHIMAZOLE 5 MG/1
5 TABLET ORAL DAILY
Qty: 90 TABLET | Refills: 1 | Status: SHIPPED | OUTPATIENT
Start: 2023-08-03

## 2023-08-06 DIAGNOSIS — M51.36 LUMBAR DEGENERATIVE DISC DISEASE: ICD-10-CM

## 2023-08-06 DIAGNOSIS — M48.061 LUMBAR STENOSIS WITHOUT NEUROGENIC CLAUDICATION: ICD-10-CM

## 2023-08-07 RX ORDER — AMITRIPTYLINE HYDROCHLORIDE 10 MG/1
10 TABLET, FILM COATED ORAL
Qty: 90 TABLET | Refills: 0 | OUTPATIENT
Start: 2023-08-07

## 2023-08-08 DIAGNOSIS — M48.061 LUMBAR STENOSIS WITHOUT NEUROGENIC CLAUDICATION: ICD-10-CM

## 2023-08-08 DIAGNOSIS — M51.36 LUMBAR DEGENERATIVE DISC DISEASE: ICD-10-CM

## 2023-08-08 RX ORDER — AMITRIPTYLINE HYDROCHLORIDE 10 MG/1
10 TABLET, FILM COATED ORAL
Qty: 90 TABLET | Refills: 1 | Status: SHIPPED | OUTPATIENT
Start: 2023-08-08

## 2023-08-08 NOTE — TELEPHONE ENCOUNTER
Encounter addended by: Nancy Martins [facility] on: 5/19/2023 1:56 PM   Actions taken: Clinical Note Signed Last visit 6/1/23  Next visit 10/4/23

## 2023-08-28 ENCOUNTER — TELEPHONE (OUTPATIENT)
Dept: INTERNAL MEDICINE CLINIC | Facility: OTHER | Age: 68
End: 2023-08-28

## 2023-08-28 ENCOUNTER — APPOINTMENT (OUTPATIENT)
Dept: LAB | Facility: MEDICAL CENTER | Age: 68
End: 2023-08-28
Payer: MEDICARE

## 2023-08-28 DIAGNOSIS — E87.1 HYPONATREMIA: ICD-10-CM

## 2023-08-28 DIAGNOSIS — D62 ACUTE BLOOD LOSS ANEMIA: ICD-10-CM

## 2023-08-28 LAB
ALBUMIN SERPL BCP-MCNC: 4 G/DL (ref 3.5–5)
ALP SERPL-CCNC: 139 U/L (ref 34–104)
ALT SERPL W P-5'-P-CCNC: 27 U/L (ref 7–52)
ANION GAP SERPL CALCULATED.3IONS-SCNC: 10 MMOL/L
AST SERPL W P-5'-P-CCNC: 33 U/L (ref 13–39)
BASOPHILS # BLD AUTO: 0.04 THOUSANDS/ÂΜL (ref 0–0.1)
BASOPHILS NFR BLD AUTO: 1 % (ref 0–1)
BILIRUB SERPL-MCNC: 1.36 MG/DL (ref 0.2–1)
BUN SERPL-MCNC: 11 MG/DL (ref 5–25)
CALCIUM SERPL-MCNC: 9.8 MG/DL (ref 8.4–10.2)
CHLORIDE SERPL-SCNC: 100 MMOL/L (ref 96–108)
CHOLEST SERPL-MCNC: 151 MG/DL
CO2 SERPL-SCNC: 27 MMOL/L (ref 21–32)
CREAT SERPL-MCNC: 0.72 MG/DL (ref 0.6–1.3)
EOSINOPHIL # BLD AUTO: 0.1 THOUSAND/ÂΜL (ref 0–0.61)
EOSINOPHIL NFR BLD AUTO: 2 % (ref 0–6)
ERYTHROCYTE [DISTWIDTH] IN BLOOD BY AUTOMATED COUNT: 14 % (ref 11.6–15.1)
GFR SERPL CREATININE-BSD FRML MDRD: 87 ML/MIN/1.73SQ M
GLUCOSE P FAST SERPL-MCNC: 97 MG/DL (ref 65–99)
HCT VFR BLD AUTO: 38.6 % (ref 34.8–46.1)
HDLC SERPL-MCNC: 74 MG/DL
HGB BLD-MCNC: 11.9 G/DL (ref 11.5–15.4)
IMM GRANULOCYTES # BLD AUTO: 0.03 THOUSAND/UL (ref 0–0.2)
IMM GRANULOCYTES NFR BLD AUTO: 1 % (ref 0–2)
LDLC SERPL CALC-MCNC: 66 MG/DL (ref 0–100)
LYMPHOCYTES # BLD AUTO: 1.29 THOUSANDS/ÂΜL (ref 0.6–4.47)
LYMPHOCYTES NFR BLD AUTO: 21 % (ref 14–44)
MCH RBC QN AUTO: 29 PG (ref 26.8–34.3)
MCHC RBC AUTO-ENTMCNC: 30.8 G/DL (ref 31.4–37.4)
MCV RBC AUTO: 94 FL (ref 82–98)
MONOCYTES # BLD AUTO: 0.64 THOUSAND/ÂΜL (ref 0.17–1.22)
MONOCYTES NFR BLD AUTO: 10 % (ref 4–12)
NEUTROPHILS # BLD AUTO: 4.15 THOUSANDS/ÂΜL (ref 1.85–7.62)
NEUTS SEG NFR BLD AUTO: 65 % (ref 43–75)
NRBC BLD AUTO-RTO: 0 /100 WBCS
PLATELET # BLD AUTO: 353 THOUSANDS/UL (ref 149–390)
PMV BLD AUTO: 10.2 FL (ref 8.9–12.7)
POTASSIUM SERPL-SCNC: 4.4 MMOL/L (ref 3.5–5.3)
PROT SERPL-MCNC: 7 G/DL (ref 6.4–8.4)
RBC # BLD AUTO: 4.11 MILLION/UL (ref 3.81–5.12)
SODIUM SERPL-SCNC: 137 MMOL/L (ref 135–147)
T4 FREE SERPL-MCNC: 1.17 NG/DL (ref 0.61–1.12)
TRIGL SERPL-MCNC: 56 MG/DL
TSH SERPL DL<=0.05 MIU/L-ACNC: 0.1 UIU/ML (ref 0.45–4.5)
WBC # BLD AUTO: 6.25 THOUSAND/UL (ref 4.31–10.16)

## 2023-08-28 PROCEDURE — 85025 COMPLETE CBC W/AUTO DIFF WBC: CPT

## 2023-08-28 NOTE — TELEPHONE ENCOUNTER
Patient calling because she had labs completed and was hoping someone could review them and give her a call back?

## 2023-08-29 ENCOUNTER — TELEPHONE (OUTPATIENT)
Dept: INTERNAL MEDICINE CLINIC | Facility: OTHER | Age: 68
End: 2023-08-29

## 2023-08-29 ENCOUNTER — OFFICE VISIT (OUTPATIENT)
Dept: INTERNAL MEDICINE CLINIC | Age: 68
End: 2023-08-29
Payer: MEDICARE

## 2023-08-29 VITALS
WEIGHT: 224.9 LBS | DIASTOLIC BLOOD PRESSURE: 82 MMHG | OXYGEN SATURATION: 97 % | SYSTOLIC BLOOD PRESSURE: 122 MMHG | HEART RATE: 78 BPM | HEIGHT: 66 IN | BODY MASS INDEX: 36.14 KG/M2 | TEMPERATURE: 97.4 F

## 2023-08-29 DIAGNOSIS — I10 BENIGN ESSENTIAL HTN: ICD-10-CM

## 2023-08-29 DIAGNOSIS — E05.90 HYPERTHYROIDISM: Primary | ICD-10-CM

## 2023-08-29 DIAGNOSIS — F41.9 ANXIETY: ICD-10-CM

## 2023-08-29 PROCEDURE — 99214 OFFICE O/P EST MOD 30 MIN: CPT

## 2023-08-29 RX ORDER — TIMOLOL MALEATE 5 MG/ML
SOLUTION/ DROPS OPHTHALMIC
COMMUNITY
Start: 2023-08-23

## 2023-08-29 NOTE — PATIENT INSTRUCTIONS
I have discontinued your Propranolol. Please stop taking it for now. Follow up at scheduled appointment in October.

## 2023-08-29 NOTE — TELEPHONE ENCOUNTER
Per Dr Augusto Tee TSH is abnormal.  Can someone please address in her absence? She is out on PTO. Thank you.      Pending appt not until 10/4/2023

## 2023-08-29 NOTE — PROGRESS NOTES
[facility] [place]  INTERNAL MEDICINE OFFICE VISIT     PATIENT INFORMATION     Joellen Ramos   79 y.o. female   MRN: [id number]    ASSESSMENT/PLAN     Diagnoses and all orders for this visit:    Hyperthyroidism    Anxiety    Benign essential HTN    Other orders  -     timolol (TIMOPTIC) 0.5 % ophthalmic solution; INSTILL 1 DROP INTO RIGHT EYE EVERY MORNING  -     POTASSIUM PO; Take 1 tablet by mouth in the morning      Hyperthyroidism  -Patient presenting with multiple complaints including fatigue, low blood pressure, stomach ache, feeling "out of it," light-headedness, feeling bloated. -Denies dry skin, tremor, palpitations, head/cold intolerance.   -Notes that she was diagnosed with hyperthyroidism 10-15 years ago, no formal diagnosis of graves disease.  -At the time she notes that she felt "over the top, upset and emotional."   -On Propranolol 10 mg BID, Methimazole 5 mg daily. -TSH 0.098, T4 1.17 on recent labs. Plan:  -Unclear etiology of patient's several generalized complaints, however complaints may be compatible with slightly hypothyroid state, despite low TSH. Will trial off Propranolol for the next month and monitor response.  -Would have patient continue Esomeprazole 1 capsule daily for GI concerns, as patient had stopped recently.  -Follow up in 1 month. Hypertension  -Blood pressures measured at 114/44, and 106/66 over the weekend, 122/82 in the office today.   -Patient on Lasix 40 mg daily and Lisinopril 20 mg daily as well as Propranolol 10 mg BID. Plan:  -Blood pressure stable today.  -Will trial off Propranolol for now. -Follow up in 1 month. Schedule a follow-up appointment in 1 month.     HEALTH MAINTENANCE     Immunization History   Administered Date(s) Administered   • COVID-19 PFIZER VACCINE 0.3 ML IM 02/19/2021, 03/12/2021, 10/25/2021   • INFLUENZA 10/14/2022   • Influenza Quadrivalent Preservative Free 3 years and older IM 01/08/2018   • Influenza Quadrivalent, 6-35 Months IM 11/14/2016 • Influenza, high dose seasonal 0.7 mL 10/15/2022   • Influenza, injectable, quadrivalent, preservative free 0.5 mL 10/15/2018, 09/15/2020   • Influenza, recombinant, quadrivalent,injectable, preservative free 11/06/2019, 01/18/2022   • Influenza, seasonal, injectable 02/27/2011, 11/12/2013, 12/11/2014, 11/02/2015   • Pneumococcal Conjugate Vaccine 20-valent (Pcv20), Polysace 07/12/2022   • Td (adult), adsorbed 12/30/2013   • Zoster Vaccine Recombinant 09/15/2020, 01/02/2021     CHIEF COMPLAINT     Chief Complaint   Patient presents with   • Fatigue     No energy, sleeping a lot, low /66 and HR 92. Started 8/26/23. Review labs 8/28/23. Patient states also feels bloated. HISTORY OF PRESENT ILLNESS     Patient is a 80 y/o female with a PMH including hyperthyroidism, SIADH, HTN, anxiety who presents today with a history of a few weeks of several complaints including fatigue and stomach ache. Per patient, on Saturday she felt very tired after waiting up and went back to bed. She had her blood pressure checked and it was 114/44, and 106/66 later. Patient stopped taking her Lisinopril because she was concerned about her low blood pressures. Over the last few weeks she has felt generally tired and "out of it," noting fatigue, light-headedness, and stomach "aches" no nausea or vomiting. She feels bloated. Denies dry skin, tremor, palpitations, head/cold intolerance. Notes that she was diagnosed with hyperthyroidism 10-15 years ago, no formal diagnosis of graves or similar. At the time she notes that she felt "over the top, upset and emotional." She has been on propranolol and methimazole for 5-10 years with no major changes. Denies history of substance use. REVIEW OF SYSTEMS     Review of Systems   Constitutional: Positive for fatigue. Negative for chills, fever and unexpected weight change. HENT: Negative for hearing loss and sore throat. Eyes: Negative for visual disturbance.    Respiratory: Negative for cough and shortness of breath. Cardiovascular: Positive for leg swelling. Negative for chest pain and palpitations. Gastrointestinal: Positive for abdominal pain. Negative for abdominal distention, constipation, diarrhea, nausea and vomiting. Stomach aches + bloating. Endocrine: Negative for polyuria. Genitourinary: Negative for dysuria and hematuria. Musculoskeletal: Negative for arthralgias and back pain. Skin: Negative for rash and wound. Neurological: Positive for light-headedness. Negative for dizziness, tremors, seizures, weakness and headaches. Psychiatric/Behavioral: The patient is not nervous/anxious.         "stressed"     OBJECTIVE     Vitals:    08/29/23 1541   BP: 122/82   BP Location: Left arm   Patient Position: Sitting   Cuff Size: Large   Pulse: 78   Temp: (!) 97.4 °F (36.3 °C)   TempSrc: Temporal   SpO2: 97%   Weight: 102 kg (224 lb 14.4 oz)   Height: 5' 6" (1.676 m)     Physical Exam  Vitals reviewed. Constitutional:       Appearance: Normal appearance. HENT:      Head: Normocephalic and atraumatic. Right Ear: External ear normal.      Left Ear: External ear normal.      Nose: Nose normal.      Mouth/Throat:      Mouth: Mucous membranes are moist.   Eyes:      Extraocular Movements: Extraocular movements intact. Pupils: Pupils are equal, round, and reactive to light. Cardiovascular:      Rate and Rhythm: Normal rate and regular rhythm. Pulses: Normal pulses. Heart sounds: Normal heart sounds. Pulmonary:      Effort: Pulmonary effort is normal.      Breath sounds: Normal breath sounds. Abdominal:      General: Abdomen is flat. Bowel sounds are normal.      Palpations: Abdomen is soft. Musculoskeletal:      Cervical back: No rigidity or tenderness. Right lower leg: No edema. Left lower leg: No edema. Skin:     General: Skin is warm. Capillary Refill: Capillary refill takes less than 2 seconds.    Neurological: General: No focal deficit present. Mental Status: She is alert and oriented to person, place, and time.        CURRENT MEDICATIONS     Current Outpatient Medications:   •  amitriptyline (ELAVIL) 10 mg tablet, Take 1 tablet (10 mg total) by mouth daily at bedtime, Disp: 90 tablet, Rfl: 1  •  Ascorbic Acid (VITAMIN C PO), Take 1 tablet by mouth daily, Disp: , Rfl:   •  atorvastatin (LIPITOR) 10 mg tablet, Take 1 tablet (10 mg total) by mouth daily at bedtime, Disp: 90 tablet, Rfl: 1  •  Cetirizine HCl (ZYRTEC PO), Take by mouth as needed, Disp: , Rfl:   •  Cholecalciferol 50 MCG (2000 UT) CAPS, Take 1 capsule by mouth every morning, Disp: , Rfl:   •  diclofenac (VOLTAREN) 75 mg EC tablet, Take 1 tablet (75 mg total) by mouth 2 (two) times a day, Disp: 180 tablet, Rfl: 1  •  Diclofenac Sodium (VOLTAREN) 1 %, APPLY 2 GRAMS TO AFFECTED AREA 4 TIMES A DAY (Patient taking differently: if needed APPLY 2 GRAMS TO AFFECTED AREA 4 TIMES A DAY), Disp: 200 g, Rfl: 1  •  fluticasone (FLONASE) 50 mcg/act nasal spray, 1 spray into each nostril daily (Patient taking differently: 1 spray into each nostril as needed), Disp: 16 g, Rfl: 3  •  furosemide (LASIX) 40 mg tablet, Take 1 tablet (40 mg total) by mouth daily, Disp: 90 tablet, Rfl: 1  •  lisinopril (ZESTRIL) 20 mg tablet, Take 1 tablet (20 mg total) by mouth 2 (two) times a day, Disp: 180 tablet, Rfl: 1  •  LORazepam (ATIVAN) 1 mg tablet, Take 1.5 tablets (1.5 mg total) by mouth daily at bedtime as needed for anxiety, Disp: 135 tablet, Rfl: 0  •  methimazole (TAPAZOLE) 5 mg tablet, Take 1 tablet (5 mg total) by mouth daily, Disp: 90 tablet, Rfl: 1  •  multivitamin (THERAGRAN) TABS, Take 1 tablet by mouth every morning, Disp: , Rfl:   •  penciclovir (Denavir) 1 % cream, Apply topically daily as needed (ulcer), Disp: 5 g, Rfl: 5  •  Polyethyl Glycol-Propyl Glycol (SYSTANE OP), Apply to eye if needed, Disp: , Rfl:   •  POTASSIUM PO, Take 1 tablet by mouth in the morning, Disp: , Rfl:   •  pregabalin (LYRICA) 50 mg capsule, Take 1 capsule (50 mg total) by mouth 2 (two) times a day (Patient taking differently: Take 50 mg by mouth daily), Disp: 180 capsule, Rfl: 1  •  Probiotic Product (PROBIOTIC PO), Take 1 capsule by mouth every morning, Disp: , Rfl:   •  RESTASIS 0.05 % ophthalmic emulsion, Administer 1 drop to both eyes 2 (two) times a day, Disp: , Rfl: 3  •  timolol (TIMOPTIC) 0.5 % ophthalmic solution, INSTILL 1 DROP INTO RIGHT EYE EVERY MORNING, Disp: , Rfl:   •  tiZANidine (ZANAFLEX) 4 mg tablet, Take 1 tablet in the a.m., 1 tablet in the p.m. and 2 tablets at bedtime (Patient taking differently: Take 4 mg by mouth daily at bedtime), Disp: 360 tablet, Rfl: 1  •  valACYclovir (VALTREX) 500 mg tablet, Take 1 tablet (500 mg total) by mouth daily as needed (Cold Sores), Disp: 30 tablet, Rfl: 1  •  busPIRone (BUSPAR) 10 mg tablet, Take 10 mg by mouth in the morning 5 mg in am if needed and 10 mg at night (Patient not taking: Reported on 8/29/2023), Disp: , Rfl:   •  Esomeprazole Magnesium (NEXIUM PO), Take 1 capsule by mouth daily as needed  (Patient not taking: Reported on 8/29/2023), Disp: , Rfl:     PAST MEDICAL & SURGICAL HISTORY     Past Medical History:   Diagnosis Date   • Allergic    • Anxiety    • Asymptomatic premature menopause 5/11/2021   • Bilateral leg edema 9/17/2021   • Cataract, bilateral     last assessed   • Cervical radiculopathy    • Chronic right shoulder pain    • COVID-19 05/15/2022   • Disc disease, degenerative, cervical    • Disease of thyroid gland    • Encounter for gynecological examination (general) (routine) without abnormal findings 2/8/2021   • Herpes simplex infection    • Hypertension    • Insomnia    • Long term systemic steroid user 5/11/2021   • Low back pain    • Lumbar degenerative disc disease    • Lumbar facet arthropathy    • Lumbar radiculopathy    • Lumbar stenosis without neurogenic claudication    • Mononucleosis    • Myofascial pain dysfunction syndrome    • Obesity, morbid ([address]) 7/12/2022   • Osteoarthritis     shoulder region - unspecified laterality - last assessed 2/1/14   • Plantar fasciitis    • Ptosis, both eyelids     last assessed 10/6/16   • Ptosis, congenital, left    • Retinal detachment     last assessed 12/11/14 right eye   • Sacroiliitis ([address])    • Thyroid disease    • Thyrotoxicosis     last assessed 5/17/13   • Vertigo    • Weight gain 12/1/2022     Past Surgical History:   Procedure Laterality Date   • EYE SURGERY     • MA RAYMUNDO IMPLTJ NSTIM ELTRDS PLATE/PADDLE EDRL Left 01/26/2016    Procedure: PLACEMENT OF THORACIC SPINAL CORD STIMULATOR WITH LEFT BUTTOCK IMPLANTABLE PULSE GENERATOR (IMPULSE MONITORING);   Surgeon: Caitlin Nogueira MD;  Location: [facility] OR;  Service: Neurosurgery   • RETINAL DETACHMENT SURGERY Right      SOCIAL & FAMILY HISTORY     Social History     Socioeconomic History   • Marital status: /Civil Union     Spouse name: Not on file   • Number of children: Not on file   • Years of education: Not on file   • Highest education level: Not on file   Occupational History   • Occupation: Business owner     Comment: [employer] time working   Tobacco Use   • Smoking status: Never   • Smokeless tobacco: Never   Vaping Use   • Vaping Use: Never used   Substance and Sexual Activity   • Alcohol use: Not Currently     Alcohol/week: 1.0 standard drink of alcohol     Types: 1 Glasses of wine per week   • Drug use: No   • Sexual activity: Not Currently     Partners: Male     Birth control/protection: None   Other Topics Concern   • Not on file   Social History Narrative    Daily caffeinated cola consumption    3 cups coffee/day     Social Determinants of Health     Financial Resource Strain: Not on file   Food Insecurity: Not on file   Transportation Needs: Not on file   Physical Activity: Not on file   Stress: Not on file   Social Connections: Not on file   Intimate Partner Violence: Not on file   Housing Stability: Not on file     Social History     Substance and Sexual Activity   Alcohol Use Not Currently   • Alcohol/week: 1.0 standard drink of alcohol   • Types: 1 Glasses of wine per week     Substance and Sexual Activity   Alcohol Use Not Currently   • Alcohol/week: 1.0 standard drink of alcohol   • Types: 1 Glasses of wine per week        Substance and Sexual Activity   Drug Use No     Social History     Tobacco Use   Smoking Status Never   Smokeless Tobacco Never     Family History   Problem Relation Age of Onset   • Breast cancer Mother    • COPD Mother    • Hypertension Mother         essential   • Cancer Mother    • Glaucoma Mother    • Heart attack Father         acute MI   • Emphysema Father    • Hypertension Father         essential   • Other Father         prehypertension   • COPD Father    • No Known Problems Maternal Grandmother    • No Known Problems Maternal Grandfather    • No Known Problems Paternal Grandmother    • No Known Problems Paternal Grandfather    • No Known Problems Sister    • No Known Problems Son    • No Known Problems Sister    • No Known Problems Paternal Aunt    • No Known Problems Paternal Aunt    • No Known Problems Paternal Aunt    • No Known Problems Paternal Aunt    • No Known Problems Paternal Aunt    • Liver cancer Maternal Uncle              ==  Delia Bingham MD PGY2  [facility]

## 2023-08-30 ENCOUNTER — HOSPITAL ENCOUNTER (OUTPATIENT)
Dept: RADIOLOGY | Facility: MEDICAL CENTER | Age: 68
Discharge: HOME | End: 2023-08-30
Payer: MEDICARE

## 2023-08-30 ENCOUNTER — TELEPHONE (OUTPATIENT)
Dept: OBGYN CLINIC | Facility: CLINIC | Age: 68
End: 2023-08-30

## 2023-08-30 VITALS — HEIGHT: 66 IN | WEIGHT: 224 LBS | BODY MASS INDEX: 36 KG/M2

## 2023-08-30 DIAGNOSIS — Z13.820 SCREENING FOR OSTEOPOROSIS: ICD-10-CM

## 2023-08-30 DIAGNOSIS — Z12.31 ENCOUNTER FOR SCREENING MAMMOGRAM FOR MALIGNANT NEOPLASM OF BREAST: ICD-10-CM

## 2023-08-30 DIAGNOSIS — E28.319 ASYMPTOMATIC PREMATURE MENOPAUSE: ICD-10-CM

## 2023-08-30 DIAGNOSIS — M85.89 OSTEOPENIA OF MULTIPLE SITES: ICD-10-CM

## 2023-08-30 PROCEDURE — 77063 BREAST TOMOSYNTHESIS BI: CPT

## 2023-08-30 PROCEDURE — 77067 SCR MAMMO BI INCL CAD: CPT

## 2023-08-30 NOTE — RESULT ENCOUNTER NOTE
Please let her know mammogram is negative. Based on her lifetime risk and breast density she may benefit from additional screening with automated breast ultrasound. This is not always covered by insurance and I recommend checking coverage first. If patient desires to have the additional testing she may contact our office and we can order.

## 2023-08-30 NOTE — TELEPHONE ENCOUNTER
Spoke to pt and reviewed results and recommendations from their mammo per  [facility].  Offered automated breast ultrasound

## 2023-08-30 NOTE — TELEPHONE ENCOUNTER
----- Message from Pedro Melendez MD sent at 8/30/2023  1:25 PM EDT -----  Please let her know mammogram is negative. Based on her lifetime risk and breast density she may benefit from additional screening with automated breast ultrasound. This is not always covered by insurance and I recommend checking coverage first. If patient desires to have the additional testing she may contact our office and we can order.

## 2023-09-22 DIAGNOSIS — F41.9 ANXIETY: ICD-10-CM

## 2023-09-22 DIAGNOSIS — M47.816 LUMBAR FACET ARTHROPATHY: ICD-10-CM

## 2023-09-22 DIAGNOSIS — R60.0 BILATERAL LEG EDEMA: ICD-10-CM

## 2023-09-22 DIAGNOSIS — M50.90 DISC DISORDER OF CERVICAL REGION: ICD-10-CM

## 2023-09-22 DIAGNOSIS — E78.00 HYPERCHOLESTEREMIA: ICD-10-CM

## 2023-09-22 DIAGNOSIS — G89.4 PAIN SYNDROME, CHRONIC: ICD-10-CM

## 2023-09-22 RX ORDER — DICLOFENAC SODIUM 75 MG/1
75 TABLET, DELAYED RELEASE ORAL 2 TIMES DAILY
Qty: 180 TABLET | Refills: 1 | Status: SHIPPED | OUTPATIENT
Start: 2023-09-22

## 2023-09-22 RX ORDER — ATORVASTATIN CALCIUM 10 MG/1
10 TABLET, FILM COATED ORAL
Qty: 90 TABLET | Refills: 1 | Status: SHIPPED | OUTPATIENT
Start: 2023-09-22

## 2023-09-22 RX ORDER — FUROSEMIDE 40 MG/1
40 TABLET ORAL DAILY
Qty: 90 TABLET | Refills: 1 | Status: CANCELLED | OUTPATIENT
Start: 2023-09-22

## 2023-09-22 RX ORDER — LORAZEPAM 1 MG/1
1.5 TABLET ORAL
Qty: 135 TABLET | Refills: 0 | Status: SHIPPED | OUTPATIENT
Start: 2023-09-22

## 2023-09-22 RX ORDER — DICLOFENAC SODIUM 10 MG/G
GEL TOPICAL
Qty: 200 G | Refills: 1 | Status: SHIPPED | OUTPATIENT
Start: 2023-09-22

## 2023-09-22 NOTE — TELEPHONE ENCOUNTER
Patient called needing the selected medications refilled and sent to [address] [place] [address] - [address]    Nov: 10/4/2023

## 2023-10-04 ENCOUNTER — OFFICE VISIT (OUTPATIENT)
Dept: INTERNAL MEDICINE CLINIC | Facility: OTHER | Age: 68
End: 2023-10-04
Payer: MEDICARE

## 2023-10-04 VITALS
OXYGEN SATURATION: 97 % | HEIGHT: 66 IN | SYSTOLIC BLOOD PRESSURE: 122 MMHG | DIASTOLIC BLOOD PRESSURE: 84 MMHG | HEART RATE: 92 BPM | BODY MASS INDEX: 34.91 KG/M2 | TEMPERATURE: 98.2 F | WEIGHT: 217.2 LBS

## 2023-10-04 DIAGNOSIS — I10 BENIGN ESSENTIAL HTN: ICD-10-CM

## 2023-10-04 DIAGNOSIS — E55.9 VITAMIN D DEFICIENCY: ICD-10-CM

## 2023-10-04 DIAGNOSIS — Z00.00 ENCOUNTER FOR INITIAL ANNUAL WELLNESS VISIT (AWV) IN MEDICARE PATIENT: ICD-10-CM

## 2023-10-04 DIAGNOSIS — I73.9 PERIPHERAL VASCULAR DISEASE (HCC): ICD-10-CM

## 2023-10-04 DIAGNOSIS — E05.90 HYPERTHYROIDISM: Primary | ICD-10-CM

## 2023-10-04 DIAGNOSIS — M85.89 OSTEOPENIA OF MULTIPLE SITES: ICD-10-CM

## 2023-10-04 DIAGNOSIS — E87.1 HYPONATREMIA: ICD-10-CM

## 2023-10-04 DIAGNOSIS — Z23 FLU VACCINE NEED: ICD-10-CM

## 2023-10-04 DIAGNOSIS — F51.01 PRIMARY INSOMNIA: ICD-10-CM

## 2023-10-04 DIAGNOSIS — Z12.11 ENCOUNTER FOR SCREENING FOR MALIGNANT NEOPLASM OF COLON: ICD-10-CM

## 2023-10-04 DIAGNOSIS — Z13.220 SCREENING CHOLESTEROL LEVEL: ICD-10-CM

## 2023-10-04 PROBLEM — E22.2 SIADH (SYNDROME OF INAPPROPRIATE ADH PRODUCTION) (HCC): Status: RESOLVED | Noted: 2022-12-30 | Resolved: 2023-10-04

## 2023-10-04 PROBLEM — L23.7 POISON IVY DERMATITIS: Status: RESOLVED | Noted: 2023-06-01 | Resolved: 2023-10-04

## 2023-10-04 PROCEDURE — G0438 PPPS, INITIAL VISIT: HCPCS | Performed by: FAMILY MEDICINE

## 2023-10-04 PROCEDURE — 90662 IIV NO PRSV INCREASED AG IM: CPT

## 2023-10-04 PROCEDURE — G0008 ADMIN INFLUENZA VIRUS VAC: HCPCS

## 2023-10-04 PROCEDURE — 99214 OFFICE O/P EST MOD 30 MIN: CPT | Performed by: FAMILY MEDICINE

## 2023-10-04 RX ORDER — PROPRANOLOL HYDROCHLORIDE 10 MG/1
10 TABLET ORAL 2 TIMES DAILY
Qty: 180 TABLET | Refills: 1 | Status: SHIPPED | OUTPATIENT
Start: 2023-10-04

## 2023-10-04 NOTE — PATIENT INSTRUCTIONS
Medicare Preventive Visit Patient Instructions  Thank you for completing your Welcome to Medicare Visit or Medicare Annual Wellness Visit today. Your next wellness visit will be due in one year (10/4/2024). The screening/preventive services that you may require over the next 5-10 years are detailed below. Some tests may not apply to you based off risk factors and/or age. Screening tests ordered at today's visit but not completed yet may show as past due. Also, please note that scanned in results may not display below. Preventive Screenings:  Service Recommendations Previous Testing/Comments   Colorectal Cancer Screening  * Colonoscopy    * Fecal Occult Blood Test (FOBT)/Fecal Immunochemical Test (FIT)  * Fecal DNA/Cologuard Test  * Flexible Sigmoidoscopy Age: 43-73 years old   Colonoscopy: every 10 years (may be performed more frequently if at higher risk)  OR  FOBT/FIT: every 1 year  OR  Cologuard: every 3 years  OR  Sigmoidoscopy: every 5 years  Screening may be recommended earlier than age 39 if at higher risk for colorectal cancer. Also, an individualized decision between you and your healthcare provider will decide whether screening between the ages of 77-80 would be appropriate. Colonoscopy: 10/28/2013  FOBT/FIT: Not on file  Cologuard: Not on file  Sigmoidoscopy: Not on file    Screening Current     Breast Cancer Screening Age: 36 years old  Frequency: every 1-2 years  Not required if history of left and right mastectomy Mammogram: 08/30/2023    Screening Current   Cervical Cancer Screening Between the ages of 21-29, pap smear recommended once every 3 years. Between the ages of 32-69, can perform pap smear with HPV co-testing every 5 years.    Recommendations may differ for women with a history of total hysterectomy, cervical cancer, or abnormal pap smears in past. Pap Smear: 04/01/2022    Screening Not Indicated   Hepatitis C Screening Once for adults born between 1945 and 1965  More frequently in patients at high risk for Hepatitis C Hep C Antibody: 08/26/2022    Screening Current   Diabetes Screening 1-2 times per year if you're at risk for diabetes or have pre-diabetes Fasting glucose: 97 mg/dL (8/28/2023)  A1C: No results in last 5 years (No results in last 5 years)  Screening Current   Cholesterol Screening Once every 5 years if you don't have a lipid disorder. May order more often based on risk factors. Lipid panel: 08/28/2023    Screening Not Indicated  History Lipid Disorder     Other Preventive Screenings Covered by Medicare:  1. Abdominal Aortic Aneurysm (AAA) Screening: covered once if your at risk. You're considered to be at risk if you have a family history of AAA. 2. Lung Cancer Screening: covers low dose CT scan once per year if you meet all of the following conditions: (1) Age 48-67; (2) No signs or symptoms of lung cancer; (3) Current smoker or have quit smoking within the last 15 years; (4) You have a tobacco smoking history of at least 20 pack years (packs per day multiplied by number of years you smoked); (5) You get a written order from a healthcare provider. 3. Glaucoma Screening: covered annually if you're considered high risk: (1) You have diabetes OR (2) Family history of glaucoma OR (3)  aged 48 and older OR (3)  American aged 72 and older  3. Osteoporosis Screening: covered every 2 years if you meet one of the following conditions: (1) You're estrogen deficient and at risk for osteoporosis based off medical history and other findings; (2) Have a vertebral abnormality; (3) On glucocorticoid therapy for more than 3 months; (4) Have primary hyperparathyroidism; (5) On osteoporosis medications and need to assess response to drug therapy. · Last bone density test (DXA Scan): 08/30/2023.  5. HIV Screening: covered annually if you're between the age of 15-65. Also covered annually if you are younger than 13 and older than 72 with risk factors for HIV infection. For pregnant patients, it is covered up to 3 times per pregnancy. Immunizations:  Immunization Recommendations   Influenza Vaccine Annual influenza vaccination during flu season is recommended for all persons aged >= 6 months who do not have contraindications   Pneumococcal Vaccine   * Pneumococcal conjugate vaccine = PCV13 (Prevnar 13), PCV15 (Vaxneuvance), PCV20 (Prevnar 20)  * Pneumococcal polysaccharide vaccine = PPSV23 (Pneumovax) Adults 20-63 years old: 1-3 doses may be recommended based on certain risk factors  Adults 72 years old: 1-2 doses may be recommended based off what pneumonia vaccine you previously received   Hepatitis B Vaccine 3 dose series if at intermediate or high risk (ex: diabetes, end stage renal disease, liver disease)   Tetanus (Td) Vaccine - COST NOT COVERED BY MEDICARE PART B Following completion of primary series, a booster dose should be given every 10 years to maintain immunity against tetanus. Td may also be given as tetanus wound prophylaxis. Tdap Vaccine - COST NOT COVERED BY MEDICARE PART B Recommended at least once for all adults. For pregnant patients, recommended with each pregnancy. Shingles Vaccine (Shingrix) - COST NOT COVERED BY MEDICARE PART B  2 shot series recommended in those aged 48 and above     Health Maintenance Due:      Topic Date Due   • Colorectal Cancer Screening  10/28/2023   • Cervical Cancer Screening  02/08/2024   • Breast Cancer Screening: Mammogram  08/30/2024   • Hepatitis C Screening  Completed     Immunizations Due:      Topic Date Due   • COVID-19 Vaccine (4 - Pfizer series) 12/20/2021   • Influenza Vaccine (1) 09/01/2023     Advance Directives   What are advance directives? Advance directives are legal documents that state your wishes and plans for medical care. These plans are made ahead of time in case you lose your ability to make decisions for yourself.  Advance directives can apply to any medical decision, such as the treatments you want, and if you want to donate organs. What are the types of advance directives? There are many types of advance directives, and each state has rules about how to use them. You may choose a combination of any of the following:  · Living will: This is a written record of the treatment you want. You can also choose which treatments you do not want, which to limit, and which to stop at a certain time. This includes surgery, medicine, IV fluid, and tube feedings. · Durable power of  for healthcare [facility]): This is a written record that states who you want to make healthcare choices for you when you are unable to make them for yourself. This person, called a proxy, is usually a family member or a friend. You may choose more than 1 proxy. · Do not resuscitate (DNR) order:  A DNR order is used in case your heart stops beating or you stop breathing. It is a request not to have certain forms of treatment, such as CPR. A DNR order may be included in other types of advance directives. · Medical directive: This covers the care that you want if you are in a coma, near death, or unable to make decisions for yourself. You can list the treatments you want for each condition. Treatment may include pain medicine, surgery, blood transfusions, dialysis, IV or tube feedings, and a ventilator (breathing machine). · Values history: This document has questions about your views, beliefs, and how you feel and think about life. This information can help others choose the care that you would choose. Why are advance directives important? An advance directive helps you control your care. Although spoken wishes may be used, it is better to have your wishes written down. Spoken wishes can be misunderstood, or not followed. Treatments may be given even if you do not want them. An advance directive may make it easier for your family to make difficult choices about your care.    Weight Management   Why it is important to manage your weight:  Being overweight increases your risk of health conditions such as heart disease, high blood pressure, type 2 diabetes, and certain types of cancer. It can also increase your risk for osteoarthritis, sleep apnea, and other respiratory problems. Aim for a slow, steady weight loss. Even a small amount of weight loss can lower your risk of health problems. How to lose weight safely:  A safe and healthy way to lose weight is to eat fewer calories and get regular exercise. You can lose up about 1 pound a week by decreasing the number of calories you eat by 500 calories each day. Healthy meal plan for weight management:  A healthy meal plan includes a variety of foods, contains fewer calories, and helps you stay healthy. A healthy meal plan includes the following:  · Eat whole-grain foods more often. A healthy meal plan should contain fiber. Fiber is the part of grains, fruits, and vegetables that is not broken down by your body. Whole-grain foods are healthy and provide extra fiber in your diet. Some examples of whole-grain foods are whole-wheat breads and pastas, oatmeal, brown rice, and bulgur. · Eat a variety of vegetables every day. Include dark, leafy greens such as spinach, kale, pham greens, and mustard greens. Eat yellow and orange vegetables such as carrots, sweet potatoes, and winter squash. · Eat a variety of fruits every day. Choose fresh or canned fruit (canned in its own juice or light syrup) instead of juice. Fruit juice has very little or no fiber. · Eat low-fat dairy foods. Drink fat-free (skim) milk or 1% milk. Eat fat-free yogurt and low-fat cottage cheese. Try low-fat cheeses such as mozzarella and other reduced-fat cheeses. · Choose meat and other protein foods that are low in fat. Choose beans or other legumes such as split peas or lentils. Choose fish, skinless poultry (chicken or turkey), or lean cuts of red meat (beef or pork).  Before you cook meat or poultry, cut off any visible fat. · Use less fat and oil. Try baking foods instead of frying them. Add less fat, such as margarine, sour cream, regular salad dressing and mayonnaise to foods. Eat fewer high-fat foods. Some examples of high-fat foods include french fries, doughnuts, ice cream, and cakes. · Eat fewer sweets. Limit foods and drinks that are high in sugar. This includes candy, cookies, regular soda, and sweetened drinks. Exercise:  Exercise at least 30 minutes per day on most days of the week. Some examples of exercise include walking, biking, dancing, and swimming. You can also fit in more physical activity by taking the stairs instead of the elevator or parking farther away from stores. Ask your healthcare provider about the best exercise plan for you. © Copyright [employer] 2018 Information is for End User's use only and may not be sold, redistributed or otherwise used for commercial purposes.  All illustrations and images included in CareNotes® are the copyrighted property of [employer] or  [employer]

## 2023-10-04 NOTE — PROGRESS NOTES
Assessment/Plan:    1. Hyperthyroidism  -     propranolol (INDERAL) 10 mg tablet; Take 1 tablet (10 mg total) by mouth 2 (two) times a day  -     TSH, 3rd generation; Future; Expected date: 11/04/2023  -     T4, free; Future; Expected date: 11/04/2023  -     TSH, 3rd generation with Free T4 reflex; Future; Expected date: 04/04/2024    2. Encounter for screening for malignant neoplasm of colon  -     Ambulatory Referral to Gastroenterology; Future    3. Peripheral vascular disease ([address])    4. Benign essential HTN  -     Comprehensive metabolic panel; Future; Expected date: 04/04/2024  -     CBC and differential; Future; Expected date: 04/04/2024    5. Hyponatremia    6. Osteopenia of multiple sites  -     Vitamin D 25 hydroxy; Future; Expected date: 04/04/2024    7. Primary insomnia    8. Encounter for initial annual wellness visit (AWV) in Medicare patient    9. Screening cholesterol level  -     Lipid Panel with Direct LDL reflex; Future; Expected date: 04/04/2024    10. Vitamin D deficiency  -     Vitamin D 25 hydroxy; Future; Expected date: 04/04/2024    11. Flu vaccine need  -     influenza vaccine, high-dose, PF 0.7 mL (FLUZONE HIGH-DOSE)      BMI Counseling: Body mass index is 35.06 kg/m². The BMI is above normal. Nutrition recommendations include moderation in carbohydrate intake. Exercise recommendations include exercising 3-5 times per week. No pharmacotherapy was ordered. Rationale for BMI follow-up plan is due to patient being overweight or obese. Depression Screening and Follow-up Plan: Patient was screened for depression during today's encounter. They screened negative with a PHQ-2 score of 0. Patient Instructions     Medicare Preventive Visit Patient Instructions  Thank you for completing your Welcome to Medicare Visit or Medicare Annual Wellness Visit today. Your next wellness visit will be due in one year (10/4/2024).   The screening/preventive services that you may require over the next 5-10 years are detailed below. Some tests may not apply to you based off risk factors and/or age. Screening tests ordered at today's visit but not completed yet may show as past due. Also, please note that scanned in results may not display below. Preventive Screenings:  Service Recommendations Previous Testing/Comments   Colorectal Cancer Screening  * Colonoscopy    * Fecal Occult Blood Test (FOBT)/Fecal Immunochemical Test (FIT)  * Fecal DNA/Cologuard Test  * Flexible Sigmoidoscopy Age: 43-73 years old   Colonoscopy: every 10 years (may be performed more frequently if at higher risk)  OR  FOBT/FIT: every 1 year  OR  Cologuard: every 3 years  OR  Sigmoidoscopy: every 5 years  Screening may be recommended earlier than age 39 if at higher risk for colorectal cancer. Also, an individualized decision between you and your healthcare provider will decide whether screening between the ages of 77-80 would be appropriate. Colonoscopy: 10/28/2013  FOBT/FIT: Not on file  Cologuard: Not on file  Sigmoidoscopy: Not on file    Screening Current     Breast Cancer Screening Age: 36 years old  Frequency: every 1-2 years  Not required if history of left and right mastectomy Mammogram: 08/30/2023    Screening Current   Cervical Cancer Screening Between the ages of 21-29, pap smear recommended once every 3 years. Between the ages of 32-69, can perform pap smear with HPV co-testing every 5 years.    Recommendations may differ for women with a history of total hysterectomy, cervical cancer, or abnormal pap smears in past. Pap Smear: 04/01/2022    Screening Not Indicated   Hepatitis C Screening Once for adults born between 1945 and 1965  More frequently in patients at high risk for Hepatitis C Hep C Antibody: 08/26/2022    Screening Current   Diabetes Screening 1-2 times per year if you're at risk for diabetes or have pre-diabetes Fasting glucose: 97 mg/dL (8/28/2023)  A1C: No results in last 5 years (No results in last 5 years)  Screening Current   Cholesterol Screening Once every 5 years if you don't have a lipid disorder. May order more often based on risk factors. Lipid panel: 08/28/2023    Screening Not Indicated  History Lipid Disorder     Other Preventive Screenings Covered by Medicare:  Abdominal Aortic Aneurysm (AAA) Screening: covered once if your at risk. You're considered to be at risk if you have a family history of AAA. Lung Cancer Screening: covers low dose CT scan once per year if you meet all of the following conditions: (1) Age 48-67; (2) No signs or symptoms of lung cancer; (3) Current smoker or have quit smoking within the last 15 years; (4) You have a tobacco smoking history of at least 20 pack years (packs per day multiplied by number of years you smoked); (5) You get a written order from a healthcare provider. Glaucoma Screening: covered annually if you're considered high risk: (1) You have diabetes OR (2) Family history of glaucoma OR (3)  aged 48 and older OR (3)  American aged 72 and older  Osteoporosis Screening: covered every 2 years if you meet one of the following conditions: (1) You're estrogen deficient and at risk for osteoporosis based off medical history and other findings; (2) Have a vertebral abnormality; (3) On glucocorticoid therapy for more than 3 months; (4) Have primary hyperparathyroidism; (5) On osteoporosis medications and need to assess response to drug therapy. Last bone density test (DXA Scan): 08/30/2023. HIV Screening: covered annually if you're between the age of 14-79. Also covered annually if you are younger than 13 and older than 72 with risk factors for HIV infection. For pregnant patients, it is covered up to 3 times per pregnancy.     Immunizations:  Immunization Recommendations   Influenza Vaccine Annual influenza vaccination during flu season is recommended for all persons aged >= 6 months who do not have contraindications   Pneumococcal Vaccine * Pneumococcal conjugate vaccine = PCV13 (Prevnar 13), PCV15 (Vaxneuvance), PCV20 (Prevnar 20)  * Pneumococcal polysaccharide vaccine = PPSV23 (Pneumovax) Adults 2364 years old: 1-3 doses may be recommended based on certain risk factors  Adults 72 years old: 1-2 doses may be recommended based off what pneumonia vaccine you previously received   Hepatitis B Vaccine 3 dose series if at intermediate or high risk (ex: diabetes, end stage renal disease, liver disease)   Tetanus (Td) Vaccine - COST NOT COVERED BY MEDICARE PART B Following completion of primary series, a booster dose should be given every 10 years to maintain immunity against tetanus. Td may also be given as tetanus wound prophylaxis. Tdap Vaccine - COST NOT COVERED BY MEDICARE PART B Recommended at least once for all adults. For pregnant patients, recommended with each pregnancy. Shingles Vaccine (Shingrix) - COST NOT COVERED BY MEDICARE PART B  2 shot series recommended in those aged 48 and above     Health Maintenance Due:      Topic Date Due    Colorectal Cancer Screening  10/28/2023    Cervical Cancer Screening  02/08/2024    Breast Cancer Screening: Mammogram  08/30/2024    Hepatitis C Screening  Completed     Immunizations Due:      Topic Date Due    COVID-19 Vaccine (4 - Pfizer series) 12/20/2021    Influenza Vaccine (1) 09/01/2023     Advance Directives   What are advance directives? Advance directives are legal documents that state your wishes and plans for medical care. These plans are made ahead of time in case you lose your ability to make decisions for yourself. Advance directives can apply to any medical decision, such as the treatments you want, and if you want to donate organs. What are the types of advance directives? There are many types of advance directives, and each state has rules about how to use them. You may choose a combination of any of the following:  Living will: This is a written record of the treatment you want. You can also choose which treatments you do not want, which to limit, and which to stop at a certain time. This includes surgery, medicine, IV fluid, and tube feedings. Durable power of  for [facility]): This is a written record that states who you want to make healthcare choices for you when you are unable to make them for yourself. This person, called a proxy, is usually a family member or a friend. You may choose more than 1 proxy. Do not resuscitate (DNR) order:  A DNR order is used in case your heart stops beating or you stop breathing. It is a request not to have certain forms of treatment, such as CPR. A DNR order may be included in other types of advance directives. Medical directive: This covers the care that you want if you are in a coma, near death, or unable to make decisions for yourself. You can list the treatments you want for each condition. Treatment may include pain medicine, surgery, blood transfusions, dialysis, IV or tube feedings, and a ventilator (breathing machine). Values history: This document has questions about your views, beliefs, and how you feel and think about life. This information can help others choose the care that you would choose. Why are advance directives important? An advance directive helps you control your care. Although spoken wishes may be used, it is better to have your wishes written down. Spoken wishes can be misunderstood, or not followed. Treatments may be given even if you do not want them. An advance directive may make it easier for your family to make difficult choices about your care. Weight Management   Why it is important to manage your weight:  Being overweight increases your risk of health conditions such as heart disease, high blood pressure, type 2 diabetes, and certain types of cancer. It can also increase your risk for osteoarthritis, sleep apnea, and other respiratory problems. Aim for a slow, steady weight loss.  Even a small amount of weight loss can lower your risk of health problems. How to lose weight safely:  A safe and healthy way to lose weight is to eat fewer calories and get regular exercise. You can lose up about 1 pound a week by decreasing the number of calories you eat by 500 calories each day. Healthy meal plan for weight management:  A healthy meal plan includes a variety of foods, contains fewer calories, and helps you stay healthy. A healthy meal plan includes the following:  Eat whole-grain foods more often. A healthy meal plan should contain fiber. Fiber is the part of grains, fruits, and vegetables that is not broken down by your body. Whole-grain foods are healthy and provide extra fiber in your diet. Some examples of whole-grain foods are whole-wheat breads and pastas, oatmeal, brown rice, and bulgur. Eat a variety of vegetables every day. Include dark, leafy greens such as spinach, kale, pham greens, and mustard greens. Eat yellow and orange vegetables such as carrots, sweet potatoes, and winter squash. Eat a variety of fruits every day. Choose fresh or canned fruit (canned in its own juice or light syrup) instead of juice. Fruit juice has very little or no fiber. Eat low-fat dairy foods. Drink fat-free (skim) milk or 1% milk. Eat fat-free yogurt and low-fat cottage cheese. Try low-fat cheeses such as mozzarella and other reduced-fat cheeses. Choose meat and other protein foods that are low in fat. Choose beans or other legumes such as split peas or lentils. Choose fish, skinless poultry (chicken or turkey), or lean cuts of red meat (beef or pork). Before you cook meat or poultry, cut off any visible fat. Use less fat and oil. Try baking foods instead of frying them. Add less fat, such as margarine, sour cream, regular salad dressing and mayonnaise to foods. Eat fewer high-fat foods. Some examples of high-fat foods include french fries, doughnuts, ice cream, and cakes. Eat fewer sweets.   Limit foods and drinks that are high in sugar. This includes candy, cookies, regular soda, and sweetened drinks. Exercise:  Exercise at least 30 minutes per day on most days of the week. Some examples of exercise include walking, biking, dancing, and swimming. You can also fit in more physical activity by taking the stairs instead of the elevator or parking farther away from stores. Ask your healthcare provider about the best exercise plan for you. © Copyright [employer] 2018 Information is for End User's use only and may not be sold, redistributed or otherwise used for commercial purposes. All illustrations and images included in CareNotes® are the copyrighted property of [employer]. or [address]      Return in about 6 months (around 4/4/2024) for Recheck. Subjective:      Patient ID: Hema Porter is a 79 y.o. female. Chief Complaint   Patient presents with    Medicare Wellness Visit    Follow-up     Labs done in Aug, refills needed    health maintenance     Patient will see Dr. Dipesh Mcknight,  for colonoscopy, order in chart       HPI     Rosacea, patient was placed on Metrogel by her endocrinologist and she has been using it for 1 month. She feels that is not helping. She has facial flushing for several years which are exacerbated by stress, hypertension, heat, and hot flashes. She would like to discuss other options. 5/16/18: started doxy 1 week ago, no relief yet. Still flushing and worse in the mornings. 10/15/18:  Helping a lot, taking doxy BID, ran out 1 week ago, re start at BID and then decrease to monthly. Dc 2019 taking doxy BID iith food, no issues  May 2021, taking doxycycline b.i.d. For several months and started Metrogel which she thinks flairs up her symptoms. September 2021, stable  January 2022, on doxycycline twice a day and tolerating well. July 2022, stable, no issues. October 2023, currently inflamed but usually very well controlled, compliant with medications.   Uses sunscreen     Insomnia, patient states she does not sleep well which is very multifactorial including the pain and how flashes. She used to be on estrogen replacement currently does not take any. She does take Ativan at night to help her sleep which helped marginally. 5/16/18: taking 1.5 mg  And advil pm and it has helped- sleeping through the night now and slightly  More tired in am.  10/15/18: taking ativan and sleeping well with it 3/1/19:   July 2019, has not tried to decrease her lorazepam and does take it every night to sleep  Dec 2019 usually doing very well but  Lately having some issues with sleep. Thinks its from the holidays  June 2020, stable  May 2021, sleeping okay usually with her medications however she does wake up in the middle of the night and feels a high level of anxiety which she does not know what to do about. She cannot sleep without her lorazepam which she is taking faithfully. At last appointment with another provider, she was started on Cymbalta but currently is not taking and she cannot remember what side effects she had  September 2021, not any better, pain keeps her up at night  Jan 2022:  Sleeping very well with the addition of Elavil and tolerating. No side effects  July 2022, stable and working well  October 2023, stable, no issues, Lyrica working well     Depression:  Patient is a depression was exacerbated by the pain and inability to sleep. She is always tired and fatigued and is having difficulty with activities of daily living around the house like cleaning and cooking as well as work. Patient is here today and can cooperate always. She feels that she is up to any medications or the long medications right now. 8/13/18: buspar was increased to 10 mg per but she felt tired on it and went back to 5 mg BId, has gained weight  10/15/18: taking bupar 5 mg- 2 [facility] at [facility],  12/21/18: stable  March 2019, no issues.   Takes 1- 5 mg BuSpar in the morning than 1 with lunch and 2 at night. July 2019:  Doing very well with present medications and feels much better since losing weight  Dec 2019 taking 2 buspar at night and 1/2 in the morning    June 2020, relatively stable with no suicidal or homicidal ideations   May 2021, depression is relatively well controlled however currently her anxiety is at exacerbations since [place] time. She stated everything is normal with no problems at home or work no changes in her medications and no over-the-counter things that she has introduced to her daily routine. Other than not eating well and putting on some weight she states everything is fine. She recently had some lab work and her thyroid is controlled and she is compliant with her medications. She was started on Cymbalta few months ago from another provider which she took for few days and stopped but she cannot remember why she stopped her what side effects she had. She takes BuSpar -total of 30 mg per day but cannot take a full dose in the morning because it makes her groggy. She is taking her lorazepam at night. She states she feels like in the middle of the night at her or jump out of her chest but then her  checks her blood pressure as well as heart rate since he is a respiratory therapist in everything is normal.  She does not drink any caffeine or alcohol towards bedtime and does not know why this is happening. It does not happen every night and can happen in the daytime as well randomly   September 2021, flare up of depression due to pain  January 2022, significantly improved since she is sleeping better and pain is better with the addition of Elavil. She has decreased her BuSpar and is only taking it once or twice a day now instead of 3 times a day. No breakthrough symptoms. No HI or SI   .  July 2022, stable, no HI or SI.   No exacerbations  October 2023, no depression symptoms at the moment, no HI or SI, takes lorazepam every night to help sleep         Chronic pain, not too much better than previously. Follows with pain management. May 2021, follows with pain management and has a pain pump  September 2021, worst, in general.  This increases her weight, lack of activity and depression. She is getting steroid injections right now and is scheduled for MRI from her pain management doctors  January 2022, received another steroid injection which has significantly helped but that was only about 3 weeks ago. She is not sure how long it lasts and can only get it every 3 months. Usually does not last her 3 months. Elavil has helped her sleep quality as well as helped with the pain and she has an appointment tomorrow for neuro surgery opinion for minimally invasive surgery. July 2022, in January she had an epidural injection which worked really well for her however her most recent1 in April has not given her any relief  October 2023, very well controlled, feels that her pain is very minimal     Hyperlipidemia, increase in lipid levels due to increased weight and inactivity. July 2022, patient continues to gain weight. She has not had recent lab work for her cholesterol        L edema; resolved with diabetic socks at give her compression as well as daily Lasix. No issues. July 2022, was doing well and noticed an increase in edema a few weeks back. Increase her Lasix from 1 tablet daily due to and that has helped her a lot. She has been taking 40 milligrams Lasix for 2 weeks. She has not had any recent blood work.   October 2023, no recent edema, has Lasix when she needs to use it      The following portions of the patient's history were reviewed and updated as appropriate: allergies, current medications, past family history, past medical history, past social history, past surgical history and problem list.    Review of Systems      Constitutional:  Denies fever or chills   Eyes:  Denies double , blurry vision or eye pain  HENT:  Denies nasal congestion, sore throat or new hearing issues  Respiratory:  Denies cough or shortness of breath or wheezing  Cardiovascular:  Denies palpitations or chest pain  GI:  Denies abdominal pain, nausea, or vomiting, no loose stools, no reflux  Integument:  Denies rash , no open areas  Neurologic:  Denies headache or focal weakness, no dizziness  : no dysuria, or hematuria      Current Outpatient Medications   Medication Sig Dispense Refill    amitriptyline (ELAVIL) 10 mg tablet Take 1 tablet (10 mg total) by mouth daily at bedtime 90 tablet 1    Ascorbic Acid (VITAMIN C PO) Take 1 tablet by mouth daily      atorvastatin (LIPITOR) 10 mg tablet Take 1 tablet (10 mg total) by mouth daily at bedtime 90 tablet 1    Cetirizine HCl (ZYRTEC PO) Take by mouth as needed      Cholecalciferol 50 MCG (2000 UT) CAPS Take 1 capsule by mouth every morning      diclofenac (VOLTAREN) 75 mg EC tablet Take 1 tablet (75 mg total) by mouth 2 (two) times a day 180 tablet 1    fluticasone (FLONASE) 50 mcg/act nasal spray 1 spray into each nostril daily (Patient taking differently: 1 spray into each nostril as needed) 16 g 3    furosemide (LASIX) 40 mg tablet Take 1 tablet (40 mg total) by mouth daily 90 tablet 1    lisinopril (ZESTRIL) 20 mg tablet Take 1 tablet (20 mg total) by mouth 2 (two) times a day 180 tablet 1    LORazepam (ATIVAN) 1 mg tablet Take 1.5 tablets (1.5 mg total) by mouth daily at bedtime as needed for anxiety 135 tablet 0    multivitamin (THERAGRAN) TABS Take 1 tablet by mouth every morning      penciclovir (Denavir) 1 % cream Apply topically daily as needed (ulcer) 5 g 5    Polyethyl Glycol-Propyl Glycol (SYSTANE OP) Apply to eye if needed      POTASSIUM PO Take 1 tablet by mouth in the morning      pregabalin (LYRICA) 50 mg capsule Take 1 capsule (50 mg total) by mouth 2 (two) times a day (Patient taking differently: Take 50 mg by mouth daily) 180 capsule 1    Probiotic Product (PROBIOTIC PO) Take 1 capsule by mouth every morning      propranolol (INDERAL) 10 mg tablet Take 1 tablet (10 mg total) by mouth 2 (two) times a day 180 tablet 1    RESTASIS 0.05 % ophthalmic emulsion Administer 1 drop to both eyes 2 (two) times a day  3    timolol (TIMOPTIC) 0.5 % ophthalmic solution INSTILL 1 DROP INTO RIGHT EYE EVERY MORNING      tiZANidine (ZANAFLEX) 4 mg tablet Take 1 tablet in the a.m., 1 tablet in the p.m. and 2 tablets at bedtime (Patient taking differently: Take 4 mg by mouth daily at bedtime) 360 tablet 1    valACYclovir (VALTREX) 500 mg tablet Take 1 tablet (500 mg total) by mouth daily as needed (Cold Sores) 30 tablet 1    [facility] Diclofenac Sodium 1 % APPLY 2 GRAMS TO AFFECTED AREA 4 TIMES A  g 1    dicyclomine (BENTYL) 20 mg tablet Take 1 tablet (20 mg total) by mouth 3 (three) times a day as needed (abdominal pain) 30 tablet 1    Esomeprazole Magnesium (NEXIUM PO) Take 1 capsule by mouth daily as needed  (Patient not taking: Reported on 8/29/2023)      methimazole (TAPAZOLE) 5 mg tablet Take 1 tablet (5 mg total) by mouth 2 (two) times a day 180 tablet 1     No current facility-administered medications for this visit.        Objective:    /84 (BP Location: Left arm, Patient Position: Sitting, Cuff Size: Large)   Pulse 92   Temp 98.2 °F (36.8 °C) (Temporal)   Ht 5' 6" (1.676 m)   Wt 98.5 kg (217 lb 3.2 oz)   LMP 11/26/2015   SpO2 97%   BMI 35.06 kg/m²        Physical Exam      Constitutional:  Well developed, well nourished, no acute distress, non-toxic appearance   Eyes:  PERRL, conjunctiva normal , non icteric sclera  HENT:  Atraumatic, oropharynx moist. Neck-  supple   Respiratory:  CTA b/l, normal breath sounds, no rales, no wheezing   Cardiovascular:  RRR, no murmurs, no LE edema b/l  GI:  Soft, nondistended, normal bowel sounds x 4, nontender, no organomegaly, no mass, no rebound, no guarding   Neurologic:  no focal deficits noted   Psychiatric:  Speech and behavior appropriate , AAO x 3           Irene Pointer, DO

## 2023-10-04 NOTE — PROGRESS NOTES
Assessment and Plan:     Problem List Items Addressed This Visit        Endocrine    Hyperthyroidism    Relevant Medications    propranolol (INDERAL) 10 mg tablet    SIADH (syndrome of inappropriate ADH production) (HCC)       Cardiovascular and Mediastinum    Benign essential HTN    Relevant Medications    propranolol (INDERAL) 10 mg tablet    Peripheral vascular disease (HCC)       Musculoskeletal and Integument    Osteopenia of multiple sites       Other    Primary insomnia    Hyponatremia    Encounter for initial annual wellness visit (AWV) in Medicare patient   Other Visit Diagnoses     Encounter for screening for malignant neoplasm of colon    -  Primary    Relevant Orders    Ambulatory Referral to Gastroenterology           Preventive health issues were discussed with patient, and age appropriate screening tests were ordered as noted in patient's After Visit Summary. Personalized health advice and appropriate referrals for health education or preventive services given if needed, as noted in patient's After Visit Summary.      History of Present Illness:     Patient presents for a Medicare Wellness Visit    HPI   Patient Care Team:  Nurys Cross DO as PCP - General  Angelica Tee MD (Orthopedic Surgery)  Clarice Carson DO (Family Medicine)     Review of Systems:     Review of Systems     Problem List:     Patient Active Problem List   Diagnosis   • Rosacea, acne   • Lumbar degenerative disc disease   • Anxiety   • Benign essential HTN   • Disc disorder of cervical region   • Lumbar facet arthropathy   • Lumbar stenosis without neurogenic claudication   • Myofascial pain syndrome   • BMI 36.0-36.9,adult   • Pain syndrome, chronic   • Primary osteoarthritis of right knee   • Bulge of lumbar disc without myelopathy   • Spondylolisthesis of lumbar region   • Arthritis of right acromioclavicular joint   • Adverse effect of adrenal cortical steroids, initial encounter   • Calculus of gallbladder without cholecystitis without obstruction   • Hyperthyroidism   • Primary insomnia   • Osteopenia of multiple sites   • Atrial enlargement, left   • SIADH (syndrome of inappropriate ADH production) ([facility])   • Hyponatremia   • Allergy to adhesive tape   • Peripheral vascular disease ([address])   • Sprain of left knee   • Encounter for initial annual wellness visit (AWV) in Medicare patient      Past Medical and Surgical History:     Past Medical History:   Diagnosis Date   • Allergic    • Anxiety    • Asymptomatic premature menopause 5/11/2021   • Bilateral leg edema 9/17/2021   • Cataract, bilateral     last assessed   • Cervical radiculopathy    • Chronic right shoulder pain    • COVID-19 05/15/2022   • Disc disease, degenerative, cervical    • Disease of thyroid gland    • Encounter for gynecological examination (general) (routine) without abnormal findings 2/8/2021   • Herpes simplex infection    • Hypertension    • Insomnia    • Long term systemic steroid user 5/11/2021   • Low back pain    • Lumbar degenerative disc disease    • Lumbar facet arthropathy    • Lumbar radiculopathy    • Lumbar stenosis without neurogenic claudication    • Mononucleosis    • Myofascial pain dysfunction syndrome    • Obesity, morbid ([address]) 7/12/2022   • Osteoarthritis     shoulder region - unspecified laterality - last assessed 2/1/14   • Plantar fasciitis    • Poison ivy dermatitis 6/1/2023   • Ptosis, both eyelids     last assessed 10/6/16   • Ptosis, congenital, left    • Retinal detachment     last assessed 12/11/14 right eye   • Sacroiliitis ([address])    • Thyroid disease    • Thyrotoxicosis     last assessed 5/17/13   • Vertigo    • Weight gain 12/1/2022     Past Surgical History:   Procedure Laterality Date   • EYE SURGERY     • MD RAYMUNDO IMPLTJ NSTIM ELTRDS PLATE/PADDLE EDRL Left 01/26/2016    Procedure: PLACEMENT OF THORACIC SPINAL CORD STIMULATOR WITH LEFT BUTTOCK IMPLANTABLE PULSE GENERATOR (IMPULSE MONITORING);   Surgeon: Celestine Donnelly MD;  Location: [facility] MAIN OR;  Service: Neurosurgery   • RETINAL DETACHMENT SURGERY Right    • SPINE SURGERY  Dec 22, 2022      Family History:     Family History   Problem Relation Age of Onset   • Breast cancer Mother    • COPD Mother    • Hypertension Mother         essential   • Cancer Mother    • Glaucoma Mother    • Heart attack Father         acute MI   • Emphysema Father    • Hypertension Father         essential   • Other Father         prehypertension   • COPD Father    • No Known Problems Maternal Grandmother    • No Known Problems Maternal Grandfather    • No Known Problems Paternal Grandmother    • No Known Problems Paternal Grandfather    • No Known Problems Sister    • No Known Problems Son    • No Known Problems Sister    • No Known Problems Paternal Aunt    • No Known Problems Paternal Aunt    • No Known Problems Paternal Aunt    • No Known Problems Paternal Aunt    • No Known Problems Paternal Aunt    • Liver cancer Maternal Uncle       Social History:     Social History     Socioeconomic History   • Marital status: /Civil Union     Spouse name: None   • Number of children: None   • Years of education: None   • Highest education level: None   Occupational History   • Occupation: Business owner     Comment: [employer]-full time working   Tobacco Use   • Smoking status: Never   • Smokeless tobacco: Never   Vaping Use   • Vaping Use: Never used   Substance and Sexual Activity   • Alcohol use: Not Currently     Alcohol/week: 1.0 standard drink of alcohol     Types: 1 Glasses of wine per week   • Drug use: No   • Sexual activity: Not Currently     Partners: Male     Birth control/protection: None   Other Topics Concern   • None   Social History Narrative    Daily caffeinated cola consumption    3 cups coffee/day     Social Determinants of Health     Financial Resource Strain: Low Risk  (10/4/2023)    Overall Financial Resource Strain (CARDIA)    • Difficulty of Paying Living Expenses: Not hard at all   Food Insecurity: Not on file   Transportation Needs: No Transportation Needs (10/4/2023)    PRAPARE - Transportation    • Lack of Transportation (Medical): No    • Lack of Transportation (Non-Medical):  No   Physical Activity: Not on file   Stress: Not on file   Social Connections: Not on file   Intimate Partner Violence: Not on file   Housing Stability: Not on file      Medications and Allergies:     Current Outpatient Medications   Medication Sig Dispense Refill   • amitriptyline (ELAVIL) 10 mg tablet Take 1 tablet (10 mg total) by mouth daily at bedtime 90 tablet 1   • Ascorbic Acid (VITAMIN C PO) Take 1 tablet by mouth daily     • atorvastatin (LIPITOR) 10 mg tablet Take 1 tablet (10 mg total) by mouth daily at bedtime 90 tablet 1   • Cetirizine HCl (ZYRTEC PO) Take by mouth as needed     • Cholecalciferol 50 MCG (2000 UT) CAPS Take 1 capsule by mouth every morning     • diclofenac (VOLTAREN) 75 mg EC tablet Take 1 tablet (75 mg total) by mouth 2 (two) times a day 180 tablet 1   • fluticasone (FLONASE) 50 mcg/act nasal spray 1 spray into each nostril daily (Patient taking differently: 1 spray into each nostril as needed) 16 g 3   • furosemide (LASIX) 40 mg tablet Take 1 tablet (40 mg total) by mouth daily 90 tablet 1   • lisinopril (ZESTRIL) 20 mg tablet Take 1 tablet (20 mg total) by mouth 2 (two) times a day 180 tablet 1   • LORazepam (ATIVAN) 1 mg tablet Take 1.5 tablets (1.5 mg total) by mouth daily at bedtime as needed for anxiety 135 tablet 0   • methimazole (TAPAZOLE) 5 mg tablet Take 1 tablet (5 mg total) by mouth daily 90 tablet 1   • multivitamin (THERAGRAN) TABS Take 1 tablet by mouth every morning     • penciclovir (Denavir) 1 % cream Apply topically daily as needed (ulcer) 5 g 5   • Polyethyl Glycol-Propyl Glycol (SYSTANE OP) Apply to eye if needed     • POTASSIUM PO Take 1 tablet by mouth in the morning     • pregabalin (LYRICA) 50 mg capsule Take 1 capsule (50 mg total) by mouth 2 (two) times a day (Patient taking differently: Take 50 mg by mouth daily) 180 capsule 1   • Probiotic Product (PROBIOTIC PO) Take 1 capsule by mouth every morning     • propranolol (INDERAL) 10 mg tablet Take 1 tablet (10 mg total) by mouth 2 (two) times a day 180 tablet 1   • RESTASIS 0.05 % ophthalmic emulsion Administer 1 drop to both eyes 2 (two) times a day  3   • timolol (TIMOPTIC) 0.5 % ophthalmic solution INSTILL 1 DROP INTO RIGHT EYE EVERY MORNING     • tiZANidine (ZANAFLEX) 4 mg tablet Take 1 tablet in the a.m., 1 tablet in the p.m. and 2 tablets at bedtime (Patient taking differently: Take 4 mg by mouth daily at bedtime) 360 tablet 1   • valACYclovir (VALTREX) 500 mg tablet Take 1 tablet (500 mg total) by mouth daily as needed (Cold Sores) 30 tablet 1   • busPIRone (BUSPAR) 10 mg tablet Take 10 mg by mouth in the morning 5 mg in am if needed and 10 mg at night (Patient not taking: Reported on 8/29/2023)     • [facility] Diclofenac Sodium 1 % APPLY 2 GRAMS TO AFFECTED AREA 4 TIMES A DAY (Patient not taking: Reported on 10/4/2023) 200 g 1   • Esomeprazole Magnesium (NEXIUM PO) Take 1 capsule by mouth daily as needed  (Patient not taking: Reported on 8/29/2023)       No current facility-administered medications for this visit. Allergies   Allergen Reactions   • Other Dermatitis     Adhesive tape - please use ONLY PAPER TAPE   • Scopolamine Other (See Comments)     Severe, debilitating headache     • Flexeril [Cyclobenzaprine] Dizziness and Fatigue   • Naproxen      Other reaction(s): Unknown Allergic Reaction  Annotation - [id number]: nightmares   • Oxycodone Other (See Comments)     Night olsen    • Penicillins      Other reaction(s): Unknown Allergic Reaction  Annotation - [id number]: childhood reaction   • Promethazine-Codeine      Reaction Date: [id number]; Annotation - [id number]: nightmares;  Annotation - [id number]: nightmares   • Tramadol      Other reaction(s): Unknown Allergic Reaction  Annotation - [id number]: PT HAS NIGHTMARES FROM TRAMADOL   • Medical Tape Rash   • Wound Dressing Adhesive Rash      Immunizations:     Immunization History   Administered Date(s) Administered   • COVID-19 PFIZER VACCINE 0.3 ML IM 02/19/2021, 03/12/2021, 10/25/2021   • INFLUENZA 10/14/2022   • Influenza Quadrivalent Preservative Free 3 years and older IM 01/08/2018   • Influenza Quadrivalent, 6-35 Months IM 11/14/2016   • Influenza, high dose seasonal 0.7 mL 10/15/2022   • Influenza, injectable, quadrivalent, preservative free 0.5 mL 10/15/2018, 09/15/2020   • Influenza, recombinant, quadrivalent,injectable, preservative free 11/06/2019, 01/18/2022   • Influenza, seasonal, injectable 02/27/2011, 11/12/2013, 12/11/2014, 11/02/2015   • Pneumococcal Conjugate Vaccine 20-valent (Pcv20), Polysace 07/12/2022   • Td (adult), adsorbed 12/30/2013   • Zoster Vaccine Recombinant 09/15/2020, 01/02/2021      Health Maintenance:         Topic Date Due   • Colorectal Cancer Screening  10/28/2023   • Cervical Cancer Screening  02/08/2024   • Breast Cancer Screening: Mammogram  08/30/2024   • Hepatitis C Screening  Completed         Topic Date Due   • COVID-19 Vaccine (4 - Pfizer series) 12/20/2021   • Influenza Vaccine (1) 09/01/2023      Medicare Screening Tests and Risk Assessments:     Elijah Segal is here for her Subsequent Wellness visit. Last Medicare Wellness visit information reviewed, patient interviewed and updates made to the record today. Health Risk Assessment:   Patient rates overall health as very good. Patient feels that their physical health rating is much better. Patient is satisfied with their life. Eyesight was rated as slightly worse. Hearing was rated as same. Patient feels that their emotional and mental health rating is same. Patients states they are never, rarely angry. Patient states they are often unusually tired/fatigued. Pain experienced in the last 7 days has been some. Patient's pain rating has been 6/10.  Patient states that she has experienced no weight loss or gain in last 6 months. Depression Screening:   PHQ-2 Score: 0      Fall Risk Screening: In the past year, patient has experienced: no history of falling in past year      Urinary Incontinence Screening:   Patient has not leaked urine accidently in the last six months. Home Safety:  Patient has working smoke alarms and has no working carbon monoxide detector. Home safety hazards include: none. Nutrition:   Current diet is Regular and Limited junk food. Medications:   Patient is currently taking over-the-counter supplements. OTC medications include: see medication list. Patient is able to manage medications. Activities of Daily Living (ADLs)/Instrumental Activities of Daily Living (IADLs):   Walk and transfer into and out of bed and chair?: Yes  Dress and groom yourself?: Yes    Bathe or shower yourself?: Yes    Feed yourself?  Yes  Do your laundry/housekeeping?: Yes  Manage your money, pay your bills and track your expenses?: Yes  Make your own meals?: Yes    Do your own shopping?: Yes    Previous Hospitalizations:   Any hospitalizations or ED visits within the last 12 months?: Yes    How many hospitalizations have you had in the last year?: 1-2    Hospitalization Comments: Back surgery    Advance Care Planning:   Living will: Yes    Advanced directive: Yes      Comments: Her     Cognitive Screening:   Provider or family/friend/caregiver concerned regarding cognition?: No    PREVENTIVE SCREENINGS      Cardiovascular Screening:    General: Screening Not Indicated and History Lipid Disorder      Diabetes Screening:     General: Screening Current      Colorectal Cancer Screening:     General: Screening Current      Breast Cancer Screening:     General: Screening Current      Cervical Cancer Screening:    General: Screening Not Indicated      Lung Cancer Screening:     General: Screening Not Indicated      Hepatitis C Screening:    General: Screening Current    Screening, Brief Intervention, and Referral to Treatment (SBIRT)    Screening  Typical number of drinks in a day: 0  Typical number of drinks in a week: 0  Interpretation: Low risk drinking behavior. Single Item Drug Screening:  How often have you used an illegal drug (including marijuana) or a prescription medication for non-medical reasons in the past year? never    Single Item Drug Screen Score: 0  Interpretation: Negative screen for possible drug use disorder    Brief Intervention  Alcohol & drug use screenings were reviewed. No concerns regarding substance use disorder identified. Other Counseling Topics:   Car/seat belt/driving safety, skin self-exam, sunscreen and regular weightbearing exercise and calcium and vitamin D intake. No results found.      Physical Exam:     /84 (BP Location: Left arm, Patient Position: Sitting, Cuff Size: Large)   Pulse 92   Temp 98.2 °F (36.8 °C) (Temporal)   Ht 5' 6" (1.676 m)   Wt 98.5 kg (217 lb 3.2 oz)   LMP 11/26/2015   SpO2 97%   BMI 35.06 kg/m²     Physical Exam     Jayden Pickering,

## 2023-10-18 ENCOUNTER — APPOINTMENT (OUTPATIENT)
Dept: LAB | Facility: MEDICAL CENTER | Age: 68
End: 2023-10-18
Payer: MEDICARE

## 2023-10-18 DIAGNOSIS — E05.90 HYPERTHYROIDISM: ICD-10-CM

## 2023-10-18 LAB
T4 FREE SERPL-MCNC: 2.2 NG/DL (ref 0.61–1.12)
TSH SERPL DL<=0.05 MIU/L-ACNC: <0.01 UIU/ML (ref 0.45–4.5)

## 2023-10-18 PROCEDURE — 36415 COLL VENOUS BLD VENIPUNCTURE: CPT

## 2023-10-18 PROCEDURE — 84439 ASSAY OF FREE THYROXINE: CPT

## 2023-10-18 PROCEDURE — 84443 ASSAY THYROID STIM HORMONE: CPT

## 2023-10-19 ENCOUNTER — TELEPHONE (OUTPATIENT)
Dept: INTERNAL MEDICINE CLINIC | Facility: OTHER | Age: 68
End: 2023-10-19

## 2023-10-19 DIAGNOSIS — E05.90 HYPERTHYROIDISM: Primary | ICD-10-CM

## 2023-10-19 NOTE — TELEPHONE ENCOUNTER
Patient calling because she just had her labs done yesterday and she saw them in [facility] and she is wondering if her medications need to be adjusted and if this could be the cause of some other issues that she having? She stated that she does go see Gastro on Monday. She is asking if you would be able to give her a call to discuss?

## 2023-10-23 ENCOUNTER — OFFICE VISIT (OUTPATIENT)
Dept: GASTROENTEROLOGY | Facility: CLINIC | Age: 68
End: 2023-10-23
Payer: MEDICARE

## 2023-10-23 ENCOUNTER — TELEPHONE (OUTPATIENT)
Dept: GASTROENTEROLOGY | Facility: CLINIC | Age: 68
End: 2023-10-23

## 2023-10-23 VITALS
TEMPERATURE: 97 F | BODY MASS INDEX: 34.07 KG/M2 | HEIGHT: 66 IN | SYSTOLIC BLOOD PRESSURE: 118 MMHG | WEIGHT: 212 LBS | DIASTOLIC BLOOD PRESSURE: 80 MMHG

## 2023-10-23 DIAGNOSIS — K80.20 CALCULUS OF GALLBLADDER WITHOUT CHOLECYSTITIS WITHOUT OBSTRUCTION: ICD-10-CM

## 2023-10-23 DIAGNOSIS — Z12.11 ENCOUNTER FOR SCREENING FOR MALIGNANT NEOPLASM OF COLON: ICD-10-CM

## 2023-10-23 DIAGNOSIS — R10.13 EPIGASTRIC PAIN: Primary | ICD-10-CM

## 2023-10-23 PROCEDURE — 99204 OFFICE O/P NEW MOD 45 MIN: CPT | Performed by: INTERNAL MEDICINE

## 2023-10-23 RX ORDER — DICYCLOMINE HCL 20 MG
20 TABLET ORAL 3 TIMES DAILY PRN
Qty: 30 TABLET | Refills: 1 | Status: SHIPPED | OUTPATIENT
Start: 2023-10-23

## 2023-10-23 NOTE — PATIENT INSTRUCTIONS
Scheduled date of EGD/colonoscopy (as of today): 12/11/23  Physician performing EGD/colonoscopy:    [facility]  Desired bowel prep reviewed with patient: Miralax/ Dulcolax   Instructions reviewed with patient by: Jason Terrazas   Clearances:  n/a

## 2023-10-23 NOTE — PROGRESS NOTES
[facility] - Outpatient Follow-up Note  Emily Austin 79 y.o. female MRN: [id number]  Encounter: [id number]          ASSESSMENT AND PLAN:      1. Epigastric pain  2. Calculus of gallbladder without cholecystitis without obstruction  -Her postprandial abdominal pain and bloating could be secondary to biliary colic. She has no significant reflux symptoms. I would like to rule out other GI pathology such as gastritis, peptic ulcer disease and underlying malignancy. I will schedule her for EGD for further evaluation. Continue to take Raysa-Henderson as needed for your symptoms  Bentyl for abdominal pain on as-needed basis  Referral to see general surgeon for further evaluation of biliary colic  - Ambulatory Referral to General Surgery; Future    3. Encounter for screening for malignant neoplasm of colon  She had normal colonoscopy 10 years ago. She is due for surveillance colonoscopy. We will schedule her for colonoscopy. Bowel prep instruction for MiraLAX Dulcolax given  - Colonoscopy; Future  - EGD; Future      ______________________________________________________________________    SUBJECTIVE: 61-year-old female here for evaluation of postprandial abdominal pain and bloating. She complains of abdominal pain after eating. Pain is located in the epigastric and right upper quadrant region, radiates to the upper back bilaterally. Pain usually lasts 30 minutes to about 3 hours and then she notes significant bloating. She has no significant reflux symptoms. She has 1-2 bowel movement every day.,  She reports about 10 pound weight loss. She has cholelithiasis on CT scan done recently through [facility] and the one from 2019 through our hospital.    She has never had EGD before. Her last colonoscopy was about 10 years ago    -Normal colonoscopy by Dr. Sven Her.       Historical Information   Past Medical History:   Diagnosis Date Allergic     Anxiety     Asymptomatic premature menopause 5/11/2021    Bilateral leg edema 9/17/2021    Cataract, bilateral     last assessed    Cervical radiculopathy     Chronic right shoulder pain     COVID-19 05/15/2022    Disc disease, degenerative, cervical     Disease of thyroid gland     Encounter for gynecological examination (general) (routine) without abnormal findings 2/8/2021    Herpes simplex infection     Hypertension     Insomnia     Long term systemic steroid user 5/11/2021    Low back pain     Lumbar degenerative disc disease     Lumbar facet arthropathy     Lumbar radiculopathy     Lumbar stenosis without neurogenic claudication     Mononucleosis     Myofascial pain dysfunction syndrome     Obesity, morbid ([address]) 7/12/2022    Osteoarthritis     shoulder region - unspecified laterality - last assessed 2/1/14    Plantar fasciitis     Poison ivy dermatitis 6/1/2023    Ptosis, both eyelids     last assessed 10/6/16    Ptosis, congenital, left     Retinal detachment     last assessed 12/11/14 right eye    Sacroiliitis (HCC)     SIADH (syndrome of inappropriate ADH production) ([address]) 12/30/2022    Thyroid disease     Thyrotoxicosis     last assessed 5/17/13    Vertigo     Weight gain 12/1/2022     Past Surgical History:   Procedure Laterality Date    EYE SURGERY      CT RAYMUNDO IMPLTJ NSTIM ELTRDS PLATE/PADDLE EDRL Left 01/26/2016    Procedure: PLACEMENT OF THORACIC SPINAL CORD STIMULATOR WITH LEFT BUTTOCK IMPLANTABLE PULSE GENERATOR (IMPULSE MONITORING);   Surgeon: Michaela Uamña MD;  Location:  MAIN OR;  Service: Neurosurgery    RETINAL DETACHMENT SURGERY Right     SPINE SURGERY  Dec 22, 2022     Social History   Social History     Substance and Sexual Activity   Alcohol Use Not Currently    Alcohol/week: 1.0 standard drink of alcohol    Types: 1 Glasses of wine per week     Social History     Substance and Sexual Activity   Drug Use No     Social History     Tobacco Use   Smoking Status Never Smokeless Tobacco Never     Family History   Problem Relation Age of Onset    Breast cancer Mother     COPD Mother     Hypertension Mother         essential    Cancer Mother     Glaucoma Mother     Heart attack Father         acute MI    Emphysema Father     Hypertension Father         essential    Other Father         prehypertension    COPD Father     No Known Problems Maternal Grandmother     No Known Problems Maternal Grandfather     No Known Problems Paternal Grandmother     No Known Problems Paternal Grandfather     No Known Problems Sister     No Known Problems Son     No Known Problems Sister     No Known Problems Paternal Aunt     No Known Problems Paternal Aunt     No Known Problems Paternal Aunt     No Known Problems Paternal Aunt     No Known Problems Paternal Aunt     Liver cancer Maternal Uncle        Meds/Allergies       Current Outpatient Medications:     amitriptyline (ELAVIL) 10 mg tablet    Ascorbic Acid (VITAMIN C PO)    atorvastatin (LIPITOR) 10 mg tablet    Cetirizine HCl (ZYRTEC PO)    Cholecalciferol 50 MCG (2000 UT) CAPS    [facility] Diclofenac Sodium 1 %    diclofenac (VOLTAREN) 75 mg EC tablet    fluticasone (FLONASE) 50 mcg/act nasal spray    furosemide (LASIX) 40 mg tablet    lisinopril (ZESTRIL) 20 mg tablet    LORazepam (ATIVAN) 1 mg tablet    methimazole (TAPAZOLE) 5 mg tablet    multivitamin (THERAGRAN) TABS    penciclovir (Denavir) 1 % cream    Polyethyl Glycol-Propyl Glycol (SYSTANE OP)    POTASSIUM PO    pregabalin (LYRICA) 50 mg capsule    Probiotic Product (PROBIOTIC PO)    propranolol (INDERAL) 10 mg tablet    RESTASIS 0.05 % ophthalmic emulsion    timolol (TIMOPTIC) 0.5 % ophthalmic solution    tiZANidine (ZANAFLEX) 4 mg tablet    valACYclovir (VALTREX) 500 mg tablet    Esomeprazole Magnesium (NEXIUM PO)    Allergies   Allergen Reactions    Other Dermatitis     Adhesive tape - please use ONLY PAPER TAPE    Scopolamine Other (See Comments)     Severe, debilitating headache Flexeril [Cyclobenzaprine] Dizziness and Fatigue    Naproxen      Other reaction(s): Unknown Allergic Reaction  Annotation - [id number]: nightmares    Oxycodone Other (See Comments)     Night olsen     Penicillins      Other reaction(s): Unknown Allergic Reaction  Annotation - [id number]: childhood reaction    Promethazine-Codeine      Reaction Date: [id number]; Annotation - [id number]: nightmares; Annotation - [id number]: nightmares    Tramadol      Other reaction(s): Unknown Allergic Reaction  Annotation - [id number]: PT HAS NIGHTMARES FROM TRAMADOL    Medical Tape Rash    Wound Dressing Adhesive Rash           Objective     Blood pressure 118/80, temperature (!) 97 °F (36.1 °C), temperature source Tympanic, height 5' 6" (1.676 m), weight 96.2 kg (212 lb), last menstrual period 11/26/2015, not currently breastfeeding. Body mass index is 34.22 kg/m². PHYSICAL EXAM:      General Appearance:   Alert, cooperative, no distress   HEENT:   Normocephalic, atraumatic, anicteric. Neck:  Supple, symmetrical, trachea midline   Lungs:   Clear to auscultation bilaterally; no rales, rhonchi or wheezing; respirations unlabored    Heart[de-identified]   Regular rate and rhythm; no murmur, rub, or gallop. Abdomen:   Soft, non-tender, non-distended; normal bowel sounds; no masses, no organomegaly    Genitalia:   Deferred    Rectal:   Deferred    Extremities:  No cyanosis, clubbing or edema    Pulses:  2+ and symmetric    Skin:  No jaundice, rashes, or lesions    Lymph nodes:  No palpable cervical lymphadenopathy        Lab Results:   No visits with results within 1 Day(s) from this visit. Latest known visit with results is:   Appointment on 10/18/2023   Component Date Value    TSH 3RD GENERATON 10/18/2023 <0.010 (L)     Free T4 10/18/2023 2.20 (H)          Radiology Results:   No results found.     Answers submitted by the patient for this visit:  Abdominal Pain Questionnaire (Submitted on 10/18/2023)  Chief Complaint: Abdominal pain  Chronicity: new  Onset: more than 1 month ago  Onset quality: sudden  Frequency: daily  Episode duration: 3 Hours  Progression since onset: waxing and waning  Pain location: periumbilical region  Pain - numeric: 6/10  Pain quality: aching  Radiates to: suprapubic region  Aggravated by: eating

## 2023-10-24 ENCOUNTER — HOSPITAL ENCOUNTER (INPATIENT)
Facility: HOSPITAL | Age: 68
LOS: 1 days | Discharge: HOME | DRG: 399 | End: 2023-10-27
Attending: STUDENT IN AN ORGANIZED HEALTH CARE EDUCATION/TRAINING PROGRAM | Admitting: SURGERY
Payer: MEDICARE

## 2023-10-24 ENCOUNTER — APPOINTMENT (EMERGENCY)
Dept: CT IMAGING | Facility: HOSPITAL | Age: 68
DRG: 399 | End: 2023-10-24
Payer: MEDICARE

## 2023-10-24 DIAGNOSIS — K80.20 CALCULUS OF GALLBLADDER WITHOUT CHOLECYSTITIS WITHOUT OBSTRUCTION: ICD-10-CM

## 2023-10-24 DIAGNOSIS — K35.80 ACUTE APPENDICITIS, UNSPECIFIED ACUTE APPENDICITIS TYPE: ICD-10-CM

## 2023-10-24 DIAGNOSIS — K35.80 ACUTE APPENDICITIS: Primary | ICD-10-CM

## 2023-10-24 LAB
ALBUMIN SERPL BCP-MCNC: 4.3 G/DL (ref 3.5–5)
ALP SERPL-CCNC: 124 U/L (ref 34–104)
ALT SERPL W P-5'-P-CCNC: 34 U/L (ref 7–52)
ANION GAP SERPL CALCULATED.3IONS-SCNC: 13 MMOL/L
AST SERPL W P-5'-P-CCNC: 37 U/L (ref 13–39)
BASOPHILS # BLD AUTO: 0.04 THOUSANDS/ÂΜL (ref 0–0.1)
BASOPHILS NFR BLD AUTO: 0 % (ref 0–1)
BILIRUB SERPL-MCNC: 1.31 MG/DL (ref 0.2–1)
BUN SERPL-MCNC: 10 MG/DL (ref 5–25)
CALCIUM SERPL-MCNC: 10 MG/DL (ref 8.4–10.2)
CHLORIDE SERPL-SCNC: 98 MMOL/L (ref 96–108)
CO2 SERPL-SCNC: 24 MMOL/L (ref 21–32)
CREAT SERPL-MCNC: 0.59 MG/DL (ref 0.6–1.3)
EOSINOPHIL # BLD AUTO: 0.03 THOUSAND/ÂΜL (ref 0–0.61)
EOSINOPHIL NFR BLD AUTO: 0 % (ref 0–6)
ERYTHROCYTE [DISTWIDTH] IN BLOOD BY AUTOMATED COUNT: 13.8 % (ref 11.6–15.1)
GFR SERPL CREATININE-BSD FRML MDRD: 95 ML/MIN/1.73SQ M
GLUCOSE SERPL-MCNC: 97 MG/DL (ref 65–140)
HCT VFR BLD AUTO: 40 % (ref 34.8–46.1)
HGB BLD-MCNC: 13.1 G/DL (ref 11.5–15.4)
IMM GRANULOCYTES # BLD AUTO: 0.06 THOUSAND/UL (ref 0–0.2)
IMM GRANULOCYTES NFR BLD AUTO: 0 % (ref 0–2)
LYMPHOCYTES # BLD AUTO: 0.88 THOUSANDS/ÂΜL (ref 0.6–4.47)
LYMPHOCYTES NFR BLD AUTO: 6 % (ref 14–44)
MCH RBC QN AUTO: 28.6 PG (ref 26.8–34.3)
MCHC RBC AUTO-ENTMCNC: 32.8 G/DL (ref 31.4–37.4)
MCV RBC AUTO: 87 FL (ref 82–98)
MONOCYTES # BLD AUTO: 1.59 THOUSAND/ÂΜL (ref 0.17–1.22)
MONOCYTES NFR BLD AUTO: 10 % (ref 4–12)
NEUTROPHILS # BLD AUTO: 13.15 THOUSANDS/ÂΜL (ref 1.85–7.62)
NEUTS SEG NFR BLD AUTO: 84 % (ref 43–75)
NRBC BLD AUTO-RTO: 0 /100 WBCS
PLATELET # BLD AUTO: 305 THOUSANDS/UL (ref 149–390)
PMV BLD AUTO: 10.1 FL (ref 8.9–12.7)
POTASSIUM SERPL-SCNC: 3.6 MMOL/L (ref 3.5–5.3)
PROT SERPL-MCNC: 7.3 G/DL (ref 6.4–8.4)
RBC # BLD AUTO: 4.58 MILLION/UL (ref 3.81–5.12)
SODIUM SERPL-SCNC: 135 MMOL/L (ref 135–147)
WBC # BLD AUTO: 15.75 THOUSAND/UL (ref 4.31–10.16)

## 2023-10-24 PROCEDURE — 96375 TX/PRO/DX INJ NEW DRUG ADDON: CPT

## 2023-10-24 PROCEDURE — 36415 COLL VENOUS BLD VENIPUNCTURE: CPT

## 2023-10-24 PROCEDURE — 96376 TX/PRO/DX INJ SAME DRUG ADON: CPT

## 2023-10-24 PROCEDURE — 85025 COMPLETE CBC W/AUTO DIFF WBC: CPT

## 2023-10-24 PROCEDURE — 99285 EMERGENCY DEPT VISIT HI MDM: CPT | Performed by: EMERGENCY MEDICINE

## 2023-10-24 PROCEDURE — 99284 EMERGENCY DEPT VISIT MOD MDM: CPT

## 2023-10-24 PROCEDURE — G1004 CDSM NDSC: HCPCS

## 2023-10-24 PROCEDURE — 1123F ACP DISCUSS/DSCN MKR DOCD: CPT | Performed by: EMERGENCY MEDICINE

## 2023-10-24 PROCEDURE — 74177 CT ABD & PELVIS W/CONTRAST: CPT

## 2023-10-24 PROCEDURE — 80053 COMPREHEN METABOLIC PANEL: CPT

## 2023-10-24 RX ORDER — HYDROMORPHONE HCL IN WATER/PF 6 MG/30 ML
0.2 PATIENT CONTROLLED ANALGESIA SYRINGE INTRAVENOUS ONCE
Status: COMPLETED | OUTPATIENT
Start: 2023-10-24 | End: 2023-10-24

## 2023-10-24 RX ADMIN — HYDROMORPHONE HYDROCHLORIDE 0.2 MG: 0.2 INJECTION, SOLUTION INTRAMUSCULAR; INTRAVENOUS; SUBCUTANEOUS at 22:38

## 2023-10-24 RX ADMIN — IOHEXOL 100 ML: 350 INJECTION, SOLUTION INTRAVENOUS at 22:18

## 2023-10-24 RX ADMIN — HYDROMORPHONE HYDROCHLORIDE 0.2 MG: 0.2 INJECTION, SOLUTION INTRAMUSCULAR; INTRAVENOUS; SUBCUTANEOUS at 21:11

## 2023-10-25 ENCOUNTER — ANESTHESIA EVENT (OUTPATIENT)
Dept: PERIOP | Facility: HOSPITAL | Age: 68
DRG: 399 | End: 2023-10-25
Payer: MEDICARE

## 2023-10-25 ENCOUNTER — ANESTHESIA (OUTPATIENT)
Dept: PERIOP | Facility: HOSPITAL | Age: 68
DRG: 399 | End: 2023-10-25
Payer: MEDICARE

## 2023-10-25 LAB
PLATELET # BLD AUTO: 270 THOUSANDS/UL (ref 149–390)
PMV BLD AUTO: 10.1 FL (ref 8.9–12.7)

## 2023-10-25 PROCEDURE — 88304 TISSUE EXAM BY PATHOLOGIST: CPT | Performed by: SPECIALIST

## 2023-10-25 PROCEDURE — 99285 EMERGENCY DEPT VISIT HI MDM: CPT | Performed by: SURGERY

## 2023-10-25 PROCEDURE — 96365 THER/PROPH/DIAG IV INF INIT: CPT

## 2023-10-25 PROCEDURE — 96372 THER/PROPH/DIAG INJ SC/IM: CPT

## 2023-10-25 PROCEDURE — 96367 TX/PROPH/DG ADDL SEQ IV INF: CPT

## 2023-10-25 PROCEDURE — 0DTJ4ZZ RESECTION OF APPENDIX, PERCUTANEOUS ENDOSCOPIC APPROACH: ICD-10-PCS | Performed by: SURGERY

## 2023-10-25 PROCEDURE — 44970 LAPAROSCOPY APPENDECTOMY: CPT | Performed by: SURGERY

## 2023-10-25 PROCEDURE — 36415 COLL VENOUS BLD VENIPUNCTURE: CPT

## 2023-10-25 PROCEDURE — 0FT44ZZ RESECTION OF GALLBLADDER, PERCUTANEOUS ENDOSCOPIC APPROACH: ICD-10-PCS | Performed by: SURGERY

## 2023-10-25 PROCEDURE — 85049 AUTOMATED PLATELET COUNT: CPT

## 2023-10-25 PROCEDURE — 96361 HYDRATE IV INFUSION ADD-ON: CPT

## 2023-10-25 RX ORDER — METOCLOPRAMIDE HYDROCHLORIDE 5 MG/ML
5 INJECTION INTRAMUSCULAR; INTRAVENOUS ONCE AS NEEDED
Status: DISCONTINUED | OUTPATIENT
Start: 2023-10-25 | End: 2023-10-25 | Stop reason: HOSPADM

## 2023-10-25 RX ORDER — ESMOLOL HYDROCHLORIDE 10 MG/ML
INJECTION INTRAVENOUS AS NEEDED
Status: DISCONTINUED | OUTPATIENT
Start: 2023-10-25 | End: 2023-10-25

## 2023-10-25 RX ORDER — FUROSEMIDE 40 MG/1
40 TABLET ORAL DAILY
Status: DISCONTINUED | OUTPATIENT
Start: 2023-10-25 | End: 2023-10-27 | Stop reason: HOSPADM

## 2023-10-25 RX ORDER — SODIUM CHLORIDE, SODIUM LACTATE, POTASSIUM CHLORIDE, CALCIUM CHLORIDE 600; 310; 30; 20 MG/100ML; MG/100ML; MG/100ML; MG/100ML
125 INJECTION, SOLUTION INTRAVENOUS CONTINUOUS
Status: DISCONTINUED | OUTPATIENT
Start: 2023-10-25 | End: 2023-10-26

## 2023-10-25 RX ORDER — FENTANYL CITRATE 50 UG/ML
INJECTION, SOLUTION INTRAMUSCULAR; INTRAVENOUS AS NEEDED
Status: DISCONTINUED | OUTPATIENT
Start: 2023-10-25 | End: 2023-10-25

## 2023-10-25 RX ORDER — METRONIDAZOLE 500 MG/100ML
500 INJECTION, SOLUTION INTRAVENOUS
Status: DISCONTINUED | OUTPATIENT
Start: 2023-10-25 | End: 2023-10-25

## 2023-10-25 RX ORDER — DEXAMETHASONE SODIUM PHOSPHATE 10 MG/ML
INJECTION, SOLUTION INTRAMUSCULAR; INTRAVENOUS AS NEEDED
Status: DISCONTINUED | OUTPATIENT
Start: 2023-10-25 | End: 2023-10-25

## 2023-10-25 RX ORDER — FENTANYL CITRATE/PF 50 MCG/ML
50 SYRINGE (ML) INJECTION
Status: DISCONTINUED | OUTPATIENT
Start: 2023-10-25 | End: 2023-10-25 | Stop reason: HOSPADM

## 2023-10-25 RX ORDER — TIZANIDINE 2 MG/1
4 TABLET ORAL
Status: DISCONTINUED | OUTPATIENT
Start: 2023-10-25 | End: 2023-10-27 | Stop reason: HOSPADM

## 2023-10-25 RX ORDER — METRONIDAZOLE 500 MG/100ML
500 INJECTION, SOLUTION INTRAVENOUS EVERY 8 HOURS
Status: DISCONTINUED | OUTPATIENT
Start: 2023-10-25 | End: 2023-10-25

## 2023-10-25 RX ORDER — MAGNESIUM HYDROXIDE 1200 MG/15ML
LIQUID ORAL AS NEEDED
Status: DISCONTINUED | OUTPATIENT
Start: 2023-10-25 | End: 2023-10-25 | Stop reason: HOSPADM

## 2023-10-25 RX ORDER — MIDAZOLAM HYDROCHLORIDE 2 MG/2ML
INJECTION, SOLUTION INTRAMUSCULAR; INTRAVENOUS AS NEEDED
Status: DISCONTINUED | OUTPATIENT
Start: 2023-10-25 | End: 2023-10-25

## 2023-10-25 RX ORDER — ATORVASTATIN CALCIUM 10 MG/1
10 TABLET, FILM COATED ORAL
Status: DISCONTINUED | OUTPATIENT
Start: 2023-10-25 | End: 2023-10-27 | Stop reason: HOSPADM

## 2023-10-25 RX ORDER — METHIMAZOLE 5 MG/1
5 TABLET ORAL DAILY
Status: DISCONTINUED | OUTPATIENT
Start: 2023-10-25 | End: 2023-10-27 | Stop reason: HOSPADM

## 2023-10-25 RX ORDER — ONDANSETRON 2 MG/ML
INJECTION INTRAMUSCULAR; INTRAVENOUS AS NEEDED
Status: DISCONTINUED | OUTPATIENT
Start: 2023-10-25 | End: 2023-10-25

## 2023-10-25 RX ORDER — PROPRANOLOL HYDROCHLORIDE 20 MG/1
10 TABLET ORAL 2 TIMES DAILY
Status: DISCONTINUED | OUTPATIENT
Start: 2023-10-25 | End: 2023-10-27 | Stop reason: HOSPADM

## 2023-10-25 RX ORDER — PANTOPRAZOLE SODIUM 40 MG/1
40 TABLET, DELAYED RELEASE ORAL
Status: DISCONTINUED | OUTPATIENT
Start: 2023-10-25 | End: 2023-10-27 | Stop reason: HOSPADM

## 2023-10-25 RX ORDER — ONDANSETRON 2 MG/ML
4 INJECTION INTRAMUSCULAR; INTRAVENOUS EVERY 6 HOURS PRN
Status: COMPLETED | OUTPATIENT
Start: 2023-10-25 | End: 2023-10-25

## 2023-10-25 RX ORDER — VALACYCLOVIR HYDROCHLORIDE 500 MG/1
500 TABLET, FILM COATED ORAL DAILY PRN
Status: DISCONTINUED | OUTPATIENT
Start: 2023-10-25 | End: 2023-10-27 | Stop reason: HOSPADM

## 2023-10-25 RX ORDER — PROPOFOL 10 MG/ML
INJECTION, EMULSION INTRAVENOUS AS NEEDED
Status: DISCONTINUED | OUTPATIENT
Start: 2023-10-25 | End: 2023-10-25

## 2023-10-25 RX ORDER — SODIUM CHLORIDE, SODIUM LACTATE, POTASSIUM CHLORIDE, CALCIUM CHLORIDE 600; 310; 30; 20 MG/100ML; MG/100ML; MG/100ML; MG/100ML
125 INJECTION, SOLUTION INTRAVENOUS CONTINUOUS
Status: DISCONTINUED | OUTPATIENT
Start: 2023-10-25 | End: 2023-10-25

## 2023-10-25 RX ORDER — LIDOCAINE HYDROCHLORIDE 10 MG/ML
INJECTION, SOLUTION EPIDURAL; INFILTRATION; INTRACAUDAL; PERINEURAL AS NEEDED
Status: DISCONTINUED | OUTPATIENT
Start: 2023-10-25 | End: 2023-10-25

## 2023-10-25 RX ORDER — KETOROLAC TROMETHAMINE 30 MG/ML
INJECTION, SOLUTION INTRAMUSCULAR; INTRAVENOUS AS NEEDED
Status: DISCONTINUED | OUTPATIENT
Start: 2023-10-25 | End: 2023-10-25

## 2023-10-25 RX ORDER — CEFAZOLIN SODIUM 2 G/50ML
2000 SOLUTION INTRAVENOUS
Status: COMPLETED | OUTPATIENT
Start: 2023-10-25 | End: 2023-10-25

## 2023-10-25 RX ORDER — ONDANSETRON 2 MG/ML
4 INJECTION INTRAMUSCULAR; INTRAVENOUS ONCE AS NEEDED
Status: DISCONTINUED | OUTPATIENT
Start: 2023-10-25 | End: 2023-10-25 | Stop reason: HOSPADM

## 2023-10-25 RX ORDER — HEPARIN SODIUM 5000 [USP'U]/ML
5000 INJECTION, SOLUTION INTRAVENOUS; SUBCUTANEOUS EVERY 8 HOURS SCHEDULED
Status: DISCONTINUED | OUTPATIENT
Start: 2023-10-25 | End: 2023-10-27 | Stop reason: HOSPADM

## 2023-10-25 RX ORDER — SODIUM CHLORIDE, SODIUM LACTATE, POTASSIUM CHLORIDE, CALCIUM CHLORIDE 600; 310; 30; 20 MG/100ML; MG/100ML; MG/100ML; MG/100ML
INJECTION, SOLUTION INTRAVENOUS CONTINUOUS PRN
Status: DISCONTINUED | OUTPATIENT
Start: 2023-10-25 | End: 2023-10-25

## 2023-10-25 RX ORDER — DICYCLOMINE HCL 20 MG
20 TABLET ORAL 3 TIMES DAILY PRN
Status: DISCONTINUED | OUTPATIENT
Start: 2023-10-25 | End: 2023-10-27 | Stop reason: HOSPADM

## 2023-10-25 RX ORDER — DOCOSANOL 100 MG/G
CREAM TOPICAL AS NEEDED
Status: DISCONTINUED | OUTPATIENT
Start: 2023-10-25 | End: 2023-10-27 | Stop reason: HOSPADM

## 2023-10-25 RX ORDER — AMITRIPTYLINE HYDROCHLORIDE 10 MG/1
10 TABLET, FILM COATED ORAL
Status: DISCONTINUED | OUTPATIENT
Start: 2023-10-25 | End: 2023-10-27 | Stop reason: HOSPADM

## 2023-10-25 RX ORDER — HYDROMORPHONE HCL/PF 1 MG/ML
0.5 SYRINGE (ML) INJECTION
Status: DISCONTINUED | OUTPATIENT
Start: 2023-10-25 | End: 2023-10-25 | Stop reason: HOSPADM

## 2023-10-25 RX ORDER — BUPIVACAINE HYDROCHLORIDE 5 MG/ML
INJECTION, SOLUTION EPIDURAL; INTRACAUDAL AS NEEDED
Status: DISCONTINUED | OUTPATIENT
Start: 2023-10-25 | End: 2023-10-25 | Stop reason: HOSPADM

## 2023-10-25 RX ORDER — ROCURONIUM BROMIDE 10 MG/ML
INJECTION, SOLUTION INTRAVENOUS AS NEEDED
Status: DISCONTINUED | OUTPATIENT
Start: 2023-10-25 | End: 2023-10-25

## 2023-10-25 RX ORDER — PREGABALIN 50 MG/1
50 CAPSULE ORAL DAILY
Status: DISCONTINUED | OUTPATIENT
Start: 2023-10-25 | End: 2023-10-27 | Stop reason: HOSPADM

## 2023-10-25 RX ADMIN — SODIUM CHLORIDE, SODIUM LACTATE, POTASSIUM CHLORIDE, AND CALCIUM CHLORIDE: .6; .31; .03; .02 INJECTION, SOLUTION INTRAVENOUS at 08:01

## 2023-10-25 RX ADMIN — AMITRIPTYLINE HYDROCHLORIDE 10 MG: 10 TABLET, FILM COATED ORAL at 01:31

## 2023-10-25 RX ADMIN — HEPARIN SODIUM 5000 UNITS: 5000 INJECTION INTRAVENOUS; SUBCUTANEOUS at 01:35

## 2023-10-25 RX ADMIN — PROPRANOLOL HYDROCHLORIDE 10 MG: 20 TABLET ORAL at 16:00

## 2023-10-25 RX ADMIN — Medication 1000 MG: at 01:40

## 2023-10-25 RX ADMIN — ROCURONIUM BROMIDE 50 MG: 10 INJECTION, SOLUTION INTRAVENOUS at 09:02

## 2023-10-25 RX ADMIN — DEXAMETHASONE SODIUM PHOSPHATE 10 MG: 10 INJECTION, SOLUTION INTRAMUSCULAR; INTRAVENOUS at 09:18

## 2023-10-25 RX ADMIN — ONDANSETRON 4 MG: 2 INJECTION INTRAMUSCULAR; INTRAVENOUS at 09:18

## 2023-10-25 RX ADMIN — ATORVASTATIN CALCIUM 10 MG: 10 TABLET, FILM COATED ORAL at 01:31

## 2023-10-25 RX ADMIN — ESMOLOL HYDROCHLORIDE 30 MG: 100 INJECTION, SOLUTION INTRAVENOUS at 09:38

## 2023-10-25 RX ADMIN — CEFAZOLIN SODIUM 2000 MG: 2 SOLUTION INTRAVENOUS at 08:54

## 2023-10-25 RX ADMIN — FENTANYL CITRATE 100 MCG: 50 INJECTION INTRAMUSCULAR; INTRAVENOUS at 10:30

## 2023-10-25 RX ADMIN — TIZANIDINE 4 MG: 2 TABLET ORAL at 21:31

## 2023-10-25 RX ADMIN — FENTANYL CITRATE 50 MCG: 50 INJECTION INTRAMUSCULAR; INTRAVENOUS at 09:38

## 2023-10-25 RX ADMIN — FUROSEMIDE 40 MG: 40 TABLET ORAL at 14:03

## 2023-10-25 RX ADMIN — AMITRIPTYLINE HYDROCHLORIDE 10 MG: 10 TABLET, FILM COATED ORAL at 21:43

## 2023-10-25 RX ADMIN — PROPOFOL 170 MG: 10 INJECTION, EMULSION INTRAVENOUS at 09:02

## 2023-10-25 RX ADMIN — SODIUM CHLORIDE, SODIUM LACTATE, POTASSIUM CHLORIDE, AND CALCIUM CHLORIDE 125 ML/HR: .6; .31; .03; .02 INJECTION, SOLUTION INTRAVENOUS at 02:30

## 2023-10-25 RX ADMIN — LORAZEPAM 1.5 MG: 1 TABLET ORAL at 21:43

## 2023-10-25 RX ADMIN — KETOROLAC TROMETHAMINE 15 MG: 30 INJECTION, SOLUTION INTRAMUSCULAR at 10:37

## 2023-10-25 RX ADMIN — MIDAZOLAM 2 MG: 1 INJECTION INTRAMUSCULAR; INTRAVENOUS at 08:54

## 2023-10-25 RX ADMIN — ATORVASTATIN CALCIUM 10 MG: 10 TABLET, FILM COATED ORAL at 21:31

## 2023-10-25 RX ADMIN — TIZANIDINE 4 MG: 2 TABLET ORAL at 01:31

## 2023-10-25 RX ADMIN — LIDOCAINE HYDROCHLORIDE 40 MG: 10 INJECTION, SOLUTION EPIDURAL; INFILTRATION; INTRACAUDAL; PERINEURAL at 09:02

## 2023-10-25 RX ADMIN — HEPARIN SODIUM 5000 UNITS: 5000 INJECTION INTRAVENOUS; SUBCUTANEOUS at 17:12

## 2023-10-25 RX ADMIN — FENTANYL CITRATE 50 MCG: 50 INJECTION INTRAMUSCULAR; INTRAVENOUS at 09:02

## 2023-10-25 RX ADMIN — DOCOSANOL: 100 CREAM TOPICAL at 21:41

## 2023-10-25 RX ADMIN — PROPRANOLOL HYDROCHLORIDE 10 MG: 20 TABLET ORAL at 21:45

## 2023-10-25 RX ADMIN — ONDANSETRON 4 MG: 2 INJECTION INTRAMUSCULAR; INTRAVENOUS at 08:22

## 2023-10-25 RX ADMIN — METHIMAZOLE 5 MG: 5 TABLET ORAL at 14:03

## 2023-10-25 RX ADMIN — METRONIDAZOLE 500 MG: 500 INJECTION, SOLUTION INTRAVENOUS at 02:30

## 2023-10-25 RX ADMIN — SODIUM CHLORIDE, SODIUM LACTATE, POTASSIUM CHLORIDE, AND CALCIUM CHLORIDE 125 ML/HR: .6; .31; .03; .02 INJECTION, SOLUTION INTRAVENOUS at 20:14

## 2023-10-25 RX ADMIN — PANTOPRAZOLE SODIUM 40 MG: 40 TABLET, DELAYED RELEASE ORAL at 05:59

## 2023-10-25 RX ADMIN — PREGABALIN 50 MG: 50 CAPSULE ORAL at 21:31

## 2023-10-25 RX ADMIN — VALACYCLOVIR HYDROCHLORIDE 500 MG: 500 TABLET, FILM COATED ORAL at 21:40

## 2023-10-25 NOTE — OP NOTE
OPERATIVE REPORT  PATIENT NAME: Jackie Mehta    :  1955  MRN: [id number]  Pt Location: [facility] OR ROOM 02    SURGERY DATE: 10/25/2023    Surgeon(s) and Role:     * Ayush Conley DO - Primary     * Azalia Grady MD - Juliette Nicole MD - Assisting    Preop Diagnosis:  Acute appendicitis, unspecified acute appendicitis type [K35.80]    Post-Op Diagnosis Codes:     * Acute appendicitis, unspecified acute appendicitis type [K35.80]    Procedure(s):  APPENDECTOMY LAPAROSCOPIC    Specimen(s):  ID Type Source Tests Collected by Time Destination   1 :  Tissue Appendix TISSUE EXAM Ayush Conley DO 10/25/2023 1025        Estimated Blood Loss:   Minimal    Drains:  [REMOVED] Urethral Catheter Latex 16 Fr. (Removed)   Number of days: 0       Anesthesia Type:   General    Operative Indications:  Acute appendicitis, unspecified acute appendicitis type [K35.80]    Operative Findings:  Acute nonperforated appendicitis  No evidence of turbid or purulent fluid in the abdomen  Acute on chronic cholecystitis identified  Surgical removal of appendix with Endoloops x2  Will need elective laparoscopic cholecystectomy    Complications:   None    Procedure and Technique:  Before the procedure, the patient was consented for a laparoscopic appendectomy. The details of the procedure, risks, benefits, and alternatives were discussed with the patient. All questions were appropriately answered. The patient gave her written consent. The patient was brought back to the operating room and was placed in supine position on the operating room table. General anesthesia was induced. A timeout was performed and all parties were in agreement with the above stated procedure. We initially attempted Veress access into the abdomen at Teresa's point, however, we were unsuccessful and then proceeded with General acute hospital cutdown immediately inferior to the umbilicus. We successfully placed a 5mm port and insufflated the abdomen.  From there, the abdomen was inspected for any trochar injury which was negative. We then proceeded with placed of a 5mm port in the LLQ and a 5mm port in the suprapubic region. After port placement, the appendix was identified, and using a combination of blunt dissection and harmonic electrocautery, the base of the appendix was dissected out. Endoloops x2 were used at the base of the appendix. The appendix was then transected at the base using a harmonic. Endocatch bag was then placed into the abdomen, the appendix was removed, and the RLQ/pelvis were inspected for any free fluid which was then appropriately suctioned. We then proceed with inspection of the RUQ and identified what is presumed to be acute on chronic cholecystitis. Finally, the abdomen was desufflated, the periumbilical fascia was closed with 0-Vicryl, and the subcuticular layer of all 3 port sites was closed with 4-0 Monocryl. Exofin surgical glue was applied to each incision. The patient was awoken from general anesthesia and was transported to PACU in a hemodynamically normal condition. Dr. Mayra Jim was present for the entire procedure.     Patient Disposition:  PACU       SIGNATURE: Avila Velazquez MD  DATE: October 25, 2023  TIME: 11:03 AM

## 2023-10-25 NOTE — PLAN OF CARE
Problem: PAIN - ADULT  Goal: Verbalizes/displays adequate comfort level or baseline comfort level  Description: Interventions:  - Encourage patient to monitor pain and request assistance  - Assess pain using appropriate pain scale  - Administer analgesics based on type and severity of pain and evaluate response  - Implement non-pharmacological measures as appropriate and evaluate response  - Consider cultural and social influences on pain and pain management  - Notify physician/advanced practitioner if interventions unsuccessful or patient reports new pain  Outcome: Progressing     Problem: INFECTION - ADULT  Goal: Absence or prevention of progression during hospitalization  Description: INTERVENTIONS:  - Assess and monitor for signs and symptoms of infection  - Monitor lab/diagnostic results  - Monitor all insertion sites, i.e. indwelling lines, tubes, and drains  - Monitor endotracheal if appropriate and nasal secretions for changes in amount and color  - Dalton appropriate cooling/warming therapies per order  - Administer medications as ordered  - Instruct and encourage patient and family to use good hand hygiene technique  - Identify and instruct in appropriate isolation precautions for identified infection/condition  Outcome: Progressing  Goal: Absence of fever/infection during neutropenic period  Description: INTERVENTIONS:  - Monitor WBC    Outcome: Progressing     Problem: DISCHARGE PLANNING  Goal: Discharge to home or other facility with appropriate resources  Description: INTERVENTIONS:  - Identify barriers to discharge w/patient and caregiver  - Arrange for needed discharge resources and transportation as appropriate  - Identify discharge learning needs (meds, wound care, etc.)  - Arrange for interpretive services to assist at discharge as needed  - Refer to Case Management Department for coordinating discharge planning if the patient needs post-hospital services based on physician/advanced practitioner order or complex needs related to functional status, cognitive ability, or social support system  Outcome: Progressing     Problem: Knowledge Deficit  Goal: Patient/family/caregiver demonstrates understanding of disease process, treatment plan, medications, and discharge instructions  Description: Complete learning assessment and assess knowledge base.   Interventions:  - Provide teaching at level of understanding  - Provide teaching via preferred learning methods  Outcome: Progressing

## 2023-10-25 NOTE — ANESTHESIA PREPROCEDURE EVALUATION
Procedure:  APPENDECTOMY LAPAROSCOPIC (Abdomen)    Relevant Problems   CARDIO   (+) Benign essential HTN      ENDO   (+) Hyperthyroidism      MUSCULOSKELETAL   (+) Arthritis of right acromioclavicular joint   (+) Lumbar degenerative disc disease   (+) Myofascial pain syndrome   (+) Primary osteoarthritis of right knee      NEURO/PSYCH   (+) Anxiety   (+) Myofascial pain syndrome   (+) Pain syndrome, chronic        Physical Exam    Airway    Mallampati score: II  TM Distance: >3 FB  Neck ROM: full     Dental   No notable dental hx     Cardiovascular  Rate: normal    Pulmonary  Pulmonary exam normal     Other Findings  LEFT VENTRICLE:  Systolic function was normal. Ejection fraction was estimated to be 60 %. There were no regional wall motion abnormalities. LEFT ATRIUM:  The atrium was mildly dilated. RIGHT ATRIUM:  The atrium was mildly dilated. MITRAL VALVE:  There was mild annular calcification. There was mild regurgitation. AORTIC VALVE:  There was mild regurgitation. TRICUSPID VALVE:  There was mild regurgitation. Anesthesia Plan  ASA Score- 2     Anesthesia Type- general with ASA Monitors. Additional Monitors:     Airway Plan: ETT. Plan Factors-Exercise tolerance (METS): >4 METS. Chart reviewed. EKG reviewed. Imaging results reviewed. Existing labs reviewed. Patient summary reviewed. Patient is not a current smoker. Patient not instructed to abstain from smoking on day of procedure. Patient did not smoke on day of surgery. Obstructive sleep apnea risk education given perioperatively. Induction- intravenous. Postoperative Plan-     Informed Consent- Anesthetic plan and risks discussed with patient. I personally reviewed this patient with the CRNA. Discussed and agreed on the Anesthesia Plan with the CRNA. Irais Doyle

## 2023-10-25 NOTE — QUICK NOTE
Post Op Check:    Patient resting comfortably in bed. Expressed concerns that regular diet would cause bloating and discomfort, encourage to eat as tolerated. Patients pain is well controlled mainly jesus manuel-incisional. Pt  denies N/V. No flatus or bm. Voiding freely. Temp:  [97.8 °F (36.6 °C)-99.7 °F (37.6 °C)] 97.9 °F (36.6 °C)  HR:  [] 92  Resp:  [12-18] 18  BP: (117-210)/() 173/77    Physical Exam:  General: No acute distress, alert and oriented  CV: Well perfused, regular rate  Lungs: Normal work of breathing, no increased respiratory effort  Abdomen: Soft, appropriately tender, non-distended. Incision(s) clean, dry and intact.   Extremities: No edema, clubbing or cyanosis  Skin: Warm, dry    Aldair Yoo MD  PGY-1   10/25/23

## 2023-10-25 NOTE — H&P
H&P - General Surgery  Jeremiah Gonzalez 79 y.o. female MRN: [id number]  Unit/Bed#: ED-30 Encounter: [id number]      Assessment:  68F with acute appendicitis. Plan:  - admit to general surgery  - [email or web address]  - ceftriaxone/flagyl  - lap appy 10/25    HPI:  Jeremiah Gonzalez is a 79 y.o. female with PMHx of herpes, SIADH, thyroid disease, HTN, anxiety, lumbar degenerative disc disease, sacroiliitis, 2016 thoracic spinal cord stimulator in L buttock, 2022 spine surgery, who presents with postprandial epigastric pain, bloating, and lower abdominal pain. Started 1 month ago. Usually lasts 30 minutes but this episode today at 4pm was severe, dull, and constant. Started in her epigastric region and moved down to lower abdomen. Denies fever, SOB, n/v. Last ate cookies at 3pm today. Last BM today this afternoon, normal stool. Hypertensive to 210/80, now 150/80. Tachy to 112. Other VSS on RA. Meds (PTA): Amitriptyline, atorvastatin, cetirizine, dicyclomine, furosemide, lisinopril, lorazepam, methimazole, pregabalin, propranolol, tizanidine, esomeprazole  Results:  Recent Labs     10/24/23  2113   WBC 15.75*   HGB 13.1      SODIUM 135   K 3.6   CL 98   CO2 24   BUN 10   CREATININE 0.59*   GLUC 97   CALCIUM 10.0   AST 37   ALT 34   ALKPHOS 124*   TBILI 1.31*   84% neutrophils    10/24/23 CT ap: dilated appendix 1cm in diameter with 3mm appendicolith at base with surrounding inflamm compatible with acute appendicitis. Gallstones, without pericholecystic inflamm changes. Unremarkable biliary tree. Physical Exam  Constitutional:       General: She is not in acute distress. HENT:      Head: Normocephalic and atraumatic. Eyes:      Extraocular Movements: Extraocular movements intact. Conjunctiva/sclera: Conjunctivae normal.   Cardiovascular:      Rate and Rhythm: Normal rate. Pulses: Normal pulses. Pulmonary:      Effort: Pulmonary effort is normal. No respiratory distress.    Abdominal: General: There is no distension. Palpations: Abdomen is soft. Tenderness: There is abdominal tenderness (RLQ > RUQ. Negative Rovsing's, psoas, obturator. Negative murphys). Musculoskeletal:         General: Normal range of motion. Cervical back: Normal range of motion. Skin:     General: Skin is warm and dry. Neurological:      General: No focal deficit present. Mental Status: She is alert and oriented to person, place, and time. Psychiatric:         Mood and Affect: Mood normal.         Review of Systems   Constitutional:  Positive for chills. Negative for fever. HENT:  Negative for ear pain and sore throat. Eyes:  Negative for pain and visual disturbance. Respiratory:  Negative for cough and shortness of breath. Cardiovascular:  Negative for chest pain and palpitations. Gastrointestinal:  Positive for abdominal distention and abdominal pain (starting in epigastric and moving down). Negative for nausea and vomiting. Genitourinary:  Negative for dysuria and hematuria. Musculoskeletal:  Negative for arthralgias and back pain. Skin:  Negative for color change and rash. Neurological:  Negative for seizures and syncope. All other systems reviewed and are negative.       Historical Information   Past Medical History:   Diagnosis Date    Allergic     Anxiety     Asymptomatic premature menopause 5/11/2021    Bilateral leg edema 9/17/2021    Cataract, bilateral     last assessed    Cervical radiculopathy     Chronic right shoulder pain     COVID-19 05/15/2022    Disc disease, degenerative, cervical     Disease of thyroid gland     Encounter for gynecological examination (general) (routine) without abnormal findings 2/8/2021    Herpes simplex infection     Hypertension     Insomnia     Long term systemic steroid user 5/11/2021    Low back pain     Lumbar degenerative disc disease     Lumbar facet arthropathy     Lumbar radiculopathy     Lumbar stenosis without neurogenic claudication     Mononucleosis     Myofascial pain dysfunction syndrome     Obesity, morbid ([address]) 7/12/2022    Osteoarthritis     shoulder region - unspecified laterality - last assessed 2/1/14    Plantar fasciitis     Poison ivy dermatitis 6/1/2023    Ptosis, both eyelids     last assessed 10/6/16    Ptosis, congenital, left     Retinal detachment     last assessed 12/11/14 right eye    Sacroiliitis (HCC)     SIADH (syndrome of inappropriate ADH production) ([address]) 12/30/2022    Thyroid disease     Thyrotoxicosis     last assessed 5/17/13    Vertigo     Weight gain 12/1/2022     Past Surgical History:   Procedure Laterality Date    EYE SURGERY      IL RAYMUNDO IMPLTJ NSTIM ELTRDS PLATE/PADDLE EDRL Left 01/26/2016    Procedure: PLACEMENT OF THORACIC SPINAL CORD STIMULATOR WITH LEFT BUTTOCK IMPLANTABLE PULSE GENERATOR (IMPULSE MONITORING); Surgeon: Robin Love MD;  Location: [facility];  Service: Neurosurgery    RETINAL DETACHMENT SURGERY Right     SPINE SURGERY  Dec 22, 2022     Social History   Social History     Substance and Sexual Activity   Alcohol Use Not Currently    Alcohol/week: 1.0 standard drink of alcohol    Types: 1 Glasses of wine per week     Social History     Substance and Sexual Activity   Drug Use No     Social History     Tobacco Use   Smoking Status Never   Smokeless Tobacco Never       Meds/Allergies   all current active meds have been reviewed  Allergies   Allergen Reactions    Other Dermatitis     Adhesive tape - please use ONLY PAPER TAPE    Scopolamine Other (See Comments)     Severe, debilitating headache      Flexeril [Cyclobenzaprine] Dizziness and Fatigue    Naproxen      Other reaction(s): Unknown Allergic Reaction  Annotation - [id number]: nightmares    Oxycodone Other (See Comments)     Night olsen     Penicillins      Other reaction(s): Unknown Allergic Reaction  Annotation - [id number]: childhood reaction    Promethazine-Codeine      Reaction Date: [id number];  Annotation - [id number]: nightmares; Annotation - [id number]: nightmares    Tramadol      Other reaction(s): Unknown Allergic Reaction  Annotation - [id number]: PT HAS NIGHTMARES FROM TRAMADOL    Medical Tape Rash    Wound Dressing Adhesive Rash       Objective   First Vitals:   Blood Pressure: 159/67 (10/24/23 1940)  Pulse: 92 (10/24/23 1940)  Temperature: 97.8 °F (36.6 °C) (10/24/23 1940)  Temp Source: Oral (10/24/23 1940)  Respirations: 18 (10/24/23 1940)  SpO2: 100 % (10/24/23 1940)    Current Vitals:   Blood Pressure: 148/81 (10/24/23 2245)  Pulse: (!) 112 (10/24/23 2245)  Temperature: 97.8 °F (36.6 °C) (10/24/23 1940)  Temp Source: Oral (10/24/23 1940)  Respirations: 18 (10/24/23 2245)  SpO2: 96 % (10/24/23 2245)    No intake or output data in the 24 hours ending 10/24/23 2316    Invasive Devices       Peripheral Intravenous Line  Duration             Peripheral IV 10/24/23 Right;Ventral (anterior) Forearm <1 day                    Lab Results: I have personally reviewed pertinent lab results. Imaging: I have personally reviewed pertinent reports. EKG, Pathology, and Other Studies: I have personally reviewed pertinent reports. Counseling / Coordination of Care  Total floor / unit time spent today 30 minutes. Greater than 50% of total time was spent with the patient and / or family counseling and / or coordination of care.

## 2023-10-25 NOTE — ED PROVIDER NOTES
History  Chief Complaint   Patient presents with    Abdominal Pain     Patient presents for abdominal pain in her upper quadrants, and back. States she is distended, had to unbutton her pants. Just saw GI doctor yesterday and was given bentyl. Patient had two BM today that were normal consistency. (Mtat Benavides) Matt Benavides is a 79 y.o. female who identifies as female    They presented to the emergency department on October 24, 2023. Patient presents with:  Abdominal Pain: Patient presents for abdominal pain in her upper quadrants, and back. States she is distended, had to unbutton her pants. Just saw GI doctor yesterday and was given bentyl. Patient had two BM today that were normal consistency. .    The patient states that she has had intermittent epigastric and lower abdominal pain since September. Today around 4 PM while she was driving she started having epigastric pain that then radiated to her lower abdominal area. Patient stated she saw her PCP yesterday and was given Bentyl for abdominal pain which she has been taking to no help. Of note patient reported that she had a abdominal CT done on 12/22/2022 that showed cholelithiasis and diverticulosis. Patient denies nausea, vomiting, diarrhea, shortness of breath, chest pain, urinary symptoms, or any other complaint at this time. Patient seen at bedside uncomfortable due to abdominal pain,  at bedside.       Allergies include:   -- Other -- Dermatitis    --  Adhesive tape - please use ONLY PAPER TAPE   -- Scopolamine -- Other (See Comments)    --  Severe, debilitating headache   -- Flexeril [Cyclobenzaprine] -- Dizziness and Fatigue   -- Naproxen     --  Other reaction(s): Unknown Allergic Reaction             Annotation - [id number]: nightmares   -- Oxycodone -- Other (See Comments)    --  Night olsen   -- Penicillins     --  Other reaction(s): Unknown Allergic Reaction             Annotation - [id number]: childhood reaction   -- Promethazine-Codeine     --  Reaction Date: [id number]; Annotation - [id number]:             nightmares; Annotation - [id number]: nightmares   -- Tramadol     --  Other reaction(s): Unknown Allergic Reaction             Annotation - [id number]: PT HAS NIGHTMARES FROM             TRAMADOL   -- Medical Tape -- Rash   -- Wound Dressing Adhesive -- Rash      Immunizations:    Immunization History  Administered            Date(s) Administered    COVID-19 PFIZER VACCINE 0.3 ML IM                          02/19/2021  03/12/2021  10/25/2021      INFLUENZA             10/14/2022      Influenza Quadrivalent Preservative Free 3 years and older IM                          01/08/2018      Influenza Quadrivalent, 6-35 Months IM                          11/14/2016      Influenza, high dose seasonal 0.7 mL                          10/15/2022  10/04/2023      Influenza, injectable, quadrivalent, preservative free 0.5 mL                          10/15/2018  09/15/2020      Influenza, recombinant, quadrivalent,injectable, preservative free                          11/06/2019 01/18/2022      Influenza, seasonal, injectable                          02/27/2011 11/12/2013 12/11/2014 11/02/2015      Pneumococcal Conjugate Vaccine 20-valent (Pcv20), Polysace                          07/12/2022      Td (adult), adsorbed  12/30/2013      Zoster Vaccine Recombinant                          09/15/2020  01/02/2021    Immunizations Reviewed. Prior to Admission Medications   Prescriptions Last Dose Informant Patient Reported? Taking?    Ascorbic Acid (VITAMIN C PO)  Self Yes No   Sig: Take 1 tablet by mouth daily   [facility] Diclofenac Sodium 1 %  Self No No   Sig: APPLY 2 GRAMS TO AFFECTED AREA 4 TIMES A DAY   Cetirizine HCl (ZYRTEC PO)  Self Yes No   Sig: Take by mouth as needed   Cholecalciferol 50 MCG (2000 UT) CAPS  Self Yes No   Sig: Take 1 capsule by mouth every morning   Esomeprazole Magnesium (NEXIUM PO)  Self Yes No   Sig: Take 1 capsule by mouth daily as needed    Patient not taking: Reported on 2023   LORazepam (ATIVAN) 1 mg tablet  Self No No   Sig: Take 1.5 tablets (1.5 mg total) by mouth daily at bedtime as needed for anxiety   POTASSIUM PO  Self Yes No   Sig: Take 1 tablet by mouth in the morning   Polyethyl Glycol-Propyl Glycol (SYSTANE OP)  Self Yes No   Sig: Apply to eye if needed   Probiotic Product (PROBIOTIC PO)  Self Yes No   Sig: Take 1 capsule by mouth every morning   RESTASIS 0.05 % ophthalmic emulsion  Self Yes No   Sig: Administer 1 drop to both eyes 2 (two) times a day   amitriptyline (ELAVIL) 10 mg tablet  Self No No   Sig: Take 1 tablet (10 mg total) by mouth daily at bedtime   atorvastatin (LIPITOR) 10 mg tablet  Self No No   Sig: Take 1 tablet (10 mg total) by mouth daily at bedtime   diclofenac (VOLTAREN) 75 mg EC tablet  Self No No   Sig: Take 1 tablet (75 mg total) by mouth 2 (two) times a day   dicyclomine (BENTYL) 20 mg tablet   No No   Sig: Take 1 tablet (20 mg total) by mouth 3 (three) times a day as needed (abdominal pain)   fluticasone (FLONASE) 50 mcg/act nasal spray  Self No No   Si spray into each nostril daily   Patient taking differently: 1 spray into each nostril as needed   furosemide (LASIX) 40 mg tablet  Self No No   Sig: Take 1 tablet (40 mg total) by mouth daily   lisinopril (ZESTRIL) 20 mg tablet  Self No No   Sig: Take 1 tablet (20 mg total) by mouth 2 (two) times a day   methimazole (TAPAZOLE) 5 mg tablet  Self No No   Sig: Take 1 tablet (5 mg total) by mouth daily   multivitamin (THERAGRAN) TABS  Self Yes No   Sig: Take 1 tablet by mouth every morning   penciclovir (Denavir) 1 % cream  Self No No   Sig: Apply topically daily as needed (ulcer)   pregabalin (LYRICA) 50 mg capsule  Self No No   Sig: Take 1 capsule (50 mg total) by mouth 2 (two) times a day   Patient taking differently: Take 50 mg by mouth daily   propranolol (INDERAL) 10 mg tablet  Self No No   Sig: Take 1 tablet (10 mg total) by mouth 2 (two) times a day   tiZANidine (ZANAFLEX) 4 mg tablet  Self No No   Sig: Take 1 tablet in the a.m., 1 tablet in the p.m. and 2 tablets at bedtime   Patient taking differently: Take 4 mg by mouth daily at bedtime   timolol (TIMOPTIC) 0.5 % ophthalmic solution  Self Yes No   Sig: INSTILL 1 DROP INTO RIGHT EYE EVERY MORNING   valACYclovir (VALTREX) 500 mg tablet  Self No No   Sig: Take 1 tablet (500 mg total) by mouth daily as needed (Cold Sores)      Facility-Administered Medications: None       Past Medical History:   Diagnosis Date    Allergic     Anxiety     Asymptomatic premature menopause 5/11/2021    Bilateral leg edema 9/17/2021    Cataract, bilateral     last assessed    Cervical radiculopathy     Chronic right shoulder pain     COVID-19 05/15/2022    Disc disease, degenerative, cervical     Disease of thyroid gland     Encounter for gynecological examination (general) (routine) without abnormal findings 2/8/2021    Herpes simplex infection     Hypertension     Insomnia     Long term systemic steroid user 5/11/2021    Low back pain     Lumbar degenerative disc disease     Lumbar facet arthropathy     Lumbar radiculopathy     Lumbar stenosis without neurogenic claudication     Mononucleosis     Myofascial pain dysfunction syndrome     Obesity, morbid ([address]) 7/12/2022    Osteoarthritis     shoulder region - unspecified laterality - last assessed 2/1/14    Plantar fasciitis     Poison ivy dermatitis 6/1/2023    Ptosis, both eyelids     last assessed 10/6/16    Ptosis, congenital, left     Retinal detachment     last assessed 12/11/14 right eye    Sacroiliitis (HCC)     SIADH (syndrome of inappropriate ADH production) ([address]) 12/30/2022    Thyroid disease     Thyrotoxicosis     last assessed 5/17/13    Vertigo     Weight gain 12/1/2022       Past Surgical History:   Procedure Laterality Date    EYE SURGERY      ID RAYMUNDO IMPLTJ NSTIM ELTRDS PLATE/PADDLE EDRL Left 01/26/2016 Procedure: PLACEMENT OF THORACIC SPINAL CORD STIMULATOR WITH LEFT BUTTOCK IMPLANTABLE PULSE GENERATOR (IMPULSE MONITORING); Surgeon: Darlene Campa MD;  Location: [facility];  Service: Neurosurgery    RETINAL DETACHMENT SURGERY Right     SPINE SURGERY  Dec 22, 2022       Family History   Problem Relation Age of Onset    Breast cancer Mother     COPD Mother     Hypertension Mother         essential    Cancer Mother     Glaucoma Mother     Heart attack Father         acute MI    Emphysema Father     Hypertension Father         essential    Other Father         prehypertension    COPD Father     No Known Problems Maternal Grandmother     No Known Problems Maternal Grandfather     No Known Problems Paternal Grandmother     No Known Problems Paternal Grandfather     No Known Problems Sister     No Known Problems Son     No Known Problems Sister     No Known Problems Paternal Aunt     No Known Problems Paternal Aunt     No Known Problems Paternal Aunt     No Known Problems Paternal Aunt     No Known Problems Paternal Aunt     Liver cancer Maternal Uncle      I have reviewed and agree with the history as documented. E-Cigarette/Vaping    E-Cigarette Use Never User      E-Cigarette/Vaping Substances    Nicotine No     THC No     CBD No     Flavoring No     Other No     Unknown No      Social History     Tobacco Use    Smoking status: Never    Smokeless tobacco: Never   Vaping Use    Vaping Use: Never used   Substance Use Topics    Alcohol use: Not Currently     Alcohol/week: 1.0 standard drink of alcohol     Types: 1 Glasses of wine per week    Drug use: No        Review of Systems   Constitutional:  Negative for chills and fever. HENT:  Negative for ear pain and sore throat. Eyes:  Negative for pain and visual disturbance. Respiratory:  Negative for cough and shortness of breath. Cardiovascular:  Negative for chest pain and palpitations. Gastrointestinal:  Positive for abdominal pain.  Negative for abdominal distention, diarrhea, nausea and vomiting. Genitourinary:  Negative for dysuria and hematuria. Musculoskeletal:  Positive for back pain (Chronic). Negative for arthralgias. Skin:  Negative for color change and rash. Neurological:  Negative for seizures and syncope. All other systems reviewed and are negative. Physical Exam  ED Triage Vitals   Temperature Pulse Respirations Blood Pressure SpO2   10/24/23 1940 10/24/23 1940 10/24/23 1940 10/24/23 1940 10/24/23 1940   97.8 °F (36.6 °C) 92 18 159/67 100 %      Temp Source Heart Rate Source Patient Position - Orthostatic VS BP Location FiO2 (%)   10/24/23 1940 10/24/23 1940 10/24/23 1940 10/24/23 1940 --   Oral Monitor Sitting Right arm       Pain Score       10/24/23 2111       10 - Worst Possible Pain             Orthostatic Vital Signs  Vitals:    10/24/23 2018 10/24/23 2137 10/24/23 2245 10/24/23 2345   BP: (!) 210/81 (!) 186/76 148/81 151/76   Pulse: 95 98 (!) 112 (!) 114   Patient Position - Orthostatic VS: Sitting Sitting Sitting Sitting       Physical Exam  Vitals and nursing note reviewed. Constitutional:       General: She is not in acute distress. Appearance: She is well-developed. Comments: Patient looks uncomfortable due to pain   HENT:      Head: Normocephalic and atraumatic. Eyes:      Conjunctiva/sclera: Conjunctivae normal.   Cardiovascular:      Rate and Rhythm: Normal rate and regular rhythm. Heart sounds: No murmur heard. Pulmonary:      Effort: Pulmonary effort is normal. No respiratory distress. Breath sounds: Normal breath sounds. Abdominal:      General: Abdomen is flat. Bowel sounds are normal. There is no distension or abdominal bruit. Palpations: Abdomen is soft. Tenderness: There is abdominal tenderness in the right lower quadrant, suprapubic area and left lower quadrant. There is no right CVA tenderness or left CVA tenderness. Negative signs include Bruce's sign.           Comments: Tenderness present on palpation of the right lower quadrant of the abdomen. Musculoskeletal:         General: No swelling. Cervical back: Neck supple. Skin:     General: Skin is warm and dry. Capillary Refill: Capillary refill takes less than 2 seconds. Neurological:      Mental Status: She is alert.    Psychiatric:         Mood and Affect: Mood normal.         ED Medications  Medications   amitriptyline (ELAVIL) tablet 10 mg (has no administration in time range)   atorvastatin (LIPITOR) tablet 10 mg (has no administration in time range)   dicyclomine (BENTYL) tablet 20 mg (has no administration in time range)   furosemide (LASIX) tablet 40 mg (has no administration in time range)   LORazepam (ATIVAN) tablet 1.5 mg (has no administration in time range)   methimazole (TAPAZOLE) tablet 5 mg (has no administration in time range)   pregabalin (LYRICA) capsule 50 mg (has no administration in time range)   propranolol (INDERAL) tablet 10 mg (has no administration in time range)   tiZANidine (ZANAFLEX) tablet 4 mg (has no administration in time range)   pantoprazole (PROTONIX) EC tablet 40 mg (has no administration in time range)   lactated ringers infusion (has no administration in time range)   heparin (porcine) subcutaneous injection 5,000 Units (has no administration in time range)   ceftriaxone (ROCEPHIN) 1 g/50 mL in dextrose IVPB (has no administration in time range)   metroNIDAZOLE (FLAGYL) IVPB (premix) 500 mg 100 mL (has no administration in time range)   ceFAZolin (ANCEF) IVPB (premix in dextrose) 2,000 mg 50 mL (has no administration in time range)   metroNIDAZOLE (FLAGYL) IVPB (premix) 500 mg 100 mL (has no administration in time range)   HYDROmorphone HCl (DILAUDID) injection 0.2 mg (0.2 mg Intravenous Given 10/24/23 2111)   HYDROmorphone HCl (DILAUDID) injection 0.2 mg (0.2 mg Intravenous Given 10/24/23 2238)   iohexol (OMNIPAQUE) 350 MG/ML injection (MULTI-DOSE) 100 mL (100 mL Intravenous Given 10/24/23 2218)       Diagnostic Studies  Results Reviewed       Procedure Component Value Units Date/Time    Platelet count [[id number]]     Lab Status: No result Specimen: Blood     Comprehensive metabolic panel [[id number]]  (Abnormal) Collected: 10/24/23 2113    Lab Status: Final result Specimen: Blood from Arm, Right Updated: 10/24/23 2143     Sodium 135 mmol/L      Potassium 3.6 mmol/L      Chloride 98 mmol/L      CO2 24 mmol/L      ANION GAP 13 mmol/L      BUN 10 mg/dL      Creatinine 0.59 mg/dL      Glucose 97 mg/dL      Calcium 10.0 mg/dL      AST 37 U/L      ALT 34 U/L      Alkaline Phosphatase 124 U/L      Total Protein 7.3 g/dL      Albumin 4.3 g/dL      Total Bilirubin 1.31 mg/dL      eGFR 95 ml/min/1.73sq m     Narrative:      [facility] ([facility]D):     Stage 1 with normal or high GFR (GFR > 90 mL/min/1.73 square meters)    Stage 2 Mild CKD (GFR = 60-89 mL/min/1.73 square meters)    Stage 3A Moderate CKD (GFR = 45-59 mL/min/1.73 square meters)    Stage 3B Moderate CKD (GFR = 30-44 mL/min/1.73 square meters)    Stage 4 Severe CKD (GFR = 15-29 mL/min/1.73 square meters)    Stage 5 End Stage CKD (GFR <15 mL/min/1.73 square meters)  Note: GFR calculation is accurate only with a steady state creatinine    CBC and differential [[id number]]  (Abnormal) Collected: 10/24/23 2113    Lab Status: Final result Specimen: Blood from Arm, Right Updated: 10/24/23 2123     WBC 15.75 Thousand/uL      RBC 4.58 Million/uL      Hemoglobin 13.1 g/dL      Hematocrit 40.0 %      MCV 87 fL      MCH 28.6 pg      MCHC 32.8 g/dL      RDW 13.8 %      MPV 10.1 fL      Platelets 170 Thousands/uL      nRBC 0 /100 WBCs      Neutrophils Relative 84 %      Immat GRANS % 0 %      Lymphocytes Relative 6 %      Monocytes Relative 10 %      Eosinophils Relative 0 %      Basophils Relative 0 %      Neutrophils Absolute 13.15 Thousands/µL      Immature Grans Absolute 0.06 Thousand/uL Lymphocytes Absolute 0.88 Thousands/µL      Monocytes Absolute 1.59 Thousand/µL      Eosinophils Absolute 0.03 Thousand/µL      Basophils Absolute 0.04 Thousands/µL                    CT abdomen pelvis with contrast   Final Result by Shilpi Doyle MD (10/24 2259)      The appendix is dilated measuring 1 cm in diameter with 3 mm appendicolith at the base with surrounding inflammatory change compatible with acute appendicitis. I personally discussed this study with [address] [facility] on 10/24/2023 10:59 PM.            Workstation performed: [id number]               Procedures  Procedures      ED Course  ED Course as of 10/25/23 0038   Tue Oct 24, 2023   216 61-year-old female presented for evaluation of epigastric abdominal pain radiating to the lower abdominal area since 4 PM not getting better. 2057 Ordered CBC CMP CT abdomen pelvis , gave Dilaudid for pain   2153 CBC and differential(!)  Elevated white count of 15.75 from a baseline of 6. This could be in the setting of an infectious etiology. 2153 Comprehensive metabolic panel(!)  Within normal limits. No SHERRY , normal anion gap . Elevated alk phos and T. bili which is at patient baseline   2156 Alkaline Phosphatase(!): 124  Elevated. Chronically in the 130s   2156 TOTAL BILIRUBIN(!): 1.31  Elevated. Chronically and 1.3 range   2212 Checked on patient after Dilaudid, patient still complaining of pain, second dose of Dilaudid given   2259 CT abdomen pelvis with contrast  The appendix is dilated measuring 1 cm in diameter with 3 mm appendicolith at the base with surrounding inflammatory change compatible with acute appendicitis. 18 Consulted surgery based on CT scan findings regarding possible appendectomy   Wed Oct 25, 2023   0028 After evaluation by surgery.   Patient was admitted by surgical team for appendectomy                                       Medical Decision Making  Patient presents with:  Abdominal Pain: Patient presents for abdominal pain in her upper quadrants, and back. States she is distended, had to unbutton her pants. Just saw GI doctor yesterday and was given bentyl. Patient had two BM today that were normal consistency. Patient seen and examined noted to have lower abdominal pain. Temp:  [97.8 °F (36.6 °C)] 97.8 °F (36.6 °C)  HR:  [] 114  Resp:  [18] 18  BP: (148-210)/(67-81) 151/76    Differential diagnosis includes but is not limited to appendicitis, diverticulitis, colitis, small bowel obstruction among others.       Due to patient's history and presentation the following laboratory evidence was collected:  Recent Results (from the past 12 hour(s))  -CBC and differential:   Collection Time: 10/24/23  9:13 PM       Result                      Value             Ref Range           WBC                         15.75 (H)         4.31 - 10.16*       RBC                         4.58              3.81 - 5.12 *       Hemoglobin                  13.1              11.5 - 15.4 *       Hematocrit                  40.0              34.8 - 46.1 %       MCV                         87                82 - 98 fL          MCH                         28.6              26.8 - 34.3 *       MCHC                        32.8              31.4 - 37.4 *       RDW                         13.8              11.6 - 15.1 %       MPV                         10.1              8.9 - 12.7 fL       Platelets                   305               149 - 390 Th*       nRBC                        0                 /100 WBCs           Neutrophils Relative        84 (H)            43 - 75 %           Immat GRANS %               0                 0 - 2 %             Lymphocytes Relative        6 (L)             14 - 44 %           Monocytes Relative          10                4 - 12 %            Eosinophils Relative        0                 0 - 6 %             Basophils Relative          0                 0 - 1 %             Neutrophils Absolute 13.15 (H)         1.85 - 7.62 *       Immature Grans Absolute     0.06              0.00 - 0.20 *       Lymphocytes Absolute        0.88              0.60 - 4.47 *       Monocytes Absolute          1.59 (H)          0.17 - 1.22 *       Eosinophils Absolute        0.03              0.00 - 0.61 *       Basophils Absolute          0.04              0.00 - 0.10 *  -Comprehensive metabolic panel:   Collection Time: 10/24/23  9:13 PM       Result                      Value             Ref Range           Sodium                      135               135 - 147 mm*       Potassium                   3.6               3.5 - 5.3 mm*       Chloride                    98                96 - 108 mmo*       CO2                         24                21 - 32 mmol*       ANION GAP                   13                mmol/L              BUN                         10                5 - 25 mg/dL        Creatinine                  0.59 (L)          0.60 - 1.30 *       Glucose                     97                65 - 140 mg/*       Calcium                     10.0              8.4 - 10.2 m*       AST                         37                13 - 39 U/L         ALT                         34                7 - 52 U/L          Alkaline Phosphatase        124 (H)           34 - 104 U/L        Total Protein               7.3               6.4 - 8.4 g/*       Albumin                     4.3               3.5 - 5.0 g/*       Total Bilirubin             1.31 (H)          0.20 - 1.00 *       eGFR                        95                ml/min/1.73s*    Due to patient's history and presentation the following imaging was collected:  CT abdomen pelvis with contrast   Final Result        The appendix is dilated measuring 1 cm in diameter with 3 mm appendicolith at the base with surrounding inflammatory change compatible with acute appendicitis.         I personally discussed this study with Sami Sanchez on 10/24/2023 10:59 PM. Workstation performed: [id number]      Patient was administered:      Medication Administration - last 24 hours from 10/24/2023 0032 to   10/25/2023 0032       Date/Time Order Dose Route Action Action by     10/24/2023 2111 EDT HYDROmorphone HCl (DILAUDID) injection 0.2 mg 0.2 mg   Intravenous Given Ritesh Baron RN     10/24/2023 2238 EDT HYDROmorphone HCl (DILAUDID) injection 0.2 mg 0.2 mg   Intravenous Given Keyla Morales RN     10/24/2023 2218 EDT iohexol (OMNIPAQUE) 350 MG/ML injection (MULTI-DOSE)   100 mL 100 mL Intravenous Given Angelica Magaña. Discussed patient's case with patient surgical team regarding admission who accepted the patient for further evaluation and management. Patient admitted to surgery for appendectomy. Amount and/or Complexity of Data Reviewed  Labs: ordered. Decision-making details documented in ED Course. Radiology: ordered. Decision-making details documented in ED Course. Risk  Prescription drug management. Decision regarding hospitalization. Disposition  Final diagnoses:   Acute appendicitis     Time reflects when diagnosis was documented in both MDM as applicable and the Disposition within this note       Time User Action Codes Description Comment    10/25/2023 12:26 AM Jaswant Boucher Add [K35.80] Acute appendicitis, unspecified acute appendicitis type     10/25/2023 12:30 AM Candice VEGA Add [K35.80] Acute appendicitis     10/25/2023 12:35 AM Rubi Marie Modify [K35.80] Acute appendicitis, unspecified acute appendicitis type     10/25/2023 12:35 AM Becca Marie Modify [K35.80] Acute appendicitis           ED Disposition       ED Disposition   Admit    Condition   Stable    Date/Time   Wed Oct 25, 2023 12:35 AM    Comment   Case was discussed with surgery and the patient's admission status was agreed to be Admission Status: inpatient status to the service of Dr. Adrian Zapata.                Follow-up Information    None         Patient's Medications   Discharge Prescriptions    No medications on file     No discharge procedures on file. PDMP Review       None             ED Provider  Attending physically available and evaluated Cierra Maharaj. I managed the patient along with the ED Attending.     Electronically Signed by           Leigh Kowalski MD  10/25/23 5794

## 2023-10-25 NOTE — ED ATTENDING ATTESTATION
10/24/2023  IEsteban MD, saw and evaluated the patient. I have discussed the patient with the resident/non-physician practitioner and agree with the resident's/non-physician practitioner's findings, Plan of Care, and MDM as documented in the resident's/non-physician practitioner's note, except where noted. All available labs and Radiology studies were reviewed. I was present for key portions of any procedure(s) performed by the resident/non-physician practitioner and I was immediately available to provide assistance. At this point I agree with the current assessment done in the Emergency Department. I have conducted an independent evaluation of this patient a history and physical is as follows:    S:  Chief Complaint   Patient presents with   • Abdominal Pain     Patient presents for abdominal pain in her upper quadrants, and back. States she is distended, had to unbutton her pants. Just saw GI doctor yesterday and was given bentyl. Patient had two BM today that were normal consistency. Estefany Dumont is a 79 y.o. female who presents with the chief complaint of generalized abdominal pain that has been off and on for several weeks but got very severe today while she was driving. She saw GI yesterday and they are planning on an EGD and Colonoscopy. She has not had any abdominal surgeries in the past.  No fevers or chills. She reports nausea and abdominal distension. No vomiting of diarrhea. O:  ED Triage Vitals   Temperature Pulse Respirations Blood Pressure SpO2   10/24/23 1940 10/24/23 1940 10/24/23 1940 10/24/23 1940 10/24/23 1940   97.8 °F (36.6 °C) 92 18 159/67 100 %      Temp Source Heart Rate Source Patient Position - Orthostatic VS BP Location FiO2 (%)   10/24/23 1940 10/24/23 1940 10/24/23 1940 10/24/23 1940 --   Oral Monitor Sitting Right arm       Pain Score       10/24/23 2111       10 - Worst Possible Pain         Physical Exam  Vitals and nursing note reviewed. Constitutional:       General: She is in acute distress. Appearance: She is well-developed. HENT:      Head: Normocephalic and atraumatic. Eyes:      Pupils: Pupils are equal, round, and reactive to light. Neck:      Vascular: No JVD. Cardiovascular:      Rate and Rhythm: Normal rate and regular rhythm. Heart sounds: Normal heart sounds. No murmur heard. No friction rub. No gallop. Pulmonary:      Effort: Pulmonary effort is normal. No respiratory distress. Breath sounds: Normal breath sounds. No wheezing or rales. Chest:      Chest wall: No tenderness. Abdominal:      Tenderness: There is generalized abdominal tenderness. There is no guarding or rebound. Musculoskeletal:         General: No tenderness. Normal range of motion. Cervical back: Normal range of motion. Skin:     General: Skin is warm and dry. Neurological:      General: No focal deficit present. Mental Status: She is alert and oriented to person, place, and time. Psychiatric:         Behavior: Behavior normal.         Thought Content: Thought content normal.         Judgment: Judgment normal.     A/P:  Background: 79 y.o. female presents with chief complaint of lower abdominal pain.      Differential: appendicitis, diverticulitis, mesenteric adenitis, ovarian cyst, cystitis, pyelonephritis, ureterolithiasis, constipation, colitis, gastric ulcer, mesenteric ischemia, perforated viscous, non specific abdominal pain    Plan: cbc, cmp, lipase, urinalysis, ct scan, symptom control       ED Course     Labs Reviewed   CBC AND DIFFERENTIAL - Abnormal       Result Value Ref Range Status    WBC 15.75 (*) 4.31 - 10.16 Thousand/uL Final    RBC 4.58  3.81 - 5.12 Million/uL Final    Hemoglobin 13.1  11.5 - 15.4 g/dL Final    Hematocrit 40.0  34.8 - 46.1 % Final    MCV 87  82 - 98 fL Final    MCH 28.6  26.8 - 34.3 pg Final    MCHC 32.8  31.4 - 37.4 g/dL Final    RDW 13.8  11.6 - 15.1 % Final    MPV 10.1  8.9 - 12.7 fL Final    Platelets 665  694 - 390 Thousands/uL Final    nRBC 0  /100 WBCs Final    Neutrophils Relative 84 (*) 43 - 75 % Final    Immat GRANS % 0  0 - 2 % Final    Lymphocytes Relative 6 (*) 14 - 44 % Final    Monocytes Relative 10  4 - 12 % Final    Eosinophils Relative 0  0 - 6 % Final    Basophils Relative 0  0 - 1 % Final    Neutrophils Absolute 13.15 (*) 1.85 - 7.62 Thousands/µL Final    Immature Grans Absolute 0.06  0.00 - 0.20 Thousand/uL Final    Lymphocytes Absolute 0.88  0.60 - 4.47 Thousands/µL Final    Monocytes Absolute 1.59 (*) 0.17 - 1.22 Thousand/µL Final    Eosinophils Absolute 0.03  0.00 - 0.61 Thousand/µL Final    Basophils Absolute 0.04  0.00 - 0.10 Thousands/µL Final   COMPREHENSIVE METABOLIC PANEL - Abnormal    Sodium 135  135 - 147 mmol/L Final    Potassium 3.6  3.5 - 5.3 mmol/L Final    Chloride 98  96 - 108 mmol/L Final    CO2 24  21 - 32 mmol/L Final    ANION GAP 13  mmol/L Final    BUN 10  5 - 25 mg/dL Final    Creatinine 0.59 (*) 0.60 - 1.30 mg/dL Final    Comment: Standardized to IDMS reference method    Glucose 97  65 - 140 mg/dL Final    Comment: If the patient is fasting, the ADA then defines impaired fasting glucose as > 100 mg/dL and diabetes as > or equal to 123 mg/dL. Calcium 10.0  8.4 - 10.2 mg/dL Final    AST 37  13 - 39 U/L Final    ALT 34  7 - 52 U/L Final    Comment: Specimen collection should occur prior to Sulfasalazine administration due to the potential for falsely depressed results. Alkaline Phosphatase 124 (*) 34 - 104 U/L Final    Total Protein 7.3  6.4 - 8.4 g/dL Final    Albumin 4.3  3.5 - 5.0 g/dL Final    Total Bilirubin 1.31 (*) 0.20 - 1.00 mg/dL Final    Comment: Use of this assay is not recommended for patients undergoing treatment with eltrombopag due to the potential for falsely elevated results.   N-acetyl-p-benzoquinone imine (metabolite of Acetaminophen) will generate erroneously low results in samples for patients that have taken an overdose of Acetaminophen. eGFR 95  ml/min/1.73sq m Final    Narrative:     National Kidney Disease Foundation guidelines for Chronic Kidney Disease (CKD):   •  Stage 1 with normal or high GFR (GFR > 90 mL/min/1.73 square meters)  •  Stage 2 Mild CKD (GFR = 60-89 mL/min/1.73 square meters)  •  Stage 3A Moderate CKD (GFR = 45-59 mL/min/1.73 square meters)  •  Stage 3B Moderate CKD (GFR = 30-44 mL/min/1.73 square meters)  •  Stage 4 Severe CKD (GFR = 15-29 mL/min/1.73 square meters)  •  Stage 5 End Stage CKD (GFR <15 mL/min/1.73 square meters)  Note: GFR calculation is accurate only with a steady state creatinine     CT abdomen pelvis with contrast   Final Result      The appendix is dilated measuring 1 cm in diameter with 3 mm appendicolith at the base with surrounding inflammatory change compatible with acute appendicitis.       I personally discussed this study with [facility] on 10/24/2023 10:59 PM.            Workstation performed: [id number]           Medications   HYDROmorphone HCl (DILAUDID) injection 0.2 mg (0.2 mg Intravenous Given 10/24/23 2111)   HYDROmorphone HCl (DILAUDID) injection 0.2 mg (0.2 mg Intravenous Given 10/24/23 2238)   iohexol (OMNIPAQUE) 350 MG/ML injection (MULTI-DOSE) 100 mL (100 mL Intravenous Given 10/24/23 2218)         Critical Care Time  Procedures    ED Disposition       None          Follow-up Information    None Rubi Marie Modify [K35.80] Acute appendicitis, unspecified acute appendicitis type     10/25/2023 12:35 AM Shittou, Hosea Schlatter Modify [K35.80] Acute appendicitis     10/25/2023 10:26 AM Miriam Taveras Modify [K35.80] Acute appendicitis, unspecified acute appendicitis type     10/25/2023  6:57 PM Juan Wheeler Add [K80.20] Calculus of gallbladder without cholecystitis without obstruction     10/27/2023 12:13 PM Taya Payan Modify [K80.20] Calculus of gallbladder without cholecystitis without obstruction           ED Disposition       ED Disposition   Admit    Condition   Stable    Date/Time   Wed Oct 25, 2023 12:35 AM    Comment   Case was discussed with surgery and the patient's admission status was agreed to be Admission Status: inpatient status to the service of Dr. Yany Villegas.                Follow-up Information       Follow up With Specialties Details Why Contact Info    Alan Sommer DO Trauma Surgery, Critical Care Medicine, General Surgery Follow up in 2 week(s)  [address]

## 2023-10-25 NOTE — ANESTHESIA POSTPROCEDURE EVALUATION
Post-Op Assessment Note    CV Status:  Stable  Pain Score: 0    Pain management: adequate     Mental Status:  Sleepy   Hydration Status:  Euvolemic   PONV Controlled:  Controlled   Airway Patency:  Patent      Post Op Vitals Reviewed: Yes      Staff: CRNA   Comments: vss sv nonobstructed uneventful        No notable events documented.     BP   123/60   Temp 99.1   Pulse  98   Resp 24   SpO2 98

## 2023-10-25 NOTE — PLAN OF CARE
Problem: Potential for Falls  Goal: Patient will remain free of falls  Description: INTERVENTIONS:  - Educate patient/family on patient safety including physical limitations  - Instruct patient to call for assistance with activity   - Consult OT/PT to assist with strengthening/mobility   - Keep Call bell within reach  - Keep bed low and locked with side rails adjusted as appropriate  - Keep care items and personal belongings within reach  - Initiate and maintain comfort rounds  - Make Fall Risk Sign visible to staff  - Offer Toileting every  Hours, in advance of need  - Initiate/Maintain alarm  - Obtain necessary fall risk management equipment:   - Apply yellow socks and bracelet for high fall risk patients  - Consider moving patient to room near nurses station  Outcome: Progressing     Problem: PAIN - ADULT  Goal: Verbalizes/displays adequate comfort level or baseline comfort level  Description: Interventions:  - Encourage patient to monitor pain and request assistance  - Assess pain using appropriate pain scale  - Administer analgesics based on type and severity of pain and evaluate response  - Implement non-pharmacological measures as appropriate and evaluate response  - Consider cultural and social influences on pain and pain management  - Notify physician/advanced practitioner if interventions unsuccessful or patient reports new pain  Outcome: Progressing     Problem: INFECTION - ADULT  Goal: Absence or prevention of progression during hospitalization  Description: INTERVENTIONS:  - Assess and monitor for signs and symptoms of infection  - Monitor lab/diagnostic results  - Monitor all insertion sites, i.e. indwelling lines, tubes, and drains  - Monitor endotracheal if appropriate and nasal secretions for changes in amount and color  - [place] appropriate cooling/warming therapies per order  - Administer medications as ordered  - Instruct and encourage patient and family to use good hand hygiene technique  - Identify and instruct in appropriate isolation precautions for identified infection/condition  Outcome: Progressing  Goal: Absence of fever/infection during neutropenic period  Description: INTERVENTIONS:  - Monitor WBC    Outcome: Progressing     Problem: SAFETY ADULT  Goal: Patient will remain free of falls  Description: INTERVENTIONS:  - Educate patient/family on patient safety including physical limitations  - Instruct patient to call for assistance with activity   - Consult OT/PT to assist with strengthening/mobility   - Keep Call bell within reach  - Keep bed low and locked with side rails adjusted as appropriate  - Keep care items and personal belongings within reach  - Initiate and maintain comfort rounds  - Make Fall Risk Sign visible to staff  - Offer Toileting every  Hours, in advance of need  - Initiate/Maintain alarm  - Obtain necessary fall risk management equipment:   - Apply yellow socks and bracelet for high fall risk patients  - Consider moving patient to room near nurses station  Outcome: Progressing  Goal: Maintain or return to baseline ADL function  Description: INTERVENTIONS:  -  Assess patient's ability to carry out ADLs; assess patient's baseline for ADL function and identify physical deficits which impact ability to perform ADLs (bathing, care of mouth/teeth, toileting, grooming, dressing, etc.)  - Assess/evaluate cause of self-care deficits   - Assess range of motion  - Assess patient's mobility; develop plan if impaired  - Assess patient's need for assistive devices and provide as appropriate  - Encourage maximum independence but intervene and supervise when necessary  - Involve family in performance of ADLs  - Assess for home care needs following discharge   - Consider OT consult to assist with ADL evaluation and planning for discharge  - Provide patient education as appropriate  Outcome: Progressing  Goal: Maintains/Returns to pre admission functional level  Description: INTERVENTIONS:  - Perform BMAT or MOVE assessment daily.   - Set and communicate daily mobility goal to care team and patient/family/caregiver. - Collaborate with rehabilitation services on mobility goals if consulted  - Perform Range of Motion  times a day. - Reposition patient every  hours. - Dangle patient  times a day  - Stand patient  times a day  - Ambulate patient  times a day  - Out of bed to chair  times a day   - Out of bed for meals times a day  - Out of bed for toileting  - Record patient progress and toleration of activity level   Outcome: Progressing     Problem: DISCHARGE PLANNING  Goal: Discharge to home or other facility with appropriate resources  Description: INTERVENTIONS:  - Identify barriers to discharge w/patient and caregiver  - Arrange for needed discharge resources and transportation as appropriate  - Identify discharge learning needs (meds, wound care, etc.)  - Arrange for interpretive services to assist at discharge as needed  - Refer to Case Management Department for coordinating discharge planning if the patient needs post-hospital services based on physician/advanced practitioner order or complex needs related to functional status, cognitive ability, or social support system  Outcome: Progressing     Problem: Knowledge Deficit  Goal: Patient/family/caregiver demonstrates understanding of disease process, treatment plan, medications, and discharge instructions  Description: Complete learning assessment and assess knowledge base.   Interventions:  - Provide teaching at level of understanding  - Provide teaching via preferred learning methods  Outcome: Progressing Consent (Scalp)/Introductory Paragraph: The rationale for Mohs was explained to the patient and consent was obtained. The risks, benefits and alternatives to therapy were discussed in detail. Specifically, the risks of changes in hair growth pattern secondary to repair, infection, scarring, bleeding, prolonged wound healing, incomplete removal, allergy to anesthesia, nerve injury and recurrence were addressed. Prior to the procedure, the treatment site was clearly identified and confirmed by the patient. All components of Universal Protocol/PAUSE Rule completed.

## 2023-10-26 LAB
ABO GROUP BLD: NORMAL
BLD GP AB SCN SERPL QL: NEGATIVE
RH BLD: POSITIVE
SPECIMEN EXPIRATION DATE: NORMAL

## 2023-10-26 PROCEDURE — 96361 HYDRATE IV INFUSION ADD-ON: CPT

## 2023-10-26 PROCEDURE — 86850 RBC ANTIBODY SCREEN: CPT

## 2023-10-26 PROCEDURE — 86901 BLOOD TYPING SEROLOGIC RH(D): CPT

## 2023-10-26 PROCEDURE — 86900 BLOOD TYPING SEROLOGIC ABO: CPT

## 2023-10-26 PROCEDURE — 99232 SBSQ HOSP IP/OBS MODERATE 35: CPT | Performed by: SURGERY

## 2023-10-26 RX ORDER — DEXTROSE MONOHYDRATE, SODIUM CHLORIDE, AND POTASSIUM CHLORIDE 50; 1.49; 4.5 G/1000ML; G/1000ML; G/1000ML
125 INJECTION, SOLUTION INTRAVENOUS CONTINUOUS
Status: DISCONTINUED | OUTPATIENT
Start: 2023-10-27 | End: 2023-10-27

## 2023-10-26 RX ORDER — DEXTROSE MONOHYDRATE, SODIUM CHLORIDE, AND POTASSIUM CHLORIDE 50; 1.49; 4.5 G/1000ML; G/1000ML; G/1000ML
125 INJECTION, SOLUTION INTRAVENOUS CONTINUOUS
Status: DISCONTINUED | OUTPATIENT
Start: 2023-10-26 | End: 2023-10-26

## 2023-10-26 RX ADMIN — VALACYCLOVIR HYDROCHLORIDE 500 MG: 500 TABLET, FILM COATED ORAL at 08:47

## 2023-10-26 RX ADMIN — SODIUM CHLORIDE, SODIUM LACTATE, POTASSIUM CHLORIDE, AND CALCIUM CHLORIDE 125 ML/HR: .6; .31; .03; .02 INJECTION, SOLUTION INTRAVENOUS at 03:35

## 2023-10-26 RX ADMIN — PROPRANOLOL HYDROCHLORIDE 10 MG: 20 TABLET ORAL at 17:21

## 2023-10-26 RX ADMIN — ATORVASTATIN CALCIUM 10 MG: 10 TABLET, FILM COATED ORAL at 21:25

## 2023-10-26 RX ADMIN — HEPARIN SODIUM 5000 UNITS: 5000 INJECTION INTRAVENOUS; SUBCUTANEOUS at 17:21

## 2023-10-26 RX ADMIN — PROPRANOLOL HYDROCHLORIDE 10 MG: 20 TABLET ORAL at 08:47

## 2023-10-26 RX ADMIN — PREGABALIN 50 MG: 50 CAPSULE ORAL at 21:25

## 2023-10-26 RX ADMIN — HEPARIN SODIUM 5000 UNITS: 5000 INJECTION INTRAVENOUS; SUBCUTANEOUS at 01:13

## 2023-10-26 RX ADMIN — HEPARIN SODIUM 5000 UNITS: 5000 INJECTION INTRAVENOUS; SUBCUTANEOUS at 08:47

## 2023-10-26 RX ADMIN — DOCOSANOL 1 APPLICATION: 100 CREAM TOPICAL at 08:38

## 2023-10-26 RX ADMIN — PANTOPRAZOLE SODIUM 40 MG: 40 TABLET, DELAYED RELEASE ORAL at 05:09

## 2023-10-26 RX ADMIN — VALACYCLOVIR HYDROCHLORIDE 500 MG: 500 TABLET, FILM COATED ORAL at 21:29

## 2023-10-26 RX ADMIN — DEXTROSE, SODIUM CHLORIDE, AND POTASSIUM CHLORIDE 125 ML/HR: 5; .45; .15 INJECTION INTRAVENOUS at 08:45

## 2023-10-26 RX ADMIN — FUROSEMIDE 40 MG: 40 TABLET ORAL at 08:47

## 2023-10-26 RX ADMIN — TIZANIDINE 4 MG: 2 TABLET ORAL at 21:25

## 2023-10-26 RX ADMIN — LORAZEPAM 1.5 MG: 1 TABLET ORAL at 21:29

## 2023-10-26 RX ADMIN — AMITRIPTYLINE HYDROCHLORIDE 10 MG: 10 TABLET, FILM COATED ORAL at 21:25

## 2023-10-26 RX ADMIN — METHIMAZOLE 5 MG: 5 TABLET ORAL at 08:47

## 2023-10-26 NOTE — PROGRESS NOTES
Progress Note - General Surgery   Kathya Lowe 79 y.o. female MRN: [id number]  Unit/Bed#: S -01 Encounter: [id number]    Assessment:  68F with acute appendicitis s/p lap appendectomy w/ acute on chronic cholecystitis    Plan:  -low fat diet, Lucy@[employer]  -Plan for OR 10/27 for laparoscopic cholecystectomy  -Continue ambulation out of bed  -Continue I-S use continue  -DVT prophylaxis    Subjective/Objective     Subjective: no acute events overnight. No nausea no vomiting tolerating diet.  + Flatus no bowel movements. Pain is well controlled. Voiding. Objective: AVSS on room air    Blood pressure 148/83, pulse 79, temperature 97.7 °F (36.5 °C), resp. rate 18, last menstrual period 11/26/2015, SpO2 95 %, not currently breastfeeding. ,There is no height or weight on file to calculate BMI. I/O: None charted, patient voiding freely    Invasive Devices       Peripheral Intravenous Line  Duration             Peripheral IV 10/26/23 Left Wrist <1 day                    Physical Exam:  General: No acute distress, alert and oriented  CV: RRR  Lungs: Normal work of breathing   Abdomen:, Nontender, nondistended, incisions are clean dry and intact.   Skin: Warm, dry    Lab, Imaging and other studies:  Recent Labs     10/24/23  2113 10/25/23  0130   WBC 15.75*  --    HGB 13.1  --     270   SODIUM 135  --    K 3.6  --    CL 98  --    CO2 24  --    BUN 10  --    CREATININE 0.59*  --    GLUC 97  --    CALCIUM 10.0  --    AST 37  --    ALT 34  --    ALKPHOS 124*  --    TBILI 1.31*  --

## 2023-10-26 NOTE — PROGRESS NOTES
Progress Note  Connie Litten 79 y.o. female MRN: [id number]  Unit/Bed#: S -01 Encounter: [id number]    Assessment  68F with acute appendicitis and acute-on-chronic cholecystitis, now s/p 10/25 lap appy. Plan  - NPO for lap lorna today  - [email or web address]@[employer]  - DVT ppx    Subjective/Objective   No acute overnight events. Pain controlled. No n/v. No BM, passing flatus. Voiding, walking. VSS on RA. /93   Pulse 76   Temp 98.3 °F (36.8 °C)   Resp 18   LMP 11/26/2015   SpO2 99%      Physical Exam:  General: NAD  HENT: NCAT  CV: nl rate  Lungs: nl wob. No resp distress. ABD: Soft, ND, mild RUQ tenderness. Incisions CDI.   Extrem: No CCE  Neuro: alert & oriented    I/O's unrecorded    Recent Labs     10/24/23  2113 10/25/23  0130 10/27/23  0419   WBC 15.75*  --  6.56   HGB 13.1  --  10.6*    270 224   SODIUM 135  --  139   K 3.6  --  3.3*   CL 98  --  107   CO2 24  --  25   BUN 10  --  10   CREATININE 0.59*  --  0.47*   GLUC 97  --  115   CALCIUM 10.0  --  8.7   AST 37  --  28   ALT 34  --  24   ALKPHOS 124*  --  84   TBILI 1.31*  --  1.02*

## 2023-10-27 ENCOUNTER — ANESTHESIA EVENT (INPATIENT)
Dept: PERIOP | Facility: HOSPITAL | Age: 68
DRG: 399 | End: 2023-10-27
Payer: MEDICARE

## 2023-10-27 ENCOUNTER — ANESTHESIA (INPATIENT)
Dept: PERIOP | Facility: HOSPITAL | Age: 68
DRG: 399 | End: 2023-10-27
Payer: MEDICARE

## 2023-10-27 VITALS
HEART RATE: 72 BPM | TEMPERATURE: 97.4 F | RESPIRATION RATE: 18 BRPM | OXYGEN SATURATION: 93 % | DIASTOLIC BLOOD PRESSURE: 89 MMHG | SYSTOLIC BLOOD PRESSURE: 156 MMHG

## 2023-10-27 PROBLEM — K35.80 ACUTE APPENDICITIS: Status: ACTIVE | Noted: 2023-10-27

## 2023-10-27 PROBLEM — K81.0 ACUTE CHOLECYSTITIS: Status: ACTIVE | Noted: 2023-10-27

## 2023-10-27 LAB
ABO GROUP BLD: NORMAL
ALBUMIN SERPL BCP-MCNC: 3.3 G/DL (ref 3.5–5)
ALP SERPL-CCNC: 84 U/L (ref 34–104)
ALT SERPL W P-5'-P-CCNC: 24 U/L (ref 7–52)
ANION GAP SERPL CALCULATED.3IONS-SCNC: 7 MMOL/L
AST SERPL W P-5'-P-CCNC: 28 U/L (ref 13–39)
BASOPHILS # BLD AUTO: 0.03 THOUSANDS/ÂΜL (ref 0–0.1)
BASOPHILS NFR BLD AUTO: 1 % (ref 0–1)
BILIRUB SERPL-MCNC: 1.02 MG/DL (ref 0.2–1)
BUN SERPL-MCNC: 10 MG/DL (ref 5–25)
CALCIUM ALBUM COR SERPL-MCNC: 9.3 MG/DL (ref 8.3–10.1)
CALCIUM SERPL-MCNC: 8.7 MG/DL (ref 8.4–10.2)
CHLORIDE SERPL-SCNC: 107 MMOL/L (ref 96–108)
CO2 SERPL-SCNC: 25 MMOL/L (ref 21–32)
CREAT SERPL-MCNC: 0.47 MG/DL (ref 0.6–1.3)
EOSINOPHIL # BLD AUTO: 0.01 THOUSAND/ÂΜL (ref 0–0.61)
EOSINOPHIL NFR BLD AUTO: 0 % (ref 0–6)
ERYTHROCYTE [DISTWIDTH] IN BLOOD BY AUTOMATED COUNT: 14.3 % (ref 11.6–15.1)
GFR SERPL CREATININE-BSD FRML MDRD: 103 ML/MIN/1.73SQ M
GLUCOSE SERPL-MCNC: 115 MG/DL (ref 65–140)
HCT VFR BLD AUTO: 32.8 % (ref 34.8–46.1)
HGB BLD-MCNC: 10.6 G/DL (ref 11.5–15.4)
IMM GRANULOCYTES # BLD AUTO: 0.02 THOUSAND/UL (ref 0–0.2)
IMM GRANULOCYTES NFR BLD AUTO: 0 % (ref 0–2)
LYMPHOCYTES # BLD AUTO: 2.15 THOUSANDS/ÂΜL (ref 0.6–4.47)
LYMPHOCYTES NFR BLD AUTO: 33 % (ref 14–44)
MCH RBC QN AUTO: 29 PG (ref 26.8–34.3)
MCHC RBC AUTO-ENTMCNC: 32.3 G/DL (ref 31.4–37.4)
MCV RBC AUTO: 90 FL (ref 82–98)
MONOCYTES # BLD AUTO: 0.74 THOUSAND/ÂΜL (ref 0.17–1.22)
MONOCYTES NFR BLD AUTO: 11 % (ref 4–12)
NEUTROPHILS # BLD AUTO: 3.61 THOUSANDS/ÂΜL (ref 1.85–7.62)
NEUTS SEG NFR BLD AUTO: 55 % (ref 43–75)
NRBC BLD AUTO-RTO: 0 /100 WBCS
PLATELET # BLD AUTO: 224 THOUSANDS/UL (ref 149–390)
PMV BLD AUTO: 10.2 FL (ref 8.9–12.7)
POTASSIUM SERPL-SCNC: 3.3 MMOL/L (ref 3.5–5.3)
PROT SERPL-MCNC: 5.5 G/DL (ref 6.4–8.4)
RBC # BLD AUTO: 3.65 MILLION/UL (ref 3.81–5.12)
RH BLD: POSITIVE
SODIUM SERPL-SCNC: 139 MMOL/L (ref 135–147)
WBC # BLD AUTO: 6.56 THOUSAND/UL (ref 4.31–10.16)

## 2023-10-27 PROCEDURE — 85025 COMPLETE CBC W/AUTO DIFF WBC: CPT | Performed by: PHYSICIAN ASSISTANT

## 2023-10-27 PROCEDURE — 47562 LAPAROSCOPIC CHOLECYSTECTOMY: CPT | Performed by: SURGERY

## 2023-10-27 PROCEDURE — 80053 COMPREHEN METABOLIC PANEL: CPT | Performed by: PHYSICIAN ASSISTANT

## 2023-10-27 PROCEDURE — 99024 POSTOP FOLLOW-UP VISIT: CPT | Performed by: SURGERY

## 2023-10-27 PROCEDURE — 47562 LAPAROSCOPIC CHOLECYSTECTOMY: CPT | Performed by: PHYSICIAN ASSISTANT

## 2023-10-27 PROCEDURE — 88304 TISSUE EXAM BY PATHOLOGIST: CPT | Performed by: PATHOLOGY

## 2023-10-27 RX ORDER — SODIUM CHLORIDE, SODIUM LACTATE, POTASSIUM CHLORIDE, CALCIUM CHLORIDE 600; 310; 30; 20 MG/100ML; MG/100ML; MG/100ML; MG/100ML
INJECTION, SOLUTION INTRAVENOUS CONTINUOUS PRN
Status: DISCONTINUED | OUTPATIENT
Start: 2023-10-27 | End: 2023-10-27

## 2023-10-27 RX ORDER — MIDAZOLAM HYDROCHLORIDE 2 MG/2ML
INJECTION, SOLUTION INTRAMUSCULAR; INTRAVENOUS AS NEEDED
Status: DISCONTINUED | OUTPATIENT
Start: 2023-10-27 | End: 2023-10-27

## 2023-10-27 RX ORDER — ACETAMINOPHEN 325 MG/1
650 TABLET ORAL EVERY 6 HOURS PRN
Status: DISCONTINUED | OUTPATIENT
Start: 2023-10-27 | End: 2023-10-27 | Stop reason: HOSPADM

## 2023-10-27 RX ORDER — MAGNESIUM HYDROXIDE 1200 MG/15ML
LIQUID ORAL AS NEEDED
Status: DISCONTINUED | OUTPATIENT
Start: 2023-10-27 | End: 2023-10-27 | Stop reason: HOSPADM

## 2023-10-27 RX ORDER — CEFAZOLIN SODIUM 1 G/3ML
INJECTION, POWDER, FOR SOLUTION INTRAMUSCULAR; INTRAVENOUS AS NEEDED
Status: DISCONTINUED | OUTPATIENT
Start: 2023-10-27 | End: 2023-10-27

## 2023-10-27 RX ORDER — BUPIVACAINE HYDROCHLORIDE 5 MG/ML
INJECTION, SOLUTION EPIDURAL; INTRACAUDAL AS NEEDED
Status: DISCONTINUED | OUTPATIENT
Start: 2023-10-27 | End: 2023-10-27 | Stop reason: HOSPADM

## 2023-10-27 RX ORDER — SODIUM CHLORIDE, SODIUM LACTATE, POTASSIUM CHLORIDE, CALCIUM CHLORIDE 600; 310; 30; 20 MG/100ML; MG/100ML; MG/100ML; MG/100ML
100 INJECTION, SOLUTION INTRAVENOUS CONTINUOUS
Status: DISCONTINUED | OUTPATIENT
Start: 2023-10-27 | End: 2023-10-27

## 2023-10-27 RX ORDER — LIDOCAINE HYDROCHLORIDE 10 MG/ML
INJECTION, SOLUTION EPIDURAL; INFILTRATION; INTRACAUDAL; PERINEURAL AS NEEDED
Status: DISCONTINUED | OUTPATIENT
Start: 2023-10-27 | End: 2023-10-27

## 2023-10-27 RX ORDER — PROPOFOL 10 MG/ML
INJECTION, EMULSION INTRAVENOUS AS NEEDED
Status: DISCONTINUED | OUTPATIENT
Start: 2023-10-27 | End: 2023-10-27

## 2023-10-27 RX ORDER — HYDROMORPHONE HCL IN WATER/PF 6 MG/30 ML
0.2 PATIENT CONTROLLED ANALGESIA SYRINGE INTRAVENOUS EVERY 4 HOURS PRN
Status: DISCONTINUED | OUTPATIENT
Start: 2023-10-27 | End: 2023-10-27 | Stop reason: HOSPADM

## 2023-10-27 RX ORDER — ROCURONIUM BROMIDE 10 MG/ML
INJECTION, SOLUTION INTRAVENOUS AS NEEDED
Status: DISCONTINUED | OUTPATIENT
Start: 2023-10-27 | End: 2023-10-27

## 2023-10-27 RX ORDER — METRONIDAZOLE 500 MG/100ML
INJECTION, SOLUTION INTRAVENOUS CONTINUOUS PRN
Status: DISCONTINUED | OUTPATIENT
Start: 2023-10-27 | End: 2023-10-27

## 2023-10-27 RX ORDER — KETOROLAC TROMETHAMINE 30 MG/ML
15 INJECTION, SOLUTION INTRAMUSCULAR; INTRAVENOUS EVERY 6 HOURS PRN
Status: DISCONTINUED | OUTPATIENT
Start: 2023-10-27 | End: 2023-10-27 | Stop reason: HOSPADM

## 2023-10-27 RX ORDER — HYDROMORPHONE HCL/PF 1 MG/ML
SYRINGE (ML) INJECTION AS NEEDED
Status: DISCONTINUED | OUTPATIENT
Start: 2023-10-27 | End: 2023-10-27

## 2023-10-27 RX ORDER — HYDROMORPHONE HCL IN WATER/PF 6 MG/30 ML
0.2 PATIENT CONTROLLED ANALGESIA SYRINGE INTRAVENOUS
Status: DISCONTINUED | OUTPATIENT
Start: 2023-10-27 | End: 2023-10-27 | Stop reason: HOSPADM

## 2023-10-27 RX ORDER — FENTANYL CITRATE 50 UG/ML
INJECTION, SOLUTION INTRAMUSCULAR; INTRAVENOUS AS NEEDED
Status: DISCONTINUED | OUTPATIENT
Start: 2023-10-27 | End: 2023-10-27

## 2023-10-27 RX ORDER — DEXAMETHASONE SODIUM PHOSPHATE 10 MG/ML
INJECTION, SOLUTION INTRAMUSCULAR; INTRAVENOUS AS NEEDED
Status: DISCONTINUED | OUTPATIENT
Start: 2023-10-27 | End: 2023-10-27

## 2023-10-27 RX ORDER — ONDANSETRON 2 MG/ML
4 INJECTION INTRAMUSCULAR; INTRAVENOUS ONCE AS NEEDED
Status: DISCONTINUED | OUTPATIENT
Start: 2023-10-27 | End: 2023-10-27 | Stop reason: HOSPADM

## 2023-10-27 RX ORDER — KETOROLAC TROMETHAMINE 30 MG/ML
INJECTION, SOLUTION INTRAMUSCULAR; INTRAVENOUS AS NEEDED
Status: DISCONTINUED | OUTPATIENT
Start: 2023-10-27 | End: 2023-10-27

## 2023-10-27 RX ADMIN — HYDROMORPHONE HYDROCHLORIDE 0.25 MG: 1 INJECTION, SOLUTION INTRAMUSCULAR; INTRAVENOUS; SUBCUTANEOUS at 11:29

## 2023-10-27 RX ADMIN — FENTANYL CITRATE 50 MCG: 50 INJECTION INTRAMUSCULAR; INTRAVENOUS at 11:22

## 2023-10-27 RX ADMIN — PROPOFOL 140 MG: 10 INJECTION, EMULSION INTRAVENOUS at 10:59

## 2023-10-27 RX ADMIN — FENTANYL CITRATE 50 MCG: 50 INJECTION INTRAMUSCULAR; INTRAVENOUS at 10:59

## 2023-10-27 RX ADMIN — DEXAMETHASONE SODIUM PHOSPHATE 10 MG: 10 INJECTION, SOLUTION INTRAMUSCULAR; INTRAVENOUS at 10:59

## 2023-10-27 RX ADMIN — HEPARIN SODIUM 5000 UNITS: 5000 INJECTION INTRAVENOUS; SUBCUTANEOUS at 17:38

## 2023-10-27 RX ADMIN — SODIUM CHLORIDE, SODIUM LACTATE, POTASSIUM CHLORIDE, AND CALCIUM CHLORIDE: .6; .31; .03; .02 INJECTION, SOLUTION INTRAVENOUS at 10:52

## 2023-10-27 RX ADMIN — ROCURONIUM BROMIDE 20 MG: 10 INJECTION, SOLUTION INTRAVENOUS at 11:27

## 2023-10-27 RX ADMIN — PROPRANOLOL HYDROCHLORIDE 10 MG: 20 TABLET ORAL at 17:38

## 2023-10-27 RX ADMIN — FUROSEMIDE 40 MG: 40 TABLET ORAL at 08:01

## 2023-10-27 RX ADMIN — HEPARIN SODIUM 5000 UNITS: 5000 INJECTION INTRAVENOUS; SUBCUTANEOUS at 00:15

## 2023-10-27 RX ADMIN — HYDROMORPHONE HYDROCHLORIDE 0.2 MG: 0.2 INJECTION, SOLUTION INTRAMUSCULAR; INTRAVENOUS; SUBCUTANEOUS at 13:14

## 2023-10-27 RX ADMIN — SUGAMMADEX 200 MG: 100 INJECTION, SOLUTION INTRAVENOUS at 12:30

## 2023-10-27 RX ADMIN — METHIMAZOLE 5 MG: 5 TABLET ORAL at 08:01

## 2023-10-27 RX ADMIN — SODIUM CHLORIDE, SODIUM LACTATE, POTASSIUM CHLORIDE, AND CALCIUM CHLORIDE 100 ML/HR: .6; .31; .03; .02 INJECTION, SOLUTION INTRAVENOUS at 14:49

## 2023-10-27 RX ADMIN — MIDAZOLAM 2 MG: 1 INJECTION INTRAMUSCULAR; INTRAVENOUS at 10:52

## 2023-10-27 RX ADMIN — DEXTROSE, SODIUM CHLORIDE, AND POTASSIUM CHLORIDE 125 ML/HR: 5; .45; .15 INJECTION INTRAVENOUS at 00:02

## 2023-10-27 RX ADMIN — LIDOCAINE HYDROCHLORIDE 40 MG: 10 INJECTION, SOLUTION EPIDURAL; INFILTRATION; INTRACAUDAL; PERINEURAL at 10:59

## 2023-10-27 RX ADMIN — SODIUM CHLORIDE, SODIUM LACTATE, POTASSIUM CHLORIDE, AND CALCIUM CHLORIDE: .6; .31; .03; .02 INJECTION, SOLUTION INTRAVENOUS at 09:42

## 2023-10-27 RX ADMIN — KETOROLAC TROMETHAMINE 15 MG: 30 INJECTION, SOLUTION INTRAMUSCULAR; INTRAVENOUS at 12:28

## 2023-10-27 RX ADMIN — PROPRANOLOL HYDROCHLORIDE 10 MG: 20 TABLET ORAL at 08:01

## 2023-10-27 RX ADMIN — CEFAZOLIN 2000 MG: 1 INJECTION, POWDER, FOR SOLUTION INTRAMUSCULAR; INTRAVENOUS at 11:11

## 2023-10-27 RX ADMIN — METRONIDAZOLE: 500 INJECTION, SOLUTION INTRAVENOUS at 11:11

## 2023-10-27 RX ADMIN — PANTOPRAZOLE SODIUM 40 MG: 40 TABLET, DELAYED RELEASE ORAL at 05:23

## 2023-10-27 RX ADMIN — ROCURONIUM BROMIDE 50 MG: 10 INJECTION, SOLUTION INTRAVENOUS at 10:59

## 2023-10-27 NOTE — DISCHARGE INSTR - AVS FIRST PAGE
Acute Care Surgery Discharge Instructions    Please follow-up as instructed. If you do not already have a follow-up appointment, please call the office when you leave to schedule an appointment to be seen in 2 weeks for post-operative re-evaluation. Activity:  - No lifting greater than 20 pounds or strenuous physical activity or exercise for 2 weeks. - Walking and normal light activities are encouraged. - Normal daily activities including climbing steps are okay. - No driving until no longer using pain medications. Return to work:    - You may return to work in 2 weeks or sooner if you are feeling well enough. Diet:    - You may resume your normal diet. Wound Care:  - May shower daily. No tub baths or swimming until cleared by your surgeon.  - Wash incision gently with soap and water and pat dry. - Do not apply any creams or ointments unless instructed to do so by your surgeon.  - Caterina Casas may apply ice as needed (no longer than 20 minutes at a time) for the first 48 hours. - Bruising is not unusual and will go away with a little time. You may apply a warm, moist compress that may help the bruising resolve quicker. - You may remove the dressings the day after surgery (unless otherwise instructed). Leave any skin tapes (steri-strips) on the incision(s) in place until they fall off on their own. Any new dressings are optional.    Medications:    - You may resume all of your regular medications after discharge unless otherwise instructed. Please refer to your discharge medication list for further details. - Please take the pain medications as directed. - You are encouraged to use non-narcotic pain medications first and whenever possible. Reserve the use of narcotic pain medication for moderate to severe pain not controlled by non-narcotic medications.  - No driving while taking narcotic pain medications. - You may become constipated, especially if taking pain medications.  You may take any over the counter stool softeners or laxatives as needed. Examples: Milk of Magnesia, Colace, Senna. Additional Instructions:  - If you have any questions or concerns after discharge please call the office.  - Call office or return to ER if fever greater than 101, chills, persistent nausea/vomiting, worsening/uncontrollable pain, and/or increasing redness or purulent/foul smelling drainage from incision(s).

## 2023-10-27 NOTE — DISCHARGE SUMMARY
Discharge Summary - Luis Stephens 79 y.o. female MRN: [id number]    Unit/Bed#: S -01 Encounter: [id number]    Admission Date:   Admission Orders (From admission, onward)       Ordered        10/26/23 0753  Inpatient Admission  Once                            Admitting Diagnosis: Abdominal pain [R10.9]  Acute appendicitis [K35.80]  Acute appendicitis, unspecified acute appendicitis type [K35.80]    HPI: Luis Stephens is a 79 y.o. female with PMHx of herpes, SIADH, thyroid disease, HTN, anxiety, lumbar degenerative disc disease, sacroiliitis, 2016 thoracic spinal cord stimulator in L buttock, 2022 spine surgery, who presents with postprandial epigastric pain, bloating, and lower abdominal pain. Started 1 month ago. Usually lasts 30 minutes but this episode today at 4pm was severe, dull, and constant. Started in her epigastric region and moved down to lower abdomen. Denies fever, SOB, n/v. Last ate cookies at 3pm today. Last BM today this afternoon, normal stool. Hypertensive to 210/80, now 150/80. Tachy to 112. Other VSS on RA. Meds (PTA): Amitriptyline, atorvastatin, cetirizine, dicyclomine, furosemide, lisinopril, lorazepam, methimazole, pregabalin, propranolol, tizanidine, esomeprazole  Results:    Procedures Performed: No orders of the defined types were placed in this encounter. Summary of Hospital Course: Patient was admitted to medical service for further management of her acute appendicitis with concern that her chronic epigastric pain may also be 2/2 chronic cholelithiasis. Patient underwent laparoscopic appendectomy on 10/25. There were no intraoperative complications. During the procedure, it was noted that the gallbladder was acutely inflamed. We elected to proceed with laparoscopic cholecystectomy during the same admission, and the patient agreed to the plan. On 10/27, the patient underwent an uncomplicated laparoscopic cholecystectomy.  Post-operatively, the patient was tolerating a diet, was out of bed and ambulating, was voiding without difficulty, and endorsed minimal abdominal pain. She was discharged to home in a hemodynamically stable condition. She was instructed to follow-up in 2 weeks for a post-op check with the general surgery team. All questions were appropriately addressed. Significant Findings, Care, Treatment and Services Provided: Acute appendicitis, acute on chronic cholecystitis. Complications: None    Discharge Diagnosis: See above    Medical Problems       Resolved Problems  Date Reviewed: 10/23/2023   None         Condition at Discharge: good         Discharge instructions/Information to patient and family:   See after visit summary for information provided to patient and family. Provisions for Follow-Up Care:  See after visit summary for information related to follow-up care and any pertinent home health orders. PCP: Aisha Anne DO    Disposition: See After Visit Summary for discharge disposition information. Planned Readmission: No      Discharge Statement   I spent 30 minutes discharging the patient. This time was spent on the day of discharge. I had direct contact with the patient on the day of discharge. Additional documentation is required if more than 30 minutes were spent on discharge. Discharge Medications:  See after visit summary for reconciled discharge medications provided to patient and family.

## 2023-10-27 NOTE — INCIDENTAL FINDINGS
The following findings require follow up:  None   Finding: CT abdomen pelvis with contrast: The appendix is dilated measuring 1 cm in diameter with 3 mm appendicolith at the base with surrounding inflammatory change compatible with acute appendicitis. , I personally discussed this study with Sami Kidd  on 10/24/2023 10:59 PM., Workstation performed: [id number]    Follow up required: None (only needs post-op check), patient had laparoscopic appendectomy during admission.    Follow up should be done within 2 week(s)    Please notify the following clinician to assist with the follow up:   Dr. Leonidas Solis, Dr. Garcia Kianna

## 2023-10-27 NOTE — ANESTHESIA POSTPROCEDURE EVALUATION
Post-Op Assessment Note    CV Status:  Stable  Pain Score: 0    Pain management: adequate     Mental Status:  Alert and awake   Hydration Status:  Euvolemic   PONV Controlled:  Controlled   Airway Patency:  Patent      Post Op Vitals Reviewed: Yes      Staff: CRNA         No notable events documented.     BP  155/78   Temp   97.8   Pulse  70   Resp   16   SpO2   100

## 2023-10-27 NOTE — ANESTHESIA PREPROCEDURE EVALUATION
Procedure:  CHOLECYSTECTOMY LAPAROSCOPIC, possible open (Abdomen)    Relevant Problems   CARDIO   (+) Benign essential HTN      ENDO   (+) Hyperthyroidism      MUSCULOSKELETAL   (+) Arthritis of right acromioclavicular joint   (+) Lumbar degenerative disc disease   (+) Myofascial pain syndrome   (+) Primary osteoarthritis of right knee      NEURO/PSYCH   (+) Anxiety   (+) Myofascial pain syndrome   (+) Pain syndrome, chronic        Physical Exam    Airway    Mallampati score: II  TM Distance: >3 FB  Neck ROM: full     Dental       Cardiovascular  Cardiovascular exam normal    Pulmonary  Pulmonary exam normal     Other Findings        Anesthesia Plan  ASA Score- 3     Anesthesia Type- general with ASA Monitors. Additional Monitors:     Airway Plan: ETT. Plan Factors-Exercise tolerance (METS): >4 METS. Chart reviewed. EKG reviewed. Existing labs reviewed. Patient summary reviewed. Patient is not a current smoker. Patient did not smoke on day of surgery. Obstructive sleep apnea risk education given perioperatively. Induction- intravenous. Postoperative Plan- Plan for postoperative opioid use. Planned trial extubation    Informed Consent- Anesthetic plan and risks discussed with patient. I personally reviewed this patient with the CRNA. Discussed and agreed on the Anesthesia Plan with the CRNA. Kendrick Lennox

## 2023-10-27 NOTE — OP NOTE
OPERATIVE REPORT  PATIENT NAME: Navi Shultz    :  1955  MRN: [id number]  Pt Location: [facility]    SURGERY DATE: 10/27/2023    Surgeon(s) and Role:     * Tip Pak DO - Primary     * Kevyn Lama PA-C - Assisting    Preop Diagnosis:  Calculus of gallbladder without cholecystitis without obstruction [K80.20]    Post-Op Diagnosis Codes:     * Calculus of gallbladder without cholecystitis without obstruction [K80.20]    Procedure(s):  CHOLECYSTECTOMY LAPAROSCOPIC    Specimen(s):  ID Type Source Tests Collected by Time Destination   1 :  Tissue Gallbladder TISSUE EXAM Tip Pak DO 10/27/2023 1146        Estimated Blood Loss:   Minimal    Drains:  [REMOVED] Urethral Catheter Latex 16 Fr. (Removed)   Number of days: 0       Anesthesia Type:   General    Operative Indications:  Calculus of gallbladder without cholecystitis without obstruction [K80.20]  Symptomatic cholelithiasis    Operative Findings:  Chronically inflamed gallbladder with omental adhesions. Complications:   None    Procedure and Technique:  Pt placed supine on table and prepped and draped in usual sterile manner. Timeout performed and all elements of timeout reviewed and confirmed. Laparoscopic equipment was checked and deemed adequate. The prior infraumbilical incision was attempted to be entered from her appendectomy 2 days prior. The tract was unable to be adequately identified. A Veress needle was then placed in the left upper quadrant Teresa's point allowing abdominal entry and proper insufflation of the abdomen. After the abdomen was insufflated a 5 mm trocar was then placed at the infraumbilical port site. This was done with direct visualization through an Optiview trocar. The abdomen was then inspected and noted to have no injuries from entry procedures. The subxiphoid and right sided ports were then placed under direct visualization.   There was significant omental adhesions noted on the gallbladder which took gentle dissection using blunt section and electrocautery. Once adhesions were removed the gallbladder retracted cephalad and laterally. The cystic duct and cystic artery were carefully skeletonized until a critical view could be accomplished. Once this was done these structures were sequentially clipped and divided. The gallbladder was removed from the gallbladder fossa with the laparoscopic paddle and bovie cautery. It was placed in an Endocatch bag and removed through the subxiphoid port site after extension of the fascial incision. The RUQ was irrigated with warm normal saline until the return fluid was clear of blood and bile. The ports were then removed under direct visualization without difficulty. The sub-xiphoid port site was closed with 2 figure of eight 0-vicryl sutures and the skin sites were closed with running 4-0 monocryl subcuticular suture and steri strips. Pt tolerated the procedure well, was transported to PACU in stable condition and sponge and instrument counts were correct. I updated the family postoperatively in the waiting room. A qualified resident physician was not available due to their mandatory weekly educational conference. and A physician assistant was required during the procedure for retraction, tissue handling, dissection and suturing.     Patient Disposition:  PACU         SIGNATURE: Riana Amado DO  DATE: October 27, 2023  TIME: 1:36 PM

## 2023-10-27 NOTE — QUICK NOTE
Post Op Check:    79 y.o. F now POD 0 s/p laparoscopic cholecystectomy    The patient denied any nausea, vomiting, chest pain, shortness of breath, fevers, chills. Saturating greater than 92% on room air. Tolerating PO intake. No bowel movements and no flatus but voiding without difficulty. Wants to be discharged home, feels well. Vitals:    10/27/23 1352   BP: 156/89   Pulse: 72   Resp: 18   Temp: (!) 97.4 °F (36.3 °C)   SpO2: 93%       General: NAD  HENT: NCAT MMM  Neck: supple, no JVD  CV: nl rate  Lungs: nl wob. No resp distress  ABD: Soft, appropriately tender, nondistended. Incisions clean/dry/intact.   Extrem: No CCE  Neuro: AAOx3     Plan:  Diet Surgical; Clear Liquid  Advance diet as tolerated  Dc fluids  Pain and nausea control PRN  OOB and ambulating  Encourage IS  DVT prophylaxis  Discharge home    Agustin Hernandez MD  General Surgery Resident

## 2023-10-27 NOTE — OP NOTE
OPERATIVE REPORT  PATIENT NAME: Jackie Mehta    :  1955  MRN: [id number]  Pt Location: [facility]    SURGERY DATE: 10/27/2023    Surgeon(s) and Role:     * Ayush Conley DO - Primary     * Narda Quevedo PA-C - Assisting    Preop Diagnosis:  Calculus of gallbladder without cholecystitis without obstruction [K80.20]    Post-Op Diagnosis Codes:     * Calculus of gallbladder without cholecystitis without obstruction [K80.20]    Procedure(s):  CHOLECYSTECTOMY LAPAROSCOPIC. possible open    Specimen(s):  ID Type Source Tests Collected by Time Destination   1 :  Tissue Gallbladder TISSUE EXAM Ayush Conley DO 10/27/2023 1146        Estimated Blood Loss:   Minimal    Drains:  [REMOVED] Urethral Catheter Latex 16 Fr. (Removed)   Number of days: 0       Anesthesia Type:   General    Operative Indications:  Calculus of gallbladder without cholecystitis without obstruction [K80.20]  Symptomatic cholelithiasis    Operative Findings:  Chronically inflamed gallbladder with significant cholecystic wall edema and omental adhesions. Complications:   None    Procedure and Technique:  Pt placed supine on table and prepped and draped in usual sterile manner. Timeout performed and all elements of timeout reviewed and confirmed. Laparoscopic equipment was checked and deemed adequate. The prior appendectomy incision was opened and a fourth attempt to be placed through this prior site. However this was unsuccessful. The subxiphoid and right sided ports were then placed under direct visualization and the gallbladder was retracted cephalad and laterally. The cystic duct and cystic artery were carefully skeletonized until a critical view could be accomplished. Once this was done these structures were sequentially clipped and divided. The gallbladder was removed from the gallbladder fossa with the laparoscopic hook and bovie cautery.  It was placed in an Endocatch bag and removed through the infraumbilical port site without difficulty. The RUQ was irrigated with warm normal saline until the return fluid was clear of blood and bile. The ports were then removed under direct visualization without difficulty. The subumbilical port site was closed with 2 figure of eight 0-vicryl sutures and the skin sites were closed with running 4-0 monocryl subcuticular suture and steri strips. Pt tolerated the procedure well, was transported to PACU in stable condition and sponge and instrument counts were correct. I was present for the entire procedure., A qualified resident physician was not available to due to their  weekly education requirements, and A physician assistant was required during the procedure for retraction, tissue handling, dissection and suturing.     Patient Disposition:  PACU         SIGNATURE: Tatyana Stephens DO  DATE: October 27, 2023  TIME: 12:38 PM

## 2023-10-28 ENCOUNTER — TELEPHONE (OUTPATIENT)
Dept: INTERNAL MEDICINE CLINIC | Facility: OTHER | Age: 68
End: 2023-10-28

## 2023-10-28 ENCOUNTER — TRANSITIONAL CARE MANAGEMENT (OUTPATIENT)
Dept: INTERNAL MEDICINE CLINIC | Facility: OTHER | Age: 68
End: 2023-10-28

## 2023-10-28 NOTE — TELEPHONE ENCOUNTER
LMOM for pt to call me at [facility] office    Please schedule TCM on or before 11/10

## 2023-10-30 DIAGNOSIS — E05.90 HYPERTHYROIDISM: ICD-10-CM

## 2023-10-30 PROCEDURE — 88304 TISSUE EXAM BY PATHOLOGIST: CPT | Performed by: SPECIALIST

## 2023-10-30 RX ORDER — METHIMAZOLE 5 MG/1
5 TABLET ORAL 2 TIMES DAILY
Qty: 180 TABLET | Refills: 1 | Status: SHIPPED | OUTPATIENT
Start: 2023-10-30

## 2023-10-30 NOTE — TELEPHONE ENCOUNTER
According to phone note from 10/19/23, patient was to increase medication from once a day to twice a day. She is almost out of medication and needs an updated script sent to the pharmacy.

## 2023-10-31 PROCEDURE — 88304 TISSUE EXAM BY PATHOLOGIST: CPT | Performed by: PATHOLOGY

## 2023-11-08 ENCOUNTER — TELEPHONE (OUTPATIENT)
Dept: INTERNAL MEDICINE CLINIC | Facility: OTHER | Age: 68
End: 2023-11-08

## 2023-11-08 DIAGNOSIS — L71.9 ROSACEA: ICD-10-CM

## 2023-11-08 RX ORDER — DOXYCYCLINE HYCLATE 100 MG/1
100 CAPSULE ORAL EVERY 12 HOURS SCHEDULED
Qty: 180 CAPSULE | Refills: 1 | Status: SHIPPED | OUTPATIENT
Start: 2023-11-08 | End: 2024-05-06

## 2023-11-08 NOTE — TELEPHONE ENCOUNTER
Patient called needing a refill for DOXYCYCLINE HYCLATE PO [[id number]]   100mg 2x daily     Pharmacy: [facility]     NOV: 4/30/24

## 2023-11-14 ENCOUNTER — OFFICE VISIT (OUTPATIENT)
Dept: SURGERY | Facility: CLINIC | Age: 68
End: 2023-11-14

## 2023-11-14 VITALS
OXYGEN SATURATION: 97 % | RESPIRATION RATE: 75 BRPM | WEIGHT: 206.25 LBS | HEART RATE: 72 BPM | BODY MASS INDEX: 33.15 KG/M2 | HEIGHT: 66 IN | SYSTOLIC BLOOD PRESSURE: 130 MMHG | DIASTOLIC BLOOD PRESSURE: 95 MMHG

## 2023-11-14 DIAGNOSIS — K80.10 CCC (CHRONIC CALCULOUS CHOLECYSTITIS): ICD-10-CM

## 2023-11-14 DIAGNOSIS — K35.80 ACUTE APPENDICITIS: Primary | ICD-10-CM

## 2023-11-14 PROCEDURE — 99024 POSTOP FOLLOW-UP VISIT: CPT | Performed by: SURGERY

## 2023-11-14 NOTE — PROGRESS NOTES
Assessment/Plan:     Diagnoses and all orders for this visit:    Acute appendicitis    CCC (chronic calculous cholecystitis)        Status post laparoscopic appendectomy and status post laparoscopic cholecystectomy done as separate procedures. Patient has recovered from the surgery. Pathology report checked. Discharge and see as needed. Subjective:      Patient ID: Nely Daniels is a 79 y.o. female. HPI  Patient was admitted as an emergency on 10/25/2023 with acute appendicitis. Prior to that she had been having episodes of biliary colic for at least 2 months. She underwent a laparoscopic appendectomy and upon laparoscopy was also found to have an inflamed gallbladder for which a separate laparoscopic cholecystectomy was performed on 10/27/2023. Patient has recovered well from the surgeries. Her preoperative symptoms of biliary colic have resolved. Her incisional pain has resolved. She has no other complaints.     The following portions of the patient's history were reviewed and updated as appropriate: allergies, current medications, past family history, past medical history, past social history, past surgical history, and problem list.    Review of Systems    No fever  No GI complaints    Objective:    /95 (BP Location: Left arm, Patient Position: Sitting, Cuff Size: Standard)   Pulse 72   Resp (!) 75   Ht 5' 6" (1.676 m)   Wt 93.6 kg (206 lb 4 oz)   LMP 11/26/2015   SpO2 97%   BMI 33.29 kg/m²      Physical Exam    All laparoscopy incisions are well-healed

## 2023-11-27 ENCOUNTER — ANESTHESIA (OUTPATIENT)
Dept: ANESTHESIOLOGY | Facility: HOSPITAL | Age: 68
End: 2023-11-27

## 2023-11-27 ENCOUNTER — ANESTHESIA EVENT (OUTPATIENT)
Dept: ANESTHESIOLOGY | Facility: HOSPITAL | Age: 68
End: 2023-11-27

## 2023-11-27 DIAGNOSIS — B00.1 RECURRENT COLD SORES: ICD-10-CM

## 2023-11-27 RX ORDER — PENCICLOVIR 10 MG/G
CREAM TOPICAL DAILY PRN
Qty: 5 G | Refills: 5 | Status: SHIPPED | OUTPATIENT
Start: 2023-11-27

## 2023-11-29 ENCOUNTER — NURSE TRIAGE (OUTPATIENT)
Age: 68
End: 2023-11-29

## 2023-11-29 ENCOUNTER — TELEPHONE (OUTPATIENT)
Dept: GASTROENTEROLOGY | Facility: CLINIC | Age: 68
End: 2023-11-29

## 2023-11-29 NOTE — TELEPHONE ENCOUNTER
Regarding: procedure questions  ----- Message from [facility] sent at 11/29/2023  3:43 PM EST -----  Pt called stating she saw Dr Pedro Santos a month ago for abdominal pain. Pt stated she went to the ER with abd pain and had to have her gallbladder and appendix removed. Pt is asking if she should still follow thru with the colon/egd scheduled for 12/11/2023.

## 2023-11-29 NOTE — TELEPHONE ENCOUNTER
Please advise if you recommend pt proceeding with egd/colonoscopy, pt does want to postpone procedure/procedures since recent surgery. Thank you.

## 2023-11-30 NOTE — TELEPHONE ENCOUNTER
Called pt to relay information regarding egd and colonoscopy - asked for a return call to confirm the pt plans to have the colonoscopy on 12/11/23 as scheduled. Removed EGD.   LM

## 2023-11-30 NOTE — TELEPHONE ENCOUNTER
Dr. Kaylynn Crenshaw ,       Patient returned our call , patient would like to clarify if EGD is truly necessary given that her pain was from Her Gall bladder and Appendix , now they are removed patient has no more pain .       Thanks

## 2023-11-30 NOTE — TELEPHONE ENCOUNTER
Patients GI provider:  Dr. Vasquez Santos     Number to return call: [phone]    Reason for call: Pt calling requesting to speak with Kin Albino because she has questions    Scheduled procedure/appointment date if applicable: Apt/procedure

## 2023-11-30 NOTE — TELEPHONE ENCOUNTER
Scheduled date of colonoscopy (as of today): 02/01/2024  Physician performing colonoscopy: Dr. Vera Elena   Location of colonoscopy: [address] [facility],[address]  Bowel prep reviewed with patient: Miralax/Dulcolax   Instructions reviewed with patient by: Yovany Flores mailed updated inst. Sheet 11/30/2023  Clearances:  N/A     Pt was previously scheduled EGD/Colon for 12/11/2023, after consultation with Dr. Vera Elena moving forward the pt will have colon only. Colonoscopy rescheduled to 02/01/2024.

## 2023-12-03 PROBLEM — Z00.00 ENCOUNTER FOR INITIAL ANNUAL WELLNESS VISIT (AWV) IN MEDICARE PATIENT: Status: RESOLVED | Noted: 2023-10-04 | Resolved: 2023-12-03

## 2023-12-11 DIAGNOSIS — B00.1 COLD SORE: ICD-10-CM

## 2023-12-11 DIAGNOSIS — M79.18 MYOFASCIAL PAIN SYNDROME: ICD-10-CM

## 2023-12-11 DIAGNOSIS — F41.9 ANXIETY: ICD-10-CM

## 2023-12-11 RX ORDER — TIZANIDINE 4 MG/1
4 TABLET ORAL
Qty: 90 TABLET | Refills: 3 | Status: SHIPPED | OUTPATIENT
Start: 2023-12-11

## 2023-12-11 RX ORDER — VALACYCLOVIR HYDROCHLORIDE 500 MG/1
500 TABLET, FILM COATED ORAL DAILY PRN
Qty: 30 TABLET | Refills: 1 | OUTPATIENT
Start: 2023-12-11 | End: 2024-02-09

## 2023-12-11 NOTE — TELEPHONE ENCOUNTER
Pt said since the surgery she now only takes the tizanidine one tab at bedtime, none during the daytime. Pls send RF for just 1 tab at HS. Pt said to tell you that her sx was phenomenal, she is doing really well!

## 2023-12-12 RX ORDER — LORAZEPAM 1 MG/1
1.5 TABLET ORAL
Qty: 135 TABLET | Refills: 0 | Status: SHIPPED | OUTPATIENT
Start: 2023-12-12

## 2023-12-12 RX ORDER — VALACYCLOVIR HYDROCHLORIDE 500 MG/1
500 TABLET, FILM COATED ORAL DAILY PRN
Qty: 30 TABLET | Refills: 1 | Status: SHIPPED | OUTPATIENT
Start: 2023-12-12 | End: 2024-02-10

## 2023-12-27 DIAGNOSIS — I10 BENIGN ESSENTIAL HTN: ICD-10-CM

## 2023-12-27 RX ORDER — LISINOPRIL 20 MG/1
20 TABLET ORAL 2 TIMES DAILY
Qty: 180 TABLET | Refills: 1 | Status: SHIPPED | OUTPATIENT
Start: 2023-12-27

## 2024-01-08 DIAGNOSIS — R60.0 BILATERAL LEG EDEMA: ICD-10-CM

## 2024-01-08 RX ORDER — FUROSEMIDE 40 MG/1
40 TABLET ORAL DAILY
Qty: 90 TABLET | Refills: 1 | Status: SHIPPED | OUTPATIENT
Start: 2024-01-08

## 2024-01-10 ENCOUNTER — HOSPITAL ENCOUNTER (OUTPATIENT)
Dept: RADIOLOGY | Facility: MEDICAL CENTER | Age: 69
Discharge: HOME | End: 2024-01-10
Payer: MEDICARE

## 2024-01-10 PROCEDURE — 77080 DXA BONE DENSITY AXIAL: CPT

## 2024-01-18 ENCOUNTER — ANESTHESIA (OUTPATIENT)
Dept: ANESTHESIOLOGY | Facility: HOSPITAL | Age: 69
End: 2024-01-18

## 2024-01-18 ENCOUNTER — ANESTHESIA EVENT (OUTPATIENT)
Dept: ANESTHESIOLOGY | Facility: HOSPITAL | Age: 69
End: 2024-01-18

## 2024-01-29 ENCOUNTER — TELEPHONE (OUTPATIENT)
Age: 69
End: 2024-01-29

## 2024-01-29 NOTE — TELEPHONE ENCOUNTER
Patients GI provider:  Dr. Dacosta    Number to return call: [phone]    Reason for call: Pt called stating that she could not find her prep instructions for her upcoming procedure. Walked her through her my chart and she found them.    Scheduled procedure/appointment date if applicable: procedure 02/01/24

## 2024-01-31 ENCOUNTER — ANESTHESIA EVENT (OUTPATIENT)
Dept: ANESTHESIOLOGY | Facility: HOSPITAL | Age: 69
End: 2024-01-31

## 2024-01-31 ENCOUNTER — ANESTHESIA (OUTPATIENT)
Dept: ANESTHESIOLOGY | Facility: HOSPITAL | Age: 69
End: 2024-01-31

## 2024-01-31 NOTE — ANESTHESIA PREPROCEDURE EVALUATION
Procedure:  PRE-OP ONLY    Relevant Problems   CARDIO   (+) Benign essential HTN      ENDO   (+) Hyperthyroidism      MUSCULOSKELETAL   (+) Arthritis of right acromioclavicular joint   (+) Lumbar degenerative disc disease   (+) Myofascial pain syndrome   (+) Primary osteoarthritis of right knee      NEURO/PSYCH   (+) Anxiety   (+) Myofascial pain syndrome   (+) Pain syndrome, chronic      SIADH      Physical Exam    Airway    Mallampati score: II  TM Distance: >3 FB  Neck ROM: full     Dental   No notable dental hx     Cardiovascular  Rhythm: regular    Pulmonary   Breath sounds clear to auscultation    Other Findings  post-pubertal.      Anesthesia Plan  ASA Score- 2     Anesthesia Type- IV sedation with anesthesia with ASA Monitors.         Additional Monitors:     Airway Plan:            Plan Factors-Exercise tolerance (METS): >4 METS.    Chart reviewed. EKG reviewed. Imaging results reviewed. Existing labs reviewed. Patient summary reviewed.    Patient is not a current smoker. Patient not instructed to abstain from smoking on day of procedure. Patient did not smoke on day of surgery.    There is medical exclusion for perioperative obstructive sleep apnea risk education.        Induction- intravenous.    Postoperative Plan-     Informed Consent- Anesthetic plan and risks discussed with patient.  I personally reviewed this patient with the CRNA. Discussed and agreed on the Anesthesia Plan with the CRNA..

## 2024-02-01 ENCOUNTER — ANESTHESIA (OUTPATIENT)
Dept: GASTROENTEROLOGY | Facility: AMBULARY SURGERY CENTER | Age: 69
End: 2024-02-01

## 2024-02-01 ENCOUNTER — HOSPITAL ENCOUNTER (OUTPATIENT)
Dept: GASTROENTEROLOGY | Facility: AMBULARY SURGERY CENTER | Age: 69
Setting detail: OUTPATIENT SURGERY
End: 2024-02-01
Attending: INTERNAL MEDICINE
Payer: MEDICARE

## 2024-02-01 ENCOUNTER — ANESTHESIA EVENT (OUTPATIENT)
Dept: GASTROENTEROLOGY | Facility: AMBULARY SURGERY CENTER | Age: 69
End: 2024-02-01

## 2024-02-01 VITALS
RESPIRATION RATE: 19 BRPM | WEIGHT: 200 LBS | HEIGHT: 65 IN | HEART RATE: 58 BPM | SYSTOLIC BLOOD PRESSURE: 131 MMHG | TEMPERATURE: 97.8 F | DIASTOLIC BLOOD PRESSURE: 71 MMHG | BODY MASS INDEX: 33.32 KG/M2 | OXYGEN SATURATION: 100 %

## 2024-02-01 DIAGNOSIS — R10.13 EPIGASTRIC PAIN: ICD-10-CM

## 2024-02-01 DIAGNOSIS — Z12.11 ENCOUNTER FOR SCREENING FOR MALIGNANT NEOPLASM OF COLON: ICD-10-CM

## 2024-02-01 PROCEDURE — G0121 COLON CA SCRN NOT HI RSK IND: HCPCS | Performed by: INTERNAL MEDICINE

## 2024-02-01 RX ORDER — SODIUM CHLORIDE, SODIUM LACTATE, POTASSIUM CHLORIDE, CALCIUM CHLORIDE 600; 310; 30; 20 MG/100ML; MG/100ML; MG/100ML; MG/100ML
INJECTION, SOLUTION INTRAVENOUS CONTINUOUS PRN
Status: DISCONTINUED | OUTPATIENT
Start: 2024-02-01 | End: 2024-02-01

## 2024-02-01 RX ORDER — PROPOFOL 10 MG/ML
INJECTION, EMULSION INTRAVENOUS CONTINUOUS PRN
Status: DISCONTINUED | OUTPATIENT
Start: 2024-02-01 | End: 2024-02-01

## 2024-02-01 RX ORDER — PROPOFOL 10 MG/ML
INJECTION, EMULSION INTRAVENOUS AS NEEDED
Status: DISCONTINUED | OUTPATIENT
Start: 2024-02-01 | End: 2024-02-01

## 2024-02-01 RX ADMIN — SODIUM CHLORIDE, SODIUM LACTATE, POTASSIUM CHLORIDE, AND CALCIUM CHLORIDE: .6; .31; .03; .02 INJECTION, SOLUTION INTRAVENOUS at 12:15

## 2024-02-01 RX ADMIN — Medication 40 MG: at 12:23

## 2024-02-01 RX ADMIN — SODIUM CHLORIDE, SODIUM LACTATE, POTASSIUM CHLORIDE, AND CALCIUM CHLORIDE: .6; .31; .03; .02 INJECTION, SOLUTION INTRAVENOUS at 12:30

## 2024-02-01 RX ADMIN — PROPOFOL 100 MG: 10 INJECTION, EMULSION INTRAVENOUS at 12:19

## 2024-02-01 RX ADMIN — PROPOFOL 150 MCG/KG/MIN: 10 INJECTION, EMULSION INTRAVENOUS at 12:19

## 2024-02-01 NOTE — ANESTHESIA POSTPROCEDURE EVALUATION
Post-Op Assessment Note    CV Status:  Stable  Pain Score: 0    Pain management: adequate       Mental Status:  Alert and awake   Hydration Status:  Stable   PONV Controlled:  None   Airway Patency:  Patent     Post Op Vitals Reviewed: Yes    No anethesia notable event occurred.    Staff: CRNA               /64 (02/01/24 1238)    Temp 97.8 °F (36.6 °C) (02/01/24 1238)    Pulse 68 (02/01/24 1238)   Resp 16 (02/01/24 1238)    SpO2 97 % (02/01/24 1238)

## 2024-02-01 NOTE — H&P
History and Physical -  [facility]  Addis Segura 68 y.o. female MRN: [id number]                  HPI: Addis Segura is a 68 y.o. year old female who presents for colon cancer screening.      REVIEW OF SYSTEMS: Per the HPI, and otherwise unremarkable.    Historical Information   Past Medical History:   Diagnosis Date    Allergic     Anxiety     Asymptomatic premature menopause 5/11/2021    Bilateral leg edema 9/17/2021    Cataract, bilateral     last assessed    Cervical radiculopathy     Chronic right shoulder pain     COVID-19 05/15/2022    Disc disease, degenerative, cervical     Disease of thyroid gland     Encounter for gynecological examination (general) (routine) without abnormal findings 2/8/2021    Herpes simplex infection     Hypertension     Insomnia     Long term systemic steroid user 5/11/2021    Low back pain     Lumbar degenerative disc disease     Lumbar facet arthropathy     Lumbar radiculopathy     Lumbar stenosis without neurogenic claudication     Mononucleosis     Myofascial pain dysfunction syndrome     Obesity, morbid (HCC) 7/12/2022    Osteoarthritis     shoulder region - unspecified laterality - last assessed 2/1/14    Plantar fasciitis     Poison ivy dermatitis 6/1/2023    Ptosis, both eyelids     last assessed 10/6/16    Ptosis, congenital, left     Retinal detachment     last assessed 12/11/14 right eye    Sacroiliitis (HCC)     SIADH (syndrome of inappropriate ADH production) ([facility]) 12/30/2022    Thyroid disease     Thyrotoxicosis     last assessed 5/17/13    Vertigo     Weight gain 12/1/2022     Past Surgical History:   Procedure Laterality Date    APPENDECTOMY LAPAROSCOPIC N/A 10/25/2023    Procedure: APPENDECTOMY LAPAROSCOPIC;  Surgeon: Francisco Mayo DO;  [facility];  Service: General    CHOLECYSTECTOMY LAPAROSCOPIC N/A 10/27/2023    Procedure: CHOLECYSTECTOMY LAPAROSCOPIC;  Surgeon: Francisco Mayo DO;  [facility];  Service: General    EYE  SURGERY      MO RAYMUNDO IMPLTJ NSTIM ELTRDS PLATE/PADDLE EDRL Left 01/26/2016    Procedure: PLACEMENT OF THORACIC SPINAL CORD STIMULATOR WITH LEFT BUTTOCK IMPLANTABLE PULSE GENERATOR (IMPULSE MONITORING);  Surgeon: Sixto Felix MD;  [facility];  Service: Neurosurgery    RETINAL DETACHMENT SURGERY Right     SPINE SURGERY  Dec 22, 2022     Social History   Social History     Substance and Sexual Activity   Alcohol Use Not Currently    Alcohol/week: 1.0 standard drink of alcohol    Types: 1 Glasses of wine per week     Social History     Substance and Sexual Activity   Drug Use No     Social History     Tobacco Use   Smoking Status Never   Smokeless Tobacco Never     Family History   Problem Relation Age of Onset    Breast cancer Mother 81    COPD Mother     Hypertension Mother         essential    Cancer Mother     Glaucoma Mother     Heart attack Father         acute MI    Emphysema Father     Hypertension Father         essential    Other Father         prehypertension    COPD Father     No Known Problems Maternal Grandmother     No Known Problems Maternal Grandfather     No Known Problems Paternal Grandmother     No Known Problems Paternal Grandfather     No Known Problems Sister     No Known Problems Son     No Known Problems Sister     No Known Problems Paternal Aunt     No Known Problems Paternal Aunt     No Known Problems Paternal Aunt     No Known Problems Paternal Aunt     No Known Problems Paternal Aunt     Liver cancer Maternal Uncle        Meds/Allergies       Current Outpatient Medications:     amitriptyline (ELAVIL) 10 mg tablet    Ascorbic Acid (VITAMIN C PO)    atorvastatin (LIPITOR) 10 mg tablet    Cetirizine HCl (ZYRTEC PO)    Cholecalciferol 50 MCG (2000 UT) CAPS    [employer] Diclofenac Sodium 1 %    diclofenac (VOLTAREN) 75 mg EC tablet    doxycycline hyclate (VIBRAMYCIN) 100 mg capsule    fluticasone (FLONASE) 50 mcg/act nasal spray    furosemide (LASIX) 40 mg tablet    lisinopril (ZESTRIL) 20  mg tablet    LORazepam (ATIVAN) 1 mg tablet    methimazole (TAPAZOLE) 5 mg tablet    multivitamin (THERAGRAN) TABS    penciclovir (Denavir) 1 % cream    Polyethyl Glycol-Propyl Glycol (SYSTANE OP)    POTASSIUM PO    pregabalin (LYRICA) 50 mg capsule    Probiotic Product (PROBIOTIC PO)    propranolol (INDERAL) 10 mg tablet    RESTASIS 0.05 % ophthalmic emulsion    timolol (TIMOPTIC) 0.5 % ophthalmic solution    tiZANidine (ZANAFLEX) 4 mg tablet    valACYclovir (VALTREX) 500 mg tablet    Allergies   Allergen Reactions    Other Dermatitis     Adhesive tape - please use ONLY PAPER TAPE    Scopolamine Other (See Comments)     Severe, debilitating headache      Flexeril [Cyclobenzaprine] Dizziness and Fatigue    Naproxen      Other reaction(s): Unknown Allergic Reaction  Annotation - [id number]: nightmares    Oxycodone Other (See Comments)     Night olsen     Penicillins      Other reaction(s): Unknown Allergic Reaction  Annotation - [id number]: childhood reaction    Promethazine-Codeine      Reaction Date: 04May2011; Annotation - [id number]: nightmares; Annotation - [id number]: nightmares    Tramadol      Other reaction(s): Unknown Allergic Reaction  Annotation - 14Jan2014: PT HAS NIGHTMARES FROM TRAMADOL    Medical Tape Rash    Wound Dressing Adhesive Rash       Objective     LMP 11/26/2015       PHYSICAL EXAM    Gen: NAD  Head: NCAT  CV: RRR  CHEST: Clear  ABD: soft, NT/ND  EXT: no edema      ASSESSMENT/PLAN:  This is a 68 y.o. year old female here for colonoscopy, and she is stable and optimized for her procedure.

## 2024-02-01 NOTE — ANESTHESIA PREPROCEDURE EVALUATION
Procedure:  COLONOSCOPY  EGD    Relevant Problems   CARDIO   (+) Benign essential HTN      ENDO   (+) Hyperthyroidism      MUSCULOSKELETAL   (+) Arthritis of right acromioclavicular joint   (+) Lumbar degenerative disc disease   (+) Myofascial pain syndrome   (+) Primary osteoarthritis of right knee      NEURO/PSYCH   (+) Anxiety   (+) Myofascial pain syndrome   (+) Pain syndrome, chronic      SIADH      Physical Exam    Airway    Mallampati score: II  TM Distance: >3 FB  Neck ROM: full     Dental   No notable dental hx     Cardiovascular  Rhythm: regular    Pulmonary   Breath sounds clear to auscultation    Other Findings  post-pubertal.      Anesthesia Plan  ASA Score- 2     Anesthesia Type- IV sedation with anesthesia with ASA Monitors.         Additional Monitors:     Airway Plan:            Plan Factors-Exercise tolerance (METS): >4 METS.    Chart reviewed. EKG reviewed. Imaging results reviewed. Existing labs reviewed. Patient summary reviewed.    Patient is not a current smoker. Patient not instructed to abstain from smoking on day of procedure. Patient did not smoke on day of surgery.    There is medical exclusion for perioperative obstructive sleep apnea risk education.        Induction- intravenous.    Postoperative Plan-     Informed Consent- Anesthetic plan and risks discussed with patient.  I personally reviewed this patient with the CRNA. Discussed and agreed on the Anesthesia Plan with the CRNA..

## 2024-02-08 NOTE — DISCHARGE INSTR - LAB
Epidural Steroid Injection   WHAT YOU NEED TO KNOW:   An epidural steroid injection (PRATIK) is a procedure to inject steroid medicine into the epidural space  The epidural space is between your spinal cord and vertebrae  Steroids reduce inflammation and fluid buildup in your spine that may be causing pain  You may be given pain medicine along with the steroids  ACTIVITY  · Do not drive or operate machinery today  · No strenuous activity today - bending, lifting, etc   · You may resume normal activites starting tomorrow - start slowly and as tolerated  · You may shower today, but no tub baths or hot tubs  · You may have numbness for several hours from the local anesthetic  Please use caution and common sense, especially with weight-bearing activities  CARE OF THE INJECTION SITE  · If you have soreness or pain, apply ice to the area today (20 minutes on/20 minutes off)  · Starting tomorrow, you may use warm, moist heat or ice if needed  · You may have an increase or change in your discomfort for 36-48 hours after your treatment  · Apply ice and continue with any pain medication you have been prescribed  · Notify [facility] if you have any of the following: redness, drainage, swelling, headache, stiff neck or fever above 100°F     SPECIAL INSTRUCTIONS  · Our office will contact you in approximately 7 days for a progress report  MEDICATIONS  · Continue to take all routine medications  · Our office may have instructed you to hold some medications  If you have a problem specifically related to your procedure, please call our office at [phone]  Problems not related to your procedure should be directed to your primary care physician 
1 = Total assistance

## 2024-02-12 DIAGNOSIS — E05.90 HYPERTHYROIDISM: ICD-10-CM

## 2024-02-12 RX ORDER — PROPRANOLOL HYDROCHLORIDE 10 MG/1
10 TABLET ORAL 2 TIMES DAILY
Qty: 180 TABLET | Refills: 1 | Status: SHIPPED | OUTPATIENT
Start: 2024-02-12 | End: 2024-08-05

## 2024-02-12 RX ORDER — METHIMAZOLE 5 MG/1
5 TABLET ORAL 2 TIMES DAILY
Qty: 180 TABLET | Refills: 1 | Status: SHIPPED | OUTPATIENT
Start: 2024-02-12 | End: 2024-08-06 | Stop reason: SDUPTHER

## 2024-03-13 DIAGNOSIS — E78.00 HYPERCHOLESTEREMIA: ICD-10-CM

## 2024-03-13 RX ORDER — ATORVASTATIN CALCIUM 10 MG/1
10 TABLET, FILM COATED ORAL
Qty: 90 TABLET | Refills: 1 | Status: SHIPPED | OUTPATIENT
Start: 2024-03-13

## 2024-03-13 NOTE — TELEPHONE ENCOUNTER
[facility]/pharmacy #[id number] - [place], [place] - [address]     NOV: 4/30/24

## 2024-03-20 DIAGNOSIS — F41.9 ANXIETY: ICD-10-CM

## 2024-03-20 NOTE — TELEPHONE ENCOUNTER
Addended by: SERGE CROWE on: 10/13/2022 12:48 PM     Modules accepted: Orders     NOV 4/30/24

## 2024-03-20 NOTE — TELEPHONE ENCOUNTER
[facility]/pharmacy #[id number] - [place], [place] - [address]     NOV: 4/30/24

## 2024-03-21 RX ORDER — LORAZEPAM 1 MG/1
1.5 TABLET ORAL
Qty: 135 TABLET | Refills: 0 | Status: SHIPPED | OUTPATIENT
Start: 2024-03-21

## 2024-03-28 DIAGNOSIS — G89.4 PAIN SYNDROME, CHRONIC: ICD-10-CM

## 2024-03-28 DIAGNOSIS — M50.90 DISC DISORDER OF CERVICAL REGION: ICD-10-CM

## 2024-03-28 DIAGNOSIS — B00.1 COLD SORE: ICD-10-CM

## 2024-03-28 RX ORDER — DICLOFENAC SODIUM 75 MG/1
75 TABLET, DELAYED RELEASE ORAL 2 TIMES DAILY
Qty: 180 TABLET | Refills: 1 | Status: SHIPPED | OUTPATIENT
Start: 2024-03-28

## 2024-03-28 RX ORDER — VALACYCLOVIR HYDROCHLORIDE 500 MG/1
500 TABLET, FILM COATED ORAL DAILY PRN
Qty: 30 TABLET | Refills: 1 | Status: SHIPPED | OUTPATIENT
Start: 2024-03-28 | End: 2024-05-27

## 2024-04-04 DIAGNOSIS — M48.061 LUMBAR STENOSIS WITHOUT NEUROGENIC CLAUDICATION: ICD-10-CM

## 2024-04-04 DIAGNOSIS — M51.36 LUMBAR DEGENERATIVE DISC DISEASE: ICD-10-CM

## 2024-04-04 RX ORDER — AMITRIPTYLINE HYDROCHLORIDE 10 MG/1
10 TABLET, FILM COATED ORAL
Qty: 90 TABLET | Refills: 1 | Status: SHIPPED | OUTPATIENT
Start: 2024-04-04

## 2024-04-17 ENCOUNTER — OFFICE VISIT (OUTPATIENT)
Dept: INTERNAL MEDICINE CLINIC | Age: 69
End: 2024-04-17
Payer: MEDICARE

## 2024-04-17 VITALS
WEIGHT: 213 LBS | DIASTOLIC BLOOD PRESSURE: 84 MMHG | SYSTOLIC BLOOD PRESSURE: 122 MMHG | HEART RATE: 75 BPM | TEMPERATURE: 97.5 F | HEIGHT: 65 IN | BODY MASS INDEX: 35.49 KG/M2 | OXYGEN SATURATION: 98 %

## 2024-04-17 DIAGNOSIS — E66.01 OBESITY, MORBID (HCC): ICD-10-CM

## 2024-04-17 DIAGNOSIS — I10 BENIGN ESSENTIAL HTN: ICD-10-CM

## 2024-04-17 DIAGNOSIS — L23.9 ALLERGIC DERMATITIS: Primary | ICD-10-CM

## 2024-04-17 DIAGNOSIS — Z12.31 ENCOUNTER FOR SCREENING MAMMOGRAM FOR MALIGNANT NEOPLASM OF BREAST: ICD-10-CM

## 2024-04-17 PROBLEM — I73.9 PERIPHERAL VASCULAR DISEASE (HCC): Status: RESOLVED | Noted: 2023-04-05 | Resolved: 2024-04-17

## 2024-04-17 PROCEDURE — G2211 COMPLEX E/M VISIT ADD ON: HCPCS | Performed by: INTERNAL MEDICINE

## 2024-04-17 PROCEDURE — 99214 OFFICE O/P EST MOD 30 MIN: CPT | Performed by: INTERNAL MEDICINE

## 2024-04-17 RX ORDER — PREDNISONE 2.5 MG/1
TABLET ORAL
Qty: 30 TABLET | Refills: 0 | Status: SHIPPED | OUTPATIENT
Start: 2024-04-17

## 2024-04-17 NOTE — PROGRESS NOTES
Assessment/Plan:    Allergic dermatitis  -Will start patient on a low-dose prednisone taper, 10 mg daily for 3 days, followed by 7.5 mg daily for 3 days, followed by 5 mg daily for 3 days and then 2.5 mg daily for 3 days and then stop.  -She was counseled taking the prednisone in the morning and with food  -She was counseled to monitor herself for any new exposures that might have started prior to onset of the rash and to stop and discontinue them.    Essential hypertension  -Fairly well-controlled  -Monitor blood pressure while taking prednisone as this can raise her blood pressure  -Continue with lisinopril, furosemide, propranolol and a low-salt diet.    BMI 35.44  -Continue with diet and exercise.     Diagnoses and all orders for this visit:    Allergic dermatitis  -     predniSONE 2.5 mg tablet; Take 4 tabs in the am with food for 3 days, then 3 tabs daily for 3 days, then 2 tabs daily for 3 days then 1 tab daily for 3 days and stop.    Encounter for screening mammogram for malignant neoplasm of breast  -     Mammo screening bilateral w 3d & cad; Future    Obesity, morbid (HCC)    Benign essential HTN          Depression Screening and Follow-up Plan: Patient was screened for depression during today's encounter. They screened negative with a PHQ-2 score of 0.      Subjective:      Patient ID: Addis Segura is a 68 y.o. female.    HPI    Patient presents with complaints of a rash on her ankles and feet that started 2 days ago  She states that she had a pedicure 3 days ago prior to onset of the rash but states that the salon she went to is the same one she always goes to.  They did use a number of products on her feet.  Rash is itchy and has worsened since it started.  Has had this kind of rash before and usually happens after vacations   No rash any where else on her body.  She denies any choking, tongue swelling, difficulty swallowing, wheezing, cough or shortness of breath.  She denies using any new creams  or lotions or perfumes , detgergent  but she did a perdicure and used new soaps at the vacation she recently came back from.  She denies taking any new medications.  She states that the rash usually occurs when she is wearing shorts but this time she had her socks and shoes on during the vacation.  She states that put her shoes and socks are all old.  No hx of contact  Used epsom salt soak and hydrocortisone and it did not help.  She admits to having very sensitive skin and multiple allergies.      The following portions of the patient's history were reviewed and updated as appropriate: She  has a past medical history of Allergic, Anxiety, Asymptomatic premature menopause (5/11/2021), Bilateral leg edema (9/17/2021), Cataract, bilateral, Cervical radiculopathy, Chronic right shoulder pain, COVID-19 (05/15/2022), Disc disease, degenerative, cervical, Disease of thyroid gland, Encounter for gynecological examination (general) (routine) without abnormal findings (2/8/2021), Herpes simplex infection, Hypertension, Insomnia, Long term systemic steroid user (5/11/2021), Low back pain, Lumbar degenerative disc disease, Lumbar facet arthropathy, Lumbar radiculopathy, Lumbar stenosis without neurogenic claudication, Mononucleosis, Myofascial pain dysfunction syndrome, Obesity, morbid ([facility]) (7/12/2022), Osteoarthritis, Plantar fasciitis, Poison ivy dermatitis (6/1/2023), Ptosis, both eyelids, Ptosis, congenital, left, Retinal detachment, Sacroiliitis ([facility]), SIADH (syndrome of inappropriate ADH production) ([facility]) (12/30/2022), Thyroid disease, Thyrotoxicosis, Vertigo, and Weight gain (12/1/2022).  She   Patient Active Problem List    Diagnosis Date Noted   • Acute appendicitis 10/27/2023   • Acute cholecystitis 10/27/2023   • Sprain of left knee 04/05/2023   • Hyponatremia 12/30/2022   • Allergy to adhesive tape 12/30/2022   • Atrial enlargement, left 07/12/2022   • Obesity, morbid (HCC) 07/12/2022   • Osteopenia of multiple  sites 09/20/2021   • Primary insomnia 04/06/2020   • Hyperthyroidism 12/27/2019   • Calculus of gallbladder without cholecystitis without obstruction 10/25/2019   • Adverse effect of adrenal cortical steroids, initial encounter 08/30/2019   • Arthritis of right acromioclavicular joint    • Spondylolisthesis of lumbar region 11/08/2018   • Bulge of lumbar disc without myelopathy 10/15/2018   • Rosacea, acne 04/13/2018   • BMI 36.0-36.9,adult 12/27/2017   • Primary osteoarthritis of right knee 11/28/2017   • Lumbar facet arthropathy 07/22/2015   • Pain syndrome, chronic 01/06/2015   • Benign essential HTN 06/20/2014   • Lumbar degenerative disc disease 05/12/2014   • Lumbar stenosis without neurogenic claudication 05/12/2014   • Disc disorder of cervical region 03/10/2014   • Myofascial pain syndrome 03/10/2014   • Anxiety 12/28/2012     She  has a past surgical history that includes Retinal detachment surgery (Right); pr segundo impltj nstim eltrds plate/paddle edrl (Left, 01/26/2016); Eye surgery; Spine surgery (Dec 22, 2022); APPENDECTOMY LAPAROSCOPIC (N/A, 10/25/2023); CHOLECYSTECTOMY LAPAROSCOPIC (N/A, 10/27/2023); Colonoscopy; and Cataract extraction.  Her family history includes Breast cancer (age of onset: 81) in her mother; COPD in her father and mother; Cancer in her mother; Emphysema in her father; Glaucoma in her mother; Heart attack in her father; Hypertension in her father and mother; Liver cancer in her maternal uncle; No Known Problems in her maternal grandfather, maternal grandmother, paternal aunt, paternal aunt, paternal aunt, paternal aunt, paternal aunt, paternal grandfather, paternal grandmother, sister, sister, and son; Other in her father.  She  reports that she has never smoked. She has never used smokeless tobacco. She reports that she does not currently use alcohol after a past usage of about 1.0 standard drink of alcohol per week. She reports that she does not use drugs.  Current Outpatient  Medications   Medication Sig Dispense Refill   • amitriptyline (ELAVIL) 10 mg tablet Take 1 tablet (10 mg total) by mouth daily at bedtime 90 tablet 1   • Ascorbic Acid (VITAMIN C PO) Take 1 tablet by mouth daily     • atorvastatin (LIPITOR) 10 mg tablet Take 1 tablet (10 mg total) by mouth daily at bedtime 90 tablet 1   • Cetirizine HCl (ZYRTEC PO) Take by mouth as needed     • Cholecalciferol 50 MCG (2000 UT) CAPS Take 1 capsule by mouth every morning     • [employer] Diclofenac Sodium 1 % APPLY 2 GRAMS TO AFFECTED AREA 4 TIMES A  g 1   • diclofenac (VOLTAREN) 75 mg EC tablet Take 1 tablet (75 mg total) by mouth 2 (two) times a day 180 tablet 1   • doxycycline hyclate (VIBRAMYCIN) 100 mg capsule Take 1 capsule (100 mg total) by mouth every 12 (twelve) hours 180 capsule 1   • fluticasone (FLONASE) 50 mcg/act nasal spray 1 spray into each nostril daily (Patient taking differently: 1 spray into each nostril as needed) 16 g 3   • furosemide (LASIX) 40 mg tablet Take 1 tablet (40 mg total) by mouth daily 90 tablet 1   • lisinopril (ZESTRIL) 20 mg tablet Take 1 tablet (20 mg total) by mouth 2 (two) times a day 180 tablet 1   • LORazepam (ATIVAN) 1 mg tablet Take 1.5 tablets (1.5 mg total) by mouth daily at bedtime as needed for anxiety 135 tablet 0   • methimazole (TAPAZOLE) 5 mg tablet Take 1 tablet (5 mg total) by mouth 2 (two) times a day 180 tablet 1   • multivitamin (THERAGRAN) TABS Take 1 tablet by mouth every morning     • Polyethyl Glycol-Propyl Glycol (SYSTANE OP) Apply to eye if needed     • POTASSIUM PO Take 1 tablet by mouth in the morning     • predniSONE 2.5 mg tablet Take 4 tabs in the am with food for 3 days, then 3 tabs daily for 3 days, then 2 tabs daily for 3 days then 1 tab daily for 3 days and stop. 30 tablet 0   • pregabalin (LYRICA) 50 mg capsule Take 1 capsule (50 mg total) by mouth 2 (two) times a day (Patient taking differently: Take 50 mg by mouth daily) 180 capsule 1   • Probiotic Product  (PROBIOTIC PO) Take 1 capsule by mouth every morning     • propranolol (INDERAL) 10 mg tablet Take 1 tablet (10 mg total) by mouth 2 (two) times a day 180 tablet 1   • RESTASIS 0.05 % ophthalmic emulsion Administer 1 drop to both eyes 2 (two) times a day  3   • timolol (TIMOPTIC) 0.5 % ophthalmic solution INSTILL 1 DROP INTO RIGHT EYE EVERY MORNING     • tiZANidine (ZANAFLEX) 4 mg tablet Take 1 tablet (4 mg total) by mouth daily at bedtime 90 tablet 3   • valACYclovir (VALTREX) 500 mg tablet Take 1 tablet (500 mg total) by mouth daily as needed (Cold Sores) 30 tablet 1   • penciclovir (Denavir) 1 % cream Apply topically daily as needed (ulcer) (Patient not taking: Reported on 4/17/2024) 5 g 5     No current facility-administered medications for this visit.     Current Outpatient Medications on File Prior to Visit   Medication Sig   • amitriptyline (ELAVIL) 10 mg tablet Take 1 tablet (10 mg total) by mouth daily at bedtime   • Ascorbic Acid (VITAMIN C PO) Take 1 tablet by mouth daily   • atorvastatin (LIPITOR) 10 mg tablet Take 1 tablet (10 mg total) by mouth daily at bedtime   • Cetirizine HCl (ZYRTEC PO) Take by mouth as needed   • Cholecalciferol 50 MCG (2000 UT) CAPS Take 1 capsule by mouth every morning   • CVS Diclofenac Sodium 1 % APPLY 2 GRAMS TO AFFECTED AREA 4 TIMES A DAY   • diclofenac (VOLTAREN) 75 mg EC tablet Take 1 tablet (75 mg total) by mouth 2 (two) times a day   • doxycycline hyclate (VIBRAMYCIN) 100 mg capsule Take 1 capsule (100 mg total) by mouth every 12 (twelve) hours   • fluticasone (FLONASE) 50 mcg/act nasal spray 1 spray into each nostril daily (Patient taking differently: 1 spray into each nostril as needed)   • furosemide (LASIX) 40 mg tablet Take 1 tablet (40 mg total) by mouth daily   • lisinopril (ZESTRIL) 20 mg tablet Take 1 tablet (20 mg total) by mouth 2 (two) times a day   • LORazepam (ATIVAN) 1 mg tablet Take 1.5 tablets (1.5 mg total) by mouth daily at bedtime as needed for  anxiety   • methimazole (TAPAZOLE) 5 mg tablet Take 1 tablet (5 mg total) by mouth 2 (two) times a day   • multivitamin (THERAGRAN) TABS Take 1 tablet by mouth every morning   • Polyethyl Glycol-Propyl Glycol (SYSTANE OP) Apply to eye if needed   • POTASSIUM PO Take 1 tablet by mouth in the morning   • pregabalin (LYRICA) 50 mg capsule Take 1 capsule (50 mg total) by mouth 2 (two) times a day (Patient taking differently: Take 50 mg by mouth daily)   • Probiotic Product (PROBIOTIC PO) Take 1 capsule by mouth every morning   • propranolol (INDERAL) 10 mg tablet Take 1 tablet (10 mg total) by mouth 2 (two) times a day   • RESTASIS 0.05 % ophthalmic emulsion Administer 1 drop to both eyes 2 (two) times a day   • timolol (TIMOPTIC) 0.5 % ophthalmic solution INSTILL 1 DROP INTO RIGHT EYE EVERY MORNING   • tiZANidine (ZANAFLEX) 4 mg tablet Take 1 tablet (4 mg total) by mouth daily at bedtime   • valACYclovir (VALTREX) 500 mg tablet Take 1 tablet (500 mg total) by mouth daily as needed (Cold Sores)   • penciclovir (Denavir) 1 % cream Apply topically daily as needed (ulcer) (Patient not taking: Reported on 4/17/2024)     No current facility-administered medications on file prior to visit.     She is allergic to other, scopolamine, flexeril [cyclobenzaprine], naproxen, oxycodone, penicillins, promethazine-codeine, tramadol, medical tape, and wound dressing adhesive..    Review of Systems   Constitutional:  Negative for activity change, chills, fatigue, fever and unexpected weight change.   HENT:  Negative for ear pain, postnasal drip, rhinorrhea, sinus pressure and sore throat.    Eyes:  Negative for pain.   Respiratory:  Negative for cough, choking, chest tightness, shortness of breath and wheezing.    Cardiovascular:  Negative for chest pain, palpitations and leg swelling.   Gastrointestinal:  Negative for abdominal pain, constipation, diarrhea, nausea and vomiting.   Genitourinary:  Negative for dysuria and hematuria.  "  Musculoskeletal:  Negative for arthralgias, back pain, gait problem, joint swelling, myalgias and neck stiffness.   Skin:  Positive for rash. Negative for pallor.   Neurological:  Negative for dizziness, tremors, seizures, syncope, light-headedness and headaches.   Hematological:  Negative for adenopathy.   Psychiatric/Behavioral:  Negative for behavioral problems.          Objective:      /84 (BP Location: Left arm, Patient Position: Sitting, Cuff Size: Standard)   Pulse 75   Temp 97.5 °F (36.4 °C) (Temporal)   Ht 5' 5\" (1.651 m)   Wt 96.6 kg (213 lb)   LMP 11/26/2015   SpO2 98%   BMI 35.44 kg/m²          Physical Exam  Constitutional:       General: She is not in acute distress.     Appearance: She is well-developed. She is obese. She is not diaphoretic.      Comments: BMI is 35.44   HENT:      Head: Normocephalic and atraumatic.      Right Ear: External ear normal.      Left Ear: External ear normal.      Nose: Nose normal.      Mouth/Throat:      Mouth: Mucous membranes are dry.      Pharynx: Posterior oropharyngeal erythema present. No oropharyngeal exudate.   Eyes:      General: No scleral icterus.        Right eye: No discharge.         Left eye: No discharge.      Conjunctiva/sclera: Conjunctivae normal.      Pupils: Pupils are equal, round, and reactive to light.   Neck:      Thyroid: No thyromegaly.      Vascular: No JVD.      Trachea: No tracheal deviation.   Cardiovascular:      Rate and Rhythm: Normal rate and regular rhythm.      Heart sounds: Normal heart sounds. No murmur heard.     No friction rub. No gallop.   Pulmonary:      Effort: Pulmonary effort is normal. No respiratory distress.      Breath sounds: Normal breath sounds. No wheezing or rales.   Chest:      Chest wall: No tenderness.   Abdominal:      General: Bowel sounds are normal. There is no distension.      Palpations: Abdomen is soft. There is no mass.      Tenderness: There is no abdominal tenderness. There is no " guarding or rebound.   Musculoskeletal:         General: No tenderness or deformity. Normal range of motion.      Cervical back: Normal range of motion and neck supple.      Left ankle: Swelling (swelling of the left ankle with an erythematous rash on the ankle and dorsum of the foot) present.   Lymphadenopathy:      Cervical: No cervical adenopathy.   Skin:     General: Skin is warm and dry.      Coloration: Skin is not pale.      Findings: Rash (erythematous rash on the b/l feet and ankle in a sock like distribution with some coalesing around the ankle) present. No erythema.   Neurological:      Mental Status: She is alert and oriented to person, place, and time.      Cranial Nerves: No cranial nerve deficit.      Motor: No abnormal muscle tone.      Coordination: Coordination normal.      Deep Tendon Reflexes: Reflexes are normal and symmetric.   Psychiatric:         Behavior: Behavior normal.           Admission on 10/24/2023, Discharged on 10/27/2023   Component Date Value Ref Range Status   • WBC 10/24/2023 15.75 (H)  4.31 - 10.16 Thousand/uL Final   • RBC 10/24/2023 4.58  3.81 - 5.12 Million/uL Final   • Hemoglobin 10/24/2023 13.1  11.5 - 15.4 g/dL Final   • Hematocrit 10/24/2023 40.0  34.8 - 46.1 % Final   • MCV 10/24/2023 87  82 - 98 fL Final   • MCH 10/24/2023 28.6  26.8 - 34.3 pg Final   • MCHC 10/24/2023 32.8  31.4 - 37.4 g/dL Final   • RDW 10/24/2023 13.8  11.6 - 15.1 % Final   • MPV 10/24/2023 10.1  8.9 - 12.7 fL Final   • Platelets 10/24/2023 305  149 - 390 Thousands/uL Final   • nRBC 10/24/2023 0  /100 WBCs Final   • Segmented % 10/24/2023 84 (H)  43 - 75 % Final   • Immature Grans % 10/24/2023 0  0 - 2 % Final   • Lymphocytes % 10/24/2023 6 (L)  14 - 44 % Final   • Monocytes % 10/24/2023 10  4 - 12 % Final   • Eosinophils Relative 10/24/2023 0  0 - 6 % Final   • Basophils Relative 10/24/2023 0  0 - 1 % Final   • Absolute Neutrophils 10/24/2023 13.15 (H)  1.85 - 7.62 Thousands/µL Final   • Absolute  Immature Grans 10/24/2023 0.06  0.00 - 0.20 Thousand/uL Final   • Absolute Lymphocytes 10/24/2023 0.88  0.60 - 4.47 Thousands/µL Final   • Absolute Monocytes 10/24/2023 1.59 (H)  0.17 - 1.22 Thousand/µL Final   • Eosinophils Absolute 10/24/2023 0.03  0.00 - 0.61 Thousand/µL Final   • Basophils Absolute 10/24/2023 0.04  0.00 - 0.10 Thousands/µL Final   • Sodium 10/24/2023 135  135 - 147 mmol/L Final   • Potassium 10/24/2023 3.6  3.5 - 5.3 mmol/L Final   • Chloride 10/24/2023 98  96 - 108 mmol/L Final   • CO2 10/24/2023 24  21 - 32 mmol/L Final   • ANION GAP 10/24/2023 13  mmol/L Final   • BUN 10/24/2023 10  5 - 25 mg/dL Final   • Creatinine 10/24/2023 0.59 (L)  0.60 - 1.30 mg/dL Final    Standardized to IDMS reference method   • Glucose 10/24/2023 97  65 - 140 mg/dL Final    If the patient is fasting, the ADA then defines impaired fasting glucose as > 100 mg/dL and diabetes as > or equal to 123 mg/dL.   • Calcium 10/24/2023 10.0  8.4 - 10.2 mg/dL Final   • AST 10/24/2023 37  13 - 39 U/L Final   • ALT 10/24/2023 34  7 - 52 U/L Final    Specimen collection should occur prior to Sulfasalazine administration due to the potential for falsely depressed results.    • Alkaline Phosphatase 10/24/2023 124 (H)  34 - 104 U/L Final   • Total Protein 10/24/2023 7.3  6.4 - 8.4 g/dL Final   • Albumin 10/24/2023 4.3  3.5 - 5.0 g/dL Final   • Total Bilirubin 10/24/2023 1.31 (H)  0.20 - 1.00 mg/dL Final    Use of this assay is not recommended for patients undergoing treatment with eltrombopag due to the potential for falsely elevated results.  N-acetyl-p-benzoquinone imine (metabolite of Acetaminophen) will generate erroneously low results in samples for patients that have taken an overdose of Acetaminophen.   • eGFR 10/24/2023 95  ml/min/1.73sq m Final   • Platelets 10/25/2023 270  149 - 390 Thousands/uL Final   • MPV 10/25/2023 10.1  8.9 - 12.7 fL Final   • Case Report 10/25/2023    Final                    Value:Surgical Pathology  Report                         Case: [id number]                                   Authorizing Provider:  Francisco Mayo DO          Collected:           10/25/2023 1025              Ordering Location:     [facility]        Received:            10/25/2023 [address] [place] [address] Operating Room                                                      Pathologist:           Jordyn Wilks MD                                                     Specimen:    Appendix                                                                                  • Final Diagnosis 10/25/2023    Final                    Value:This result contains rich text formatting which cannot be displayed here.   • Additional Information 10/25/2023    Final                    Value:This result contains rich text formatting which cannot be displayed here.   • Gross Description 10/25/2023    Final                    Value:This result contains rich text formatting which cannot be displayed here.   • ABO Grouping 10/26/2023 A   Final   • Rh Factor 10/26/2023 Positive   Final   • Antibody Screen 10/26/2023 Negative   Final   • Specimen Expiration Date 10/26/2023 [id number]   Final   • ABO Grouping 10/27/2023 A   Final   • Rh Factor 10/27/2023 Positive   Final   • WBC 10/27/2023 6.56  4.31 - 10.16 Thousand/uL Final   • RBC 10/27/2023 3.65 (L)  3.81 - 5.12 Million/uL Final   • Hemoglobin 10/27/2023 10.6 (L)  11.5 - 15.4 g/dL Final   • Hematocrit 10/27/2023 32.8 (L)  34.8 - 46.1 % Final   • MCV 10/27/2023 90  82 - 98 fL Final   • MCH 10/27/2023 29.0  26.8 - 34.3 pg Final   • MCHC 10/27/2023 32.3  31.4 - 37.4 g/dL Final   • RDW 10/27/2023 14.3  11.6 - 15.1 % Final   • MPV 10/27/2023 10.2  8.9 - 12.7 fL Final   • Platelets 10/27/2023 224  149 - 390 Thousands/uL Final   • nRBC 10/27/2023 0  /100 WBCs Final   • Segmented % 10/27/2023 55  43 - 75 % Final   • Immature Grans % 10/27/2023 0  0 - 2 % Final   • Lymphocytes % 10/27/2023  33  14 - 44 % Final   • Monocytes % 10/27/2023 11  4 - 12 % Final   • Eosinophils Relative 10/27/2023 0  0 - 6 % Final   • Basophils Relative 10/27/2023 1  0 - 1 % Final   • Absolute Neutrophils 10/27/2023 3.61  1.85 - 7.62 Thousands/µL Final   • Absolute Immature Grans 10/27/2023 0.02  0.00 - 0.20 Thousand/uL Final   • Absolute Lymphocytes 10/27/2023 2.15  0.60 - 4.47 Thousands/µL Final   • Absolute Monocytes 10/27/2023 0.74  0.17 - 1.22 Thousand/µL Final   • Eosinophils Absolute 10/27/2023 0.01  0.00 - 0.61 Thousand/µL Final   • Basophils Absolute 10/27/2023 0.03  0.00 - 0.10 Thousands/µL Final   • Sodium 10/27/2023 139  135 - 147 mmol/L Final   • Potassium 10/27/2023 3.3 (L)  3.5 - 5.3 mmol/L Final   • Chloride 10/27/2023 107  96 - 108 mmol/L Final   • CO2 10/27/2023 25  21 - 32 mmol/L Final   • ANION GAP 10/27/2023 7  mmol/L Final   • BUN 10/27/2023 10  5 - 25 mg/dL Final   • Creatinine 10/27/2023 0.47 (L)  0.60 - 1.30 mg/dL Final    Standardized to IDMS reference method   • Glucose 10/27/2023 115  65 - 140 mg/dL Final    If the patient is fasting, the ADA then defines impaired fasting glucose as > 100 mg/dL and diabetes as > or equal to 123 mg/dL.   • Calcium 10/27/2023 8.7  8.4 - 10.2 mg/dL Final   • Corrected Calcium 10/27/2023 9.3  8.3 - 10.1 mg/dL Final   • AST 10/27/2023 28  13 - 39 U/L Final   • ALT 10/27/2023 24  7 - 52 U/L Final    Specimen collection should occur prior to Sulfasalazine administration due to the potential for falsely depressed results.    • Alkaline Phosphatase 10/27/2023 84  34 - 104 U/L Final   • Total Protein 10/27/2023 5.5 (L)  6.4 - 8.4 g/dL Final   • Albumin 10/27/2023 3.3 (L)  3.5 - 5.0 g/dL Final   • Total Bilirubin 10/27/2023 1.02 (H)  0.20 - 1.00 mg/dL Final    Use of this assay is not recommended for patients undergoing treatment with eltrombopag due to the potential for falsely elevated results.  N-acetyl-p-benzoquinone imine (metabolite of Acetaminophen) will generate  erroneously low results in samples for patients that have taken an overdose of Acetaminophen.   • eGFR 10/27/2023 103  ml/min/1.73sq m Final   • Case Report 10/27/2023    Final                    Value:Surgical Pathology Report                         Case: [id number]                                   Authorizing Provider:  Francisco Mayo DO          Collected:           10/27/2023 1146              Ordering Location:     [facility]        Received:            10/27/2023 [address] [place] [address] Operating Room                                                      Pathologist:           Tyrone Leal MD                                                          Specimen:    Gallbladder                                                                               • Final Diagnosis 10/27/2023    Final                    Value:This result contains rich text formatting which cannot be displayed here.   • Additional Information 10/27/2023    Final                    Value:This result contains rich text formatting which cannot be displayed here.   • Gross Description 10/27/2023    Final                    Value:This result contains rich text formatting which cannot be displayed here.   Appointment on 10/18/2023   Component Date Value Ref Range Status   • TSH 3RD GENERATON 10/18/2023 <0.010 (L)  0.450 - 4.500 uIU/mL Final    The recommended reference ranges for TSH during pregnancy are as follows:   First trimester 0.100 to 2.500 uIU/mL   Second trimester  0.200 to 3.000 uIU/mL   Third trimester 0.300 to 3.000 uIU/m    Note: Normal ranges may not apply to patients who are transgender, non-binary, or whose legal sex, sex at birth, and gender identity differ.  Adult TSH (3rd generation) reference range follows the recommended guidelines of the [employer], January, 2020.   • Free T4 10/18/2023 2.20 (H)  0.61 - 1.12 ng/dL Final    Specimens with biotin concentrations > 10 ng/mL  can lead to significant (> 10%) positive bias in result.   Appointment on 08/28/2023   Component Date Value Ref Range Status   • WBC 08/28/2023 6.25  4.31 - 10.16 Thousand/uL Final   • RBC 08/28/2023 4.11  3.81 - 5.12 Million/uL Final   • Hemoglobin 08/28/2023 11.9  11.5 - 15.4 g/dL Final   • Hematocrit 08/28/2023 38.6  34.8 - 46.1 % Final   • MCV 08/28/2023 94  82 - 98 fL Final   • MCH 08/28/2023 29.0  26.8 - 34.3 pg Final   • MCHC 08/28/2023 30.8 (L)  31.4 - 37.4 g/dL Final   • RDW 08/28/2023 14.0  11.6 - 15.1 % Final   • MPV 08/28/2023 10.2  8.9 - 12.7 fL Final   • Platelets 08/28/2023 353  149 - 390 Thousands/uL Final   • nRBC 08/28/2023 0  /100 WBCs Final   • Segmented % 08/28/2023 65  43 - 75 % Final   • Immature Grans % 08/28/2023 1  0 - 2 % Final   • Lymphocytes % 08/28/2023 21  14 - 44 % Final   • Monocytes % 08/28/2023 10  4 - 12 % Final   • Eosinophils Relative 08/28/2023 2  0 - 6 % Final   • Basophils Relative 08/28/2023 1  0 - 1 % Final   • Absolute Neutrophils 08/28/2023 4.15  1.85 - 7.62 Thousands/µL Final   • Absolute Immature Grans 08/28/2023 0.03  0.00 - 0.20 Thousand/uL Final   • Absolute Lymphocytes 08/28/2023 1.29  0.60 - 4.47 Thousands/µL Final   • Absolute Monocytes 08/28/2023 0.64  0.17 - 1.22 Thousand/µL Final   • Eosinophils Absolute 08/28/2023 0.10  0.00 - 0.61 Thousand/µL Final   • Basophils Absolute 08/28/2023 0.04  0.00 - 0.10 Thousands/µL Final

## 2024-04-23 ENCOUNTER — TELEPHONE (OUTPATIENT)
Age: 69
End: 2024-04-23

## 2024-04-23 NOTE — TELEPHONE ENCOUNTER
Patient called in to reschedule her appointment and ask about labs. I did advise her that there are labs ordered. She will complete them prior to her visit.

## 2024-05-08 DIAGNOSIS — L71.9 ROSACEA: ICD-10-CM

## 2024-05-08 RX ORDER — DOXYCYCLINE HYCLATE 100 MG/1
100 CAPSULE ORAL EVERY 12 HOURS
Qty: 180 CAPSULE | Refills: 1 | OUTPATIENT
Start: 2024-05-08

## 2024-05-09 ENCOUNTER — TELEPHONE (OUTPATIENT)
Age: 69
End: 2024-05-09

## 2024-05-09 DIAGNOSIS — L71.9 ROSACEA, ACNE: Primary | ICD-10-CM

## 2024-05-09 RX ORDER — DOXYCYCLINE HYCLATE 100 MG/1
100 CAPSULE ORAL EVERY 12 HOURS SCHEDULED
Qty: 180 CAPSULE | Refills: 0 | Status: SHIPPED | OUTPATIENT
Start: 2024-05-09 | End: 2024-08-07

## 2024-05-09 NOTE — TELEPHONE ENCOUNTER
Addis De La Rosa    Patient is asking for a new script for the medication below:    doxycycline hyclate (VIBRAMYCIN) 100 mg     She wasn't able to order on line, pharmacy tried to order it but was denied.    Pharmacy:  [facility]/pharmacy #[id number] - [place], [place] - [address]  [address], [place] [address]  Phone: [phone]  Fax: [phone]  MIGUEL #: [id number]     CB: [phone]

## 2024-05-16 ENCOUNTER — TELEPHONE (OUTPATIENT)
Dept: ADMINISTRATIVE | Facility: OTHER | Age: 69
End: 2024-05-16

## 2024-05-16 NOTE — TELEPHONE ENCOUNTER
05/16/24 2:20 PM    Patient contacted (left message) to bring Advance Directive, POLST, or Living Will document to next scheduled pcp visit.    Thank you.  Mackenzie Lemus [facility]

## 2024-05-17 ENCOUNTER — OFFICE VISIT (OUTPATIENT)
Dept: INTERNAL MEDICINE CLINIC | Facility: OTHER | Age: 69
End: 2024-05-17
Payer: MEDICARE

## 2024-05-17 VITALS
WEIGHT: 216.2 LBS | DIASTOLIC BLOOD PRESSURE: 70 MMHG | HEIGHT: 65 IN | OXYGEN SATURATION: 97 % | TEMPERATURE: 96.7 F | BODY MASS INDEX: 36.02 KG/M2 | HEART RATE: 75 BPM | SYSTOLIC BLOOD PRESSURE: 114 MMHG

## 2024-05-17 DIAGNOSIS — E05.90 HYPERTHYROIDISM: ICD-10-CM

## 2024-05-17 DIAGNOSIS — E55.9 VITAMIN D DEFICIENCY: ICD-10-CM

## 2024-05-17 DIAGNOSIS — E66.01 OBESITY, MORBID (HCC): ICD-10-CM

## 2024-05-17 DIAGNOSIS — I51.7 ATRIAL ENLARGEMENT, LEFT: ICD-10-CM

## 2024-05-17 DIAGNOSIS — R73.09 ABNORMAL GLUCOSE: Primary | ICD-10-CM

## 2024-05-17 DIAGNOSIS — E87.1 HYPONATREMIA: ICD-10-CM

## 2024-05-17 DIAGNOSIS — L71.9 ROSACEA, ACNE: ICD-10-CM

## 2024-05-17 DIAGNOSIS — M85.89 OSTEOPENIA OF MULTIPLE SITES: ICD-10-CM

## 2024-05-17 DIAGNOSIS — G89.4 PAIN SYNDROME, CHRONIC: ICD-10-CM

## 2024-05-17 DIAGNOSIS — Z13.220 SCREENING CHOLESTEROL LEVEL: ICD-10-CM

## 2024-05-17 DIAGNOSIS — E78.00 HYPERCHOLESTEREMIA: ICD-10-CM

## 2024-05-17 DIAGNOSIS — I10 BENIGN ESSENTIAL HTN: ICD-10-CM

## 2024-05-17 PROBLEM — S83.92XA SPRAIN OF LEFT KNEE: Status: RESOLVED | Noted: 2023-04-05 | Resolved: 2024-05-17

## 2024-05-17 PROBLEM — K81.0 ACUTE CHOLECYSTITIS: Status: RESOLVED | Noted: 2023-10-27 | Resolved: 2024-05-17

## 2024-05-17 PROBLEM — K35.80 ACUTE APPENDICITIS: Status: RESOLVED | Noted: 2023-10-27 | Resolved: 2024-05-17

## 2024-05-17 PROBLEM — T38.0X5A ADVERSE EFFECT OF ADRENAL CORTICAL STEROIDS, INITIAL ENCOUNTER: Status: RESOLVED | Noted: 2019-08-30 | Resolved: 2024-05-17

## 2024-05-17 PROCEDURE — G2211 COMPLEX E/M VISIT ADD ON: HCPCS | Performed by: FAMILY MEDICINE

## 2024-05-17 PROCEDURE — 99214 OFFICE O/P EST MOD 30 MIN: CPT | Performed by: FAMILY MEDICINE

## 2024-05-17 RX ORDER — PHENTERMINE HYDROCHLORIDE 37.5 MG/1
37.5 TABLET ORAL DAILY
Qty: 90 TABLET | Refills: 1 | Status: SHIPPED | OUTPATIENT
Start: 2024-05-17

## 2024-05-17 NOTE — PROGRESS NOTES
Assessment/Plan:    1. Abnormal glucose  -     Hemoglobin A1C; Future  -     Comprehensive metabolic panel; Future  -     CBC and differential; Future  -     Hemoglobin A1C; Future; Expected date: 11/17/2024  -     Comprehensive metabolic panel; Future; Expected date: 11/17/2024  2. Benign essential HTN  3. Hyperthyroidism  -     TSH, 3rd generation with Free T4 reflex; Future  -     TSH, 3rd generation with Free T4 reflex; Future; Expected date: 11/17/2024  4. Osteopenia of multiple sites  -     Vitamin D 25 hydroxy; Future  5. Hyponatremia  -     Comprehensive metabolic panel; Future  6. Screening cholesterol level  -     Lipid Panel with Direct LDL reflex; Future  7. Vitamin D deficiency  -     Vitamin D 25 hydroxy; Future  8. Hypercholesteremia  -     Lipid Panel with Direct LDL reflex; Future; Expected date: 11/17/2024  9. BMI 35.0-35.9,adult  -     phentermine (ADIPEX-P) 37.5 MG tablet; Take 1 tablet (37.5 mg total) by mouth in the morning  10. Obesity, morbid (HCC)  11. Pain syndrome, chronic  12. Rosacea, acne  13. Atrial enlargement, left  -     Echo complete w/ contrast if indicated; Future; Expected date: 05/17/2024      BMI counseling: the BMI is above normal. Nutrition recommendations include reducing portion sizes and increasing intake of lean protein. Exercise recommendations include moderate aerobic physical activity for 150 minutes/week.    There are no Patient Instructions on file for this visit.    Return in about 6 months (around 11/17/2024).    Subjective:      Patient ID: Addis Segura is a 69 y.o. female.    Chief Complaint   Patient presents with    Follow-up     Pt is here for a follow up.  Pt will call and schedule mammogram       HPI    Rosacea, patient was placed on Metrogel by her endocrinologist and she has been using it for 1 month.  She feels that is not helping.  She has facial flushing for several years which are exacerbated by stress, hypertension, heat, and hot flashes.  She  would like to discuss other options. 5/16/18: started doxy 1 week ago, no relief yet. Still flushing and worse in the mornings. 10/15/18:  Helping a lot, taking doxy BID, ran out 1 week ago, re start at BID and then decrease to monthly. Dc 2019 taking doxy BID iith food, no issues  May 2021, taking doxycycline b.i.d. For several months and started Metrogel which she thinks flairs up her symptoms.  September 2021, stable  January 2022, on doxycycline twice a day and tolerating well.  July 2022, maximilian, no issues.  October 2023, currently inflamed but usually very well controlled, compliant with medications.  Uses sunscreen  May 2024: stable, on medications, controlled        Insomnia, patient states she does not sleep well which is very multifactorial including the pain and how flashes.  She used to be on estrogen replacement currently does not take any.  She does take Ativan at night to help her sleep which helped marginally.5/16/18: taking 1.5 mg  And advil pm and it has helped- sleeping through the night now and slightly  More tired in am.  10/15/18: taking ativan and sleeping well with it 3/1/19:   July 2019, has not tried to decrease her lorazepam and does take it every night to sleep  Dec 2019 usually doing very well but  Lately having some issues with sleep. Thinks its from the holidays  June 2020, stable  May 2021, sleeping okay usually with her medications however she does wake up in the middle of the night and feels a high level of anxiety which she does not know what to do about.  She cannot sleep without her lorazepam which she is taking faithfully.  At last appointment with another provider, she was started on Cymbalta but currently is not taking and she cannot remember what side effects she had  September 2021, not any better, pain keeps her up at night  Jan 2022:  Sleeping very well with the addition of Elavil and tolerating.  No side effects  July 2022, stable and working well  October 2023, maximilian,  no issues, Lyrica working well  May 2024: better lately, no pain at night        Depression:  Patient is a depression was exacerbated by the pain and inability to sleep.  She is always tired and fatigued and is having difficulty with activities of daily living around the house like cleaning and cooking as well as work.  Patient is here today and can cooperate always.  She feels that she is up to any medications or the long medications right now.  8/13/18: buspar was increased to 10 mg per but she felt tired on it and went back to 5 mg BId, has gained weight  10/15/18: taking bupar 5 mg- 2 tabs at [facility],  12/21/18: stable  March 2019, no issues.  Takes 1- 5 mg BuSpar in the morning than 1 with lunch and 2 at night.   July 2019:  Doing very well with present medications and feels much better since losing weight  Dec 2019 taking 2 buspar at night and 1/2 in the morning    June 2020, relatively stable with no suicidal or homicidal ideations   May 2021, depression is relatively well controlled however currently her anxiety is at exacerbations since [place] time.  She stated everything is normal with no problems at home or work no changes in her medications and no over-the-counter things that she has introduced to her daily routine.  Other than not eating well and putting on some weight she states everything is fine.  She recently had some lab work and her thyroid is controlled and she is compliant with her medications.  She was started on Cymbalta few months ago from another provider which she took for few days and stopped but she cannot remember why she stopped her what side effects she had.  She takes BuSpar -total of 30 mg per day but cannot take a full dose in the morning because it makes her groggy.  She is taking her lorazepam at night.  She states she feels like in the middle of the night at her or jump out of her chest but then her  checks her blood pressure as well as heart rate since he is a  respiratory therapist in everything is normal.  She does not drink any caffeine or alcohol towards bedtime and does not know why this is happening.  It does not happen every night and can happen in the daytime as well randomly   September 2021, flare up of depression due to pain  January 2022, significantly improved since she is sleeping better and pain is better with the addition of Elavil.  She has decreased her BuSpar and is only taking it once or twice a day now instead of 3 times a day.  No breakthrough symptoms.  No HI or SI   .  July 2022, stable, no HI or SI.  No exacerbations  October 2023, no depression symptoms at the moment, no HI or SI, takes lorazepam every night to help sleep   May 2024: no HI or SI, on medications , stressed as she is helping to plan her sons wedding and d-I-l bridal shower         Chronic pain, not too much better than previously.  Follows with pain management.   May 2021, follows with pain management and has a pain pump  September 2021, worst, in general.  This increases her weight, lack of activity and depression.  She is getting steroid injections right now and is scheduled for MRI from her pain management doctors  January 2022, received another steroid injection which has significantly helped but that was only about 3 weeks ago.  She is not sure how long it lasts and can only get it every 3 months.  Usually does not last her 3 months.  Elavil has helped her sleep quality as well as helped with the pain and she has an appointment tomorrow for neuro surgery opinion for minimally invasive surgery.   July 2022, in January she had an epidural injection which worked really well for her however her most recent1 in April has not given her any relief  October 2023, very well controlled, feels that her pain is very minimal  May 2024: pain is well controlled with after surgery and pain pump     Hyperlipidemia, increase in lipid levels due to increased weight and inactivity.  July 2022,  patient continues to gain weight.  She has not had recent lab work for her cholesterol   May 2024: stable with low dose meds         The following portions of the patient's history were reviewed and updated as appropriate: allergies, current medications, past family history, past medical history, past social history, past surgical history and problem list.    Review of Systems        Constitutional:  Denies fever or chills , + weight gain   Eyes:  Denies double , blurry vision or eye pain  HENT:  Denies nasal congestion, sore throat or new hearing issues  Respiratory:  Denies cough or shortness of breath or wheezing  Cardiovascular:  Denies palpitations or chest pain  GI:  Denies abdominal pain, nausea, or vomiting, no loose stools, no reflux  Integument:  Denies rash , no open areas  Neurologic:  Denies headache or focal weakness, no dizziness  : no dysuria, or hematuria    Current Outpatient Medications   Medication Sig Dispense Refill    amitriptyline (ELAVIL) 10 mg tablet Take 1 tablet (10 mg total) by mouth daily at bedtime 90 tablet 1    Ascorbic Acid (VITAMIN C PO) Take 1 tablet by mouth daily      atorvastatin (LIPITOR) 10 mg tablet Take 1 tablet (10 mg total) by mouth daily at bedtime 90 tablet 1    Cetirizine HCl (ZYRTEC PO) Take by mouth as needed      Cholecalciferol 50 MCG (2000 UT) CAPS Take 1 capsule by mouth every morning      [facility] Diclofenac Sodium 1 % APPLY 2 GRAMS TO AFFECTED AREA 4 TIMES A  g 1    diclofenac (VOLTAREN) 75 mg EC tablet Take 1 tablet (75 mg total) by mouth 2 (two) times a day 180 tablet 1    doxycycline hyclate (VIBRAMYCIN) 100 mg capsule Take 1 capsule (100 mg total) by mouth every 12 (twelve) hours 180 capsule 0    fluticasone (FLONASE) 50 mcg/act nasal spray 1 spray into each nostril daily (Patient taking differently: 1 spray into each nostril as needed) 16 g 3    furosemide (LASIX) 40 mg tablet Take 1 tablet (40 mg total) by mouth daily 90 tablet 1    lisinopril  "(ZESTRIL) 20 mg tablet Take 1 tablet (20 mg total) by mouth 2 (two) times a day 180 tablet 1    LORazepam (ATIVAN) 1 mg tablet Take 1.5 tablets (1.5 mg total) by mouth daily at bedtime as needed for anxiety 135 tablet 0    methimazole (TAPAZOLE) 5 mg tablet Take 1 tablet (5 mg total) by mouth 2 (two) times a day 180 tablet 1    multivitamin (THERAGRAN) TABS Take 1 tablet by mouth every morning      phentermine (ADIPEX-P) 37.5 MG tablet Take 1 tablet (37.5 mg total) by mouth in the morning 90 tablet 1    Polyethyl Glycol-Propyl Glycol (SYSTANE OP) Apply to eye if needed      POTASSIUM PO Take 1 tablet by mouth in the morning      Probiotic Product (PROBIOTIC PO) Take 1 capsule by mouth every morning      propranolol (INDERAL) 10 mg tablet Take 1 tablet (10 mg total) by mouth 2 (two) times a day 180 tablet 1    RESTASIS 0.05 % ophthalmic emulsion Administer 1 drop to both eyes 2 (two) times a day  3    timolol (TIMOPTIC) 0.5 % ophthalmic solution INSTILL 1 DROP INTO RIGHT EYE EVERY MORNING      tiZANidine (ZANAFLEX) 4 mg tablet Take 1 tablet (4 mg total) by mouth daily at bedtime 90 tablet 3    valACYclovir (VALTREX) 500 mg tablet Take 1 tablet (500 mg total) by mouth daily as needed (Cold Sores) 30 tablet 1    penciclovir (Denavir) 1 % cream Apply topically daily as needed (ulcer) (Patient not taking: Reported on 4/17/2024) 5 g 5     No current facility-administered medications for this visit.       Objective:    /70 (BP Location: Left arm, Patient Position: Sitting, Cuff Size: Large)   Pulse 75   Temp (!) 96.7 °F (35.9 °C) (Tympanic)   Ht 5' 5\" (1.651 m)   Wt 98.1 kg (216 lb 3.2 oz)   LMP 11/26/2015   SpO2 97% Comment: ra  BMI 35.98 kg/m²        Physical Exam      Constitutional:  Well developed, well nourished, no acute distress, non-toxic appearance   Eyes:  PERRL, conjunctiva normal , non icteric sclera  HENT:  Atraumatic, oropharynx moist. Neck-  supple   Respiratory:  CTA b/l, normal breath sounds, " no rales, no wheezing   Cardiovascular:  RRR, no murmurs, no LE edema b/l  GI:  Soft, nondistended, normal bowel sounds x 4, nontender, no organomegaly, no mass, no rebound, no guarding   Neurologic:  no focal deficits noted   Psychiatric:  Speech and behavior appropriate , AAO x 3         Anup Crowell DO

## 2024-05-26 DIAGNOSIS — B00.1 COLD SORE: ICD-10-CM

## 2024-05-27 RX ORDER — VALACYCLOVIR HYDROCHLORIDE 500 MG/1
500 TABLET, FILM COATED ORAL DAILY PRN
Qty: 30 TABLET | Refills: 5 | Status: SHIPPED | OUTPATIENT
Start: 2024-05-27 | End: 2024-11-23

## 2024-06-03 ENCOUNTER — APPOINTMENT (OUTPATIENT)
Dept: LAB | Facility: MEDICAL CENTER | Age: 69
End: 2024-06-03
Payer: MEDICARE

## 2024-06-03 DIAGNOSIS — M85.89 OSTEOPENIA OF MULTIPLE SITES: ICD-10-CM

## 2024-06-03 DIAGNOSIS — E55.9 VITAMIN D DEFICIENCY: ICD-10-CM

## 2024-06-03 DIAGNOSIS — R73.09 ABNORMAL GLUCOSE: ICD-10-CM

## 2024-06-03 DIAGNOSIS — Z13.220 SCREENING CHOLESTEROL LEVEL: ICD-10-CM

## 2024-06-03 DIAGNOSIS — E87.1 HYPONATREMIA: ICD-10-CM

## 2024-06-03 DIAGNOSIS — E05.90 HYPERTHYROIDISM: ICD-10-CM

## 2024-06-03 LAB
25(OH)D3 SERPL-MCNC: 84.8 NG/ML (ref 30–100)
ALBUMIN SERPL BCP-MCNC: 4.2 G/DL (ref 3.5–5)
ALP SERPL-CCNC: 106 U/L (ref 34–104)
ALT SERPL W P-5'-P-CCNC: 20 U/L (ref 7–52)
ANION GAP SERPL CALCULATED.3IONS-SCNC: 9 MMOL/L (ref 4–13)
AST SERPL W P-5'-P-CCNC: 27 U/L (ref 13–39)
BASOPHILS # BLD AUTO: 0.06 THOUSANDS/ÂΜL (ref 0–0.1)
BASOPHILS NFR BLD AUTO: 1 % (ref 0–1)
BILIRUB SERPL-MCNC: 1.14 MG/DL (ref 0.2–1)
BUN SERPL-MCNC: 13 MG/DL (ref 5–25)
CALCIUM SERPL-MCNC: 9.6 MG/DL (ref 8.4–10.2)
CHLORIDE SERPL-SCNC: 97 MMOL/L (ref 96–108)
CHOLEST SERPL-MCNC: 173 MG/DL
CO2 SERPL-SCNC: 28 MMOL/L (ref 21–32)
CREAT SERPL-MCNC: 0.66 MG/DL (ref 0.6–1.3)
EOSINOPHIL # BLD AUTO: 0.1 THOUSAND/ÂΜL (ref 0–0.61)
EOSINOPHIL NFR BLD AUTO: 2 % (ref 0–6)
ERYTHROCYTE [DISTWIDTH] IN BLOOD BY AUTOMATED COUNT: 13.6 % (ref 11.6–15.1)
EST. AVERAGE GLUCOSE BLD GHB EST-MCNC: 117 MG/DL
GFR SERPL CREATININE-BSD FRML MDRD: 91 ML/MIN/1.73SQ M
GLUCOSE P FAST SERPL-MCNC: 97 MG/DL (ref 65–99)
HBA1C MFR BLD: 5.7 %
HCT VFR BLD AUTO: 39.7 % (ref 34.8–46.1)
HDLC SERPL-MCNC: 68 MG/DL
HGB BLD-MCNC: 12.9 G/DL (ref 11.5–15.4)
IMM GRANULOCYTES # BLD AUTO: 0.02 THOUSAND/UL (ref 0–0.2)
IMM GRANULOCYTES NFR BLD AUTO: 0 % (ref 0–2)
LDLC SERPL CALC-MCNC: 82 MG/DL (ref 0–100)
LYMPHOCYTES # BLD AUTO: 1.53 THOUSANDS/ÂΜL (ref 0.6–4.47)
LYMPHOCYTES NFR BLD AUTO: 29 % (ref 14–44)
MCH RBC QN AUTO: 31.6 PG (ref 26.8–34.3)
MCHC RBC AUTO-ENTMCNC: 32.5 G/DL (ref 31.4–37.4)
MCV RBC AUTO: 97 FL (ref 82–98)
MONOCYTES # BLD AUTO: 0.45 THOUSAND/ÂΜL (ref 0.17–1.22)
MONOCYTES NFR BLD AUTO: 9 % (ref 4–12)
NEUTROPHILS # BLD AUTO: 3.1 THOUSANDS/ÂΜL (ref 1.85–7.62)
NEUTS SEG NFR BLD AUTO: 59 % (ref 43–75)
NRBC BLD AUTO-RTO: 0 /100 WBCS
PLATELET # BLD AUTO: 302 THOUSANDS/UL (ref 149–390)
PMV BLD AUTO: 10.1 FL (ref 8.9–12.7)
POTASSIUM SERPL-SCNC: 4.2 MMOL/L (ref 3.5–5.3)
PROT SERPL-MCNC: 6.9 G/DL (ref 6.4–8.4)
RBC # BLD AUTO: 4.08 MILLION/UL (ref 3.81–5.12)
SODIUM SERPL-SCNC: 134 MMOL/L (ref 135–147)
T4 FREE SERPL-MCNC: 0.76 NG/DL (ref 0.61–1.12)
TRIGL SERPL-MCNC: 115 MG/DL
TSH SERPL DL<=0.05 MIU/L-ACNC: 3.62 UIU/ML (ref 0.45–4.5)
WBC # BLD AUTO: 5.26 THOUSAND/UL (ref 4.31–10.16)

## 2024-06-03 PROCEDURE — 80053 COMPREHEN METABOLIC PANEL: CPT

## 2024-06-03 PROCEDURE — 84443 ASSAY THYROID STIM HORMONE: CPT

## 2024-06-03 PROCEDURE — 85025 COMPLETE CBC W/AUTO DIFF WBC: CPT

## 2024-06-03 PROCEDURE — 84439 ASSAY OF FREE THYROXINE: CPT

## 2024-06-03 PROCEDURE — 82306 VITAMIN D 25 HYDROXY: CPT

## 2024-06-03 PROCEDURE — 36415 COLL VENOUS BLD VENIPUNCTURE: CPT

## 2024-06-03 PROCEDURE — 80061 LIPID PANEL: CPT

## 2024-06-03 PROCEDURE — 83036 HEMOGLOBIN GLYCOSYLATED A1C: CPT

## 2024-06-11 DIAGNOSIS — F41.9 ANXIETY: ICD-10-CM

## 2024-06-17 DIAGNOSIS — E78.00 HYPERCHOLESTEREMIA: ICD-10-CM

## 2024-06-18 RX ORDER — ATORVASTATIN CALCIUM 10 MG/1
10 TABLET, FILM COATED ORAL
Qty: 90 TABLET | Refills: 0 | Status: SHIPPED | OUTPATIENT
Start: 2024-06-18

## 2024-06-20 RX ORDER — LORAZEPAM 1 MG/1
1.5 TABLET ORAL
Qty: 135 TABLET | Refills: 0 | Status: SHIPPED | OUTPATIENT
Start: 2024-06-20

## 2024-07-06 DIAGNOSIS — R60.0 BILATERAL LEG EDEMA: ICD-10-CM

## 2024-07-07 RX ORDER — FUROSEMIDE 40 MG/1
40 TABLET ORAL DAILY
Qty: 100 TABLET | Refills: 1 | Status: SHIPPED | OUTPATIENT
Start: 2024-07-07

## 2024-08-05 DIAGNOSIS — I10 BENIGN ESSENTIAL HTN: ICD-10-CM

## 2024-08-06 ENCOUNTER — TELEPHONE (OUTPATIENT)
Age: 69
End: 2024-08-06

## 2024-08-06 DIAGNOSIS — L71.9 ROSACEA, ACNE: ICD-10-CM

## 2024-08-06 DIAGNOSIS — I10 BENIGN ESSENTIAL HTN: ICD-10-CM

## 2024-08-06 DIAGNOSIS — E05.90 HYPERTHYROIDISM: ICD-10-CM

## 2024-08-06 RX ORDER — LISINOPRIL 20 MG/1
20 TABLET ORAL 2 TIMES DAILY
Qty: 180 TABLET | Refills: 1 | Status: SHIPPED | OUTPATIENT
Start: 2024-08-06

## 2024-08-06 RX ORDER — METHIMAZOLE 5 MG/1
5 TABLET ORAL 2 TIMES DAILY
Qty: 180 TABLET | Refills: 1 | Status: SHIPPED | OUTPATIENT
Start: 2024-08-06

## 2024-08-06 RX ORDER — DOXYCYCLINE HYCLATE 100 MG/1
100 CAPSULE ORAL EVERY 12 HOURS SCHEDULED
Qty: 180 CAPSULE | Refills: 0 | Status: SHIPPED | OUTPATIENT
Start: 2024-08-06 | End: 2024-11-04

## 2024-08-06 NOTE — TELEPHONE ENCOUNTER
Patient called as a reminder to have refills placed for three medications:  Doxycycline 100 mg   Lisinopril 20 mg  Methimazole 5 mg    Upon checking status of refill request all three meds have been pended and are waiting on provider approval/release.

## 2024-08-08 ENCOUNTER — TELEMEDICINE (OUTPATIENT)
Dept: INTERNAL MEDICINE CLINIC | Age: 69
End: 2024-08-08
Payer: MEDICARE

## 2024-08-08 DIAGNOSIS — J06.9 UPPER RESPIRATORY TRACT INFECTION, UNSPECIFIED TYPE: Primary | ICD-10-CM

## 2024-08-08 PROCEDURE — 99213 OFFICE O/P EST LOW 20 MIN: CPT | Performed by: INTERNAL MEDICINE

## 2024-08-08 PROCEDURE — G2211 COMPLEX E/M VISIT ADD ON: HCPCS | Performed by: INTERNAL MEDICINE

## 2024-08-08 RX ORDER — NIRMATRELVIR AND RITONAVIR 300-100 MG
3 KIT ORAL 2 TIMES DAILY
Qty: 30 TABLET | Refills: 0 | Status: SHIPPED | OUTPATIENT
Start: 2024-08-08 | End: 2024-08-13

## 2024-08-08 NOTE — PROGRESS NOTES
Virtual Regular Visit  Name: Addis Seugra      : 1955      MRN: [id number]  Encounter Provider: Boris Felix MD  Encounter Date: 2024   Encounter department: [facility]    Verification of patient location:    Patient is located at Home in the following state in which I hold an active license [place]    Assessment & Plan   1. Upper respiratory tract infection, unspecified type  -     nirmatrelvir & ritonavir (Paxlovid, 300/100,) tablet therapy pack; Take 3 tablets by mouth 2 (two) times a day for 5 days Take 2 nirmatrelvir tablets + 1 ritonavir tablet together per dose    Upper Respiratory Infection  -Patient presenting with 3 days of respiratory symptoms.  -Positive home Covid test this AM.  -Numerous sick contacts in the family.    Plan:  -At this time will prescribe Paxlovid for 5 day treatment course.  -Recommend supportive care with over the counter medications for sinus symptoms.  -Recommend good hydration.  -Instructed patient that if she does not experience improvement in 10-14 days to seek follow up appointment. Otherwise, she has a follow up scheduled in October.         Encounter provider Boris Felix MD    The patient was identified by name and date of birth. Addis Segura was informed that this is a telemedicine visit and that the visit is being conducted through the Epic Embedded platform. She agrees to proceed..  My office door was closed. No one else was in the room.  She acknowledged consent and understanding of privacy and security of the video platform. The patient has agreed to participate and understands they can discontinue the visit at any time.    History of Present Illness     Patient is a 68 y/o female with PMH including HTN, hypothyroidism, who presents today for virtual visit for respiratory symptoms. Patient notes that her current symptoms started on Tuesday - she says that she felt like she had a really bad cold. She  initially took a home COVID test that was negative, but then this morning took a home covid test that was positive. She feels extremely fatigued. She has a lot of sinus pressure and congestion, but no cough. She has been using over the counter medications for management of symptoms. Of note, she has numerous sick contacts with COVID in the family including her daughter in law. She has been vaccinated for COVID and had covid once prior.        Review of Systems   Constitutional:  Positive for fatigue. Negative for chills and fever.   HENT:  Positive for sinus pressure and sinus pain. Negative for hearing loss, sneezing and sore throat.    Eyes:  Negative for visual disturbance.   Respiratory:  Negative for cough and shortness of breath.    Gastrointestinal:  Negative for abdominal distention, abdominal pain, constipation, diarrhea, nausea and vomiting.   Endocrine: Negative for polyuria.   Genitourinary:  Negative for hematuria.   Musculoskeletal:  Negative for arthralgias and back pain.   Skin:  Negative for rash and wound.   Neurological:  Negative for light-headedness and headaches.   Psychiatric/Behavioral:  The patient is not nervous/anxious.      Medical History Reviewed by provider this encounter:       Current Outpatient Medications on File Prior to Visit   Medication Sig Dispense Refill    amitriptyline (ELAVIL) 10 mg tablet Take 1 tablet (10 mg total) by mouth daily at bedtime 90 tablet 1    Ascorbic Acid (VITAMIN C PO) Take 1 tablet by mouth daily      atorvastatin (LIPITOR) 10 mg tablet Take 1 tablet (10 mg total) by mouth daily at bedtime 90 tablet 0    Cetirizine HCl (ZYRTEC PO) Take by mouth as needed      Cholecalciferol 50 MCG (2000 UT) CAPS Take 1 capsule by mouth every morning      [employer] Diclofenac Sodium 1 % APPLY 2 GRAMS TO AFFECTED AREA 4 TIMES A  g 1    diclofenac (VOLTAREN) 75 mg EC tablet Take 1 tablet (75 mg total) by mouth 2 (two) times a day 180 tablet 1    doxycycline hyclate  (VIBRAMYCIN) 100 mg capsule Take 1 capsule (100 mg total) by mouth every 12 (twelve) hours 180 capsule 0    fluticasone (FLONASE) 50 mcg/act nasal spray 1 spray into each nostril daily (Patient taking differently: 1 spray into each nostril as needed) 16 g 3    furosemide (LASIX) 40 mg tablet TAKE 1 TABLET BY MOUTH EVERY  tablet 1    lisinopril (ZESTRIL) 20 mg tablet Take 1 tablet (20 mg total) by mouth 2 (two) times a day 180 tablet 1    LORazepam (ATIVAN) 1 mg tablet Take 1.5 tablets (1.5 mg total) by mouth daily at bedtime as needed for anxiety 135 tablet 0    methimazole (TAPAZOLE) 5 mg tablet Take 1 tablet (5 mg total) by mouth 2 (two) times a day 180 tablet 1    multivitamin (THERAGRAN) TABS Take 1 tablet by mouth every morning      phentermine (ADIPEX-P) 37.5 MG tablet Take 1 tablet (37.5 mg total) by mouth in the morning 90 tablet 1    Polyethyl Glycol-Propyl Glycol (SYSTANE OP) Apply to eye if needed      POTASSIUM PO Take 1 tablet by mouth in the morning      Probiotic Product (PROBIOTIC PO) Take 1 capsule by mouth every morning      propranolol (INDERAL) 10 mg tablet TAKE 1 TABLET BY MOUTH TWICE A  tablet 1    RESTASIS 0.05 % ophthalmic emulsion Administer 1 drop to both eyes 2 (two) times a day  3    timolol (TIMOPTIC) 0.5 % ophthalmic solution INSTILL 1 DROP INTO RIGHT EYE EVERY MORNING      tiZANidine (ZANAFLEX) 4 mg tablet Take 1 tablet (4 mg total) by mouth daily at bedtime 90 tablet 3    valACYclovir (VALTREX) 500 mg tablet Take 1 tablet (500 mg total) by mouth daily as needed (Cold Sores) 30 tablet 5    penciclovir (Denavir) 1 % cream Apply topically daily as needed (ulcer) (Patient not taking: Reported on 4/17/2024) 5 g 5     No current facility-administered medications on file prior to visit.      Objective     LMP 11/26/2015   Physical Exam  Constitutional:       General: She is not in acute distress.     Appearance: Normal appearance.      Comments: Limited by: virtual visit    HENT:      Right Ear: External ear normal.      Left Ear: External ear normal.   Neurological:      Mental Status: She is alert.         Visit Time  Total Visit Duration: 16 minutes

## 2024-08-08 NOTE — PATIENT INSTRUCTIONS
I have prescribed a medication, Paxlovid. Please take 3 tablets by mouth 2 times daily for 5 days as prescribed.  If you do not experience improvement in 10-14 days, please call the office for follow up appointment - otherwise, you have a follow up in October.

## 2024-09-05 DIAGNOSIS — T50.2X5A DIURETIC-INDUCED HYPOKALEMIA: Primary | ICD-10-CM

## 2024-09-05 DIAGNOSIS — E87.6 DIURETIC-INDUCED HYPOKALEMIA: Primary | ICD-10-CM

## 2024-09-11 DIAGNOSIS — F41.9 ANXIETY: ICD-10-CM

## 2024-09-12 RX ORDER — LORAZEPAM 1 MG/1
1.5 TABLET ORAL
Qty: 135 TABLET | Refills: 0 | Status: SHIPPED | OUTPATIENT
Start: 2024-09-12

## 2024-09-19 ENCOUNTER — APPOINTMENT (OUTPATIENT)
Dept: LAB | Facility: MEDICAL CENTER | Age: 69
End: 2024-09-19
Payer: MEDICARE

## 2024-09-19 DIAGNOSIS — E87.6 DIURETIC-INDUCED HYPOKALEMIA: ICD-10-CM

## 2024-09-19 DIAGNOSIS — T50.2X5A DIURETIC-INDUCED HYPOKALEMIA: ICD-10-CM

## 2024-09-19 LAB — POTASSIUM SERPL-SCNC: 4.5 MMOL/L (ref 3.5–5.3)

## 2024-09-19 PROCEDURE — 84132 ASSAY OF SERUM POTASSIUM: CPT

## 2024-09-19 PROCEDURE — 36415 COLL VENOUS BLD VENIPUNCTURE: CPT

## 2024-09-23 ENCOUNTER — HOSPITAL ENCOUNTER (OUTPATIENT)
Dept: NON INVASIVE DIAGNOSTICS | Facility: HOSPITAL | Age: 69
Discharge: HOME | End: 2024-09-23
Attending: FAMILY MEDICINE
Payer: MEDICARE

## 2024-09-23 VITALS
WEIGHT: 216.27 LBS | DIASTOLIC BLOOD PRESSURE: 70 MMHG | HEIGHT: 65 IN | HEART RATE: 75 BPM | BODY MASS INDEX: 36.03 KG/M2 | SYSTOLIC BLOOD PRESSURE: 114 MMHG

## 2024-09-23 DIAGNOSIS — I51.7 ATRIAL ENLARGEMENT, LEFT: ICD-10-CM

## 2024-09-23 LAB
AORTIC ROOT: 3.6 CM
APICAL FOUR CHAMBER EJECTION FRACTION: 55 %
ASCENDING AORTA: 3.5 CM
AV REGURGITATION PRESSURE HALF TIME: 555 MS
BSA FOR ECHO PROCEDURE: 2.04 M2
DOP CALC LVOT AREA: 3.46 CM2
DOP CALC LVOT DIAMETER: 2.1 CM
E WAVE DECELERATION TIME: 180 MS
E/A RATIO: 0.87
FRACTIONAL SHORTENING: 38 (ref 28–44)
INTERVENTRICULAR SEPTUM IN DIASTOLE (PARASTERNAL SHORT AXIS VIEW): 1 CM
INTERVENTRICULAR SEPTUM: 1 CM (ref 0.6–1.1)
LAAS-AP2: 25.2 CM2
LAAS-AP4: 19.3 CM2
LEFT ATRIUM SIZE: 3.6 CM
LEFT ATRIUM VOLUME (MOD BIPLANE): 72 ML
LEFT ATRIUM VOLUME INDEX (MOD BIPLANE): 35.3 ML/M2
LEFT INTERNAL DIMENSION IN SYSTOLE: 2.8 CM (ref 2.1–4)
LEFT VENTRICULAR INTERNAL DIMENSION IN DIASTOLE: 4.5 CM (ref 3.5–6)
LEFT VENTRICULAR POSTERIOR WALL IN END DIASTOLE: 0.9 CM
LEFT VENTRICULAR STROKE VOLUME: 64 ML
LVSV (TEICH): 64 ML
MV E'TISSUE VEL-SEP: 8 CM/S
MV PEAK A VEL: 0.83 M/S
MV PEAK E VEL: 72 CM/S
MV STENOSIS PRESSURE HALF TIME: 52 MS
MV VALVE AREA P 1/2 METHOD: 4.23
RA PRESSURE ESTIMATED: 5 MMHG
RIGHT ATRIAL 2D VOLUME: 70 ML
RIGHT ATRIUM AREA SYSTOLE A4C: 21.5 CM2
RIGHT VENTRICLE ID DIMENSION: 4.3 CM
RV PSP: 29 MMHG
SINOTUBULAR JUNCTION: 2.6 CM
SL CV AV DECELERATION TIME RETROGRADE: 1914 MS
SL CV AV PEAK GRADIENT RETROGRADE: 56 MMHG
SL CV LEFT ATRIUM LENGTH A2C: 6.1 CM
SL CV LV EF: 65
SL CV PED ECHO LEFT VENTRICLE DIASTOLIC VOLUME (MOD BIPLANE) 2D: 93 ML
SL CV PED ECHO LEFT VENTRICLE SYSTOLIC VOLUME (MOD BIPLANE) 2D: 29 ML
SL CV SINUS OF VALSALVA 2D: 3.5 CM
STJ: 2.6 CM
TR MAX PG: 24 MMHG
TR PEAK VELOCITY: 2.4 M/S
TRICUSPID ANNULAR PLANE SYSTOLIC EXCURSION: 3 CM
TRICUSPID VALVE PEAK REGURGITATION VELOCITY: 2.43 M/S

## 2024-09-23 PROCEDURE — 93306 TTE W/DOPPLER COMPLETE: CPT

## 2024-09-23 PROCEDURE — 93306 TTE W/DOPPLER COMPLETE: CPT | Performed by: INTERNAL MEDICINE

## 2024-09-24 DIAGNOSIS — M48.061 LUMBAR STENOSIS WITHOUT NEUROGENIC CLAUDICATION: ICD-10-CM

## 2024-09-24 DIAGNOSIS — E78.00 HYPERCHOLESTEREMIA: ICD-10-CM

## 2024-09-24 DIAGNOSIS — M51.369 LUMBAR DEGENERATIVE DISC DISEASE: ICD-10-CM

## 2024-09-24 DIAGNOSIS — G89.4 PAIN SYNDROME, CHRONIC: ICD-10-CM

## 2024-09-24 DIAGNOSIS — M50.90 DISC DISORDER OF CERVICAL REGION: ICD-10-CM

## 2024-09-24 DIAGNOSIS — M51.36 LUMBAR DEGENERATIVE DISC DISEASE: ICD-10-CM

## 2024-09-26 RX ORDER — ATORVASTATIN CALCIUM 10 MG/1
10 TABLET, FILM COATED ORAL
Qty: 90 TABLET | Refills: 1 | Status: SHIPPED | OUTPATIENT
Start: 2024-09-26

## 2024-09-26 RX ORDER — AMITRIPTYLINE HYDROCHLORIDE 10 MG/1
10 TABLET ORAL
Qty: 90 TABLET | Refills: 1 | Status: SHIPPED | OUTPATIENT
Start: 2024-09-26

## 2024-09-26 RX ORDER — DICLOFENAC SODIUM 75 MG/1
75 TABLET, DELAYED RELEASE ORAL 2 TIMES DAILY
Qty: 180 TABLET | Refills: 1 | Status: SHIPPED | OUTPATIENT
Start: 2024-09-26

## 2024-10-10 ENCOUNTER — OFFICE VISIT (OUTPATIENT)
Age: 69
End: 2024-10-10
Payer: MEDICARE

## 2024-10-10 VITALS
TEMPERATURE: 98.2 F | BODY MASS INDEX: 33.59 KG/M2 | HEART RATE: 73 BPM | DIASTOLIC BLOOD PRESSURE: 82 MMHG | HEIGHT: 65 IN | OXYGEN SATURATION: 98 % | WEIGHT: 201.6 LBS | SYSTOLIC BLOOD PRESSURE: 124 MMHG

## 2024-10-10 DIAGNOSIS — S02.5XXA CLOSED FRACTURE OF TOOTH, INITIAL ENCOUNTER: ICD-10-CM

## 2024-10-10 DIAGNOSIS — Z00.00 ENCOUNTER FOR ANNUAL WELLNESS VISIT (AWV) IN MEDICARE PATIENT: ICD-10-CM

## 2024-10-10 DIAGNOSIS — E05.90 HYPERTHYROIDISM: ICD-10-CM

## 2024-10-10 DIAGNOSIS — F43.9 STRESS: ICD-10-CM

## 2024-10-10 DIAGNOSIS — Z13.1 SCREENING FOR DIABETES MELLITUS: ICD-10-CM

## 2024-10-10 DIAGNOSIS — Z13.220 SCREENING CHOLESTEROL LEVEL: ICD-10-CM

## 2024-10-10 DIAGNOSIS — E87.1 HYPONATREMIA: ICD-10-CM

## 2024-10-10 DIAGNOSIS — I10 BENIGN ESSENTIAL HTN: Primary | ICD-10-CM

## 2024-10-10 PROBLEM — E66.01 OBESITY, MORBID (HCC): Status: RESOLVED | Noted: 2022-07-12 | Resolved: 2024-10-10

## 2024-10-10 PROCEDURE — G0439 PPPS, SUBSEQ VISIT: HCPCS | Performed by: FAMILY MEDICINE

## 2024-10-10 PROCEDURE — 99214 OFFICE O/P EST MOD 30 MIN: CPT | Performed by: FAMILY MEDICINE

## 2024-10-10 RX ORDER — CEPHALEXIN 500 MG/1
500 CAPSULE ORAL 2 TIMES DAILY
Qty: 20 CAPSULE | Refills: 0 | Status: SHIPPED | OUTPATIENT
Start: 2024-10-10 | End: 2024-10-20

## 2024-10-10 RX ORDER — CYCLOSPORINE 0.5 MG/ML
1 EMULSION OPHTHALMIC 2 TIMES DAILY
COMMUNITY

## 2024-10-10 RX ORDER — ESCITALOPRAM OXALATE 10 MG/1
10 TABLET ORAL DAILY
Qty: 30 TABLET | Refills: 2 | Status: SHIPPED | OUTPATIENT
Start: 2024-10-10 | End: 2025-04-08

## 2024-10-10 RX ORDER — CLINDAMYCIN HYDROCHLORIDE 150 MG/1
150 CAPSULE ORAL
COMMUNITY
Start: 2024-10-10

## 2024-10-10 NOTE — PROGRESS NOTES
Assessment/Plan:    1. Benign essential HTN  -     Comprehensive metabolic panel; Future; Expected date: 04/10/2025  -     CBC and differential; Future; Expected date: 04/10/2025  2. Hyperthyroidism  -     TSH, 3rd generation with Free T4 reflex; Future; Expected date: 04/10/2025  3. Hyponatremia  4. Closed fracture of tooth, initial encounter  -     cephalexin (KEFLEX) 500 mg capsule; Take 1 capsule (500 mg total) by mouth 2 (two) times a day for 10 days  5. Screening cholesterol level  -     Lipid Panel with Direct LDL reflex; Future; Expected date: 04/10/2025  6. Screening for diabetes mellitus  -     Hemoglobin A1C; Future; Expected date: 04/10/2025  7. Stress  -     escitalopram (LEXAPRO) 10 mg tablet; Take 1 tablet (10 mg total) by mouth daily  8. Encounter for annual wellness visit (AWV) in Medicare patient          Patient denies personal history of pancreatitis.  Patient also denies personal and family history of medullary thyroid cancer and multiple endocrine neoplasia type II ( MEN 2 tumor).  Patient denies any history of kidney stones, seizures, or glaucoma.        BMI counseling: the BMI is above normal. Nutrition recommendations include reducing portion sizes and increasing intake of lean protein. Exercise recommendations include moderate aerobic physical activity for 150 minutes/week.      Start Lexapro due to increased tearfulness  There are no Patient Instructions on file for this visit.    Return in about 6 months (around 4/10/2025).    Subjective:      Patient ID: Addis Segura is a 69 y.o. female.    Chief Complaint   Patient presents with    Follow-up     4 m f/u visit for hypertension, review labs done 6/3/24 and 9/19/24.  Declines a flu vaccine.    Medicare Wellness Visit     Subsequent AWV       HPI    Rosacea, patient was placed on Metrogel by her endocrinologist and she has been using it for 1 month.  She feels that is not helping.  She has facial flushing for several years which are  exacerbated by stress, hypertension, heat, and hot flashes.  She would like to discuss other options. 5/16/18: started doxy 1 week ago, no relief yet. Still flushing and worse in the mornings. 10/15/18:  Helping a lot, taking doxy BID, ran out 1 week ago, re start at BID and then decrease to monthly. Dc 2019 taking doxy BID iith food, no issues  May 2021, taking doxycycline b.i.d. For several months and started Metrogel which she thinks flairs up her symptoms.  September 2021, stable  January 2022, on doxycycline twice a day and tolerating well.  July 2022, stable, no issues.  October 2023, currently inflamed but usually very well controlled, compliant with medications.  Uses sunscreen  May 2024: stable, on medications, controlled   October 2024, no new issues or flareups     Insomnia, patient states she does not sleep well which is very multifactorial including the pain and how flashes.  She used to be on estrogen replacement currently does not take any.  She does take Ativan at night to help her sleep which helped marginally.5/16/18: taking 1.5 mg  And advil pm and it has helped- sleeping through the night now and slightly  More tired in am.  10/15/18: taking ativan and sleeping well with it 3/1/19:   July 2019, has not tried to decrease her lorazepam and does take it every night to sleep  Dec 2019 usually doing very well but  Lately having some issues with sleep. Thinks its from the holidays  June 2020, stable  May 2021, sleeping okay usually with her medications however she does wake up in the middle of the night and feels a high level of anxiety which she does not know what to do about.  She cannot sleep without her lorazepam which she is taking faithfully.  At last appointment with another provider, she was started on Cymbalta but currently is not taking and she cannot remember what side effects she had  September 2021, not any better, pain keeps her up at night  Jan 2022:  Sleeping very well with the addition  of Elavil and tolerating.  No side effects  July 2022, stable and working well  October 2023, stable, no issues, Lyrica working well  May 2024: better lately, no pain at night   October 2024, improved in general     Depression:  Patient is a depression was exacerbated by the pain and inability to sleep.  She is always tired and fatigued and is having difficulty with activities of daily living around the house like cleaning and cooking as well as work.  Patient is here today and can cooperate always.  She feels that she is up to any medications or the long medications right now.  8/13/18: buspar was increased to 10 mg per but she felt tired on it and went back to 5 mg BId, has gained weight  10/15/18: taking bupar 5 mg- 2 tabs at [facility],  12/21/18: stable  March 2019, no issues.  Takes 1- 5 mg BuSpar in the morning than 1 with lunch and 2 at night.   July 2019:  Doing very well with present medications and feels much better since losing weight  Dec 2019 taking 2 buspar at night and 1/2 in the morning    June 2020, relatively stable with no suicidal or homicidal ideations   May 2021, depression is relatively well controlled however currently her anxiety is at exacerbations since [place] time.  She stated everything is normal with no problems at home or work no changes in her medications and no over-the-counter things that she has introduced to her daily routine.  Other than not eating well and putting on some weight she states everything is fine.  She recently had some lab work and her thyroid is controlled and she is compliant with her medications.  She was started on Cymbalta few months ago from another provider which she took for few days and stopped but she cannot remember why she stopped her what side effects she had.  She takes BuSpar -total of 30 mg per day but cannot take a full dose in the morning because it makes her groggy.  She is taking her lorazepam at night.  She states she feels like in the  middle of the night at her or jump out of her chest but then her  checks her blood pressure as well as heart rate since he is a respiratory therapist in everything is normal.  She does not drink any caffeine or alcohol towards bedtime and does not know why this is happening.  It does not happen every night and can happen in the daytime as well randomly   September 2021, flare up of depression due to pain  January 2022, significantly improved since she is sleeping better and pain is better with the addition of Elavil.  She has decreased her BuSpar and is only taking it once or twice a day now instead of 3 times a day.  No breakthrough symptoms.  No HI or SI   .  July 2022, stable, no HI or SI.  No exacerbations  October 2023, no depression symptoms at the moment, no HI or SI, takes lorazepam every night to help sleep   May 2024: no HI or SI, on medications , stressed as she is helping to plan her sons wedding and d-I-l bridal shower   October 2024, no HI or SI stable on medications but would like something different due to feeling very weepy     Chronic pain, not too much better than previously.  Follows with pain management.   May 2021, follows with pain management and has a pain pump  September 2021, worst, in general.  This increases her weight, lack of activity and depression.  She is getting steroid injections right now and is scheduled for MRI from her pain management doctors  January 2022, received another steroid injection which has significantly helped but that was only about 3 weeks ago.  She is not sure how long it lasts and can only get it every 3 months.  Usually does not last her 3 months.  Elavil has helped her sleep quality as well as helped with the pain and she has an appointment tomorrow for neuro surgery opinion for minimally invasive surgery.   July 2022, in January she had an epidural injection which worked really well for her however her most recent1 in April has not given her any  relief  October 2023, very well controlled, feels that her pain is very minimal  May 2024: pain is well controlled with after surgery and pain pump  October 2024, relatively well-controlled, no new issues     Hyperlipidemia, increase in lipid levels due to increased weight and inactivity.  July 2022, patient continues to gain weight.  She has not had recent lab work for her cholesterol   May 2024: stable with low dose meds  October 2024, doing well with current medications                Answers submitted by the patient for this visit:  Medicare Annual Wellness Visit (Submitted on 10/10/2024)  How would you rate your overall health?: very good  Compared to last year, how is your physical health?: slightly better  In general, how satisfied are you with your life?: very satisfied  Compared to last year, how is your eyesight?: same  Compared to last year, how is your hearing?: same  Compared to last year, how is your emotional/mental health?: same  How often is anger a problem for you?: never, rarely  How often do you feel unusually tired/fatigued?: never, rarely  In the past 7 days, how much pain have you experienced?: none  In the past 6 months, have you lost or gained 10 pounds without trying?: No  One or more falls in the last year: No  In the past 6 months, have you accidentally leaked urine?: No  Do you have trouble with the stairs inside or outside your home?: No  Does your home have working smoke alarms?: Yes  Does your home have a carbon monoxide monitor?: No  Which safety hazards (if any) have you experienced in your home? Please select all that apply.: none  How would you describe your current diet? Please select all that apply.: Regular  In addition to prescription medications, are you taking any over-the-counter supplements?: Yes  If yes, what supplements are you taking?: Vit C, D, B12, Multivitamin, Probiotic, Potassium, Calcium, Zyrtec, Sustain Eye Drops  Can you manage your medications?: Yes  Are you  currently taking any opioid medications?: No  Can you walk and transfer into and out of your bed and chair?: Yes  Can you dress and groom yourself?: Yes  Can you bathe or shower yourself?: Yes  Can you feed yourself?: Yes  Can you do your laundry/ housekeeping?: Yes  Can you manage your money, pay your bills, and track your expenses?: Yes  Can you make your own meals?: Yes  Can you do your own shopping?: Yes  Within the last 12 months, have you had any hospitalizations or Emergency Department visits?: Yes  If yes, how many times have you been hospitalized within the past year?: 1-2  Do you have a living will?: Yes  Do you have a Durable POA (Power of ) for healthcare decisions?: Yes  Do you have an Advanced Directive for end of life decisions?: Yes  How often have you used an illegal drug (including marijuana) or a prescription medication for non-medical reasons in the past year?: never  What is the typical number of drinks you consume in a day?: 0  What is the typical number of drinks you consume in a week?: 0  How often did you have a drink containing alcohol in the past year?: monthly or less  How many drinks did you have on a typical day  when you were drinking in the past year?: 1 to 2  How often did you have 6 or more drinks on one occasion in the past year?: never    The following portions of the patient's history were reviewed and updated as appropriate: allergies, current medications, past family history, past medical history, past social history, past surgical history and problem list.    Review of Systems      Constitutional:  Denies fever or chills   Eyes:  Denies double , blurry vision or eye pain  HENT:  Denies nasal congestion, sore throat or new hearing issues  Respiratory:  Denies cough or shortness of breath or wheezing  Cardiovascular:  Denies palpitations or chest pain  GI:  Denies abdominal pain, nausea, or vomiting, no loose stools, no reflux  Integument:  Denies rash , no open  areas  Neurologic:  Denies headache or focal weakness, no dizziness  : no dysuria, or hematuria      Current Outpatient Medications   Medication Sig Dispense Refill    amitriptyline (ELAVIL) 10 mg tablet Take 1 tablet (10 mg total) by mouth daily at bedtime 90 tablet 1    Ascorbic Acid (VITAMIN C PO) Take 1 tablet by mouth daily      atorvastatin (LIPITOR) 10 mg tablet Take 1 tablet (10 mg total) by mouth daily at bedtime 90 tablet 1    Cetirizine HCl (ZYRTEC PO) Take by mouth as needed      Cholecalciferol 50 MCG (2000 UT) CAPS Take 1 capsule by mouth every morning      clindamycin (CLEOCIN) 150 mg capsule Take 150 mg by mouth 1-2 times daily x 10 days      [facility] Diclofenac Sodium 1 % APPLY 2 GRAMS TO AFFECTED AREA 4 TIMES A  g 1    cycloSPORINE (RESTASIS) 0.05 % ophthalmic emulsion 1 drop 2 (two) times a day      diclofenac (VOLTAREN) 75 mg EC tablet Take 1 tablet (75 mg total) by mouth 2 (two) times a day 180 tablet 1    doxycycline hyclate (VIBRAMYCIN) 100 mg capsule Take 1 capsule (100 mg total) by mouth every 12 (twelve) hours 180 capsule 0    escitalopram (LEXAPRO) 10 mg tablet Take 1 tablet (10 mg total) by mouth daily 30 tablet 2    fluticasone (FLONASE) 50 mcg/act nasal spray 1 spray into each nostril daily (Patient taking differently: 1 spray into each nostril as needed) 16 g 3    furosemide (LASIX) 40 mg tablet TAKE 1 TABLET BY MOUTH EVERY  tablet 1    lisinopril (ZESTRIL) 20 mg tablet Take 1 tablet (20 mg total) by mouth 2 (two) times a day 180 tablet 1    LORazepam (ATIVAN) 1 mg tablet Take 1.5 tablets (1.5 mg total) by mouth daily at bedtime as needed for anxiety 135 tablet 0    methimazole (TAPAZOLE) 5 mg tablet Take 1 tablet (5 mg total) by mouth 2 (two) times a day 180 tablet 1    multivitamin (THERAGRAN) TABS Take 1 tablet by mouth every morning      penciclovir (Denavir) 1 % cream Apply topically daily as needed (ulcer) 5 g 5    Polyethyl Glycol-Propyl Glycol (SYSTANE OP) Apply to  "eye if needed      POTASSIUM PO Take 1 tablet by mouth in the morning      Probiotic Product (PROBIOTIC PO) Take 1 capsule by mouth every morning      propranolol (INDERAL) 10 mg tablet TAKE 1 TABLET BY MOUTH TWICE A  tablet 1    timolol (TIMOPTIC) 0.5 % ophthalmic solution INSTILL 1 DROP INTO RIGHT EYE EVERY MORNING      tiZANidine (ZANAFLEX) 4 mg tablet Take 1 tablet (4 mg total) by mouth daily at bedtime 90 tablet 3    valACYclovir (VALTREX) 500 mg tablet Take 1 tablet (500 mg total) by mouth daily as needed (Cold Sores) 30 tablet 5    RESTASIS 0.05 % ophthalmic emulsion Administer 1 drop to both eyes 2 (two) times a day  3    tirzepatide (Zepbound) 2.5 mg/0.5 mL auto-injector Inject 0.5 mL (2.5 mg total) under the skin once a week for 28 days 2 mL 0     No current facility-administered medications for this visit.       Objective:    /82 (BP Location: Left arm, Patient Position: Sitting, Cuff Size: Large)   Pulse 73   Temp 98.2 °F (36.8 °C) (Temporal)   Ht 5' 5\" (1.651 m)   Wt 91.4 kg (201 lb 9.6 oz)   LMP 11/26/2015   SpO2 98%   BMI 33.55 kg/m²        Physical Exam       Constitutional:  Well developed, well nourished, no acute distress, non-toxic appearance   Eyes:  PERRL, conjunctiva normal , non icteric sclera  HENT:  Atraumatic, oropharynx moist. Neck-  supple   Respiratory:  CTA b/l, normal breath sounds, no rales, no wheezing   Cardiovascular:  RRR, no murmurs, no LE edema b/l  GI:  Soft, nondistended, normal bowel sounds x 4, nontender, no organomegaly, no mass, no rebound, no guarding   Neurologic:  no focal deficits noted   Psychiatric:  Speech and behavior appropriate , AAO x 3        Anup Crowell DO    "

## 2024-10-10 NOTE — PROGRESS NOTES
Ambulatory Visit  Name: Addis Segura      : 1955      MRN: [id number]  Encounter Provider: Anup Crowell DO  Encounter Date: 10/10/2024   Encounter department: [facility] [place]    Assessment & Plan       Preventive health issues were discussed with patient, and age appropriate screening tests were ordered as noted in patient's After Visit Summary. Personalized health advice and appropriate referrals for health education or preventive services given if needed, as noted in patient's After Visit Summary.    History of Present Illness     HPI   Patient Care Team:  Anup Crowell DO as PCP - General  Naren Caro MD (Orthopedic Surgery)  Debbie Farris DO (Family Medicine)    Review of Systems  Medical History Reviewed by provider this encounter:       Annual Wellness Visit Questionnaire   Addis is here for her Subsequent Wellness visit.     Health Risk Assessment:   Patient rates overall health as very good. Patient feels that their physical health rating is slightly better. Patient is very satisfied with their life. Eyesight was rated as same. Hearing was rated as same. Patient feels that their emotional and mental health rating is same. Patients states they are never, rarely angry. Patient states they are never, rarely unusually tired/fatigued. Pain experienced in the last 7 days has been none. Patient states that she has experienced no weight loss or gain in last 6 months.     Depression Screening:   PHQ-2 Score: 0      Fall Risk Screening:   In the past year, patient has experienced: no history of falling in past year      Urinary Incontinence Screening:   Patient has not leaked urine accidently in the last six months.     Home Safety:  Patient does not have trouble with stairs inside or outside of their home. Patient has working smoke alarms and has no working carbon monoxide detector. Home safety hazards include: none.     Nutrition:   Current diet is Regular.      Medications:   Patient is currently taking over-the-counter supplements. OTC medications include: see medication list. Patient is able to manage medications.     Activities of Daily Living (ADLs)/Instrumental Activities of Daily Living (IADLs):   Walk and transfer into and out of bed and chair?: Yes  Dress and groom yourself?: Yes    Bathe or shower yourself?: Yes    Feed yourself? Yes  Do your laundry/housekeeping?: Yes  Manage your money, pay your bills and track your expenses?: Yes  Make your own meals?: Yes    Do your own shopping?: Yes    Previous Hospitalizations:   Any hospitalizations or ED visits within the last 12 months?: Yes    How many hospitalizations have you had in the last year?: 1-2    Advance Care Planning:   Living will: Yes    Durable POA for healthcare: Yes    Advanced directive: Yes      PREVENTIVE SCREENINGS      Cardiovascular Screening:    General: Screening Not Indicated and History Lipid Disorder      Diabetes Screening:     General: Screening Current      Colorectal Cancer Screening:     General: Screening Current      Breast Cancer Screening:     General: Screening Current      Cervical Cancer Screening:    General: Screening Not Indicated      Lung Cancer Screening:     General: Screening Not Indicated      Hepatitis C Screening:    General: Screening Current    Screening, Brief Intervention, and Referral to Treatment (SBIRT)    Screening  Typical number of drinks in a day: 0  Typical number of drinks in a week: 0  Interpretation: Low risk drinking behavior.    AUDIT-C Screenin) How often did you have a drink containing alcohol in the past year? monthly or less  2) How many drinks did you have on a typical day when you were drinking in the past year? 1 to 2  3) How often did you have 6 or more drinks on one occasion in the past year? never    AUDIT-C Score: 1  Interpretation: Score 0-2 (female): Negative screen for alcohol misuse    Single Item Drug Screening:  How often have  you used an illegal drug (including marijuana) or a prescription medication for non-medical reasons in the past year? never    Single Item Drug Screen Score: 0  Interpretation: Negative screen for possible drug use disorder    Social Determinants of Health     Financial Resource Strain: Low Risk  (10/4/2023)    Overall Financial Resource Strain (CARDIA)     Difficulty of Paying Living Expenses: Not hard at all   Food Insecurity: No Food Insecurity (10/10/2024)    Hunger Vital Sign     Worried About Running Out of Food in the Last Year: Never true     Ran Out of Food in the Last Year: Never true   Transportation Needs: No Transportation Needs (10/10/2024)    PRAPARE - Transportation     Lack of Transportation (Medical): No     Lack of Transportation (Non-Medical): No   Housing Stability: Low Risk  (10/10/2024)    Housing Stability Vital Sign     Unable to Pay for Housing in the Last Year: No     Number of Times Moved in the Last Year: 0     Homeless in the Last Year: No   Utilities: Not At Risk (10/10/2024)    [facility] Utilities     Threatened with loss of utilities: No     No results found.    Objective     LMP 11/26/2015     Physical Exam

## 2024-10-15 ENCOUNTER — TELEPHONE (OUTPATIENT)
Age: 69
End: 2024-10-15

## 2024-10-15 RX ORDER — TIRZEPATIDE 2.5 MG/.5ML
2.5 INJECTION, SOLUTION SUBCUTANEOUS WEEKLY
Qty: 2 ML | Refills: 0 | Status: SHIPPED | OUTPATIENT
Start: 2024-10-15 | End: 2024-10-18 | Stop reason: SDUPTHER

## 2024-10-17 ENCOUNTER — TELEPHONE (OUTPATIENT)
Age: 69
End: 2024-10-17

## 2024-10-18 ENCOUNTER — PATIENT MESSAGE (OUTPATIENT)
Age: 69
End: 2024-10-18

## 2024-10-18 RX ORDER — TIRZEPATIDE 2.5 MG/.5ML
2.5 INJECTION, SOLUTION SUBCUTANEOUS WEEKLY
Qty: 2 ML | Refills: 0 | Status: SHIPPED | OUTPATIENT
Start: 2024-10-18 | End: 2024-11-15

## 2024-10-18 NOTE — TELEPHONE ENCOUNTER
PA for tirzepatide (Zepbound) 2.5 mg/0.5 mL auto-injector SUBMITTED     via    [x][facility]-KEY: [id number]  []Surescripts-Case ID #   []Availity-Auth ID # NDC #   []Faxed to plan   []Other website   []Phone call Case ID #     Office notes sent, clinical questions answered. Awaiting determination    Turnaround time for your insurance to make a decision on your Prior Authorization can take 7-21 business days.

## 2024-10-22 PROBLEM — Z00.00 ENCOUNTER FOR ANNUAL WELLNESS VISIT (AWV) IN MEDICARE PATIENT: Status: ACTIVE | Noted: 2024-10-22

## 2024-10-31 NOTE — PATIENT COMMUNICATION
Received medical necessity form from [facility] in regards to fulfilling compounded Tirzepatide, due to zepbound not being covered by insurance. Scanned into media for review. I have sent a [employer] message to the patient of status.

## 2024-11-05 DIAGNOSIS — L71.9 ROSACEA, ACNE: ICD-10-CM

## 2024-11-06 RX ORDER — DOXYCYCLINE 100 MG/1
100 CAPSULE ORAL EVERY 12 HOURS SCHEDULED
Qty: 180 CAPSULE | Refills: 0 | Status: SHIPPED | OUTPATIENT
Start: 2024-11-06 | End: 2024-11-06 | Stop reason: SDUPTHER

## 2024-11-06 RX ORDER — DOXYCYCLINE 100 MG/1
100 CAPSULE ORAL EVERY 12 HOURS SCHEDULED
Qty: 180 CAPSULE | Refills: 0 | Status: SHIPPED | OUTPATIENT
Start: 2024-11-06 | End: 2025-02-04

## 2024-11-06 NOTE — TELEPHONE ENCOUNTER
PROVIDERS,   PLEASE ADDRESS SINCE PCP IS OUT OF THE OFFICE.   THANK YOU!    
Reason for call:   [x] Refill   [] Prior Auth  [] Other:     Office:   [x] PCP/Provider - Dr Crowell  [] Specialty/Provider -     Medication: doxycycline     Dose/Frequency: 100 mg Q12H     Quantity: 90D     Pharmacy: [facility] [address] on file     Does the patient have enough for 3 days?   [x] Yes   [] No - Send as HP to POD    
Transmission to pharmacy error occurred. Medication resent to pharmacy. Receipt confirmed by pharmacy.       
Stable

## 2024-11-21 PROBLEM — Z00.00 ENCOUNTER FOR ANNUAL WELLNESS VISIT (AWV) IN MEDICARE PATIENT: Status: RESOLVED | Noted: 2024-10-22 | Resolved: 2024-11-21

## 2024-12-01 DIAGNOSIS — B00.1 COLD SORE: ICD-10-CM

## 2024-12-03 RX ORDER — VALACYCLOVIR HYDROCHLORIDE 500 MG/1
500 TABLET, FILM COATED ORAL DAILY PRN
Qty: 30 TABLET | Refills: 5 | Status: SHIPPED | OUTPATIENT
Start: 2024-12-03 | End: 2025-06-01

## 2024-12-04 ENCOUNTER — OFFICE VISIT (OUTPATIENT)
Dept: PAIN MEDICINE | Facility: CLINIC | Age: 69
End: 2024-12-04
Payer: MEDICARE

## 2024-12-04 VITALS
BODY MASS INDEX: 34.16 KG/M2 | WEIGHT: 205 LBS | HEART RATE: 71 BPM | SYSTOLIC BLOOD PRESSURE: 158 MMHG | HEIGHT: 65 IN | RESPIRATION RATE: 18 BRPM | DIASTOLIC BLOOD PRESSURE: 97 MMHG

## 2024-12-04 DIAGNOSIS — M79.18 MYOFASCIAL PAIN SYNDROME: ICD-10-CM

## 2024-12-04 DIAGNOSIS — G89.4 CHRONIC PAIN SYNDROME: ICD-10-CM

## 2024-12-04 DIAGNOSIS — M25.511 CHRONIC RIGHT SHOULDER PAIN: Primary | ICD-10-CM

## 2024-12-04 DIAGNOSIS — G89.29 CHRONIC RIGHT SHOULDER PAIN: Primary | ICD-10-CM

## 2024-12-04 DIAGNOSIS — M54.12 CERVICAL RADICULOPATHY: ICD-10-CM

## 2024-12-04 PROCEDURE — 99214 OFFICE O/P EST MOD 30 MIN: CPT

## 2024-12-04 NOTE — PROGRESS NOTES
Assessment:  1. Chronic right shoulder pain    2. Chronic pain syndrome    3. Cervical radiculopathy    4. Myofascial pain syndrome        Plan:  The patient is a 69-year-old female with a history of chronic pain secondary to low back pain, lumbar intervertebral disc disorder with radiculopathy, lumbar spondylosis, neck pain, cervical radiculopathy, myofascial pain and right shoulder pain who presents to the office with worsening right shoulder pain and right sided neck pain with pain that radiates into the right shoulder blade.    On exam, I do feel the majority of the patient's pain is coming from her right shoulder.  I did instruct patient we can perform a repeat right subacromial bursa injection to help decrease inflammation provide her relief.  Patient would like to proceed and will be scheduled.  Complete risks and benefits including bleeding, infection, tissue reaction, nerve injury and allergic reaction were discussed.  The approach was demonstrated using models and literature was provided.  Verbal and written consent was obtained.    Can also consider a cervical epidural steroid injection, however patient would like to proceed with right shoulder injection.    My impressions and treatment recommendations were discussed in detail with the patient who verbalized understanding and had no further questions.  Discharge instructions were provided. I personally saw and examined the patient and I agree with the above discussed plan of care.    No orders of the defined types were placed in this encounter.    No orders of the defined types were placed in this encounter.      History of Present Illness:  Addis Segura is a 69 y.o. female with a history of chronic pain secondary to low back pain, lumbar intervertebral disc disorder with radiculopathy, lumbar spondylosis, neck pain, cervical radiculopathy, myofascial pain and right shoulder pain.  She was last seen on 9/12/2022 where she underwent a right subacromial  bursa injection which provided her greater than 50% relief of her pain.  She presents to the office with worsening right shoulder pain and right sided neck pain with pain that radiates into the right shoulder blade.    She states her pain is worse since the last office visit and constant but worse in the evening.  She rates the quality of her pain as dull/aching, sharp and is currently rating her pain a 5/10 on a numeric scale.    Current pain medications include tizanidine and diclofenac which are helpful.    I have personally reviewed and/or updated the patient's past medical history, past surgical history, family history, social history, current medications, allergies, and vital signs today.     Review of Systems   Respiratory:  Negative for shortness of breath.    Cardiovascular:  Negative for chest pain.   Gastrointestinal:  Negative for constipation, diarrhea, nausea and vomiting.   Musculoskeletal:  Negative for arthralgias, gait problem, joint swelling and myalgias.        Right shoulder pain   Decreased range of motion   Skin:  Negative for rash.   Neurological:  Positive for weakness. Negative for dizziness and seizures.   All other systems reviewed and are negative.      Patient Active Problem List   Diagnosis    Rosacea, acne    Lumbar degenerative disc disease    Anxiety    Benign essential HTN    Disc disorder of cervical region    Lumbar facet arthropathy    Lumbar stenosis without neurogenic claudication    Myofascial pain syndrome    Pain syndrome, chronic    Primary osteoarthritis of right knee    Bulge of lumbar disc without myelopathy    Spondylolisthesis of lumbar region    Arthritis of right acromioclavicular joint    Calculus of gallbladder without cholecystitis without obstruction    Hyperthyroidism    Primary insomnia    Osteopenia of multiple sites    Atrial enlargement, left    Hyponatremia    Allergy to adhesive tape    BMI 33.0-33.9,adult       Past Medical History:   Diagnosis Date     Acute appendicitis 10/27/2023    Acute cholecystitis 10/27/2023    Adverse effect of adrenal cortical steroids, initial encounter 08/30/2019    Allergic     Anxiety     Asymptomatic premature menopause 05/11/2021    Bilateral leg edema 09/17/2021    Cataract, bilateral     last assessed    Cervical radiculopathy     Chronic right shoulder pain     COVID-19 05/15/2022    Disc disease, degenerative, cervical     Disease of thyroid gland     Encounter for gynecological examination (general) (routine) without abnormal findings 02/08/2021    Herpes simplex infection     Hypertension     Insomnia     Long term systemic steroid user 05/11/2021    Low back pain     Lumbar degenerative disc disease     Lumbar facet arthropathy     Lumbar radiculopathy     Lumbar stenosis without neurogenic claudication     Mononucleosis     Myofascial pain dysfunction syndrome     Obesity, morbid ([facility]) 07/12/2022    Osteoarthritis     shoulder region - unspecified laterality - last assessed 2/1/14    Plantar fasciitis     Poison ivy dermatitis 06/01/2023    Ptosis, both eyelids     last assessed 10/6/16    Ptosis, congenital, left     Retinal detachment     last assessed 12/11/14 right eye    Sacroiliitis ([facility])     SIADH (syndrome of inappropriate ADH production) ([facility]) 12/30/2022    Sprain of left knee 04/05/2023    Thyroid disease     Thyrotoxicosis     last assessed 5/17/13    Vertigo     Weight gain 12/01/2022       Past Surgical History:   Procedure Laterality Date    APPENDECTOMY LAPAROSCOPIC N/A 10/25/2023    Procedure: APPENDECTOMY LAPAROSCOPIC;  Surgeon: Francisco Mayo DO;  [facility];  Service: General    CATARACT EXTRACTION      CHOLECYSTECTOMY LAPAROSCOPIC N/A 10/27/2023    Procedure: CHOLECYSTECTOMY LAPAROSCOPIC;  Surgeon: Francisco Mayo DO;  Location[facility];  Service: General    COLONOSCOPY      EYE SURGERY      CA RAYMUNDO IMPLTJ NSTIM ELTRDS PLATE/PADDLE EDRL Left 01/26/2016    Procedure: PLACEMENT OF THORACIC SPINAL  CORD STIMULATOR WITH LEFT BUTTOCK IMPLANTABLE PULSE GENERATOR (IMPULSE MONITORING);  Surgeon: Sixto Felix MD;  Location: [facility];  Service: Neurosurgery    RETINAL DETACHMENT SURGERY Right     SPINE SURGERY  Dec 22, 2022       Family History   Problem Relation Age of Onset    Breast cancer Mother 81    COPD Mother     Hypertension Mother         essential    Cancer Mother     Glaucoma Mother     Heart attack Father         acute MI    Emphysema Father     Hypertension Father         essential    Other Father         prehypertension    COPD Father     No Known Problems Maternal Grandmother     No Known Problems Maternal Grandfather     No Known Problems Paternal Grandmother     No Known Problems Paternal Grandfather     No Known Problems Sister     No Known Problems Son     No Known Problems Sister     No Known Problems Paternal Aunt     No Known Problems Paternal Aunt     No Known Problems Paternal Aunt     No Known Problems Paternal Aunt     No Known Problems Paternal Aunt     Liver cancer Maternal Uncle        Social History     Occupational History    Occupation: Business owner     Comment: [employer]-full time working   Tobacco Use    Smoking status: Never    Smokeless tobacco: Never   Vaping Use    Vaping status: Never Used   Substance and Sexual Activity    Alcohol use: Not Currently     Alcohol/week: 1.0 standard drink of alcohol     Types: 1 Glasses of wine per week     Comment: Social/Rare    Drug use: No    Sexual activity: Not Currently     Partners: Male     Birth control/protection: None       Current Outpatient Medications on File Prior to Visit   Medication Sig    amitriptyline (ELAVIL) 10 mg tablet Take 1 tablet (10 mg total) by mouth daily at bedtime    Ascorbic Acid (VITAMIN C PO) Take 1 tablet by mouth daily    atorvastatin (LIPITOR) 10 mg tablet Take 1 tablet (10 mg total) by mouth daily at bedtime    Cetirizine HCl (ZYRTEC PO) Take by mouth as needed    Cholecalciferol 50 MCG (2000  UT) CAPS Take 1 capsule by mouth every morning    clindamycin (CLEOCIN) 150 mg capsule Take 150 mg by mouth 1-2 times daily x 10 days    [employer] Diclofenac Sodium 1 % APPLY 2 GRAMS TO AFFECTED AREA 4 TIMES A DAY    cycloSPORINE (RESTASIS) 0.05 % ophthalmic emulsion 1 drop 2 (two) times a day    diclofenac (VOLTAREN) 75 mg EC tablet Take 1 tablet (75 mg total) by mouth 2 (two) times a day    doxycycline hyclate (VIBRAMYCIN) 100 mg capsule Take 1 capsule (100 mg total) by mouth every 12 (twelve) hours    escitalopram (LEXAPRO) 10 mg tablet Take 1 tablet (10 mg total) by mouth daily    fluticasone (FLONASE) 50 mcg/act nasal spray 1 spray into each nostril daily    furosemide (LASIX) 40 mg tablet TAKE 1 TABLET BY MOUTH EVERY DAY    lisinopril (ZESTRIL) 20 mg tablet Take 1 tablet (20 mg total) by mouth 2 (two) times a day    LORazepam (ATIVAN) 1 mg tablet Take 1.5 tablets (1.5 mg total) by mouth daily at bedtime as needed for anxiety    methimazole (TAPAZOLE) 5 mg tablet Take 1 tablet (5 mg total) by mouth 2 (two) times a day    multivitamin (THERAGRAN) TABS Take 1 tablet by mouth every morning    penciclovir (Denavir) 1 % cream Apply topically daily as needed (ulcer)    Polyethyl Glycol-Propyl Glycol (SYSTANE OP) Apply to eye if needed    POTASSIUM PO Take 1 tablet by mouth in the morning    Probiotic Product (PROBIOTIC PO) Take 1 capsule by mouth every morning    propranolol (INDERAL) 10 mg tablet TAKE 1 TABLET BY MOUTH TWICE A DAY    RESTASIS 0.05 % ophthalmic emulsion Administer 1 drop to both eyes 2 (two) times a day    timolol (TIMOPTIC) 0.5 % ophthalmic solution INSTILL 1 DROP INTO RIGHT EYE EVERY MORNING    tiZANidine (ZANAFLEX) 4 mg tablet Take 1 tablet (4 mg total) by mouth daily at bedtime    valACYclovir (VALTREX) 500 mg tablet Take 1 tablet (500 mg total) by mouth daily as needed (Cold Sores)     No current facility-administered medications on file prior to visit.       Allergies   Allergen Reactions    Other  "Dermatitis     Adhesive tape - please use ONLY PAPER TAPE    Scopolamine Other (See Comments)     Severe, debilitating headache      Flexeril [Cyclobenzaprine] Dizziness and Fatigue    Naproxen      Other reaction(s): Unknown Allergic Reaction  Annotation - [id number]: nightmares    Oxycodone Other (See Comments)     Night olsen     Penicillins      Other reaction(s): Unknown Allergic Reaction  Annotation - [id number]: childhood reaction    Promethazine-Codeine      Reaction Date: 04May2011; Annotation - [id number]: nightmares; Annotation - [id number]: nightmares    Tramadol      Other reaction(s): Unknown Allergic Reaction  Annotation - 14Jan2014: PT HAS NIGHTMARES FROM TRAMADOL    Medical Tape Rash    Wound Dressing Adhesive Rash       Physical Exam:    /97   Pulse 71   Resp 18   Ht 5' 5\" (1.651 m)   Wt 93 kg (205 lb)   LMP 11/26/2015   BMI 34.11 kg/m²     Constitutional:normal, well developed, well nourished, alert, in no distress and non-toxic and no overt pain behavior.  Eyes:anicteric  HEENT:grossly intact  Neck:supple, symmetric, trachea midline and no masses   Pulmonary:even and unlabored  Cardiovascular:No edema or pitting edema present  Skin:Normal without rashes or lesions and well hydrated  Psychiatric:Mood and affect appropriate  Neurologic:Cranial Nerves II-XII grossly intact  Musculoskeletal:normal    Cervical Spine Exam    Appearance:  Normal lordosis  Palpation/Tenderness:  right trapezium tenderness  Sensory:  no sensory deficits noted  Range of Motion:  Flexion:  No limitation  with pain  Extension:  No limitation  with pain  Rotation - Left:  No limitation  with pain  Rotation - Right:  No limitation  with pain  Motor Strength:  Left Arm Flexion  5/5  Left Arm Extension  5/5  Right Arm Flexion  5/5  Right Arm Extension  5/5  Left    5/5  Right   5/5    Shoulder Exam - Right    Appearance:  Normal with no swelling or bruising and no obvious joint " deformity  Palpation/Tenderness:  Tenderness with palpation right shoulder  Active Range of Motion:  Flexion: Minimally limited, Extension: Minimally limited, Abduction: Minimally limited, External Rotation: Minimally limited, and Internal Rotation: Minimally limited    Imaging

## 2024-12-11 ENCOUNTER — PROCEDURE VISIT (OUTPATIENT)
Dept: PAIN MEDICINE | Facility: CLINIC | Age: 69
End: 2024-12-11
Payer: MEDICARE

## 2024-12-11 VITALS
DIASTOLIC BLOOD PRESSURE: 102 MMHG | BODY MASS INDEX: 34.16 KG/M2 | HEIGHT: 65 IN | SYSTOLIC BLOOD PRESSURE: 162 MMHG | HEART RATE: 84 BPM | RESPIRATION RATE: 18 BRPM | WEIGHT: 205.03 LBS

## 2024-12-11 DIAGNOSIS — M25.511 CHRONIC RIGHT SHOULDER PAIN: Primary | ICD-10-CM

## 2024-12-11 DIAGNOSIS — G89.29 CHRONIC RIGHT SHOULDER PAIN: Primary | ICD-10-CM

## 2024-12-11 PROCEDURE — 20611 DRAIN/INJ JOINT/BURSA W/US: CPT | Performed by: ANESTHESIOLOGY

## 2024-12-11 RX ORDER — BUPIVACAINE HYDROCHLORIDE 2.5 MG/ML
10 INJECTION, SOLUTION EPIDURAL; INFILTRATION; INTRACAUDAL ONCE
Status: DISCONTINUED | OUTPATIENT
Start: 2024-12-11 | End: 2024-12-13

## 2024-12-11 RX ORDER — METHYLPREDNISOLONE ACETATE 40 MG/ML
40 INJECTION, SUSPENSION INTRA-ARTICULAR; INTRALESIONAL; INTRAMUSCULAR; SOFT TISSUE ONCE
Status: DISCONTINUED | OUTPATIENT
Start: 2024-12-11 | End: 2024-12-13

## 2024-12-11 NOTE — PROGRESS NOTES
Pre-procedure Diagnosis:   1. Chronic right shoulder pain      Post-procedure Diagnosis:   1. Chronic right shoulder pain      Operation Title(s): Right subacromial bursa injection under ultrasound guidance  Attending Surgeon:   Rod Albert MD  Anesthesia:   Local    Indications: The patient is a 69 y.o. year-old female with a diagnosis of   1. Chronic right shoulder pain    . The patient's history and physical exam were reviewed. The risks, benefits and alternatives to the procedure were discussed, and all questions were answered to the patient's satisfaction. The patient agreed to proceed, and written informed consent was obtained.    Procedure in Detail: The patient was brought into the exam room and placed in the sitting position on the exam room chair. The right shoulder was prepped with chloraprep and draped in a sterile manner.     A sterile ultrasound probe cover and gel was placed over a 3.75 MHz curvilinear transducer probe.  The probe was placed over the shoulder to visualize the subdeltoid/subacromial bursal region. Optimal needle path was determined and the skin and subcutaneous tissues in the area was anesthetized with 1% lidocaine.  Then, under ultrasound guidance, a 2 inch ultrasound needle was advanced until the needle entered the bursa region. After visualization of the tip in the target area and negative aspiration for blood, a solution consisting of 3 mL0.25% bupivacaine and 1 mL Depo-Medrol (40mg/ml) was easily injected.    The patient's shoulder was cleansed and a bandage was placed over the sites of needle insertion.    Disposition: The patient tolerated the procedure well and there were no apparent complications.  The patient was given written discharge instructions for the procedure.

## 2024-12-15 DIAGNOSIS — F41.9 ANXIETY: ICD-10-CM

## 2024-12-15 DIAGNOSIS — F43.9 STRESS: ICD-10-CM

## 2024-12-17 RX ORDER — LORAZEPAM 1 MG/1
1.5 TABLET ORAL
Qty: 135 TABLET | Refills: 0 | Status: SHIPPED | OUTPATIENT
Start: 2024-12-17

## 2024-12-17 RX ORDER — ESCITALOPRAM OXALATE 10 MG/1
10 TABLET ORAL DAILY
Qty: 100 TABLET | Refills: 1 | Status: SHIPPED | OUTPATIENT
Start: 2024-12-17

## 2024-12-18 ENCOUNTER — ANNUAL EXAM (OUTPATIENT)
Dept: OBGYN CLINIC | Facility: MEDICAL CENTER | Age: 69
End: 2024-12-18
Payer: MEDICARE

## 2024-12-18 VITALS
SYSTOLIC BLOOD PRESSURE: 124 MMHG | HEIGHT: 65 IN | WEIGHT: 201 LBS | BODY MASS INDEX: 33.49 KG/M2 | DIASTOLIC BLOOD PRESSURE: 82 MMHG

## 2024-12-18 DIAGNOSIS — Z12.39 ENCOUNTER FOR BREAST CANCER SCREENING OTHER THAN MAMMOGRAM: ICD-10-CM

## 2024-12-18 DIAGNOSIS — Z01.419 ENCOUNTER FOR GYNECOLOGICAL EXAMINATION (GENERAL) (ROUTINE) WITHOUT ABNORMAL FINDINGS: Primary | ICD-10-CM

## 2024-12-18 DIAGNOSIS — Z91.89 INCREASED RISK OF BREAST CANCER: ICD-10-CM

## 2024-12-18 DIAGNOSIS — Z12.31 ENCOUNTER FOR SCREENING MAMMOGRAM FOR BREAST CANCER: ICD-10-CM

## 2024-12-18 PROCEDURE — G0101 CA SCREEN;PELVIC/BREAST EXAM: HCPCS | Performed by: CLINICAL NURSE SPECIALIST

## 2024-12-18 NOTE — PROGRESS NOTES
Name: Addis Segura      : 1955      MRN: [id number]  Encounter Provider: CARMEN Rust  Encounter Date: 2024   Encounter department: [facility] [place]    :  Assessment & Plan  Encounter for gynecological examination (general) (routine) without abnormal findings    Annual GYN examination completed today.   Health maintenance reviewed/updated as appropriate  Risk prevention and anticipatory guidance provided including:  Encouraged regular self breast exams and to call with changes   Age related Calcium and vitamin D intake  Dietary and lifestyle recommendations based on her age and weight. body mass index is 33.45 kg/m²..    Tobacco and alcohol use, intervention ordered if applicable.   Condom use for prevention of STI's if sexually active.       Encounter for screening mammogram for breast cancer         Increased risk of breast cancer  Reviewed lifetime Tyrer-cuzick risk model of 20.19% and offered supplemental screening with breast MRI   Orders:  •  MRI breast bilateral w and wo contrast w cad; Future    Encounter for breast cancer screening other than mammogram    Orders:  •  MRI breast bilateral w and wo contrast w cad; Future                    Subjective:      History of Present Illness     Addis Segura is a 69 y.o. female. Here for Gynecologic Exam (Postmenopausal /Pap 21 -/-/Mammo 25 bit-ads 2/ scheduled/Dxa 1/10/24 normal/Colonoscopy 24 recall 10 years /Mother breast cancer )      Pt is postmenopausal. She denies PMB.   Rare hot flashes  She denies any C/O abnormal vaginal discharge or irritation. Denies Urinary complaints or breast changes    Sexually active:  but not having sex often; Monogamous/single partner  Denies C/O related to intimacy/sexual activity    She reports she feels safe at home.   Lifestyle/exercise: minimal   Dietary calcium/vit D  intake: adequate    HEALTH MAINTENANCE SCREENINGS:     Previous pap  "smears and ASCCP screening guidelines have been reviewed.   Last Pap:  04/01/2022; Next due N/a- aged out   History of abnormal pap: No,   Last mammogram:  08/30/2023  Last Colon Cancer Screening: colonoscopy: 02/01/2024 Cologuard:Not on file. Recall 10yrs  Last Dexa Scan: 1/2024 normal      Substance Abuse Screening Completed. See hx and flowsheet.    Review of Systems   Constitutional:  Negative for appetite change, chills, fatigue, fever and unexpected weight change.   HENT: Negative.     Eyes: Negative.    Respiratory:  Negative for chest tightness and shortness of breath.    Cardiovascular:  Negative for chest pain and palpitations.   Gastrointestinal:  Negative for abdominal pain, constipation and vomiting.   Endocrine: Negative for cold intolerance and heat intolerance.   Genitourinary:         As per HPI   Musculoskeletal:  Negative for back pain, joint swelling and neck pain.   Skin:  Negative for color change and rash.   Neurological:  Negative for dizziness, weakness and numbness.   Hematological:  Does not bruise/bleed easily.   Psychiatric/Behavioral: Negative.         The following portions of the patient's history were reviewed and updated as appropriate: allergies, current medications, past family history, past medical history, past social history, past surgical history, and problem list.         Objective   /82 (BP Location: Left arm, Patient Position: Sitting, Cuff Size: Standard)   Ht 5' 5\" (1.651 m)   Wt 91.2 kg (201 lb)   LMP 11/26/2015   BMI 33.45 kg/m²     Physical Exam  Constitutional:       General: She is not in acute distress.     Appearance: Normal appearance.   Genitourinary:      Vulva and rectum normal.      No lesions in the vagina.      Right Labia: No rash or lesions.     Left Labia: No lesions or rash.     No vaginal discharge, erythema, tenderness or bleeding.      Anterior vaginal prolapse present.     Moderate vaginal atrophy present.       Right Adnexa: not tender and " no mass present.     Left Adnexa: not tender and no mass present.     No cervical motion tenderness, discharge or friability.      Uterus is not enlarged or tender.      No urethral prolapse present.      Pelvic exam was performed with patient in the lithotomy position.   Breasts:     Breasts are symmetrical.      Right: No inverted nipple, mass, nipple discharge, skin change or tenderness.      Left: No inverted nipple, mass, nipple discharge, skin change or tenderness.   HENT:      Head: Normocephalic and atraumatic.   Cardiovascular:      Rate and Rhythm: Normal rate.      Heart sounds: No murmur heard.  Pulmonary:      Effort: Pulmonary effort is normal.      Breath sounds: Normal breath sounds.   Abdominal:      General: There is no distension.      Palpations: Abdomen is soft.      Tenderness: There is no abdominal tenderness.   Musculoskeletal:         General: Normal range of motion.      Cervical back: Normal range of motion.   Lymphadenopathy:      Cervical: No cervical adenopathy.   Neurological:      Mental Status: She is alert and oriented to person, place, and time.   Skin:     General: Skin is warm and dry.   Psychiatric:         Mood and Affect: Mood normal.         Behavior: Behavior normal.   Vitals reviewed.

## 2024-12-23 ENCOUNTER — HOSPITAL ENCOUNTER (OUTPATIENT)
Dept: MAMMOGRAPHY | Facility: MEDICAL CENTER | Age: 69
Discharge: HOME | End: 2024-12-23
Payer: MEDICARE

## 2024-12-23 VITALS — BODY MASS INDEX: 33.5 KG/M2 | WEIGHT: 201.06 LBS | HEIGHT: 65 IN

## 2024-12-23 DIAGNOSIS — Z12.31 ENCOUNTER FOR SCREENING MAMMOGRAM FOR MALIGNANT NEOPLASM OF BREAST: ICD-10-CM

## 2024-12-23 PROCEDURE — 77067 SCR MAMMO BI INCL CAD: CPT

## 2024-12-23 PROCEDURE — 77063 BREAST TOMOSYNTHESIS BI: CPT

## 2024-12-24 ENCOUNTER — TELEPHONE (OUTPATIENT)
Dept: PAIN MEDICINE | Facility: CLINIC | Age: 69
End: 2024-12-24

## 2024-12-29 DIAGNOSIS — R60.0 BILATERAL LEG EDEMA: ICD-10-CM

## 2024-12-30 RX ORDER — FUROSEMIDE 40 MG/1
40 TABLET ORAL DAILY
Qty: 90 TABLET | Refills: 1 | Status: SHIPPED | OUTPATIENT
Start: 2024-12-30

## 2025-01-31 DIAGNOSIS — E05.90 HYPERTHYROIDISM: ICD-10-CM

## 2025-01-31 DIAGNOSIS — L71.9 ROSACEA, ACNE: ICD-10-CM

## 2025-01-31 RX ORDER — PROPRANOLOL HYDROCHLORIDE 10 MG/1
10 TABLET ORAL 2 TIMES DAILY
Qty: 180 TABLET | Refills: 1 | Status: SHIPPED | OUTPATIENT
Start: 2025-01-31

## 2025-01-31 RX ORDER — METHIMAZOLE 5 MG/1
5 TABLET ORAL 2 TIMES DAILY
Qty: 200 TABLET | Refills: 1 | Status: SHIPPED | OUTPATIENT
Start: 2025-01-31

## 2025-02-03 RX ORDER — DOXYCYCLINE 100 MG/1
100 CAPSULE ORAL EVERY 12 HOURS SCHEDULED
Qty: 180 CAPSULE | Refills: 1 | Status: SHIPPED | OUTPATIENT
Start: 2025-02-03 | End: 2025-05-04

## 2025-02-19 DIAGNOSIS — I10 BENIGN ESSENTIAL HTN: ICD-10-CM

## 2025-02-20 RX ORDER — LISINOPRIL 20 MG/1
20 TABLET ORAL 2 TIMES DAILY
Qty: 180 TABLET | Refills: 1 | Status: SHIPPED | OUTPATIENT
Start: 2025-02-20

## 2025-03-12 ENCOUNTER — APPOINTMENT (OUTPATIENT)
Dept: LAB | Facility: MEDICAL CENTER | Age: 70
End: 2025-03-12
Payer: MEDICARE

## 2025-03-12 DIAGNOSIS — I10 BENIGN ESSENTIAL HTN: ICD-10-CM

## 2025-03-12 DIAGNOSIS — Z13.220 SCREENING CHOLESTEROL LEVEL: ICD-10-CM

## 2025-03-12 DIAGNOSIS — Z13.1 SCREENING FOR DIABETES MELLITUS: ICD-10-CM

## 2025-03-12 DIAGNOSIS — E05.90 HYPERTHYROIDISM: ICD-10-CM

## 2025-03-12 LAB
ALBUMIN SERPL BCG-MCNC: 4.2 G/DL (ref 3.5–5)
ALP SERPL-CCNC: 90 U/L (ref 34–104)
ALT SERPL W P-5'-P-CCNC: 15 U/L (ref 7–52)
ANION GAP SERPL CALCULATED.3IONS-SCNC: 6 MMOL/L (ref 4–13)
AST SERPL W P-5'-P-CCNC: 28 U/L (ref 13–39)
BASOPHILS # BLD AUTO: 0.07 THOUSANDS/ÂΜL (ref 0–0.1)
BASOPHILS NFR BLD AUTO: 1 % (ref 0–1)
BILIRUB SERPL-MCNC: 1.15 MG/DL (ref 0.2–1)
BUN SERPL-MCNC: 14 MG/DL (ref 5–25)
CALCIUM SERPL-MCNC: 9.6 MG/DL (ref 8.4–10.2)
CHLORIDE SERPL-SCNC: 95 MMOL/L (ref 96–108)
CHOLEST SERPL-MCNC: 161 MG/DL (ref ?–200)
CO2 SERPL-SCNC: 29 MMOL/L (ref 21–32)
CREAT SERPL-MCNC: 0.7 MG/DL (ref 0.6–1.3)
EOSINOPHIL # BLD AUTO: 0.07 THOUSAND/ÂΜL (ref 0–0.61)
EOSINOPHIL NFR BLD AUTO: 1 % (ref 0–6)
ERYTHROCYTE [DISTWIDTH] IN BLOOD BY AUTOMATED COUNT: 13.7 % (ref 11.6–15.1)
EST. AVERAGE GLUCOSE BLD GHB EST-MCNC: 114 MG/DL
GFR SERPL CREATININE-BSD FRML MDRD: 89 ML/MIN/1.73SQ M
GLUCOSE P FAST SERPL-MCNC: 100 MG/DL (ref 65–99)
HBA1C MFR BLD: 5.6 %
HCT VFR BLD AUTO: 38.6 % (ref 34.8–46.1)
HDLC SERPL-MCNC: 72 MG/DL
HGB BLD-MCNC: 12.8 G/DL (ref 11.5–15.4)
IMM GRANULOCYTES # BLD AUTO: 0.02 THOUSAND/UL (ref 0–0.2)
IMM GRANULOCYTES NFR BLD AUTO: 0 % (ref 0–2)
LDLC SERPL CALC-MCNC: 75 MG/DL (ref 0–100)
LYMPHOCYTES # BLD AUTO: 1.59 THOUSANDS/ÂΜL (ref 0.6–4.47)
LYMPHOCYTES NFR BLD AUTO: 29 % (ref 14–44)
MCH RBC QN AUTO: 31.8 PG (ref 26.8–34.3)
MCHC RBC AUTO-ENTMCNC: 33.2 G/DL (ref 31.4–37.4)
MCV RBC AUTO: 96 FL (ref 82–98)
MONOCYTES # BLD AUTO: 0.61 THOUSAND/ÂΜL (ref 0.17–1.22)
MONOCYTES NFR BLD AUTO: 11 % (ref 4–12)
NEUTROPHILS # BLD AUTO: 3.13 THOUSANDS/ÂΜL (ref 1.85–7.62)
NEUTS SEG NFR BLD AUTO: 58 % (ref 43–75)
NRBC BLD AUTO-RTO: 0 /100 WBCS
PLATELET # BLD AUTO: 322 THOUSANDS/UL (ref 149–390)
PMV BLD AUTO: 10 FL (ref 8.9–12.7)
POTASSIUM SERPL-SCNC: 4.6 MMOL/L (ref 3.5–5.3)
PROT SERPL-MCNC: 6.6 G/DL (ref 6.4–8.4)
RBC # BLD AUTO: 4.02 MILLION/UL (ref 3.81–5.12)
SODIUM SERPL-SCNC: 130 MMOL/L (ref 135–147)
TRIGL SERPL-MCNC: 68 MG/DL (ref ?–150)
TSH SERPL DL<=0.05 MIU/L-ACNC: 3.69 UIU/ML (ref 0.45–4.5)
WBC # BLD AUTO: 5.49 THOUSAND/UL (ref 4.31–10.16)

## 2025-03-12 PROCEDURE — 83036 HEMOGLOBIN GLYCOSYLATED A1C: CPT

## 2025-03-12 PROCEDURE — 80053 COMPREHEN METABOLIC PANEL: CPT

## 2025-03-12 PROCEDURE — 84443 ASSAY THYROID STIM HORMONE: CPT

## 2025-03-12 PROCEDURE — 36415 COLL VENOUS BLD VENIPUNCTURE: CPT

## 2025-03-12 PROCEDURE — 85025 COMPLETE CBC W/AUTO DIFF WBC: CPT

## 2025-03-12 PROCEDURE — 80061 LIPID PANEL: CPT

## 2025-03-13 ENCOUNTER — OFFICE VISIT (OUTPATIENT)
Dept: INTERNAL MEDICINE CLINIC | Facility: OTHER | Age: 70
End: 2025-03-13
Payer: MEDICARE

## 2025-03-13 VITALS
OXYGEN SATURATION: 97 % | WEIGHT: 206.4 LBS | BODY MASS INDEX: 34.39 KG/M2 | SYSTOLIC BLOOD PRESSURE: 120 MMHG | TEMPERATURE: 97.1 F | HEART RATE: 70 BPM | DIASTOLIC BLOOD PRESSURE: 80 MMHG | HEIGHT: 65 IN

## 2025-03-13 DIAGNOSIS — R63.2 HYPERPHAGIA: ICD-10-CM

## 2025-03-13 DIAGNOSIS — R63.5 WEIGHT GAIN: ICD-10-CM

## 2025-03-13 PROCEDURE — G2211 COMPLEX E/M VISIT ADD ON: HCPCS | Performed by: FAMILY MEDICINE

## 2025-03-13 PROCEDURE — 99213 OFFICE O/P EST LOW 20 MIN: CPT | Performed by: FAMILY MEDICINE

## 2025-03-13 NOTE — PROGRESS NOTES
Assessment/Plan:    1. BMI 33.0-33.9,adult  -     Semaglutide-Weight Management (WEGOVY) 0.25 MG/0.5ML; Inject 0.5 mL (0.25 mg total) under the skin once a week  2. Weight gain  3. Hyperphagia       Alternate lexapro 10 mg and 5 mg  for 1-2 weeks, change zepbound to wegovy due to some side effects.        Patient denies personal history of pancreatitis.  Patient also denies personal and family history of medullary thyroid cancer and multiple endocrine neoplasia type II ( MEN 2 tumor).  Patient denies any history of kidney stones, seizures, or glaucoma.        There are no Patient Instructions on file for this visit.    Return for Next scheduled follow up.    Subjective:      Patient ID: Addis Segura is a 69 y.o. female.    Chief Complaint   Patient presents with    medication discussion       HPI    Patient coming in today because she is having increased hunger when starting her Mounjaro which was about 3 weeks ago, she completed 3 injections.  She was also placed on Lexapro back in October 2024 and tried to wean herself off of that a few months ago and had difficulty.  She had extreme fatigue.  She went back to 10 mg every day, on most recent blood work she has some hyponatremia which is new for her.  Last week I advised her to alternate 10 mg with 5 mg to her messaging through [employer] but she did not start that yet.  Patient think she is having side effects to the generic Mounjaro and would like to try something else that she is gained 6 pounds and feels that she cannot turn the hunger off.      The following portions of the patient's history were reviewed and updated as appropriate: allergies, current medications, past family history, past medical history, past social history, past surgical history and problem list.    Review of Systems      Constitutional:  Denies fever or chills , + weight gain   Eyes:  Denies double , blurry vision or eye pain  HENT:  Denies nasal congestion, sore throat or new hearing  issues  Respiratory:  Denies cough or shortness of breath or wheezing  Cardiovascular:  Denies palpitations or chest pain  GI:  Denies abdominal pain, nausea, or vomiting, no loose stools, no reflux  Integument:  Denies rash , no open areas  Neurologic:  Denies headache or focal weakness, no dizziness  : no dysuria, or hematuria      Current Outpatient Medications   Medication Sig Dispense Refill    amitriptyline (ELAVIL) 10 mg tablet Take 1 tablet (10 mg total) by mouth daily at bedtime 90 tablet 1    Ascorbic Acid (VITAMIN C PO) Take 1 tablet by mouth daily      atorvastatin (LIPITOR) 10 mg tablet Take 1 tablet (10 mg total) by mouth daily at bedtime 90 tablet 1    Cetirizine HCl (ZYRTEC PO) Take by mouth as needed      Cholecalciferol 50 MCG (2000 UT) CAPS Take 1 capsule by mouth every morning      [facility] Diclofenac Sodium 1 % APPLY 2 GRAMS TO AFFECTED AREA 4 TIMES A DAY (Patient taking differently: continuous as needed) 200 g 1    cycloSPORINE (RESTASIS) 0.05 % ophthalmic emulsion 1 drop 2 (two) times a day      diclofenac (VOLTAREN) 75 mg EC tablet Take 1 tablet (75 mg total) by mouth 2 (two) times a day 180 tablet 1    doxycycline hyclate (VIBRAMYCIN) 100 mg capsule Take 1 capsule (100 mg total) by mouth every 12 (twelve) hours 180 capsule 1    escitalopram (LEXAPRO) 10 mg tablet Take 1 tablet (10 mg total) by mouth daily 100 tablet 1    fluticasone (FLONASE) 50 mcg/act nasal spray 1 spray into each nostril daily 16 g 3    furosemide (LASIX) 40 mg tablet TAKE 1 TABLET BY MOUTH EVERY DAY 90 tablet 1    lisinopril (ZESTRIL) 20 mg tablet TAKE 1 TABLET BY MOUTH TWICE A  tablet 1    LORazepam (ATIVAN) 1 mg tablet Take 1.5 tablets (1.5 mg total) by mouth daily at bedtime as needed for anxiety 135 tablet 0    methimazole (TAPAZOLE) 5 mg tablet TAKE 1 TABLET BY MOUTH TWICE A  tablet 1    multivitamin (THERAGRAN) TABS Take 1 tablet by mouth every morning      Polyethyl Glycol-Propyl Glycol (SYSTANE OP)  "Apply to eye if needed      POTASSIUM PO Take 1 tablet by mouth in the morning      Probiotic Product (PROBIOTIC PO) Take 1 capsule by mouth every morning      propranolol (INDERAL) 10 mg tablet TAKE 1 TABLET BY MOUTH TWICE A  tablet 1    Semaglutide-Weight Management (WEGOVY) 0.25 MG/0.5ML Inject 0.5 mL (0.25 mg total) under the skin once a week 2 mL 0    timolol (TIMOPTIC) 0.5 % ophthalmic solution       tiZANidine (ZANAFLEX) 4 mg tablet Take 1 tablet (4 mg total) by mouth daily at bedtime 90 tablet 3    valACYclovir (VALTREX) 500 mg tablet Take 1 tablet (500 mg total) by mouth daily as needed (Cold Sores) 30 tablet 5    penciclovir (Denavir) 1 % cream Apply topically daily as needed (ulcer) (Patient not taking: Reported on 3/13/2025) 5 g 5     No current facility-administered medications for this visit.       Objective:    /80   Pulse 70   Temp (!) 97.1 °F (36.2 °C)   Ht 5' 5\" (1.651 m)   Wt 93.6 kg (206 lb 6.4 oz)   LMP 11/26/2015   SpO2 97%   BMI 34.35 kg/m²        Physical Exam      Constitutional:  Well developed, well nourished, no acute distress, non-toxic appearance   Eyes:  PERRL, conjunctiva normal , non icteric sclera  HENT:  Atraumatic, oropharynx moist. Neck-  supple   Respiratory:  CTA b/l, normal breath sounds, no rales, no wheezing   Cardiovascular:  RRR, no murmurs, no LE edema b/l  GI:  Soft, nondistended, normal bowel sounds x 4, nontender, no organomegaly, no mass, no rebound, no guarding   Neurologic:  no focal deficits noted   Psychiatric:  Speech and behavior appropriate , AAO x 3         Anup Crowell DO  "

## 2025-03-14 ENCOUNTER — TELEPHONE (OUTPATIENT)
Age: 70
End: 2025-03-14

## 2025-03-14 NOTE — TELEPHONE ENCOUNTER
Pharmacy called to ask what size vial of the medication they should dispense as they compound the medication. Please advise.

## 2025-03-17 NOTE — TELEPHONE ENCOUNTER
Spoke to [facility]. They will dispense 2mg vial (0.25mg per week * 4wks = 2mg)

## 2025-03-21 DIAGNOSIS — M48.061 LUMBAR STENOSIS WITHOUT NEUROGENIC CLAUDICATION: ICD-10-CM

## 2025-03-21 DIAGNOSIS — F41.9 ANXIETY: ICD-10-CM

## 2025-03-21 DIAGNOSIS — M51.369 LUMBAR DEGENERATIVE DISC DISEASE: ICD-10-CM

## 2025-03-21 DIAGNOSIS — E78.00 HYPERCHOLESTEREMIA: ICD-10-CM

## 2025-03-21 RX ORDER — ATORVASTATIN CALCIUM 10 MG/1
10 TABLET, FILM COATED ORAL
Qty: 90 TABLET | Refills: 0 | Status: SHIPPED | OUTPATIENT
Start: 2025-03-21

## 2025-03-21 RX ORDER — AMITRIPTYLINE HYDROCHLORIDE 10 MG/1
10 TABLET ORAL
Qty: 90 TABLET | Refills: 0 | Status: SHIPPED | OUTPATIENT
Start: 2025-03-21

## 2025-03-22 DIAGNOSIS — G89.4 PAIN SYNDROME, CHRONIC: ICD-10-CM

## 2025-03-22 DIAGNOSIS — M50.90 DISC DISORDER OF CERVICAL REGION: ICD-10-CM

## 2025-03-22 RX ORDER — DICLOFENAC SODIUM 75 MG/1
75 TABLET, DELAYED RELEASE ORAL 2 TIMES DAILY
Qty: 180 TABLET | Refills: 1 | Status: SHIPPED | OUTPATIENT
Start: 2025-03-22

## 2025-03-24 ENCOUNTER — OFFICE VISIT (OUTPATIENT)
Dept: PAIN MEDICINE | Facility: CLINIC | Age: 70
End: 2025-03-24
Payer: MEDICARE

## 2025-03-24 DIAGNOSIS — M54.12 CERVICAL RADICULOPATHY: Primary | ICD-10-CM

## 2025-03-24 DIAGNOSIS — G89.4 CHRONIC PAIN SYNDROME: ICD-10-CM

## 2025-03-24 DIAGNOSIS — M47.812 CERVICAL SPONDYLOSIS: ICD-10-CM

## 2025-03-24 PROCEDURE — 99214 OFFICE O/P EST MOD 30 MIN: CPT

## 2025-03-24 RX ORDER — LORAZEPAM 1 MG/1
1.5 TABLET ORAL
Qty: 135 TABLET | Refills: 0 | Status: SHIPPED | OUTPATIENT
Start: 2025-03-24

## 2025-03-24 NOTE — PROGRESS NOTES
Assessment:  1. Cervical radiculopathy    2. Chronic pain syndrome    3. Cervical spondylosis        Plan:  The patient is a 69-year-old female with a history of chronic pain secondary to low back pain, lumbar intervertebral disc disorder with radiculopathy, lumbar spondylosis, neck pain, cervical radiculopathy, myofascial pain and right shoulder pain who presents to the office with worsening bilateral neck pain.    At this time, I did instruct patient we can perform a cervical epidural steroid injection to help decrease inflammation and provide her relief of her neck pain secondary to spondylosis and foraminal stenosis.  Patient did undergo a cervical epidural steroid injection in 2018 which was helpful.  Patient would like to proceed will be scheduled.  Complete risks and benefits including bleeding, infection, tissue reaction, nerve injury and allergic reaction were discussed.  The approach was demonstrated using models and literature was provided.  Verbal and written consent was obtained.    My impressions and treatment recommendations were discussed in detail with the patient who verbalized understanding and had no further questions.  Discharge instructions were provided. I personally saw and examined the patient and I agree with the above discussed plan of care.    Orders Placed This Encounter   Procedures    FL spine and pain procedure     Dr Albert     Standing Status:   Future     Expected Date:   3/24/2025     Expiration Date:   3/24/2029     Reason for Exam::   ISAI     Anticoagulant hold needed?:   No     No orders of the defined types were placed in this encounter.      History of Present Illness:  Addis Segura is a 69 y.o. female with a history of chronic pain secondary to low back pain, lumbar intervertebral disc disorder with radiculopathy, lumbar spondylosis, neck pain, cervical radiculopathy, myofascial pain and right shoulder pain.  She was last seen on 12/11/2024 where she underwent a right  subacromial bursa injection which continues to provide her significant relief of her right shoulder pain.  She presents to the office with worsening neck pain.  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    She states her pain is worse since the last office visit and intermittent.  She rates the quality of her pain as dull/aching and is currently rating her pain a 1-8/10 on a numeric scale.    Current pain medications include tizanidine and diclofenac which are helpful.     I have personally reviewed and/or updated the patient's past medical history, past surgical history, family history, social history, current medications, allergies, and vital signs today.     Review of Systems   Respiratory:  Negative for shortness of breath.    Cardiovascular:  Negative for chest pain.   Gastrointestinal:  Negative for constipation, diarrhea, nausea and vomiting.   Musculoskeletal:  Positive for joint swelling, neck pain and neck stiffness. Negative for arthralgias, gait problem and myalgias.        Decreased range of motion   Skin:  Negative for rash.   Neurological:  Negative for dizziness, seizures and weakness.   All other systems reviewed and are negative.      Patient Active Problem List   Diagnosis    Rosacea, acne    Lumbar degenerative disc disease    Anxiety    Benign essential HTN    Disc disorder of cervical region    Lumbar facet arthropathy    Lumbar stenosis without neurogenic claudication    Myofascial pain syndrome    Pain syndrome, chronic    Primary osteoarthritis of right knee    Bulge of lumbar disc without myelopathy    Spondylolisthesis of lumbar region    Arthritis of right acromioclavicular joint    Calculus of gallbladder without cholecystitis without obstruction    Chronic right shoulder pain    Hyperthyroidism    Primary insomnia    Osteopenia of multiple sites    Atrial enlargement, left    Weight gain    Hyponatremia    Allergy to  adhesive tape    BMI 33.0-33.9,adult    Hyperphagia       Past Medical History:   Diagnosis Date    Acute appendicitis 10/27/2023    Acute cholecystitis 10/27/2023    Adverse effect of adrenal cortical steroids, initial encounter 08/30/2019    Allergic     Anxiety     Asymptomatic premature menopause 05/11/2021    Bilateral leg edema 09/17/2021    Cataract, bilateral     last assessed    Cervical radiculopathy     Chronic right shoulder pain     COVID-19 05/15/2022    Disc disease, degenerative, cervical     Disease of thyroid gland     Encounter for gynecological examination (general) (routine) without abnormal findings 02/08/2021    Herpes simplex infection     Hypertension     Hyperthyroidism     Insomnia     Long term systemic steroid user 05/11/2021    Low back pain     Lumbar degenerative disc disease     Lumbar facet arthropathy     Lumbar radiculopathy     Lumbar stenosis without neurogenic claudication     Mononucleosis     Myofascial pain dysfunction syndrome     Obesity, morbid (HCC) 07/12/2022    Osteoarthritis     shoulder region - unspecified laterality - last assessed 2/1/14    Plantar fasciitis     Poison ivy dermatitis 06/01/2023    Ptosis, both eyelids     last assessed 10/6/16    Ptosis, congenital, left     Retinal detachment     last assessed 12/11/14 right eye    Sacroiliitis ([facility])     SIADH (syndrome of inappropriate ADH production) ([facility]) 12/30/2022    Sprain of left knee 04/05/2023    Thyroid disease     Thyrotoxicosis     last assessed 5/17/13    Vertigo     Weight gain 12/01/2022       Past Surgical History:   Procedure Laterality Date    APPENDECTOMY  10/23    APPENDECTOMY LAPAROSCOPIC N/A 10/25/2023    Procedure: APPENDECTOMY LAPAROSCOPIC;  Surgeon: Francisco Mayo DO;  Location: [facility];  Service: General    CATARACT EXTRACTION      CHOLECYSTECTOMY  10/23    CHOLECYSTECTOMY LAPAROSCOPIC N/A 10/27/2023    Procedure: CHOLECYSTECTOMY LAPAROSCOPIC;  Surgeon: Francisco Mayo DO;  Location:  [facility];  Service: General    COLONOSCOPY      EYE SURGERY      IA RAYMUNDO IMPLTJ NSTIM ELTRDS PLATE/PADDLE EDRL Left 01/26/2016    Procedure: PLACEMENT OF THORACIC SPINAL CORD STIMULATOR WITH LEFT BUTTOCK IMPLANTABLE PULSE GENERATOR (IMPULSE MONITORING);  Surgeon: Sixto Felix MD;  Location: [facility];  Service: Neurosurgery    RETINAL DETACHMENT SURGERY Right     SPINAL CORD STIMULATOR IMPLANT  January 2016    SPINE SURGERY  Dec 22, 2022       Family History   Problem Relation Age of Onset    Breast cancer Mother 81    COPD Mother     Hypertension Mother         essential    Cancer Mother     Glaucoma Mother     Heart attack Father         acute MI    Emphysema Father     Hypertension Father         essential    COPD Father     No Known Problems Maternal Grandmother     No Known Problems Maternal Grandfather     No Known Problems Paternal Grandmother     No Known Problems Paternal Grandfather     No Known Problems Sister     No Known Problems Son     No Known Problems Sister     No Known Problems Paternal Aunt     No Known Problems Paternal Aunt     No Known Problems Paternal Aunt     No Known Problems Paternal Aunt     No Known Problems Paternal Aunt     Liver cancer Maternal Uncle        Social History     Occupational History    Occupation: Business owner     Comment: [employer]-full time working   Tobacco Use    Smoking status: Never    Smokeless tobacco: Never   Vaping Use    Vaping status: Never Used   Substance and Sexual Activity    Alcohol use: Yes     Alcohol/week: 1.0 standard drink of alcohol     Types: 1 Glasses of wine per week     Comment: Social/Rare    Drug use: Never    Sexual activity: Not Currently     Partners: Male     Birth control/protection: Post-menopausal, None       Current Outpatient Medications on File Prior to Visit   Medication Sig    amitriptyline (ELAVIL) 10 mg tablet Take 1 tablet (10 mg total) by mouth daily at bedtime    Ascorbic Acid (VITAMIN C PO) Take 1 tablet by  mouth daily    atorvastatin (LIPITOR) 10 mg tablet Take 1 tablet (10 mg total) by mouth daily at bedtime    Cetirizine HCl (ZYRTEC PO) Take by mouth as needed    Cholecalciferol 50 MCG (2000 UT) CAPS Take 1 capsule by mouth every morning    [facility] Diclofenac Sodium 1 % APPLY 2 GRAMS TO AFFECTED AREA 4 TIMES A DAY (Patient taking differently: continuous as needed)    cycloSPORINE (RESTASIS) 0.05 % ophthalmic emulsion 1 drop 2 (two) times a day    diclofenac (VOLTAREN) 75 mg EC tablet TAKE 1 TABLET BY MOUTH TWICE A DAY    doxycycline hyclate (VIBRAMYCIN) 100 mg capsule Take 1 capsule (100 mg total) by mouth every 12 (twelve) hours    escitalopram (LEXAPRO) 10 mg tablet Take 1 tablet (10 mg total) by mouth daily    fluticasone (FLONASE) 50 mcg/act nasal spray 1 spray into each nostril daily    furosemide (LASIX) 40 mg tablet TAKE 1 TABLET BY MOUTH EVERY DAY    lisinopril (ZESTRIL) 20 mg tablet TAKE 1 TABLET BY MOUTH TWICE A DAY    LORazepam (ATIVAN) 1 mg tablet Take 1.5 tablets (1.5 mg total) by mouth daily at bedtime as needed for anxiety    methimazole (TAPAZOLE) 5 mg tablet TAKE 1 TABLET BY MOUTH TWICE A DAY    multivitamin (THERAGRAN) TABS Take 1 tablet by mouth every morning    Polyethyl Glycol-Propyl Glycol (SYSTANE OP) Apply to eye if needed    POTASSIUM PO Take 1 tablet by mouth in the morning    Probiotic Product (PROBIOTIC PO) Take 1 capsule by mouth every morning    propranolol (INDERAL) 10 mg tablet TAKE 1 TABLET BY MOUTH TWICE A DAY    Semaglutide-Weight Management (WEGOVY) 0.25 MG/0.5ML Inject 0.5 mL (0.25 mg total) under the skin once a week    timolol (TIMOPTIC) 0.5 % ophthalmic solution     tiZANidine (ZANAFLEX) 4 mg tablet Take 1 tablet (4 mg total) by mouth daily at bedtime    valACYclovir (VALTREX) 500 mg tablet Take 1 tablet (500 mg total) by mouth daily as needed (Cold Sores)    penciclovir (Denavir) 1 % cream Apply topically daily as needed (ulcer) (Patient not taking: Reported on 3/13/2025)     No  current facility-administered medications on file prior to visit.       Allergies   Allergen Reactions    Other Dermatitis     Adhesive tape - please use ONLY PAPER TAPE    Scopolamine Other (See Comments)     Severe, debilitating headache      Flexeril [Cyclobenzaprine] Dizziness and Fatigue    Naproxen      Other reaction(s): Unknown Allergic Reaction  Annotation - [id number]: nightmares    Oxycodone Other (See Comments)     Night olsen     Penicillins      Other reaction(s): Unknown Allergic Reaction  Annotation - [id number]: childhood reaction    Promethazine-Codeine      Reaction Date: 04May2011; Annotation - [id number]: nightmares; Annotation - [id number]: nightmares    Tramadol      Other reaction(s): Unknown Allergic Reaction  Annotation - 14Jan2014: PT HAS NIGHTMARES FROM TRAMADOL    Medical Tape Rash    Wound Dressing Adhesive Rash       Physical Exam:    [facility] 11/26/2015     Constitutional:normal, well developed, well nourished, alert, in no distress and non-toxic and no overt pain behavior.  Eyes:anicteric  HEENT:grossly intact  Neck:supple, symmetric, trachea midline and no masses   Pulmonary:even and unlabored  Cardiovascular:No edema or pitting edema present  Skin:Normal without rashes or lesions and well hydrated  Psychiatric:Mood and affect appropriate  Neurologic:Cranial Nerves II-XII grossly intact  Musculoskeletal:normal    Cervical Spine Exam    Appearance:  Normal lordosis  Palpation/Tenderness:  left cervical paraspinal tenderness  right cervical paraspinal tenderness  Sensory:  no sensory deficits noted  Range of Motion:  Flexion:  No limitation  with pain  Extension:  No limitation  with pain  Rotation - Left:  No limitation  with pain  Rotation - Right:  No limitation  with pain  Motor Strength:  Left Arm Flexion  5/5  Left Arm Extension  5/5  Right Arm Flexion  5/5  Right Arm Extension  5/5  Left    5/5  Right   5/5      Imaging

## 2025-04-01 DIAGNOSIS — H53.131 ACUTE LOSS OF VISION, RIGHT: Primary | ICD-10-CM

## 2025-04-01 NOTE — PROGRESS NOTES
Per Dr. Crowell - Place order for MRI and CT STAT     If spinal stimulator is able to be turned on to MRI mode, patient will proceed with MRI. If not, Patient will proceed with CT .    Patient aware to cancel CT if able to do MRI and schedule STAT MRI    Patient aware to go to ED if symptoms reappear or has any stroke like symptoms     Patient states on 3/31 in the afternoon, she lost vision in right eye for about 15 mins. Called and went to her eye doctor. Was told nothing was found and recommended to follow up PCP. Dr. Crowell recommended MRI or CT

## 2025-04-02 ENCOUNTER — HOSPITAL ENCOUNTER (OUTPATIENT)
Dept: RADIOLOGY | Facility: HOSPITAL | Age: 70
Discharge: HOME | End: 2025-04-02
Attending: FAMILY MEDICINE
Payer: MEDICARE

## 2025-04-02 DIAGNOSIS — H53.131 ACUTE LOSS OF VISION, RIGHT: ICD-10-CM

## 2025-04-02 PROCEDURE — 70482 CT ORBIT/EAR/FOSSA W/O&W/DYE: CPT

## 2025-04-02 RX ADMIN — IOHEXOL 75 ML: 350 INJECTION, SOLUTION INTRAVENOUS at 14:54

## 2025-04-03 ENCOUNTER — HOSPITAL ENCOUNTER (OUTPATIENT)
Dept: RADIOLOGY | Facility: CLINIC | Age: 70
Discharge: HOME | End: 2025-04-03
Payer: MEDICARE

## 2025-04-03 ENCOUNTER — APPOINTMENT (EMERGENCY)
Dept: RADIOLOGY | Facility: HOSPITAL | Age: 70
End: 2025-04-03
Payer: MEDICARE

## 2025-04-03 ENCOUNTER — RESULTS FOLLOW-UP (OUTPATIENT)
Dept: INTERNAL MEDICINE CLINIC | Age: 70
End: 2025-04-03

## 2025-04-03 ENCOUNTER — HOSPITAL ENCOUNTER (EMERGENCY)
Facility: HOSPITAL | Age: 70
Discharge: HOME | End: 2025-04-03
Attending: EMERGENCY MEDICINE
Payer: MEDICARE

## 2025-04-03 VITALS
DIASTOLIC BLOOD PRESSURE: 81 MMHG | HEART RATE: 75 BPM | OXYGEN SATURATION: 100 % | RESPIRATION RATE: 18 BRPM | SYSTOLIC BLOOD PRESSURE: 137 MMHG | TEMPERATURE: 97.6 F

## 2025-04-03 VITALS
HEART RATE: 66 BPM | SYSTOLIC BLOOD PRESSURE: 128 MMHG | RESPIRATION RATE: 18 BRPM | DIASTOLIC BLOOD PRESSURE: 85 MMHG | OXYGEN SATURATION: 98 % | TEMPERATURE: 97.8 F

## 2025-04-03 DIAGNOSIS — M25.561 ACUTE PAIN OF RIGHT KNEE: Primary | ICD-10-CM

## 2025-04-03 DIAGNOSIS — M54.12 CERVICAL RADICULOPATHY: ICD-10-CM

## 2025-04-03 PROCEDURE — 62321 NJX INTERLAMINAR CRV/THRC: CPT | Performed by: ANESTHESIOLOGY

## 2025-04-03 PROCEDURE — 99284 EMERGENCY DEPT VISIT MOD MDM: CPT

## 2025-04-03 PROCEDURE — 73564 X-RAY EXAM KNEE 4 OR MORE: CPT

## 2025-04-03 PROCEDURE — 99283 EMERGENCY DEPT VISIT LOW MDM: CPT

## 2025-04-03 RX ORDER — IBUPROFEN 400 MG/1
400 TABLET, FILM COATED ORAL ONCE
Status: COMPLETED | OUTPATIENT
Start: 2025-04-03 | End: 2025-04-03

## 2025-04-03 RX ORDER — METHYLPREDNISOLONE ACETATE 80 MG/ML
80 INJECTION, SUSPENSION INTRA-ARTICULAR; INTRALESIONAL; INTRAMUSCULAR; PARENTERAL; SOFT TISSUE ONCE
Status: COMPLETED | OUTPATIENT
Start: 2025-04-03 | End: 2025-04-03

## 2025-04-03 RX ADMIN — METHYLPREDNISOLONE ACETATE 80 MG: 80 INJECTION, SUSPENSION INTRA-ARTICULAR; INTRALESIONAL; INTRAMUSCULAR; SOFT TISSUE at 10:48

## 2025-04-03 RX ADMIN — IBUPROFEN 400 MG: 400 TABLET, FILM COATED ORAL at 14:59

## 2025-04-03 RX ADMIN — IOHEXOL 1 ML: 300 INJECTION, SOLUTION INTRAVENOUS at 10:48

## 2025-04-03 NOTE — DISCHARGE INSTRUCTIONS
-no fracture noted on x-ray  -may continue to alternate Tylenol and Motrin as needed for pain  -can apply topical creams, ice, and heat for additional pain relief.   -follow-up with PCP and orthopedics for any worsening pain

## 2025-04-03 NOTE — H&P
History of Present Illness: The patient is a 69 y.o. female who presents with complaints of neck and arm pain and is here today for cervical epidural steroid injection    Past Medical History:   Diagnosis Date    Acute appendicitis 10/27/2023    Acute cholecystitis 10/27/2023    Adverse effect of adrenal cortical steroids, initial encounter 08/30/2019    Allergic     Anxiety     Asymptomatic premature menopause 05/11/2021    Bilateral leg edema 09/17/2021    Cataract, bilateral     last assessed    Cervical radiculopathy     Chronic right shoulder pain     COVID-19 05/15/2022    Disc disease, degenerative, cervical     Disease of thyroid gland     Encounter for gynecological examination (general) (routine) without abnormal findings 02/08/2021    Herpes simplex infection     Hypertension     Hyperthyroidism     Insomnia     Long term systemic steroid user 05/11/2021    Low back pain     Lumbar degenerative disc disease     Lumbar facet arthropathy     Lumbar radiculopathy     Lumbar stenosis without neurogenic claudication     Mononucleosis     Myofascial pain dysfunction syndrome     Obesity, morbid (HCC) 07/12/2022    Osteoarthritis     shoulder region - unspecified laterality - last assessed 2/1/14    Plantar fasciitis     Poison ivy dermatitis 06/01/2023    Ptosis, both eyelids     last assessed 10/6/16    Ptosis, congenital, left     Retinal detachment     last assessed 12/11/14 right eye    Sacroiliitis ([facility])     SIADH (syndrome of inappropriate ADH production) ([facility]) 12/30/2022    Sprain of left knee 04/05/2023    Thyroid disease     Thyrotoxicosis     last assessed 5/17/13    Vertigo     Weight gain 12/01/2022       Past Surgical History:   Procedure Laterality Date    APPENDECTOMY  10/23    APPENDECTOMY LAPAROSCOPIC N/A 10/25/2023    Procedure: APPENDECTOMY LAPAROSCOPIC;  Surgeon: Francisco Mayo DO;  Location: [facility];  Service: General    CATARACT EXTRACTION      CHOLECYSTECTOMY  10/23    CHOLECYSTECTOMY  LAPAROSCOPIC N/A 10/27/2023    Procedure: CHOLECYSTECTOMY LAPAROSCOPIC;  Surgeon: Francisco Mayo DO;  Location: [facility];  Service: General    COLONOSCOPY      EYE SURGERY      HI RAYMUNDO IMPLTJ NSTIM ELTRDS PLATE/PADDLE EDRL Left 01/26/2016    Procedure: PLACEMENT OF THORACIC SPINAL CORD STIMULATOR WITH LEFT BUTTOCK IMPLANTABLE PULSE GENERATOR (IMPULSE MONITORING);  Surgeon: Sixto Felix MD;  Location: [facility];  Service: Neurosurgery    RETINAL DETACHMENT SURGERY Right     SPINAL CORD STIMULATOR IMPLANT  January 2016    SPINE SURGERY  Dec 22, 2022         Current Outpatient Medications:     amitriptyline (ELAVIL) 10 mg tablet, Take 1 tablet (10 mg total) by mouth daily at bedtime, Disp: 90 tablet, Rfl: 0    Ascorbic Acid (VITAMIN C PO), Take 1 tablet by mouth daily, Disp: , Rfl:     atorvastatin (LIPITOR) 10 mg tablet, Take 1 tablet (10 mg total) by mouth daily at bedtime, Disp: 90 tablet, Rfl: 0    Cetirizine HCl (ZYRTEC PO), Take by mouth as needed, Disp: , Rfl:     Cholecalciferol 50 MCG (2000 UT) CAPS, Take 1 capsule by mouth every morning, Disp: , Rfl:     [facility] Diclofenac Sodium 1 %, APPLY 2 GRAMS TO AFFECTED AREA 4 TIMES A DAY (Patient taking differently: continuous as needed), Disp: 200 g, Rfl: 1    cycloSPORINE (RESTASIS) 0.05 % ophthalmic emulsion, 1 drop 2 (two) times a day, Disp: , Rfl:     diclofenac (VOLTAREN) 75 mg EC tablet, TAKE 1 TABLET BY MOUTH TWICE A DAY, Disp: 180 tablet, Rfl: 1    doxycycline hyclate (VIBRAMYCIN) 100 mg capsule, Take 1 capsule (100 mg total) by mouth every 12 (twelve) hours, Disp: 180 capsule, Rfl: 1    escitalopram (LEXAPRO) 10 mg tablet, Take 1 tablet (10 mg total) by mouth daily, Disp: 100 tablet, Rfl: 1    fluticasone (FLONASE) 50 mcg/act nasal spray, 1 spray into each nostril daily, Disp: 16 g, Rfl: 3    furosemide (LASIX) 40 mg tablet, TAKE 1 TABLET BY MOUTH EVERY DAY, Disp: 90 tablet, Rfl: 1    lisinopril (ZESTRIL) 20 mg tablet, TAKE 1 TABLET BY MOUTH TWICE A DAY,  Disp: 180 tablet, Rfl: 1    LORazepam (ATIVAN) 1 mg tablet, Take 1.5 tablets (1.5 mg total) by mouth daily at bedtime as needed for anxiety, Disp: 135 tablet, Rfl: 0    methimazole (TAPAZOLE) 5 mg tablet, TAKE 1 TABLET BY MOUTH TWICE A DAY, Disp: 200 tablet, Rfl: 1    multivitamin (THERAGRAN) TABS, Take 1 tablet by mouth every morning, Disp: , Rfl:     penciclovir (Denavir) 1 % cream, Apply topically daily as needed (ulcer) (Patient not taking: Reported on 3/13/2025), Disp: 5 g, Rfl: 5    Polyethyl Glycol-Propyl Glycol (SYSTANE OP), Apply to eye if needed, Disp: , Rfl:     POTASSIUM PO, Take 1 tablet by mouth in the morning, Disp: , Rfl:     Probiotic Product (PROBIOTIC PO), Take 1 capsule by mouth every morning, Disp: , Rfl:     propranolol (INDERAL) 10 mg tablet, TAKE 1 TABLET BY MOUTH TWICE A DAY, Disp: 180 tablet, Rfl: 1    Semaglutide-Weight Management (WEGOVY) 0.25 MG/0.5ML, Inject 0.5 mL (0.25 mg total) under the skin once a week, Disp: 2 mL, Rfl: 0    timolol (TIMOPTIC) 0.5 % ophthalmic solution, , Disp: , Rfl:     tiZANidine (ZANAFLEX) 4 mg tablet, Take 1 tablet (4 mg total) by mouth daily at bedtime, Disp: 90 tablet, Rfl: 3    valACYclovir (VALTREX) 500 mg tablet, Take 1 tablet (500 mg total) by mouth daily as needed (Cold Sores), Disp: 30 tablet, Rfl: 5    Current Facility-Administered Medications:     iohexol (OMNIPAQUE) 300 mg/mL injection 1 mL, 1 mL, Epidural, Once, Rod Albert MD    methylPREDNISolone acetate (DEPO-MEDROL) injection 80 mg, 80 mg, Epidural, Once, Rod Albert MD    Allergies   Allergen Reactions    Other Dermatitis     Adhesive tape - please use ONLY PAPER TAPE    Scopolamine Other (See Comments)     Severe, debilitating headache      Flexeril [Cyclobenzaprine] Dizziness and Fatigue    Naproxen      Other reaction(s): Unknown Allergic Reaction  [facility] - [id number]: nightmares    Oxycodone Other (See Comments)     Night olsen     Penicillins      Other reaction(s): Unknown  Allergic Reaction  Annotation - [id number]: childhood reaction    Promethazine-Codeine      Reaction Date: 04May2011; Annotation - 03Apr2017: nightmares; Annotation - 05Feb2016: nightmares    Tramadol      Other reaction(s): Unknown Allergic Reaction  Annotation - 14Jan2014: PT HAS NIGHTMARES FROM TRAMADOL    Medical Tape Rash    Wound Dressing Adhesive Rash       Physical Exam:   Vitals:    04/03/25 1034   BP: 116/78   Pulse: 64   Resp: 18   Temp: 97.8 °F (36.6 °C)   SpO2: 98%     General: Awake, Alert, Oriented x 3, Mood and affect appropriate  Respiratory: Respirations even and unlabored  Cardiovascular: Peripheral pulses intact; no edema  Musculoskeletal Exam: Neck and arm pain    ASA Score: 3    Patient/Chart Verification  Patient ID Verified: Verbal  ID Band Applied: No  H&P( within 30 days) Verified: To be obtained in the Procedural area  Allergies Reviewed: Yes  Anticoag/NSAID held?: Yes (diclofenac LD 3/30- FQ informed 1 day short)  Currently on antibiotics?: Yes (chronic abx - FQ informed, denies active infection)    Assessment:   1. Cervical radiculopathy        Plan: ISAI

## 2025-04-03 NOTE — DISCHARGE INSTR - LAB
Epidural Steroid Injection     ** Resume Diclofenac **  WHAT YOU NEED TO KNOW:   An epidural steroid injection (PRATIK) is a procedure to inject steroid medicine into the epidural space. The epidural space is between your spinal cord and vertebrae. Steroids reduce inflammation and fluid buildup in your spine that may be causing pain. You may be given pain medicine along with the steroids.          ACTIVITY  Do not drive or operate machinery today.  No strenuous activity today - bending, lifting, etc.  You may resume normal activites starting tomorrow - start slowly and as tolerated.  You may shower today, but no tub baths or hot tubs.  You may have numbness for several hours from the local anesthetic. Please use caution and common sense, especially with weight-bearing activities.    CARE OF THE INJECTION SITE  If you have soreness or pain, apply ice to the area today (20 minutes on/20 minutes off).  Starting tomorrow, you may use warm, moist heat or ice if needed.  You may have an increase or change in your discomfort for 36-48 hours after your treatment.  Apply ice and continue with any pain medication you have been prescribed.  Notify the [facility] if you have any of the following: redness, drainage, swelling, headache, stiff neck or fever above 100°F.    SPECIAL INSTRUCTIONS  Our office will contact you in approximately 14 days for a progress report.    MEDICATIONS  Continue to take all routine medications.  Our office may have instructed you to hold some medications.    As no general anesthesia was used in today's procedure, you should not experience any side effects related to anesthesia.     If you are diabetic, the steroids used in today's injection may temporarily increase your blood sugar levels after the first few days after your injection. Please keep a close eye on your sugars and alert the doctor who manages your diabetes if your sugars are significantly high from your baseline or you are  symptomatic.     If you have a problem specifically related to your procedure, please call our office at [phone].  Problems not related to your procedure should be directed to your primary care physician.

## 2025-04-03 NOTE — ED PROVIDER NOTES
Time reflects when diagnosis was documented in both MDM as applicable and the Disposition within this note       Time User Action Codes Description Comment    4/3/2025  2:57 PM Lauren Johnson Add [M25.561] Acute pain of right knee           ED Disposition       ED Disposition   Discharge    Condition   Stable    Date/Time   Thu Apr 3, 2025  3:38 PM    Comment   Addis Segura discharge to home/self care.                   Assessment & Plan       Medical Decision Making  Patient is a 69-year-old female presenting for evaluation of right knee pain. Upon examination, patient is well-appearing and does not appear in acute distress. VSS. Right knee is normal appearing. No evidence of swelling or effusion. Area is non erythematous and is not warm to the touch--no suspicion for infected joint at this time. Patient exhibiting full ROM with no joint instability--lower suspicion for ligamental injury. Neurovascular and sensation intact. X-ray obtained which does not show any evidence of fracture on my interpretation. Knee was wrapped with ACE wrap. Patient attempted ambulation and notes pain in doing so. She is able to stand but experiences shooting pain up her leg with while walking with a cane. Discussed with patient option to admit to hospital for ambulatory dysfunction as she has multiple steps in her home to navigate but she has declined at this time. States that she has access to a walker at home that she will use as he knee is healing. Referral to orthopedics provided for any continuing pain. Advised patient that she may continue to alternate Tylenol and Motrin for pain as well as apply ice, heat, and topical creams for additional relief. Reviewed RICE with patient. Strict ED return precautions reviewed and patient verbalizes understanding of discharge instructions and follow-up care at this time.     Amount and/or Complexity of Data Reviewed  Radiology: ordered and independent interpretation performed.  "Decision-making details documented in ED Course.     Details: Reviewed     Risk  Prescription drug management.        ED Course as of 04/03/25 1708   Thu Apr 03, 2025   1538 XR knee 4+ views Right injury  No fracture noted on my interpretation of x-ray.       Medications   ibuprofen (MOTRIN) tablet 400 mg (400 mg Oral Given 4/3/25 4990)       ED Risk Strat Scores                            SBIRT 22yo+      Flowsheet Row Most Recent Value   Initial Alcohol Screen: US AUDIT-C     1. How often do you have a drink containing alcohol? 0 Filed at: 04/03/2025 1413   2. How many drinks containing alcohol do you have on a typical day you are drinking?  0 Filed at: 04/03/2025 1413   3a. Male UNDER 65: How often do you have five or more drinks on one occasion? 0 Filed at: 04/03/2025 1413   3b. FEMALE Any Age, or MALE 65+: How often do you have 4 or more drinks on one occassion? 0 Filed at: 04/03/2025 1413   Audit-C Score 0 Filed at: 04/03/2025 1413   SIGIFREDO: How many times in the past year have you...    Used an illegal drug or used a prescription medication for non-medical reasons? Never Filed at: 04/03/2025 1413                            History of Present Illness       Chief Complaint   Patient presents with    Knee Pain     Pt reports she was getting into the car and \"my knee cracked and I fell into the car.\" No head strike or other injury. Right knee pain        Past Medical History:   Diagnosis Date    Acute appendicitis 10/27/2023    Acute cholecystitis 10/27/2023    Adverse effect of adrenal cortical steroids, initial encounter 08/30/2019    Allergic     Anxiety     Asymptomatic premature menopause 05/11/2021    Bilateral leg edema 09/17/2021    Cataract, bilateral     last assessed    Cervical radiculopathy     Chronic right shoulder pain     COVID-19 05/15/2022    Disc disease, degenerative, cervical     Disease of thyroid gland     Encounter for gynecological examination (general) (routine) without abnormal findings " 02/08/2021    Herpes simplex infection     Hypertension     Hyperthyroidism     Insomnia     Long term systemic steroid user 05/11/2021    Low back pain     Lumbar degenerative disc disease     Lumbar facet arthropathy     Lumbar radiculopathy     Lumbar stenosis without neurogenic claudication     Mononucleosis     Myofascial pain dysfunction syndrome     Obesity, morbid ([facility]) 07/12/2022    Osteoarthritis     shoulder region - unspecified laterality - last assessed 2/1/14    Plantar fasciitis     Poison ivy dermatitis 06/01/2023    Ptosis, both eyelids     last assessed 10/6/16    Ptosis, congenital, left     Retinal detachment     last assessed 12/11/14 right eye    Sacroiliitis ([facility])     SIADH (syndrome of inappropriate ADH production) ([facility]) 12/30/2022    Sprain of left knee 04/05/2023    Thyroid disease     Thyrotoxicosis     last assessed 5/17/13    Vertigo     Weight gain 12/01/2022      Past Surgical History:   Procedure Laterality Date    APPENDECTOMY  10/23    APPENDECTOMY LAPAROSCOPIC N/A 10/25/2023    Procedure: APPENDECTOMY LAPAROSCOPIC;  Surgeon: Francisco Mayo DO;  Location: [facility];  Service: General    CATARACT EXTRACTION      CHOLECYSTECTOMY  10/23    CHOLECYSTECTOMY LAPAROSCOPIC N/A 10/27/2023    Procedure: CHOLECYSTECTOMY LAPAROSCOPIC;  Surgeon: Francisco Mayo DO;  Location[facility];  Service: General    COLONOSCOPY      EYE SURGERY      WI RAYMUNDO IMPLTJ NSTIM ELTRDS PLATE/PADDLE EDRL Left 01/26/2016    Procedure: PLACEMENT OF THORACIC SPINAL CORD STIMULATOR WITH LEFT BUTTOCK IMPLANTABLE PULSE GENERATOR (IMPULSE MONITORING);  Surgeon: Sixto Felix MD;  Location: [facility];  Service: Neurosurgery    RETINAL DETACHMENT SURGERY Right     SPINAL CORD STIMULATOR IMPLANT  January 2016    SPINE SURGERY  Dec 22, 2022      Family History   Problem Relation Age of Onset    Breast cancer Mother 81    COPD Mother     Hypertension Mother         essential    Cancer Mother     Glaucoma Mother     Heart  attack Father         acute MI    Emphysema Father     Hypertension Father         essential    COPD Father     No Known Problems Maternal Grandmother     No Known Problems Maternal Grandfather     No Known Problems Paternal Grandmother     No Known Problems Paternal Grandfather     No Known Problems Sister     No Known Problems Son     No Known Problems Sister     No Known Problems Paternal Aunt     No Known Problems Paternal Aunt     No Known Problems Paternal Aunt     No Known Problems Paternal Aunt     No Known Problems Paternal Aunt     Liver cancer Maternal Uncle       Social History     Tobacco Use    Smoking status: Never    Smokeless tobacco: Never   Vaping Use    Vaping status: Never Used   Substance Use Topics    Alcohol use: Yes     Alcohol/week: 1.0 standard drink of alcohol     Types: 1 Glasses of wine per week     Comment: Social/Rare    Drug use: Never      E-Cigarette/Vaping    E-Cigarette Use Never User       E-Cigarette/Vaping Substances    Nicotine No     THC No     CBD No     Flavoring No     Other No     Unknown No       I have reviewed and agree with the history as documented.     Patient is a 69-year-old female with a past medical history including hypertension, hyperthyroidism, osteoarthritis, DDD, and SIADH. Presents today for evaluation of right knee pain. States that's she was trying to get into her car today when she felt a cracking sensation in her right knee. Denies any falls or injury to the knee. Pain is worse with ambulation and is now using a cane to help with walking. Attempted 3 extra strength Tylenol and icing it with slight relief. She is currently attending physical therapy to help strengthen her legs. Denies any other injuries.      Knee Pain      Review of Systems   Musculoskeletal:  Positive for arthralgias (right knee pain) and gait problem. Negative for joint swelling.   All other systems reviewed and are negative.          Objective       ED Triage Vitals   Temperature  Pulse Blood Pressure Respirations SpO2 Patient Position - Orthostatic VS   04/03/25 1412 04/03/25 1411 04/03/25 1411 04/03/25 1411 04/03/25 1411 04/03/25 1411   97.6 °F (36.4 °C) 75 137/81 18 100 % Sitting      Temp Source Heart Rate Source BP Location FiO2 (%) Pain Score    04/03/25 1412 04/03/25 1411 04/03/25 1411 -- 04/03/25 1411    Oral Monitor Right arm  8      Vitals      Date and Time Temp Pulse SpO2 Resp BP Pain Score FACES Pain Rating User   04/03/25 1412 97.6 °F (36.4 °C) -- -- -- -- -- -- EM   04/03/25 1411 -- 75 100 % 18 137/81 8 -- EM            Physical Exam  Vitals and nursing note reviewed.   Constitutional:       Appearance: Normal appearance.   Pulmonary:      Effort: Pulmonary effort is normal.   Musculoskeletal:         General: Tenderness present.      Right knee: No swelling, deformity, effusion or erythema. Normal range of motion. Tenderness present over the medial joint line. Normal pulse.      Left knee: Normal.      Comments: Tenderness upon palpation of medial joint line. No significant swelling or effusion noted. Area is non erythematous. Full ROM without any joint instability. Neurovascular and sensation intact. 2+ pedal/PT.    Skin:     General: Skin is warm and dry.      Capillary Refill: Capillary refill takes less than 2 seconds.   Neurological:      General: No focal deficit present.      Mental Status: She is alert and oriented to person, place, and time.   Psychiatric:         Mood and Affect: Mood normal.         Behavior: Behavior normal.         Results Reviewed       None            XR knee 4+ views Right injury   ED Interpretation by CARMEN Elizondo (04/03 1538)   No fracture noted on my interpretation of x-ray.      Final Interpretation by Stephon Keita MD (04/03 1628)      No acute osseous abnormality.         Computerized Assisted Algorithm (CAA) may have been used to analyze all applicable images.         Workstation performed: [id number]              Procedures    ED Medication and Procedure Management   Prior to Admission Medications   Prescriptions Last Dose Informant Patient Reported? Taking?   Ascorbic Acid (VITAMIN C PO)  Self Yes No   Sig: Take 1 tablet by mouth daily   [employer] Diclofenac Sodium 1 %  Self No No   Sig: APPLY 2 GRAMS TO AFFECTED AREA 4 TIMES A DAY   Patient taking differently: continuous as needed   Cetirizine HCl (ZYRTEC PO)  Self Yes No   Sig: Take by mouth as needed   Cholecalciferol 50 MCG (2000 UT) CAPS  Self Yes No   Sig: Take 1 capsule by mouth every morning   LORazepam (ATIVAN) 1 mg tablet   No No   Sig: Take 1.5 tablets (1.5 mg total) by mouth daily at bedtime as needed for anxiety   POTASSIUM PO  Self Yes No   Sig: Take 1 tablet by mouth in the morning   Polyethyl Glycol-Propyl Glycol (SYSTANE OP)  Self Yes No   Sig: Apply to eye if needed   Probiotic Product (PROBIOTIC PO)  Self Yes No   Sig: Take 1 capsule by mouth every morning   Semaglutide-Weight Management (WEGOVY) 0.25 MG/0.5ML  Self No No   Sig: Inject 0.5 mL (0.25 mg total) under the skin once a week   amitriptyline (ELAVIL) 10 mg tablet  Self No No   Sig: Take 1 tablet (10 mg total) by mouth daily at bedtime   atorvastatin (LIPITOR) 10 mg tablet  Self No No   Sig: Take 1 tablet (10 mg total) by mouth daily at bedtime   cycloSPORINE (RESTASIS) 0.05 % ophthalmic emulsion  Self Yes No   Si drop 2 (two) times a day   diclofenac (VOLTAREN) 75 mg EC tablet  Self No No   Sig: TAKE 1 TABLET BY MOUTH TWICE A DAY   doxycycline hyclate (VIBRAMYCIN) 100 mg capsule  Self No No   Sig: Take 1 capsule (100 mg total) by mouth every 12 (twelve) hours   escitalopram (LEXAPRO) 10 mg tablet  Self No No   Sig: Take 1 tablet (10 mg total) by mouth daily   fluticasone (FLONASE) 50 mcg/act nasal spray  Self No No   Si spray into each nostril daily   furosemide (LASIX) 40 mg tablet  Self No No   Sig: TAKE 1 TABLET BY MOUTH EVERY DAY   lisinopril (ZESTRIL) 20 mg tablet  Self No No   Sig:  TAKE 1 TABLET BY MOUTH TWICE A DAY   methimazole (TAPAZOLE) 5 mg tablet  Self No No   Sig: TAKE 1 TABLET BY MOUTH TWICE A DAY   multivitamin (THERAGRAN) TABS  Self Yes No   Sig: Take 1 tablet by mouth every morning   penciclovir (Denavir) 1 % cream  Self No No   Sig: Apply topically daily as needed (ulcer)   Patient not taking: Reported on 3/13/2025   propranolol (INDERAL) 10 mg tablet  Self No No   Sig: TAKE 1 TABLET BY MOUTH TWICE A DAY   tiZANidine (ZANAFLEX) 4 mg tablet  Self No No   Sig: Take 1 tablet (4 mg total) by mouth daily at bedtime   timolol (TIMOPTIC) 0.5 % ophthalmic solution  Self Yes No   valACYclovir (VALTREX) 500 mg tablet  Self No No   Sig: Take 1 tablet (500 mg total) by mouth daily as needed (Cold Sores)      Facility-Administered Medications: None     Discharge Medication List as of 4/3/2025  3:39 PM        CONTINUE these medications which have NOT CHANGED    Details   amitriptyline (ELAVIL) 10 mg tablet Take 1 tablet (10 mg total) by mouth daily at bedtime, Starting Fri 3/21/2025, Normal      Ascorbic Acid (VITAMIN C PO) Take 1 tablet by mouth daily, Historical Med      atorvastatin (LIPITOR) 10 mg tablet Take 1 tablet (10 mg total) by mouth daily at bedtime, Starting Fri 3/21/2025, Normal      Cetirizine HCl (ZYRTEC PO) Take by mouth as needed, Historical Med      Cholecalciferol 50 MCG (2000 UT) CAPS Take 1 capsule by mouth every morning, Historical Med      [employer] Diclofenac Sodium 1 % APPLY 2 GRAMS TO AFFECTED AREA 4 TIMES A DAY, Normal      cycloSPORINE (RESTASIS) 0.05 % ophthalmic emulsion 1 drop 2 (two) times a day, Historical Med      diclofenac (VOLTAREN) 75 mg EC tablet TAKE 1 TABLET BY MOUTH TWICE A DAY, Starting Sat 3/22/2025, Normal      doxycycline hyclate (VIBRAMYCIN) 100 mg capsule Take 1 capsule (100 mg total) by mouth every 12 (twelve) hours, Starting Mon 2/3/2025, Until Sun 5/4/2025, Normal      escitalopram (LEXAPRO) 10 mg tablet Take 1 tablet (10 mg total) by mouth daily,  Starting Tue 12/17/2024, Normal      fluticasone (FLONASE) 50 mcg/act nasal spray 1 spray into each nostril daily, Starting Mon 10/15/2018, Normal      furosemide (LASIX) 40 mg tablet TAKE 1 TABLET BY MOUTH EVERY DAY, Starting Mon 12/30/2024, Normal      lisinopril (ZESTRIL) 20 mg tablet TAKE 1 TABLET BY MOUTH TWICE A DAY, Starting Thu 2/20/2025, Normal      LORazepam (ATIVAN) 1 mg tablet Take 1.5 tablets (1.5 mg total) by mouth daily at bedtime as needed for anxiety, Starting Mon 3/24/2025, Normal      methimazole (TAPAZOLE) 5 mg tablet TAKE 1 TABLET BY MOUTH TWICE A DAY, Starting Fri 1/31/2025, Normal      multivitamin (THERAGRAN) TABS Take 1 tablet by mouth every morning, Historical Med      penciclovir (Denavir) 1 % cream Apply topically daily as needed (ulcer), Starting Mon 11/27/2023, Normal      Polyethyl Glycol-Propyl Glycol (SYSTANE OP) Apply to eye if needed, Historical Med      POTASSIUM PO Take 1 tablet by mouth in the morning, Historical Med      Probiotic Product (PROBIOTIC PO) Take 1 capsule by mouth every morning, Historical Med      propranolol (INDERAL) 10 mg tablet TAKE 1 TABLET BY MOUTH TWICE A DAY, Starting Fri 1/31/2025, Normal      Semaglutide-Weight Management (WEGOVY) 0.25 MG/0.5ML Inject 0.5 mL (0.25 mg total) under the skin once a week, Starting Thu 3/13/2025, Normal      timolol (TIMOPTIC) 0.5 % ophthalmic solution Historical Med      tiZANidine (ZANAFLEX) 4 mg tablet Take 1 tablet (4 mg total) by mouth daily at bedtime, Starting Mon 12/11/2023, Normal      valACYclovir (VALTREX) 500 mg tablet Take 1 tablet (500 mg total) by mouth daily as needed (Cold Sores), Starting Tue 12/3/2024, Until Sun 6/1/2025 at 2359, Normal             ED SEPSIS DOCUMENTATION   Time reflects when diagnosis was documented in both MDM as applicable and the Disposition within this note       Time User Action Codes Description Comment    4/3/2025  2:57 PM Lauren Johnson Add [M25.561] Acute pain of right knee                   CARMEN Elizondo  04/03/25 1701

## 2025-04-17 ENCOUNTER — OFFICE VISIT (OUTPATIENT)
Dept: INTERNAL MEDICINE CLINIC | Facility: OTHER | Age: 70
End: 2025-04-17
Payer: MEDICARE

## 2025-04-17 ENCOUNTER — TELEPHONE (OUTPATIENT)
Dept: RADIOLOGY | Facility: MEDICAL CENTER | Age: 70
End: 2025-04-17

## 2025-04-17 VITALS
WEIGHT: 206 LBS | SYSTOLIC BLOOD PRESSURE: 132 MMHG | HEART RATE: 97 BPM | DIASTOLIC BLOOD PRESSURE: 80 MMHG | HEIGHT: 65 IN | OXYGEN SATURATION: 95 % | TEMPERATURE: 98.4 F | BODY MASS INDEX: 34.32 KG/M2

## 2025-04-17 DIAGNOSIS — R63.2 HYPERPHAGIA: ICD-10-CM

## 2025-04-17 DIAGNOSIS — E78.00 HYPERCHOLESTEREMIA: ICD-10-CM

## 2025-04-17 DIAGNOSIS — Z13.1 SCREENING FOR DIABETES MELLITUS: ICD-10-CM

## 2025-04-17 DIAGNOSIS — E05.90 HYPERTHYROIDISM: ICD-10-CM

## 2025-04-17 DIAGNOSIS — E87.1 HYPONATREMIA: ICD-10-CM

## 2025-04-17 DIAGNOSIS — F41.9 ANXIETY: ICD-10-CM

## 2025-04-17 DIAGNOSIS — I10 BENIGN ESSENTIAL HTN: Primary | ICD-10-CM

## 2025-04-17 PROCEDURE — 99214 OFFICE O/P EST MOD 30 MIN: CPT | Performed by: FAMILY MEDICINE

## 2025-04-17 PROCEDURE — G2211 COMPLEX E/M VISIT ADD ON: HCPCS | Performed by: FAMILY MEDICINE

## 2025-04-17 RX ORDER — LORAZEPAM 1 MG/1
1.5 TABLET ORAL
Qty: 135 TABLET | Refills: 0 | Status: SHIPPED | OUTPATIENT
Start: 2025-04-17

## 2025-04-17 RX ORDER — SEMAGLUTIDE 0.5 MG/.5ML
INJECTION, SOLUTION SUBCUTANEOUS
Qty: 2 ML | Refills: 0 | Status: SHIPPED | OUTPATIENT
Start: 2025-04-17

## 2025-04-17 NOTE — PROGRESS NOTES
Assessment/Plan:    1. Benign essential HTN  2. Anxiety  -     LORazepam (ATIVAN) 1 mg tablet; Take 1.5 tablets (1.5 mg total) by mouth daily at bedtime as needed for anxiety  3. BMI 33.0-33.9,adult  -     Semaglutide-Weight Management (Wegovy) 0.5 MG/0.5ML; Inject 0.5 mg under the skin weekly  4. Hyperthyroidism  -     TSH, 3rd generation with Free T4 reflex; Future; Expected date: 10/17/2025  5. Hyperphagia  6. Hyponatremia  -     Basic metabolic panel; Future; Expected date: 05/17/2025  7. Hypercholesteremia  -     Lipid Panel with Direct LDL reflex; Future; Expected date: 10/17/2025  -     Comprehensive metabolic panel; Future; Expected date: 10/17/2025  -     CBC and differential; Future; Expected date: 10/17/2025  8. Screening for diabetes mellitus  -     Hemoglobin A1C; Future; Expected date: 10/17/2025      .Patient denies personal history of pancreatitis.  Patient also denies personal and family history of medullary thyroid cancer and multiple endocrine neoplasia type II ( MEN 2 tumor).  Patient denies any history of kidney stones, seizures, or glaucoma.      Increased dose of semaglutide today      There are no Patient Instructions on file for this visit.    Return in about 6 months (around 10/17/2025).    Subjective:      Patient ID: Addis Segura is a 69 y.o. female.    Chief Complaint   Patient presents with    Follow-up     6 month follow up, labs done 3/12/25    Hypertension    hm     Depression screening       HPI    Patient coming in today because she is having increased hunger when starting her Mounjaro which was about 3 weeks ago, she completed 3 injections. She was also placed on Lexapro back in October 2024 and tried to wean herself off of that a few months ago and had difficulty. She had extreme fatigue. She went back to 10 mg every day, on most recent blood work she has some hyponatremia which is new for her. Last week I advised her to alternate 10 mg with 5 mg to her messaging through  MyChart but she did not start that yet. Patient think she is having side effects to the generic Mounjaro and would like to try something else that she is gained 6 pounds and feels that she cannot turn the hunger off.   April 2025, has been tolerating the semaglutide without any issues but is not having any weight loss.  She would like a dose increase.      Rosacea, patient was placed on Metrogel by her endocrinologist and she has been using it for 1 month.  She feels that is not helping.  She has facial flushing for several years which are exacerbated by stress, hypertension, heat, and hot flashes.  She would like to discuss other options. 5/16/18: started doxy 1 week ago, no relief yet. Still flushing and worse in the mornings. 10/15/18:  Helping a lot, taking doxy BID, ran out 1 week ago, re start at BID and then decrease to monthly. Dc 2019 taking doxy BID iith food, no issues  May 2021, taking doxycycline b.i.d. For several months and started Metrogel which she thinks flairs up her symptoms.  September 2021, stable  January 2022, on doxycycline twice a day and tolerating well.  July 2022, stable, no issues.  October 2023, currently inflamed but usually very well controlled, compliant with medications.  Uses sunscreen  May 2024: stable, on medications, controlled   October 2024, no new issues or flareups  April 2025, no recent flareups     Insomnia, patient states she does not sleep well which is very multifactorial including the pain and how flashes.  She used to be on estrogen replacement currently does not take any.  She does take Ativan at night to help her sleep which helped marginally.5/16/18: taking 1.5 mg  And advil pm and it has helped- sleeping through the night now and slightly  More tired in am.  10/15/18: taking ativan and sleeping well with it 3/1/19:   July 2019, has not tried to decrease her lorazepam and does take it every night to sleep  Dec 2019 usually doing very well but  Lately having some  issues with sleep. Thinks its from the holidays  June 2020, stable  May 2021, sleeping okay usually with her medications however she does wake up in the middle of the night and feels a high level of anxiety which she does not know what to do about.  She cannot sleep without her lorazepam which she is taking faithfully.  At last appointment with another provider, she was started on Cymbalta but currently is not taking and she cannot remember what side effects she had  September 2021, not any better, pain keeps her up at night  Jan 2022:  Sleeping very well with the addition of Elavil and tolerating.  No side effects  July 2022, stable and working well  October 2023, stable, no issues, Lyrica working well  May 2024: better lately, no pain at night   October 2024, improved in general  April 2025, doing well in general no HI or SI and sleeping     Depression:  Patient is a depression was exacerbated by the pain and inability to sleep.  She is always tired and fatigued and is having difficulty with activities of daily living around the house like cleaning and cooking as well as work.  Patient is here today and can cooperate always.  She feels that she is up to any medications or the long medications right now.  8/13/18: buspar was increased to 10 mg per but she felt tired on it and went back to 5 mg BId, has gained weight  10/15/18: taking bupar 5 mg- 2 tabs at [facility],  12/21/18: stable  March 2019, no issues.  Takes 1- 5 mg BuSpar in the morning than 1 with lunch and 2 at night.   July 2019:  Doing very well with present medications and feels much better since losing weight  Dec 2019 taking 2 buspar at night and 1/2 in the morning    June 2020, relatively stable with no suicidal or homicidal ideations   May 2021, depression is relatively well controlled however currently her anxiety is at exacerbations since [place]rix time.  She stated everything is normal with no problems at home or work no changes in her  medications and no over-the-counter things that she has introduced to her daily routine.  Other than not eating well and putting on some weight she states everything is fine.  She recently had some lab work and her thyroid is controlled and she is compliant with her medications.  She was started on Cymbalta few months ago from another provider which she took for few days and stopped but she cannot remember why she stopped her what side effects she had.  She takes BuSpar -total of 30 mg per day but cannot take a full dose in the morning because it makes her groggy.  She is taking her lorazepam at night.  She states she feels like in the middle of the night at her or jump out of her chest but then her  checks her blood pressure as well as heart rate since he is a respiratory therapist in everything is normal.  She does not drink any caffeine or alcohol towards bedtime and does not know why this is happening.  It does not happen every night and can happen in the daytime as well randomly   September 2021, flare up of depression due to pain  January 2022, significantly improved since she is sleeping better and pain is better with the addition of Elavil.  She has decreased her BuSpar and is only taking it once or twice a day now instead of 3 times a day.  No breakthrough symptoms.  No HI or SI   .  July 2022, stable, no HI or SI.  No exacerbations  October 2023, no depression symptoms at the moment, no HI or SI, takes lorazepam every night to help sleep   May 2024: no HI or SI, on medications , stressed as she is helping to plan her sons wedding and d-I-l bridal shower   October 2024, no HI or SI stable on medications but would like something different due to feeling very weepy  April 2025, started on Lexapro in October which is working very well, she has some hyponatremia however while she was not on Lexapro she has had that in the past as well.  Current sodium level is 130.  Medication is working well, no HI or  SI     Chronic pain, not too much better than previously.  Follows with pain management.   May 2021, follows with pain management and has a pain pump  September 2021, worst, in general.  This increases her weight, lack of activity and depression.  She is getting steroid injections right now and is scheduled for MRI from her pain management doctors  January 2022, received another steroid injection which has significantly helped but that was only about 3 weeks ago.  She is not sure how long it lasts and can only get it every 3 months.  Usually does not last her 3 months.  Elavil has helped her sleep quality as well as helped with the pain and she has an appointment tomorrow for neuro surgery opinion for minimally invasive surgery.   July 2022, in January she had an epidural injection which worked really well for her however her most recent1 in April has not given her any relief  October 2023, very well controlled, feels that her pain is very minimal  May 2024: pain is well controlled with after surgery and pain pump  October 2024, relatively well-controlled, no new issues  April 2025, doing well no issues, has a pain stimulator that she is not use, needs a battery change    Transient loss of vision, occurred several weeks ago, was able to get stat CT scan which ruled out any acute issues however would recommend an MRI however she needs to follow through with getting her pain stimulator battery changed and then putting it in MRI mode as she is not able to get MRI without doing that.  She denies any other strokelike symptoms      Hyperlipidemia, increase in lipid levels due to increased weight and inactivity.  July 2022, patient continues to gain weight.  She has not had recent lab work for her cholesterol   May 2024: stable with low dose meds  October 2024, doing well with current medications  April 2025, lipid levels in range, doing well on medications.    The following portions of the patient's history were reviewed  and updated as appropriate: allergies, current medications, past family history, past medical history, past social history, past surgical history and problem list.    Review of Systems        Constitutional:  Denies fever or chills   Eyes:  Denies double , blurry vision or eye pain  HENT:  Denies nasal congestion, sore throat or new hearing issues  Respiratory:  Denies cough or shortness of breath or wheezing  Cardiovascular:  Denies palpitations or chest pain  GI:  Denies abdominal pain, nausea, or vomiting, no loose stools, no reflux  Integument:  Denies rash , no open areas  Neurologic:  Denies headache or focal weakness, no dizziness  : no dysuria, or hematuria      Current Outpatient Medications   Medication Sig Dispense Refill    amitriptyline (ELAVIL) 10 mg tablet Take 1 tablet (10 mg total) by mouth daily at bedtime 90 tablet 0    Ascorbic Acid (VITAMIN C PO) Take 1 tablet by mouth daily      atorvastatin (LIPITOR) 10 mg tablet Take 1 tablet (10 mg total) by mouth daily at bedtime 90 tablet 0    Cetirizine HCl (ZYRTEC PO) Take by mouth as needed      Cholecalciferol 50 MCG (2000 UT) CAPS Take 1 capsule by mouth every morning      [employer] Diclofenac Sodium 1 % APPLY 2 GRAMS TO AFFECTED AREA 4 TIMES A  g 1    diclofenac (VOLTAREN) 75 mg EC tablet TAKE 1 TABLET BY MOUTH TWICE A DAY (Patient taking differently: Take 75 mg by mouth 2 (two) times a day Taking 0.5 tab daily) 180 tablet 1    doxycycline hyclate (VIBRAMYCIN) 100 mg capsule Take 1 capsule (100 mg total) by mouth every 12 (twelve) hours 180 capsule 1    escitalopram (LEXAPRO) 10 mg tablet Take 1 tablet (10 mg total) by mouth daily 100 tablet 1    fluticasone (FLONASE) 50 mcg/act nasal spray 1 spray into each nostril daily 16 g 3    furosemide (LASIX) 40 mg tablet TAKE 1 TABLET BY MOUTH EVERY DAY 90 tablet 1    lisinopril (ZESTRIL) 20 mg tablet TAKE 1 TABLET BY MOUTH TWICE A  tablet 1    LORazepam (ATIVAN) 1 mg tablet Take 1.5 tablets (1.5  "mg total) by mouth daily at bedtime as needed for anxiety 135 tablet 0    methimazole (TAPAZOLE) 5 mg tablet TAKE 1 TABLET BY MOUTH TWICE A  tablet 1    multivitamin (THERAGRAN) TABS Take 1 tablet by mouth every morning      Polyethyl Glycol-Propyl Glycol (SYSTANE OP) Apply to eye if needed      POTASSIUM PO Take 1 tablet by mouth in the morning      Probiotic Product (PROBIOTIC PO) Take 1 capsule by mouth every morning      propranolol (INDERAL) 10 mg tablet TAKE 1 TABLET BY MOUTH TWICE A  tablet 1    Semaglutide-Weight Management (Wegovy) 0.5 MG/0.5ML Inject 0.5 mg under the skin weekly 2 mL 0    timolol (TIMOPTIC) 0.5 % ophthalmic solution       tiZANidine (ZANAFLEX) 4 mg tablet Take 1 tablet (4 mg total) by mouth daily at bedtime 90 tablet 3    valACYclovir (VALTREX) 500 mg tablet Take 1 tablet (500 mg total) by mouth daily as needed (Cold Sores) 30 tablet 5    cycloSPORINE (RESTASIS) 0.05 % ophthalmic emulsion 1 drop 2 (two) times a day      penciclovir (Denavir) 1 % cream Apply topically daily as needed (ulcer) (Patient not taking: Reported on 3/13/2025) 5 g 5     No current facility-administered medications for this visit.       Objective:    /80 (BP Location: Left arm, Patient Position: Sitting, Cuff Size: Adult)   Pulse 97   Temp 98.4 °F (36.9 °C) (Temporal)   Ht 5' 5\" (1.651 m)   Wt 93.4 kg (206 lb)   LMP 11/26/2015   SpO2 95%   BMI 34.28 kg/m²        Physical Exam       Constitutional:  Well developed, well nourished, no acute distress, non-toxic appearance   Eyes:  PERRL, conjunctiva normal , non icteric sclera  HENT:  Atraumatic, oropharynx moist. Neck-  supple   Respiratory:  CTA b/l, normal breath sounds, no rales, no wheezing   Cardiovascular:  RRR, no murmurs, no LE edema b/l  GI:  Soft, nondistended, normal bowel sounds x 4, nontender, no organomegaly, no mass, no rebound, no guarding   Neurologic:  no focal deficits noted   Psychiatric:  Speech and behavior appropriate , " AAO x 3      Anup Crowell DO

## 2025-04-21 DIAGNOSIS — M86.9 OSTEITIS (HCC): Primary | ICD-10-CM

## 2025-04-21 RX ORDER — CIPROFLOXACIN 500 MG/1
500 TABLET, FILM COATED ORAL EVERY 12 HOURS SCHEDULED
Qty: 20 TABLET | Refills: 0 | Status: SHIPPED | OUTPATIENT
Start: 2025-04-21 | End: 2025-05-01

## 2025-04-21 RX ORDER — TRIAMCINOLONE ACETONIDE 55 UG/1
1 SPRAY, METERED NASAL DAILY
Qty: 16.9 ML | Refills: 1 | Status: SHIPPED | OUTPATIENT
Start: 2025-04-21

## 2025-04-28 ENCOUNTER — TELEPHONE (OUTPATIENT)
Dept: INTERNAL MEDICINE CLINIC | Facility: OTHER | Age: 70
End: 2025-04-28

## 2025-04-28 DIAGNOSIS — R63.2 HYPERPHAGIA: ICD-10-CM

## 2025-04-28 DIAGNOSIS — R63.5 WEIGHT GAIN: ICD-10-CM

## 2025-04-28 RX ORDER — SEMAGLUTIDE 1 MG/.5ML
INJECTION, SOLUTION SUBCUTANEOUS
Qty: 2 ML | Refills: 0 | Status: SHIPPED | OUTPATIENT
Start: 2025-04-28

## 2025-04-28 NOTE — TELEPHONE ENCOUNTER
Spoke with Haley at [facility] pharmacy.Took verbal order. Semaglutide needs to be ordered as semaglutide  not wegovy. Script needs to say semaglutide 5-4 mg of pyroxidine (B6).Needs added B6 for nausea.

## 2025-04-28 NOTE — TELEPHONE ENCOUNTER
"Patient calling back regarding Semaglutide-Weight Management (Wegovy) patient states that Dr Crowell was going to increase her dosage . Please send to [facility] in chart. Patient states she spoke to pharmacy today and they are requesting reason for \"medical necessity\" for medication. Will accept phone call [phone]     Patient needs medication by Thursday 5/1/25 as she is going away  "

## 2025-04-28 NOTE — TELEPHONE ENCOUNTER
Patient called the RX Refill Line. Message is being forwarded to the office.     Patient called back about Wegovy injection. Patient stated she confirmed with [facility] that it is not a prior auth that is needed, just additional information. Patient requesting phone call once pharmacy is updated.

## 2025-05-22 DIAGNOSIS — F41.9 ANXIETY: ICD-10-CM

## 2025-05-23 RX ORDER — LORAZEPAM 1 MG/1
1.5 TABLET ORAL
Qty: 135 TABLET | Refills: 0 | Status: SHIPPED | OUTPATIENT
Start: 2025-05-23

## 2025-06-12 DIAGNOSIS — E78.00 HYPERCHOLESTEREMIA: ICD-10-CM

## 2025-06-12 DIAGNOSIS — M51.369 LUMBAR DEGENERATIVE DISC DISEASE: ICD-10-CM

## 2025-06-12 DIAGNOSIS — M48.061 LUMBAR STENOSIS WITHOUT NEUROGENIC CLAUDICATION: ICD-10-CM

## 2025-06-13 RX ORDER — ATORVASTATIN CALCIUM 10 MG/1
10 TABLET, FILM COATED ORAL
Qty: 90 TABLET | Refills: 1 | Status: SHIPPED | OUTPATIENT
Start: 2025-06-13

## 2025-06-13 RX ORDER — AMITRIPTYLINE HYDROCHLORIDE 10 MG/1
10 TABLET ORAL
Qty: 90 TABLET | Refills: 1 | Status: SHIPPED | OUTPATIENT
Start: 2025-06-13

## 2025-06-23 DIAGNOSIS — R60.0 BILATERAL LEG EDEMA: ICD-10-CM

## 2025-06-23 RX ORDER — FUROSEMIDE 40 MG/1
40 TABLET ORAL DAILY
Qty: 90 TABLET | Refills: 1 | Status: SHIPPED | OUTPATIENT
Start: 2025-06-23

## 2025-07-16 DIAGNOSIS — E05.90 HYPERTHYROIDISM: ICD-10-CM

## 2025-07-17 RX ORDER — PROPRANOLOL HYDROCHLORIDE 10 MG/1
10 TABLET ORAL 2 TIMES DAILY
Qty: 180 TABLET | Refills: 1 | Status: SHIPPED | OUTPATIENT
Start: 2025-07-17

## 2025-07-18 RX ORDER — METHIMAZOLE 5 MG/1
5 TABLET ORAL 2 TIMES DAILY
Qty: 180 TABLET | Refills: 1 | Status: SHIPPED | OUTPATIENT
Start: 2025-07-18

## 2025-07-20 DIAGNOSIS — F41.9 ANXIETY: ICD-10-CM

## 2025-07-20 DIAGNOSIS — F43.9 STRESS: ICD-10-CM

## 2025-07-22 RX ORDER — LORAZEPAM 1 MG/1
1.5 TABLET ORAL
Qty: 135 TABLET | Refills: 0 | Status: SHIPPED | OUTPATIENT
Start: 2025-07-22

## 2025-07-22 RX ORDER — ESCITALOPRAM OXALATE 10 MG/1
10 TABLET ORAL DAILY
Qty: 100 TABLET | Refills: 1 | Status: SHIPPED | OUTPATIENT
Start: 2025-07-22

## 2025-07-30 ENCOUNTER — CONSULT (OUTPATIENT)
Dept: NEUROSURGERY | Facility: CLINIC | Age: 70
End: 2025-07-30
Payer: MEDICARE

## 2025-07-30 VITALS
BODY MASS INDEX: 34.32 KG/M2 | SYSTOLIC BLOOD PRESSURE: 122 MMHG | RESPIRATION RATE: 16 BRPM | OXYGEN SATURATION: 98 % | DIASTOLIC BLOOD PRESSURE: 80 MMHG | HEART RATE: 67 BPM | TEMPERATURE: 97.7 F | HEIGHT: 65 IN | WEIGHT: 206 LBS

## 2025-07-30 DIAGNOSIS — Z79.899 ENCOUNTER FOR LONG-TERM (CURRENT) USE OF MEDICATIONS: ICD-10-CM

## 2025-07-30 DIAGNOSIS — Z79.01 LONG TERM (CURRENT) USE OF ANTICOAGULANTS: ICD-10-CM

## 2025-07-30 DIAGNOSIS — Z45.42 BATTERY END OF LIFE OF SPINAL CORD STIMULATOR: Primary | ICD-10-CM

## 2025-07-30 DIAGNOSIS — Z01.812 PRE-OPERATIVE LABORATORY EXAMINATION: ICD-10-CM

## 2025-07-30 PROCEDURE — 99203 OFFICE O/P NEW LOW 30 MIN: CPT | Performed by: STUDENT IN AN ORGANIZED HEALTH CARE EDUCATION/TRAINING PROGRAM

## 2025-07-30 RX ORDER — CHLORHEXIDINE GLUCONATE ORAL RINSE 1.2 MG/ML
15 SOLUTION DENTAL ONCE
OUTPATIENT
Start: 2025-07-30 | End: 2025-07-30

## 2025-07-30 RX ORDER — CEFAZOLIN SODIUM 2 G/50ML
2000 SOLUTION INTRAVENOUS ONCE
OUTPATIENT
Start: 2025-07-30 | End: 2025-07-30

## 2025-08-01 ENCOUNTER — TELEPHONE (OUTPATIENT)
Dept: NEUROSURGERY | Facility: CLINIC | Age: 70
End: 2025-08-01

## 2025-08-05 ENCOUNTER — OFFICE VISIT (OUTPATIENT)
Dept: INTERNAL MEDICINE CLINIC | Facility: OTHER | Age: 70
End: 2025-08-05
Payer: MEDICARE

## 2025-08-05 ENCOUNTER — HOSPITAL ENCOUNTER (OUTPATIENT)
Dept: RADIOLOGY | Facility: IMAGING CENTER | Age: 70
Discharge: HOME | End: 2025-08-05
Payer: MEDICARE

## 2025-08-05 VITALS
BODY MASS INDEX: 34.75 KG/M2 | HEIGHT: 65 IN | WEIGHT: 208.6 LBS | TEMPERATURE: 98.4 F | OXYGEN SATURATION: 98 % | HEART RATE: 64 BPM | DIASTOLIC BLOOD PRESSURE: 80 MMHG | SYSTOLIC BLOOD PRESSURE: 126 MMHG
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
## 2025-08-05 DIAGNOSIS — M50.90 DISC DISORDER OF CERVICAL REGION: ICD-10-CM

## 2025-08-05 DIAGNOSIS — I10 BENIGN ESSENTIAL HTN: ICD-10-CM

## 2025-08-05 DIAGNOSIS — M54.2 NECK PAIN: Primary | ICD-10-CM

## 2025-08-05 DIAGNOSIS — M54.2 NECK PAIN: ICD-10-CM

## 2025-08-05 PROCEDURE — G2211 COMPLEX E/M VISIT ADD ON: HCPCS

## 2025-08-05 PROCEDURE — 99213 OFFICE O/P EST LOW 20 MIN: CPT

## 2025-08-05 PROCEDURE — 72040 X-RAY EXAM NECK SPINE 2-3 VW: CPT

## 2025-08-07 ENCOUNTER — OFFICE VISIT (OUTPATIENT)
Dept: PAIN MEDICINE | Facility: CLINIC | Age: 70
End: 2025-08-07
Payer: MEDICARE

## 2025-08-07 VITALS — HEIGHT: 65 IN | WEIGHT: 208 LBS | BODY MASS INDEX: 34.66 KG/M2

## 2025-08-07 DIAGNOSIS — G89.29 CHRONIC RIGHT SHOULDER PAIN: ICD-10-CM

## 2025-08-07 DIAGNOSIS — M25.511 CHRONIC RIGHT SHOULDER PAIN: ICD-10-CM

## 2025-08-07 DIAGNOSIS — M54.12 CERVICAL RADICULOPATHY: Primary | ICD-10-CM

## 2025-08-07 DIAGNOSIS — M79.18 MYOFASCIAL PAIN SYNDROME: ICD-10-CM

## 2025-08-07 PROCEDURE — 99214 OFFICE O/P EST MOD 30 MIN: CPT

## 2025-08-07 RX ORDER — LEVOBUNOLOL HYDROCHLORIDE 5 MG/ML
1 SOLUTION/ DROPS OPHTHALMIC EVERY MORNING
COMMUNITY
Start: 2025-07-09

## 2025-08-09 ENCOUNTER — APPOINTMENT (OUTPATIENT)
Dept: LAB | Facility: MEDICAL CENTER | Age: 70
End: 2025-08-09
Payer: MEDICARE

## 2025-08-12 ENCOUNTER — TELEPHONE (OUTPATIENT)
Age: 70
End: 2025-08-12

## 2025-08-22 ENCOUNTER — CONSULT (OUTPATIENT)
Dept: INTERNAL MEDICINE CLINIC | Age: 70
End: 2025-08-22
Payer: MEDICARE

## 2025-08-22 VITALS
TEMPERATURE: 98.3 F | DIASTOLIC BLOOD PRESSURE: 78 MMHG | BODY MASS INDEX: 32.95 KG/M2 | HEART RATE: 76 BPM | WEIGHT: 205 LBS | HEIGHT: 66 IN | SYSTOLIC BLOOD PRESSURE: 118 MMHG | OXYGEN SATURATION: 98 %

## 2025-08-22 DIAGNOSIS — Z01.818 PRE-OPERATIVE CLEARANCE: Primary | ICD-10-CM

## 2025-08-22 DIAGNOSIS — Z01.810 ENCOUNTER FOR PRE-OPERATIVE CARDIOVASCULAR CLEARANCE: ICD-10-CM

## 2025-08-22 DIAGNOSIS — G89.4 PAIN SYNDROME, CHRONIC: ICD-10-CM

## 2025-08-22 DIAGNOSIS — I10 BENIGN ESSENTIAL HTN: ICD-10-CM

## 2025-08-22 DIAGNOSIS — E87.1 HYPONATREMIA: ICD-10-CM

## 2025-08-22 PROCEDURE — G2211 COMPLEX E/M VISIT ADD ON: HCPCS

## 2025-08-22 PROCEDURE — 99214 OFFICE O/P EST MOD 30 MIN: CPT

## 2025-08-22 PROCEDURE — 93000 ELECTROCARDIOGRAM COMPLETE: CPT

## (undated) DEVICE — PDS II VLT 0 107CM AG ST3: Brand: ENDOLOOP

## (undated) DEVICE — SUT VICRYL 0 UR-6 27 IN J603H

## (undated) DEVICE — NEEDLE 25G X 1 1/2

## (undated) DEVICE — METZENBAUM ADTEC SINGLE USE DISSECTING SCISSORS, SHAFT ONLY, MONOPOLAR, CURVED TO LEFT, WORKING LENGTH: 12 1/4", (310 MM), DIAM. 5 MM, INSULATED, DOUBLE ACTION, STERILE, DISPOSABLE, PACKAGE OF 10 PIECES: Brand: AESCULAP

## (undated) DEVICE — GLOVE SRG BIOGEL 7.5

## (undated) DEVICE — GLOVE SRG BIOGEL ECLIPSE 8.5

## (undated) DEVICE — GLOVE INDICATOR PI UNDERGLOVE SZ 7.5 BLUE

## (undated) DEVICE — INTENDED FOR TISSUE SEPARATION, AND OTHER PROCEDURES THAT REQUIRE A SHARP SURGICAL BLADE TO PUNCTURE OR CUT.: Brand: BARD-PARKER SAFETY BLADES SIZE 11, STERILE

## (undated) DEVICE — TROCAR: Brand: KII FIOS FIRST ENTRY

## (undated) DEVICE — ADHESIVE SKIN HIGH VISCOSITY EXOFIN 1ML

## (undated) DEVICE — TOWEL SURG XR DETECT GREEN STRL RFD

## (undated) DEVICE — COBAN 4 IN STERILE

## (undated) DEVICE — LIGACLIP 10-M/L, 10MM ENDOSCOPIC ROTATING MULTIPLE CLIP APPLIERS: Brand: LIGACLIP

## (undated) DEVICE — INSUFFLATION NEEDLE TO ESTABLISH PNEUMOPERITONEUM.: Brand: INSUFFLATION NEEDLE

## (undated) DEVICE — 3M™ STERI-STRIP™ REINFORCED ADHESIVE SKIN CLOSURES, R1547, 1/2 IN X 4 IN (12 MM X 100 MM), 6 STRIPS/ENVELOPE: Brand: 3M™ STERI-STRIP™

## (undated) DEVICE — SYRINGE 10ML LL

## (undated) DEVICE — GLOVE INDICATOR PI UNDERGLOVE SZ 8.5 BLUE

## (undated) DEVICE — ELECTRODE LAP SPATULA CRV E-Z CLEAN 33CM -0018C

## (undated) DEVICE — ENDOPATH 5MM ENDOSCOPIC BLUNT TIP DISSECTORS (12 POUCHES CONTAINING 3 DISSECTORS EACH): Brand: ENDOPATH

## (undated) DEVICE — 5 MM BABCOCKS WITH RATCHET HANDLES: Brand: ENDOPATH

## (undated) DEVICE — PENCIL ELECTROSURG E-Z CLEAN -0035H

## (undated) DEVICE — ASTOUND STANDARD SURGICAL GOWN, XXL: Brand: CONVERTORS

## (undated) DEVICE — TROCAR: Brand: KII® SLEEVE

## (undated) DEVICE — HARMONIC ACE 5MM DIAMETER SHEARS 36CM SHAFT LENGTH + ADAPTIVE TISSUE TECHNOLOGY FOR USE WITH GENERATOR G11: Brand: HARMONIC ACE

## (undated) DEVICE — CAUTERY TIP POLISHER: Brand: DEVON

## (undated) DEVICE — PACK PBDS LAP CHOLE RF

## (undated) DEVICE — TRAY FOLEY 16FR URIMETER SURESTEP

## (undated) DEVICE — SUT MONOCRYL 4-0 PS-2 27 IN Y426H